# Patient Record
Sex: MALE | Race: WHITE | NOT HISPANIC OR LATINO | Employment: FULL TIME | URBAN - METROPOLITAN AREA
[De-identification: names, ages, dates, MRNs, and addresses within clinical notes are randomized per-mention and may not be internally consistent; named-entity substitution may affect disease eponyms.]

---

## 2019-10-25 ENCOUNTER — CONVERSION ENCOUNTER (OUTPATIENT)
Dept: FAMILY MEDICINE CLINIC | Facility: CLINIC | Age: 34
End: 2019-10-25

## 2019-10-25 ENCOUNTER — OFFICE VISIT CONVERTED (OUTPATIENT)
Dept: FAMILY MEDICINE CLINIC | Facility: CLINIC | Age: 34
End: 2019-10-25
Attending: FAMILY MEDICINE

## 2019-10-25 ENCOUNTER — HOSPITAL ENCOUNTER (OUTPATIENT)
Dept: FAMILY MEDICINE CLINIC | Facility: CLINIC | Age: 34
Discharge: HOME | End: 2019-10-25
Attending: FAMILY MEDICINE

## 2019-10-25 LAB
C TRACH RRNA CVX QL NAA+PROBE: NOT DETECTED
N GONORRHOEA DNA SPEC QL NAA+PROBE: NOT DETECTED

## 2019-10-27 LAB — HCV AB SER DONR QL: 0.1 S/CO RATIO (ref 0–0.9)

## 2019-10-28 LAB
CONV HEPATITIS B SURFACE AG W CONFIRMATION RE: NEGATIVE
CONV HIV COMBO AG/AB (HIV-1/O/2) WITH REFLEX: NEGATIVE
HBV CORE AB SER DONR QL IA: NEGATIVE
HBV CORE IGM SERPL QL IA: NEGATIVE
HBV E AB SERPL QL IA: NEGATIVE
HBV E AG SERPL QL IA: NEGATIVE
HBV SURFACE AB SER QL: NON REACTIVE

## 2019-12-27 ENCOUNTER — OFFICE VISIT CONVERTED (OUTPATIENT)
Dept: FAMILY MEDICINE CLINIC | Facility: CLINIC | Age: 34
End: 2019-12-27
Attending: FAMILY MEDICINE

## 2019-12-27 ENCOUNTER — HOSPITAL ENCOUNTER (OUTPATIENT)
Dept: FAMILY MEDICINE CLINIC | Facility: CLINIC | Age: 34
Discharge: HOME | End: 2019-12-27
Attending: FAMILY MEDICINE

## 2019-12-27 ENCOUNTER — CONVERSION ENCOUNTER (OUTPATIENT)
Dept: FAMILY MEDICINE CLINIC | Facility: CLINIC | Age: 34
End: 2019-12-27

## 2019-12-27 LAB
ALBUMIN SERPL-MCNC: 4.5 G/DL (ref 3.5–5)
ALBUMIN/GLOB SERPL: 1.4 {RATIO} (ref 1.4–2.6)
ALP SERPL-CCNC: 120 U/L (ref 53–128)
ALT SERPL-CCNC: 100 U/L (ref 10–40)
ANION GAP SERPL CALC-SCNC: 23 MMOL/L (ref 8–19)
AST SERPL-CCNC: 173 U/L (ref 15–50)
BILIRUB SERPL-MCNC: 1.13 MG/DL (ref 0.2–1.3)
BUN SERPL-MCNC: 10 MG/DL (ref 5–25)
BUN/CREAT SERPL: 10 {RATIO} (ref 6–20)
CALCIUM SERPL-MCNC: 8.7 MG/DL (ref 8.7–10.4)
CHLORIDE SERPL-SCNC: 97 MMOL/L (ref 99–111)
CHOLEST SERPL-MCNC: 240 MG/DL (ref 107–200)
CHOLEST/HDLC SERPL: 2.6 {RATIO} (ref 3–6)
CONV CO2: 23 MMOL/L (ref 22–32)
CONV TOTAL PROTEIN: 7.8 G/DL (ref 6.3–8.2)
CREAT UR-MCNC: 0.96 MG/DL (ref 0.7–1.2)
GFR SERPLBLD BASED ON 1.73 SQ M-ARVRAT: >60 ML/MIN/{1.73_M2}
GLOBULIN UR ELPH-MCNC: 3.3 G/DL (ref 2–3.5)
GLUCOSE SERPL-MCNC: 91 MG/DL (ref 70–99)
HDLC SERPL-MCNC: 91 MG/DL (ref 40–60)
LDLC SERPL CALC-MCNC: 129 MG/DL (ref 70–100)
OSMOLALITY SERPL CALC.SUM OF ELEC: 289 MOSM/KG (ref 273–304)
POTASSIUM SERPL-SCNC: 3.4 MMOL/L (ref 3.5–5.3)
SODIUM SERPL-SCNC: 140 MMOL/L (ref 135–147)
TRIGL SERPL-MCNC: 98 MG/DL (ref 40–150)
VLDLC SERPL-MCNC: 20 MG/DL (ref 5–37)

## 2020-01-30 ENCOUNTER — HOSPITAL ENCOUNTER (OUTPATIENT)
Dept: FAMILY MEDICINE CLINIC | Facility: CLINIC | Age: 35
Discharge: HOME | End: 2020-01-30
Attending: FAMILY MEDICINE

## 2020-01-30 LAB
ALBUMIN SERPL-MCNC: 4.3 G/DL (ref 3.5–5)
ALBUMIN/GLOB SERPL: 1.3 {RATIO} (ref 1.4–2.6)
ALP SERPL-CCNC: 131 U/L (ref 53–128)
ALT SERPL-CCNC: 112 U/L (ref 10–40)
ANION GAP SERPL CALC-SCNC: 18 MMOL/L (ref 8–19)
AST SERPL-CCNC: 245 U/L (ref 15–50)
BILIRUB SERPL-MCNC: 1.63 MG/DL (ref 0.2–1.3)
BUN SERPL-MCNC: 6 MG/DL (ref 5–25)
BUN/CREAT SERPL: 7 {RATIO} (ref 6–20)
CALCIUM SERPL-MCNC: 9 MG/DL (ref 8.7–10.4)
CHLORIDE SERPL-SCNC: 97 MMOL/L (ref 99–111)
CONV CO2: 30 MMOL/L (ref 22–32)
CONV TOTAL PROTEIN: 7.5 G/DL (ref 6.3–8.2)
CREAT UR-MCNC: 0.84 MG/DL (ref 0.7–1.2)
GFR SERPLBLD BASED ON 1.73 SQ M-ARVRAT: >60 ML/MIN/{1.73_M2}
GLOBULIN UR ELPH-MCNC: 3.2 G/DL (ref 2–3.5)
GLUCOSE SERPL-MCNC: 88 MG/DL (ref 70–99)
OSMOLALITY SERPL CALC.SUM OF ELEC: 291 MOSM/KG (ref 273–304)
POTASSIUM SERPL-SCNC: 3.2 MMOL/L (ref 3.5–5.3)
SODIUM SERPL-SCNC: 142 MMOL/L (ref 135–147)

## 2020-01-31 ENCOUNTER — OFFICE VISIT CONVERTED (OUTPATIENT)
Dept: FAMILY MEDICINE CLINIC | Facility: CLINIC | Age: 35
End: 2020-01-31
Attending: FAMILY MEDICINE

## 2020-02-03 ENCOUNTER — HOSPITAL ENCOUNTER (OUTPATIENT)
Dept: ULTRASOUND IMAGING | Facility: HOSPITAL | Age: 35
Discharge: HOME | End: 2020-02-03
Attending: FAMILY MEDICINE

## 2020-02-24 ENCOUNTER — OFFICE VISIT CONVERTED (OUTPATIENT)
Dept: SURGERY | Facility: CLINIC | Age: 35
End: 2020-02-24
Attending: SURGERY

## 2020-03-12 ENCOUNTER — HOSPITAL ENCOUNTER (OUTPATIENT)
Dept: PERIOP | Facility: HOSPITAL | Age: 35
Setting detail: HOSPITAL OUTPATIENT SURGERY
Discharge: HOME | End: 2020-03-12
Attending: SURGERY

## 2020-03-27 ENCOUNTER — CONVERSION ENCOUNTER (OUTPATIENT)
Dept: SURGERY | Facility: CLINIC | Age: 35
End: 2020-03-27

## 2020-03-27 ENCOUNTER — OFFICE VISIT CONVERTED (OUTPATIENT)
Dept: SURGERY | Facility: CLINIC | Age: 35
End: 2020-03-27
Attending: SURGERY

## 2020-04-16 ENCOUNTER — TELEMEDICINE CONVERTED (OUTPATIENT)
Dept: FAMILY MEDICINE CLINIC | Facility: CLINIC | Age: 35
End: 2020-04-16
Attending: FAMILY MEDICINE

## 2020-04-22 ENCOUNTER — HOSPITAL ENCOUNTER (OUTPATIENT)
Dept: LAB | Facility: HOSPITAL | Age: 35
Discharge: HOME | End: 2020-04-22
Attending: FAMILY MEDICINE

## 2020-04-22 LAB
ALBUMIN SERPL-MCNC: 4.2 G/DL (ref 3.5–5)
ALBUMIN/GLOB SERPL: 1.1 {RATIO} (ref 1.4–2.6)
ALP SERPL-CCNC: 115 U/L (ref 53–128)
ALT SERPL-CCNC: 63 U/L (ref 10–40)
ANION GAP SERPL CALC-SCNC: 21 MMOL/L (ref 8–19)
AST SERPL-CCNC: 120 U/L (ref 15–50)
BILIRUB SERPL-MCNC: 2.04 MG/DL (ref 0.2–1.3)
BUN SERPL-MCNC: 5 MG/DL (ref 5–25)
BUN/CREAT SERPL: 6 {RATIO} (ref 6–20)
CALCIUM SERPL-MCNC: 9.7 MG/DL (ref 8.7–10.4)
CHLORIDE SERPL-SCNC: 68 MMOL/L (ref 99–111)
CONV CO2: 43 MMOL/L (ref 22–32)
CONV TOTAL PROTEIN: 8 G/DL (ref 6.3–8.2)
CREAT UR-MCNC: 0.85 MG/DL (ref 0.7–1.2)
GFR SERPLBLD BASED ON 1.73 SQ M-ARVRAT: >60 ML/MIN/{1.73_M2}
GLOBULIN UR ELPH-MCNC: 3.8 G/DL (ref 2–3.5)
GLUCOSE SERPL-MCNC: 116 MG/DL (ref 70–99)
OSMOLALITY SERPL CALC.SUM OF ELEC: 268 MOSM/KG (ref 273–304)
POTASSIUM SERPL-SCNC: 2 MMOL/L (ref 3.5–5.3)
SODIUM SERPL-SCNC: 130 MMOL/L (ref 135–147)

## 2020-04-23 LAB — MAGNESIUM SERPL-MCNC: 1.54 MG/DL (ref 1.6–2.3)

## 2020-04-24 ENCOUNTER — HOSPITAL ENCOUNTER (OUTPATIENT)
Dept: LAB | Facility: HOSPITAL | Age: 35
Discharge: HOME | End: 2020-04-24
Attending: NURSE PRACTITIONER

## 2020-04-24 LAB
ALBUMIN SERPL-MCNC: 4.1 G/DL (ref 3.5–5)
ALBUMIN/GLOB SERPL: 1.1 {RATIO} (ref 1.4–2.6)
ALP SERPL-CCNC: 103 U/L (ref 53–128)
ALT SERPL-CCNC: 44 U/L (ref 10–40)
ANION GAP SERPL CALC-SCNC: 20 MMOL/L (ref 8–19)
AST SERPL-CCNC: 84 U/L (ref 15–50)
BILIRUB SERPL-MCNC: 1.97 MG/DL (ref 0.2–1.3)
BUN SERPL-MCNC: 5 MG/DL (ref 5–25)
BUN/CREAT SERPL: 5 {RATIO} (ref 6–20)
CALCIUM SERPL-MCNC: 9.3 MG/DL (ref 8.7–10.4)
CHLORIDE SERPL-SCNC: 72 MMOL/L (ref 99–111)
CONV CO2: 39 MMOL/L (ref 22–32)
CONV TOTAL PROTEIN: 7.8 G/DL (ref 6.3–8.2)
CREAT UR-MCNC: 1.11 MG/DL (ref 0.7–1.2)
GFR SERPLBLD BASED ON 1.73 SQ M-ARVRAT: >60 ML/MIN/{1.73_M2}
GLOBULIN UR ELPH-MCNC: 3.7 G/DL (ref 2–3.5)
GLUCOSE SERPL-MCNC: 160 MG/DL (ref 70–99)
MAGNESIUM SERPL-MCNC: 1.55 MG/DL (ref 1.6–2.3)
OSMOLALITY SERPL CALC.SUM OF ELEC: 269 MOSM/KG (ref 273–304)
POTASSIUM SERPL-SCNC: 2.3 MMOL/L (ref 3.5–5.3)
SODIUM SERPL-SCNC: 129 MMOL/L (ref 135–147)

## 2020-04-27 ENCOUNTER — OFFICE VISIT CONVERTED (OUTPATIENT)
Dept: FAMILY MEDICINE CLINIC | Facility: CLINIC | Age: 35
End: 2020-04-27
Attending: FAMILY MEDICINE

## 2020-04-27 ENCOUNTER — HOSPITAL ENCOUNTER (OUTPATIENT)
Dept: FAMILY MEDICINE CLINIC | Facility: CLINIC | Age: 35
Discharge: HOME | End: 2020-04-27
Attending: FAMILY MEDICINE

## 2020-04-27 LAB
ALBUMIN SERPL-MCNC: 3.7 G/DL (ref 3.5–5)
ALBUMIN/GLOB SERPL: 1 {RATIO} (ref 1.4–2.6)
ALP SERPL-CCNC: 81 U/L (ref 53–128)
ALT SERPL-CCNC: 29 U/L (ref 10–40)
ANION GAP SERPL CALC-SCNC: 20 MMOL/L (ref 8–19)
AST SERPL-CCNC: 57 U/L (ref 15–50)
BILIRUB SERPL-MCNC: 2.03 MG/DL (ref 0.2–1.3)
BUN SERPL-MCNC: 8 MG/DL (ref 5–25)
BUN/CREAT SERPL: 9 {RATIO} (ref 6–20)
CALCIUM SERPL-MCNC: 9.6 MG/DL (ref 8.7–10.4)
CHLORIDE SERPL-SCNC: 80 MMOL/L (ref 99–111)
CONV CO2: 35 MMOL/L (ref 22–32)
CONV TOTAL PROTEIN: 7.5 G/DL (ref 6.3–8.2)
CREAT UR-MCNC: 0.89 MG/DL (ref 0.7–1.2)
GFR SERPLBLD BASED ON 1.73 SQ M-ARVRAT: >60 ML/MIN/{1.73_M2}
GLOBULIN UR ELPH-MCNC: 3.8 G/DL (ref 2–3.5)
GLUCOSE SERPL-MCNC: 172 MG/DL (ref 70–99)
MAGNESIUM SERPL-MCNC: 1.72 MG/DL (ref 1.6–2.3)
OSMOLALITY SERPL CALC.SUM OF ELEC: 276 MOSM/KG (ref 273–304)
POTASSIUM SERPL-SCNC: 2.6 MMOL/L (ref 3.5–5.3)
SODIUM SERPL-SCNC: 132 MMOL/L (ref 135–147)

## 2020-05-04 ENCOUNTER — HOSPITAL ENCOUNTER (OUTPATIENT)
Dept: LAB | Facility: HOSPITAL | Age: 35
Discharge: HOME | End: 2020-05-04
Attending: FAMILY MEDICINE

## 2020-05-04 LAB
ALBUMIN SERPL-MCNC: 4 G/DL (ref 3.5–5)
ALBUMIN/GLOB SERPL: 1 {RATIO} (ref 1.4–2.6)
ALP SERPL-CCNC: 99 U/L (ref 53–128)
ALT SERPL-CCNC: 64 U/L (ref 10–40)
ANION GAP SERPL CALC-SCNC: 23 MMOL/L (ref 8–19)
AST SERPL-CCNC: 74 U/L (ref 15–50)
BILIRUB SERPL-MCNC: 0.94 MG/DL (ref 0.2–1.3)
BUN SERPL-MCNC: 7 MG/DL (ref 5–25)
BUN/CREAT SERPL: 8 {RATIO} (ref 6–20)
CALCIUM SERPL-MCNC: 9.9 MG/DL (ref 8.7–10.4)
CHLORIDE SERPL-SCNC: 89 MMOL/L (ref 99–111)
CONV CO2: 30 MMOL/L (ref 22–32)
CONV TOTAL PROTEIN: 8.2 G/DL (ref 6.3–8.2)
CREAT UR-MCNC: 0.88 MG/DL (ref 0.7–1.2)
EST. AVERAGE GLUCOSE BLD GHB EST-MCNC: 88 MG/DL
GFR SERPLBLD BASED ON 1.73 SQ M-ARVRAT: >60 ML/MIN/{1.73_M2}
GLOBULIN UR ELPH-MCNC: 4.2 G/DL (ref 2–3.5)
GLUCOSE SERPL-MCNC: 128 MG/DL (ref 70–99)
HBA1C MFR BLD: 4.7 % (ref 3.5–5.7)
OSMOLALITY SERPL CALC.SUM OF ELEC: 286 MOSM/KG (ref 273–304)
POTASSIUM SERPL-SCNC: 3.9 MMOL/L (ref 3.5–5.3)
SODIUM SERPL-SCNC: 138 MMOL/L (ref 135–147)

## 2020-05-15 ENCOUNTER — OFFICE VISIT CONVERTED (OUTPATIENT)
Dept: FAMILY MEDICINE CLINIC | Facility: CLINIC | Age: 35
End: 2020-05-15
Attending: FAMILY MEDICINE

## 2020-05-15 ENCOUNTER — HOSPITAL ENCOUNTER (OUTPATIENT)
Dept: FAMILY MEDICINE CLINIC | Facility: CLINIC | Age: 35
Discharge: HOME | End: 2020-05-15
Attending: FAMILY MEDICINE

## 2020-05-15 LAB
ALBUMIN SERPL-MCNC: 4 G/DL (ref 3.5–5)
ALBUMIN/GLOB SERPL: 1 {RATIO} (ref 1.4–2.6)
ALP SERPL-CCNC: 90 U/L (ref 53–128)
ALT SERPL-CCNC: 28 U/L (ref 10–40)
ANION GAP SERPL CALC-SCNC: 20 MMOL/L (ref 8–19)
AST SERPL-CCNC: 37 U/L (ref 15–50)
BILIRUB SERPL-MCNC: 0.84 MG/DL (ref 0.2–1.3)
BUN SERPL-MCNC: 6 MG/DL (ref 5–25)
BUN/CREAT SERPL: 6 {RATIO} (ref 6–20)
CALCIUM SERPL-MCNC: 9.8 MG/DL (ref 8.7–10.4)
CHLORIDE SERPL-SCNC: 91 MMOL/L (ref 99–111)
CONV CO2: 31 MMOL/L (ref 22–32)
CONV TOTAL PROTEIN: 7.9 G/DL (ref 6.3–8.2)
CREAT UR-MCNC: 0.99 MG/DL (ref 0.7–1.2)
GFR SERPLBLD BASED ON 1.73 SQ M-ARVRAT: >60 ML/MIN/{1.73_M2}
GLOBULIN UR ELPH-MCNC: 3.9 G/DL (ref 2–3.5)
GLUCOSE SERPL-MCNC: 138 MG/DL (ref 70–99)
OSMOLALITY SERPL CALC.SUM OF ELEC: 288 MOSM/KG (ref 273–304)
POTASSIUM SERPL-SCNC: 3.3 MMOL/L (ref 3.5–5.3)
SODIUM SERPL-SCNC: 139 MMOL/L (ref 135–147)

## 2020-06-12 ENCOUNTER — OFFICE VISIT CONVERTED (OUTPATIENT)
Dept: FAMILY MEDICINE CLINIC | Facility: CLINIC | Age: 35
End: 2020-06-12
Attending: FAMILY MEDICINE

## 2020-06-12 ENCOUNTER — HOSPITAL ENCOUNTER (OUTPATIENT)
Dept: FAMILY MEDICINE CLINIC | Facility: CLINIC | Age: 35
Discharge: HOME | End: 2020-06-12
Attending: FAMILY MEDICINE

## 2020-06-12 LAB
ALBUMIN SERPL-MCNC: 3.8 G/DL (ref 3.5–5)
ALBUMIN/GLOB SERPL: 1 {RATIO} (ref 1.4–2.6)
ALP SERPL-CCNC: 92 U/L (ref 53–128)
ALT SERPL-CCNC: 11 U/L (ref 10–40)
ANION GAP SERPL CALC-SCNC: 16 MMOL/L (ref 8–19)
AST SERPL-CCNC: 34 U/L (ref 15–50)
BILIRUB SERPL-MCNC: 0.78 MG/DL (ref 0.2–1.3)
BUN SERPL-MCNC: 3 MG/DL (ref 5–25)
BUN/CREAT SERPL: 3 {RATIO} (ref 6–20)
CALCIUM SERPL-MCNC: 9.7 MG/DL (ref 8.7–10.4)
CHLORIDE SERPL-SCNC: 102 MMOL/L (ref 99–111)
CONV CO2: 27 MMOL/L (ref 22–32)
CONV TOTAL PROTEIN: 7.5 G/DL (ref 6.3–8.2)
CREAT UR-MCNC: 0.91 MG/DL (ref 0.7–1.2)
GFR SERPLBLD BASED ON 1.73 SQ M-ARVRAT: >60 ML/MIN/{1.73_M2}
GLOBULIN UR ELPH-MCNC: 3.7 G/DL (ref 2–3.5)
GLUCOSE SERPL-MCNC: 78 MG/DL (ref 70–99)
OSMOLALITY SERPL CALC.SUM OF ELEC: 287 MOSM/KG (ref 273–304)
POTASSIUM SERPL-SCNC: 4 MMOL/L (ref 3.5–5.3)
SODIUM SERPL-SCNC: 141 MMOL/L (ref 135–147)

## 2020-12-21 ENCOUNTER — TELEMEDICINE CONVERTED (OUTPATIENT)
Dept: FAMILY MEDICINE CLINIC | Facility: CLINIC | Age: 35
End: 2020-12-21
Attending: FAMILY MEDICINE

## 2020-12-22 ENCOUNTER — HOSPITAL ENCOUNTER (OUTPATIENT)
Dept: FAMILY MEDICINE CLINIC | Facility: CLINIC | Age: 35
Discharge: HOME | End: 2020-12-22
Attending: FAMILY MEDICINE

## 2020-12-22 LAB
ALBUMIN SERPL-MCNC: 4.5 G/DL (ref 3.5–5)
ALBUMIN/GLOB SERPL: 1.3 {RATIO} (ref 1.4–2.6)
ALP SERPL-CCNC: 181 U/L (ref 53–128)
ALT SERPL-CCNC: 86 U/L (ref 10–40)
ANION GAP SERPL CALC-SCNC: 18 MMOL/L (ref 8–19)
AST SERPL-CCNC: 201 U/L (ref 15–50)
BILIRUB SERPL-MCNC: 1.48 MG/DL (ref 0.2–1.3)
BUN SERPL-MCNC: 10 MG/DL (ref 5–25)
BUN/CREAT SERPL: 11 {RATIO} (ref 6–20)
CALCIUM SERPL-MCNC: 9.5 MG/DL (ref 8.7–10.4)
CHLORIDE SERPL-SCNC: 96 MMOL/L (ref 99–111)
CONV CO2: 27 MMOL/L (ref 22–32)
CONV TOTAL PROTEIN: 7.9 G/DL (ref 6.3–8.2)
CREAT UR-MCNC: 0.92 MG/DL (ref 0.7–1.2)
GFR SERPLBLD BASED ON 1.73 SQ M-ARVRAT: >60 ML/MIN/{1.73_M2}
GLOBULIN UR ELPH-MCNC: 3.4 G/DL (ref 2–3.5)
GLUCOSE SERPL-MCNC: 101 MG/DL (ref 70–99)
OSMOLALITY SERPL CALC.SUM OF ELEC: 283 MOSM/KG (ref 273–304)
POTASSIUM SERPL-SCNC: 3.6 MMOL/L (ref 3.5–5.3)
SODIUM SERPL-SCNC: 137 MMOL/L (ref 135–147)

## 2021-05-12 NOTE — PROGRESS NOTES
Progress Note      Patient Name: Wai Gay   Patient ID: [id number]   Sex: Male   YOB: 1985    Primary Care Provider: Callum Peterson DO   Referring Provider: Callum Peterson DO    Visit Date: April 16, 2020    Provider: Callum Peterson DO   Location: [facility]   Location Address: [address] [place] [address]  [id number]   Location Phone: [phone]          Chief Complaint  · F/u for HTN, tobacco abuse, elevated liver enzymes      History Of Present Illness  Video Conferencing Visit  Wai Gay is a 34 year old /White male who is presenting for evaluation via video conferencing. Verbal consent obtained before beginning visit.   The following staff were present during this visit: Elba Tapia LPN   Wai Gay is a 34 year old /White male who presents for evaluation and treatment of: HTN and elevated LFT. He states that he has been checking his BP and it has been borderline. He is taking his medication daily.      He has his GB out a month ago for gallstones. He also had liver biopsy done at that time. I do not have those results. His pathology was consistent with early cirrhosis.  He states that he has a 20 oz beer per day.       Past Medical History  Disease Name Date Onset Notes   Alcohol abuse 03/14/2017 --    Essential hypertension 12/27/2019 Will start medication   Hypokalemia 03/14/2017 Will up date   Hyponatremia 03/14/2017 --    Steatohepatitis, alcoholic 03/14/2017 --    Tobacco abuse 03/14/2017 --          Past Surgical History  Procedure Name Date Notes   Cholecystectomy --  --          Allergy List  Allergen Name Date Reaction Notes   NO KNOWN DRUG ALLERGIES --  --  --    No known history of drug allergy --  --  --          Family Medical History  Disease Name Relative/Age Notes   Stroke Grandfather (maternal)/  Grandmother (maternal)/   --    Heart Disease Grandfather (maternal)/  Grandmother (maternal)/   --   "  Hypertension Grandmother (maternal)/   --    Lung cancer Grandfather (paternal)/  Grandmother (maternal)/   --    Diabetes Grandmother (maternal)/  Mother/   --          Social History  Finding Status Start/Stop Quantity Notes   Alcohol Current some day --/-- 4 serv/week --    Tobacco Current every day 18/-- 1/2-1ppd --          Review of Systems  · Constitutional  o Denies  o : fatigue  · HENT  o Denies  o : headaches  · Cardiovascular  o Denies  o : chest pain, irregular heart beats, rapid heart rate, dyspnea on exertion  · Gastrointestinal  o Denies  o : nausea, vomiting, diarrhea, constipation, abdominal pain      Vitals  Date Time BP Position Site L\R Cuff Size HR RR TEMP (F) WT  HT  BMI kg/m2 BSA m2 O2 Sat HC       01/31/2020 03:43 /96 Sitting    113 - R   186lbs 4oz 5'  8\" 28.32 2.01 98 %    02/24/2020 03:15 PM       14  181lbs 2oz 5'  8\" 27.54 1.99     03/27/2020 10:00 AM       16  180lbs 0oz 5'  8\" 27.37 1.98           Physical Examination  · Constitutional  o Appearance  o : well-nourished, well developed, no obvious deformities present  · Head and Face  o Head  o :   § Inspection  § : atraumatic, normocephalic  o Face  o :   § Inspection  § : no facial lesions  · Respiratory  o Respiratory Effort  o : breathing unlabored          Assessment  · Essential hypertension     401.9/I10  HIs BP is borderline. He needs to quit drinking, smoking and weight loss.   · Hypokalemia     276.8/E87.6  Will up date  · Hyponatremia     276.1/E87.1  · Steatohepatitis, alcoholic     571.1/K70.10  he states that he drink little, but with cirrhosis it is probably more then he lets on  · Tobacco abuse     305.1/Z72.0      Plan  · Orders  o [facility] ([id number]) - 276.8/E87.6, 276.1/E87.1, 571.1/K70.10, 401.9/I10 - 04/16/2020  o ACO-39: Current medications updated and reviewed () - - 04/16/2020  · Medications  o Medications have been Reconciled  o Transition of Care or Provider Policy  · Instructions  o Patient is taking " medications as prescribed and doing well.   o Take all medications as prescribed/directed.  o Patient instructed/educated on their diet and exercise program.  o Patient was educated/instructed on their diagnosis, treatment and medications prior to discharge from the clinic today.  o Patient instructed to seek medical attention urgently for new or worsening symptoms.  o Call the office with any concerns or questions.  o Bring all medicines with their bottles to each office visit.  · Disposition  o Call or Return if symptoms worsen or persist.  o Return Visit Request in/on 2 months +/- 2 days ([id number]).            Electronically Signed by: Callum Peterson, DO -Author on April 16, 2020 03:56:46 PM

## 2021-05-12 NOTE — PROGRESS NOTES
"   Progress Note      Patient Name: Wai Gay   Patient ID: [id number]   Sex: Male   YOB: 1985    Primary Care Provider: Callum Peterson DO   Referring Provider: Callum Peterson DO    Visit Date: March 27, 2020    Provider: David Spencer MD   Location: [facility]   Location Address: [address] [place] [address]  [id number]   Location Phone: [phone]          Chief Complaint  · Follow Up Office Visit      History Of Present Illness     Wai came in today for evaluation. He is a gentleman that recently had a laparoscopic cholecystectomy. At the time of his surgery, he appeared to have cirrhosis and we went ahead and did a liver biopsy. It turns out that he did have early changes consistent with cirrhosis. He does drink alcohol daily.       Past Medical History  ***No Significant Medical History; Alcohol abuse; Essential hypertension; Hypokalemia; Hyponatremia; Steatohepatitis, alcoholic; Tobacco abuse         Past Surgical History  *I have had no surgeries; Cholecystectomy         Medication List  triamcinolone acetonide 0.1 % topical cream         Allergy List  NO KNOWN DRUG ALLERGIES; No known history of drug allergy         Family Medical History  Stroke; Heart Disease; Hypertension; Lung cancer; Diabetes         Social History  Alcohol (Current some day); Tobacco (Current every day)         Review of Systems  · Cardiovascular  o Denies  o : chest pain, irregular heart beats, rapid heart rate, chest pain on exertion, shortness of breath, lower extremity swelling  · Respiratory  o Denies  o : shortness of breath, wheezing, cough, wheezing, chronic cough, coughing up blood  · Gastrointestinal  o Denies  o : nausea, vomiting, diarrhea, chronic abdominal pain, reflux symptoms      Vitals  Date Time BP Position Site L\R Cuff Size HR RR TEMP (F) WT  HT  BMI kg/m2 BSA m2 O2 Sat HC       03/27/2020 10:00 AM       16  180lbs 0oz 5'  8\" 27.37 1.98           Physical Examination   "   Today on physical exam, he appears well today. His incisions look good.           Assessment  · Postoperative Exam Following Surgery     V67.00       Status post recent laparoscopic cholecystectomy. He did have gallstones but he also had evidence of cirrhosis on visual inspection and his biopsy came back consistent with that.       Plan  · Medications  o Medications have been Reconciled  o Transition of Care or Provider Policy     We will let him go back to work next week and then we are also going to refer him to one of the gastroenterologists for chronic monitoring of his cirrhosis.             Electronically Signed by: Ashley Lomax-, -Author on March 27, 2020 12:41:37 PM  Electronically Co-signed by: David Spencer MD -Reviewer on March 30, 2020 02:00:55 PM

## 2021-05-12 NOTE — PROGRESS NOTES
Progress Note      Patient Name: Wai Gay   Patient ID: [id number]   Sex: Male   YOB: 1985    Primary Care Provider: Callum Peterson DO   Referring Provider: Callum Peterson DO    Visit Date: April 27, 2020    Provider: Callum Peterson DO   Location: [facility]   Location Address: [address] [place] [address]  [id number]   Location Phone: [phone]          Chief Complaint  · Follow up - Potassium Labs  · pt has dry red spots all around nose mouth and cheeks on face      History Of Present Illness  Wai Gay is a 34 year old /White male who presents for evaluation and treatment of: abnormal electrolytes. He states that he has not drank in 6 days. He states that he feels good.       Past Medical History  Disease Name Date Onset Notes   Alcohol abuse 03/14/2017 --    Essential hypertension 12/27/2019 Will start medication   Hypokalemia 03/14/2017 Will up date   Hyponatremia 03/14/2017 --    Steatohepatitis, alcoholic 03/14/2017 --    Tobacco abuse 03/14/2017 --          Past Surgical History  Procedure Name Date Notes   Cholecystectomy --  --          Medication List  Name Date Started Instructions   potassium chloride 20 mEq oral tablet extended release 04/22/2020 take 4 tablets (80 meq) PO x 2 days then decrease to 1 tablet (20meq) PO qd.         Allergy List  Allergen Name Date Reaction Notes   NO KNOWN DRUG ALLERGIES --  --  --    No known history of drug allergy --  --  --          Family Medical History  Disease Name Relative/Age Notes   Stroke Grandfather (maternal)/  Grandmother (maternal)/   --    Heart Disease Grandfather (maternal)/  Grandmother (maternal)/   --    Hypertension Grandmother (maternal)/   --    Lung cancer Grandfather (paternal)/  Grandmother (maternal)/   --    Diabetes Grandmother (maternal)/  Mother/   --          Social History  Finding Status Start/Stop Quantity Notes   Alcohol Current some day --/-- 4 serv/week --   "  Tobacco Current every day 18/-- 1/2-1ppd --          Review of Systems  · Constitutional  o Denies  o : fatigue, loss of appetite  · Cardiovascular  o Denies  o : chest pain, irregular heart beats, rapid heart rate  · Gastrointestinal  o Denies  o : nausea, vomiting, diarrhea, constipation, jaundice, blood in stools, melena      Vitals  Date Time BP Position Site L\R Cuff Size HR RR TEMP (F) WT  HT  BMI kg/m2 BSA m2 O2 Sat HC       12/27/2019 02:17 /111 Sitting    108 - R  98.3 191lbs 4oz 5'  8\" 29.08 2.04 97 %    01/31/2020 03:43 /96 Sitting    113 - R   186lbs 4oz 5'  8\" 28.32 2.01 98 %    04/27/2020 03:53 /64 Sitting    145 - R  100.2 167lbs 1oz 5'  8\" 25.4 1.91 96 %          Physical Examination  · Constitutional  o Appearance  o : well-nourished, well developed, no obvious deformities present  · Respiratory  o Respiratory Effort  o : breathing unlabored, no accessory muscle use  o Auscultation of Lungs  o : normal breath sounds  · Cardiovascular  o Heart  o :   § Auscultation of Heart  § : regular rate, normal rhythm, no murmurs present  · Gastrointestinal  o Abdominal Examination  o : abdomen nontender to palpation, normal bowel sounds, tone normal without rigidity or guarding, no masses present  o Liver and spleen  o : no hepatomegaly present          Assessment  · Alcohol abuse     305.00/F10.10  · Hypokalemia     276.8/E87.6  Will up date  · Hyponatremia     276.1/E87.1  will update   · Steatohepatitis, alcoholic     571.1/K70.10  encouraged him to continue to abstain from ETOH  · Tobacco abuse     305.1/Z72.0  he is smoking less      Plan  · Orders  o Brief face-to-face behavioral counseling for alcohol misuse, 15 minutes () - 305.00/F10.10 - 04/27/2020  o [facility] ([id number]) - 276.8/E87.6, 276.1/E87.1, 571.1/K70.10 - 04/27/2020  o ACO-39: Current medications updated and reviewed () - - 04/27/2020  o Magnesium, serum (36761) - 305.00/F10.10 - " 04/27/2020  · Medications  o Medications have been Reconciled  o Transition of Care or Provider Policy  · Instructions  o Counseled patient on harms of alcohol misuse and encouraged cessation of alcohol intake (15 minutes).  o Patient is taking medications as prescribed and doing well.   o Take all medications as prescribed/directed.  o Patient instructed/educated on their diet and exercise program.  o Patient was educated/instructed on their diagnosis, treatment and medications prior to discharge from the clinic today.  o Patient instructed to seek medical attention urgently for new or worsening symptoms.  o Call the office with any concerns or questions.  o Bring all medicines with their bottles to each office visit.  · Disposition  o Call or Return if symptoms worsen or persist.  o Return Visit Request in/on 2 weeks +/- 2 days ([id number]).            Electronically Signed by: Callum Peterson DO -Author on April 27, 2020 04:09:46 PM

## 2021-05-13 NOTE — PROGRESS NOTES
Progress Note      Patient Name: Wai Gay   Patient ID: [id number]   Sex: Male   YOB: 1985    Primary Care Provider: Callum Peterson DO   Referring Provider: Callum Peterson DO    Visit Date: May 15, 2020    Provider: Callum Peterson DO   Location: [facility]   Location Address: [address] [place] [address]  [id number]   Location Phone: [phone]          Chief Complaint  · 2 week follow up - Alcohol abuse, Alcoholic, Tobacco Abuse, Hypokalemia, hypoatremia, Stratohhepatitis      History Of Present Illness  Wai Gay is a 34 year old /White male who presents for evaluation and treatment of: alcohol abuse, elevated LFT and electrolyte abnormalities. He states that he is no longer drinking and denies any illicit substance use.       Past Medical History  Disease Name Date Onset Notes   Alcohol abuse 03/14/2017 --    Essential hypertension 12/27/2019 Will start medication   Hypokalemia 03/14/2017 Will up date   Hyponatremia 03/14/2017 --    Steatohepatitis, alcoholic 03/14/2017 --    Tobacco abuse 03/14/2017 --          Past Surgical History  Procedure Name Date Notes   Cholecystectomy --  --          Medication List  Name Date Started Instructions   potassium chloride 20 mEq oral tablet extended release 04/22/2020 take 4 tablets (80 meq) PO x 2 days then decrease to 1 tablet (20meq) PO qd.         Allergy List  Allergen Name Date Reaction Notes   NO KNOWN DRUG ALLERGIES --  --  --    No known history of drug allergy --  --  --          Family Medical History  Disease Name Relative/Age Notes   Stroke Grandfather (maternal)/  Grandmother (maternal)/   --    Heart Disease Grandfather (maternal)/  Grandmother (maternal)/   --    Hypertension Grandmother (maternal)/   --    Lung cancer Grandfather (paternal)/  Grandmother (maternal)/   --    Diabetes Grandmother (maternal)/  Mother/   --          Social History  Finding Status Start/Stop Quantity Notes  "  Alcohol Current some day --/-- 4 serv/week --    Tobacco Current every day 18/-- 1/2-1ppd --          Review of Systems  · Constitutional  o Denies  o : fatigue  · Gastrointestinal  o Denies  o : nausea, vomiting, diarrhea, constipation, abdominal pain  · Psychiatric  o Denies  o : anxiety, depression      Vitals  Date Time BP Position Site L\R Cuff Size HR RR TEMP (F) WT  HT  BMI kg/m2 BSA m2 O2 Sat HC       01/31/2020 03:43 /96 Sitting    113 - R   186lbs 4oz 5'  8\" 28.32 2.01 98 %    04/27/2020 03:53 /64 Sitting    145 - R  100.2 167lbs 1oz 5'  8\" 25.4 1.91 96 %    05/15/2020 02:50 PM 98/83 Sitting    134 - R  98.4 160lbs 9oz 5'  8\" 24.41 1.87 98 %          Physical Examination  · Constitutional  o Appearance  o : well-nourished, well developed, no obvious deformities present  · Respiratory  o Respiratory Effort  o : breathing unlabored, no accessory muscle use  o Auscultation of Lungs  o : normal breath sounds  · Cardiovascular  o Heart  o :   § Auscultation of Heart  § : regular rate, normal rhythm, no murmurs present  · Gastrointestinal  o Abdominal Examination  o : abdomen nontender to palpation, normal bowel sounds, tone normal without rigidity or guarding  o Liver and spleen  o : no hepatomegaly present          Assessment  · Hypokalemia     276.8/E87.6  Will up date  · Hyponatremia     276.1/E87.1  will update   · Steatohepatitis, alcoholic     571.1/K70.10  will update his LFT  · Tobacco abuse     305.1/Z72.0  encouraged to quit.       Plan  · Orders  o CMP [facility] ([id number]) - 276.8/E87.6, 276.1/E87.1, 571.1/K70.10, 305.1/Z72.0 - 05/15/2020  o ACO-39: Current medications updated and reviewed () - - 05/15/2020  · Medications  o Medications have been Reconciled  o Transition of Care or Provider Policy  · Instructions  o Patient is taking medications as prescribed and doing well.   o Take all medications as prescribed/directed.  o Patient instructed/educated on their diet and exercise " program.  o Patient was educated/instructed on their diagnosis, treatment and medications prior to discharge from the clinic today.  o Patient instructed to seek medical attention urgently for new or worsening symptoms.  o Call the office with any concerns or questions.  o Bring all medicines with their bottles to each office visit.  · Disposition  o Call or Return if symptoms worsen or persist.  o Return Visit Request in/on 4 weeks +/- 2 days ([id number]).            Electronically Signed by: Callum Peterson DO -Author on May 15, 2020 03:24:40 PM

## 2021-05-13 NOTE — PROGRESS NOTES
Progress Note      Patient Name: Wai Gay   Patient ID: [id number]   Sex: Male   YOB: 1985    Primary Care Provider: Callum Peterson DO   Referring Provider: Callum Peterson DO    Visit Date: June 12, 2020    Provider: Callum Peterson DO   Location: [facility]   Location Address: [address] [place] [address]  [id number]   Location Phone: [phone]          Chief Complaint  · 4 week follow up - Hypokalemia, Hypoantremia      History Of Present Illness  Wai Gay is a 34 year old /White male who presents for evaluation and treatment of: HYPOnatremia and kalemia. He is taking his K+ daily as prescribed. NO ETOH.       Past Medical History  Disease Name Date Onset Notes   Alcohol abuse 03/14/2017 --    Essential hypertension 12/27/2019 Will start medication   Hypokalemia 03/14/2017 Will up date   Hyponatremia 03/14/2017 --    Steatohepatitis, alcoholic 03/14/2017 --    Tobacco abuse 03/14/2017 --          Past Surgical History  Procedure Name Date Notes   Cholecystectomy --  --          Medication List  Name Date Started Instructions   potassium chloride 20 mEq oral tablet extended release 05/20/2020 take 1 tablet (20 meq) by oral route once daily with food for 30 days         Allergy List  Allergen Name Date Reaction Notes   NO KNOWN DRUG ALLERGIES --  --  --    No known history of drug allergy --  --  --          Family Medical History  Disease Name Relative/Age Notes   Stroke Grandfather (maternal)/  Grandmother (maternal)/   --    Heart Disease Grandfather (maternal)/  Grandmother (maternal)/   --    Hypertension Grandmother (maternal)/   --    Lung cancer Grandfather (paternal)/  Grandmother (maternal)/   --    Diabetes Grandmother (maternal)/  Mother/   --          Social History  Finding Status Start/Stop Quantity Notes   Alcohol Current some day --/-- 4 serv/week --    Tobacco Current every day 18/-- 1/2-1ppd --          Review of  "Systems  · Constitutional  o Denies  o : fatigue  · Cardiovascular  o Denies  o : chest pain, irregular heart beats, rapid heart rate  · Gastrointestinal  o Denies  o : nausea, vomiting, diarrhea, constipation      Vitals  Date Time BP Position Site L\R Cuff Size HR RR TEMP (F) WT  HT  BMI kg/m2 BSA m2 O2 Sat HC       04/27/2020 03:53 /64 Sitting    145 - R  100.2 167lbs 1oz 5'  8\" 25.4 1.91 96 %    05/15/2020 02:50 PM 98/83 Sitting    134 - R  98.4 160lbs 9oz 5'  8\" 24.41 1.87 98 %    06/12/2020 03:56 /76 Sitting    103 - R  98.2 159lbs 2oz 5'  8\" 24.19 1.86 99 %          Physical Examination  · Constitutional  o Appearance  o : well-nourished, well developed, no obvious deformities present  · Respiratory  o Respiratory Effort  o : breathing unlabored, no accessory muscle use  o Auscultation of Lungs  o : normal breath sounds  · Cardiovascular  o Heart  o :   § Auscultation of Heart  § : regular rate, normal rhythm, no murmurs present  · Gastrointestinal  o Abdominal Examination  o : abdomen nontender to palpation, normal bowel sounds, tone normal without rigidity or guarding, no masses present  o Liver and spleen  o : no hepatomegaly present          Assessment  · Hypokalemia     276.8/E87.6  Will up date  · Hyponatremia     276.1/E87.1  will update. Encouraged with the hot weather to rehydrate with electrolyte solutions  · Steatohepatitis, alcoholic     571.1/K70.10  will repeat. He has not had any alcohol in several months      Plan  · Orders  o CMP [facility] ([id number]) - 276.8/E87.6, 276.1/E87.1, 571.1/K70.10 - 06/12/2020  o ACO-39: Current medications updated and reviewed () - - 06/12/2020  · Medications  o Medications have been Reconciled  o Transition of Care or Provider Policy  · Instructions  o Patient is taking medications as prescribed and doing well.   o Take all medications as prescribed/directed.  o Patient instructed/educated on their diet and exercise program.  o Patient was " educated/instructed on their diagnosis, treatment and medications prior to discharge from the clinic today.  o Patient instructed to seek medical attention urgently for new or worsening symptoms.  o Call the office with any concerns or questions.  o Bring all medicines with their bottles to each office visit.  · Disposition  o Call or Return if symptoms worsen or persist.  o Return Visit Request in/on 3 months +/- 2 days ([id number]).            Electronically Signed by: Callum Peterson DO -Author on June 12, 2020 04:36:13 PM

## 2021-05-14 NOTE — PROGRESS NOTES
Progress Note      Patient Name: Wai Gay   Patient ID: [id number]   Sex: Male   YOB: 1985    Primary Care Provider: Callum Peterson DO   Referring Provider: Callum Peterson DO    Visit Date: December 21, 2020    Provider: Callum Peterson DO   Location: [facility]   Location Address: [address] [place] [address]  [id number]   Location Phone: [phone]          Chief Complaint  · Follow up - Hyoialenia, Hyponatremia, Steatohepatits alcoholic  · medication refills      History Of Present Illness  Video Conferencing Visit  Wai Gay is a 35 year old /White male who is presenting for evaluation via video conferencing via ZOOM. Verbal consent obtained before beginning visit.   The following staff were present during this visit: Hamida Tate CMA   Wai Gay is a 35 year old /White male who presents for evaluation and treatment of: h/o alcohol abuse, fatty liver, and HYPOnatremia and kalemia. He states that he is doing well. Denies any alcohol use.      He is still smoking, but iinfrequently       Past Medical History  Disease Name Date Onset Notes   Alcohol abuse 03/14/2017 --    Essential hypertension 12/27/2019 Will start medication   Hypokalemia 03/14/2017 Will up date   Hyponatremia 03/14/2017 --    Steatohepatitis, alcoholic 03/14/2017 --    Tobacco abuse 03/14/2017 --          Past Surgical History  Procedure Name Date Notes   Cholecystectomy --  --          Medication List  Name Date Started Instructions   potassium chloride 20 mEq oral tablet extended release 05/20/2020 take 1 tablet (20 meq) by oral route once daily with food for 30 days   POTASSIUM CHLORIDE 20MEQ ER TABLETS 11/29/2020 TAKE 1 TABLET(20 MEQ) BY MOUTH EVERY DAY WITH FOOD         Allergy List  Allergen Name Date Reaction Notes   NO KNOWN DRUG ALLERGIES --  --  --    No known history of drug allergy --  --  --          Family Medical  "History  Disease Name Relative/Age Notes   Stroke Grandfather (maternal)/  Grandmother (maternal)/   --    Heart Disease Grandfather (maternal)/  Grandmother (maternal)/   --    Hypertension Grandmother (maternal)/   --    Lung cancer Grandfather (paternal)/  Grandmother (maternal)/   --    Diabetes Grandmother (maternal)/  Mother/   --          Social History  Finding Status Start/Stop Quantity Notes   Alcohol Current some day --/-- 4 serv/week --    Tobacco Current every day 18/-- 1/2-1ppd --          Review of Systems  · Constitutional  o Denies  o : fatigue  · HENT  o Denies  o : headaches  · Cardiovascular  o Denies  o : chest pain, irregular heart beats, rapid heart rate  · Respiratory  o Denies  o : shortness of breath, wheezing, cough, dyspnea on exertion  · Gastrointestinal  o Denies  o : nausea, vomiting, diarrhea  · Musculoskeletal  o Denies  o : muscle cramps      Vitals  Date Time BP Position Site L\R Cuff Size HR RR TEMP (F) WT  HT  BMI kg/m2 BSA m2 O2 Sat FR L/min FiO2 HC       04/27/2020 03:53 /64 Sitting    145 - R  100.2 167lbs 1oz 5'  8\" 25.4 1.91 96 %  21%    05/15/2020 02:50 PM 98/83 Sitting    134 - R  98.4 160lbs 9oz 5'  8\" 24.41 1.87 98 %  21%    06/12/2020 03:56 /76 Sitting    103 - R  98.2 159lbs 2oz 5'  8\" 24.19 1.86 99 %  21%          Physical Examination  · Constitutional  o Appearance  o : well-nourished, well developed, no obvious deformities present  · Head and Face  o Head  o :   § Inspection  § : atraumatic, normocephalic  o Face  o :   § Inspection  § : no facial lesions  · Respiratory  o Respiratory Effort  o : breathing unlabored, no accessory muscle use  · Neurologic  o Mental Status Examination  o :   § Orientation  § : grossly oriented to person, place and time  § Speech/Language  § : speech development normal for age, level of language comprehension normal for age, communication ability within normal limits, rate or speech normal  · Psychiatric  o Judgement and " Insight  o : judgement and insight intact, judgement for everyday activities and social situations within normal limits  o Thought Processes  o : rate of thoughts normal, thought content logical  o Mood and Affect  o : mood normal, affect appropriate          Assessment  · Hypokalemia     276.8/E87.6  Will up date  · Hyponatremia     276.1/E87.1  will update   · Steatohepatitis, alcoholic     571.1/K70.10  will update his LFT  · Tobacco abuse     305.1/Z72.0  encouraged him to not smoke any      Plan  · Orders  o CMP [facility] ([id number]) - 276.8/E87.6, 276.1/E87.1, 571.1/K70.10 - 12/21/2020  o ACO-39: Current medications updated and reviewed (, 1159F) - - 12/21/2020  o ACO-14: Influenza immunization was not administered for reasons documented [facility] () - - 12/21/2020   pt refused  · Medications  o potassium chloride 20 mEq oral tablet extended release   SIG: take 1 tablet (20 meq) by oral route once daily with food for 30 days   DISP: (30) Tablet with 11 refills  Adjusted on 12/21/2020     o POTASSIUM CHLORIDE 20MEQ ER TABLETS   SIG: TAKE 1 TABLET(20 MEQ) BY MOUTH EVERY DAY WITH FOOD   DISP: (30) Tablet with -1 refills  Discontinued on 12/21/2020     o Medications have been Reconciled  o Transition of Care or Provider Policy  · Instructions  o Patient is taking medications as prescribed and doing well.   o Take all medications as prescribed/directed.  o Patient instructed/educated on their diet and exercise program.  o Patient was educated/instructed on their diagnosis, treatment and medications prior to discharge from the clinic today.  o Patient instructed to seek medical attention urgently for new or worsening symptoms.  o Call the office with any concerns or questions.  o Bring all medicines with their bottles to each office visit.  · Disposition  o Call or Return if symptoms worsen or persist.  o Return Visit Request in/on 1 year +/- 2 days ([id number]).            Electronically Signed by: Callum Peterson DO  -Author on December 21, 2020 03:39:43 PM

## 2021-05-15 VITALS
WEIGHT: 191.25 LBS | HEIGHT: 68 IN | HEART RATE: 108 BPM | DIASTOLIC BLOOD PRESSURE: 111 MMHG | TEMPERATURE: 98.3 F | OXYGEN SATURATION: 97 % | BODY MASS INDEX: 28.99 KG/M2 | SYSTOLIC BLOOD PRESSURE: 153 MMHG

## 2021-05-15 VITALS
BODY MASS INDEX: 24.33 KG/M2 | OXYGEN SATURATION: 98 % | HEIGHT: 68 IN | SYSTOLIC BLOOD PRESSURE: 98 MMHG | TEMPERATURE: 98.4 F | WEIGHT: 160.56 LBS | DIASTOLIC BLOOD PRESSURE: 83 MMHG | HEART RATE: 134 BPM

## 2021-05-15 VITALS
OXYGEN SATURATION: 96 % | SYSTOLIC BLOOD PRESSURE: 107 MMHG | HEART RATE: 145 BPM | HEIGHT: 68 IN | WEIGHT: 167.06 LBS | BODY MASS INDEX: 25.32 KG/M2 | DIASTOLIC BLOOD PRESSURE: 64 MMHG | TEMPERATURE: 100.2 F

## 2021-05-15 VITALS
OXYGEN SATURATION: 99 % | HEART RATE: 103 BPM | BODY MASS INDEX: 24.12 KG/M2 | SYSTOLIC BLOOD PRESSURE: 113 MMHG | WEIGHT: 159.12 LBS | HEIGHT: 68 IN | DIASTOLIC BLOOD PRESSURE: 76 MMHG | TEMPERATURE: 98.2 F

## 2021-05-15 VITALS
SYSTOLIC BLOOD PRESSURE: 164 MMHG | HEIGHT: 68 IN | BODY MASS INDEX: 28.55 KG/M2 | DIASTOLIC BLOOD PRESSURE: 107 MMHG | HEART RATE: 93 BPM | WEIGHT: 188.37 LBS | OXYGEN SATURATION: 100 %

## 2021-05-15 VITALS — BODY MASS INDEX: 27.45 KG/M2 | HEIGHT: 68 IN | WEIGHT: 181.12 LBS | RESPIRATION RATE: 14 BRPM

## 2021-05-15 VITALS — RESPIRATION RATE: 16 BRPM | BODY MASS INDEX: 27.28 KG/M2 | WEIGHT: 180 LBS | HEIGHT: 68 IN

## 2021-05-15 VITALS
SYSTOLIC BLOOD PRESSURE: 135 MMHG | OXYGEN SATURATION: 98 % | BODY MASS INDEX: 28.23 KG/M2 | DIASTOLIC BLOOD PRESSURE: 96 MMHG | HEART RATE: 113 BPM | WEIGHT: 186.25 LBS | HEIGHT: 68 IN

## 2021-09-07 ENCOUNTER — APPOINTMENT (OUTPATIENT)
Dept: CT IMAGING | Facility: HOSPITAL | Age: 36
End: 2021-09-07

## 2021-09-07 ENCOUNTER — APPOINTMENT (OUTPATIENT)
Dept: GENERAL RADIOLOGY | Facility: HOSPITAL | Age: 36
End: 2021-09-07

## 2021-09-07 ENCOUNTER — HOSPITAL ENCOUNTER (INPATIENT)
Facility: HOSPITAL | Age: 36
LOS: 2 days | Discharge: HOME | End: 2021-09-09
Attending: EMERGENCY MEDICINE | Admitting: FAMILY MEDICINE

## 2021-09-07 DIAGNOSIS — K72.00 ACUTE LIVER FAILURE WITHOUT HEPATIC COMA: Primary | ICD-10-CM

## 2021-09-07 DIAGNOSIS — D72.829 LEUKOCYTOSIS, UNSPECIFIED TYPE: ICD-10-CM

## 2021-09-07 DIAGNOSIS — E87.6 HYPOKALEMIA: ICD-10-CM

## 2021-09-07 DIAGNOSIS — K70.31 ALCOHOLIC CIRRHOSIS OF LIVER WITH ASCITES (HCC): ICD-10-CM

## 2021-09-07 DIAGNOSIS — R00.0 TACHYCARDIA: ICD-10-CM

## 2021-09-07 DIAGNOSIS — J90 PLEURAL EFFUSION: ICD-10-CM

## 2021-09-07 DIAGNOSIS — E87.1 HYPONATREMIA: ICD-10-CM

## 2021-09-07 DIAGNOSIS — K70.31 ASCITES DUE TO ALCOHOLIC CIRRHOSIS (HCC): ICD-10-CM

## 2021-09-07 LAB
ALBUMIN SERPL-MCNC: 3 G/DL (ref 3.5–5.2)
ALBUMIN/GLOB SERPL: 0.9 G/DL
ALP SERPL-CCNC: 222 U/L (ref 39–117)
ALT SERPL W P-5'-P-CCNC: 34 U/L (ref 1–41)
AMMONIA BLD-SCNC: 41 UMOL/L (ref 16–60)
ANION GAP SERPL CALCULATED.3IONS-SCNC: 11.4 MMOL/L (ref 5–15)
ANION GAP SERPL CALCULATED.3IONS-SCNC: 12.6 MMOL/L (ref 5–15)
ANISOCYTOSIS BLD QL: NORMAL
AST SERPL-CCNC: 141 U/L (ref 1–40)
BACTERIA UR QL AUTO: ABNORMAL /HPF
BASOPHILS # BLD AUTO: 0.05 10*3/MM3 (ref 0–0.2)
BASOPHILS # BLD AUTO: 0.07 10*3/MM3 (ref 0–0.2)
BASOPHILS NFR BLD AUTO: 0.3 % (ref 0–1.5)
BASOPHILS NFR BLD AUTO: 0.6 % (ref 0–1.5)
BILIRUB SERPL-MCNC: 23.9 MG/DL (ref 0–1.2)
BILIRUB UR QL STRIP: ABNORMAL
BUN SERPL-MCNC: 8 MG/DL (ref 6–20)
BUN SERPL-MCNC: 8 MG/DL (ref 6–20)
BUN/CREAT SERPL: 11.3 (ref 7–25)
BUN/CREAT SERPL: 9.9 (ref 7–25)
CALCIUM SPEC-SCNC: 8 MG/DL (ref 8.6–10.5)
CALCIUM SPEC-SCNC: 8.6 MG/DL (ref 8.6–10.5)
CHLORIDE SERPL-SCNC: 85 MMOL/L (ref 98–107)
CHLORIDE SERPL-SCNC: 87 MMOL/L (ref 98–107)
CLARITY UR: CLEAR
CO2 SERPL-SCNC: 28.4 MMOL/L (ref 22–29)
CO2 SERPL-SCNC: 28.6 MMOL/L (ref 22–29)
COLOR UR: YELLOW
CREAT SERPL-MCNC: 0.71 MG/DL (ref 0.76–1.27)
CREAT SERPL-MCNC: 0.81 MG/DL (ref 0.76–1.27)
DEPRECATED RDW RBC AUTO: 58.2 FL (ref 37–54)
DEPRECATED RDW RBC AUTO: 59.9 FL (ref 37–54)
EOSINOPHIL # BLD AUTO: 0.09 10*3/MM3 (ref 0–0.4)
EOSINOPHIL # BLD AUTO: 0.1 10*3/MM3 (ref 0–0.4)
EOSINOPHIL NFR BLD AUTO: 0.6 % (ref 0.3–6.2)
EOSINOPHIL NFR BLD AUTO: 0.8 % (ref 0.3–6.2)
ERYTHROCYTE [DISTWIDTH] IN BLOOD BY AUTOMATED COUNT: 16.3 % (ref 12.3–15.4)
ERYTHROCYTE [DISTWIDTH] IN BLOOD BY AUTOMATED COUNT: 16.5 % (ref 12.3–15.4)
ETHANOL BLD-MCNC: <10 MG/DL (ref 0–10)
ETHANOL UR QL: <0.01 %
GFR SERPL CREATININE-BSD FRML MDRD: 110 ML/MIN/1.73
GFR SERPL CREATININE-BSD FRML MDRD: 128 ML/MIN/1.73
GLOBULIN UR ELPH-MCNC: 3.5 GM/DL
GLUCOSE SERPL-MCNC: 128 MG/DL (ref 65–99)
GLUCOSE SERPL-MCNC: 88 MG/DL (ref 65–99)
GLUCOSE UR STRIP-MCNC: ABNORMAL MG/DL
HCT VFR BLD AUTO: 31.1 % (ref 37.5–51)
HCT VFR BLD AUTO: 35 % (ref 37.5–51)
HGB BLD-MCNC: 11.4 G/DL (ref 13–17.7)
HGB BLD-MCNC: 13 G/DL (ref 13–17.7)
HGB UR QL STRIP.AUTO: ABNORMAL
HOLD SPECIMEN: NORMAL
HOLD SPECIMEN: NORMAL
HYALINE CASTS UR QL AUTO: ABNORMAL /LPF
IMM GRANULOCYTES # BLD AUTO: 0.17 10*3/MM3 (ref 0–0.05)
IMM GRANULOCYTES # BLD AUTO: 0.31 10*3/MM3 (ref 0–0.05)
IMM GRANULOCYTES NFR BLD AUTO: 1.3 % (ref 0–0.5)
IMM GRANULOCYTES NFR BLD AUTO: 2 % (ref 0–0.5)
INR PPP: 2.02 (ref 2–3)
INR PPP: 2.1 (ref 2–3)
KETONES UR QL STRIP: ABNORMAL
LEUKOCYTE ESTERASE UR QL STRIP.AUTO: ABNORMAL
LIPASE SERPL-CCNC: 142 U/L (ref 13–60)
LYMPHOCYTES # BLD AUTO: 1.1 10*3/MM3 (ref 0.7–3.1)
LYMPHOCYTES # BLD AUTO: 1.17 10*3/MM3 (ref 0.7–3.1)
LYMPHOCYTES NFR BLD AUTO: 7 % (ref 19.6–45.3)
LYMPHOCYTES NFR BLD AUTO: 9.3 % (ref 19.6–45.3)
MAGNESIUM SERPL-MCNC: 1.6 MG/DL (ref 1.6–2.6)
MAGNESIUM SERPL-MCNC: 1.6 MG/DL (ref 1.6–2.6)
MCH RBC QN AUTO: 36.1 PG (ref 26.6–33)
MCH RBC QN AUTO: 36.4 PG (ref 26.6–33)
MCHC RBC AUTO-ENTMCNC: 36.7 G/DL (ref 31.5–35.7)
MCHC RBC AUTO-ENTMCNC: 37.1 G/DL (ref 31.5–35.7)
MCV RBC AUTO: 98 FL (ref 79–97)
MCV RBC AUTO: 98.4 FL (ref 79–97)
MONOCYTES # BLD AUTO: 1.57 10*3/MM3 (ref 0.1–0.9)
MONOCYTES # BLD AUTO: 1.68 10*3/MM3 (ref 0.1–0.9)
MONOCYTES NFR BLD AUTO: 10.7 % (ref 5–12)
MONOCYTES NFR BLD AUTO: 12.4 % (ref 5–12)
NEUTROPHILS NFR BLD AUTO: 12.47 10*3/MM3 (ref 1.7–7)
NEUTROPHILS NFR BLD AUTO: 75.6 % (ref 42.7–76)
NEUTROPHILS NFR BLD AUTO: 79.4 % (ref 42.7–76)
NEUTROPHILS NFR BLD AUTO: 9.55 10*3/MM3 (ref 1.7–7)
NITRITE UR QL STRIP: POSITIVE
NRBC BLD AUTO-RTO: 0 /100 WBC (ref 0–0.2)
NRBC BLD AUTO-RTO: 0 /100 WBC (ref 0–0.2)
OVALOCYTES BLD QL SMEAR: NORMAL
PH UR STRIP.AUTO: 6.5 [PH] (ref 5–8)
PHOSPHATE SERPL-MCNC: 2.2 MG/DL (ref 2.5–4.5)
PLATELET # BLD AUTO: 134 10*3/MM3 (ref 140–450)
PLATELET # BLD AUTO: 138 10*3/MM3 (ref 140–450)
PMV BLD AUTO: 9.3 FL (ref 6–12)
PMV BLD AUTO: 9.5 FL (ref 6–12)
POTASSIUM SERPL-SCNC: 2.5 MMOL/L (ref 3.5–5.2)
POTASSIUM SERPL-SCNC: 2.7 MMOL/L (ref 3.5–5.2)
PROT SERPL-MCNC: 6.5 G/DL (ref 6–8.5)
PROT UR QL STRIP: ABNORMAL
PROTHROMBIN TIME: 20.5 SECONDS (ref 9.4–12)
PROTHROMBIN TIME: 20.9 SECONDS (ref 9.4–12)
RBC # BLD AUTO: 3.16 10*6/MM3 (ref 4.14–5.8)
RBC # BLD AUTO: 3.57 10*6/MM3 (ref 4.14–5.8)
RBC # UR: ABNORMAL /HPF
REF LAB TEST METHOD: ABNORMAL
SMALL PLATELETS BLD QL SMEAR: ADEQUATE
SODIUM SERPL-SCNC: 126 MMOL/L (ref 136–145)
SODIUM SERPL-SCNC: 127 MMOL/L (ref 136–145)
SP GR UR STRIP: 1.01 (ref 1–1.03)
SQUAMOUS #/AREA URNS HPF: ABNORMAL /HPF
TARGETS BLD QL SMEAR: NORMAL
UROBILINOGEN UR QL STRIP: ABNORMAL
WBC # BLD AUTO: 12.63 10*3/MM3 (ref 3.4–10.8)
WBC # BLD AUTO: 15.7 10*3/MM3 (ref 3.4–10.8)
WBC MORPH BLD: NORMAL
WBC UR QL AUTO: ABNORMAL /HPF
WHOLE BLOOD HOLD SPECIMEN: NORMAL
WHOLE BLOOD HOLD SPECIMEN: NORMAL

## 2021-09-07 PROCEDURE — 25010000003 PHYTONADIONE 10 MG/ML SOLUTION 1 ML AMPULE: Performed by: EMERGENCY MEDICINE

## 2021-09-07 PROCEDURE — 81001 URINALYSIS AUTO W/SCOPE: CPT | Performed by: EMERGENCY MEDICINE

## 2021-09-07 PROCEDURE — 99223 1ST HOSP IP/OBS HIGH 75: CPT | Performed by: GENERAL PRACTICE

## 2021-09-07 PROCEDURE — 82550 ASSAY OF CK (CPK): CPT | Performed by: GENERAL PRACTICE

## 2021-09-07 PROCEDURE — 84478 ASSAY OF TRIGLYCERIDES: CPT | Performed by: GENERAL PRACTICE

## 2021-09-07 PROCEDURE — 82728 ASSAY OF FERRITIN: CPT | Performed by: GENERAL PRACTICE

## 2021-09-07 PROCEDURE — 86140 C-REACTIVE PROTEIN: CPT | Performed by: GENERAL PRACTICE

## 2021-09-07 PROCEDURE — 74177 CT ABD & PELVIS W/CONTRAST: CPT

## 2021-09-07 PROCEDURE — 83690 ASSAY OF LIPASE: CPT | Performed by: EMERGENCY MEDICINE

## 2021-09-07 PROCEDURE — 84145 PROCALCITONIN (PCT): CPT | Performed by: GENERAL PRACTICE

## 2021-09-07 PROCEDURE — 85025 COMPLETE CBC W/AUTO DIFF WBC: CPT | Performed by: GENERAL PRACTICE

## 2021-09-07 PROCEDURE — 80048 BASIC METABOLIC PNL TOTAL CA: CPT | Performed by: GENERAL PRACTICE

## 2021-09-07 PROCEDURE — 36415 COLL VENOUS BLD VENIPUNCTURE: CPT | Performed by: EMERGENCY MEDICINE

## 2021-09-07 PROCEDURE — 25010000003 POTASSIUM CHLORIDE 10 MEQ/100ML SOLUTION: Performed by: EMERGENCY MEDICINE

## 2021-09-07 PROCEDURE — 85025 COMPLETE CBC W/AUTO DIFF WBC: CPT | Performed by: EMERGENCY MEDICINE

## 2021-09-07 PROCEDURE — 84484 ASSAY OF TROPONIN QUANT: CPT | Performed by: GENERAL PRACTICE

## 2021-09-07 PROCEDURE — 80053 COMPREHEN METABOLIC PANEL: CPT | Performed by: EMERGENCY MEDICINE

## 2021-09-07 PROCEDURE — 83735 ASSAY OF MAGNESIUM: CPT | Performed by: EMERGENCY MEDICINE

## 2021-09-07 PROCEDURE — 82077 ASSAY SPEC XCP UR&BREATH IA: CPT | Performed by: EMERGENCY MEDICINE

## 2021-09-07 PROCEDURE — 0 IOPAMIDOL PER 1 ML: Performed by: EMERGENCY MEDICINE

## 2021-09-07 PROCEDURE — 82140 ASSAY OF AMMONIA: CPT | Performed by: EMERGENCY MEDICINE

## 2021-09-07 PROCEDURE — 83880 ASSAY OF NATRIURETIC PEPTIDE: CPT | Performed by: GENERAL PRACTICE

## 2021-09-07 PROCEDURE — 85610 PROTHROMBIN TIME: CPT | Performed by: GENERAL PRACTICE

## 2021-09-07 PROCEDURE — 85610 PROTHROMBIN TIME: CPT | Performed by: EMERGENCY MEDICINE

## 2021-09-07 PROCEDURE — 84100 ASSAY OF PHOSPHORUS: CPT | Performed by: GENERAL PRACTICE

## 2021-09-07 PROCEDURE — 71045 X-RAY EXAM CHEST 1 VIEW: CPT

## 2021-09-07 PROCEDURE — 99285 EMERGENCY DEPT VISIT HI MDM: CPT

## 2021-09-07 PROCEDURE — 83036 HEMOGLOBIN GLYCOSYLATED A1C: CPT | Performed by: GENERAL PRACTICE

## 2021-09-07 PROCEDURE — 83735 ASSAY OF MAGNESIUM: CPT | Performed by: GENERAL PRACTICE

## 2021-09-07 PROCEDURE — 85007 BL SMEAR W/DIFF WBC COUNT: CPT | Performed by: EMERGENCY MEDICINE

## 2021-09-07 RX ORDER — BISACODYL 5 MG/1
5 TABLET, DELAYED RELEASE ORAL DAILY PRN
Status: DISCONTINUED | OUTPATIENT
Start: 2021-09-07 | End: 2021-09-09 | Stop reason: HOSPADM

## 2021-09-07 RX ORDER — POTASSIUM CHLORIDE 750 MG/1
40 CAPSULE, EXTENDED RELEASE ORAL ONCE
Status: COMPLETED | OUTPATIENT
Start: 2021-09-07 | End: 2021-09-07

## 2021-09-07 RX ORDER — POLYETHYLENE GLYCOL 3350 17 G/17G
17 POWDER, FOR SOLUTION ORAL DAILY PRN
Status: DISCONTINUED | OUTPATIENT
Start: 2021-09-07 | End: 2021-09-09 | Stop reason: HOSPADM

## 2021-09-07 RX ORDER — ONDANSETRON 4 MG/1
4 TABLET, FILM COATED ORAL EVERY 6 HOURS PRN
Status: DISCONTINUED | OUTPATIENT
Start: 2021-09-07 | End: 2021-09-09 | Stop reason: HOSPADM

## 2021-09-07 RX ORDER — BISACODYL 10 MG
10 SUPPOSITORY, RECTAL RECTAL DAILY PRN
Status: DISCONTINUED | OUTPATIENT
Start: 2021-09-07 | End: 2021-09-09 | Stop reason: HOSPADM

## 2021-09-07 RX ORDER — SODIUM CHLORIDE 0.9 % (FLUSH) 0.9 %
10 SYRINGE (ML) INJECTION AS NEEDED
Status: DISCONTINUED | OUTPATIENT
Start: 2021-09-07 | End: 2021-09-09 | Stop reason: HOSPADM

## 2021-09-07 RX ORDER — SODIUM CHLORIDE 0.9 % (FLUSH) 0.9 %
10 SYRINGE (ML) INJECTION EVERY 12 HOURS SCHEDULED
Status: DISCONTINUED | OUTPATIENT
Start: 2021-09-07 | End: 2021-09-09 | Stop reason: HOSPADM

## 2021-09-07 RX ORDER — POTASSIUM CHLORIDE 7.45 MG/ML
10 INJECTION INTRAVENOUS
Status: COMPLETED | OUTPATIENT
Start: 2021-09-07 | End: 2021-09-07

## 2021-09-07 RX ORDER — AMOXICILLIN 250 MG
2 CAPSULE ORAL 2 TIMES DAILY
Status: DISCONTINUED | OUTPATIENT
Start: 2021-09-07 | End: 2021-09-09 | Stop reason: HOSPADM

## 2021-09-07 RX ADMIN — IOPAMIDOL 100 ML: 755 INJECTION, SOLUTION INTRAVENOUS at 19:58

## 2021-09-07 RX ADMIN — POTASSIUM CHLORIDE 10 MEQ: 7.46 INJECTION, SOLUTION INTRAVENOUS at 21:29

## 2021-09-07 RX ADMIN — PHYTONADIONE 10 MG: 10 INJECTION, EMULSION INTRAMUSCULAR; INTRAVENOUS; SUBCUTANEOUS at 23:35

## 2021-09-07 RX ADMIN — POTASSIUM CHLORIDE 10 MEQ: 7.46 INJECTION, SOLUTION INTRAVENOUS at 19:41

## 2021-09-07 RX ADMIN — POTASSIUM CHLORIDE 40 MEQ: 10 CAPSULE, COATED, EXTENDED RELEASE ORAL at 19:39

## 2021-09-07 NOTE — ED PROVIDER NOTES
Time: 6:21 PM EDT  Arrived by: private car  Chief Complaint:   Chief Complaint   Patient presents with   • Jaundice     History provided by: patient  History is limited by: N/A     History of Present Illness:  Patient is a 35 y.o. year old male that presents to the emergency department with jaundice. Pt mother is at bedside. Pt states he went to work on Friday when someone noticed his eyes were yellow which is worsening. Pt mother notes pt skin and eyes are yellow. Pt has noticed bloating in his stomach. Pt has had dark urine. Pt stools have been dark green.      Pt had his gallbladder removed 2 years ago.   Pt had 2 beers last night and that's his usual daily amount for the last 6 months to a year. Pt drank a fifth a day for about 5 years.      Jaundice  Location:  Skin and eyes  Severity:  Mild  Onset quality:  Gradual  Duration:  5 days  Timing:  Constant  Progression:  Worsening  Chronicity:  New  Relieved by:  Nothing  Worsened by:  Nothing  Ineffective treatments:  None tried  Associated symptoms: no abdominal pain, no chest pain, no cough, no diarrhea, no fever, no nausea, no rash, no shortness of breath and no vomiting        Similar Symptoms Previously: no  Recently seen: no      Patient Care Team  Primary Care Provider: Callum Peterson DO    Past Medical History:     No Known Allergies  Past Medical History:   Diagnosis Date   • Alcohol abuse 03/14/2017   • Hypokalemia 03/14/2017    Will update   • Hyponatremia 03/14/2017   • Steatohepatitis, alcoholic 03/14/2017   • Tobacco abuse 03/14/2017     Past Surgical History:   Procedure Laterality Date   • CHOLECYSTECTOMY       Family History   Problem Relation Age of Onset   • Alcohol abuse Mother    • Arthritis Mother    • COPD Mother    • Heart disease Maternal Grandmother    • Hypertension Maternal Grandmother    • Lung cancer Maternal Grandmother    • Stroke Maternal Grandmother    • Heart disease Maternal Grandfather    • Lung cancer Maternal  "Grandfather        Home Medications:  Prior to Admission medications    Not on File        Social History:   Social History     Tobacco Use   • Smoking status: Current Every Day Smoker     Packs/day: 1.00     Years: 13.00     Pack years: 13.00   • Smokeless tobacco: Never Used   • Tobacco comment: start age 20   Substance Use Topics   • Alcohol use: Yes     Alcohol/week: 3.0 standard drinks     Types: 2 Cans of beer, 1 Shots of liquor per week     Comment: 4 serv/week   • Drug use: Not Currently         Review of Systems:  Review of Systems   Constitutional: Negative for chills and fever.   HENT: Negative for nosebleeds.    Eyes: Negative for redness.   Respiratory: Negative for cough and shortness of breath.    Cardiovascular: Negative for chest pain.   Gastrointestinal: Positive for jaundice. Negative for abdominal pain, diarrhea, nausea and vomiting.   Genitourinary: Negative for dysuria, frequency and hematuria.   Musculoskeletal: Negative for back pain and neck pain.   Skin: Positive for color change. Negative for rash.   Neurological: Negative for seizures.        Physical Exam:  /69 (BP Location: Right arm, Patient Position: Lying)   Pulse 100   Temp 98.6 °F (37 °C) (Oral)   Resp 18   Ht 170.2 cm (67\")   Wt 78.1 kg (172 lb 2.9 oz)   SpO2 93%   BMI 26.97 kg/m²     Physical Exam  Vitals and nursing note reviewed.   Constitutional:       General: He is not in acute distress.     Appearance: Normal appearance. He is not toxic-appearing.   HENT:      Head: Normocephalic and atraumatic.      Nose: Nose normal.      Mouth/Throat:      Mouth: Mucous membranes are moist.   Eyes:      General: Scleral icterus (severe) present.      Pupils: Pupils are equal, round, and reactive to light.      Right eye: Pupil is round and reactive.      Left eye: Pupil is round and reactive.   Cardiovascular:      Rate and Rhythm: Regular rhythm. Tachycardia present.      Pulses: Normal pulses.      Heart sounds: Normal " heart sounds. No murmur heard.     Pulmonary:      Effort: Pulmonary effort is normal. No respiratory distress.      Breath sounds: Normal breath sounds and air entry. No decreased air movement. No decreased breath sounds, wheezing, rhonchi or rales.      Comments: Lungs are clear bilaterally.   Chest:      Chest wall: No tenderness.   Abdominal:      Palpations: Abdomen is soft. There is hepatomegaly.      Tenderness: There is no abdominal tenderness. There is no guarding or rebound.      Comments: No rigidity. Mild ascites present.    Musculoskeletal:         General: No tenderness. Normal range of motion.      Cervical back: Normal range of motion and neck supple. No tenderness.      Right lower leg: No edema.      Left lower leg: No edema.   Skin:     General: Skin is warm and dry.      Capillary Refill: Capillary refill takes more than 3 seconds.      Coloration: Skin is jaundiced and pale.      Findings: Bruising present. No rash.      Comments: Bruising to left flank. Mild jaundice of the face.    Neurological:      General: No focal deficit present.      Mental Status: He is alert and oriented to person, place, and time.      Sensory: Sensation is intact. No sensory deficit.      Motor: Motor function is intact. No weakness.   Psychiatric:         Behavior: Behavior normal.                Medications in the Emergency Department:  Medications   sodium chloride 0.9 % flush 10 mL (has no administration in time range)   sodium chloride 0.9 % flush 10 mL (10 mL Intravenous Given 9/8/21 2016)   sodium chloride 0.9 % flush 10 mL (has no administration in time range)   sennosides-docusate (PERICOLACE) 8.6-50 MG per tablet 2 tablet (2 tablets Oral Given 9/8/21 2016)     And   polyethylene glycol (MIRALAX) packet 17 g (has no administration in time range)     And   bisacodyl (DULCOLAX) EC tablet 5 mg (has no administration in time range)     And   bisacodyl (DULCOLAX) suppository 10 mg (has no administration in time  range)   ondansetron (ZOFRAN) tablet 4 mg (has no administration in time range)   LORazepam (ATIVAN) tablet 1 mg (has no administration in time range)     Or   LORazepam (ATIVAN) injection 1 mg (has no administration in time range)     Or   LORazepam (ATIVAN) tablet 2 mg (has no administration in time range)     Or   LORazepam (ATIVAN) injection 2 mg (has no administration in time range)     Or   LORazepam (ATIVAN) injection 2 mg (has no administration in time range)     Or   LORazepam (ATIVAN) injection 2 mg (has no administration in time range)   prednisoLONE (PRELONE) oral solution 40 mg (40 mg Oral Given 9/8/21 0936)   cefTRIAXone (ROCEPHIN) 1 g/100 mL 0.9% NS (MBP) (0 g Intravenous Stopped 9/8/21 1105)   potassium chloride 10 mEq in 100 mL IVPB (10 mEq Intravenous Not Given 9/8/21 1959)   potassium chloride 10 mEq in 100 mL IVPB (10 mEq Intravenous Not Given 9/8/21 1959)   albumin human 25 % IV SOLN 25 g (25 g Intravenous Not Given 9/8/21 1828)   albumin human 25 % IV SOLN 25 g (25 g Intravenous Not Given 9/8/21 1824)   potassium chloride (MICRO-K) CR capsule 40 mEq (40 mEq Oral Given 9/7/21 1939)   potassium chloride 10 mEq in 100 mL IVPB (0 mEq Intravenous Stopped 9/7/21 2316)   iopamidol (ISOVUE-370) 76 % injection 100 mL (100 mL Intravenous Given 9/7/21 1958)   phytonadione (AQUA-MEPHYTON, VITAMIN K) 10 mg in sodium chloride 0.9 % 50 mL IVPB (0 mg Intravenous Stopped 9/8/21 0044)   potassium chloride (MICRO-K) CR capsule 40 mEq (40 mEq Oral Given 9/8/21 0935)   magnesium sulfate in D5W 1g/100mL (PREMIX) (0 g Intravenous Stopped 9/8/21 1221)   lidocaine (XYLOCAINE) 2% injection  - ADS Override Pull (10 mL Injection Given 9/8/21 1603)   potassium chloride (MICRO-K) CR capsule 40 mEq (40 mEq Oral Given 9/8/21 2215)   gadobenate dimeglumine (MULTIHANCE) injection 15 mL (15 mL Intravenous Given 9/8/21 1068)        Labs  Lab Results (last 24 hours)     Procedure Component Value Units Date/Time    Lipid Panel  [[id number]]  (Abnormal) Collected: 09/08/21 0746    Specimen: Blood Updated: 09/08/21 0815     Total Cholesterol 104 mg/dL      Triglycerides 145 mg/dL      HDL Cholesterol 11 mg/dL      LDL Cholesterol  67 mg/dL      VLDL Cholesterol 26 mg/dL      LDL/HDL Ratio 5.82    Narrative:      Cholesterol Reference Ranges  (U.[place]. Department of Health and Human Services ATP III Classifications)    Desirable          <200 mg/dL  Borderline High    200-239 mg/dL  High Risk          >240 mg/dL      Triglyceride Reference Ranges  (U.S. Department of Health and Human Services ATP III Classifications)    Normal           <150 mg/dL  Borderline High  150-199 mg/dL  High             200-499 mg/dL  Very High        >500 mg/dL    HDL Reference Ranges  (U.S. Department of Health and Human Services ATP III Classifcations)    Low     <40 mg/dl (major risk factor for CHD)  High    >60 mg/dl ('negative' risk factor for CHD)        LDL Reference Ranges  (U.S. Department of Health and Human Services ATP III Classifcations)    Optimal          <100 mg/dL  Near Optimal     100-129 mg/dL  Borderline High  130-159 mg/dL  High             160-189 mg/dL  Very High        >189 mg/dL    Magnesium [[id number]]  (Normal) Collected: 09/08/21 0746    Specimen: Blood Updated: 09/08/21 0814     Magnesium 1.6 mg/dL     Lactate Dehydrogenase [[id number]]  (Abnormal) Collected: 09/08/21 0746    Specimen: Blood Updated: 09/08/21 0814      U/L     Sedimentation Rate [[id number]]  (Abnormal) Collected: 09/08/21 0746    Specimen: Blood Updated: 09/08/21 0754     Sed Rate 38 mm/hr     D-dimer, Quantitative [[id number]]  (Abnormal) Collected: 09/08/21 0746    Specimen: Blood Updated: 09/08/21 0830     D-Dimer, Quantitative 6.59 mg/L (FEU)     Narrative:      Effective 6/30/09 new normal reference range: <= 0.59 mg/L FEU  Increases in D-dimer concentration observed with  thromboembolic events can be variable due to localization,  size and age of the thrombus.  Therefore, a thromboembolic  event cannot be diagnosed with certainty on the basis of the  reference range.  D-dimers may also be elevated for a variety of disorders   including: advanced age, pregnancy, coronary disease, cancer,  liver disease, infection, inflammation, hematoma, DIC, trauma,  post surgery, diabetes, thrombolytic therapy, stress, and  general hospitalization. D-dimer levels may be decreased in  patients on anticoagulant therapy.    Fibrinogen [[id number]]  (Normal) Collected: 09/08/21 0746    Specimen: Blood Updated: 09/08/21 0818     Fibrinogen 270 mg/dL     aPTT [[id number]]  (Normal) Collected: 09/08/21 0746    Specimen: Blood Updated: 09/08/21 0818     PTT 32.5 seconds     Lactic Acid, Plasma [[id number]]  (Normal) Collected: 09/08/21 0746    Specimen: Blood Updated: 09/08/21 0809     Lactate 1.2 mmol/L     Hepatitis Panel, Acute [[id number]]  (Normal) Collected: 09/08/21 0746    Specimen: Blood Updated: 09/08/21 1625     Hepatitis B Surface Ag Non-Reactive     Hep A IgM Non-Reactive     Hep B C IgM Non-Reactive     Hepatitis C Ab Non-Reactive    Narrative:      Results may be falsely decreased if patient taking Biotin.     Comprehensive Metabolic Panel [[id number]]  (Abnormal) Collected: 09/08/21 0746    Specimen: Blood Updated: 09/08/21 0829     Glucose 91 mg/dL      BUN 8 mg/dL      Creatinine 0.64 mg/dL      Sodium 128 mmol/L      Potassium 2.5 mmol/L      Chloride 90 mmol/L      CO2 29.0 mmol/L      Calcium 7.6 mg/dL      Total Protein 5.4 g/dL      Albumin 2.70 g/dL      ALT (SGPT) 27 U/L      AST (SGOT) 113 U/L      Alkaline Phosphatase 212 U/L      Total Bilirubin 19.3 mg/dL      eGFR Non African Amer 144 mL/min/1.73      Globulin 2.7 gm/dL      A/G Ratio 1.0 g/dL      BUN/Creatinine Ratio 12.5     Anion Gap 9.0 mmol/L     Narrative:      GFR Normal >60  Chronic Kidney Disease <60  Kidney Failure <15      Protime-INR [[id number]]  (Abnormal) Collected: 09/08/21 0746    Specimen: Blood Updated:  09/08/21 0842     Protime 17.1 Seconds      INR 1.70    Narrative:      Suggested Therapeutic Ranges For Oral Anticoagulant Therapy:  Level of Therapy                      INR Target Range  Standard Dose                            2.0-3.0  High Dose                                2.5-3.5  Patients not receiving anticoagulant  Therapy Normal Range                     0.6-1.2    COVID PRE-OP / PRE-PROCEDURE SCREENING ORDER (NO ISOLATION) - Swab, Nasopharynx [[id number]]  (Normal) Collected: 09/08/21 0916    Specimen: Swab from Nasopharynx Updated: 09/08/21 2113    Narrative:      The following orders were created for panel order COVID PRE-OP / PRE-PROCEDURE SCREENING ORDER (NO ISOLATION) - Swab, Nasopharynx.  Procedure                               Abnormality         Status                     ---------                               -----------         ------                     COVID-19,CEPHEID/JULIUS/BD...[[id number]]  Normal              Final result                 Please view results for these tests on the individual orders.    COVID-19,CEPHEID/JULIUS/BDMAX,COR/AMBAR/PAD/[facility] IN-HOUSE(OR EMERGENT/ADD-ON),NP SWAB IN TRANSPORT MEDIA 3-4 HR TAT, RT-PCR - Swab, Nasopharynx [[id number]]  (Normal) Collected: 09/08/21 0916    Specimen: Swab from Nasopharynx Updated: 09/08/21 2113     COVID19 Not Detected    Narrative:      Fact sheet for providers: https://www.fda.gov/media/592789/download     Fact sheet for patients: https://www.fda.gov/media/952892/download  Fact sheet for providers: https://www.fda.gov/media/072437/download     Fact sheet for patients: https://www.fda.gov/media/834636/download  Fact sheet for providers: https://www.fda.gov/media/778109/download     Fact sheet for patients: https://www.fda.gov/media/503641/download    Body Fluid Cell Count With Differential - Body Fluid, Peritoneum [[id number]] Collected: 09/08/21 1605    Specimen: Body Fluid from Peritoneum Updated: 09/08/21 3253    Narrative:      The  following orders were created for panel order Body Fluid Cell Count With Differential - Body Fluid, Peritoneum.  Procedure                               Abnormality         Status                     ---------                               -----------         ------                     Body fluid cell count - ...[[id number]]                      Final result               Body fluid differential ...[[id number]]                      Final result                 Please view results for these tests on the individual orders.    Albumin, Fluid - Body Fluid, Peritoneum [[id number]] Collected: 09/08/21 1605    Specimen: Body Fluid from Peritoneum Updated: 09/08/21 2250     Albumin, Fluid 0.50 g/dL     Narrative:      No Reference Ranges Established.    A Serous fluid albumin gradient (serum albumin-fluid) <1.1 g/dL suggests the fluid is an exudate.  Cirrhosis usually results in an ascites fluid albumin gradient >1.1 g/dL.    This test was developed, its performance characteristics determined and judged suitable for clinical purposes by [facility].  It has not been cleared or approved by the FDA.  The laboratory is regulated under CLIA as qualified to perfom high-complexity testing.      Protein, Body Fluid - Body Fluid, Peritoneum [[id number]] Collected: 09/08/21 1605    Specimen: Body Fluid from Peritoneum Updated: 09/08/21 2250     Protein, Total, Fluid <1.0 g/dL     Narrative:      No Reference Ranges Established.    A serous fluid total fluid (TP) greater than 50 percent of the serum TP suggests the fluid is an exudate.      1. Pleural TP/Serum TP >0.5  2. Pleural LD/Serum LD >0.6  3. Pleural LD >2/3 of the upper limit of normal for serum LDH    This test was developed, it performance characteristics determined and judged suitable for clinical purposes by [facility].  It has not been cleared or approved by the FDA.  The laboratory is regulated under CLIA as qualified to  perform high-complexity testing.     Body Fluid Culture - Body Fluid, Peritoneum [[id number]] Collected: 09/08/21 1605    Specimen: Body Fluid from Peritoneum Updated: 09/08/21 1756     Gram Stain No organisms seen      Few (2+) WBCs seen    Body fluid cell count - Body Fluid, Peritoneum [[id number]] Collected: 09/08/21 1605    Specimen: Body Fluid from Peritoneum Updated: 09/08/21 1743     Color, Fluid Yellow     Appearance, Fluid Clear     Nucleated Cells, Fluid 69 /mm3      RBC, Fluid <2,000 /mm3     Narrative:      No reference range established. Physician to interpret results with clinical findings.    Body fluid differential - Body Fluid, Peritoneum [[id number]] Collected: 09/08/21 1605    Specimen: Body Fluid from Peritoneum Updated: 09/08/21 1743     Neutrophils, Fluid 3 %      Lymphocytes, Fluid 23 %      Monocytes, Fluid 33 %      Mesothelial Cells, Fluid 16 %      Macrophage, Fluid 25 %     Basic Metabolic Panel [[id number]]  (Abnormal) Collected: 09/08/21 2018    Specimen: Blood from Arm, Left Updated: 09/08/21 2059     Glucose 139 mg/dL      BUN 8 mg/dL      Creatinine 0.67 mg/dL      Sodium 129 mmol/L      Potassium 3.2 mmol/L      Chloride 94 mmol/L      CO2 24.7 mmol/L      Calcium 7.8 mg/dL      eGFR Non African Amer 137 mL/min/1.73      BUN/Creatinine Ratio 11.9     Anion Gap 10.3 mmol/L     Narrative:      GFR Normal >60  Chronic Kidney Disease <60  Kidney Failure <15      Magnesium [[id number]]  (Normal) Collected: 09/08/21 2018    Specimen: Blood from Arm, Left Updated: 09/08/21 2059     Magnesium 2.1 mg/dL            Imaging:  IR Paracentesis With Imaging    Result Date: 9/8/2021  PROCEDURE: IR PARACENTESIS WITH IMAGING  COMPARISON: None  INDICATIONS: ascites. 5 FR 7 CM YUEH. 800 ML OCHOA CLEAR FLUID COLLECTED AND SENT TO LAB.  CONSENT:   Prior to the procedure, the risks, benefits and alternatives were thoroughly explained.  This included the risk of bleeding, infection, injury to associated  structures, and risk of bowel injury.  The patient expressed understanding and wished to continue.  Informed written consent was obtained.  FINDINGS: Preliminary ultrasound demonstrated appropriate fluid in the right mid abdomen.  The overlying skin was prepped and draped in normal sterile fashion.  Lidocaine was injected along the anticipated tract of the needle.   A 5 Stateless 7 cm Yueh catheter was advanced into the peritoneal space.  800 cubic centimeters clear flores fluid was aspirated.   The catheter was removed without complication.  The fluid was sent to the lab for analysis per referring clinician.   CONCLUSION: Successful ultrasound-guided diagnostic and therapeutic paracentesis without evidence of complication.     AHSAN GERMAIN MD       Electronically Signed and Approved By: AHSAN GERMAIN MD on 9/08/2021 at 16:55               Procedures:  Procedures    Progress                            Medical Decision Making:  MDM  Number of Diagnoses or Management Options     Amount and/or Complexity of Data Reviewed  Clinical lab tests: reviewed (Reviewed pt CMP from 8 months ago. Bilirubin was 1.48. sodium was 137. Potassium was 3.6.)  Tests in the radiology section of CPT®: reviewed  Decide to obtain previous medical records or to obtain history from someone other than the patient: yes  Discuss the patient with other providers: yes (I discussed the patient with Dr. Berg.  We have reviewed the patient's symptoms, vital signs and laboratory values as well as the patient's CT.  He is requesting hospitalist admit the patient for further work-up and evaluation of the liver failure and possible undergo paracentesis in the hospital.  Dr. Khan request that 10 mg of IV vitamin K be given)                 Final diagnoses:   Acute liver failure without hepatic coma   Alcoholic cirrhosis of liver with ascites (CMS/HCC)   Hyponatremia   Hypokalemia   Ascites due to alcoholic cirrhosis (CMS/HCC)   Pleural effusion    Tachycardia   Leukocytosis, unspecified type        Disposition:  ED Disposition     ED Disposition Condition Comment    Decision to Admit  Level of Care: Med/Surg [1]   Diagnosis: Acute hepatitis [[id number]]   Admitting Physician: FADIA VALDOVINOS [[id number]]   Attending Physician: FADIA VALDOVINOS [[id number]]   Isolate for COVID?: Yes [1]   Certification: I Certify That Inpatient Hospital Services Are Medically Necessary For Greater Than 2 Midnights            This medical record created using voice recognition software and may contain unintended errors.    Documentation assistance provided by Diamond Yao acting as scribe for Chicho Rinaldi DO. Information recorded by the scribe was done at my direction and has been verified and validated by me.          Diamond Yao  09/07/21 2947       Diamond Yao  09/07/21 4344       Chicho Rinaldi DO  09/09/21 0130

## 2021-09-08 ENCOUNTER — APPOINTMENT (OUTPATIENT)
Dept: MRI IMAGING | Facility: HOSPITAL | Age: 36
End: 2021-09-08

## 2021-09-08 ENCOUNTER — APPOINTMENT (OUTPATIENT)
Dept: ULTRASOUND IMAGING | Facility: HOSPITAL | Age: 36
End: 2021-09-08

## 2021-09-08 ENCOUNTER — APPOINTMENT (OUTPATIENT)
Dept: INTERVENTIONAL RADIOLOGY/VASCULAR | Facility: HOSPITAL | Age: 36
End: 2021-09-08

## 2021-09-08 PROBLEM — B17.9 ACUTE HEPATITIS: Status: ACTIVE | Noted: 2021-09-08

## 2021-09-08 LAB
ALBUMIN FLD-MCNC: 0.5 G/DL
ALBUMIN SERPL-MCNC: 2.7 G/DL (ref 3.5–5.2)
ALBUMIN/GLOB SERPL: 1 G/DL
ALP SERPL-CCNC: 212 U/L (ref 39–117)
ALT SERPL W P-5'-P-CCNC: 27 U/L (ref 1–41)
ANION GAP SERPL CALCULATED.3IONS-SCNC: 10.3 MMOL/L (ref 5–15)
ANION GAP SERPL CALCULATED.3IONS-SCNC: 9 MMOL/L (ref 5–15)
APPEARANCE FLD: CLEAR
APTT PPP: 32.5 SECONDS (ref 22.2–34.2)
AST SERPL-CCNC: 113 U/L (ref 1–40)
BILIRUB SERPL-MCNC: 19.3 MG/DL (ref 0–1.2)
BUN SERPL-MCNC: 8 MG/DL (ref 6–20)
BUN SERPL-MCNC: 8 MG/DL (ref 6–20)
BUN/CREAT SERPL: 11.9 (ref 7–25)
BUN/CREAT SERPL: 12.5 (ref 7–25)
CALCIUM SPEC-SCNC: 7.6 MG/DL (ref 8.6–10.5)
CALCIUM SPEC-SCNC: 7.8 MG/DL (ref 8.6–10.5)
CHLORIDE SERPL-SCNC: 90 MMOL/L (ref 98–107)
CHLORIDE SERPL-SCNC: 94 MMOL/L (ref 98–107)
CHOLEST SERPL-MCNC: 104 MG/DL (ref 0–200)
CK SERPL-CCNC: 354 U/L (ref 20–200)
CO2 SERPL-SCNC: 24.7 MMOL/L (ref 22–29)
CO2 SERPL-SCNC: 29 MMOL/L (ref 22–29)
COLOR FLD: YELLOW
CREAT SERPL-MCNC: 0.64 MG/DL (ref 0.76–1.27)
CREAT SERPL-MCNC: 0.67 MG/DL (ref 0.76–1.27)
CRP SERPL-MCNC: 5.04 MG/DL (ref 0–0.5)
D DIMER PPP FEU-MCNC: 6.59 MG/L (FEU) (ref 0–0.59)
D-LACTATE SERPL-SCNC: 1.2 MMOL/L (ref 0.5–2)
D-LACTATE SERPL-SCNC: 1.8 MMOL/L (ref 0.5–2)
ERYTHROCYTE [SEDIMENTATION RATE] IN BLOOD: 38 MM/HR (ref 0–15)
FERRITIN SERPL-MCNC: 826.7 NG/ML (ref 30–400)
FIBRINOGEN PPP-MCNC: 270 MG/DL (ref 200–450)
GFR SERPL CREATININE-BSD FRML MDRD: 137 ML/MIN/1.73
GFR SERPL CREATININE-BSD FRML MDRD: 144 ML/MIN/1.73
GLOBULIN UR ELPH-MCNC: 2.7 GM/DL
GLUCOSE SERPL-MCNC: 139 MG/DL (ref 65–99)
GLUCOSE SERPL-MCNC: 91 MG/DL (ref 65–99)
HAV IGM SERPL QL IA: NORMAL
HBA1C MFR BLD: <4.3 % (ref 4.8–5.6)
HBV CORE IGM SERPL QL IA: NORMAL
HBV SURFACE AG SERPL QL IA: NORMAL
HCV AB SER DONR QL: NORMAL
HDLC SERPL-MCNC: 11 MG/DL (ref 40–60)
INR PPP: 1.7 (ref 2–3)
LDH SERPL-CCNC: 341 U/L (ref 135–225)
LDLC SERPL CALC-MCNC: 67 MG/DL (ref 0–100)
LDLC/HDLC SERPL: 5.82 {RATIO}
LYMPHOCYTES NFR FLD MANUAL: 23 %
MACROPHAGE FLUID: 25 %
MAGNESIUM SERPL-MCNC: 1.6 MG/DL (ref 1.6–2.6)
MAGNESIUM SERPL-MCNC: 2.1 MG/DL (ref 1.6–2.6)
MESOTHL CELL NFR FLD MANUAL: 16 %
MONOCYTES NFR FLD: 33 %
NEUTROPHILS NFR FLD MANUAL: 3 %
NT-PROBNP SERPL-MCNC: 243.7 PG/ML (ref 0–450)
NUC CELL # FLD: 69 /MM3
POTASSIUM SERPL-SCNC: 2.5 MMOL/L (ref 3.5–5.2)
POTASSIUM SERPL-SCNC: 3.2 MMOL/L (ref 3.5–5.2)
PROCALCITONIN SERPL-MCNC: 0.4 NG/ML (ref 0–0.25)
PROT FLD-MCNC: <1 G/DL
PROT SERPL-MCNC: 5.4 G/DL (ref 6–8.5)
PROTHROMBIN TIME: 17.1 SECONDS (ref 9.4–12)
RBC # FLD AUTO: <2000 /MM3
SARS-COV-2 RNA RESP QL NAA+PROBE: NOT DETECTED
SODIUM SERPL-SCNC: 128 MMOL/L (ref 136–145)
SODIUM SERPL-SCNC: 129 MMOL/L (ref 136–145)
TRIGL SERPL-MCNC: 136 MG/DL (ref 0–150)
TRIGL SERPL-MCNC: 145 MG/DL (ref 0–150)
TROPONIN T SERPL-MCNC: <0.01 NG/ML (ref 0–0.03)
VLDLC SERPL-MCNC: 26 MG/DL (ref 5–40)

## 2021-09-08 PROCEDURE — A9577 INJ MULTIHANCE: HCPCS | Performed by: FAMILY MEDICINE

## 2021-09-08 PROCEDURE — 25010000003 POTASSIUM CHLORIDE 10 MEQ/100ML SOLUTION: Performed by: PHYSICIAN ASSISTANT

## 2021-09-08 PROCEDURE — 87070 CULTURE OTHR SPECIMN AEROBIC: CPT | Performed by: INTERNAL MEDICINE

## 2021-09-08 PROCEDURE — 63710000001 PREDNISOLONE 15 MG/5ML SOLUTION: Performed by: PHYSICIAN ASSISTANT

## 2021-09-08 PROCEDURE — 25010000002 CEFTRIAXONE PER 250 MG: Performed by: PHYSICIAN ASSISTANT

## 2021-09-08 PROCEDURE — 80061 LIPID PANEL: CPT | Performed by: GENERAL PRACTICE

## 2021-09-08 PROCEDURE — 85384 FIBRINOGEN ACTIVITY: CPT | Performed by: GENERAL PRACTICE

## 2021-09-08 PROCEDURE — 85610 PROTHROMBIN TIME: CPT | Performed by: FAMILY MEDICINE

## 2021-09-08 PROCEDURE — 99233 SBSQ HOSP IP/OBS HIGH 50: CPT | Performed by: FAMILY MEDICINE

## 2021-09-08 PROCEDURE — 82042 OTHER SOURCE ALBUMIN QUAN EA: CPT | Performed by: INTERNAL MEDICINE

## 2021-09-08 PROCEDURE — 80074 ACUTE HEPATITIS PANEL: CPT | Performed by: GENERAL PRACTICE

## 2021-09-08 PROCEDURE — C1729 CATH, DRAINAGE: HCPCS

## 2021-09-08 PROCEDURE — 85652 RBC SED RATE AUTOMATED: CPT | Performed by: GENERAL PRACTICE

## 2021-09-08 PROCEDURE — 25010000002 MAGNESIUM SULFATE IN D5W 1G/100ML (PREMIX) 1-5 GM/100ML-% SOLUTION: Performed by: PHYSICIAN ASSISTANT

## 2021-09-08 PROCEDURE — 83615 LACTATE (LD) (LDH) ENZYME: CPT | Performed by: GENERAL PRACTICE

## 2021-09-08 PROCEDURE — 83605 ASSAY OF LACTIC ACID: CPT | Performed by: GENERAL PRACTICE

## 2021-09-08 PROCEDURE — 74183 MRI ABD W/O CNTR FLWD CNTR: CPT

## 2021-09-08 PROCEDURE — 83735 ASSAY OF MAGNESIUM: CPT | Performed by: PHYSICIAN ASSISTANT

## 2021-09-08 PROCEDURE — 89051 BODY FLUID CELL COUNT: CPT | Performed by: INTERNAL MEDICINE

## 2021-09-08 PROCEDURE — 76942 ECHO GUIDE FOR BIOPSY: CPT

## 2021-09-08 PROCEDURE — 80048 BASIC METABOLIC PNL TOTAL CA: CPT | Performed by: PHYSICIAN ASSISTANT

## 2021-09-08 PROCEDURE — 83735 ASSAY OF MAGNESIUM: CPT | Performed by: GENERAL PRACTICE

## 2021-09-08 PROCEDURE — 83605 ASSAY OF LACTIC ACID: CPT | Performed by: EMERGENCY MEDICINE

## 2021-09-08 PROCEDURE — 0W9G3ZZ DRAINAGE OF PERITONEAL CAVITY, PERCUTANEOUS APPROACH: ICD-10-PCS | Performed by: RADIOLOGY

## 2021-09-08 PROCEDURE — 88108 CYTOPATH CONCENTRATE TECH: CPT | Performed by: INTERNAL MEDICINE

## 2021-09-08 PROCEDURE — U0003 INFECTIOUS AGENT DETECTION BY NUCLEIC ACID (DNA OR RNA); SEVERE ACUTE RESPIRATORY SYNDROME CORONAVIRUS 2 (SARS-COV-2) (CORONAVIRUS DISEASE [COVID-19]), AMPLIFIED PROBE TECHNIQUE, MAKING USE OF HIGH THROUGHPUT TECHNOLOGIES AS DESCRIBED BY CMS-2020-01-R: HCPCS | Performed by: FAMILY MEDICINE

## 2021-09-08 PROCEDURE — 87040 BLOOD CULTURE FOR BACTERIA: CPT | Performed by: EMERGENCY MEDICINE

## 2021-09-08 PROCEDURE — 80053 COMPREHEN METABOLIC PANEL: CPT | Performed by: FAMILY MEDICINE

## 2021-09-08 PROCEDURE — 85730 THROMBOPLASTIN TIME PARTIAL: CPT | Performed by: GENERAL PRACTICE

## 2021-09-08 PROCEDURE — 0 GADOBENATE DIMEGLUMINE 529 MG/ML SOLUTION: Performed by: FAMILY MEDICINE

## 2021-09-08 PROCEDURE — 84157 ASSAY OF PROTEIN OTHER: CPT | Performed by: INTERNAL MEDICINE

## 2021-09-08 PROCEDURE — 87205 SMEAR GRAM STAIN: CPT | Performed by: INTERNAL MEDICINE

## 2021-09-08 PROCEDURE — 99222 1ST HOSP IP/OBS MODERATE 55: CPT | Performed by: INTERNAL MEDICINE

## 2021-09-08 PROCEDURE — 85379 FIBRIN DEGRADATION QUANT: CPT | Performed by: GENERAL PRACTICE

## 2021-09-08 RX ORDER — LORAZEPAM 0.5 MG/1
1 TABLET ORAL
Status: DISCONTINUED | OUTPATIENT
Start: 2021-09-08 | End: 2021-09-09 | Stop reason: HOSPADM

## 2021-09-08 RX ORDER — LORAZEPAM 2 MG/ML
2 INJECTION INTRAMUSCULAR
Status: DISCONTINUED | OUTPATIENT
Start: 2021-09-08 | End: 2021-09-09 | Stop reason: HOSPADM

## 2021-09-08 RX ORDER — PREDNISOLONE 15 MG/5ML
40 SOLUTION ORAL DAILY
Status: DISCONTINUED | OUTPATIENT
Start: 2021-09-08 | End: 2021-09-09 | Stop reason: HOSPADM

## 2021-09-08 RX ORDER — POTASSIUM CHLORIDE 750 MG/1
40 CAPSULE, EXTENDED RELEASE ORAL ONCE
Status: COMPLETED | OUTPATIENT
Start: 2021-09-08 | End: 2021-09-08

## 2021-09-08 RX ORDER — POTASSIUM CHLORIDE 7.45 MG/ML
10 INJECTION INTRAVENOUS
Status: DISPENSED | OUTPATIENT
Start: 2021-09-08 | End: 2021-09-08

## 2021-09-08 RX ORDER — LORAZEPAM 2 MG/ML
1 INJECTION INTRAMUSCULAR
Status: DISCONTINUED | OUTPATIENT
Start: 2021-09-08 | End: 2021-09-09 | Stop reason: HOSPADM

## 2021-09-08 RX ORDER — MAGNESIUM SULFATE 1 G/100ML
1 INJECTION INTRAVENOUS
Status: COMPLETED | OUTPATIENT
Start: 2021-09-08 | End: 2021-09-08

## 2021-09-08 RX ORDER — LORAZEPAM 0.5 MG/1
2 TABLET ORAL
Status: DISCONTINUED | OUTPATIENT
Start: 2021-09-08 | End: 2021-09-09 | Stop reason: HOSPADM

## 2021-09-08 RX ORDER — POTASSIUM CHLORIDE 7.45 MG/ML
10 INJECTION INTRAVENOUS
Status: DISCONTINUED | OUTPATIENT
Start: 2021-09-08 | End: 2021-09-08

## 2021-09-08 RX ORDER — ALBUMIN (HUMAN) 12.5 G/50ML
25 SOLUTION INTRAVENOUS ONCE
Status: DISCONTINUED | OUTPATIENT
Start: 2021-09-08 | End: 2021-09-09 | Stop reason: HOSPADM

## 2021-09-08 RX ORDER — LIDOCAINE HYDROCHLORIDE 20 MG/ML
INJECTION, SOLUTION INFILTRATION; PERINEURAL
Status: COMPLETED
Start: 2021-09-08 | End: 2021-09-08

## 2021-09-08 RX ORDER — ALBUMIN (HUMAN) 12.5 G/50ML
50 SOLUTION INTRAVENOUS ONCE
Status: DISCONTINUED | OUTPATIENT
Start: 2021-09-08 | End: 2021-09-08

## 2021-09-08 RX ADMIN — POTASSIUM CHLORIDE 10 MEQ: 7.46 INJECTION, SOLUTION INTRAVENOUS at 11:15

## 2021-09-08 RX ADMIN — PREDNISOLONE 40 MG: 15 SOLUTION ORAL at 09:36

## 2021-09-08 RX ADMIN — POTASSIUM CHLORIDE 40 MEQ: 10 CAPSULE, COATED, EXTENDED RELEASE ORAL at 09:35

## 2021-09-08 RX ADMIN — MAGNESIUM SULFATE 1 G: 1 INJECTION INTRAVENOUS at 11:21

## 2021-09-08 RX ADMIN — SODIUM CHLORIDE, PRESERVATIVE FREE 10 ML: 5 INJECTION INTRAVENOUS at 10:45

## 2021-09-08 RX ADMIN — SODIUM CHLORIDE, PRESERVATIVE FREE 10 ML: 5 INJECTION INTRAVENOUS at 20:16

## 2021-09-08 RX ADMIN — POTASSIUM CHLORIDE 40 MEQ: 10 CAPSULE, COATED, EXTENDED RELEASE ORAL at 22:15

## 2021-09-08 RX ADMIN — GADOBENATE DIMEGLUMINE 15 ML: 529 INJECTION, SOLUTION INTRAVENOUS at 21:54

## 2021-09-08 RX ADMIN — MAGNESIUM SULFATE 1 G: 1 INJECTION INTRAVENOUS at 09:36

## 2021-09-08 RX ADMIN — POTASSIUM CHLORIDE 10 MEQ: 7.46 INJECTION, SOLUTION INTRAVENOUS at 09:36

## 2021-09-08 RX ADMIN — POTASSIUM CHLORIDE 10 MEQ: 7.46 INJECTION, SOLUTION INTRAVENOUS at 12:17

## 2021-09-08 RX ADMIN — LIDOCAINE HYDROCHLORIDE 10 ML: 20 INJECTION, SOLUTION INFILTRATION; PERINEURAL at 16:03

## 2021-09-08 RX ADMIN — DOCUSATE SODIUM 50MG AND SENNOSIDES 8.6MG 2 TABLET: 8.6; 5 TABLET, FILM COATED ORAL at 20:16

## 2021-09-08 RX ADMIN — SODIUM CHLORIDE 1 G: 9 INJECTION, SOLUTION INTRAVENOUS at 09:37

## 2021-09-08 NOTE — PROGRESS NOTES
[facility]   Hospitalist Progress Note       Patient Name: Wai Gay  : 1985  MRN: [id number]  Primary Care Physician: Callum Peterson DO  Date of admission: 2021  Today's Date: 2021  Room / Bed:     Subjective   Chief Complaint: Jaundice    Summary:  Wai Gay is a 35 y.o. male  with a history of heavy alcohol abuse.  He continues to drink daily, but reportedly not nearly as much as he used to (previously fifth daily).  He was at work and his boss asked him about yellow discoloration of eyes and suggested he seek medical attention.  The patient really has no major symptoms other than maybe some mild abdominal distention.  He specifically denies any fever, nausea, vomiting, diarrhea, or abdominal pain.  His energy levels are reasonable and he feels close to his baseline.    He came to the ED with K 2.5, , lipase 142, magnesium 1.6, INR 2.0, ammonia 41, WBC 15, creatinine 0.7, ALT 34, , total bilirubin 23.9.  His DF score is > 70.    Interval Followup: 2021  • Patient resting on gurney.  Alert and oriented and coherent.  No signs of withdrawal.  • Sinus rhythm on monitor.  Vital stable.  • Mother at bedside.  • Patient jaundiced with mild abdominal distention, minimally tender.  He says feels pretty normal/okay without any specific complaints other than mild distention.  • Was given food in the ED.  He denies any issues with oral intake.  Ultrasound has been postponed.  • Gastroenterology consulted via the ED and vitamin K ordered      REVIEW OF SYSTEMS: All other systems reviewed and are negative.   • GEN: No fevers. No chills. No weight gain. No weight loss.     • HEENT:   No dysphagia/odynophagia. No visual disturbance.    • GI:    No N/V.  No abd pain. No diarrhea. No constipation.  No bloody/black tarry stools. No hematemesis.   • CV: No chest discomfort.  No palps. No lightheadedness. No syncope. No orthopnea/PND. No edema.   • RESP:    No  cough. No wheeze.  No increased sputum. No hemoptysis. No MATOS.  • :   No dysuria or suprapubic discomfort. No frequency.No urgency. No hesitancy. No incontinence. No hematuria. No flank pain.    • MS:   No joint stiffness or arthralgias. No myalgias. No muscle weakness.    • SKIN:   No painful or pruritic rashes.  No skin discoloration.  • NEURO:  No focal numbness or weakness.  No headaches.  No ataxia. No slurred speech. No receptive/expressive aphasia.      • PSYCH:   No anxiety. No depression.  • ENDO:  No tremor, hair loss, heat or cold intolerance.  Objective   Temp:  [99.9 °F (37.7 °C)] 99.9 °F (37.7 °C)  Heart Rate:  [] 97  Resp:  [18-23] 23  BP: ()/(61-87) 90/61  PHYSICAL EXAM   • CON: WN. WD. NAD.   • EYES:  Sclera icteric. EOMI. Normal conjunctiva.   • ENT:  Oropharyngeal mucosa without ulcers or thrush.    • NECK:  No thyromegaly. No stridor. Trachea midline.  • RESP:  CTA. No wheezes. No crackles.  No work of breathing or tachypnea.   • CV:  Rhythm regular. Rate WNL. No murmur noted.  No edema.  • GI:  Soft and minimally tender.  Mildly distended.  Bowel sounds present.   • EXT: Peripheral pulses intact.  No joint deformities or cyanosis.  • LYMPH:  No lymphedema noted.  No cervical lymphadenopathy.  • PSYCH:  Alert. Oriented. Normal affect and mood.  • NEURO:  CNII-XII grossly intact. No dysarthria or aphasia. No unilateral weakness or paresthesia.  • SKIN: No chronic venous stasis changes or varicosities.  No cellulitis.  Clubbing fingernails noted    Results from last 7 days   Lab Units 09/07/21 2238 09/07/21  1705   WBC 10*3/mm3 12.63* 15.70*   HEMOGLOBIN g/dL 11.4* 13.0   HEMATOCRIT % 31.1* 35.0*   PLATELETS 10*3/mm3 134* 138*     Results from last 7 days   Lab Units 09/08/21  0746 09/07/21 2238 09/07/21  1705   SODIUM mmol/L 128* 127* 126*   POTASSIUM mmol/L 2.5* 2.7* 2.5*   CO2 mmol/L 29.0 28.6 28.4   CHLORIDE mmol/L 90* 87* 85*   ANION GAP mmol/L 9.0 11.4 12.6   BUN mg/dL 8 8 8    CREATININE mg/dL 0.64* 0.71* 0.81   GLUCOSE mg/dL 91 88 128*     Results from last 7 days   Lab Units 09/08/21  0746 09/07/21  2238 09/07/21  1705   INR  1.70* 2.10 2.02       RESULTS REVIEWED:  I have personally reviewed the results from the time of this admission to 9/8/2021 12:33 EDT and agree with these findings:  [x]  Laboratory  []  Microbiology  [x]  Radiology  [x]  EKG/Telemetry   []  Cardiology/Vascular   []  Pathology  [x]  Old records  []  Other:  Assessment / Plan   Assessment:  • Liver failure  • Possible alcoholic hepatitis  • Cirrhosis  • Alcohol abuse  • Tobacco use  • History of cholelithiasis/cholecystectomy  • Bacteruria, query UTI  • Pleural effusions  • Ascites  • Hypokalemia, hyponatremia, hypomagnesemia, elevated INR, hyperbilirubinemia, leukocytosis     Plan:  • Admit to hospitalist service, monitored bed  • Gastroenterology consulted: Dr. Khan  • Vitamin K given in ED  • DF score = 70  • Empiric Rocephin added  • Prednisolone 40 mg daily added  • Abdominal ultrasound .... Pending  • Paracentesis .... Will discuss with GI  • Viral hep panel A/B/C ..... Pending  • PPI    Discussed plan with RN.  DVT prophylaxis:  Mechanical DVT prophylaxis orders are present.  CODE STATUS:      Level Of Support Discussed With: Patient  Code Status: CPR  Medical Interventions (Level of Support Prior to Arrest): Full       Electronically signed by FRITZ Calix, 09/08/21, 12:33 PM EDT.    Attending documentation:  I reviewed the above documentation and independently reviewed and rounded and evaluated the patient and discussed the care plan with JESUS Alva PA-C, I agree with his findings and plan as documented, what I have added to the care plan and modified is as follows in my documentation and my medical decision making; I consulted GI, I discussed with Dr. Khan.  The patient is a 35-year-old male who presented to the ER with chief complaint of jaundice.  He has been jaundiced for several days, he is  a heavy alcohol consumer.  In the emergency room he was found to be in liver failure, signs of sepsis to suggest related to alcoholism and acute alcoholic hepatitis.  His INR was elevated, his LFTs were elevated, his total bilirubin was 23.9, his DF score was greater than 70.  He was given vitamin K, I reviewed his case and did not deem him to have SBP based on lack of clinical supportive evidence and findings, he underwent paracentesis which did not allude to infection.  He has been started on prednisolone, he will be given vitamin K further on a daily basis, liver ultrasound is pending, other etiologies for liver failure are being worked up.  Seen and examined on rounds this morning, his mother was at the bedside, he was in no acute distress, he had no worsening abdominal pain, he had no fatigue, he had no shakes or lethargy.  He had several electrolyte abnormalities including severely low potassium, low sodium, low magnesium.  I reviewed paracentesis results where less than a liter was drained, Gram stain had few WBCs, no organisms seen.  Vitals reviewed, physical exam, well-appearing male, clearly jaundiced, with scleral icterus, in no acute distress, EOMI, full range of motion of his neck, alert and oriented x3, regular rate and rhythm, clear to auscultation bilaterally, soft mildly distended abdomen, with very minimal tenderness, very little abdominal fullness, cranial nerves grossly intact, clubbing fingers, no peripheral edema.  Hospitalized with acute alcoholic hepatitis, liver failure, coagulopathy due to liver disease, alcoholic abuse, severe electrolyte derangement, replacement potassium and magnesium ordered IV, monitor on telemetry while receiving potassium supplementation, vitamin K repeat 10 mg in the morning, empiric Rocephin, start prednisolone 40 mg daily, follow-up liver imaging, discussed with Dr. Khan, following urine culture, I do not think he has UTI, a.m. labs, full code, VTE prophylaxis  with SCDs, watch for alcohol withdrawal, if he shows signs of symptoms, contact MD for CIWA orders, okay to have regular diet, clinical course will dictate further management, discussed with nurse at the bedside.  -MALINA CARDOSO  Electronically signed by Jerrod Baker MD, 09/08/21, 7:32 PM EDT.

## 2021-09-08 NOTE — CONSULTS
[facility]  Initial Inpatient Consult Note    Referring Provider: Hospitalist    Reason for Consultation: Jaundice    Subjective     History of present illness:    35 y.o. male with history as below who came to emergency department after he noted having jaundice dark urine but he describes 3 days.  He does have a history of alcohol previously drinking 1/5 of alcohol per day but notes he decrease his alcohol consumption several months ago to 2 beers per day on average.  He denies any abdominal pain, nausea, vomiting fevers or chills and actually feels well.  His appetite has been good he denies any recent weight loss.  He has had cholecystectomy.  Emergency department he was found to have significantly high total bilirubin, prolonged PTT/INR, but no evidence of confusion and his ammonia level was normal.    Past Medical History:  Past Medical History:   Diagnosis Date   • Alcohol abuse 03/14/2017   • Hypokalemia 03/14/2017    Will update   • Hyponatremia 03/14/2017   • Steatohepatitis, alcoholic 03/14/2017   • Tobacco abuse 03/14/2017     Past Surgical History:  Past Surgical History:   Procedure Laterality Date   • CHOLECYSTECTOMY        Social History:   Social History     Tobacco Use   • Smoking status: Current Every Day Smoker     Packs/day: 1.00     Years: 13.00     Pack years: 13.00   • Smokeless tobacco: Never Used   • Tobacco comment: start age 20   Substance Use Topics   • Alcohol use: Yes     Alcohol/week: 3.0 standard drinks     Types: 2 Cans of beer, 1 Shots of liquor per week     Comment: 4 serv/week      Family History:  Family History   Problem Relation Age of Onset   • Alcohol abuse Mother    • Arthritis Mother    • COPD Mother    • Heart disease Maternal Grandmother    • Hypertension Maternal Grandmother    • Lung cancer Maternal Grandmother    • Stroke Maternal Grandmother    • Heart disease Maternal Grandfather    • Lung cancer Maternal Grandfather        Home Meds:  No  medications prior to admission.     Current Meds:   lidocaine, , ,   albumin human, 25 g, Intravenous, Once  albumin human, 25 g, Intravenous, Once  cefTRIAXone, 1 g, Intravenous, Q24H  potassium chloride, 10 mEq, Intravenous, Q1H  prednisoLONE, 40 mg, Oral, Daily  senna-docusate sodium, 2 tablet, Oral, BID  sodium chloride, 10 mL, Intravenous, Q12H      Allergies:  No Known Allergies  Review of Systems  Pertinent items are noted in HPI, all other systems reviewed and negative         Vital Signs  Temp:  [98.6 °F (37 °C)-99.9 °F (37.7 °C)] 98.6 °F (37 °C)  Heart Rate:  [] 100  Resp:  [18-23] 18  BP: ()/(61-87) 113/69  Physical Exam:  General Appearance:    Alert, cooperative, in no acute distress   Head:    Normocephalic, without obvious abnormality, atraumatic   Eyes:          conjunctivae and sclerae normal, scleral icterus quite obvious   Throat:   no thrush, oral mucosa moist   Neck:   Supple, no adenopathy   Lungs:     Clear to auscultation bilaterally    Heart:    Regular rhythm and normal rate    Chest Wall:    No abnormalities observed   Abdomen:     Soft, nondistended, nontender; normal bowel sounds   Extremities:   no edema, no redness   Skin:   No bruising or rash   Psychiatric:  normal mood and insight     Results Review:  [x]  Laboratory   [x]  Radiology  []  Pathology      I reviewed the patient's new clinical results.    Results from last 7 days   Lab Units 09/07/21 2238 09/07/21  1705   WBC 10*3/mm3 12.63* 15.70*   HEMOGLOBIN g/dL 11.4* 13.0   HEMATOCRIT % 31.1* 35.0*   PLATELETS 10*3/mm3 134* 138*     Results from last 7 days   Lab Units 09/08/21  0746 09/07/21 2238 09/07/21  1705   SODIUM mmol/L 128* 127* 126*   POTASSIUM mmol/L 2.5* 2.7* 2.5*   CHLORIDE mmol/L 90* 87* 85*   CO2 mmol/L 29.0 28.6 28.4   BUN mg/dL 8 8 8   CREATININE mg/dL 0.64* 0.71* 0.81   CALCIUM mg/dL 7.6* 8.0* 8.6   BILIRUBIN mg/dL 19.3*  --  23.9*   ALK PHOS U/L 212*  --  222*   ALT (SGPT) U/L 27  --  34   AST  (SGOT) U/L 113*  --  141*   GLUCOSE mg/dL 91 88 128*     Results from last 7 days   Lab Units 09/08/21  0746 09/07/21  2238 09/07/21  1705   INR  1.70* 2.10 2.02     Lab Results   Lab Value Date/Time    LIPASE 142 (H) 09/07/2021 1705       Radiology:  US Abdomen Limited         XR Chest 1 View   Final Result      CT Abdomen Pelvis With Contrast   Final Result      IR Paracentesis With Imaging    (Results Pending)        Assessment/Plan     Patient Active Problem List   Diagnosis   • Acute liver failure without hepatic coma   • Acute hepatitis        Plan:  Patient appears to have acute alcoholic related hepatitis with an elevated discriminant function score which will qualify him for steroids.  He was counseled today that he needs to completely abstain from all alcohol.  We will send him to have ultrasound guided paracentesis to ensure there is no evidence of SBP.  His CT scan was suggestive of cirrhosis with signs of portal hypertension.  His INR is improved with vitamin K this morning..  I also think it reasonable to proceed with MRI/MRCP to rule out other etiologies but most likely this is only alcoholic hepatitis.      I discussed the patients findings and my recommendations with patient.    Gino hKan MD

## 2021-09-08 NOTE — PLAN OF CARE
Goal Outcome Evaluation:  Plan of Care Reviewed With: patient      PATIENT HAD PARACENTESIS DONE TODAY.  REMOVED 800mL OF FLUID, NOTIFIED DR. LENNON OF THE RESULTS.  VERBAL ORDER GIVEN TO HOLD ALBUMIN AT THIS TIME. READ BACK AND VERIFIED.

## 2021-09-08 NOTE — H&P
[facility]  [facility] 2021   Patient Name: Wai Gay  : 1985  MRN: [id number]  Primary Care Physician:  Callum Peterson DO  Date of admission: 2021    Subjective   Subjective     Chief Complaint: Icterus    HPI: Wai Gay is a 35 y.o. male who presented to the hospital complaining of noticing eye discoloration.  Patient is completely asymptomatic otherwise.  He has presented to work in his boss asked him to come to be evaluated by the physician.  Patient's mother notes that the skin and the eyes have been more yellow than normal HEENT patient has noticed some bloating in his stomach he reports that he had a cyst cholecystectomy and at that time he was told that he was in the beginning onset ofcirrhotic liver disease.  The patient states that he drinks 2 beers per day and occasionally drinks some shots.  He states he had about 8 years of very heavy drinking.  He denies doing any illicit drugs, patient is tearful and frightful that he will die from liver disease.  Patient's mother is at the bedside and states she is concerned that he could go through withdrawals and states that often \"his mind goes flying\"  Initial work-up in the emergency department revealed glucose of 88 sodium of 127 potassium of 2.7  Patient had a white count of 12.63 his UA was significant for glucose, ketones, large bilirubin and 1+ bacteria.  Chest x-ray did show a bilateral pulmonary infiltrates.         Personal History   ROS     REVIEW OF SYSTEMS  General-no fatigue, no fever or chills, denies weakness or fainting  HEENT -Denies dysphagia or hearing changes  Respiratory-Denies cough dyspnea sputum and changes , denies hemoptysis or shortness of breath on exertion  Cardiovascular- Denies chest pain ,palpitations, orthopnea  GI -Denies abdominal pain, nausea ,vomiting ,diarrhea ,blood  per rectum   -Denies dysuria ,frequency  Musculoskeletal-Denies joint " effusion or joint tenderness  Hem onc-Denies bleeding or bruising         PMH  Past Medical History:   Diagnosis Date   • Alcohol abuse 03/14/2017   • Hypertension 12/27/2019    Essential  Will start medication   • Hypokalemia 03/14/2017    Will update   • Hyponatremia 03/14/2017   • Steatohepatitis, alcoholic 03/14/2017   • Tobacco abuse 03/14/2017         PSH  Past Surgical History:   Procedure Laterality Date   • CHOLECYSTECTOMY         FH  Family History   Problem Relation Age of Onset   • Diabetes Mother    • Stroke Maternal Grandmother    • Heart disease Maternal Grandmother    • Hypertension Maternal Grandmother    • Diabetes Maternal Grandmother    • Lung cancer Maternal Grandmother    • Stroke Maternal Grandfather    • Heart disease Maternal Grandfather    • Lung cancer Maternal Grandfather        SOCIAL HISTORY   reports that he has been smoking. He has a 13.00 pack-year smoking history. He has never used smokeless tobacco. He reports current alcohol use. He reports previous drug use.     ALLERGIES   No Known Allergies    HOME MEDICINES  None     Objective     Vitals:   Temp:  [99.9 °F (37.7 °C)] 99.9 °F (37.7 °C)  Heart Rate:  [104-139] 114  Resp:  [18-21] 21  BP: (113-132)/(73-87) 119/73    Physical Exam  Vital signs were reviewed.  General appearance - Patient appears well-developed and well-nourished.   Head - Normocephalic, atraumatic.  Pupils - Equal, round, reactive to light.  Extraocular muscles are intact.  Conjunctive is clear  Tympanic membranes - Gray, intact without erythema or retractions.  Oral mucosa - Pink and moist without lesions or erythema.  Uvula is midline.  Neck - Supple.  Trachea was midline.  There is no palpable lymphadenopathy or thyromegaly.  There are no meningeal signs  Lungs -diminished in the bases patient is mildly tachypneic.  Heart -tachycardic and irregularly irregular without any murmurs, clicks, or gallops.  Abdomen - Soft.  Obese.  Bowel sounds are present.  There is  no rebound, guarding, or rigidity.  There are no palpable masses.  There are no pulsatile masses.  Patient does have some ascites.  Back - Spine is straight and midline.  There is no CVA tenderness.  Extremities - Intact x4 with full range of motion.  There is no palpable edema.  Neurologic - Cranial nerves II through XII are grossly intact.  Patient has right-sided residual hemiparesis.  Cerebellar function was normal.  Integument - There are no rashes.  There are no petechia or purpura lesions noted.  There are no vesicular lesions noted.           Assessment / Plan     Assessment/Plan:   Patient was admitted to a monitored bed     Acute hepatitis likely alcoholic hepatitis with elevated bilirubin  -We will consult Dr. Berg, he was consulted from the emergency department, appreciate input.  -We will obtain a right upper quadrant ultrasound    Hypokalemia  -Replace potassium  -keep Magnesium above 2    -Bilateral infiltrates, questionable  pneumonia.   Patient was placed on respiratory isolation  CBC CMP CRP proBNP, sed rate, lactic acid, coags, procalcitonin, flu PCR obtained.  Respiratory viral panel obtained  Ferritin and d-dimer  Sputum culture strep mycoplasma Legionella  Urinalysis, urine culture, blood cultures.  We will start patient on Decadron 6 mg IV daily  Patient will be started on remdesivir.  We will do DuoNebs every 4 as needed shortness of breath.    Daily alcohol  -We will place patient on CIWA protocol    SCDs bilateral for DVT prophylaxis.        DVT prophylaxis:  Mechanical DVT prophylaxis orders are present.    CODE STATUS:       Result Review:    I have personally reviewed the results from the time of this admission to 6/11/2021 06:16 EDT and agree with these findings:  [x]  Laboratory  [x]  Microbiology  [x]  Radiology  [x]  EKG/Telemetry   []  Cardiology/Vascular   []  Pathology  []  Old records  []  Other:    Admission Status:  I believe this patient meets  inpatientstatus.    Electronically signed by Radha Loo MD, 09/07/21, 11:49 PM EDT.

## 2021-09-08 NOTE — NURSING NOTE
Patient was new admit today. While getting report from ED, I was told that 3 bags of potassium chloride was already given and patient only had 1 bag potassium chloride left to run.     Notified pharmacy that 1 bag of potassium chloride was still needed. Pharmacist asked the nurse to call ED back and confirm that 3 bags potassium had been given. Computer does not reflect that 3 bags were given.     Returned phone call to pharmacy and discussed the findings, 1 bag of potassium chloride was sent up from pharmacy.

## 2021-09-08 NOTE — ED NOTES
Received call from Belén in Ultrasound.  She advises that she came to get patient for procedure and he was eating.  Admitting physician ordered procedure and patient was supposed to be NPO for 8 hours prior to procedure.  Procedure will be rescheduled for tomorrow.       Lula Wahl RN  09/08/21 0895

## 2021-09-09 ENCOUNTER — READMISSION MANAGEMENT (OUTPATIENT)
Dept: CALL CENTER | Facility: HOSPITAL | Age: 36
End: 2021-09-09

## 2021-09-09 ENCOUNTER — APPOINTMENT (OUTPATIENT)
Dept: ULTRASOUND IMAGING | Facility: HOSPITAL | Age: 36
End: 2021-09-09

## 2021-09-09 VITALS
WEIGHT: 172.18 LBS | BODY MASS INDEX: 27.02 KG/M2 | TEMPERATURE: 98.1 F | SYSTOLIC BLOOD PRESSURE: 110 MMHG | OXYGEN SATURATION: 96 % | DIASTOLIC BLOOD PRESSURE: 69 MMHG | HEIGHT: 67 IN | HEART RATE: 95 BPM | RESPIRATION RATE: 18 BRPM

## 2021-09-09 LAB
ALBUMIN SERPL-MCNC: 2.4 G/DL (ref 3.5–5.2)
ALBUMIN/GLOB SERPL: 0.8 G/DL
ALP SERPL-CCNC: 224 U/L (ref 39–117)
ALT SERPL W P-5'-P-CCNC: 27 U/L (ref 1–41)
ANION GAP SERPL CALCULATED.3IONS-SCNC: 11.2 MMOL/L (ref 5–15)
ANISOCYTOSIS BLD QL: NORMAL
AST SERPL-CCNC: 93 U/L (ref 1–40)
BASOPHILS # BLD AUTO: 0.04 10*3/MM3 (ref 0–0.2)
BASOPHILS NFR BLD AUTO: 0.3 % (ref 0–1.5)
BILIRUB CONJ SERPL-MCNC: >10 MG/DL (ref 0–0.3)
BILIRUB SERPL-MCNC: 19.6 MG/DL (ref 0–1.2)
BUN SERPL-MCNC: 9 MG/DL (ref 6–20)
BUN/CREAT SERPL: 13.8 (ref 7–25)
CALCIUM SPEC-SCNC: 7.8 MG/DL (ref 8.6–10.5)
CHLORIDE SERPL-SCNC: 94 MMOL/L (ref 98–107)
CK SERPL-CCNC: 193 U/L (ref 20–200)
CO2 SERPL-SCNC: 25.8 MMOL/L (ref 22–29)
CREAT SERPL-MCNC: 0.65 MG/DL (ref 0.76–1.27)
CRP SERPL-MCNC: 4.18 MG/DL (ref 0–0.5)
DEPRECATED RDW RBC AUTO: 58.8 FL (ref 37–54)
EOSINOPHIL # BLD AUTO: 0.01 10*3/MM3 (ref 0–0.4)
EOSINOPHIL NFR BLD AUTO: 0.1 % (ref 0.3–6.2)
ERYTHROCYTE [DISTWIDTH] IN BLOOD BY AUTOMATED COUNT: 16.5 % (ref 12.3–15.4)
ERYTHROCYTE [SEDIMENTATION RATE] IN BLOOD: 41 MM/HR (ref 0–15)
FERRITIN SERPL-MCNC: 700 NG/ML (ref 30–400)
GFR SERPL CREATININE-BSD FRML MDRD: 141 ML/MIN/1.73
GLOBULIN UR ELPH-MCNC: 3.2 GM/DL
GLUCOSE SERPL-MCNC: 113 MG/DL (ref 65–99)
HCT VFR BLD AUTO: 31.7 % (ref 37.5–51)
HGB BLD-MCNC: 11.8 G/DL (ref 13–17.7)
IMM GRANULOCYTES # BLD AUTO: 0.17 10*3/MM3 (ref 0–0.05)
IMM GRANULOCYTES NFR BLD AUTO: 1.5 % (ref 0–0.5)
INR 570 REPEAT: 1.54 (ref 2–3)
LYMPHOCYTES # BLD AUTO: 0.75 10*3/MM3 (ref 0.7–3.1)
LYMPHOCYTES NFR BLD AUTO: 6.4 % (ref 19.6–45.3)
MACROCYTES BLD QL SMEAR: NORMAL
MAGNESIUM SERPL-MCNC: 2.1 MG/DL (ref 1.6–2.6)
MCH RBC QN AUTO: 36.8 PG (ref 26.6–33)
MCHC RBC AUTO-ENTMCNC: 37.2 G/DL (ref 31.5–35.7)
MCV RBC AUTO: 98.8 FL (ref 79–97)
MONOCYTES # BLD AUTO: 0.87 10*3/MM3 (ref 0.1–0.9)
MONOCYTES NFR BLD AUTO: 7.5 % (ref 5–12)
NEUTROPHILS NFR BLD AUTO: 84.2 % (ref 42.7–76)
NEUTROPHILS NFR BLD AUTO: 9.81 10*3/MM3 (ref 1.7–7)
NRBC BLD AUTO-RTO: 0 /100 WBC (ref 0–0.2)
PHOSPHATE SERPL-MCNC: 1.7 MG/DL (ref 2.5–4.5)
PLAT MORPH BLD: NORMAL
PLATELET # BLD AUTO: 145 10*3/MM3 (ref 140–450)
PMV BLD AUTO: 9.6 FL (ref 6–12)
POTASSIUM SERPL-SCNC: 3 MMOL/L (ref 3.5–5.2)
PROT SERPL-MCNC: 5.6 G/DL (ref 6–8.5)
PT 570 REPEAT: 15.9 SECONDS (ref 9.4–12)
RBC # BLD AUTO: 3.21 10*6/MM3 (ref 4.14–5.8)
SODIUM SERPL-SCNC: 131 MMOL/L (ref 136–145)
WBC # BLD AUTO: 11.65 10*3/MM3 (ref 3.4–10.8)
WBC MORPH BLD: NORMAL

## 2021-09-09 PROCEDURE — 25010000003 POTASSIUM CHLORIDE 10 MEQ/100ML SOLUTION: Performed by: FAMILY MEDICINE

## 2021-09-09 PROCEDURE — 82248 BILIRUBIN DIRECT: CPT | Performed by: FAMILY MEDICINE

## 2021-09-09 PROCEDURE — 85652 RBC SED RATE AUTOMATED: CPT | Performed by: GENERAL PRACTICE

## 2021-09-09 PROCEDURE — 63710000001 PREDNISOLONE 15 MG/5ML SOLUTION: Performed by: PHYSICIAN ASSISTANT

## 2021-09-09 PROCEDURE — 85610 PROTHROMBIN TIME: CPT | Performed by: PHYSICIAN ASSISTANT

## 2021-09-09 PROCEDURE — 25010000002 CEFTRIAXONE PER 250 MG: Performed by: PHYSICIAN ASSISTANT

## 2021-09-09 PROCEDURE — 83735 ASSAY OF MAGNESIUM: CPT | Performed by: GENERAL PRACTICE

## 2021-09-09 PROCEDURE — 99232 SBSQ HOSP IP/OBS MODERATE 35: CPT | Performed by: INTERNAL MEDICINE

## 2021-09-09 PROCEDURE — 86140 C-REACTIVE PROTEIN: CPT | Performed by: GENERAL PRACTICE

## 2021-09-09 PROCEDURE — 99239 HOSP IP/OBS DSCHRG MGMT >30: CPT | Performed by: FAMILY MEDICINE

## 2021-09-09 PROCEDURE — 85007 BL SMEAR W/DIFF WBC COUNT: CPT | Performed by: PHYSICIAN ASSISTANT

## 2021-09-09 PROCEDURE — 80053 COMPREHEN METABOLIC PANEL: CPT | Performed by: PHYSICIAN ASSISTANT

## 2021-09-09 PROCEDURE — 76705 ECHO EXAM OF ABDOMEN: CPT

## 2021-09-09 PROCEDURE — 85025 COMPLETE CBC W/AUTO DIFF WBC: CPT | Performed by: PHYSICIAN ASSISTANT

## 2021-09-09 PROCEDURE — 84100 ASSAY OF PHOSPHORUS: CPT | Performed by: FAMILY MEDICINE

## 2021-09-09 PROCEDURE — 82728 ASSAY OF FERRITIN: CPT | Performed by: GENERAL PRACTICE

## 2021-09-09 PROCEDURE — 36415 COLL VENOUS BLD VENIPUNCTURE: CPT | Performed by: PHYSICIAN ASSISTANT

## 2021-09-09 PROCEDURE — 82550 ASSAY OF CK (CPK): CPT | Performed by: GENERAL PRACTICE

## 2021-09-09 RX ORDER — POTASSIUM CHLORIDE 750 MG/1
20 TABLET, FILM COATED, EXTENDED RELEASE ORAL DAILY
Qty: 60 TABLET | Refills: 0 | Status: SHIPPED | OUTPATIENT
Start: 2021-09-09 | End: 2021-11-05

## 2021-09-09 RX ORDER — MULTIPLE VITAMINS W/ MINERALS TAB 9MG-400MCG
1 TAB ORAL DAILY
Qty: 30 EACH | Refills: 0 | Status: SHIPPED | OUTPATIENT
Start: 2021-09-09 | End: 2022-02-07

## 2021-09-09 RX ORDER — ESOMEPRAZOLE MAGNESIUM 40 MG/1
40 CAPSULE, DELAYED RELEASE ORAL
Qty: 30 CAPSULE | Refills: 1 | Status: SHIPPED | OUTPATIENT
Start: 2021-09-09 | End: 2021-11-05

## 2021-09-09 RX ORDER — POTASSIUM CHLORIDE 7.45 MG/ML
10 INJECTION INTRAVENOUS ONCE
Status: COMPLETED | OUTPATIENT
Start: 2021-09-09 | End: 2021-09-09

## 2021-09-09 RX ORDER — PREDNISOLONE SODIUM PHOSPHATE 10 MG/1
TABLET, ORALLY DISINTEGRATING ORAL
Qty: 138 TABLET | Refills: 0 | Status: SHIPPED | OUTPATIENT
Start: 2021-09-09 | End: 2021-11-05

## 2021-09-09 RX ORDER — POTASSIUM CHLORIDE 750 MG/1
40 CAPSULE, EXTENDED RELEASE ORAL 2 TIMES DAILY
Status: DISCONTINUED | OUTPATIENT
Start: 2021-09-09 | End: 2021-09-09 | Stop reason: HOSPADM

## 2021-09-09 RX ADMIN — POTASSIUM CHLORIDE 40 MEQ: 10 CAPSULE, COATED, EXTENDED RELEASE ORAL at 09:45

## 2021-09-09 RX ADMIN — SODIUM CHLORIDE 1 G: 9 INJECTION, SOLUTION INTRAVENOUS at 11:29

## 2021-09-09 RX ADMIN — SODIUM CHLORIDE, PRESERVATIVE FREE 10 ML: 5 INJECTION INTRAVENOUS at 09:48

## 2021-09-09 RX ADMIN — POTASSIUM CHLORIDE 10 MEQ: 7.46 INJECTION, SOLUTION INTRAVENOUS at 09:45

## 2021-09-09 RX ADMIN — PREDNISOLONE 30 MG: 15 SOLUTION ORAL at 09:43

## 2021-09-09 NOTE — OUTREACH NOTE
Prep Survey      Responses   [facility] facility patient discharged from?  [facility]   Is LACE score < 7 ?  No   Emergency Room discharge w/ pulse ox?  No   Eligibility  [facility]   Date of Admission  09/07/21   Date of Discharge  09/09/21   Discharge Disposition  Home or Self Care   Discharge diagnosis  Acute liver failure without hepatic coma  Acute hepatitis   Does the patient have one of the following disease processes/diagnoses(primary or secondary)?  Other   Does the patient have Home health ordered?  No   Is there a DME ordered?  No   Prep survey completed?  Yes          Peace Melendez RN

## 2021-09-09 NOTE — DISCHARGE INSTRUCTIONS
September 9, 2021     Patient: Wai Gay   YOB: 1985   Date of Visit: 9/7/2021       To Whom It May Concern:    It is my medical opinion that Wai Gay may return to work/school after his appointment with PCP Callum Peterson on 9/16/2021       @EMI@    Sincerely,        No name on file.    CC: [unfilled]

## 2021-09-09 NOTE — SIGNIFICANT NOTE
"   09/08/21 2016   Provider Notification   Reason for Communication Review case   Provider Name marga   Notification Route Phone call   Response See orders    notified marga of not sure if patient has received full doses of potassium runs in ED. They have not been scanned in. Can we do a redraw on labs to see if the patient needs all 5 runs due? Tea Matias RN     \"yes I will put orders in now\"  "

## 2021-09-09 NOTE — SIGNIFICANT NOTE
09/09/21 1115   Coping/Psychosocial   Observed Emotional State calm;cooperative   Verbalized Emotional State hopefulness   Trust Relationship/Rapport empathic listening provided   Family/Support Persons mother   Involvement in Care interacting with patient   Spiritual Care   Spiritual Care Visit Type initial   Spiritual Care Source  initiative   Receptivity to Spiritual Care visit welcomed   Spiritual Care Interventions supportive conversation provided   Response to Spiritual Care receptive of support   Use of Spiritual Resources non-Pentecostalism use of spiritual care   Spiritual Care Follow-Up follow-up, none required as presently assessed

## 2021-09-09 NOTE — PROGRESS NOTES
[facility]  Inpatient Progress Note    Reason for Follow Up: Alcoholic hepatitis, jaundice    Subjective     Interval History:   Paracentesis yesterday showed no evidence of SBP  MRI showed no bile duct abnormality, mild ascites, pancreatic inflammatory changes  Ascites fluid calculate high SAAG, low total protein consistent with portal hypertension  Acute hepatitis panel negative      Current Facility-Administered Medications:   •  albumin human 25 % IV SOLN 25 g, 25 g, Intravenous, Once, Nicolle Sanchez  •  albumin human 25 % IV SOLN 25 g, 25 g, Intravenous, Once, Nicolle Sanchez  •  sennosides-docusate (PERICOLACE) 8.6-50 MG per tablet 2 tablet, 2 tablet, Oral, BID, 2 tablet at 09/08/21 2016 **AND** polyethylene glycol (MIRALAX) packet 17 g, 17 g, Oral, Daily PRN **AND** bisacodyl (DULCOLAX) EC tablet 5 mg, 5 mg, Oral, Daily PRN **AND** bisacodyl (DULCOLAX) suppository 10 mg, 10 mg, Rectal, Daily PRN, Radha Loo MD  •  cefTRIAXone (ROCEPHIN) 1 g/100 mL 0.9% NS (MBP), 1 g, Intravenous, Q24H, Tyler Alva PA, Stopped at 09/08/21 1105  •  LORazepam (ATIVAN) tablet 1 mg, 1 mg, Oral, Q2H PRN **OR** LORazepam (ATIVAN) injection 1 mg, 1 mg, Intravenous, Q2H PRN **OR** LORazepam (ATIVAN) tablet 2 mg, 2 mg, Oral, Q1H PRN **OR** LORazepam (ATIVAN) injection 2 mg, 2 mg, Intravenous, Q1H PRN **OR** LORazepam (ATIVAN) injection 2 mg, 2 mg, Intravenous, Q15 Min PRN **OR** LORazepam (ATIVAN) injection 2 mg, 2 mg, Intramuscular, Q15 Min PRN, Radha Loo MD  •  ondansetron (ZOFRAN) tablet 4 mg, 4 mg, Oral, Q6H PRN, Radha Loo MD  •  potassium chloride (MICRO-K) CR capsule 40 mEq, 40 mEq, Oral, BID, Tyler Alva PA, 40 mEq at 09/09/21 0945  •  prednisoLONE (PRELONE) oral solution 40 mg, 40 mg, Oral, Daily, Tyler Alva PA, 30 mg at 09/09/21 0943  •  sodium chloride 0.9 % flush 10 mL, 10 mL, Intravenous, PRN, Chicho Rinaldi, DO  •  sodium chloride 0.9 % flush 10 mL, 10 mL, Intravenous,  Q12H, Radha Loo MD, 10 mL at 09/09/21 0948  •  sodium chloride 0.9 % flush 10 mL, 10 mL, Intravenous, PRN, Radha Loo MD  Review of Systems:    All systems were reviewed and negative except for that previously mentioned in the HPI    Objective     Vital Signs  Temp:  [97.9 °F (36.6 °C)-99 °F (37.2 °C)] 97.9 °F (36.6 °C)  Heart Rate:  [] 98  Resp:  [18] 18  BP: (108-117)/(64-77) 111/72  Body mass index is 26.97 kg/m².    Intake/Output Summary (Last 24 hours) at 9/9/2021 1110  Last data filed at 9/9/2021 1027  Gross per 24 hour   Intake 920 ml   Output --   Net 920 ml     I/O this shift:  In: 240 [P.O.:240]  Out: -      Physical Exam:   General: patient awake, alert and cooperative   Eyes: Normal lids and lashes, no scleral icterus   Neck: supple, normal ROM   Skin: warm and dry, not jaundiced   Cardiovascular: regular rhythm and rate, no murmurs auscultated   Pulm: clear to auscultation bilaterally, regular and unlabored   Abdomen: soft, nontender, nondistended; normal bowel sounds   Rectal: deferred   Extremities: no rash or edema   Psychiatric: Normal mood and behavior; memory intact     Results Review:     I reviewed the patient's new clinical results.    Results from last 7 days   Lab Units 09/09/21 0443 09/07/21 2238 09/07/21  1705   WBC 10*3/mm3 11.65* 12.63* 15.70*   HEMOGLOBIN g/dL 11.8* 11.4* 13.0   HEMATOCRIT % 31.7* 31.1* 35.0*   PLATELETS 10*3/mm3 145 134* 138*     Results from last 7 days   Lab Units 09/09/21 0443 09/08/21 2018 09/08/21  0746 09/07/21 2238 09/07/21  1705   SODIUM mmol/L 131* 129* 128*   < > 126*   POTASSIUM mmol/L 3.0* 3.2* 2.5*   < > 2.5*   CHLORIDE mmol/L 94* 94* 90*   < > 85*   CO2 mmol/L 25.8 24.7 29.0   < > 28.4   BUN mg/dL 9 8 8   < > 8   CREATININE mg/dL 0.65* 0.67* 0.64*   < > 0.81   CALCIUM mg/dL 7.8* 7.8* 7.6*   < > 8.6   BILIRUBIN mg/dL 19.6*  --  19.3*  --  23.9*   ALK PHOS U/L 224*  --  212*  --  222*   ALT (SGPT) U/L 27  --  27  --  34   AST (SGOT) U/L  93*  --  113*  --  141*   GLUCOSE mg/dL 113* 139* 91   < > 128*    < > = values in this interval not displayed.     Results from last 7 days   Lab Units 09/09/21  0745 09/08/21  0746 09/07/21  2238   INR   --  1.70* 2.10    REPEAT  1.54*  --   --      Lab Results   Lab Value Date/Time    LIPASE 142 (H) 09/07/2021 1705       Radiology:  [unfilled]        Assessment:     Patient Active Problem List   Diagnosis   • Acute liver failure without hepatic coma   • Acute hepatitis        Plan:   Patient has acute alcoholic hepatitis but is relatively asymptomatic apart from his jaundice.  Paracentesis yesterday was consistent with portal hypertension and no SBP.  I therefore think he can be discharged home on a 1 month prednisone taper 40 mg, 30 mg, 20 mg, and 10 mg dosing each for 1 week.  Long discussion about importance of alcohol cessation and enrollment in AA or rehabilitation facility was discussed with the patient and his mother.  Patient is able to work in my opinion as long as his physical condition allows for.  He will follow-up in my office in 2 to 3 weeks with repeat labs at that time.  He and his family were also instructed that if he develops any signs of confusion or abdominal pain then he needs to return to the emergency department    I discussed the patients findings and my recommendations with patient.    Gino Khan M.D.  Gastroenterology

## 2021-09-09 NOTE — PLAN OF CARE
Goal Outcome Evaluation:  Plan of Care Reviewed With: patient        Progress: no change  Outcome Summary: pt has rested well during the night. covid is negative. ciwa precautions. has not qualified for needing ativan. pt is very jaundiced, md is aware. no other changes this shift.Tea Matias RN

## 2021-09-09 NOTE — DISCHARGE SUMMARY
[facility]  HOSPITALIST  DISCHARGE SUMMARY       Patient Name: Wai Gay  : 1985  MRN: [id number]  Primary Care Physician: Callum Peterson DO    Date of Admission: 2021  Date of Discharge: 2021    Discharge Diagnoses     Active Hospital Problems    Diagnosis POA   • Acute hepatitis [B17.9] Yes   • Acute liver failure without hepatic coma [K72.00] Yes      Resolved Hospital Problems   No resolved problems to display.     · Liver failure  · Likely alcoholic hepatitis  · S/P paracentesis 21  · Cirrhosis  · Alcohol abuse  · Tobacco use  · History of cholelithiasis/cholecystectomy  · Bacteruria, query UTI  · Pleural effusions  · Ascites  · Hypokalemia, hyponatremia, hypomagnesemia, elevated INR, hyperbilirubinemia, leukocytosis (improved)    Hospital Course   Hospital Course:  Wai Gay is a pleasant 35 y.o. male with a history of heavy alcohol abuse.  He continues to drink daily, but reportedly not nearly as much as he used to (previously fifth daily).  He was at work and his boss asked him about yellow discoloration of eyes and suggested he seek medical attention.  The patient really has no major symptoms other than maybe some mild abdominal distention.  He specifically denies any fever, nausea, vomiting, diarrhea, or abdominal pain.  His energy levels are reasonable and he feels close to his baseline.   He came to the ED with K 2.5, , lipase 142, magnesium 1.6, INR 2.0, ammonia 41, WBC 15, creatinine 0.7, ALT 34, , total bilirubin 23.9.  His DF score is > 70.    He was admitted to the hospitalist service; seen by gastroenterology in consultation.  He was given Rocephin empirically.  He was placed on CIWA protocol.  He had paracentesis 21 with 800 cc clear flores fluid removed.  Fluid analysis did not suggest SBP.  He was started on prednisolone.  He was given Vitamin K to help lower his INR.  Viral hepatitis panel was negative for hep ABC.   Potassium supplementation given.  Sodium improved to 131.  ALT improved to 27.  AST 93.  Bilirubin modestly decreased to 19.6.  Work-up included CT abdomen pelvis, abdominal ultrasound, and MRCP.  Results below.  Evaluation and clinical picture consistent with alcoholic hepatitis.  Patient would benefit from prolonged steroid, in light of elevated Maddrey DF score.  Patient will be on prednisolone 40 mg daily x 28 days, then tapering off over the next 2 weeks.  PPI started while on steroids.  He will follow up with gastroenterology in 4 weeks.  He has been strongly encouraged to avoid alcohol.  He has been advised to seek medical attention if symptoms recur or new symptoms develop in the interim.  He is to follow-up with his PCP in 1 to 2 weeks as well.    Discharge Follow Up / Recommendations (labs, diagnostics, meds, etc):   • As above  Future Appointments   Date Time Provider Department Center   9/16/2021  9:00 AM Callum Peterson,  [facility]   11/5/2021  3:15 PM Marian Ferrell NP [facility]   12/21/2021  3:45 PM Callum Peterson,  [facility]   •   Consultants     Consults     Date and Time Order Name Status Description    9/8/2021 12:32 PM Inpatient Gastroenterology Consult Completed     9/8/2021 12:26 AM Inpatient Gastroenterology Consult Completed     9/7/2021 10:15 PM Inpatient Gastroenterology Consult Completed     9/7/2021  9:52 PM Inpatient Hospitalist Consult Completed     9/7/2021  8:56 PM Inpatient Gastroenterology Consult Completed       •   On Day of Discharge   VS: Temp:  [97.9 °F (36.6 °C)-99 °F (37.2 °C)] 98.1 °F (36.7 °C)  Heart Rate:  [] 95  Resp:  [18] 18  BP: (109-117)/(64-77) 110/69  EXAM:  (refer to progress note from 9/9/2021)     Discharge Medications      New Medications      Instructions Start Date   esomeprazole 40 MG capsule  Commonly known as: nexIUM   40 mg, Oral, Every Morning Before Breakfast      multivitamin with minerals tablet tablet   1  tablet, Oral, Daily      potassium chloride 10 MEQ CR tablet   20 mEq, Oral, Daily      prednisoLONE ODT 10 MG disintegrating tablet  Commonly known as: ORAPRED ODT   Daily dose:  40mg x 28 days, then, 30mg x 4 days, then 20mg x 4 days, then 10mg x 4 days, then 5mg x 4 days.           Procedures   Paracentesis  Imaging   CT Abdomen Pelvis With Contrast    Result Date: 9/7/2021  PROCEDURE: CT ABDOMEN PELVIS W CONTRAST  COMPARISON: [facility], ,  ABDOMEN LIMITED, 2/03/2020, 14:38.  INDICATIONS: abdominal pain, Jaundice  TECHNIQUE: After obtaining the patient's consent, CT images were created with non-ionic intravenous contrast material.   PROTOCOL:   Standard imaging protocol performed    RADIATION:   DLP: 503.1 mGy*cm   Automated exposure control was utilized to minimize radiation dose. CONTRAST: 100 cc Isovue 370 I.V. LABS:   eGFR: >60 ml/min/1.73m2  FINDINGS:  There are small to moderate layering bilateral pleural effusions with associated dependent bibasilar atelectasis.  There is a moderate amount of ascites.  The liver is enlarged and diffusely nodular indicative of cirrhosis.  There are subtle nodular hypodensities throughout the liver which are too small to characterize .  Cholecystectomy clips are present.  There is no biliary dilatation.  The hepatic and portal veins are patent.  The spleen is enlarged and measures 18 cm in maximum craniocaudal dimension at the midclavicular line.  Pancreatic parenchyma appears unremarkable.  In there are splenic varices as well as suggestion of gastroesophageal varices.  The bladder is unremarkable.  Solid pelvic organs are normal for his age.  Perirectal varices are noted.  There is a small hiatal hernia.  No focal areas of bowel wall thickening or dilation.  The appendix is normal.  The abdominal aorta is normal in course and caliber.  There is mild retroperitoneal and gastrohepatic and upper abdominal adenopathy.  No acute bony abnormality or aggressive  appearing focal osseous lesions.  CONCLUSION: 1. There is cirrhosis with splenomegaly and multiple portosystemic varices.  .  There is mild upper abdominal and retroperitoneal adenopathy and moderate amount of ascites.  There are also small to moderate layering bilateral pleural effusions.  2. Evidence of cholecystectomy.  There is no biliary dilatation.  Subtle tiny hypodensities throughout the liver are present , which may be related to his cirrhosis but they are too small to characterize.  MRI of the abdomen with and without contrast and MRCP would be better able to characterize the liver and biliary tree if this has not been recently performed elsewhere.  There are no prior CTs at this institution for comparison.    DELONTE HERNANDEZ DO       Electronically Signed and Approved By: DELONTE HERNANDEZ DO on 9/07/2021 at 20:38               IR Paracentesis With Imaging    Result Date: 9/8/2021  PROCEDURE: IR PARACENTESIS WITH IMAGING  COMPARISON: None  INDICATIONS: ascites. 5 FR 7 CM YUEH. 800 ML FLORES CLEAR FLUID COLLECTED AND SENT TO LAB.  CONSENT:   Prior to the procedure, the risks, benefits and alternatives were thoroughly explained.  This included the risk of bleeding, infection, injury to associated structures, and risk of bowel injury.  The patient expressed understanding and wished to continue.  Informed written consent was obtained.  FINDINGS: Preliminary ultrasound demonstrated appropriate fluid in the right mid abdomen.  The overlying skin was prepped and draped in normal sterile fashion.  Lidocaine was injected along the anticipated tract of the needle.   A 5 Sami 7 cm Yueh catheter was advanced into the peritoneal space.  800 cubic centimeters clear flores fluid was aspirated.   The catheter was removed without complication.  The fluid was sent to the lab for analysis per referring clinician.   CONCLUSION: Successful ultrasound-guided diagnostic and therapeutic paracentesis without evidence of  complication.     AHSAN GERMAIN MD       Electronically Signed and Approved By: AHSAN GERMAIN MD on 9/08/2021 at 16:55             XR Chest 1 View    Result Date: 9/7/2021  PROCEDURE: XR CHEST 1 VW  COMPARISON: [facility], CR, CHEST AP/PA 1 VIEW, 3/27/2017, 14:25.  INDICATIONS: JAUNDICE  FINDINGS: A single AP upright portable view of the chest is provided for review.  New bibasilar infiltrates/atelectasis are/is seen.  The findings may represent infectious multifocal pneumonia.  Atelectasis alone is possible.  No cardiomediastinal enlargement.  The projection is somewhat apical-lordotic, which may limit assessment.  No pneumothorax.  No pneumomediastinum.  No pleural effusion.  There is slight pulmonary hypoinflation.  External artifacts obscure detail.  CONCLUSION: New bibasilar infiltrates and/or atelectasis are/is seen.  The findings may represent infectious multifocal pneumonia.      POP DORADO JR, MD       Electronically Signed and Approved By: POP DORADO JR, MD on 9/07/2021 at 23:28               MRI abdomen w wo contrast mrcp    Result Date: 9/9/2021  PROCEDURE: MRI ABDOMEN W WO CONTRAST MRCP  COMPARISONS: [facility], CT, CT ABD-PELVIS W CONTRAST, 9/07/2021, 20:00.   [facility], US, US ABDOMEN LIMITED, 2/03/2020, 14:38.  INDICATIONS: Jaundice.  CONTRAST: 15 mL  MultiHance I.V.  TECHNIQUE: A comprehensive examination was performed utilizing a variety of imaging planes and imaging parameters to optimize visualization of suspected pathology.  Images were obtained both before and after intravenous gadolinium injection. Magnetic resonance cholangiopancreatography (MRCP) was also performed.  1,086 magnetic resonance (MR) images were obtained.  There is motion artifact on the study.  The patient was unable to suspend respirations for the exam.  FINDINGS: The study is limited by motion artifact.  Diffuse hepatic steatosis is seen with hepatomegaly.  There is  suspected cirrhosis with portal hypertension and portosystemic venous shunting.  Splenomegaly is present.  The maximum craniocaudal dimension of the spleen is about 17.6 cm.  The maximum craniocaudal dimension of the liver is approximately 22.3 cm.  Prominent goyo hepatis and portacaval lymph nodes are suspected.  The caudate lobe of liver is thought to be mildly prominent.  There are small to moderate bilateral pleural effusions.  A small to moderate amount of ascites is seen.  Age-indeterminate pancreatitis is suspected without definite pseudocyst formation or pancreatic necrosis.  Please correlate with pertinent lab values.  There may be secondary duodenitis.  No pancreatic ductal dilatation is seen.  No biliary ductal dilatation is identified.  The patient has undergone cholecystectomy.  No suspicious pancreatic, renal, or hepatic mass is appreciated.  There is a small hiatal hernia.  There may be paraesophageal varices.  Recanalization of the paraumbilical veins is suspected.  Atelectasis involves the lung bases.  No adrenal mass is seen.  No definite portal vein thrombosis.  The splenic vein is patent.  The visualized portions of the superior mesenteric vein are patent.  There is bilateral gynecomastia.  Portal hypertensive enteropathy/colopathy cannot be excluded.  CONCLUSION:  1. Motion-limited study.  2. The patient has undergone cholecystectomy.  3. There is suspected cirrhosis with diffuse hepatic steatosis and hepatomegaly.  Portal hypertension and portosystemic venous shunting are also suspected.  4. There is splenomegaly.  5. There is a small-to-moderate amount of ascites.  6. Small-to-moderate bilateral pleural effusions are seen.  7. No biliary or pancreatic ductal dilatation is seen.  8. Age-indeterminate pancreatitis is suggested.  Please correlate with pertinent lab values.  No definite MRI evidence of pancreatic necrosis or pseudocyst formation.  9. No definite suspicious mass is appreciated  within the pancreas, liver, kidneys, or spleen.  However, the motion artifact may limit detection of such findings.  10. No definite adrenal mass is suggested.  11. Atelectasis and/or infiltrates in the lung bases is/are noted.  12. No hydronephrosis.  13. There may be secondary duodenitis. 14. Prominent upper abdominal lymph nodes are suspected.  These findings are nonspecific.  They may be reactive in nature.  15. Consider close interval clinical and imaging follow-up.  Nuclear Medicine (NM) whole-body FDG-PET-CT scan may be helpful in further imaging assessment if clinically warranted and if not contraindicated (especially if clinical concern exists for an occult malignancy).    POP DORADO JR, MD       Electronically Signed and Approved By: POP DORADO JR, MD on 9/09/2021 at 3:45             US Abdomen Limited    Result Date: 9/9/2021  PROCEDURE: US ABDOMEN LIMITED  COMPARISON: [facility], US, US ABDOMEN LIMITED, 2/03/2020, 14:38.  INDICATIONS: sob  TECHNIQUE: Ultrasound examination of the right upper quadrant of the abdomen was performed.   FINDINGS:  LIVER: Liver enlarged measuring 20.5 cm.  The liver has diffusely increased echotexture.  There is a small amount of perihepatic fluid.  There may be mild surface nodularity in the liver.  No visible liver mass.  Portal vein is patent.  BILIARY: Previous cholecystectomy.  Common duct measures 7 mm in diameter, unchanged. PANCREAS: Normal.  No visible mass, abnormal atrophy, or ductal dilatation.  RIGHT KIDNEY: Normal.  No mass or hydronephrosis.  OTHER: Spleen enlarged measuring 14.8 cm.  There are bilateral pleural effusions.  CONCLUSION: Possible cirrhotic liver.  No visible liver mass.  Increased liver echotexture could represent steatosis or sequela of chronic liver disease.  Small amount of perihepatic ascites.  Bilateral pleural effusions.  Splenomegaly.  Mild prominence of the common duct may be related to previous cholecystectomy.   Correlate with laboratory values.     MIKAL NI MD       ELECTRONICALLY SIGNED AND APPROVED BY: MIKAL NI MD ON 9/09/2021 AT 9:26             Discharge Details   Hospital Diet:     Diet Order   Procedures   • Diet Regular     CODE STATUS:    Code Status and Medical Interventions:   Ordered at: 09/08/21 0727     Level Of Support Discussed With:    Patient     Code Status:    CPR     Medical Interventions (Level of Support Prior to Arrest):    Full     Additional Instructions for the Follow-ups that You Need to Schedule     Discharge Follow-up with PCP   As directed       Currently Documented PCP:    Callum Peterson DO    PCP Phone Number:    [phone]     Follow Up Details: 1 week         Discharge Follow-up with Specified Provider: [facility] - gastroenterology clinic 1 month; 1 Month   As directed      To: [facility] - gastroenterology clinic 1 month    Follow Up: 1 Month             Discharge Disposition: Home or Self Care  Pertinent  Labs   LAB RESULTS:      Lab 09/09/21  0745 09/09/21  0443 09/08/21  0746 09/08/21  0100 09/07/21  2238 09/07/21  1705   WBC  --  11.65*  --   --  12.63* 15.70*   HEMOGLOBIN  --  11.8*  --   --  11.4* 13.0   HEMATOCRIT  --  31.7*  --   --  31.1* 35.0*   PLATELETS  --  145  --   --  134* 138*   NEUTROS ABS  --  9.81*  --   --  9.55* 12.47*   IMMATURE GRANS (ABS)  --  0.17*  --   --  0.17* 0.31*   LYMPHS ABS  --  0.75  --   --  1.17 1.10   MONOS ABS  --  0.87  --   --  1.57* 1.68*   EOS ABS  --  0.01  --   --  0.10 0.09   MCV  --  98.8*  --   --  98.4* 98.0*   SED RATE  --  41* 38*  --   --   --    CRP  --  4.18*  --   --  5.04*  --    PROCALCITONIN  --   --   --   --  0.40*  --    LACTATE  --   --  1.2 1.8  --   --    LDH  --   --  341*  --   --   --    PROTIME  --   --  17.1*  --  20.9* 20.5*    REPEAT 15.9*  --   --   --   --   --    APTT  --   --  32.5  --   --   --          Lab 09/09/21  0443 09/08/21 2018 09/08/21  0746 09/07/21  2235 09/07/21  1705   SODIUM  131* 129* 128* 127* 126*   POTASSIUM 3.0* 3.2* 2.5* 2.7* 2.5*   CHLORIDE 94* 94* 90* 87* 85*   CO2 25.8 24.7 29.0 28.6 28.4   ANION GAP 11.2 10.3 9.0 11.4 12.6   BUN 9 8 8 8 8   CREATININE 0.65* 0.67* 0.64* 0.71* 0.81   GLUCOSE 113* 139* 91 88 128*   CALCIUM 7.8* 7.8* 7.6* 8.0* 8.6   MAGNESIUM 2.1 2.1 1.6 1.6 1.6   PHOSPHORUS 1.7*  --   --  2.2*  --    HEMOGLOBIN A1C  --   --   --  <4.30*  --          Lab 09/09/21  0443 09/08/21  0746 09/07/21  1705   TOTAL PROTEIN 5.6* 5.4* 6.5   ALBUMIN 2.40* 2.70* 3.00*   GLOBULIN 3.2 2.7 3.5   ALT (SGPT) 27 27 34   AST (SGOT) 93* 113* 141*   BILIRUBIN 19.6* 19.3* 23.9*   BILIRUBIN DIRECT >10.0*  --   --    ALK PHOS 224* 212* 222*   LIPASE  --   --  142*         Lab 09/09/21  0745 09/08/21  0746 09/07/21 2238 09/07/21  1705   PROBNP  --   --  243.7  --    TROPONIN T  --   --  <0.010  --    PROTIME  --  17.1* 20.9* 20.5*    REPEAT 15.9*  --   --   --    INR  --  1.70* 2.10 2.02    REPEAT 1.54*  --   --   --          Lab 09/08/21  0746 09/07/21 2238   CHOLESTEROL 104  --    LDL CHOL 67  --    HDL CHOL 11*  --    TRIGLYCERIDES 145 136         Lab 09/09/21  0443   FERRITIN 700.00*         Brief Urine Lab Results  (Last result in the past 365 days)      Color   Clarity   Blood   Leuk Est   Nitrite   Protein   CREAT   Urine HCG        09/07/21 1709 Yellow Clear Trace Trace Positive 30 mg/dL (1+)             Microbiology Results (last 10 days)     Procedure Component Value - Date/Time    Body Fluid Culture - Body Fluid, Peritoneum [[id number]] Collected: 09/08/21 1605    Lab Status: Preliminary result Specimen: Body Fluid from Peritoneum Updated: 09/09/21 1044     Body Fluid Culture No growth     Gram Stain No organisms seen      Few (2+) WBCs seen    COVID PRE-OP / PRE-PROCEDURE SCREENING ORDER (NO ISOLATION) - Swab, Nasopharynx [[id number]]  (Normal) Collected: 09/08/21 0916    Lab Status: Final result Specimen: Swab from Nasopharynx Updated: 09/08/21 2113    Narrative:       The following orders were created for panel order COVID PRE-OP / PRE-PROCEDURE SCREENING ORDER (NO ISOLATION) - Swab, Nasopharynx.  Procedure                               Abnormality         Status                     ---------                               -----------         ------                     COVID-19,CEPHEID/JULIUS/BD...[[id number]]  Normal              Final result                 Please view results for these tests on the individual orders.    COVID-19,CEPHEID/JULIUS/BDMAX,COR/AMBAR/PAD/CALEB IN-HOUSE(OR EMERGENT/ADD-ON),NP SWAB IN TRANSPORT MEDIA 3-4 HR TAT, RT-PCR - Swab, Nasopharynx [[id number]]  (Normal) Collected: 09/08/21 0916    Lab Status: Final result Specimen: Swab from Nasopharynx Updated: 09/08/21 2113     COVID19 Not Detected    Narrative:      Fact sheet for providers: https://www.fda.gov/media/519879/download     Fact sheet for patients: https://www.fda.gov/media/159118/download  Fact sheet for providers: https://www.fda.gov/media/444407/download     Fact sheet for patients: https://www.fda.gov/media/784539/download  Fact sheet for providers: https://www.fda.gov/media/876719/download     Fact sheet for patients: https://www.fda.gov/media/525373/download    Blood Culture - Blood, Arm, Left [[id number]] Collected: 09/08/21 0104    Lab Status: Preliminary result Specimen: Blood from Arm, Left Updated: 09/09/21 0130     Blood Culture No growth at 24 hours    Blood Culture - Blood, Arm, Left [[id number]] Collected: 09/08/21 0100    Lab Status: Preliminary result Specimen: Blood from Arm, Left Updated: 09/09/21 0130     Blood Culture No growth at 24 hours        Labs Pending at Discharge:   Pending Labs     Order Current Status    Non-gynecologic Cytology In process    Blood Culture - Blood, Arm, Left Preliminary result    Blood Culture - Blood, Arm, Left Preliminary result    Body Fluid Culture - Body Fluid, Peritoneum Preliminary result        Time spent on Discharge including face to face service:  > 30 minutes  Electronically signed by FRITZ Calix, 09/09/21, 2:27 PM EDT.         Attending documentation:  I reviewed the above documentation and independently reviewed and rounded and evaluated the patient and discussed the care plan with JESUS Alva PA-C, I agree with his findings and plan as documented, what I have added to the care plan and modified is as follows in my documentation and my medical decision making; I consulted GI, I discussed with Dr. Khan.  The patient is a 35-year-old male who presented to the ER with chief complaint of jaundice.  He has been jaundiced for several days, he is a heavy alcohol consumer.  In the emergency room he was found to be in liver failure, signs of sepsis to suggest related to alcoholism and acute alcoholic hepatitis.  His INR was elevated, his LFTs were elevated, his total bilirubin was 23.9, his DF score was greater than 70.  He was given vitamin K, I reviewed his case and did not deem him to have SBP based on lack of clinical supportive evidence and findings, he underwent paracentesis which did not elude to infection.  He had been started on prednisolone, was given vitamin K, liver ultrasound is revealed cirrhotic liver, no masses, perihepatic ascites, splenomegaly.  MRI of his abdomen revealed liver cirrhosis, diffuse hepatic steatosis, portal hypertension and portosystemic venous shunting, splenomegaly, pleural effusions, no biliary ductal dilation.  Duodenitis.  The patient did not have any epigastric abdominal pain nor did he have any hypoxemia.  He had low potassium which was replaced.  Apart from jaundice he was relatively asymptomatic per GI.  Long discussion about alcohol cessation enrollment in [employer] Anonymous and rehabilitation discussed with the patient.  Cleared by GI for discharge to follow-up with GI in 2 to 3 weeks with repeat labs.  Advised him to return to the ER if he has any new onset abdominal pain.  He is to complete a long course of  prednisolone at home.  If he does not stop drinking alcohol his prognosis is guarded, he is aware of this prognosis.  Discharged in hemodynamically stable condition on 9/9/2021. I again reiterated the fact that his DF score 70, if his liver does not improve or rebound and he does not make efforts to stop drinking alcohol, his prognosis is guarded.  Total discharge time 32 minutes.  Discussed with nurse at the bedside, discussed with Dr. Khan.  -MALINA CARDOSO    Electronically signed by Jerrod Baker MD, 09/09/21, 8:46 PM EDT.

## 2021-09-09 NOTE — PROGRESS NOTES
[facility]   Hospitalist Progress Note       Patient Name: Wai Gay  : 1985  MRN: [id number]  Primary Care Physician: Callum Peterson DO  Date of admission: 2021  Today's Date: 2021  Room / Bed:   418/1  Subjective   Chief Complaint: Jaundice    Summary:  Wai Gay is a 35 y.o. male  with a history of heavy alcohol abuse.  He continues to drink daily, but reportedly not nearly as much as he used to (previously fifth daily).  He was at work and his boss asked him about yellow discoloration of eyes and suggested he seek medical attention.  The patient really has no major symptoms other than maybe some mild abdominal distention.  He specifically denies any fever, nausea, vomiting, diarrhea, or abdominal pain.  His energy levels are reasonable and he feels close to his baseline.    He came to the ED with K 2.5, , lipase 142, magnesium 1.6, INR 2.0, ammonia 41, WBC 15, creatinine 0.7, ALT 34, , total bilirubin 23.9.  His DF score is > 70.    Interval Followup: 2021  • Alert and oriented and coherent.  No signs of withdrawal.  • Sinus rhythm on monitor.    • Vitals stable.  • Mother at bedside.  • Feels better today.  Had paracentesis yesterday.  • GI recommending steroid taper and f/u in 1 month. Abstain from EtOH.    REVIEW OF SYSTEMS: All other systems reviewed and are negative.   • GEN: No fevers. No chills. No weight gain. No weight loss.     • HEENT:   No dysphagia/odynophagia. No visual disturbance.    • GI:    No N/V.  No abd pain. No diarrhea. No constipation.  No bloody/black tarry stools. No hematemesis.   • CV: No chest discomfort.  No palps. No lightheadedness. No syncope. No orthopnea/PND. No edema.   • RESP:    No cough. No wheeze.  No increased sputum. No hemoptysis. No MATOS.  • :   No dysuria or suprapubic discomfort. No frequency.No urgency. No hesitancy. No incontinence. No hematuria. No flank pain.    • MS:   No joint stiffness or  arthralgias. No myalgias. No muscle weakness.    • SKIN:   No painful or pruritic rashes.  No skin discoloration.  • NEURO:  No focal numbness or weakness.  No headaches.  No ataxia. No slurred speech. No receptive/expressive aphasia.      • PSYCH:   No anxiety. No depression.  • ENDO:  No tremor, hair loss, heat or cold intolerance.  Objective   Temp:  [97.9 °F (36.6 °C)-99 °F (37.2 °C)] 97.9 °F (36.6 °C)  Heart Rate:  [] 98  Resp:  [18] 18  BP: ()/(61-77) 111/72  PHYSICAL EXAM   • CON: WN. WD. NAD.   • EYES:  Sclera icteric. EOMI. Normal conjunctiva.   • ENT:  Oropharyngeal mucosa without ulcers or thrush.    • NECK:  No thyromegaly. No stridor. Trachea midline.  • RESP:  CTA. No wheezes. No crackles.  No work of breathing or tachypnea.   • CV:  Rhythm regular. Rate WNL. No murmur noted.  No edema.  • GI:  Soft and minimally tender.  Mildly distended.  Bowel sounds present.   • EXT: Peripheral pulses intact.  No joint deformities or cyanosis.  • LYMPH:  No lymphedema noted.  No cervical lymphadenopathy.  • PSYCH:  Alert. Oriented. Normal affect and mood.  • NEURO:  CNII-XII grossly intact. No dysarthria or aphasia. No unilateral weakness or paresthesia.  • SKIN: No chronic venous stasis changes or varicosities.  No cellulitis.  Clubbing fingernails noted    Results from last 7 days   Lab Units 09/09/21 0443 09/07/21 2238 09/07/21  1705   WBC 10*3/mm3 11.65* 12.63* 15.70*   HEMOGLOBIN g/dL 11.8* 11.4* 13.0   HEMATOCRIT % 31.7* 31.1* 35.0*   PLATELETS 10*3/mm3 145 134* 138*     Results from last 7 days   Lab Units 09/09/21 0443 09/08/21 2018 09/08/21  0746 09/07/21 2238 09/07/21  1705   SODIUM mmol/L 131* 129* 128* 127* 126*   POTASSIUM mmol/L 3.0* 3.2* 2.5* 2.7* 2.5*   CO2 mmol/L 25.8 24.7 29.0 28.6 28.4   CHLORIDE mmol/L 94* 94* 90* 87* 85*   ANION GAP mmol/L 11.2 10.3 9.0 11.4 12.6   BUN mg/dL 9 8 8 8 8   CREATININE mg/dL 0.65* 0.67* 0.64* 0.71* 0.81   GLUCOSE mg/dL 113* 139* 91 88 128*      Results from last 7 days   Lab Units 09/09/21  0745 09/08/21  0746 09/07/21  2238 09/07/21  1705   INR   --  1.70* 2.10 2.02    REPEAT  1.54*  --   --   --        RESULTS REVIEWED:  I have personally reviewed the results from the time of this admission to 9/9/2021 10:40 EDT and agree with these findings:  [x]  Laboratory  []  Microbiology  [x]  Radiology  [x]  EKG/Telemetry   []  Cardiology/Vascular   []  Pathology  [x]  Old records  []  Other:  Assessment / Plan   Assessment:  • Liver failure  • Likely alcoholic hepatitis  • S/P paracentesis 9/8/21  • Cirrhosis  • Alcohol abuse  • Tobacco use  • History of cholelithiasis/cholecystectomy  • Bacteruria, query UTI  • Pleural effusions  • Ascites  • Hypokalemia, hyponatremia, hypomagnesemia, elevated INR, hyperbilirubinemia, leukocytosis     Plan:  • Follow up with GI in 4 weeks.  Avoid EtOH.  Steroid Taper.  PPI while on steroids.  • Multivitamin / Potassium supplement.  • Gastroenterology consulted: Dr. Khan  • Vitamin K given in ED  • DF score = 70  • Empiric Rocephin added  • Prednisolone 40 mg daily added  o (Continue prednisolone 40mg daily for 28 days, and then finish therapy with a taper: 30mg x 4 days, then 20mg x 4 days, then 10mg x 4 days, then 5mg x 4 days, then stop.  • Paracentesis .... 800cc.  Analysis noted.  Not suggestive of SBP.  • Viral hep panel A/B/C ..... negative  • PPI  • Abdominal ultrasound .... Possible cirrhotic liver.  No visible liver mass.  Increased liver echotexture could represent steatosis or sequela of chronic liver disease.  Small amount of perihepatic ascites.  Bilateral pleural effusions.  Splenomegaly.    Discussed plan with RN.  DVT prophylaxis:  Mechanical DVT prophylaxis orders are present.  CODE STATUS:      Level Of Support Discussed With: Patient  Code Status: CPR  Medical Interventions (Level of Support Prior to Arrest): Full         Attending documentation:  I reviewed the above documentation and  independently reviewed and rounded and evaluated the patient and discussed the care plan with JESUS Alva PA-C, I agree with his findings and plan as documented, what I have added to the care plan and modified is as follows in my documentation and my medical decision making; I consulted GI, I discussed with Dr. Khan.  The patient is a 35-year-old male who presented to the ER with chief complaint of jaundice.  He has been jaundiced for several days, he is a heavy alcohol consumer.  In the emergency room he was found to be in liver failure, signs of sepsis to suggest related to alcoholism and acute alcoholic hepatitis.  His INR was elevated, his LFTs were elevated, his total bilirubin was 23.9, his DF score was greater than 70.  He was given vitamin K, I reviewed his case and did not deem him to have SBP based on lack of clinical supportive evidence and findings, he underwent paracentesis which did not elude to infection.  He had been started on prednisolone, was given vitamin K, liver ultrasound is revealed cirrhotic liver, no masses, perihepatic ascites, splenomegaly.  MRI of his abdomen revealed liver cirrhosis, diffuse hepatic steatosis, portal hypertension and portosystemic venous shunting, splenomegaly, pleural effusions, no biliary ductal dilation.  Duodenitis.  The patient did not have any epigastric abdominal pain nor did he have any hypoxemia.  He had low potassium which was replaced.  Apart from jaundice he was relatively asymptomatic per GI.  Long discussion about alcohol cessation enrollment in [employer] and rehabilitation discussed with the patient.  Cleared by GI for discharge to follow-up with GI in 2 to 3 weeks with repeat labs.  Advised him to return to the ER if he has any new onset abdominal pain.  He is to complete a long course of prednisolone at home.  If he does not stop drinking alcohol his prognosis is guarded, he is aware of this prognosis.  Discharged in hemodynamically stable  condition on 9/9/2021.  Seen on the day of discharge, vitals reviewed, physical exam, well-appearing male, clearly jaundiced, with scleral icterus, in no acute distress, EOMI, full range of motion of his neck, alert and oriented x3, regular rate and rhythm, clear to auscultation bilaterally, soft mildly distended abdomen, with very minimal tenderness, very little abdominal fullness, cranial nerves grossly intact, clubbing fingers, no peripheral edema.  I again reiterated the fact that his DF score 70, if his liver does not improve or rebound and he does not make efforts to stop drinking alcohol, his prognosis is guarded.  Total discharge time 32 minutes.  Discussed with nurse at the bedside, discussed with Dr. Khan.  -MALINA CARDOSO  Electronically signed by Jerrod Baker MD, 09/09/21, 8:46 PM EDT.

## 2021-09-10 ENCOUNTER — TRANSITIONAL CARE MANAGEMENT TELEPHONE ENCOUNTER (OUTPATIENT)
Dept: CALL CENTER | Facility: HOSPITAL | Age: 36
End: 2021-09-10

## 2021-09-10 LAB
CYTO UR: NORMAL
LAB AP CASE REPORT: NORMAL
LAB AP CLINICAL INFORMATION: NORMAL
PATH REPORT.FINAL DX SPEC: NORMAL
PATH REPORT.GROSS SPEC: NORMAL
STAT OF ADQ CVX/VAG CYTO-IMP: NORMAL

## 2021-09-10 NOTE — OUTREACH NOTE
[facility] TCM Note      Responses   [facility] patient discharged from?  [facility]   Does the patient have one of the following disease processes/diagnoses(primary or secondary)?  Other   TCM attempt successful?  Yes   Call start time  1344   Call end time  1352   Discharge diagnosis  Acute liver failure without hepatic coma  Acute hepatitis   Meds reviewed with patient/caregiver?  Yes   Is the patient having any side effects they believe may be caused by any medication additions or changes?  No   Does the patient have all medications ordered at discharge?  Yes   Is the patient taking all medications as directed (includes completed medication regime)?  Yes   Medication comments  Pt has to go back to pharmacy to  Nexium--   Comments regarding appointments  Reminded him of GI appt 11/5/21   Does the patient have a primary care provider?   Yes   Does the patient have an appointment with their PCP within 7 days of discharge?  Yes   Comments regarding PCP  Hospital D/C follow-up scheduled for 9/16/21@0900am     Has the patient kept scheduled appointments due by today?  N/A   Has home health visited the patient within 72 hours of discharge?  N/A   Psychosocial issues?  No   Did the patient receive a copy of their discharge instructions?  Yes   Nursing interventions  Reviewed instructions with patient   What is the patient's perception of their health status since discharge?  Improving [Reports some yellowing to eyes remain but improving, no n/v or abdominal pain, some diarrhea. He reports improvement.  disc'd etoh cessation and importance and resources if needed--v/u,]   Is the patient/caregiver able to teach back signs and symptoms related to disease process for when to call PCP?  Yes   Is the patient/caregiver able to teach back signs and symptoms related to disease process for when to call 911?  Yes   If the patient is a current smoker, are they able to teach back resources for cessation?  Smoking cessation  medications   TCM call completed?  Yes          Dinora Talley, MIGUEL A    9/10/2021, 13:52 EDT

## 2021-09-11 LAB
BACTERIA FLD CULT: NORMAL
GRAM STN SPEC: NORMAL
GRAM STN SPEC: NORMAL

## 2021-09-13 LAB
BACTERIA SPEC AEROBE CULT: NORMAL
BACTERIA SPEC AEROBE CULT: NORMAL

## 2021-09-16 ENCOUNTER — READMISSION MANAGEMENT (OUTPATIENT)
Dept: CALL CENTER | Facility: HOSPITAL | Age: 36
End: 2021-09-16

## 2021-09-16 ENCOUNTER — OFFICE VISIT (OUTPATIENT)
Dept: FAMILY MEDICINE CLINIC | Facility: CLINIC | Age: 36
End: 2021-09-16

## 2021-09-16 VITALS
TEMPERATURE: 97.9 F | OXYGEN SATURATION: 99 % | WEIGHT: 171.8 LBS | HEIGHT: 67 IN | DIASTOLIC BLOOD PRESSURE: 64 MMHG | HEART RATE: 108 BPM | SYSTOLIC BLOOD PRESSURE: 100 MMHG | BODY MASS INDEX: 26.97 KG/M2

## 2021-09-16 DIAGNOSIS — F10.10 ALCOHOL ABUSE: ICD-10-CM

## 2021-09-16 DIAGNOSIS — E87.1 HYPONATREMIA: ICD-10-CM

## 2021-09-16 DIAGNOSIS — E87.6 HYPOKALEMIA: ICD-10-CM

## 2021-09-16 DIAGNOSIS — K72.00 ACUTE LIVER FAILURE WITHOUT HEPATIC COMA: Primary | ICD-10-CM

## 2021-09-16 DIAGNOSIS — K70.10 STEATOHEPATITIS, ALCOHOLIC: ICD-10-CM

## 2021-09-16 LAB
ALBUMIN SERPL-MCNC: 2.9 G/DL (ref 3.5–5.2)
ALBUMIN/GLOB SERPL: 0.8 G/DL
ALP SERPL-CCNC: 256 U/L (ref 39–117)
ALT SERPL W P-5'-P-CCNC: 52 U/L (ref 1–41)
ANION GAP SERPL CALCULATED.3IONS-SCNC: 9.5 MMOL/L (ref 5–15)
AST SERPL-CCNC: 99 U/L (ref 1–40)
BILIRUB SERPL-MCNC: 14 MG/DL (ref 0–1.2)
BUN SERPL-MCNC: 16 MG/DL (ref 6–20)
BUN/CREAT SERPL: 18.4 (ref 7–25)
CALCIUM SPEC-SCNC: 8.9 MG/DL (ref 8.6–10.5)
CHLORIDE SERPL-SCNC: 107 MMOL/L (ref 98–107)
CO2 SERPL-SCNC: 24.5 MMOL/L (ref 22–29)
CREAT SERPL-MCNC: 0.87 MG/DL (ref 0.76–1.27)
GFR SERPL CREATININE-BSD FRML MDRD: 101 ML/MIN/1.73
GLOBULIN UR ELPH-MCNC: 3.6 GM/DL
GLUCOSE SERPL-MCNC: 92 MG/DL (ref 65–99)
INR PPP: 1.37 (ref 2–3)
POTASSIUM SERPL-SCNC: 4 MMOL/L (ref 3.5–5.2)
PROT SERPL-MCNC: 6.5 G/DL (ref 6–8.5)
PROTHROMBIN TIME: 14.4 SECONDS (ref 9.4–12)
SODIUM SERPL-SCNC: 141 MMOL/L (ref 136–145)

## 2021-09-16 PROCEDURE — 85610 PROTHROMBIN TIME: CPT | Performed by: FAMILY MEDICINE

## 2021-09-16 PROCEDURE — 80053 COMPREHEN METABOLIC PANEL: CPT | Performed by: FAMILY MEDICINE

## 2021-09-16 PROCEDURE — 99495 TRANSJ CARE MGMT MOD F2F 14D: CPT | Performed by: FAMILY MEDICINE

## 2021-09-16 PROCEDURE — 36415 COLL VENOUS BLD VENIPUNCTURE: CPT | Performed by: FAMILY MEDICINE

## 2021-09-16 RX ORDER — PREDNISOLONE 15 MG/5ML
SOLUTION ORAL
COMMUNITY
Start: 2021-09-09 | End: 2021-11-05

## 2021-09-16 NOTE — ASSESSMENT & PLAN NOTE
He has a follow-up with GI.  Repeat his liver function test in INR.  He may need a FibroScan to assess the degree of cirrhosis.

## 2021-09-16 NOTE — ASSESSMENT & PLAN NOTE
He does have a follow-up appoint with GI.  Hopefully most of his liver is fatty versus fibrotic.  And this will heal over period of time.  Encouraged him this only happens with long-term sobriety.

## 2021-09-16 NOTE — OUTREACH NOTE
Medical Week 2 Survey      Responses   [facility] [place] patient discharged from?  [facility]   Does the patient have one of the following disease processes/diagnoses(primary or secondary)?  Other   Week 2 attempt successful?  Yes   Call start time  1307   Discharge diagnosis  Acute liver failure without hepatic coma  Acute hepatitis   Call end time  1310   Comments regarding appointments  GI appt 11/5/21   Does the patient have a primary care provider?   Yes   Comments regarding PCP  Hospital D/C follow-up scheduled for 9/16/21@0900am  - completed.    Has the patient kept scheduled appointments due by today?  Yes   Psychosocial issues?  No   Did the patient receive a copy of their discharge instructions?  Yes   Nursing interventions  Reviewed instructions with patient   What is the patient's perception of their health status since discharge?  Improving   Is the patient/caregiver able to teach back signs and symptoms related to disease process for when to call PCP?  Yes   Is the patient/caregiver able to teach back signs and symptoms related to disease process for when to call 911?  Yes   Is the patient/caregiver able to teach back the hierarchy of who to call/visit for symptoms/problems? PCP, Specialist, Home health nurse, Urgent Care, ED, 911  Yes   If the patient is a current smoker, are they able to teach back resources for cessation?  Smoking cessation medications [Cut back on smoking. ]   Week 2 Call Completed?  Yes   Wrap up additional comments  Pt reports that he did see his Dr this AM. Pt also states that it took him a little while to get his multi vitamin,  however he does have all meds at this time and is taking as ordered. Pt states that he has not been drinking any alcohol. Praise given.           Dyan Michelle RN

## 2021-09-16 NOTE — ASSESSMENT & PLAN NOTE
Since his discharge from the hospital he states he has not consumed any alcohol whatsoever.  Instructed him the long-term sobriety typically tends to be better maintained through support groups such as AA.

## 2021-09-16 NOTE — PROGRESS NOTES
Chief Complaint   Patient presents with   • Hospital Follow Up Visit     liver failure        Subjective     Wai Gay  has a past medical history of Alcohol abuse (03/14/2017), Hypokalemia (03/14/2017), Hyponatremia (03/14/2017), Steatohepatitis, alcoholic (03/14/2017), and Tobacco abuse (03/14/2017).    Hospital [id number] was admitted at [facility] 9/7 through 9/9/2021.  This initially occurred when he was at work and his boss noticed that the whites of his eyes were yellow.  As a result they encouraged him to go to the emergency room for treatment.  His evaluation there revealed low sodium potassium abnormal liver enzymes and bilirubin.  He was subsequently admitted to have all of these abnormalities corrected.  He does have a long history of alcohol abuse prior but he states as of late it is typically only 1 or 2 beers per day.  Since discharge he did get a phone call from the discharge planning nurse see if any cares or needs at that time.  He states that since he has been home he has felt pretty good.  He has not drank any alcohol and denies his eyes being yellow.      PHQ-2 Depression Screening  Little interest or pleasure in doing things? 0   Feeling down, depressed, or hopeless? 0   PHQ-2 Total Score 0   PHQ-9 Depression Screening  Little interest or pleasure in doing things? 0   Feeling down, depressed, or hopeless? 0   Trouble falling or staying asleep, or sleeping too much?     Feeling tired or having little energy?     Poor appetite or overeating?     Feeling bad about yourself - or that you are a failure or have let yourself or your family down?     Trouble concentrating on things, such as reading the newspaper or watching television?     Moving or speaking so slowly that other people could have noticed? Or the opposite - being so fidgety or restless that you have been moving around a lot more than usual?     Thoughts that you would be better off dead, or of hurting yourself in  some way?     PHQ-9 Total Score 0   If you checked off any problems, how difficult have these problems made it for you to do your work, take care of things at home, or get along with other people?       No Known Allergies    Prior to Admission medications    Medication Sig Start Date End Date Taking? Authorizing Provider   esomeprazole (nexIUM) 40 MG capsule Take 1 capsule by mouth Every Morning Before Breakfast. 9/9/21  Yes Tyler Alva PA   multivitamin with minerals (multivitamin with minerals) tablet tablet Take 1 tablet by mouth Daily. 9/9/21  Yes Tyler Alva PA   potassium chloride 10 MEQ CR tablet Take 2 tablets by mouth Daily. 9/9/21  Yes Tyler Alva PA   prednisoLONE (PRELONE) 15 MG/5ML solution oral solution  9/9/21  Yes Provider, MD Rose   prednisoLONE ODT (ORAPRED ODT) 10 MG disintegrating tablet Daily dose:  40mg x 28 days, then, 30mg x 4 days, then 20mg x 4 days, then 10mg x 4 days, then 5mg x 4 days. 9/9/21   Tyler Alva PA        Patient Active Problem List   Diagnosis   • Acute liver failure without hepatic coma   • Acute hepatitis   • Hypokalemia   • Hyponatremia   • Steatohepatitis, alcoholic   • Alcohol abuse        Past Surgical History:   Procedure Laterality Date   • CHOLECYSTECTOMY         Social History     Socioeconomic History   • Marital status: Single     Spouse name: Not on file   • Number of children: Not on file   • Years of education: Not on file   • Highest education level: Not on file   Tobacco Use   • Smoking status: Current Every Day Smoker     Packs/day: 1.00     Years: 13.00     Pack years: 13.00   • Smokeless tobacco: Never Used   • Tobacco comment: start age 20   Vaping Use   • Vaping Use: Never used   Substance and Sexual Activity   • Alcohol use: Yes     Alcohol/week: 3.0 standard drinks     Types: 2 Cans of beer, 1 Shots of liquor per week     Comment: 4 serv/week   • Drug use: Not Currently   • Sexual activity: Yes     Birth control/protection:  "Condom       Family History   Problem Relation Age of Onset   • Alcohol abuse Mother    • Arthritis Mother    • COPD Mother    • Heart disease Maternal Grandmother    • Hypertension Maternal Grandmother    • Lung cancer Maternal Grandmother    • Stroke Maternal Grandmother    • Heart disease Maternal Grandfather    • Lung cancer Maternal Grandfather        Family history, surgical history, past medical history, Allergies and med's reviewed with patient today and updated in [facility] EMR.     ROS:  Review of Systems   Constitutional: Negative for appetite change and fatigue.   HENT: Negative for congestion, postnasal drip and rhinorrhea.    Eyes: Negative for blurred vision and visual disturbance.   Respiratory: Negative for cough, chest tightness, shortness of breath and wheezing.    Cardiovascular: Negative for chest pain and palpitations.   Gastrointestinal: Positive for diarrhea. Negative for abdominal pain, blood in stool, constipation and indigestion.   Skin: Negative for color change.   Neurological: Negative for headache.   Psychiatric/Behavioral: Negative for depressed mood. The patient is not nervous/anxious.        OBJECTIVE:  Vitals:    09/16/21 0918   BP: 100/64   BP Location: Left arm   Patient Position: Sitting   Pulse: 108   Temp: 97.9 °F (36.6 °C)   SpO2: 99%   Weight: 77.9 kg (171 lb 12.8 oz)   Height: 170.2 cm (67\")     No exam data present   Body mass index is 26.91 kg/m².  No LMP for male patient.    Physical Exam  Vitals and nursing note reviewed.   Constitutional:       General: He is not in acute distress.     Appearance: Normal appearance. He is normal weight.   HENT:      Head: Normocephalic.      Right Ear: Tympanic membrane, ear canal and external ear normal.      Left Ear: Tympanic membrane, ear canal and external ear normal.      Nose: Nose normal.      Mouth/Throat:      Mouth: Mucous membranes are moist.      Dentition: Has dentures.      Pharynx: Oropharynx is clear.   Eyes:      General: " Scleral icterus present.      Pupils: Pupils are equal, round, and reactive to light.   Cardiovascular:      Rate and Rhythm: Normal rate and regular rhythm.      Pulses: Normal pulses.      Heart sounds: Normal heart sounds. No murmur heard.     Pulmonary:      Effort: Pulmonary effort is normal.      Breath sounds: Normal breath sounds. No wheezing, rhonchi or rales.   Musculoskeletal:      Cervical back: No rigidity or tenderness.   Lymphadenopathy:      Cervical: No cervical adenopathy.   Skin:     General: Skin is warm and dry.      Coloration: Skin is not jaundiced.      Findings: No rash.   Neurological:      General: No focal deficit present.      Mental Status: He is alert and oriented to person, place, and time.      Gait: Gait normal.   Psychiatric:         Mood and Affect: Mood normal.         Thought Content: Thought content normal.         Judgment: Judgment normal.         Procedures    No results displayed because visit has over 200 results.          ASSESSMENT/ PLAN:    Diagnoses and all orders for this visit:    1. Acute liver failure without hepatic coma (Primary)  Assessment & Plan:  He does have a follow-up appoint with GI.  Hopefully most of his liver is fatty versus fibrotic.  And this will heal over period of time.  Encouraged him this only happens with long-term sobriety.    Orders:  -     Comprehensive Metabolic Panel  -     Protime-INR    2. Steatohepatitis, alcoholic  Assessment & Plan:  He has a follow-up with GI.  Repeat his liver function test in INR.  He may need a FibroScan to assess the degree of cirrhosis.    Orders:  -     Comprehensive Metabolic Panel  -     Protime-INR    3. Hypokalemia  Assessment & Plan:  Update his electrolytes to make sure that his sodium potassium improved.    Orders:  -     Comprehensive Metabolic Panel  -     Protime-INR    4. Hyponatremia  -     Comprehensive Metabolic Panel  -     Protime-INR    5. Alcohol abuse  Assessment & Plan:  Since his discharge  from the hospital he states he has not consumed any alcohol whatsoever.  Instructed him the long-term sobriety typically tends to be better maintained through support groups such as AA.        Orders Placed Today:     No orders of the defined types were placed in this encounter.       Management Plan:     An After Visit Summary was printed and given to the patient at discharge.    Follow-up: Return in about 4 weeks (around 10/14/2021) for Recheck.    Callum Peterson,  9/16/2021 09:49 EDT  This note was electronically signed.

## 2021-09-23 ENCOUNTER — TELEPHONE (OUTPATIENT)
Dept: FAMILY MEDICINE CLINIC | Facility: CLINIC | Age: 36
End: 2021-09-23

## 2021-10-18 ENCOUNTER — OFFICE VISIT (OUTPATIENT)
Dept: FAMILY MEDICINE CLINIC | Facility: CLINIC | Age: 36
End: 2021-10-18

## 2021-10-18 VITALS
HEART RATE: 90 BPM | TEMPERATURE: 98.5 F | SYSTOLIC BLOOD PRESSURE: 112 MMHG | BODY MASS INDEX: 26.68 KG/M2 | HEIGHT: 67 IN | WEIGHT: 170 LBS | OXYGEN SATURATION: 98 % | DIASTOLIC BLOOD PRESSURE: 76 MMHG

## 2021-10-18 DIAGNOSIS — F10.10 ALCOHOL ABUSE: ICD-10-CM

## 2021-10-18 DIAGNOSIS — K70.10 STEATOHEPATITIS, ALCOHOLIC: Primary | ICD-10-CM

## 2021-10-18 DIAGNOSIS — E87.1 HYPONATREMIA: ICD-10-CM

## 2021-10-18 DIAGNOSIS — Z72.0 TOBACCO ABUSE: ICD-10-CM

## 2021-10-18 DIAGNOSIS — Z23 NEED FOR INFLUENZA VACCINATION: ICD-10-CM

## 2021-10-18 DIAGNOSIS — R10.84 GENERALIZED ABDOMINAL PAIN: ICD-10-CM

## 2021-10-18 DIAGNOSIS — E87.6 HYPOKALEMIA: ICD-10-CM

## 2021-10-18 LAB
ALBUMIN SERPL-MCNC: 3.5 G/DL (ref 3.5–5.2)
ALBUMIN/GLOB SERPL: 1.3 G/DL
ALP SERPL-CCNC: 157 U/L (ref 39–117)
ALT SERPL W P-5'-P-CCNC: 38 U/L (ref 1–41)
ANION GAP SERPL CALCULATED.3IONS-SCNC: 10.9 MMOL/L (ref 5–15)
AST SERPL-CCNC: 48 U/L (ref 1–40)
BASOPHILS # BLD AUTO: 0.01 10*3/MM3 (ref 0–0.2)
BASOPHILS NFR BLD AUTO: 0.1 % (ref 0–1.5)
BILIRUB SERPL-MCNC: 6.3 MG/DL (ref 0–1.2)
BUN SERPL-MCNC: 8 MG/DL (ref 6–20)
BUN/CREAT SERPL: 9.8 (ref 7–25)
CALCIUM SPEC-SCNC: 8.3 MG/DL (ref 8.6–10.5)
CHLORIDE SERPL-SCNC: 102 MMOL/L (ref 98–107)
CO2 SERPL-SCNC: 24.1 MMOL/L (ref 22–29)
CREAT SERPL-MCNC: 0.82 MG/DL (ref 0.76–1.27)
DEPRECATED RDW RBC AUTO: 39.5 FL (ref 37–54)
EOSINOPHIL # BLD AUTO: 0.08 10*3/MM3 (ref 0–0.4)
EOSINOPHIL NFR BLD AUTO: 1.2 % (ref 0.3–6.2)
ERYTHROCYTE [DISTWIDTH] IN BLOOD BY AUTOMATED COUNT: 11.3 % (ref 12.3–15.4)
GFR SERPL CREATININE-BSD FRML MDRD: 108 ML/MIN/1.73
GLOBULIN UR ELPH-MCNC: 2.6 GM/DL
GLUCOSE SERPL-MCNC: 100 MG/DL (ref 65–99)
HCT VFR BLD AUTO: 39.4 % (ref 37.5–51)
HGB BLD-MCNC: 14.5 G/DL (ref 13–17.7)
IMM GRANULOCYTES # BLD AUTO: 0.03 10*3/MM3 (ref 0–0.05)
IMM GRANULOCYTES NFR BLD AUTO: 0.4 % (ref 0–0.5)
INR PPP: 1.32 (ref 2–3)
LYMPHOCYTES # BLD AUTO: 0.79 10*3/MM3 (ref 0.7–3.1)
LYMPHOCYTES NFR BLD AUTO: 11.6 % (ref 19.6–45.3)
MCH RBC QN AUTO: 35.6 PG (ref 26.6–33)
MCHC RBC AUTO-ENTMCNC: 36.8 G/DL (ref 31.5–35.7)
MCV RBC AUTO: 96.8 FL (ref 79–97)
MONOCYTES # BLD AUTO: 0.54 10*3/MM3 (ref 0.1–0.9)
MONOCYTES NFR BLD AUTO: 7.9 % (ref 5–12)
NEUTROPHILS NFR BLD AUTO: 5.36 10*3/MM3 (ref 1.7–7)
NEUTROPHILS NFR BLD AUTO: 78.8 % (ref 42.7–76)
NRBC BLD AUTO-RTO: 0 /100 WBC (ref 0–0.2)
PLATELET # BLD AUTO: 71 10*3/MM3 (ref 140–450)
PMV BLD AUTO: 11.5 FL (ref 6–12)
POTASSIUM SERPL-SCNC: 3.5 MMOL/L (ref 3.5–5.2)
PROT SERPL-MCNC: 6.1 G/DL (ref 6–8.5)
PROTHROMBIN TIME: 13.9 SECONDS (ref 9.4–12)
RBC # BLD AUTO: 4.07 10*6/MM3 (ref 4.14–5.8)
SODIUM SERPL-SCNC: 137 MMOL/L (ref 136–145)
WBC # BLD AUTO: 6.81 10*3/MM3 (ref 3.4–10.8)

## 2021-10-18 PROCEDURE — 36415 COLL VENOUS BLD VENIPUNCTURE: CPT | Performed by: FAMILY MEDICINE

## 2021-10-18 PROCEDURE — 99214 OFFICE O/P EST MOD 30 MIN: CPT | Performed by: FAMILY MEDICINE

## 2021-10-18 PROCEDURE — 80053 COMPREHEN METABOLIC PANEL: CPT | Performed by: FAMILY MEDICINE

## 2021-10-18 PROCEDURE — 90471 IMMUNIZATION ADMIN: CPT | Performed by: FAMILY MEDICINE

## 2021-10-18 PROCEDURE — 85025 COMPLETE CBC W/AUTO DIFF WBC: CPT | Performed by: FAMILY MEDICINE

## 2021-10-18 PROCEDURE — 85610 PROTHROMBIN TIME: CPT | Performed by: FAMILY MEDICINE

## 2021-10-18 PROCEDURE — 90686 IIV4 VACC NO PRSV 0.5 ML IM: CPT | Performed by: FAMILY MEDICINE

## 2021-10-18 NOTE — ASSESSMENT & PLAN NOTE
He needs to continue to avoid alcohol and all Tylenol-containing products.  Overall he is doing well.  We will update his labs.

## 2021-10-18 NOTE — PROGRESS NOTES
Chief Complaint   Patient presents with   • Follow-up     1 mon f/u   • Bloated     pt states his stomach is feeling swollen         Subjective     Wai Gay  has a past medical history of Alcohol abuse (03/14/2017), Hypokalemia (03/14/2017), Hyponatremia (03/14/2017), Steatohepatitis, alcoholic (03/14/2017), and Tobacco abuse (03/14/2017).    Alcoholic liver disease-he states he is doing well.  He has not had any alcohol alcohol-containing products and no Tylenol since her last visit.  He has had some right upper quadrant epigastric discomfort for the last 2 to 3 days.  It feels better with laying on his side.  It is not better nor worse with eating.      PHQ-2 Depression Screening  Little interest or pleasure in doing things?     Feeling down, depressed, or hopeless?     PHQ-2 Total Score     PHQ-9 Depression Screening  Little interest or pleasure in doing things?     Feeling down, depressed, or hopeless?     Trouble falling or staying asleep, or sleeping too much?     Feeling tired or having little energy?     Poor appetite or overeating?     Feeling bad about yourself - or that you are a failure or have let yourself or your family down?     Trouble concentrating on things, such as reading the newspaper or watching television?     Moving or speaking so slowly that other people could have noticed? Or the opposite - being so fidgety or restless that you have been moving around a lot more than usual?     Thoughts that you would be better off dead, or of hurting yourself in some way?     PHQ-9 Total Score     If you checked off any problems, how difficult have these problems made it for you to do your work, take care of things at home, or get along with other people?       No Known Allergies    Prior to Admission medications    Medication Sig Start Date End Date Taking? Authorizing Provider   esomeprazole (nexIUM) 40 MG capsule Take 1 capsule by mouth Every Morning Before Breakfast. 9/9/21  Yes Artie  FRITZ Scott   multivitamin with minerals (multivitamin with minerals) tablet tablet Take 1 tablet by mouth Daily. 9/9/21  Yes Tyler Alva PA   potassium chloride 10 MEQ CR tablet Take 2 tablets by mouth Daily. 9/9/21   Tyler Alva PA   prednisoLONE (PRELONE) 15 MG/5ML solution oral solution  9/9/21   Provider, MD Rose   prednisoLONE ODT (ORAPRED ODT) 10 MG disintegrating tablet Daily dose:  40mg x 28 days, then, 30mg x 4 days, then 20mg x 4 days, then 10mg x 4 days, then 5mg x 4 days. 9/9/21   Tyler Alva PA        Patient Active Problem List   Diagnosis   • Acute liver failure without hepatic coma   • Acute hepatitis   • Hypokalemia   • Hyponatremia   • Steatohepatitis, alcoholic   • Alcohol abuse   • Tobacco abuse   • Generalized abdominal pain   • Need for influenza vaccination        Past Surgical History:   Procedure Laterality Date   • CHOLECYSTECTOMY         Social History     Socioeconomic History   • Marital status: Single   Tobacco Use   • Smoking status: Current Every Day Smoker     Packs/day: 0.50     Years: 13.00     Pack years: 6.50     Types: Cigarettes     Start date: 2008   • Smokeless tobacco: Never Used   Vaping Use   • Vaping Use: Never used   Substance and Sexual Activity   • Alcohol use: Yes     Alcohol/week: 3.0 standard drinks     Types: 2 Cans of beer, 1 Shots of liquor per week     Comment: 4 serv/week   • Drug use: Not Currently   • Sexual activity: Yes     Birth control/protection: Condom       Family History   Problem Relation Age of Onset   • Alcohol abuse Mother    • Arthritis Mother    • COPD Mother    • Heart disease Maternal Grandmother    • Hypertension Maternal Grandmother    • Lung cancer Maternal Grandmother    • Stroke Maternal Grandmother    • Heart disease Maternal Grandfather    • Lung cancer Maternal Grandfather        Family history, surgical history, past medical history, Allergies and med's reviewed with patient today and updated in [employer] EMR.  "    ROS:  Review of Systems   Constitutional: Negative for fatigue.   Gastrointestinal: Positive for abdominal pain. Negative for abdominal distention, blood in stool, constipation, diarrhea, nausea, GERD and indigestion.   Skin: Negative for color change.       OBJECTIVE:  Vitals:    10/18/21 0917   BP: 112/76   BP Location: Left arm   Patient Position: Sitting   Cuff Size: Adult   Pulse: 90   Temp: 98.5 °F (36.9 °C)   SpO2: 98%   Weight: 77.1 kg (170 lb)   Height: 170.2 cm (67\")     No exam data present   Body mass index is 26.63 kg/m².  No LMP for male patient.    Physical Exam  Vitals and nursing note reviewed.   Constitutional:       General: He is not in acute distress.     Appearance: He is normal weight.   HENT:      Head: Normocephalic and atraumatic.   Cardiovascular:      Rate and Rhythm: Normal rate and regular rhythm.      Heart sounds: Normal heart sounds. No murmur heard.      Pulmonary:      Effort: Pulmonary effort is normal.      Breath sounds: Normal breath sounds. No wheezing, rhonchi or rales.   Abdominal:      General: Abdomen is flat. Bowel sounds are normal. There is no distension.      Palpations: Abdomen is soft. There is no hepatomegaly or mass.      Tenderness: There is generalized abdominal tenderness.   Neurological:      Mental Status: He is alert.         Procedures    No visits with results within 30 Day(s) from this visit.   Latest known visit with results is:   Office Visit on 09/16/2021   Component Date Value Ref Range Status   • Glucose 09/16/2021 92  65 - 99 mg/dL Final   • BUN 09/16/2021 16  6 - 20 mg/dL Final   • Creatinine 09/16/2021 0.87  0.76 - 1.27 mg/dL Final   • Sodium 09/16/2021 141  136 - 145 mmol/L Final   • Potassium 09/16/2021 4.0  3.5 - 5.2 mmol/L Final   • Chloride 09/16/2021 107  98 - 107 mmol/L Final   • CO2 09/16/2021 24.5  22.0 - 29.0 mmol/L Final   • Calcium 09/16/2021 8.9  8.6 - 10.5 mg/dL Final   • Total Protein 09/16/2021 6.5  6.0 - 8.5 g/dL Final   • " Albumin 09/16/2021 2.90* 3.50 - 5.20 g/dL Final   • ALT (SGPT) 09/16/2021 52* 1 - 41 U/L Final   • AST (SGOT) 09/16/2021 99* 1 - 40 U/L Final   • Alkaline Phosphatase 09/16/2021 256* 39 - 117 U/L Final   • Total Bilirubin 09/16/2021 14.0* 0.0 - 1.2 mg/dL Final   • eGFR Non African Amer 09/16/2021 101  >60 mL/min/1.73 Final   • Globulin 09/16/2021 3.6  gm/dL Final   • A/G Ratio 09/16/2021 0.8  g/dL Final   • BUN/Creatinine Ratio 09/16/2021 18.4  7.0 - 25.0 Final   • Anion Gap 09/16/2021 9.5  5.0 - 15.0 mmol/L Final   • Protime 09/16/2021 14.4* 9.4 - 12.0 Seconds Final   • INR 09/16/2021 1.37* 2.00 - 3.00 Final       ASSESSMENT/ PLAN:    Diagnoses and all orders for this visit:    1. Steatohepatitis, alcoholic (Primary)  Assessment & Plan:  He needs to continue to avoid alcohol and all Tylenol-containing products.  Overall he is doing well.  We will update his labs.    Orders:  -     Comprehensive Metabolic Panel  -     CT Abdomen Pelvis Without Contrast; Future    2. Hypokalemia  -     Comprehensive Metabolic Panel    3. Hyponatremia  -     Comprehensive Metabolic Panel    4. Alcohol abuse  Assessment & Plan:  He has not had any alcohol now in about 2 months.  We will encouraged him to abstain 100%.    Orders:  -     CBC & Differential  -     Comprehensive Metabolic Panel  -     CT Abdomen Pelvis Without Contrast; Future    5. Tobacco abuse  Assessment & Plan:  He is still smoking but down to a half a pack per day.      6. Generalized abdominal pain  Assessment & Plan:  With his generalized abdominal pain we will update his labs and get a CT of his abdomen.    Orders:  -     CBC & Differential  -     Comprehensive Metabolic Panel  -     CT Abdomen Pelvis Without Contrast; Future    7. Need for influenza vaccination    Other orders  -     FluLaval/Fluarix/Fluzone >6 Months ([id number])      Orders Placed Today:     No orders of the defined types were placed in this encounter.       Management Plan:     An After Visit  Summary was printed and given to the patient at discharge.    Follow-up: No follow-ups on file.    Callum Peterson DO 10/18/2021 09:43 EDT  This note was electronically signed.

## 2021-10-21 ENCOUNTER — TELEPHONE (OUTPATIENT)
Dept: FAMILY MEDICINE CLINIC | Facility: CLINIC | Age: 36
End: 2021-10-21

## 2021-10-21 DIAGNOSIS — R10.84 GENERALIZED ABDOMINAL PAIN: Primary | ICD-10-CM

## 2021-10-27 ENCOUNTER — APPOINTMENT (OUTPATIENT)
Dept: CT IMAGING | Facility: HOSPITAL | Age: 36
End: 2021-10-27

## 2021-11-03 NOTE — PROGRESS NOTES
Chief Complaint        In patient follow up from [facility]     History of Present Illness      Wai Gay is a 35 y.o. male who presents to [facility] as a new patient with a history of heavy alcohol abuse and alcoholic hepatitis.  Patient is following up in the office after admission to the hospital 9/7 through 9/9.  When he was admitted to the hospital his potassium was 2.5 sodium 126, lipase 142, INR 2, ammonia 41, ALT 34, , total bilirubin 23.9.  Patient was admitted to the hospital and treated with antibiotics he had a paracentesis which removed 800 cc.  He was started on steroids. Patient discharged With a bilirubin of 19.6. Today he presents to the office sober since 9/7/2021. Reviewed his recent labs 10/18/2021 his bilirubin is 6.3 AST 48, INR 1.37, sodium 137, platelet 71 patient reports he is feeling really good since stopping alcohol intake. He reports a longstanding history of alcoholism drinking 2 tall beers and 4-5 shots of whiskey for about 8 years. He has been sober for 2 months. He continues to have abdominal tenderness over umbilical hernia that is reducible. Patient denies abdominal swelling, leg swelling, dark-colored urine, easily bleeding. Patient denies fever, nausea, vomiting, weight loss, night sweats, melena, hematochezia, hematemesis.    Patient was admitted to [facility] on 09/07/2021 and discharged on 09/09/2021    Patient has never had a colonoscopy or EGD.     Abdominal ultrasound scheduled for 11/14/2021    Results       Result Review :   The following data was reviewed by: Marian Ferrell NP on 11/05/2021     CMP    CMP 9/9/21 9/16/21 10/18/21   Glucose 113 (A) 92 100 (A)   BUN 9 16 8   Creatinine 0.65 (A) 0.87 0.82   eGFR Non African Am 141 101 108   Sodium 131 (A) 141 137   Potassium 3.0 (A) 4.0 3.5   Chloride 94 (A) 107 102   Calcium 7.8 (A) 8.9 8.3 (A)   Albumin 2.40 (A) 2.90 (A) 3.50   Total Bilirubin 19.6  "(A) 14.0 (A) 6.3 (A)   Alkaline Phosphatase 224 (A) 256 (A) 157 (A)   AST (SGOT) 93 (A) 99 (A) 48 (A)   ALT (SGPT) 27 52 (A) 38   (A) Abnormal value            CBC    CBC 9/7/21 9/7/21 9/9/21 10/18/21    1705 2238     WBC 15.70 (A) 12.63 (A) 11.65 (A) 6.81   RBC 3.57 (A) 3.16 (A) 3.21 (A) 4.07 (A)   Hemoglobin 13.0 11.4 (A) 11.8 (A) 14.5   Hematocrit 35.0 (A) 31.1 (A) 31.7 (A) 39.4   MCV 98.0 (A) 98.4 (A) 98.8 (A) 96.8   MCH 36.4 (A) 36.1 (A) 36.8 (A) 35.6 (A)   MCHC 37.1 (A) 36.7 (A) 37.2 (A) 36.8 (A)   RDW 16.5 (A) 16.3 (A) 16.5 (A) 11.3 (A)   Platelets 138 (A) 134 (A) 145 71 (A)   (A) Abnormal value                   Past Medical History       Past Medical History:   Diagnosis Date   • Alcohol abuse 03/14/2017   • Hypokalemia 03/14/2017    Will update   • Hyponatremia 03/14/2017   • Liver disease    • Steatohepatitis, alcoholic 03/14/2017   • Tobacco abuse 03/14/2017       Past Surgical History:   Procedure Laterality Date   • CHOLECYSTECTOMY           Current Outpatient Medications:   •  multivitamin with minerals (multivitamin with minerals) tablet tablet, Take 1 tablet by mouth Daily., Disp: 30 each, Rfl: 0     No Known Allergies    Family History   Problem Relation Age of Onset   • Alcohol abuse Mother    • Arthritis Mother    • COPD Mother    • Heart disease Maternal Grandmother    • Hypertension Maternal Grandmother    • Lung cancer Maternal Grandmother    • Stroke Maternal Grandmother    • Heart disease Maternal Grandfather    • Lung cancer Maternal Grandfather    • Colon cancer Neg Hx         Social History     Social History Narrative   • Not on file       Objective       Objective     Vital Signs:   /77 (BP Location: Left arm, Patient Position: Sitting, Cuff Size: Small Adult)   Pulse 108   Ht 170.2 cm (67\")   Wt 73.1 kg (161 lb 1.6 oz)   SpO2 100%   BMI 25.23 kg/m²     Body mass index is 25.23 kg/m².    Physical Exam  Constitutional:       General: He is not in acute distress.     " Appearance: Normal appearance. He is well-developed and normal weight.   Eyes:      Conjunctiva/sclera: Conjunctivae normal.      Pupils: Pupils are equal, round, and reactive to light.      Visual Fields: Right eye visual fields normal and left eye visual fields normal.   Cardiovascular:      Rate and Rhythm: Normal rate and regular rhythm.      Heart sounds: Normal heart sounds.   Pulmonary:      Effort: Pulmonary effort is normal. No retractions.      Breath sounds: Normal breath sounds and air entry.      Comments: Inspection of chest: normal appearance  Abdominal:      General: Bowel sounds are normal.      Palpations: Abdomen is soft.      Tenderness: There is no abdominal tenderness.      Hernia: A hernia is present.      Comments: No appreciable hepatosplenomegaly   Musculoskeletal:      Cervical back: Neck supple.      Right lower leg: No edema.      Left lower leg: No edema.   Lymphadenopathy:      Cervical: No cervical adenopathy.   Skin:     Findings: No lesion.      Comments: Turgor normal   Neurological:      Mental Status: He is alert and oriented to person, place, and time.   Psychiatric:         Mood and Affect: Mood and affect normal.              Assessment & Plan          Assessment and Plan    Diagnoses and all orders for this visit:    1. Periumbilical abdominal pain (Primary)  -     CBC (No Diff); Future  -     Comprehensive Metabolic Panel; Future  -     Protime-INR  -     CT Abdomen Pelvis With Contrast; Future    2. Steatohepatitis, alcoholic  -     CBC (No Diff); Future  -     Comprehensive Metabolic Panel; Future  -     Protime-INR  -     CT Abdomen Pelvis With Contrast; Future    3. Acute liver failure without hepatic coma  -     CBC (No Diff); Future  -     Comprehensive Metabolic Panel; Future  -     Protime-INR  -     CT Abdomen Pelvis With Contrast; Future    4. Acute hepatitis  -     CBC (No Diff); Future  -     Comprehensive Metabolic Panel; Future  -     Protime-INR  -     CT  Abdomen Pelvis With Contrast; Future    5. History of alcohol abuse  Comments:  Sobriety-9/7/2021  Orders:  -     CBC (No Diff); Future  -     Comprehensive Metabolic Panel; Future  -     Protime-INR  -     CT Abdomen Pelvis With Contrast; Future    6. Alcoholic cirrhosis of liver with ascites (HCC)  -     CBC (No Diff); Future  -     Comprehensive Metabolic Panel; Future  -     Protime-INR  -     CT Abdomen Pelvis With Contrast; Future      35-year-old male presenting the office today for an inpatient follow-up  with a history of heavy alcohol abuse and alcoholic hepatitis. We have reviewed the patient's labs and imaging. Patient's bilirubin has improved to 6.9. He continues with his jaundice. Patient is having pain around an umbilical hernia I have ordered a CT scan of the abdomen for further evaluation. Patient continues with sobriety since 9/7/2021. I have recommended AA for support related to his sobriety. We will follow up in the office in 1 month. Patient will have labs completed to include a CBC, CMP, PT/INR prior to the appointment. Patient agreeable to this plan will call with any questions or concerns.          Follow Up       Follow Up   Return in about 1 month (around 12/5/2021).  Patient was given instructions and counseling regarding his condition or for health maintenance advice. Please see specific information pulled into the AVS if appropriate.

## 2021-11-05 ENCOUNTER — OFFICE VISIT (OUTPATIENT)
Dept: GASTROENTEROLOGY | Facility: CLINIC | Age: 36
End: 2021-11-05

## 2021-11-05 VITALS
DIASTOLIC BLOOD PRESSURE: 77 MMHG | HEART RATE: 108 BPM | OXYGEN SATURATION: 100 % | BODY MASS INDEX: 25.28 KG/M2 | WEIGHT: 161.1 LBS | SYSTOLIC BLOOD PRESSURE: 110 MMHG | HEIGHT: 67 IN

## 2021-11-05 DIAGNOSIS — F10.11 HISTORY OF ALCOHOL ABUSE: ICD-10-CM

## 2021-11-05 DIAGNOSIS — R10.33 PERIUMBILICAL ABDOMINAL PAIN: Primary | ICD-10-CM

## 2021-11-05 DIAGNOSIS — K70.31 ALCOHOLIC CIRRHOSIS OF LIVER WITH ASCITES (HCC): ICD-10-CM

## 2021-11-05 DIAGNOSIS — K70.10 STEATOHEPATITIS, ALCOHOLIC: ICD-10-CM

## 2021-11-05 DIAGNOSIS — K72.00 ACUTE LIVER FAILURE WITHOUT HEPATIC COMA: ICD-10-CM

## 2021-11-05 DIAGNOSIS — B17.9 ACUTE HEPATITIS: ICD-10-CM

## 2021-11-05 PROCEDURE — 99214 OFFICE O/P EST MOD 30 MIN: CPT | Performed by: NURSE PRACTITIONER

## 2021-11-18 ENCOUNTER — HOSPITAL ENCOUNTER (OUTPATIENT)
Dept: CT IMAGING | Facility: HOSPITAL | Age: 36
Discharge: HOME | End: 2021-11-18
Admitting: NURSE PRACTITIONER

## 2021-11-18 ENCOUNTER — LAB (OUTPATIENT)
Dept: FAMILY MEDICINE CLINIC | Facility: CLINIC | Age: 36
End: 2021-11-18

## 2021-11-18 DIAGNOSIS — K70.10 STEATOHEPATITIS, ALCOHOLIC: ICD-10-CM

## 2021-11-18 DIAGNOSIS — R10.33 PERIUMBILICAL ABDOMINAL PAIN: ICD-10-CM

## 2021-11-18 DIAGNOSIS — B17.9 ACUTE HEPATITIS: ICD-10-CM

## 2021-11-18 DIAGNOSIS — K70.31 ALCOHOLIC CIRRHOSIS OF LIVER WITH ASCITES (HCC): ICD-10-CM

## 2021-11-18 DIAGNOSIS — K72.00 ACUTE LIVER FAILURE WITHOUT HEPATIC COMA: ICD-10-CM

## 2021-11-18 DIAGNOSIS — F10.11 HISTORY OF ALCOHOL ABUSE: ICD-10-CM

## 2021-11-18 PROBLEM — I10 ESSENTIAL HYPERTENSION: Status: ACTIVE | Noted: 2019-12-27

## 2021-11-18 LAB
ALBUMIN SERPL-MCNC: 3.1 G/DL (ref 3.5–5.2)
ALBUMIN/GLOB SERPL: 0.9 G/DL
ALP SERPL-CCNC: 187 U/L (ref 39–117)
ALT SERPL W P-5'-P-CCNC: 17 U/L (ref 1–41)
ANION GAP SERPL CALCULATED.3IONS-SCNC: 12.6 MMOL/L (ref 5–15)
AST SERPL-CCNC: 42 U/L (ref 1–40)
BILIRUB SERPL-MCNC: 4 MG/DL (ref 0–1.2)
BUN SERPL-MCNC: 5 MG/DL (ref 6–20)
BUN/CREAT SERPL: 6.6 (ref 7–25)
CALCIUM SPEC-SCNC: 8.3 MG/DL (ref 8.6–10.5)
CHLORIDE SERPL-SCNC: 100 MMOL/L (ref 98–107)
CO2 SERPL-SCNC: 24.4 MMOL/L (ref 22–29)
CREAT SERPL-MCNC: 0.76 MG/DL (ref 0.76–1.27)
DEPRECATED RDW RBC AUTO: 48.9 FL (ref 37–54)
ERYTHROCYTE [DISTWIDTH] IN BLOOD BY AUTOMATED COUNT: 14 % (ref 12.3–15.4)
GFR SERPL CREATININE-BSD FRML MDRD: 117 ML/MIN/1.73
GLOBULIN UR ELPH-MCNC: 3.4 GM/DL
GLUCOSE SERPL-MCNC: 92 MG/DL (ref 65–99)
HCT VFR BLD AUTO: 37.7 % (ref 37.5–51)
HGB BLD-MCNC: 13.2 G/DL (ref 13–17.7)
INR PPP: 1.32 (ref 2–3)
MCH RBC QN AUTO: 33.8 PG (ref 26.6–33)
MCHC RBC AUTO-ENTMCNC: 35 G/DL (ref 31.5–35.7)
MCV RBC AUTO: 96.4 FL (ref 79–97)
PLATELET # BLD AUTO: 152 10*3/MM3 (ref 140–450)
PMV BLD AUTO: 9.9 FL (ref 6–12)
POTASSIUM SERPL-SCNC: 3.3 MMOL/L (ref 3.5–5.2)
PROT SERPL-MCNC: 6.5 G/DL (ref 6–8.5)
PROTHROMBIN TIME: 13.3 SECONDS (ref 9.4–12)
RBC # BLD AUTO: 3.91 10*6/MM3 (ref 4.14–5.8)
SODIUM SERPL-SCNC: 137 MMOL/L (ref 136–145)
WBC NRBC COR # BLD: 9.59 10*3/MM3 (ref 3.4–10.8)

## 2021-11-18 PROCEDURE — 85610 PROTHROMBIN TIME: CPT | Performed by: NURSE PRACTITIONER

## 2021-11-18 PROCEDURE — 74177 CT ABD & PELVIS W/CONTRAST: CPT

## 2021-11-18 PROCEDURE — 36415 COLL VENOUS BLD VENIPUNCTURE: CPT | Performed by: FAMILY MEDICINE

## 2021-11-18 PROCEDURE — 0 IOPAMIDOL PER 1 ML: Performed by: NURSE PRACTITIONER

## 2021-11-18 PROCEDURE — 85027 COMPLETE CBC AUTOMATED: CPT | Performed by: NURSE PRACTITIONER

## 2021-11-18 PROCEDURE — 80053 COMPREHEN METABOLIC PANEL: CPT | Performed by: NURSE PRACTITIONER

## 2021-11-18 RX ADMIN — IOPAMIDOL 100 ML: 755 INJECTION, SOLUTION INTRAVENOUS at 15:58

## 2021-11-19 ENCOUNTER — HOSPITAL ENCOUNTER (EMERGENCY)
Facility: HOSPITAL | Age: 36
Discharge: HOME | End: 2021-11-19
Attending: EMERGENCY MEDICINE | Admitting: EMERGENCY MEDICINE

## 2021-11-19 ENCOUNTER — TELEPHONE (OUTPATIENT)
Dept: GASTROENTEROLOGY | Facility: CLINIC | Age: 36
End: 2021-11-19

## 2021-11-19 ENCOUNTER — APPOINTMENT (OUTPATIENT)
Dept: INTERVENTIONAL RADIOLOGY/VASCULAR | Facility: HOSPITAL | Age: 36
End: 2021-11-19

## 2021-11-19 VITALS
WEIGHT: 163.36 LBS | OXYGEN SATURATION: 96 % | BODY MASS INDEX: 24.76 KG/M2 | RESPIRATION RATE: 18 BRPM | TEMPERATURE: 98.4 F | SYSTOLIC BLOOD PRESSURE: 97 MMHG | HEART RATE: 100 BPM | HEIGHT: 68 IN | DIASTOLIC BLOOD PRESSURE: 71 MMHG

## 2021-11-19 DIAGNOSIS — B17.9 ACUTE HEPATITIS: ICD-10-CM

## 2021-11-19 DIAGNOSIS — K70.10 STEATOHEPATITIS, ALCOHOLIC: ICD-10-CM

## 2021-11-19 DIAGNOSIS — J90 PLEURAL EFFUSION: Primary | ICD-10-CM

## 2021-11-19 DIAGNOSIS — F10.11 HISTORY OF ALCOHOL ABUSE: ICD-10-CM

## 2021-11-19 DIAGNOSIS — J90 PLEURAL EFFUSION: ICD-10-CM

## 2021-11-19 DIAGNOSIS — R18.8 CIRRHOSIS OF LIVER WITH ASCITES, UNSPECIFIED HEPATIC CIRRHOSIS TYPE (HCC): Primary | ICD-10-CM

## 2021-11-19 DIAGNOSIS — K72.00 ACUTE LIVER FAILURE WITHOUT HEPATIC COMA: ICD-10-CM

## 2021-11-19 DIAGNOSIS — K70.31 ALCOHOLIC CIRRHOSIS OF LIVER WITH ASCITES (HCC): ICD-10-CM

## 2021-11-19 DIAGNOSIS — K74.60 CIRRHOSIS OF LIVER WITH ASCITES, UNSPECIFIED HEPATIC CIRRHOSIS TYPE (HCC): Primary | ICD-10-CM

## 2021-11-19 LAB
ALBUMIN FLD-MCNC: 0.6 G/DL
ALBUMIN FLD-MCNC: 0.7 G/DL
ALBUMIN SERPL-MCNC: 3.2 G/DL (ref 3.5–5.2)
ALBUMIN/GLOB SERPL: 1 G/DL
ALP SERPL-CCNC: 179 U/L (ref 39–117)
ALT SERPL W P-5'-P-CCNC: 16 U/L (ref 1–41)
AMMONIA BLD-SCNC: 13 UMOL/L (ref 16–60)
ANION GAP SERPL CALCULATED.3IONS-SCNC: 10.7 MMOL/L (ref 5–15)
APPEARANCE FLD: CLEAR
APPEARANCE FLD: CLEAR
AST SERPL-CCNC: 38 U/L (ref 1–40)
BACTERIA UR QL AUTO: ABNORMAL /HPF
BASOPHILS # BLD AUTO: 0.05 10*3/MM3 (ref 0–0.2)
BASOPHILS NFR BLD AUTO: 0.7 % (ref 0–1.5)
BILIRUB SERPL-MCNC: 4.6 MG/DL (ref 0–1.2)
BILIRUB UR QL STRIP: ABNORMAL
BUN SERPL-MCNC: 6 MG/DL (ref 6–20)
BUN/CREAT SERPL: 8.1 (ref 7–25)
CALCIUM SPEC-SCNC: 8.3 MG/DL (ref 8.6–10.5)
CHLORIDE SERPL-SCNC: 99 MMOL/L (ref 98–107)
CLARITY UR: CLEAR
CO2 SERPL-SCNC: 25.3 MMOL/L (ref 22–29)
COLOR FLD: YELLOW
COLOR FLD: YELLOW
COLOR UR: ABNORMAL
CREAT SERPL-MCNC: 0.74 MG/DL (ref 0.76–1.27)
DEPRECATED RDW RBC AUTO: 50.3 FL (ref 37–54)
EOSINOPHIL # BLD AUTO: 0.26 10*3/MM3 (ref 0–0.4)
EOSINOPHIL NFR BLD AUTO: 3.5 % (ref 0.3–6.2)
ERYTHROCYTE [DISTWIDTH] IN BLOOD BY AUTOMATED COUNT: 13.9 % (ref 12.3–15.4)
GFR SERPL CREATININE-BSD FRML MDRD: 121 ML/MIN/1.73
GLOBULIN UR ELPH-MCNC: 3.2 GM/DL
GLUCOSE SERPL-MCNC: 115 MG/DL (ref 65–99)
GLUCOSE UR STRIP-MCNC: ABNORMAL MG/DL
HCT VFR BLD AUTO: 37.4 % (ref 37.5–51)
HGB BLD-MCNC: 13 G/DL (ref 13–17.7)
HGB UR QL STRIP.AUTO: ABNORMAL
HOLD SPECIMEN: NORMAL
HOLD SPECIMEN: NORMAL
HYALINE CASTS UR QL AUTO: ABNORMAL /LPF
IMM GRANULOCYTES # BLD AUTO: 0.03 10*3/MM3 (ref 0–0.05)
IMM GRANULOCYTES NFR BLD AUTO: 0.4 % (ref 0–0.5)
KETONES UR QL STRIP: ABNORMAL
LEUKOCYTE ESTERASE UR QL STRIP.AUTO: ABNORMAL
LIPASE SERPL-CCNC: 166 U/L (ref 13–60)
LYMPHOCYTES # BLD AUTO: 1.38 10*3/MM3 (ref 0.7–3.1)
LYMPHOCYTES NFR BLD AUTO: 18.4 % (ref 19.6–45.3)
LYMPHOCYTES NFR FLD MANUAL: 40 %
LYMPHOCYTES NFR FLD MANUAL: 80 %
MCH RBC QN AUTO: 33.9 PG (ref 26.6–33)
MCHC RBC AUTO-ENTMCNC: 34.8 G/DL (ref 31.5–35.7)
MCV RBC AUTO: 97.7 FL (ref 79–97)
MONOCYTES # BLD AUTO: 0.87 10*3/MM3 (ref 0.1–0.9)
MONOCYTES NFR BLD AUTO: 11.6 % (ref 5–12)
MONOCYTES NFR FLD: 20 %
MONOCYTES NFR FLD: 50 %
NEUTROPHILS NFR BLD AUTO: 4.89 10*3/MM3 (ref 1.7–7)
NEUTROPHILS NFR BLD AUTO: 65.4 % (ref 42.7–76)
NEUTROPHILS NFR FLD MANUAL: 10 %
NITRITE UR QL STRIP: ABNORMAL
NRBC BLD AUTO-RTO: 0 /100 WBC (ref 0–0.2)
NUC CELL # FLD: 121 /MM3
NUC CELL # FLD: 137 /MM3
PH UR STRIP.AUTO: ABNORMAL [PH]
PLATELET # BLD AUTO: 144 10*3/MM3 (ref 140–450)
PMV BLD AUTO: 9.2 FL (ref 6–12)
POTASSIUM SERPL-SCNC: 3.1 MMOL/L (ref 3.5–5.2)
PROT FLD-MCNC: <1 G/DL
PROT FLD-MCNC: <1 G/DL
PROT SERPL-MCNC: 6.4 G/DL (ref 6–8.5)
PROT UR QL STRIP: ABNORMAL
RBC # BLD AUTO: 3.83 10*6/MM3 (ref 4.14–5.8)
RBC # FLD AUTO: <2000 /MM3
RBC # FLD AUTO: <2000 /MM3
RBC # UR STRIP: ABNORMAL /HPF
REF LAB TEST METHOD: ABNORMAL
SODIUM SERPL-SCNC: 135 MMOL/L (ref 136–145)
SP GR UR STRIP: 1.02 (ref 1–1.03)
SQUAMOUS #/AREA URNS HPF: ABNORMAL /HPF
UROBILINOGEN UR QL STRIP: ABNORMAL
WBC # UR STRIP: ABNORMAL /HPF
WBC NRBC COR # BLD: 7.48 10*3/MM3 (ref 3.4–10.8)
WHOLE BLOOD HOLD SPECIMEN: NORMAL
WHOLE BLOOD HOLD SPECIMEN: NORMAL

## 2021-11-19 PROCEDURE — 88108 CYTOPATH CONCENTRATE TECH: CPT | Performed by: NURSE PRACTITIONER

## 2021-11-19 PROCEDURE — 84157 ASSAY OF PROTEIN OTHER: CPT | Performed by: NURSE PRACTITIONER

## 2021-11-19 PROCEDURE — 99283 EMERGENCY DEPT VISIT LOW MDM: CPT

## 2021-11-19 PROCEDURE — 85025 COMPLETE CBC W/AUTO DIFF WBC: CPT | Performed by: EMERGENCY MEDICINE

## 2021-11-19 PROCEDURE — C1729 CATH, DRAINAGE: HCPCS

## 2021-11-19 PROCEDURE — 81001 URINALYSIS AUTO W/SCOPE: CPT | Performed by: EMERGENCY MEDICINE

## 2021-11-19 PROCEDURE — 89051 BODY FLUID CELL COUNT: CPT | Performed by: NURSE PRACTITIONER

## 2021-11-19 PROCEDURE — 80053 COMPREHEN METABOLIC PANEL: CPT | Performed by: EMERGENCY MEDICINE

## 2021-11-19 PROCEDURE — 96374 THER/PROPH/DIAG INJ IV PUSH: CPT

## 2021-11-19 PROCEDURE — 82042 OTHER SOURCE ALBUMIN QUAN EA: CPT | Performed by: NURSE PRACTITIONER

## 2021-11-19 PROCEDURE — 36415 COLL VENOUS BLD VENIPUNCTURE: CPT

## 2021-11-19 PROCEDURE — 82140 ASSAY OF AMMONIA: CPT | Performed by: EMERGENCY MEDICINE

## 2021-11-19 PROCEDURE — 83690 ASSAY OF LIPASE: CPT | Performed by: EMERGENCY MEDICINE

## 2021-11-19 PROCEDURE — 76942 ECHO GUIDE FOR BIOPSY: CPT

## 2021-11-19 PROCEDURE — 25010000002 FUROSEMIDE PER 20 MG: Performed by: EMERGENCY MEDICINE

## 2021-11-19 RX ORDER — SPIRONOLACTONE 50 MG/1
50 TABLET, FILM COATED ORAL DAILY
Qty: 20 TABLET | Refills: 0 | Status: SHIPPED | OUTPATIENT
Start: 2021-11-19 | End: 2021-12-06 | Stop reason: SDUPTHER

## 2021-11-19 RX ORDER — LIDOCAINE HYDROCHLORIDE 20 MG/ML
INJECTION, SOLUTION INFILTRATION; PERINEURAL
Status: COMPLETED
Start: 2021-11-19 | End: 2021-11-19

## 2021-11-19 RX ORDER — ALBUMIN (HUMAN) 12.5 G/50ML
50 SOLUTION INTRAVENOUS ONCE
Status: CANCELLED | OUTPATIENT
Start: 2021-11-19 | End: 2021-11-19

## 2021-11-19 RX ORDER — SODIUM CHLORIDE 0.9 % (FLUSH) 0.9 %
10 SYRINGE (ML) INJECTION AS NEEDED
Status: DISCONTINUED | OUTPATIENT
Start: 2021-11-19 | End: 2021-11-19 | Stop reason: HOSPADM

## 2021-11-19 RX ORDER — FUROSEMIDE 10 MG/ML
40 INJECTION INTRAMUSCULAR; INTRAVENOUS ONCE
Status: COMPLETED | OUTPATIENT
Start: 2021-11-19 | End: 2021-11-19

## 2021-11-19 RX ADMIN — FUROSEMIDE 40 MG: 10 INJECTION INTRAMUSCULAR; INTRAVENOUS at 14:04

## 2021-11-19 RX ADMIN — LIDOCAINE HYDROCHLORIDE 10 ML: 20 INJECTION, SOLUTION INFILTRATION; PERINEURAL at 16:39

## 2021-11-19 NOTE — ED PROVIDER NOTES
"Time: 1:45 PM EST  Arrived by: ambulance; accompanied by family   Chief Complaint: ASCITES    History provided by: pt  History is limited by: N/A     History of Present Illness:  Patient is a 36 y.o. year old male that presents to the emergency department with ASCITES. This started a couple days ago and is still present and constant. It is moderate in severity. Nothing improves or worsens symptoms.     Pt notes that he received a call today from his PCP after having an abdominal CT yesterday. He states that he was told to come into the ED due to \"fluid\". He reports no other complaints at this time.     Pt has hx of cirrhosis. He has not had any ETOH since October 2021.       History provided by:  Patient      Similar Symptoms Previously: yes   Recently seen: Pt was last seen in this ED on 9/7/21 for jaundice. Pt was admitted.       Patient Care Team  Primary Care Provider: Callum Peterson DO    Past Medical History:     No Known Allergies  Past Medical History:   Diagnosis Date   • Alcohol abuse 03/14/2017   • Essential hypertension 12/27/2019   • Hypokalemia 03/14/2017    Will update   • Hyponatremia 03/14/2017   • Liver disease    • Steatohepatitis, alcoholic 03/14/2017   • Tobacco abuse 03/14/2017     Past Surgical History:   Procedure Laterality Date   • CHOLECYSTECTOMY       Family History   Problem Relation Age of Onset   • Alcohol abuse Mother    • Arthritis Mother    • COPD Mother    • Heart disease Maternal Grandmother    • Hypertension Maternal Grandmother    • Lung cancer Maternal Grandmother    • Stroke Maternal Grandmother    • Heart disease Maternal Grandfather    • Lung cancer Maternal Grandfather    • Colon cancer Neg Hx        Home Medications:  Prior to Admission medications    Medication Sig Start Date End Date Taking? Authorizing Provider   multivitamin with minerals (multivitamin with minerals) tablet tablet Take 1 tablet by mouth Daily. 9/9/21   Tyler Alva PA        Social " "History:   Social History     Tobacco Use   • Smoking status: Current Every Day Smoker     Packs/day: 0.50     Years: 13.00     Pack years: 6.50     Types: Cigarettes     Start date: 2008   • Smokeless tobacco: Never Used   Vaping Use   • Vaping Use: Never used   Substance Use Topics   • Alcohol use: Not Currently     Alcohol/week: 3.0 standard drinks     Types: 2 Cans of beer, 1 Shots of liquor per week     Comment: FORMER   • Drug use: Not Currently     Recent travel: no     Review of Systems:  Review of Systems   Constitutional: Negative for chills, diaphoresis and fever.   HENT: Negative for ear discharge and nosebleeds.    Eyes: Negative for photophobia.   Respiratory: Negative for shortness of breath.    Cardiovascular: Negative for chest pain.   Gastrointestinal: Negative for diarrhea, nausea and vomiting.        Ascites.    Genitourinary: Negative for dysuria.   Musculoskeletal: Negative for back pain and neck pain.   Skin: Negative for rash.   Neurological: Negative for headaches.        Physical Exam:  BP 94/72   Pulse 96   Temp 98.4 °F (36.9 °C) (Oral)   Resp 18   Ht 172.7 cm (68\")   Wt 74.1 kg (163 lb 5.8 oz)   SpO2 97%   BMI 24.84 kg/m²     Physical Exam  Vitals and nursing note reviewed.   Constitutional:       General: He is not in acute distress.     Appearance: Normal appearance. He is not toxic-appearing.   HENT:      Head: Normocephalic and atraumatic.      Jaw: There is normal jaw occlusion.      Mouth/Throat:      Mouth: Mucous membranes are dry.   Eyes:      General: Lids are normal. Scleral icterus (mild) present.      Extraocular Movements: Extraocular movements intact.      Conjunctiva/sclera: Conjunctivae normal.      Pupils: Pupils are equal, round, and reactive to light.   Cardiovascular:      Rate and Rhythm: Normal rate and regular rhythm.      Pulses: Normal pulses.      Heart sounds: Normal heart sounds.   Pulmonary:      Effort: Pulmonary effort is normal. No respiratory " distress.      Breath sounds: Normal breath sounds. No wheezing or rhonchi.   Abdominal:      General: Abdomen is flat. There is distension (mild).      Palpations: Abdomen is soft.      Tenderness: There is no abdominal tenderness. There is no guarding or rebound.   Musculoskeletal:         General: Normal range of motion.      Cervical back: Normal range of motion and neck supple.      Right lower leg: No edema.      Left lower leg: No edema.   Skin:     General: Skin is warm and dry.   Neurological:      Mental Status: He is alert and oriented to person, place, and time. Mental status is at baseline.   Psychiatric:         Mood and Affect: Mood normal.                Medications in the Emergency Department:  Medications   sodium chloride 0.9 % flush 10 mL (has no administration in time range)   furosemide (LASIX) injection 40 mg (40 mg Intravenous Given 11/19/21 1404)   lidocaine (XYLOCAINE) 2% injection  - ADS Override Pull (10 mL Injection Given 11/19/21 1639)        Labs  Lab Results (last 24 hours)     Procedure Component Value Units Date/Time    CBC & Differential [[id number]]  (Abnormal) Collected: 11/19/21 1307    Specimen: Blood Updated: 11/19/21 1315    Narrative:      The following orders were created for panel order CBC & Differential.  Procedure                               Abnormality         Status                     ---------                               -----------         ------                     CBC Auto Differential[[id number]]        Abnormal            Final result                 Please view results for these tests on the individual orders.    Comprehensive Metabolic Panel [[id number]]  (Abnormal) Collected: 11/19/21 1307    Specimen: Blood Updated: 11/19/21 1345     Glucose 115 mg/dL      BUN 6 mg/dL      Creatinine 0.74 mg/dL      Sodium 135 mmol/L      Potassium 3.1 mmol/L      Chloride 99 mmol/L      CO2 25.3 mmol/L      Calcium 8.3 mg/dL      Total Protein 6.4 g/dL      Albumin 3.20  g/dL      ALT (SGPT) 16 U/L      AST (SGOT) 38 U/L      Alkaline Phosphatase 179 U/L      Total Bilirubin 4.6 mg/dL      eGFR Non African Amer 121 mL/min/1.73      Globulin 3.2 gm/dL      A/G Ratio 1.0 g/dL      BUN/Creatinine Ratio 8.1     Anion Gap 10.7 mmol/L     Narrative:      GFR Normal >60  Chronic Kidney Disease <60  Kidney Failure <15      Lipase [[id number]]  (Abnormal) Collected: 11/19/21 1307    Specimen: Blood Updated: 11/19/21 1335     Lipase 166 U/L     CBC Auto Differential [[id number]]  (Abnormal) Collected: 11/19/21 1307    Specimen: Blood Updated: 11/19/21 1315     WBC 7.48 10*3/mm3      RBC 3.83 10*6/mm3      Hemoglobin 13.0 g/dL      Hematocrit 37.4 %      MCV 97.7 fL      MCH 33.9 pg      MCHC 34.8 g/dL      RDW 13.9 %      RDW-SD 50.3 fl      MPV 9.2 fL      Platelets 144 10*3/mm3      Neutrophil % 65.4 %      Lymphocyte % 18.4 %      Monocyte % 11.6 %      Eosinophil % 3.5 %      Basophil % 0.7 %      Immature Grans % 0.4 %      Neutrophils, Absolute 4.89 10*3/mm3      Lymphocytes, Absolute 1.38 10*3/mm3      Monocytes, Absolute 0.87 10*3/mm3      Eosinophils, Absolute 0.26 10*3/mm3      Basophils, Absolute 0.05 10*3/mm3      Immature Grans, Absolute 0.03 10*3/mm3      nRBC 0.0 /100 WBC     Urinalysis With Microscopic If Indicated (No Culture) - Urine, Clean Catch [[id number]]  (Abnormal) Collected: 11/19/21 1406    Specimen: Urine, Clean Catch Updated: 11/19/21 1424     Color, UA [place]     Appearance, UA Clear     pH, UA --     Comment: Result not available due to interfering substances.        Specific Gravity, UA 1.020     Glucose, UA --     Comment: Result not available due to interfering substances.        Ketones, UA --     Comment: Result not available due to interfering substances.        Bilirubin, UA --     Comment: Result not available due to interfering substances.        Blood, UA --     Comment: Result not available due to interfering substances.        Protein, UA --     Comment:  Result not available due to interfering substances.        Leuk Esterase, UA --     Comment: Result not available due to interfering substances.        Nitrite, UA --     Comment: Result not available due to interfering substances.        Urobilinogen, UA --     Comment: Result not available due to interfering substances.       Ammonia [[id number]]  (Abnormal) Collected: 11/19/21 1406    Specimen: Blood Updated: 11/19/21 1520     Ammonia 13 umol/L     Urinalysis, Microscopic Only - Urine, Clean Catch [[id number]]  (Abnormal) Collected: 11/19/21 1406    Specimen: Urine, Clean Catch Updated: 11/19/21 1437     RBC, UA 0-2 /HPF      WBC, UA 0-2 /HPF      Bacteria, UA Trace /HPF      Squamous Epithelial Cells, UA None Seen /HPF      Hyaline Casts, UA None Seen /LPF      Methodology Automated Microscopy    Albumin, Fluid - Body Fluid, Peritoneum [[id number]] Collected: 11/19/21 1645    Specimen: Body Fluid from Peritoneum Updated: 11/19/21 1721    Body Fluid Cell Count With Differential - Body Fluid, Peritoneum [[id number]] Collected: 11/19/21 1645    Specimen: Body Fluid from Peritoneum Updated: 11/19/21 1734    Narrative:      The following orders were created for panel order Body Fluid Cell Count With Differential - Body Fluid, Peritoneum.  Procedure                               Abnormality         Status                     ---------                               -----------         ------                     Body fluid cell count - ...[[id number]]                      In process                 Body fluid differential ...[[id number]]                      In process                   Please view results for these tests on the individual orders.    Protein, Body Fluid - Body Fluid, Peritoneum [[id number]] Collected: 11/19/21 1645    Specimen: Body Fluid from Peritoneum Updated: 11/19/21 1721    Body fluid cell count - Body Fluid, Peritoneum [[id number]] Collected: 11/19/21 1645    Specimen: Body Fluid from Peritoneum Updated:  11/19/21 1721    Body fluid differential - Body Fluid, Peritoneum [[id number]] Collected: 11/19/21 1645    Specimen: Body Fluid from Peritoneum Updated: 11/19/21 1734    Albumin, Fluid - Pleural Fluid, Pleural Cavity [[id number]] Collected: 11/19/21 1645    Specimen: Pleural Fluid from Pleural Cavity Updated: 11/19/21 1739    Body Fluid Cell Count With Differential - Pleural Fluid, Pleural Cavity [[id number]] Collected: 11/19/21 1645    Specimen: Pleural Fluid from Pleural Cavity Updated: 11/19/21 1739    Narrative:      The following orders were created for panel order Body Fluid Cell Count With Differential - Pleural Fluid, Pleural Cavity.  Procedure                               Abnormality         Status                     ---------                               -----------         ------                     Body fluid cell count - ...[[id number]]                      In process                   Please view results for these tests on the individual orders.    Protein, Body Fluid - Pleural Fluid, Pleural Cavity [[id number]] Collected: 11/19/21 1645    Specimen: Pleural Fluid from Pleural Cavity Updated: 11/19/21 1739    Body fluid cell count - Pleural Fluid, Pleural Cavity [[id number]] Collected: 11/19/21 1645    Specimen: Pleural Fluid from Pleural Cavity Updated: 11/19/21 1739           Imaging:  No Radiology Exams Resulted Within Past 24 Hours    Procedures:  Procedures    Progress                            Medical Decision Making:  MDM  Number of Diagnoses or Management Options  Cirrhosis of liver with ascites, unspecified hepatic cirrhosis type (HCC)  Diagnosis management comments: The patient´s CBC was reviewed and shows no abnormalities of critical concern. Of note, there is no anemia requiring a blood transfusion and the platelet count is acceptable.  The patient´s CMP was reviewed and shows no abnormalities of critical concern. Of note, the patient´s sodium and potassium are acceptable. The patient´s liver  enzymes are unremarkable. The patient´s renal function (creatinine) is preserved. The patient has a normal anion gap.  Lipase is 166.  Urinalysis shows trace bacteriuria.  The patient had a paracentesis in the emergency department and 3.5 L was removed.  Case was discussed with Dr. Victoria who agrees the patient to be discharged and suggest starting the patient on Aldactone as a outpatient.  Patient advised to follow-up with Dr. Victoria next 2 to 3 days.  The patient is resting comfortably and feels better, is alert and in no distress. Repeat examination is unremarkable and benign; in particular, there's no discomfort at McBurney's point and there is no pulsatile mass. The history, exam, diagnostic testing, and current condition does not suggest acute appendicitis, bowel obstruction, acute cholecystitis, bowel perforation, major gastrointestinal bleeding, severe diverticulitis, abdominal aortic aneurysm, mesenteric ischemia, volvulus, sepsis, or other significant pathology that warrants further testing, continued ED treatment, admission, for surgical evaluation at this point. The vital signs have been stable. The patient does not have uncontrollable pain, intractable vomiting, or other significant symptoms. The patient's condition is stable and appropriate for discharge from the emergency department.       Amount and/or Complexity of Data Reviewed  Clinical lab tests: reviewed  Tests in the radiology section of CPT®: reviewed  Discussion of test results with the performing providers: yes  Decide to obtain previous medical records or to obtain history from someone other than the patient: yes  Review and summarize past medical records: yes (Pt had a CT scan yesterday that shows abdominal and pelvic ascites and moderate left pleural effusion. )  Discuss the patient with other providers: yes    Risk of Complications, Morbidity, and/or Mortality  Presenting problems: moderate  Management options: moderate    Patient  Progress  Patient progress: stable       Final diagnoses:   Cirrhosis of liver with ascites, unspecified hepatic cirrhosis type (HCC)        Disposition:  ED Disposition     ED Disposition Condition Comment    Discharge Stable           This medical record created using voice recognition software and may contain unintended errors.    Documentation assistance provided by Tea Sanchez acting as scribe for Dru Woody MD. Information recorded by the scribe was done at my direction and has been verified and validated by me.        Tea Sanchez  11/19/21 [id number]       Dru Woody MD  11/19/21 [id number]

## 2021-11-19 NOTE — TELEPHONE ENCOUNTER
I spoke with patient, and notified him that his CT showed fluid on his abdomen and his lungs per Marian. Patient states he is having leg swelling, abdominal swelling, and shortness of breath at times. I advised patient the earliest we could get him into a paracentesis is Tuesday at 10:00 AM. Patient was advised to go to the ER. Patient agreed to go to the ER.

## 2021-11-23 ENCOUNTER — HOSPITAL ENCOUNTER (OUTPATIENT)
Dept: INTERVENTIONAL RADIOLOGY/VASCULAR | Facility: HOSPITAL | Age: 36
End: 2021-11-23

## 2021-12-01 NOTE — PROGRESS NOTES
Chief Complaint   Cirrhosis     History of Present Illness       Wai Gay is a 36 y.o. male who presents to [facility] for 1 month follow-up with a history of alcoholic hepatitis.  Patient reports overall he is doing really well he denies any abdominal pain he continues sobriety since 9/7/2021.  He did have a paracentesis performed 11/19/2021 where they removed 3-1/2 L off of the abdomen.  Patient reports he was experience shortness of breath prior to his paracentesis but that is since resolved.  He denies abdominal swelling, leg swelling, confusion, dark-colored urine or easily bruising or bleeding.  Patient continues on spironolactone 50 mg daily and is tolerating the medication well.  Patient reports no abdominal pain. Patient denies fever, nausea, vomiting, weight loss, night sweats, melena, hematochezia, hematemesis.    Labs performed on 11/19/2021 see below.  PT 13.3, INR 1.32, Ammonia 13    CT abdomen and pelvis with contrast performed on 11/18/2021 increase large amount of abdominal and pelvic ascites, cirrhosis, splenomegaly, portal hypertension, mural prominence throughout the small bowel likely portal hypertensive enteropathy moderate left pleural effusion.    Patient has never had a colonoscopy or EGD.    Office Visit 11/5/21 new patient with a history of heavy alcohol abuse and alcoholic hepatitis.  Patient is following up in the office after admission to the hospital 9/7 through 9/9.  When he was admitted to the hospital his potassium was 2.5 sodium 126, lipase 142, INR 2, ammonia 41, ALT 34, , total bilirubin 23.9.  Patient was admitted to the hospital and treated with antibiotics he had a paracentesis which removed 800 cc.  He was started on steroids. Patient discharged With a bilirubin of 19.6. Today he presents to the office sober since 9/7/2021. Reviewed his recent labs 10/18/2021 his bilirubin is 6.3 AST 48, INR 1.37, sodium 137, platelet 71  patient reports he is feeling really good since stopping alcohol intake. He reports a longstanding history of alcoholism drinking 2 tall beers and 4-5 shots of whiskey for about 8 years. He has been sober for 2 months. He continues to have abdominal tenderness over umbilical hernia that is reducible. Patient denies abdominal swelling, leg swelling, dark-colored urine, easily bleeding. Patient denies fever, nausea, vomiting, weight loss, night sweats, melena, hematochezia, hematemesis.    Results       Result Review :       CMP    CMP 10/18/21 11/18/21 11/19/21   Glucose 100 (A) 92 115 (A)   BUN 8 5 (A) 6   Creatinine 0.82 0.76 0.74 (A)   eGFR Non African Am 108 117 121   Sodium 137 137 135 (A)   Potassium 3.5 3.3 (A) 3.1 (A)   Chloride 102 100 99   Calcium 8.3 (A) 8.3 (A) 8.3 (A)   Albumin 3.50 3.10 (A) 3.20 (A)   Total Bilirubin 6.3 (A) 4.0 (A) 4.6 (A)   Alkaline Phosphatase 157 (A) 187 (A) 179 (A)   AST (SGOT) 48 (A) 42 (A) 38   ALT (SGPT) 38 17 16   (A) Abnormal value            CBC    CBC 10/18/21 11/18/21 11/19/21   WBC 6.81 9.59 7.48   RBC 4.07 (A) 3.91 (A) 3.83 (A)   Hemoglobin 14.5 13.2 13.0   Hematocrit 39.4 37.7 37.4 (A)   MCV 96.8 96.4 97.7 (A)   MCH 35.6 (A) 33.8 (A) 33.9 (A)   MCHC 36.8 (A) 35.0 34.8   RDW 11.3 (A) 14.0 13.9   Platelets 71 (A) 152 144   (A) Abnormal value                      Past Medical History       Past Medical History:   Diagnosis Date   • Alcohol abuse 03/14/2017   • Essential hypertension 12/27/2019   • Hypokalemia 03/14/2017    Will update   • Hyponatremia 03/14/2017   • Liver disease    • Steatohepatitis, alcoholic 03/14/2017   • Tobacco abuse 03/14/2017       Past Surgical History:   Procedure Laterality Date   • CHOLECYSTECTOMY           Current Outpatient Medications:   •  multivitamin with minerals (multivitamin with minerals) tablet tablet, Take 1 tablet by mouth Daily., Disp: 30 each, Rfl: 0  •  spironolactone (ALDACTONE) 50 MG tablet, Take 1 tablet by mouth Daily., Disp:  30 tablet, Rfl: 5     No Known Allergies    Family History   Problem Relation Age of Onset   • Alcohol abuse Mother    • Arthritis Mother    • COPD Mother    • Heart disease Maternal Grandmother    • Hypertension Maternal Grandmother    • Lung cancer Maternal Grandmother    • Stroke Maternal Grandmother    • Heart disease Maternal Grandfather    • Lung cancer Maternal Grandfather    • Colon cancer Neg Hx         Social History     Social History Narrative   • Not on file       Objective       Vital Signs:   /71   Pulse 117   Wt 62.6 kg (138 lb)   SpO2 100%   BMI 20.98 kg/m²       Physical Exam  Constitutional:       General: He is not in acute distress.     Appearance: Normal appearance.   HENT:      Head: Normocephalic.   Eyes:      Conjunctiva/sclera: Conjunctivae normal.      Pupils: Pupils are equal, round, and reactive to light.      Visual Fields: Right eye visual fields normal and left eye visual fields normal.   Neck:      Trachea: Trachea normal.   Cardiovascular:      Rate and Rhythm: Normal rate and regular rhythm.      Heart sounds: Normal heart sounds.   Pulmonary:      Effort: Pulmonary effort is normal.      Breath sounds: Normal breath sounds and air entry.      Comments: Inspection of chest: normal appearance  Abdominal:      General: Abdomen is flat. Bowel sounds are normal.      Palpations: Abdomen is soft. There is no mass.      Tenderness: There is no guarding.   Musculoskeletal:      Right lower leg: No edema.      Left lower leg: No edema.   Skin:     Findings: No lesion.      Comments: Turgor is normal   Neurological:      Mental Status: He is alert and oriented to person, place, and time.   Psychiatric:         Mood and Affect: Mood and affect normal.           Assessment & Plan          Assessment and Plan    Diagnoses and all orders for this visit:    1. Steatohepatitis, alcoholic (Primary)  -     CBC & Differential; Future  -     Comprehensive Metabolic Panel; Future  -      Protime-INR; Future    2. Acute liver failure without hepatic coma  -     CBC & Differential; Future  -     Comprehensive Metabolic Panel; Future  -     Protime-INR; Future    3. History of alcohol abuse  -     CBC & Differential; Future  -     Comprehensive Metabolic Panel; Future  -     Protime-INR; Future    4. Alcoholic cirrhosis of liver with ascites (HCC)  -     CBC & Differential; Future  -     Comprehensive Metabolic Panel; Future  -     Protime-INR; Future    Other orders  -     Discontinue: spironolactone (ALDACTONE) 50 MG tablet; Take 1 tablet by mouth Daily.  Dispense: 20 tablet; Refill: 0  -     spironolactone (ALDACTONE) 50 MG tablet; Take 1 tablet by mouth Daily.  Dispense: 30 tablet; Refill: 5        36-year-old male presenting the office today for a 1 month follow-up with a history of alcoholic hepatitis.  Patient has improved tremendously since last office visit.  He had a paracentesis 11/19/2021 and reports he has been feeling well since that time.  I have ordered a CBC, CMP, PT/INR for the patient to complete at his convenience. I have recommended that the patient undergo further evaluation with an EGD to evaluate for esophageal varices.  I have discussed this procedure in detail with the patient.  I have discussed the risks, benefits and alternatives.  I have discussed the risk of anesthesia, bleeding and perforation.  Patient understands these risks, benefits and alternatives and wishes to proceed.  I will schedule him at his earliest convenience.  Patient will continue spironolactone I have placed a refill at this time.  Patient will continue with sobriety.  He has been sober since 9/7/2021.  We will follow up in office after EGD.          Follow Up       Follow Up   Return for Follow up after endoscopy in office.  Patient was given instructions and counseling regarding his condition or for health maintenance advice. Please see specific information pulled into the AVS if appropriate.

## 2021-12-06 ENCOUNTER — OFFICE VISIT (OUTPATIENT)
Dept: GASTROENTEROLOGY | Facility: CLINIC | Age: 36
End: 2021-12-06

## 2021-12-06 ENCOUNTER — PREP FOR SURGERY (OUTPATIENT)
Dept: OTHER | Facility: HOSPITAL | Age: 36
End: 2021-12-06

## 2021-12-06 VITALS
WEIGHT: 138 LBS | SYSTOLIC BLOOD PRESSURE: 105 MMHG | BODY MASS INDEX: 20.98 KG/M2 | OXYGEN SATURATION: 100 % | DIASTOLIC BLOOD PRESSURE: 71 MMHG | HEART RATE: 117 BPM

## 2021-12-06 DIAGNOSIS — K70.31 ALCOHOLIC CIRRHOSIS OF LIVER WITH ASCITES (HCC): ICD-10-CM

## 2021-12-06 DIAGNOSIS — K72.00 ACUTE LIVER FAILURE WITHOUT HEPATIC COMA: ICD-10-CM

## 2021-12-06 DIAGNOSIS — K74.60 CIRRHOSIS OF LIVER WITH ASCITES, UNSPECIFIED HEPATIC CIRRHOSIS TYPE (HCC): ICD-10-CM

## 2021-12-06 DIAGNOSIS — K70.31 ALCOHOLIC CIRRHOSIS OF LIVER WITH ASCITES (HCC): Primary | ICD-10-CM

## 2021-12-06 DIAGNOSIS — R18.8 CIRRHOSIS OF LIVER WITH ASCITES, UNSPECIFIED HEPATIC CIRRHOSIS TYPE (HCC): ICD-10-CM

## 2021-12-06 DIAGNOSIS — F10.11 HISTORY OF ALCOHOL ABUSE: ICD-10-CM

## 2021-12-06 DIAGNOSIS — K70.10 STEATOHEPATITIS, ALCOHOLIC: Primary | ICD-10-CM

## 2021-12-06 PROCEDURE — 99214 OFFICE O/P EST MOD 30 MIN: CPT | Performed by: NURSE PRACTITIONER

## 2021-12-06 RX ORDER — SPIRONOLACTONE 50 MG/1
50 TABLET, FILM COATED ORAL DAILY
Qty: 30 TABLET | Refills: 5 | Status: SHIPPED | OUTPATIENT
Start: 2021-12-06 | End: 2022-05-16

## 2021-12-06 RX ORDER — SPIRONOLACTONE 50 MG/1
50 TABLET, FILM COATED ORAL DAILY
Qty: 20 TABLET | Refills: 0 | Status: SHIPPED | OUTPATIENT
Start: 2021-12-06 | End: 2021-12-06 | Stop reason: SDUPTHER

## 2021-12-21 ENCOUNTER — OFFICE VISIT (OUTPATIENT)
Dept: FAMILY MEDICINE CLINIC | Facility: CLINIC | Age: 36
End: 2021-12-21

## 2021-12-21 VITALS
HEART RATE: 104 BPM | WEIGHT: 137 LBS | OXYGEN SATURATION: 100 % | HEIGHT: 68 IN | TEMPERATURE: 98 F | SYSTOLIC BLOOD PRESSURE: 101 MMHG | DIASTOLIC BLOOD PRESSURE: 67 MMHG | BODY MASS INDEX: 20.76 KG/M2

## 2021-12-21 DIAGNOSIS — K70.10 STEATOHEPATITIS, ALCOHOLIC: ICD-10-CM

## 2021-12-21 DIAGNOSIS — K70.31 ALCOHOLIC CIRRHOSIS OF LIVER WITH ASCITES (HCC): ICD-10-CM

## 2021-12-21 DIAGNOSIS — F10.11 HISTORY OF ALCOHOL ABUSE: ICD-10-CM

## 2021-12-21 DIAGNOSIS — K72.00 ACUTE LIVER FAILURE WITHOUT HEPATIC COMA: ICD-10-CM

## 2021-12-21 DIAGNOSIS — I10 ESSENTIAL HYPERTENSION: Primary | ICD-10-CM

## 2021-12-21 LAB
ACANTHOCYTES BLD QL SMEAR: ABNORMAL
ALBUMIN SERPL-MCNC: 3.3 G/DL (ref 3.5–5.2)
ALBUMIN/GLOB SERPL: 1 G/DL
ALP SERPL-CCNC: 169 U/L (ref 39–117)
ALT SERPL W P-5'-P-CCNC: 18 U/L (ref 1–41)
ANION GAP SERPL CALCULATED.3IONS-SCNC: 6.3 MMOL/L (ref 5–15)
AST SERPL-CCNC: 36 U/L (ref 1–40)
BILIRUB SERPL-MCNC: 3 MG/DL (ref 0–1.2)
BUN SERPL-MCNC: 10 MG/DL (ref 6–20)
BUN/CREAT SERPL: 14.3 (ref 7–25)
BURR CELLS BLD QL SMEAR: ABNORMAL
CALCIUM SPEC-SCNC: 8.9 MG/DL (ref 8.6–10.5)
CHLORIDE SERPL-SCNC: 102 MMOL/L (ref 98–107)
CO2 SERPL-SCNC: 27.7 MMOL/L (ref 22–29)
CREAT SERPL-MCNC: 0.7 MG/DL (ref 0.76–1.27)
DEPRECATED RDW RBC AUTO: 42.6 FL (ref 37–54)
ELLIPTOCYTES BLD QL SMEAR: ABNORMAL
EOSINOPHIL # BLD MANUAL: 1.34 10*3/MM3 (ref 0–0.4)
EOSINOPHIL NFR BLD MANUAL: 18.1 % (ref 0.3–6.2)
ERYTHROCYTE [DISTWIDTH] IN BLOOD BY AUTOMATED COUNT: 13 % (ref 12.3–15.4)
GFR SERPL CREATININE-BSD FRML MDRD: 129 ML/MIN/1.73
GLOBULIN UR ELPH-MCNC: 3.4 GM/DL
GLUCOSE SERPL-MCNC: 95 MG/DL (ref 65–99)
HCT VFR BLD AUTO: 33.9 % (ref 37.5–51)
HGB BLD-MCNC: 12 G/DL (ref 13–17.7)
INR PPP: 1.37 (ref 2–3)
LYMPHOCYTES # BLD MANUAL: 1.49 10*3/MM3 (ref 0.7–3.1)
LYMPHOCYTES NFR BLD MANUAL: 5.3 % (ref 5–12)
MCH RBC QN AUTO: 32 PG (ref 26.6–33)
MCHC RBC AUTO-ENTMCNC: 35.4 G/DL (ref 31.5–35.7)
MCV RBC AUTO: 90.4 FL (ref 79–97)
MONOCYTES # BLD: 0.39 10*3/MM3 (ref 0.1–0.9)
NEUTROPHILS # BLD AUTO: 4.17 10*3/MM3 (ref 1.7–7)
NEUTROPHILS NFR BLD MANUAL: 56.4 % (ref 42.7–76)
PLAT MORPH BLD: NORMAL
PLATELET # BLD AUTO: 96 10*3/MM3 (ref 140–450)
PMV BLD AUTO: 11.5 FL (ref 6–12)
POIKILOCYTOSIS BLD QL SMEAR: ABNORMAL
POLYCHROMASIA BLD QL SMEAR: ABNORMAL
POTASSIUM SERPL-SCNC: 3.8 MMOL/L (ref 3.5–5.2)
PROT SERPL-MCNC: 6.7 G/DL (ref 6–8.5)
PROTHROMBIN TIME: 13.7 SECONDS (ref 9.4–12)
RBC # BLD AUTO: 3.75 10*6/MM3 (ref 4.14–5.8)
SODIUM SERPL-SCNC: 136 MMOL/L (ref 136–145)
VARIANT LYMPHS NFR BLD MANUAL: 20.2 % (ref 19.6–45.3)
WBC MORPH BLD: NORMAL
WBC NRBC COR # BLD: 7.39 10*3/MM3 (ref 3.4–10.8)

## 2021-12-21 PROCEDURE — 85610 PROTHROMBIN TIME: CPT | Performed by: NURSE PRACTITIONER

## 2021-12-21 PROCEDURE — 85007 BL SMEAR W/DIFF WBC COUNT: CPT | Performed by: NURSE PRACTITIONER

## 2021-12-21 PROCEDURE — 36415 COLL VENOUS BLD VENIPUNCTURE: CPT | Performed by: FAMILY MEDICINE

## 2021-12-21 PROCEDURE — 80053 COMPREHEN METABOLIC PANEL: CPT | Performed by: NURSE PRACTITIONER

## 2021-12-21 PROCEDURE — 85025 COMPLETE CBC W/AUTO DIFF WBC: CPT | Performed by: NURSE PRACTITIONER

## 2021-12-21 PROCEDURE — 99213 OFFICE O/P EST LOW 20 MIN: CPT | Performed by: FAMILY MEDICINE

## 2021-12-21 NOTE — PROGRESS NOTES
Chief Complaint   Patient presents with   • Follow-up     1 year    • Hypertension        Subjective     Wai Gay  has a past medical history of Alcohol abuse (03/14/2017), Essential hypertension (12/27/2019), Hypokalemia (03/14/2017), Hyponatremia (03/14/2017), Liver disease, Steatohepatitis, alcoholic (03/14/2017), and Tobacco abuse (03/14/2017).    Hypertension-he does not check his blood pressure outside the office.  His blood pressure is very good here today at 110/67.    Alcoholic cirrhosis-he saw GI about a month ago and had another paracentesis.  He states he has not drank any alcohol now in a month.  He is due for repeat labs through the gastroenterology.      PHQ-2 Depression Screening  Little interest or pleasure in doing things?     Feeling down, depressed, or hopeless?     PHQ-2 Total Score     PHQ-9 Depression Screening  Little interest or pleasure in doing things?     Feeling down, depressed, or hopeless?     Trouble falling or staying asleep, or sleeping too much?     Feeling tired or having little energy?     Poor appetite or overeating?     Feeling bad about yourself - or that you are a failure or have let yourself or your family down?     Trouble concentrating on things, such as reading the newspaper or watching television?     Moving or speaking so slowly that other people could have noticed? Or the opposite - being so fidgety or restless that you have been moving around a lot more than usual?     Thoughts that you would be better off dead, or of hurting yourself in some way?     PHQ-9 Total Score     If you checked off any problems, how difficult have these problems made it for you to do your work, take care of things at home, or get along with other people?       No Known Allergies    Prior to Admission medications    Medication Sig Start Date End Date Taking? Authorizing Provider   multivitamin with minerals (multivitamin with minerals) tablet tablet Take 1 tablet by mouth Daily.  9/9/21  Yes Tyler Alva PA   spironolactone (ALDACTONE) 50 MG tablet Take 1 tablet by mouth Daily. 12/6/21  Yes Marian Ferrell NP        Patient Active Problem List   Diagnosis   • Acute liver failure without hepatic coma   • Acute hepatitis   • Hypokalemia   • Hyponatremia   • Steatohepatitis, alcoholic   • Alcohol abuse   • Tobacco abuse   • Generalized abdominal pain   • Need for influenza vaccination   • Essential hypertension   • Alcoholic cirrhosis of liver with ascites (HCC)   • Cirrhosis of liver with ascites (HCC)        Past Surgical History:   Procedure Laterality Date   • CHOLECYSTECTOMY         Social History     Socioeconomic History   • Marital status: Single   Tobacco Use   • Smoking status: Current Every Day Smoker     Packs/day: 0.50     Years: 13.00     Pack years: 6.50     Types: Cigarettes     Start date: 2008   • Smokeless tobacco: Never Used   Vaping Use   • Vaping Use: Never used   Substance and Sexual Activity   • Alcohol use: Not Currently     Alcohol/week: 3.0 standard drinks     Types: 2 Cans of beer, 1 Shots of liquor per week     Comment: FORMER   • Drug use: Not Currently   • Sexual activity: Yes     Birth control/protection: Condom       Family History   Problem Relation Age of Onset   • Alcohol abuse Mother    • Arthritis Mother    • COPD Mother    • Heart disease Maternal Grandmother    • Hypertension Maternal Grandmother    • Lung cancer Maternal Grandmother    • Stroke Maternal Grandmother    • Heart disease Maternal Grandfather    • Lung cancer Maternal Grandfather    • Colon cancer Neg Hx        Family history, surgical history, past medical history, Allergies and med's reviewed with patient today and updated in [facility] EMR.     ROS:  Review of Systems   Constitutional: Negative for fatigue.   Respiratory: Negative for cough, chest tightness, shortness of breath and wheezing.    Cardiovascular: Negative for chest pain and palpitations.   Gastrointestinal: Negative for abdominal  "distention, abdominal pain, blood in stool, constipation, diarrhea, nausea, vomiting and GERD.   Neurological: Negative for headache.       OBJECTIVE:  Vitals:    12/21/21 1517   BP: 101/67   BP Location: Left arm   Patient Position: Sitting   Pulse: 104   Temp: 98 °F (36.7 °C)   SpO2: 100%   Weight: 62.1 kg (137 lb)   Height: 172.7 cm (68\")     No exam data present   Body mass index is 20.83 kg/m².  No LMP for male patient.    Physical Exam  Vitals and nursing note reviewed.   Constitutional:       General: He is not in acute distress.     Appearance: Normal appearance. He is normal weight.   HENT:      Head: Normocephalic.   Cardiovascular:      Rate and Rhythm: Normal rate and regular rhythm.      Heart sounds: Normal heart sounds. No murmur heard.      Pulmonary:      Effort: Pulmonary effort is normal.      Breath sounds: Normal breath sounds. No wheezing.   Abdominal:      General: Abdomen is flat. Bowel sounds are normal. There is no distension.      Palpations: Abdomen is soft.      Tenderness: There is no abdominal tenderness.   Neurological:      Mental Status: He is alert.         Procedures    No visits with results within 30 Day(s) from this visit.   Latest known visit with results is:   Admission on 11/19/2021, Discharged on 11/19/2021   Component Date Value Ref Range Status   • Glucose 11/19/2021 115* 65 - 99 mg/dL Final   • BUN 11/19/2021 6  6 - 20 mg/dL Final   • Creatinine 11/19/2021 0.74* 0.76 - 1.27 mg/dL Final   • Sodium 11/19/2021 135* 136 - 145 mmol/L Final   • Potassium 11/19/2021 3.1* 3.5 - 5.2 mmol/L Final   • Chloride 11/19/2021 99  98 - 107 mmol/L Final   • CO2 11/19/2021 25.3  22.0 - 29.0 mmol/L Final   • Calcium 11/19/2021 8.3* 8.6 - 10.5 mg/dL Final   • Total Protein 11/19/2021 6.4  6.0 - 8.5 g/dL Final   • Albumin 11/19/2021 3.20* 3.50 - 5.20 g/dL Final   • ALT (SGPT) 11/19/2021 16  1 - 41 U/L Final   • AST (SGOT) 11/19/2021 38  1 - 40 U/L Final   • Alkaline Phosphatase 11/19/2021 " 179* 39 - 117 U/L Final   • Total Bilirubin 11/19/2021 4.6* 0.0 - 1.2 mg/dL Final   • eGFR Non African Amer 11/19/2021 121  >60 mL/min/1.73 Final   • Globulin 11/19/2021 3.2  gm/dL Final   • A/G Ratio 11/19/2021 1.0  g/dL Final   • BUN/Creatinine Ratio 11/19/2021 8.1  7.0 - 25.0 Final   • Anion Gap 11/19/2021 10.7  5.0 - 15.0 mmol/L Final   • Lipase 11/19/2021 166* 13 - 60 U/L Final   • Color, UA 11/19/2021 Orange* Yellow, Straw Final   • Appearance, UA 11/19/2021 Clear  Clear Final   • pH, UA 11/19/2021    Final    Result not available due to interfering substances.   • Specific Gravity, UA 11/19/2021 1.020  1.005 - 1.030 Final   • Glucose, UA 11/19/2021    Final    Result not available due to interfering substances.   • Ketones, UA 11/19/2021    Final    Result not available due to interfering substances.   • Bilirubin, UA 11/19/2021    Final    Result not available due to interfering substances.   • Blood, UA 11/19/2021    Final    Result not available due to interfering substances.   • Protein, UA 11/19/2021    Final    Result not available due to interfering substances.   • Leuk Esterase, UA 11/19/2021    Final    Result not available due to interfering substances.   • Nitrite, UA 11/19/2021    Final    Result not available due to interfering substances.   • Urobilinogen, UA 11/19/2021    Final    Result not available due to interfering substances.   • Extra Tube 11/19/2021 Hold for add-ons.   Final    Auto resulted.   • Extra Tube 11/19/2021 hold for add-on   Final    Auto resulted   • Extra Tube 11/19/2021 Hold for add-ons.   Final    Auto resulted.   • Extra Tube 11/19/2021 hold for add-on   Final    Auto resulted   • WBC 11/19/2021 7.48  3.40 - 10.80 10*3/mm3 Final   • RBC 11/19/2021 3.83* 4.14 - 5.80 10*6/mm3 Final   • Hemoglobin 11/19/2021 13.0  13.0 - 17.7 g/dL Final   • Hematocrit 11/19/2021 37.4* 37.5 - 51.0 % Final   • MCV 11/19/2021 97.7* 79.0 - 97.0 fL Final   • MCH 11/19/2021 33.9* 26.6 - 33.0 pg  Final   • MCHC 11/19/2021 34.8  31.5 - 35.7 g/dL Final   • RDW 11/19/2021 13.9  12.3 - 15.4 % Final   • RDW-SD 11/19/2021 50.3  37.0 - 54.0 fl Final   • MPV 11/19/2021 9.2  6.0 - 12.0 fL Final   • Platelets 11/19/2021 144  140 - 450 10*3/mm3 Final   • Neutrophil % 11/19/2021 65.4  42.7 - 76.0 % Final   • Lymphocyte % 11/19/2021 18.4* 19.6 - 45.3 % Final   • Monocyte % 11/19/2021 11.6  5.0 - 12.0 % Final   • Eosinophil % 11/19/2021 3.5  0.3 - 6.2 % Final   • Basophil % 11/19/2021 0.7  0.0 - 1.5 % Final   • Immature Grans % 11/19/2021 0.4  0.0 - 0.5 % Final   • Neutrophils, Absolute 11/19/2021 4.89  1.70 - 7.00 10*3/mm3 Final   • Lymphocytes, Absolute 11/19/2021 1.38  0.70 - 3.10 10*3/mm3 Final   • Monocytes, Absolute 11/19/2021 0.87  0.10 - 0.90 10*3/mm3 Final   • Eosinophils, Absolute 11/19/2021 0.26  0.00 - 0.40 10*3/mm3 Final   • Basophils, Absolute 11/19/2021 0.05  0.00 - 0.20 10*3/mm3 Final   • Immature Grans, Absolute 11/19/2021 0.03  0.00 - 0.05 10*3/mm3 Final   • nRBC 11/19/2021 0.0  0.0 - 0.2 /100 WBC Final   • Ammonia 11/19/2021 13* 16 - 60 umol/L Final   • RBC, UA 11/19/2021 0-2* None Seen /HPF Final   • WBC, UA 11/19/2021 0-2* None Seen /HPF Final   • Bacteria, UA 11/19/2021 Trace* None Seen /HPF Final   • Squamous Epithelial Cells, UA 11/19/2021 None Seen  None Seen, 0-2 /HPF Final   • Hyaline Casts, UA 11/19/2021 None Seen  None Seen /LPF Final   • Methodology 11/19/2021 Automated Microscopy   Final   • Albumin, Fluid 11/19/2021 0.60  g/dL Final   • Case Report 11/19/2021    Final                    Value:Medical Cytology Report                           Case: [id number]                                  Authorizing Provider:  Marian Ferrell NP           Collected:           11/19/2021 04:45 PM          Ordering Location:     [facility]      Received:            11/22/2021 08:57 AM                                 EMERGENCY ROOM                                                                Pathologist:           Praful Matthew MD                                                            Specimen:    Abdomen, Peritoneal fluid                                                                 • Specimen Adequacy 11/19/2021 Satisfactory for evaluation   Final   • Final Diagnosis 11/19/2021    Final                    Value:This result contains rich text formatting which cannot be displayed here.   • Clinical Information 11/19/2021    Final                    Value:This result contains rich text formatting which cannot be displayed here.   • Gross Description 11/19/2021    Final                    Value:This result contains rich text formatting which cannot be displayed here.   • Microscopic Description 11/19/2021    Final    This result contains rich text formatting which cannot be displayed here.   • Protein, Total, Fluid 11/19/2021 <1.0  g/dL Final   • Color, Fluid 11/19/2021 Yellow   Final   • Appearance, Fluid 11/19/2021 Clear  Clear Final   • Nucleated Cells, Fluid 11/19/2021 137  /mm3 Final   • RBC, Fluid 11/19/2021 <2,000    /mm3 Final   • Neutrophils, Fluid 11/19/2021 10  % Final   • Lymphocytes, Fluid 11/19/2021 40  % Final   • Monocytes, Fluid 11/19/2021 50  % Final   • Albumin, Fluid 11/19/2021 0.70  g/dL Final   • Protein, Total, Fluid 11/19/2021 <1.0  g/dL Final   • Color, Fluid 11/19/2021 Yellow   Final   • Appearance, Fluid 11/19/2021 Clear  Clear Final   • Nucleated Cells, Fluid 11/19/2021 121  /mm3 Final   • RBC, Fluid 11/19/2021 <2,000    /mm3 Final   • Lymphocytes, Fluid 11/19/2021 80  % Final   • Monocytes, Fluid 11/19/2021 20  % Final       ASSESSMENT/ PLAN:    Diagnoses and all orders for this visit:    1. Essential hypertension (Primary)  Assessment & Plan:  His blood pressure is good here today.  He is just on the spironolactone which is primarily for his alcoholic liver disease and ascites.      2. Alcoholic cirrhosis of liver with ascites (HCC)  Assessment & Plan:  He is doing  better right now he has not drank in about a month we will get his labs for GI while he is here today.        Orders Placed Today:     No orders of the defined types were placed in this encounter.       Management Plan:     An After Visit Summary was printed and given to the patient at discharge.    Follow-up: Return in about 6 months (around 6/21/2022) for Recheck.    Callum Peterson,  12/21/2021 15:37 EST  This note was electronically signed.

## 2021-12-21 NOTE — ASSESSMENT & PLAN NOTE
His blood pressure is good here today.  He is just on the spironolactone which is primarily for his alcoholic liver disease and ascites.

## 2021-12-21 NOTE — ASSESSMENT & PLAN NOTE
He is doing better right now he has not drank in about a month we will get his labs for GI while he is here today.

## 2022-01-11 ENCOUNTER — TELEPHONE (OUTPATIENT)
Dept: GASTROENTEROLOGY | Facility: CLINIC | Age: 37
End: 2022-01-11

## 2022-01-11 NOTE — TELEPHONE ENCOUNTER
Patient left a VM requesting a refill but did not mention which medication. I attempted to call patient back, and no VM has been set up. Unable to reach patient. Awaiting a call back from patient.

## 2022-02-07 NOTE — PRE-PROCEDURE INSTRUCTIONS
PT INSTRUCTED WHERE TO COME TO AND NOTHING TO EAT OR DRINK AFTER MIDNIGHT ON TUESDAY. HE STATED THAT HIS FATHER WAS BRINGING HIM NO MEDS NOTED TO TAKE THE MORNING OF PROCEDURE ARRIVAL TIME IS 0730 ON WEDNESADY 2/9/22

## 2022-02-09 ENCOUNTER — ANESTHESIA (OUTPATIENT)
Dept: GASTROENTEROLOGY | Facility: HOSPITAL | Age: 37
End: 2022-02-09

## 2022-02-09 ENCOUNTER — TELEPHONE (OUTPATIENT)
Dept: GASTROENTEROLOGY | Facility: CLINIC | Age: 37
End: 2022-02-09

## 2022-02-09 ENCOUNTER — ANESTHESIA EVENT (OUTPATIENT)
Dept: GASTROENTEROLOGY | Facility: HOSPITAL | Age: 37
End: 2022-02-09

## 2022-02-09 ENCOUNTER — HOSPITAL ENCOUNTER (OUTPATIENT)
Facility: HOSPITAL | Age: 37
Setting detail: HOSPITAL OUTPATIENT SURGERY
Discharge: HOME | End: 2022-02-09
Attending: INTERNAL MEDICINE | Admitting: INTERNAL MEDICINE

## 2022-02-09 VITALS
BODY MASS INDEX: 21.45 KG/M2 | WEIGHT: 141.54 LBS | HEART RATE: 70 BPM | OXYGEN SATURATION: 97 % | TEMPERATURE: 97.8 F | RESPIRATION RATE: 16 BRPM | HEIGHT: 68 IN | SYSTOLIC BLOOD PRESSURE: 95 MMHG | DIASTOLIC BLOOD PRESSURE: 64 MMHG

## 2022-02-09 DIAGNOSIS — K70.31 ALCOHOLIC CIRRHOSIS OF LIVER WITH ASCITES: ICD-10-CM

## 2022-02-09 DIAGNOSIS — K74.60 CIRRHOSIS OF LIVER WITH ASCITES, UNSPECIFIED HEPATIC CIRRHOSIS TYPE: ICD-10-CM

## 2022-02-09 DIAGNOSIS — R18.8 CIRRHOSIS OF LIVER WITH ASCITES, UNSPECIFIED HEPATIC CIRRHOSIS TYPE: ICD-10-CM

## 2022-02-09 PROCEDURE — 25010000002 PROPOFOL 10 MG/ML EMULSION: Performed by: NURSE ANESTHETIST, CERTIFIED REGISTERED

## 2022-02-09 PROCEDURE — 88305 TISSUE EXAM BY PATHOLOGIST: CPT | Performed by: INTERNAL MEDICINE

## 2022-02-09 PROCEDURE — 43239 EGD BIOPSY SINGLE/MULTIPLE: CPT | Performed by: INTERNAL MEDICINE

## 2022-02-09 RX ORDER — SODIUM CHLORIDE, SODIUM LACTATE, POTASSIUM CHLORIDE, CALCIUM CHLORIDE 600; 310; 30; 20 MG/100ML; MG/100ML; MG/100ML; MG/100ML
30 INJECTION, SOLUTION INTRAVENOUS CONTINUOUS
Status: DISCONTINUED | OUTPATIENT
Start: 2022-02-09 | End: 2022-02-09 | Stop reason: HOSPADM

## 2022-02-09 RX ORDER — LIDOCAINE HYDROCHLORIDE 20 MG/ML
INJECTION, SOLUTION INFILTRATION; PERINEURAL AS NEEDED
Status: DISCONTINUED | OUTPATIENT
Start: 2022-02-09 | End: 2022-02-09 | Stop reason: SURG

## 2022-02-09 RX ORDER — PROPOFOL 10 MG/ML
VIAL (ML) INTRAVENOUS AS NEEDED
Status: DISCONTINUED | OUTPATIENT
Start: 2022-02-09 | End: 2022-02-09 | Stop reason: SURG

## 2022-02-09 RX ORDER — PANTOPRAZOLE SODIUM 40 MG/1
40 TABLET, DELAYED RELEASE ORAL DAILY
Qty: 30 TABLET | Refills: 5 | Status: SHIPPED | OUTPATIENT
Start: 2022-02-09 | End: 2022-08-08

## 2022-02-09 RX ADMIN — PROPOFOL 50 MG: 10 INJECTION, EMULSION INTRAVENOUS at 08:58

## 2022-02-09 RX ADMIN — LIDOCAINE HYDROCHLORIDE 100 MG: 20 INJECTION, SOLUTION INFILTRATION; PERINEURAL at 08:58

## 2022-02-09 RX ADMIN — PROPOFOL 200 MCG/KG/MIN: 10 INJECTION, EMULSION INTRAVENOUS at 08:58

## 2022-02-09 RX ADMIN — SODIUM CHLORIDE, POTASSIUM CHLORIDE, SODIUM LACTATE AND CALCIUM CHLORIDE 30 ML/HR: 600; 310; 30; 20 INJECTION, SOLUTION INTRAVENOUS at 08:12

## 2022-02-09 NOTE — H&P
"Pre Procedure History & Physical    Chief Complaint:   cirrhosis    Subjective     HPI:   cirrhosis    Past Medical History:   Past Medical History:   Diagnosis Date   • Alcohol abuse 03/14/2017   • Essential hypertension 12/27/2019   • Hypokalemia 03/14/2017    Will update   • Hyponatremia 03/14/2017   • Liver disease    • Steatohepatitis, alcoholic 03/14/2017   • Tobacco abuse 03/14/2017       Past Surgical History:  Past Surgical History:   Procedure Laterality Date   • CHOLECYSTECTOMY         Family History:  Family History   Problem Relation Age of Onset   • Alcohol abuse Mother    • Arthritis Mother    • COPD Mother    • Heart disease Maternal Grandmother    • Hypertension Maternal Grandmother    • Lung cancer Maternal Grandmother    • Stroke Maternal Grandmother    • Heart disease Maternal Grandfather    • Lung cancer Maternal Grandfather    • Cancer Paternal Grandmother    • Cancer Paternal Grandfather    • Colon cancer Neg Hx    • Malig Hyperthermia Neg Hx        Social History:   reports that he has been smoking cigarettes. He started smoking about 14 years ago. He has a 6.50 pack-year smoking history. He has never used smokeless tobacco. He reports previous alcohol use of about 3.0 standard drinks of alcohol per week. He reports previous drug use.    Medications:   Medications Prior to Admission   Medication Sig Dispense Refill Last Dose   • MELATONIN GUMMIES PO Take 10 mg by mouth As Needed.      • spironolactone (ALDACTONE) 50 MG tablet Take 1 tablet by mouth Daily. 30 tablet 5        Allergies:  Patient has no known allergies.        Objective     Height 172.7 cm (67.99\"), weight 64.2 kg (141 lb 8.6 oz).    Physical Exam   Constitutional: Pt is oriented to person, place, and time and well-developed, well-nourished, and in no distress.   Mouth/Throat: Oropharynx is clear and moist.   Neck: Normal range of motion.   Cardiovascular: Normal rate, regular rhythm and normal heart sounds.    Pulmonary/Chest: " Effort normal and breath sounds normal.   Abdominal: Soft. Nontender  Skin: Skin is warm and dry.   Psychiatric: Mood, memory, affect and judgment normal.     Assessment/Plan     Diagnosis:  cirrhosis    Anticipated Surgical Procedure:  egd    The risks, benefits, and alternatives of this procedure have been discussed with the patient or the responsible party- the patient understands and agrees to proceed.

## 2022-02-09 NOTE — ANESTHESIA PREPROCEDURE EVALUATION
Anesthesia Evaluation     Patient summary reviewed and Nursing notes reviewed   no history of anesthetic complications:  NPO Solid Status: > 8 hours  NPO Liquid Status: > 2 hours           Airway   Mallampati: II  TM distance: >3 FB  No difficulty expected  Dental    (+) upper dentures        Pulmonary - normal exam   (+) a smoker,   Cardiovascular - normal exam  Exercise tolerance: good (4-7 METS)    (+) hypertension,       Neuro/Psych  (+) psychiatric history,    GI/Hepatic/Renal/Endo    (+)   liver disease (acute liver failure) cirrhosis,     ROS Comment: Hyponatremia   ascites    Musculoskeletal (-) negative ROS    Abdominal  - normal exam   Substance History   (+) alcohol use (quit- last drink in october 2021),      OB/GYN negative ob/gyn ROS         Other - negative ROS       ROS/Med Hx Other:                        Anesthesia Plan    ASA 3     general   (Total IV Anesthesia      )  intravenous induction     Anesthetic plan, all risks, benefits, and alternatives have been provided, discussed and informed consent has been obtained with: patient.    Plan discussed with CRNA.        CODE STATUS:

## 2022-02-09 NOTE — DISCHARGE INSTRUCTIONS
Last's Esophagus    Last's esophagus occurs when the tissue that lines the esophagus changes or becomes damaged. The esophagus is the tube that carries food from the throat to the stomach. With Last's esophagus, the cells that line the esophagus are replaced by cells that are similar to the lining of the intestines (intestinal metaplasia).  Last's esophagus itself may not cause any symptoms. However, many people who have Last's esophagus also have gastroesophageal reflux disease (GERD), which may cause symptoms such as heartburn. Over time, a few people with this condition may develop cancer of the esophagus. Treatment may include medicines, procedures to destroy the abnormal cells, or surgery.  What are the causes?  The exact cause of this condition is not known. In some cases, the condition develops from damage to the lining of the esophagus caused by gastroesophageal reflux disease (GERD). GERD occurs when stomach acids flow up from the stomach into the esophagus. Frequent symptoms of GERD may cause intestinal metaplasia or cause cell changes (dysplasia).  What increases the risk?  You are more likely to develop this condition if you:  · Have GERD.  · Are male.  · Are of  descent.  · Are obese.  · Are older than 50.  · Have a hiatal hernia. This is a condition in which part of your stomach bulges into your chest.  · Smoke.  What are the signs or symptoms?  People with Last's esophagus often have no symptoms. However, many people with this condition also have GERD. Symptoms of GERD may include:  · Heartburn.  · Difficulty swallowing.  · Dry cough.  How is this diagnosed?  This condition may be diagnosed based on:  · Results of an upper gastrointestinal endoscopy. For this exam, a thin, flexible tube with a light and a camera on the end (endoscope) is passed down your esophagus. Your health care provider can view the inside of your esophagus during this procedure.  · Results of a biopsy.  For this procedure, several tissue samples are removed (biopsy) from your esophagus to look at under a microscope. They are then checked for intestinal metaplasia or dysplasia.  How is this treated?  Treatment for this condition may include:  · Medicines (proton pump inhibitors, or PPIs) to decrease or stop GERD.  · Periodic endoscopic exams to make sure that cancer is not developing.  · A procedure or surgery for dysplasia. This may include:  ? Removal or destruction of abnormal cells.  ? Removal of part of the esophagus.  Follow these instructions at home:  Eating and drinking  · Eat more fruits and vegetables.  · Avoid fatty foods.  · Eat small, frequent meals instead of large meals.  · Avoid foods that cause heartburn. These foods include:  ? Coffee and alcoholic drinks.  ? Tomatoes and foods made with tomatoes.  ? Greasy or spicy foods.  ? Chocolate and peppermint.  · Do not drink alcohol.  General instructions  · Take over-the-counter and prescription medicines only as told by your health care provider.  · Do not use any products that contain nicotine or tobacco, such as cigarettes, e-cigarettes, and chewing tobacco. If you need help quitting, ask your health care provider.  · If you are being treated for GERD, make sure you take medicines and follow all instructions as told by your health care provider.  · Keep all follow-up visits as told by your health care provider. This is important.  Contact a health care provider if:  · You have heartburn or GERD symptoms.  · You have difficulty swallowing.  Get help right away if:  · You have chest pain.  · You are unable to swallow.  · You vomit blood or material that looks like coffee grounds.  · Your stool (feces) is bright red or dark.  These symptoms may represent a serious problem that is an emergency. Do not wait to see if the symptoms will go away. Get medical help right away. Call your local emergency services (911 in the [place] Do not drive yourself to the  [facility].  Summary  · Last's esophagus occurs when the tissue that lines the esophagus changes or becomes damaged.  · Last's esophagus may be diagnosed with an upper gastrointestinal endoscopy and a biopsy.  · Treatment may include medicines, procedures to remove abnormal cells, or surgery.  · Follow your health care provider's instructions about what to eat and drink, what medicines to take, and when to call for help.  This information is not intended to replace advice given to you by your health care provider. Make sure you discuss any questions you have with your health care provider.  Document Revised: 03/06/2021 Document Reviewed: 03/06/2021  [employer] Patient Education © 2021 [employer]  Upper Endoscopy, Adult, Care After  This sheet gives you information about how to care for yourself after your procedure. Your health care provider may also give you more specific instructions. If you have problems or questions, contact your health care provider.  What can I expect after the procedure?  After the procedure, it is common to have:  · A sore throat.  · Mild stomach pain or discomfort.  · Bloating.  · Nausea.  Follow these instructions at home:    · Follow instructions from your health care provider about what to eat or drink after your procedure.  · Return to your normal activities as told by your health care provider. Ask your health care provider what activities are safe for you.  · Take over-the-counter and prescription medicines only as told by your health care provider.  · If you were given a sedative during the procedure, it can affect you for several hours. Do not drive or operate machinery until your health care provider says that it is safe.  · Keep all follow-up visits as told by your health care provider. This is important.  Contact a health care provider if you have:  · A sore throat that lasts longer than one day.  · Trouble swallowing.  Get help right away if:  · You vomit blood or your  vomit looks like coffee grounds.  · You have:  ? A fever.  ? Bloody, black, or tarry stools.  ? A severe sore throat or you cannot swallow.  ? Difficulty breathing.  ? Severe pain in your chest or abdomen.  Summary  · After the procedure, it is common to have a sore throat, mild stomach discomfort, bloating, and nausea.  · If you were given a sedative during the procedure, it can affect you for several hours. Do not drive or operate machinery until your health care provider says that it is safe.  · Follow instructions from your health care provider about what to eat or drink after your procedure.  · Return to your normal activities as told by your health care provider.  This information is not intended to replace advice given to you by your health care provider. Make sure you discuss any questions you have with your health care provider.  Document Revised: 12/15/2020 Document Reviewed: 05/20/2019  [employer] Patient Education © 2021 [employer]

## 2022-02-09 NOTE — ANESTHESIA POSTPROCEDURE EVALUATION
Patient: Wai Gay    Procedure Summary     Date: 02/09/22 Room / Location: [facility] ENDOSCOPY 2 / [facility] ENDOSCOPY    Anesthesia Start: 0856 Anesthesia Stop: 0925    Procedure: ESOPHAGOGASTRODUODENOSCOPY WITH BX (N/A ) Diagnosis:       Alcoholic cirrhosis of liver with ascites (HCC)      Cirrhosis of liver with ascites, unspecified hepatic cirrhosis type (HCC)      (Alcoholic cirrhosis of liver with ascites (HCC) [K70.31])      (Cirrhosis of liver with ascites, unspecified hepatic cirrhosis type (HCC) [K74.60, R18.8])    Surgeons: Gino Khan MD Provider: Catalina Cespedes DO    Anesthesia Type: general ASA Status: 3          Anesthesia Type: general    Vitals  Vitals Value Taken Time   BP 95/64 02/09/22 0945   Temp 36.6 °C (97.8 °F) 02/09/22 0945   Pulse 77 02/09/22 0947   Resp 16 02/09/22 0945   SpO2 89 % 02/09/22 0947   Vitals shown include unvalidated device data.        Post Anesthesia Care and Evaluation    Patient location during evaluation: bedside  Patient participation: complete - patient participated  Level of consciousness: awake  Pain management: adequate  Airway patency: patent  Anesthetic complications: No anesthetic complications  PONV Status: none  Cardiovascular status: acceptable and stable  Respiratory status: acceptable  Hydration status: acceptable    Comments: An Anesthesiologist personally participated in the most demanding procedures (including induction and emergence if applicable) in the anesthesia plan, monitored the course of anesthesia administration at frequent intervals and remained physically present and available for immediate diagnosis and treatment of emergencies.

## 2022-02-09 NOTE — TELEPHONE ENCOUNTER
"Endo called to schedule follow up appointment for EGD, I told her that patient was already scheduled 2/21/22 per Marian.  She said Bill wanted a follow up in 4 months but she would confirm and call back.  She called back shortly after and said \"Per Bill, cancel the 2/21 appointment and schedule appointment in 4 months\".  I have not cancelled this appointment yet, I wanted to confirm with you that you did not need to see this patient sooner for another reason.  Please advise whether to cancel this appointment on 2/21 or if patient needs to keep this as well so that I can inform the patient.  "

## 2022-02-10 LAB
CYTO UR: NORMAL
LAB AP CASE REPORT: NORMAL
LAB AP CLINICAL INFORMATION: NORMAL
PATH REPORT.FINAL DX SPEC: NORMAL
PATH REPORT.GROSS SPEC: NORMAL

## 2022-05-16 RX ORDER — SPIRONOLACTONE 50 MG/1
TABLET, FILM COATED ORAL
Qty: 90 TABLET | Refills: 1 | Status: SHIPPED | OUTPATIENT
Start: 2022-05-16 | End: 2022-11-08

## 2022-06-08 NOTE — PROGRESS NOTES
Chief Complaint   EGD follow up     History of Present Illness       Wai Gay is a 36 y.o. male who presents to [facility] GASTROENTEROLOGY for follow-up after recent EGD.  We reviewed EGD and pathology at this time.  Patient has a history of alcoholic hepatitis, alcoholic cirrhosis requiring paracentesis.  Patient has been sober since 9/6/2021.  Patient had a longstanding history of alcoholism drinking 2 tall beers and 4-5 shots of whiskey for about 8 years prior to 9/6/2021.  Patient's last Paracentesis occurred 11/19/2021 with 3.5 L of fluid removed.  Patient has been on spironolactone 50 mg daily since that time and had no issues with fluid.  Patient denies abdominal swelling, leg swelling, confusion, jaundice, easily bleeding, fever, nausea, vomiting, weight loss, night sweats, melena, hematochezia, hematemesis.    EGD: Review of the patient's most recent EGD performed by Dr. Khan on 02/09/2022 esophageal mucosal changes suspicious for long segment Last's esophagus in the lower third of the esophagus.  Maximum longitudinal extent 7 cm in length.  Medium amount of food found in the gastric body.  Normal duodenum.  Repeat EGD 1 year.    Labs performed on 12/21/2022 see below.    CT scan abdomen pelvis with contrast 11/18/2021 revealed large amount of ascites, cirrhosis, mild splenomegaly, portal hypertension, mural prominence of the small bowel likely portal hypertensive enteropathy.     Results       Result Review :       CMP    CMP 11/18/21 11/19/21 12/21/21   Glucose 92 115 (A) 95   BUN 5 (A) 6 10   Creatinine 0.76 0.74 (A) 0.70 (A)   eGFR Non African Am 117 121 129   Sodium 137 135 (A) 136   Potassium 3.3 (A) 3.1 (A) 3.8   Chloride 100 99 102   Calcium 8.3 (A) 8.3 (A) 8.9   Albumin 3.10 (A) 3.20 (A) 3.30 (A)   Total Bilirubin 4.0 (A) 4.6 (A) 3.0 (A)   Alkaline Phosphatase 187 (A) 179 (A) 169 (A)   AST (SGOT) 42 (A) 38 36   ALT (SGPT) 17 16 18   (A) Abnormal value             CBC    CBC 11/18/21 11/19/21 12/21/21   WBC 9.59 7.48 7.39   RBC 3.91 (A) 3.83 (A) 3.75 (A)   Hemoglobin 13.2 13.0 12.0 (A)   Hematocrit 37.7 37.4 (A) 33.9 (A)   MCV 96.4 97.7 (A) 90.4   MCH 33.8 (A) 33.9 (A) 32.0   MCHC 35.0 34.8 35.4   RDW 14.0 13.9 13.0   Platelets 152 144 96 (A)   (A) Abnormal value              Iron Profile No results found for: IRON, TIBC, LABIRON, TRANSFERRIN  Liver Workup   Ferritin   Date Value Ref Range Status   09/09/2021 700.00 (H) 30.00 - 400.00 ng/mL Final     PT/INR   Protime   Date Value Ref Range Status   12/21/2021 13.7 (H) 9.4 - 12.0 Seconds Final   09/09/2021 15.9 (H) 9.4 - 12.0 Seconds Final     INR   Date Value Ref Range Status   12/21/2021 1.37 (L) 2.00 - 3.00 Final   09/09/2021 1.54 (L) 2.00 - 3.00 Final               Past Medical History       Past Medical History:   Diagnosis Date   • Alcohol abuse 03/14/2017   • Essential hypertension 12/27/2019   • Hypokalemia 03/14/2017    Will update   • Hyponatremia 03/14/2017   • Liver disease    • Steatohepatitis, alcoholic 03/14/2017   • Tobacco abuse 03/14/2017       Past Surgical History:   Procedure Laterality Date   • CHOLECYSTECTOMY     • ENDOSCOPY N/A 2/9/2022    Procedure: ESOPHAGOGASTRODUODENOSCOPY WITH BX;  Surgeon: Gino Khan MD;  Location: [facility] ENDOSCOPY;  Service: Gastroenterology;  Laterality: N/A;  RETAINED LIQUID FOOD IN STOMACH, HUFFMAN'S ESOPHAGUS         Current Outpatient Medications:   •  MELATONIN GUMMIES PO, Take 10 mg by mouth As Needed., Disp: , Rfl:   •  pantoprazole (PROTONIX) 40 MG EC tablet, Take 1 tablet by mouth Daily., Disp: 30 tablet, Rfl: 5  •  spironolactone (ALDACTONE) 50 MG tablet, TAKE 1 TABLET BY MOUTH EVERY DAY, Disp: 90 tablet, Rfl: 1     No Known Allergies    Family History   Problem Relation Age of Onset   • Alcohol abuse Mother    • Arthritis Mother    • COPD Mother    • Heart disease Maternal Grandmother    • Hypertension Maternal Grandmother    • Lung cancer Maternal  "Grandmother    • Stroke Maternal Grandmother    • Heart disease Maternal Grandfather    • Lung cancer Maternal Grandfather    • Cancer Paternal Grandmother    • Cancer Paternal Grandfather    • Colon cancer Neg Hx    • Malig Hyperthermia Neg Hx         Social History     Social History Narrative   • Not on file       Objective     Vital Signs:   /58   Pulse 96   Ht 170.2 cm (67\")   Wt 71.2 kg (157 lb)   SpO2 100%   BMI 24.59 kg/m²       Physical Exam  Constitutional:       General: He is not in acute distress.     Appearance: Normal appearance. He is well-developed and normal weight.   Eyes:      Conjunctiva/sclera: Conjunctivae normal.      Pupils: Pupils are equal, round, and reactive to light.      Visual Fields: Right eye visual fields normal and left eye visual fields normal.   Cardiovascular:      Rate and Rhythm: Normal rate and regular rhythm.      Heart sounds: Normal heart sounds.   Pulmonary:      Effort: Pulmonary effort is normal. No retractions.      Breath sounds: Normal breath sounds and air entry.      Comments: Inspection of chest: normal appearance  Abdominal:      General: Bowel sounds are normal.      Palpations: Abdomen is soft.      Tenderness: There is no abdominal tenderness.      Comments: No appreciable hepatosplenomegaly   Musculoskeletal:      Cervical back: Neck supple.      Right lower leg: No edema.      Left lower leg: No edema.   Lymphadenopathy:      Cervical: No cervical adenopathy.   Skin:     Findings: No lesion.      Comments: Turgor normal   Neurological:      Mental Status: He is alert and oriented to person, place, and time.   Psychiatric:         Mood and Affect: Mood and affect normal.           Assessment & Plan          Assessment and Plan    Diagnoses and all orders for this visit:    1. Alcoholic cirrhosis of liver with ascites (HCC) (Primary)  -     US Abdomen Limited; Future  -     CBC (No Diff); Future  -     Comprehensive Metabolic Panel; Future  -     " Protime-INR; Future  -     AFP Tumor Marker; Future    2. Cirrhosis of liver with ascites, unspecified hepatic cirrhosis type (HCC)  -     US Abdomen Limited; Future  -     CBC (No Diff); Future  -     Comprehensive Metabolic Panel; Future  -     Protime-INR; Future  -     AFP Tumor Marker; Future    3. History of alcohol abuse  -     US Abdomen Limited; Future  -     CBC (No Diff); Future  -     Comprehensive Metabolic Panel; Future  -     Protime-INR; Future  -     AFP Tumor Marker; Future      36-year-old male presenting the office today for follow-up after recent EGD.  We reviewed EGD and pathology at this time.  Patient has a history of alcoholic hepatitis, alcoholic cirrhosis requiring paracentesis.  Patient has been sober since 9/6/2021, encouraged continued sobriety.  I have ordered labs to include a CBC, CMP, PT/INR alpha-fetoprotein for further evaluation of the patient cirrhosis.  I have ordered 6-month ultrasound to be performed at this time.  We have discussed the general recommendations for cirrhosis listed below.  Patient will be due for EGD in February 2023.  Patient will follow-up in the office in 6 months.  He is agreeable to this plan will call any questions or concerns.    General Recommendations:  - Abstain from alcohol and illicit substances which can accelerate progression of liver disease.  - Tobacco cessation  - Hepatitis A and B hepatitis vaccinations   - Pneumococcal, meningococcal, and zoster vaccinations  - Yearly influenza.   - Avoid narcotics, benzodiazepines, sedatives, or sleeping medications (other than diphenhydramine) as these medications have potential to aggravate hepatic encephalopathy.   - Avoid ASA (including products containing ASA such as Excedrin) and NSAIDs (Including but not limited to Ibuprofen, Advil, Aleve, Motrin, Naproxen, Nuprin, and Goody powders). These products have the potential to decreased platelet adhesivenness, irritate the stomach, and reduce renal  function in patients with liver disease.   - Limit Tylenol < 2,000 mg daily.      Portal hypertension screening:  -Reviewed recent EGD with no varices noted.  Repeat EGD in 1 year related to Last's esophagus.    Hepatocellular carcinoma (HCC):  -The patient is aware of the increased risk of HCC. Schedule RUQ US with doppler and check AFP every 6 months for HCC surveillance.    Nutrition:  - Recommended diet: 35-40 kcal/kg/min with 1.2 - 1.5 g/kg/day of lean protein from poultry, fish and legumes, with at least 3 servings protein daily. Eat plenty of vegetables and fruits.  - Diet along with regular exercise as tolerated was recommended to decrease the muscle wasting which is associated with chronic liver disease.   - Encouraged to be conscientious of dietary sodium intake. <2 grams per day.   - Check vitamin A and vitamin D and supplement as necessary.    Follow up:  - Office visit every 6 months with labs and an US.  - Return sooner should the need arise. Pt understands to call any time with questions or concerns.   - Counseled to call if jaundice, ascites, encephalopathy or bleeding develop.                Follow Up       Follow Up   Return in about 6 months (around 12/9/2022).  Patient was given instructions and counseling regarding his condition or for health maintenance advice. Please see specific information pulled into the AVS if appropriate.

## 2022-06-09 ENCOUNTER — OFFICE VISIT (OUTPATIENT)
Dept: GASTROENTEROLOGY | Facility: CLINIC | Age: 37
End: 2022-06-09

## 2022-06-09 VITALS
HEIGHT: 67 IN | WEIGHT: 157 LBS | SYSTOLIC BLOOD PRESSURE: 107 MMHG | BODY MASS INDEX: 24.64 KG/M2 | HEART RATE: 96 BPM | DIASTOLIC BLOOD PRESSURE: 58 MMHG | OXYGEN SATURATION: 100 %

## 2022-06-09 DIAGNOSIS — K70.31 ALCOHOLIC CIRRHOSIS OF LIVER WITH ASCITES: Primary | ICD-10-CM

## 2022-06-09 DIAGNOSIS — F10.11 HISTORY OF ALCOHOL ABUSE: ICD-10-CM

## 2022-06-09 DIAGNOSIS — K74.60 CIRRHOSIS OF LIVER WITH ASCITES, UNSPECIFIED HEPATIC CIRRHOSIS TYPE: ICD-10-CM

## 2022-06-09 DIAGNOSIS — R18.8 CIRRHOSIS OF LIVER WITH ASCITES, UNSPECIFIED HEPATIC CIRRHOSIS TYPE: ICD-10-CM

## 2022-06-09 PROCEDURE — 99214 OFFICE O/P EST MOD 30 MIN: CPT | Performed by: NURSE PRACTITIONER

## 2022-06-28 ENCOUNTER — OFFICE VISIT (OUTPATIENT)
Dept: FAMILY MEDICINE CLINIC | Facility: CLINIC | Age: 37
End: 2022-06-28

## 2022-06-28 VITALS
TEMPERATURE: 98.2 F | BODY MASS INDEX: 24.96 KG/M2 | WEIGHT: 159 LBS | OXYGEN SATURATION: 100 % | SYSTOLIC BLOOD PRESSURE: 95 MMHG | HEIGHT: 67 IN | HEART RATE: 96 BPM | DIASTOLIC BLOOD PRESSURE: 55 MMHG

## 2022-06-28 DIAGNOSIS — Z23 NEED FOR VACCINATION AGAINST STREPTOCOCCUS PNEUMONIAE: ICD-10-CM

## 2022-06-28 DIAGNOSIS — I10 ESSENTIAL HYPERTENSION: Primary | ICD-10-CM

## 2022-06-28 DIAGNOSIS — Z23 NEED FOR MENINGOCOCCAL VACCINATION: ICD-10-CM

## 2022-06-28 DIAGNOSIS — F10.11 HISTORY OF ALCOHOL ABUSE: ICD-10-CM

## 2022-06-28 DIAGNOSIS — K74.60 CIRRHOSIS OF LIVER WITH ASCITES, UNSPECIFIED HEPATIC CIRRHOSIS TYPE: ICD-10-CM

## 2022-06-28 DIAGNOSIS — F10.10 ALCOHOL ABUSE: ICD-10-CM

## 2022-06-28 DIAGNOSIS — R18.8 CIRRHOSIS OF LIVER WITH ASCITES, UNSPECIFIED HEPATIC CIRRHOSIS TYPE: ICD-10-CM

## 2022-06-28 DIAGNOSIS — K70.31 ALCOHOLIC CIRRHOSIS OF LIVER WITH ASCITES: ICD-10-CM

## 2022-06-28 DIAGNOSIS — Z23 NEED FOR SHINGLES VACCINE: ICD-10-CM

## 2022-06-28 DIAGNOSIS — Z23 NEED FOR HEPATITIS A AND B VACCINATION: ICD-10-CM

## 2022-06-28 LAB
INR PPP: 1.51 (ref 0.86–1.15)
PROTHROMBIN TIME: 18.5 SECONDS (ref 11.8–14.9)

## 2022-06-28 PROCEDURE — 90746 HEPB VACCINE 3 DOSE ADULT IM: CPT | Performed by: FAMILY MEDICINE

## 2022-06-28 PROCEDURE — 99214 OFFICE O/P EST MOD 30 MIN: CPT | Performed by: FAMILY MEDICINE

## 2022-06-28 PROCEDURE — 85610 PROTHROMBIN TIME: CPT | Performed by: NURSE PRACTITIONER

## 2022-06-28 PROCEDURE — 80053 COMPREHEN METABOLIC PANEL: CPT | Performed by: FAMILY MEDICINE

## 2022-06-28 PROCEDURE — 90677 PCV20 VACCINE IM: CPT | Performed by: FAMILY MEDICINE

## 2022-06-28 PROCEDURE — 90471 IMMUNIZATION ADMIN: CPT | Performed by: FAMILY MEDICINE

## 2022-06-28 PROCEDURE — 36415 COLL VENOUS BLD VENIPUNCTURE: CPT | Performed by: FAMILY MEDICINE

## 2022-06-28 PROCEDURE — 90734 MENACWYD/MENACWYCRM VACC IM: CPT | Performed by: FAMILY MEDICINE

## 2022-06-28 PROCEDURE — 85027 COMPLETE CBC AUTOMATED: CPT | Performed by: NURSE PRACTITIONER

## 2022-06-28 PROCEDURE — 90472 IMMUNIZATION ADMIN EACH ADD: CPT | Performed by: FAMILY MEDICINE

## 2022-06-28 PROCEDURE — 82105 ALPHA-FETOPROTEIN SERUM: CPT | Performed by: NURSE PRACTITIONER

## 2022-06-28 PROCEDURE — 80061 LIPID PANEL: CPT | Performed by: FAMILY MEDICINE

## 2022-06-28 RX ORDER — ZOSTER VACCINE RECOMBINANT, ADJUVANTED 50 MCG/0.5
0.5 KIT INTRAMUSCULAR ONCE
Qty: 1 EACH | Refills: 1 | Status: SHIPPED | OUTPATIENT
Start: 2022-06-28 | End: 2022-06-28

## 2022-06-28 NOTE — PROGRESS NOTES
Chief Complaint   Patient presents with   • Hypertension     6 month follow up        Subjective     Wai Gay  has a past medical history of Alcohol abuse (03/14/2017), Essential hypertension (12/27/2019), Hypokalemia (03/14/2017), Hyponatremia (03/14/2017), Steatohepatitis, alcoholic (03/14/2017), and Tobacco abuse (03/14/2017).    Hypertension- he states his blood pressure has been good.  It is somewhat on the low side today at 95/55.    Alcoholic liver disease-he has not drank since September of last year.  He continues to see GI on a regular basis related to his alcoholic liver disease cirrhosis and previous ascites.  He continues to do well.  He saw them beginning of the month and has routine labs ordered and is scheduled for an upcoming ultrasound of his liver as well.      PHQ-2 Depression Screening  Little interest or pleasure in doing things?     Feeling down, depressed, or hopeless?     PHQ-2 Total Score     PHQ-9 Depression Screening  Little interest or pleasure in doing things?     Feeling down, depressed, or hopeless?     Trouble falling or staying asleep, or sleeping too much?     Feeling tired or having little energy?     Poor appetite or overeating?     Feeling bad about yourself - or that you are a failure or have let yourself or your family down?     Trouble concentrating on things, such as reading the newspaper or watching television?     Moving or speaking so slowly that other people could have noticed? Or the opposite - being so fidgety or restless that you have been moving around a lot more than usual?     Thoughts that you would be better off dead, or of hurting yourself in some way?     PHQ-9 Total Score     If you checked off any problems, how difficult have these problems made it for you to do your work, take care of things at home, or get along with other people?       No Known Allergies    Prior to Admission medications    Medication Sig Start Date End Date Taking? Authorizing  Provider   MELATONIN GUMMIES PO Take 10 mg by mouth As Needed.   Yes Provider, MD Rose   pantoprazole (PROTONIX) 40 MG EC tablet Take 1 tablet by mouth Daily. 2/9/22  Yes Gino Khan MD   spironolactone (ALDACTONE) 50 MG tablet TAKE 1 TABLET BY MOUTH EVERY DAY 5/16/22  Yes Marian Ferrell APRN        Patient Active Problem List   Diagnosis   • Acute liver failure without hepatic coma   • Acute hepatitis   • Hypokalemia   • Hyponatremia   • Steatohepatitis, alcoholic   • Alcohol abuse   • Tobacco abuse   • Generalized abdominal pain   • Need for influenza vaccination   • Essential hypertension   • Alcoholic cirrhosis of liver with ascites (HCC)   • Cirrhosis of liver with ascites (HCC)   • Need for hepatitis A and B vaccination   • Need for vaccination against Streptococcus pneumoniae   • Need for meningococcal vaccination   • Need for shingles vaccine        Past Surgical History:   Procedure Laterality Date   • CHOLECYSTECTOMY     • ENDOSCOPY N/A 2/9/2022    Procedure: ESOPHAGOGASTRODUODENOSCOPY WITH BX;  Surgeon: Gino Khan MD;  Location: [facility] ENDOSCOPY;  Service: Gastroenterology;  Laterality: N/A;  RETAINED LIQUID FOOD IN STOMACH, HUFFMAN'S ESOPHAGUS       Social History     Socioeconomic History   • Marital status: Single   Tobacco Use   • Smoking status: Current Every Day Smoker     Packs/day: 0.50     Years: 13.00     Pack years: 6.50     Types: Cigarettes     Start date: 2008   • Smokeless tobacco: Never Used   Vaping Use   • Vaping Use: Never used   Substance and Sexual Activity   • Alcohol use: Not Currently     Alcohol/week: 3.0 standard drinks     Types: 2 Cans of beer, 1 Shots of liquor per week     Comment: FORMER   • Drug use: Not Currently   • Sexual activity: Defer       Family History   Problem Relation Age of Onset   • Alcohol abuse Mother    • Arthritis Mother    • COPD Mother    • Heart disease Maternal Grandmother    • Hypertension Maternal Grandmother    • Lung  "cancer Maternal Grandmother    • Stroke Maternal Grandmother    • Heart disease Maternal Grandfather    • Lung cancer Maternal Grandfather    • Cancer Paternal Grandmother    • Cancer Paternal Grandfather    • Colon cancer Neg Hx    • Malig Hyperthermia Neg Hx        Family history, surgical history, past medical history, Allergies and meds reviewed with patient today and updated in [facility] EMR.     ROS:  Review of Systems   Constitutional: Negative for fatigue.   HENT: Negative for congestion, postnasal drip and rhinorrhea.    Eyes: Negative for blurred vision and visual disturbance.   Respiratory: Negative for cough, chest tightness, shortness of breath and wheezing.    Cardiovascular: Negative for chest pain and palpitations.   Gastrointestinal: Positive for abdominal distention. Negative for abdominal pain, blood in stool, constipation and diarrhea.   Allergic/Immunologic: Negative for environmental allergies.   Neurological: Negative for headache.   Psychiatric/Behavioral: Negative for depressed mood. The patient is not nervous/anxious.        OBJECTIVE:  Vitals:    06/28/22 1437   BP: 95/55   Pulse: 96   Temp: 98.2 °F (36.8 °C)   TempSrc: Temporal   SpO2: 100%   Weight: 72.1 kg (159 lb)   Height: 170.2 cm (67\")     No exam data present   Body mass index is 24.9 kg/m².  No LMP for male patient.    Physical Exam  Vitals and nursing note reviewed.   Constitutional:       General: He is not in acute distress.     Appearance: Normal appearance. He is normal weight.   HENT:      Head: Normocephalic.      Right Ear: Tympanic membrane, ear canal and external ear normal.      Left Ear: Tympanic membrane, ear canal and external ear normal.      Nose: Nose normal.      Mouth/Throat:      Mouth: Mucous membranes are moist.      Dentition: Has dentures.      Pharynx: Oropharynx is clear.   Eyes:      General: No scleral icterus.     Conjunctiva/sclera: Conjunctivae normal.      Pupils: Pupils are equal, round, and reactive to " light.   Cardiovascular:      Rate and Rhythm: Normal rate and regular rhythm.      Pulses: Normal pulses.      Heart sounds: Normal heart sounds. No murmur heard.  Pulmonary:      Effort: Pulmonary effort is normal.      Breath sounds: Normal breath sounds. No wheezing, rhonchi or rales.   Musculoskeletal:      Cervical back: Neck supple. No rigidity or tenderness.   Lymphadenopathy:      Cervical: No cervical adenopathy.   Skin:     General: Skin is warm and dry.      Coloration: Skin is not jaundiced.      Findings: No rash.   Neurological:      General: No focal deficit present.      Mental Status: He is alert and oriented to person, place, and time.   Psychiatric:         Mood and Affect: Mood normal.         Thought Content: Thought content normal.         Judgment: Judgment normal.         Procedures    No visits with results within 30 Day(s) from this visit.   Latest known visit with results is:   Admission on 02/09/2022, Discharged on 02/09/2022   Component Date Value Ref Range Status   • Case Report 02/09/2022    Final                    Value:Surgical Pathology Report                         Case: [id number]                                  Authorizing Provider:  Gino Khan MD  Collected:           02/09/2022 09:15 AM          Ordering Location:     [facility] Received:            02/09/2022 09:45 AM                                 SUITES                                                                       Pathologist:           Megan Bunch MD                                                     Specimens:   1) - Gastric, Antrum, ANTRUM BX                                                                     2) - Esophagus, HUFFMAN'S ESOPHAGUS                                                       • Clinical Information 02/09/2022    Final    This result contains rich text formatting which cannot be displayed here.   • Final Diagnosis 02/09/2022    Final                     Value:This result contains rich text formatting which cannot be displayed here.   • Gross Description 02/09/2022    Final                    Value:This result contains rich text formatting which cannot be displayed here.   • Microscopic Description 02/09/2022    Final    This result contains rich text formatting which cannot be displayed here.       ASSESSMENT/ PLAN:    Diagnoses and all orders for this visit:    1. Essential hypertension (Primary)  Assessment & Plan:  His blood pressure is good here today.  It is somewhat low because of his diuretics.  We will update his electrolytes and lipids.    Orders:  -     Lipid Panel    2. Alcoholic cirrhosis of liver with ascites (HCC)  Assessment & Plan:  He is doing well at this time.  Due to his cirrhosis he needs some routine immunizations.    Orders:  -     Mening ACY&W-135 Diphth Conj (MENACTRA) injection 0.5 mL    3. Alcohol abuse  Assessment & Plan:  He is doing well.  He has abstained alcohol now for almost 10 months.  And doing well without it.    Orders:  -     Mening ACY&W-135 Diphth Conj (MENACTRA) injection 0.5 mL    4. Need for hepatitis A and B vaccination    5. Need for vaccination against Streptococcus pneumoniae    6. Need for meningococcal vaccination  -     Mening ACY&W-135 Diphth Conj (MENACTRA) injection 0.5 mL    7. Need for shingles vaccine    Other orders  -     Pneumococcal Conjugate Vaccine 20-Valent All  -     Hepatitis B Vaccine Adult IM (ENERGIX/RECOMBIVAX)  -     Zoster Vac Recomb Adjuvanted (Shingrix) 50 MCG/0.5ML reconstituted suspension; Inject 0.5 mL into the appropriate muscle as directed by prescriber 1 (One) Time for 1 dose.  Dispense: 1 each; Refill: 1      Orders Placed Today:     New Medications Ordered This Visit   Medications   • Mening ACY&W-135 Diphth Conj (MENACTRA) injection 0.5 mL   • Zoster Vac Recomb Adjuvanted (Shingrix) 50 MCG/0.5ML reconstituted suspension     Sig: Inject 0.5 mL into the appropriate muscle as directed  by prescriber 1 (One) Time for 1 dose.     Dispense:  1 each     Refill:  1        Management Plan:     An After Visit Summary was printed and given to the patient at discharge.    Follow-up: Return in about 6 months (around 12/28/2022).    Callum Peterson DO 6/28/2022 15:37 EDT  This note was electronically signed.

## 2022-06-28 NOTE — ASSESSMENT & PLAN NOTE
His blood pressure is good here today.  It is somewhat low because of his diuretics.  We will update his electrolytes and lipids.

## 2022-06-29 LAB
ALBUMIN SERPL-MCNC: 3.7 G/DL (ref 3.5–5.2)
ALBUMIN/GLOB SERPL: 1.1 G/DL
ALP SERPL-CCNC: 180 U/L (ref 39–117)
ALPHA-FETOPROTEIN: 4 NG/ML (ref 0–8.3)
ALT SERPL W P-5'-P-CCNC: 17 U/L (ref 1–41)
ANION GAP SERPL CALCULATED.3IONS-SCNC: 12.4 MMOL/L (ref 5–15)
AST SERPL-CCNC: 42 U/L (ref 1–40)
BILIRUB SERPL-MCNC: 2.7 MG/DL (ref 0–1.2)
BUN SERPL-MCNC: 8 MG/DL (ref 6–20)
BUN/CREAT SERPL: 8.8 (ref 7–25)
CALCIUM SPEC-SCNC: 9.6 MG/DL (ref 8.6–10.5)
CHLORIDE SERPL-SCNC: 104 MMOL/L (ref 98–107)
CHOLEST SERPL-MCNC: 142 MG/DL (ref 0–200)
CO2 SERPL-SCNC: 23.6 MMOL/L (ref 22–29)
CREAT SERPL-MCNC: 0.91 MG/DL (ref 0.76–1.27)
DEPRECATED RDW RBC AUTO: 44.4 FL (ref 37–54)
EGFRCR SERPLBLD CKD-EPI 2021: 113.4 ML/MIN/1.73
ERYTHROCYTE [DISTWIDTH] IN BLOOD BY AUTOMATED COUNT: 13.6 % (ref 12.3–15.4)
GLOBULIN UR ELPH-MCNC: 3.3 GM/DL
GLUCOSE SERPL-MCNC: 70 MG/DL (ref 65–99)
HCT VFR BLD AUTO: 35.1 % (ref 37.5–51)
HDLC SERPL-MCNC: 65 MG/DL (ref 40–60)
HGB BLD-MCNC: 12.9 G/DL (ref 13–17.7)
LDLC SERPL CALC-MCNC: 65 MG/DL (ref 0–100)
LDLC/HDLC SERPL: 1.02 {RATIO}
MCH RBC QN AUTO: 33.2 PG (ref 26.6–33)
MCHC RBC AUTO-ENTMCNC: 36.8 G/DL (ref 31.5–35.7)
MCV RBC AUTO: 90.2 FL (ref 79–97)
PLATELET # BLD AUTO: 111 10*3/MM3 (ref 140–450)
PMV BLD AUTO: 9.7 FL (ref 6–12)
POTASSIUM SERPL-SCNC: 4.2 MMOL/L (ref 3.5–5.2)
PROT SERPL-MCNC: 7 G/DL (ref 6–8.5)
RBC # BLD AUTO: 3.89 10*6/MM3 (ref 4.14–5.8)
SODIUM SERPL-SCNC: 140 MMOL/L (ref 136–145)
TRIGL SERPL-MCNC: 53 MG/DL (ref 0–150)
VLDLC SERPL-MCNC: 12 MG/DL (ref 5–40)
WBC NRBC COR # BLD: 4.4 10*3/MM3 (ref 3.4–10.8)

## 2022-07-06 ENCOUNTER — TELEPHONE (OUTPATIENT)
Dept: GASTROENTEROLOGY | Facility: CLINIC | Age: 37
End: 2022-07-06

## 2022-07-06 DIAGNOSIS — K70.31 ALCOHOLIC CIRRHOSIS OF LIVER WITH ASCITES: Primary | ICD-10-CM

## 2022-07-06 NOTE — TELEPHONE ENCOUNTER
----- Message from ANA LAURA Gramajo sent at 7/1/2022  8:06 AM EDT -----  Glucose normal when labs are drawn.  Kidney function normal.  AST mildly elevated.  Alkaline phosphatase elevated.  Bilirubin 2.7 Continues to improve.  Alpha-fetoprotein normal.  Hemoglobin improving at 12.9.  Repeat CMP in 3 months with PT/INR.  Maintain follow-up.

## 2022-07-06 NOTE — TELEPHONE ENCOUNTER
Called left message for Pt to call the office back concerning his labs and repeat labs in 3 months

## 2022-07-07 ENCOUNTER — APPOINTMENT (OUTPATIENT)
Dept: ULTRASOUND IMAGING | Facility: HOSPITAL | Age: 37
End: 2022-07-07

## 2022-07-21 ENCOUNTER — HOSPITAL ENCOUNTER (OUTPATIENT)
Dept: ULTRASOUND IMAGING | Facility: HOSPITAL | Age: 37
Discharge: HOME | End: 2022-07-21
Admitting: NURSE PRACTITIONER

## 2022-07-21 DIAGNOSIS — K74.60 CIRRHOSIS OF LIVER WITH ASCITES, UNSPECIFIED HEPATIC CIRRHOSIS TYPE: ICD-10-CM

## 2022-07-21 DIAGNOSIS — K70.31 ALCOHOLIC CIRRHOSIS OF LIVER WITH ASCITES: ICD-10-CM

## 2022-07-21 DIAGNOSIS — R18.8 CIRRHOSIS OF LIVER WITH ASCITES, UNSPECIFIED HEPATIC CIRRHOSIS TYPE: ICD-10-CM

## 2022-07-21 DIAGNOSIS — F10.11 HISTORY OF ALCOHOL ABUSE: ICD-10-CM

## 2022-07-21 PROCEDURE — 76705 ECHO EXAM OF ABDOMEN: CPT

## 2022-08-08 RX ORDER — PANTOPRAZOLE SODIUM 40 MG/1
TABLET, DELAYED RELEASE ORAL
Qty: 90 TABLET | Refills: 1 | Status: SHIPPED | OUTPATIENT
Start: 2022-08-08 | End: 2023-02-14

## 2022-08-29 ENCOUNTER — CLINICAL SUPPORT (OUTPATIENT)
Dept: FAMILY MEDICINE CLINIC | Facility: CLINIC | Age: 37
End: 2022-08-29

## 2022-08-29 DIAGNOSIS — Z23 NEED FOR HEPATITIS B VACCINATION: Primary | ICD-10-CM

## 2022-08-29 PROCEDURE — 90746 HEPB VACCINE 3 DOSE ADULT IM: CPT | Performed by: FAMILY MEDICINE

## 2022-08-29 PROCEDURE — 90471 IMMUNIZATION ADMIN: CPT | Performed by: FAMILY MEDICINE

## 2022-11-08 RX ORDER — SPIRONOLACTONE 50 MG/1
TABLET, FILM COATED ORAL
Qty: 90 TABLET | Refills: 1 | Status: SHIPPED | OUTPATIENT
Start: 2022-11-08

## 2022-11-11 ENCOUNTER — TELEPHONE (OUTPATIENT)
Dept: GASTROENTEROLOGY | Facility: CLINIC | Age: 37
End: 2022-11-11

## 2022-11-11 NOTE — TELEPHONE ENCOUNTER
Called pt to remind of over due labs that are due and reminded to have them done before office visit on 12.14.2022

## 2022-11-28 ENCOUNTER — LAB (OUTPATIENT)
Dept: LAB | Facility: HOSPITAL | Age: 37
End: 2022-11-28

## 2022-11-28 DIAGNOSIS — K70.31 ALCOHOLIC CIRRHOSIS OF LIVER WITH ASCITES: ICD-10-CM

## 2022-11-28 LAB
ALBUMIN SERPL-MCNC: 3.2 G/DL (ref 3.5–5.2)
ALBUMIN/GLOB SERPL: 0.9 G/DL
ALP SERPL-CCNC: 174 U/L (ref 39–117)
ALT SERPL W P-5'-P-CCNC: 16 U/L (ref 1–41)
ANION GAP SERPL CALCULATED.3IONS-SCNC: 7.2 MMOL/L (ref 5–15)
AST SERPL-CCNC: 39 U/L (ref 1–40)
BILIRUB SERPL-MCNC: 2.2 MG/DL (ref 0–1.2)
BUN SERPL-MCNC: 6 MG/DL (ref 6–20)
BUN/CREAT SERPL: 5.9 (ref 7–25)
CALCIUM SPEC-SCNC: 8.4 MG/DL (ref 8.6–10.5)
CHLORIDE SERPL-SCNC: 106 MMOL/L (ref 98–107)
CO2 SERPL-SCNC: 23.8 MMOL/L (ref 22–29)
CREAT SERPL-MCNC: 1.01 MG/DL (ref 0.76–1.27)
EGFRCR SERPLBLD CKD-EPI 2021: 99.5 ML/MIN/1.73
GLOBULIN UR ELPH-MCNC: 3.4 GM/DL
GLUCOSE SERPL-MCNC: 103 MG/DL (ref 65–99)
INR PPP: 1.35 (ref 0.86–1.15)
POTASSIUM SERPL-SCNC: 4.1 MMOL/L (ref 3.5–5.2)
PROT SERPL-MCNC: 6.6 G/DL (ref 6–8.5)
PROTHROMBIN TIME: 16.8 SECONDS (ref 11.8–14.9)
SODIUM SERPL-SCNC: 137 MMOL/L (ref 136–145)

## 2022-11-28 PROCEDURE — 36415 COLL VENOUS BLD VENIPUNCTURE: CPT

## 2022-11-28 PROCEDURE — 85610 PROTHROMBIN TIME: CPT

## 2022-11-28 PROCEDURE — 80053 COMPREHEN METABOLIC PANEL: CPT

## 2022-12-12 NOTE — PROGRESS NOTES
Chief Complaint  6 month follow up     Wai Gay is a 37 y.o. male who presents to [facility]- [facility] for 6 month follow up     History of present Illness  Patient presents to the office for 6 month follow up regarding alcohol related cirrhosis. Patient has a history of alcoholic hepatitis, patient has been sober since 9/6/21. History of heavy alcohol use 2 tall beers and 4-5 shots of whiskey for about 8 years prior to 9/6/2021.  Most recent meld 13 based on 11/28/22 labs.  Patient reports adequate management of fluid retention with spironolactone 50 mg daily.  Denies abdominal swelling, extremity swelling, excessive bruising/bleeding, difficulty sleeping, or problems with memory.  Patient continues pantoprazole 40 mg daily due to history of Huffman's esophagus.  Denies breakthrough heartburn, nausea, vomiting, epigastric pain, and dysphagia.  Denies lower GI symptoms such as change in bowel habits, melena, and hematochezia.    US abdomen limited 07/21/2022 -diffuse coarse echotexture throughout liver, status postcholecystectomy    EGD 02/09/2022 by Dr. Khan -mucosal changes suggestive of Huffman's esophagus, medium amount of food in gastric body, and normal duodenum.  Esophageal biopsies show sparse intestinal metaplasia, normal stomach biopsies.  Repeat EGD in 1 year     Past Medical History:   Diagnosis Date   • Alcohol abuse 03/14/2017   • Essential hypertension 12/27/2019   • Hypokalemia 03/14/2017    Will update   • Hyponatremia 03/14/2017   • Steatohepatitis, alcoholic 03/14/2017   • Tobacco abuse 03/14/2017       Past Surgical History:   Procedure Laterality Date   • CHOLECYSTECTOMY     • ENDOSCOPY N/A 02/09/2022    Procedure: ESOPHAGOGASTRODUODENOSCOPY WITH BX;  Surgeon: Gino Khan MD;  Location: [facility] ENDOSCOPY;  Service: Gastroenterology;  Laterality: N/A;  RETAINED LIQUID FOOD IN STOMACH, HUFFMAN'S ESOPHAGUS   • UPPER GASTROINTESTINAL  "ENDOSCOPY           Current Outpatient Medications:   •  MELATONIN GUMMIES PO, Take 10 mg by mouth As Needed., Disp: , Rfl:   •  pantoprazole (PROTONIX) 40 MG EC tablet, TAKE 1 TABLET BY MOUTH EVERY DAY, Disp: 90 tablet, Rfl: 1  •  spironolactone (ALDACTONE) 50 MG tablet, TAKE 1 TABLET BY MOUTH EVERY DAY, Disp: 90 tablet, Rfl: 1     No Known Allergies    Family History   Problem Relation Age of Onset   • Alcohol abuse Mother    • Arthritis Mother    • COPD Mother    • Heart disease Maternal Grandmother    • Hypertension Maternal Grandmother    • Lung cancer Maternal Grandmother    • Stroke Maternal Grandmother    • Heart disease Maternal Grandfather    • Lung cancer Maternal Grandfather    • Cancer Paternal Grandmother    • Cancer Paternal Grandfather    • Colon cancer Neg Hx    • Malig Hyperthermia Neg Hx         Social History     Social History Narrative   • Not on file       Objective       Vital Signs:   /66 (BP Location: Left arm, Patient Position: Sitting, Cuff Size: Adult)   Pulse 84   Ht 170.2 cm (67.01\")   Wt 79.4 kg (175 lb 1.6 oz)   SpO2 100%   BMI 27.42 kg/m²       Physical Exam  Constitutional:       Appearance: Normal appearance.   HENT:      Head: Normocephalic.   Cardiovascular:      Rate and Rhythm: Normal rate and regular rhythm.      Heart sounds: Normal heart sounds.   Pulmonary:      Effort: Pulmonary effort is normal.      Breath sounds: Normal breath sounds.   Abdominal:      General: Bowel sounds are normal.      Palpations: Abdomen is soft.   Skin:     General: Skin is warm and dry.   Neurological:      Mental Status: He is alert and oriented to person, place, and time. Mental status is at baseline.   Psychiatric:         Mood and Affect: Mood normal.         Behavior: Behavior normal.         Thought Content: Thought content normal.         Judgment: Judgment normal.         Result Review :       CBC w/diff    CBC w/Diff 12/21/21 6/28/22   WBC 7.39 4.40   RBC 3.75 (A) 3.89 (A) "   Hemoglobin 12.0 (A) 12.9 (A)   Hematocrit 33.9 (A) 35.1 (A)   MCV 90.4 90.2   MCH 32.0 33.2 (A)   MCHC 35.4 36.8 (A)   RDW 13.0 13.6   Platelets 96 (A) 111 (A)   (A) Abnormal value            CMP    CMP 12/21/21 6/28/22 11/28/22   Glucose 95 70 103 (A)   BUN 10 8 6   Creatinine 0.70 (A) 0.91 1.01   eGFR Non African Am 129     Sodium 136 140 137   Potassium 3.8 4.2 4.1   Chloride 102 104 106   Calcium 8.9 9.6 8.4 (A)   Albumin 3.30 (A) 3.70 3.20 (A)   Total Bilirubin 3.0 (A) 2.7 (A) 2.2 (A)   Alkaline Phosphatase 169 (A) 180 (A) 174 (A)   AST (SGOT) 36 42 (A) 39   ALT (SGPT) 18 17 16   (A) Abnormal value              Liver Workup   Ferritin   Date Value Ref Range Status   09/09/2021 700.00 (H) 30.00 - 400.00 ng/mL Final     Protime   Date Value Ref Range Status   11/28/2022 16.8 (H) 11.8 - 14.9 Seconds Final   09/09/2021 15.9 (H) 9.4 - 12.0 Seconds Final     INR   Date Value Ref Range Status   11/28/2022 1.35 (H) 0.86 - 1.15 Final   09/09/2021 1.54 (L) 2.00 - 3.00 Final     ALPHA-FETOPROTEIN   Date Value Ref Range Status   06/28/2022 4 0 - 8.3 ng/mL Final           Assessment and Plan    Diagnoses and all orders for this visit:    1. Alcoholic cirrhosis of liver without ascites (HCC) (Primary)  -     CBC & Differential; Future  -     Comprehensive Metabolic Panel; Future  -     Protime-INR; Future  -     AFP Tumor Marker; Future  -     US Liver; Future    2. History of alcohol abuse    3. Last's esophagus without dysplasia    37 year old patient presents to the office for 6 month follow up regarding alcohol related cirrhosis and Last's esophagus.  Patient has been sober since 9/6/2021.  Most recent MELD score 13 based on 11/28/2022 labs.  Patient will complete labs listed above and ultrasound for surveillance.  Patient will continue spironolactone 50 mg daily as this is adequately managing fluid retention.  Patient will continue Protonix 40 mg due to history of Last's esophagus.  Patient is due for repeat  EGD due to Last's esophagus, we will schedule at his convenience.  Patient is agreeable to plan will call the office any questions or concerns.    EGD Surgical Risk and Benefits: Possible risk/complications, benefits, and alternatives to surgical or invasive procedure have been explained to patient and/or legal guardian. Risks include bleeding, infection, and perforation. Patient has been evaluated and can tolerate anesthesia and/or sedation. Risk, benefits, and alternatives to anesthesia and sedation have been explained to patient and/or legal guardian.       General Recommendations:  - Abstain from alcohol and illicit substances which can accelerate progression of liver disease.  - Tobacco cessation  - Hepatitis A and B hepatitis vaccinations   - Pneumococcal, meningococcal, and zoster vaccinations  - Yearly influenza.   - Avoid narcotics, benzodiazepines, sedatives, or sleeping medications (other than diphenhydramine) as these medications have potential to aggravate hepatic encephalopathy.   - Avoid ASA (including products containing ASA such as Excedrin) and NSAIDs (Including but not limited to Ibuprofen, Advil, Aleve, Motrin, Naproxen, Nuprin, and Goody powders). These products have the potential to decreased platelet adhesivenness, irritate the stomach, and reduce renal function in patients with liver disease.   - Limit Tylenol < 1,000 mg daily.  - Check DXA to assess for osteoporosis.     Portal hypertension screening:  -We will plan to schedule EGD to screen for esophageal varices.   - Medical treatment (beta blockers) and banding procedures discussed.     Hepatocellular carcinoma (HCC):  -The patient is aware of the increased risk of HCC. Schedule RUQ US with doppler and check AFP every 6 months for HCC surveillance.     Nutrition:  - Recommended diet: 35-40 kcal/kg/min with 1.2 - 1.5 g/kg/day of lean protein from poultry, fish and legumes, with at least 3 servings protein daily. Eat plenty of vegetables  and fruits.  - Diet along with regular exercise as tolerated was recommended to decrease the muscle wasting which is associated with chronic liver disease.   - Encouraged to be conscientious of dietary sodium intake. <2 grams per day.   - Check vitamin A and vitamin D and supplement as necessary.     Follow up:  - Office visit every 6 months with labs and an US.  - Return sooner should the need arise. Pt understands to call any time with questions or concerns.   - Counseled to call if jaundice, ascites, encephalopathy or bleeding develop.      Follow Up   Return in about 6 months (around 6/14/2023) for cirrhosis.  Patient was given instructions and counseling regarding his condition or for health maintenance advice. Please see specific information pulled into the AVS if appropriate.

## 2022-12-14 ENCOUNTER — OFFICE VISIT (OUTPATIENT)
Dept: GASTROENTEROLOGY | Facility: CLINIC | Age: 37
End: 2022-12-14

## 2022-12-14 ENCOUNTER — PREP FOR SURGERY (OUTPATIENT)
Dept: OTHER | Facility: HOSPITAL | Age: 37
End: 2022-12-14

## 2022-12-14 VITALS
OXYGEN SATURATION: 100 % | HEART RATE: 84 BPM | DIASTOLIC BLOOD PRESSURE: 66 MMHG | HEIGHT: 67 IN | WEIGHT: 175.1 LBS | SYSTOLIC BLOOD PRESSURE: 114 MMHG | BODY MASS INDEX: 27.48 KG/M2

## 2022-12-14 DIAGNOSIS — K70.30 ALCOHOLIC CIRRHOSIS OF LIVER WITHOUT ASCITES: Primary | ICD-10-CM

## 2022-12-14 DIAGNOSIS — K22.70 BARRETT'S ESOPHAGUS WITHOUT DYSPLASIA: ICD-10-CM

## 2022-12-14 DIAGNOSIS — F10.11 HISTORY OF ALCOHOL ABUSE: ICD-10-CM

## 2022-12-14 PROCEDURE — 99214 OFFICE O/P EST MOD 30 MIN: CPT

## 2022-12-16 ENCOUNTER — PATIENT ROUNDING (BHMG ONLY) (OUTPATIENT)
Dept: GASTROENTEROLOGY | Facility: CLINIC | Age: 37
End: 2022-12-16

## 2022-12-16 NOTE — PROGRESS NOTES
"12/16/2022      Hello, may I speak with Wai Gay     My name is Akbar. I am calling from [facility] [place].    Before we get started may I verify your date of birth? 1985    I am calling to officially welcome you to our practice and ask about your recent visit. Is this a good time to talk? Yes.     Tell me about your visit with us. What things went well? \"The staff are always great. My visit with Shyla went smoothly.\"         We're always looking for ways to make our patients' experiences even better. Do you have recommendations on ways we may improve? No.     Overall were you satisfied with your first visit to our practice? Yes.     I appreciate you taking the time to speak with me today. Is there anything else I can do for you? No.    I'm glad to hear that you had a very good visit. We would really appreciate you completing a survey if you receive one either in the mail, email or text.       Thank you, and have a great day.    "

## 2023-01-05 ENCOUNTER — APPOINTMENT (OUTPATIENT)
Dept: ULTRASOUND IMAGING | Facility: HOSPITAL | Age: 38
End: 2023-01-05

## 2023-01-10 ENCOUNTER — APPOINTMENT (OUTPATIENT)
Dept: ULTRASOUND IMAGING | Facility: HOSPITAL | Age: 38
End: 2023-01-10
Payer: COMMERCIAL

## 2023-01-13 ENCOUNTER — OFFICE VISIT (OUTPATIENT)
Dept: FAMILY MEDICINE CLINIC | Facility: CLINIC | Age: 38
End: 2023-01-13
Payer: COMMERCIAL

## 2023-01-13 VITALS
BODY MASS INDEX: 27.34 KG/M2 | DIASTOLIC BLOOD PRESSURE: 74 MMHG | HEART RATE: 94 BPM | OXYGEN SATURATION: 100 % | HEIGHT: 67 IN | WEIGHT: 174.2 LBS | SYSTOLIC BLOOD PRESSURE: 122 MMHG | TEMPERATURE: 97.6 F

## 2023-01-13 DIAGNOSIS — K70.31 ALCOHOLIC CIRRHOSIS OF LIVER WITH ASCITES: ICD-10-CM

## 2023-01-13 DIAGNOSIS — I10 ESSENTIAL HYPERTENSION: Primary | ICD-10-CM

## 2023-01-13 PROCEDURE — 99213 OFFICE O/P EST LOW 20 MIN: CPT | Performed by: FAMILY MEDICINE

## 2023-01-13 NOTE — ASSESSMENT & PLAN NOTE
He is doing well.  He has a follow-up appointment with GI, Dr. Gino Berg, in the next couple months.  He will have the updated labs and an ultrasound of his liver.  Encouraged him to abstain from alcohol and any Tylenol-containing products.

## 2023-01-13 NOTE — PROGRESS NOTES
Chief Complaint   Patient presents with   • Follow-up     6 month    • Hypertension        Subjective     Wai Gay  has a past medical history of Alcohol abuse (03/14/2017), Essential hypertension (12/27/2019), Hypokalemia (03/14/2017), Hyponatremia (03/14/2017), Steatohepatitis, alcoholic (03/14/2017), and Tobacco abuse (03/14/2017).    Hypertension- he does not check his blood pressure outside the office.  His blood pressure is good here today at 122/74.    Alcoholic cirrhosis- he states he has not drank any alcohol now in over a year.  He also attempts to avoid any Tylenol and Tylenol-containing products.      PHQ-2 Depression Screening  Little interest or pleasure in doing things?     Feeling down, depressed, or hopeless?     PHQ-2 Total Score     PHQ-9 Depression Screening  Little interest or pleasure in doing things?     Feeling down, depressed, or hopeless?     Trouble falling or staying asleep, or sleeping too much?     Feeling tired or having little energy?     Poor appetite or overeating?     Feeling bad about yourself - or that you are a failure or have let yourself or your family down?     Trouble concentrating on things, such as reading the newspaper or watching television?     Moving or speaking so slowly that other people could have noticed? Or the opposite - being so fidgety or restless that you have been moving around a lot more than usual?     Thoughts that you would be better off dead, or of hurting yourself in some way?     PHQ-9 Total Score     If you checked off any problems, how difficult have these problems made it for you to do your work, take care of things at home, or get along with other people?       No Known Allergies    Prior to Admission medications    Medication Sig Start Date End Date Taking? Authorizing Provider   pantoprazole (PROTONIX) 40 MG EC tablet TAKE 1 TABLET BY MOUTH EVERY DAY 8/8/22  Yes Gino Khan MD   spironolactone (ALDACTONE) 50 MG tablet TAKE 1  TABLET BY MOUTH EVERY DAY 11/8/22  Yes Shyla Yarbrough, ANA LAURA   MELATONIN GUMMIES PO Take 10 mg by mouth As Needed.  1/13/23  Provider, MD Rose        Patient Active Problem List   Diagnosis   • Acute liver failure without hepatic coma   • Acute hepatitis   • Hypokalemia   • Hyponatremia   • Steatohepatitis, alcoholic   • Alcohol abuse   • Tobacco abuse   • Generalized abdominal pain   • Need for influenza vaccination   • Essential hypertension   • Alcoholic cirrhosis of liver with ascites (HCC)   • Cirrhosis of liver with ascites (HCC)   • Need for hepatitis A and B vaccination   • Need for vaccination against Streptococcus pneumoniae   • Need for meningococcal vaccination   • Need for shingles vaccine   • Huffman's esophagus without dysplasia        Past Surgical History:   Procedure Laterality Date   • CHOLECYSTECTOMY     • ENDOSCOPY N/A 02/09/2022    Procedure: ESOPHAGOGASTRODUODENOSCOPY WITH BX;  Surgeon: Gino Khan MD;  Location: [facility] ENDOSCOPY;  Service: Gastroenterology;  Laterality: N/A;  RETAINED LIQUID FOOD IN STOMACH, HUFFMAN'S ESOPHAGUS   • UPPER GASTROINTESTINAL ENDOSCOPY         Social History     Socioeconomic History   • Marital status: Single   Tobacco Use   • Smoking status: Every Day     Packs/day: 0.50     Years: 13.00     Pack years: 6.50     Types: Cigarettes     Start date: 2008   • Smokeless tobacco: Never   Vaping Use   • Vaping Use: Never used   Substance and Sexual Activity   • Alcohol use: Not Currently     Alcohol/week: 3.0 standard drinks     Types: 2 Cans of beer, 1 Shots of liquor per week     Comment: FORMER   • Drug use: Not Currently   • Sexual activity: Defer       Family History   Problem Relation Age of Onset   • Alcohol abuse Mother    • Arthritis Mother    • COPD Mother    • Heart disease Maternal Grandmother    • Hypertension Maternal Grandmother    • Lung cancer Maternal Grandmother    • Stroke Maternal Grandmother    • Heart disease Maternal  "Grandfather    • Lung cancer Maternal Grandfather    • Cancer Paternal Grandmother    • Cancer Paternal Grandfather    • Colon cancer Neg Hx    • Malig Hyperthermia Neg Hx        Family history, surgical history, past medical history, Allergies and meds reviewed with patient today and updated in [facility] EMR.     ROS:  Review of Systems   Constitutional: Negative for fatigue.   Respiratory: Negative for cough, chest tightness, shortness of breath and wheezing.    Cardiovascular: Negative for chest pain, palpitations and leg swelling.   Gastrointestinal: Negative for abdominal distention, abdominal pain, blood in stool, constipation, diarrhea, nausea and vomiting.   Neurological: Negative for headache.       OBJECTIVE:  Vitals:    01/13/23 1533   BP: 122/74   BP Location: Right arm   Patient Position: Sitting   Pulse: 94   Temp: 97.6 °F (36.4 °C)   SpO2: 100%   Weight: 79 kg (174 lb 3.2 oz)   Height: 170.2 cm (67.01\")     No results found.   Body mass index is 27.28 kg/m².  No LMP for male patient.    Physical Exam  Vitals and nursing note reviewed.   Constitutional:       General: He is not in acute distress.     Appearance: Normal appearance. He is normal weight.   HENT:      Head: Normocephalic.   Cardiovascular:      Rate and Rhythm: Normal rate and regular rhythm.      Heart sounds: Normal heart sounds. No murmur heard.  Pulmonary:      Effort: Pulmonary effort is normal.      Breath sounds: Normal breath sounds. No wheezing.   Abdominal:      General: Abdomen is flat. Bowel sounds are normal.      Palpations: Abdomen is soft. There is no mass.      Tenderness: There is no abdominal tenderness.   Neurological:      Mental Status: He is alert.         Procedures    No visits with results within 30 Day(s) from this visit.   Latest known visit with results is:   Lab on 11/28/2022   Component Date Value Ref Range Status   • Glucose 11/28/2022 103 (H)  65 - 99 mg/dL Final   • BUN 11/28/2022 6  6 - 20 mg/dL Final   • " Creatinine 11/28/2022 1.01  0.76 - 1.27 mg/dL Final   • Sodium 11/28/2022 137  136 - 145 mmol/L Final   • Potassium 11/28/2022 4.1  3.5 - 5.2 mmol/L Final   • Chloride 11/28/2022 106  98 - 107 mmol/L Final   • CO2 11/28/2022 23.8  22.0 - 29.0 mmol/L Final   • Calcium 11/28/2022 8.4 (L)  8.6 - 10.5 mg/dL Final   • Total Protein 11/28/2022 6.6  6.0 - 8.5 g/dL Final   • Albumin 11/28/2022 3.20 (L)  3.50 - 5.20 g/dL Final   • ALT (SGPT) 11/28/2022 16  1 - 41 U/L Final   • AST (SGOT) 11/28/2022 39  1 - 40 U/L Final   • Alkaline Phosphatase 11/28/2022 174 (H)  39 - 117 U/L Final   • Total Bilirubin 11/28/2022 2.2 (H)  0.0 - 1.2 mg/dL Final   • Globulin 11/28/2022 3.4  gm/dL Final   • A/G Ratio 11/28/2022 0.9  g/dL Final   • BUN/Creatinine Ratio 11/28/2022 5.9 (L)  7.0 - 25.0 Final   • Anion Gap 11/28/2022 7.2  5.0 - 15.0 mmol/L Final   • eGFR 11/28/2022 99.5  >60.0 mL/min/1.73 Final    National Kidney Foundation and American Society of Nephrology (ASN) Task Force recommended calculation based on the Chronic Kidney Disease Epidemiology Collaboration (CKD-EPI) equation refit without adjustment for race.   • Protime 11/28/2022 16.8 (H)  11.8 - 14.9 Seconds Final   • INR 11/28/2022 1.35 (H)  0.86 - 1.15 Final       ASSESSMENT/ PLAN:    Diagnoses and all orders for this visit:    1. Essential hypertension (Primary)  Assessment & Plan:  His blood pressure remains fine here today.  His routine labs with GI to check his electrolytes and liver function with his next visit thus we will hold off on repeating any at this time.      2. Alcoholic cirrhosis of liver with ascites (HCC)  Assessment & Plan:  He is doing well.  He has a follow-up appointment with GI, Dr. Gino Berg, in the next couple months.  He will have the updated labs and an ultrasound of his liver.  Encouraged him to abstain from alcohol and any Tylenol-containing products.        Orders Placed Today:     No orders of the defined types were placed in this  encounter.       Management Plan:     An After Visit Summary was printed and given to the patient at discharge.    Follow-up: Return in about 6 months (around 7/13/2023) for Recheck.    Callum Peterson DO 1/13/2023 16:07 EST  This note was electronically signed.

## 2023-01-13 NOTE — ASSESSMENT & PLAN NOTE
His blood pressure remains fine here today.  His routine labs with GI to check his electrolytes and liver function with his next visit thus we will hold off on repeating any at this time.

## 2023-01-23 ENCOUNTER — HOSPITAL ENCOUNTER (OUTPATIENT)
Dept: ULTRASOUND IMAGING | Facility: HOSPITAL | Age: 38
Discharge: HOME | End: 2023-01-23
Payer: COMMERCIAL

## 2023-01-23 DIAGNOSIS — K70.30 ALCOHOLIC CIRRHOSIS OF LIVER WITHOUT ASCITES: ICD-10-CM

## 2023-01-23 PROCEDURE — 76705 ECHO EXAM OF ABDOMEN: CPT

## 2023-01-27 ENCOUNTER — TELEPHONE (OUTPATIENT)
Dept: GASTROENTEROLOGY | Facility: CLINIC | Age: 38
End: 2023-01-27
Payer: COMMERCIAL

## 2023-01-27 NOTE — TELEPHONE ENCOUNTER
I left  reminding Mr Gay of his scheduled scope on 02.10.23, estimated arrival time of 12:00pm. Reminded of NPO after midnight. Stated a hospital nurse will call to go over Rx's and confirm time. Stated I would also sent [employer] message too. [email or web address]

## 2023-01-31 ENCOUNTER — TELEPHONE (OUTPATIENT)
Dept: GASTROENTEROLOGY | Facility: CLINIC | Age: 38
End: 2023-01-31
Payer: COMMERCIAL

## 2023-01-31 NOTE — TELEPHONE ENCOUNTER
Pt called back, I let the patient know he has lab work that was ordered by ANA LAURA Brandon. Pt stated he is going to try either this week or next week to get those labs done.

## 2023-02-09 NOTE — PRE-PROCEDURE INSTRUCTIONS
Contacted patient regarding a possible earlier arrival time.  Patient agreed and verbalized understanding of an 0600-arrival time on 2/10/23.    Reminded to be NPO after midnight tonight.    Carrie Duran RN

## 2023-02-10 ENCOUNTER — ANESTHESIA (OUTPATIENT)
Dept: GASTROENTEROLOGY | Facility: HOSPITAL | Age: 38
End: 2023-02-10
Payer: COMMERCIAL

## 2023-02-10 ENCOUNTER — HOSPITAL ENCOUNTER (OUTPATIENT)
Facility: HOSPITAL | Age: 38
Setting detail: HOSPITAL OUTPATIENT SURGERY
Discharge: HOME | End: 2023-02-10
Attending: INTERNAL MEDICINE | Admitting: INTERNAL MEDICINE
Payer: COMMERCIAL

## 2023-02-10 ENCOUNTER — ANESTHESIA EVENT (OUTPATIENT)
Dept: GASTROENTEROLOGY | Facility: HOSPITAL | Age: 38
End: 2023-02-10
Payer: COMMERCIAL

## 2023-02-10 VITALS
BODY MASS INDEX: 28.25 KG/M2 | HEART RATE: 82 BPM | TEMPERATURE: 97.5 F | HEIGHT: 67 IN | SYSTOLIC BLOOD PRESSURE: 98 MMHG | OXYGEN SATURATION: 100 % | RESPIRATION RATE: 17 BRPM | DIASTOLIC BLOOD PRESSURE: 69 MMHG | WEIGHT: 180 LBS

## 2023-02-10 DIAGNOSIS — K70.30 ALCOHOLIC CIRRHOSIS OF LIVER WITHOUT ASCITES: ICD-10-CM

## 2023-02-10 DIAGNOSIS — K22.70 BARRETT'S ESOPHAGUS WITHOUT DYSPLASIA: ICD-10-CM

## 2023-02-10 PROCEDURE — 88305 TISSUE EXAM BY PATHOLOGIST: CPT | Performed by: INTERNAL MEDICINE

## 2023-02-10 PROCEDURE — 25010000002 PROPOFOL 10 MG/ML EMULSION: Performed by: NURSE ANESTHETIST, CERTIFIED REGISTERED

## 2023-02-10 PROCEDURE — 43239 EGD BIOPSY SINGLE/MULTIPLE: CPT | Performed by: INTERNAL MEDICINE

## 2023-02-10 RX ORDER — SODIUM CHLORIDE, SODIUM LACTATE, POTASSIUM CHLORIDE, CALCIUM CHLORIDE 600; 310; 30; 20 MG/100ML; MG/100ML; MG/100ML; MG/100ML
30 INJECTION, SOLUTION INTRAVENOUS CONTINUOUS
Status: DISCONTINUED | OUTPATIENT
Start: 2023-02-10 | End: 2023-02-10 | Stop reason: HOSPADM

## 2023-02-10 RX ORDER — PROPOFOL 10 MG/ML
VIAL (ML) INTRAVENOUS AS NEEDED
Status: DISCONTINUED | OUTPATIENT
Start: 2023-02-10 | End: 2023-02-10 | Stop reason: SURG

## 2023-02-10 RX ORDER — SODIUM CHLORIDE, SODIUM LACTATE, POTASSIUM CHLORIDE, CALCIUM CHLORIDE 600; 310; 30; 20 MG/100ML; MG/100ML; MG/100ML; MG/100ML
1000 INJECTION, SOLUTION INTRAVENOUS CONTINUOUS
Status: DISCONTINUED | OUTPATIENT
Start: 2023-02-10 | End: 2023-02-10 | Stop reason: HOSPADM

## 2023-02-10 RX ORDER — LIDOCAINE HYDROCHLORIDE 20 MG/ML
INJECTION, SOLUTION EPIDURAL; INFILTRATION; INTRACAUDAL; PERINEURAL AS NEEDED
Status: DISCONTINUED | OUTPATIENT
Start: 2023-02-10 | End: 2023-02-10 | Stop reason: SURG

## 2023-02-10 RX ADMIN — PROPOFOL 100 MG: 10 INJECTION, EMULSION INTRAVENOUS at 07:53

## 2023-02-10 RX ADMIN — PROPOFOL 100 MG: 10 INJECTION, EMULSION INTRAVENOUS at 07:56

## 2023-02-10 RX ADMIN — PROPOFOL 100 MG: 10 INJECTION, EMULSION INTRAVENOUS at 07:43

## 2023-02-10 RX ADMIN — PROPOFOL 100 MG: 10 INJECTION, EMULSION INTRAVENOUS at 07:48

## 2023-02-10 RX ADMIN — SODIUM CHLORIDE, POTASSIUM CHLORIDE, SODIUM LACTATE AND CALCIUM CHLORIDE 1000 ML: 600; 310; 30; 20 INJECTION, SOLUTION INTRAVENOUS at 06:41

## 2023-02-10 RX ADMIN — LIDOCAINE HYDROCHLORIDE 100 MG: 20 INJECTION, SOLUTION EPIDURAL; INFILTRATION; INTRACAUDAL; PERINEURAL at 07:43

## 2023-02-10 NOTE — ANESTHESIA PREPROCEDURE EVALUATION
Anesthesia Evaluation     Patient summary reviewed and Nursing notes reviewed   no history of anesthetic complications:  NPO Solid Status: > 8 hours  NPO Liquid Status: > 2 hours           Airway   Mallampati: II  Dental    (+) poor dentition    Comment: Just some lower teeth left, severe caries    Pulmonary    (+) a smoker Current,   Cardiovascular   Exercise tolerance: good (4-7 METS)    (+) hypertension,       Neuro/Psych- negative ROS  GI/Hepatic/Renal/Endo    (+)  GERD,  hepatitis, liver disease cirrhosis,     Musculoskeletal (-) negative ROS    Abdominal    Substance History   (+) alcohol use,      OB/GYN negative ob/gyn ROS         Other - negative ROS       ROS/Med Hx Other: PAT Nursing Notes unavailable.                   Anesthesia Plan    ASA 3     general       Anesthetic plan, risks, benefits, and alternatives have been provided, discussed and informed consent has been obtained with: patient.        CODE STATUS:

## 2023-02-10 NOTE — H&P
Pre Procedure History & Physical    Chief Complaint:   Cirrhosis  Barretts    Subjective     HPI:   As above    Past Medical History:   Past Medical History:   Diagnosis Date   • Alcohol abuse 03/14/2017   • Essential hypertension 12/27/2019   • GERD (gastroesophageal reflux disease)    • Hypokalemia 03/14/2017    Will update   • Hyponatremia 03/14/2017   • Steatohepatitis, alcoholic 03/14/2017   • Tobacco abuse 03/14/2017       Past Surgical History:  Past Surgical History:   Procedure Laterality Date   • CHOLECYSTECTOMY     • ENDOSCOPY N/A 02/09/2022    Procedure: ESOPHAGOGASTRODUODENOSCOPY WITH BX;  Surgeon: Gino Khan MD;  Location: [facility] ENDOSCOPY;  Service: Gastroenterology;  Laterality: N/A;  RETAINED LIQUID FOOD IN STOMACH, HUFFMAN'S ESOPHAGUS   • UPPER GASTROINTESTINAL ENDOSCOPY         Family History:  Family History   Problem Relation Age of Onset   • Alcohol abuse Mother    • Arthritis Mother    • COPD Mother    • Heart disease Maternal Grandmother    • Hypertension Maternal Grandmother    • Lung cancer Maternal Grandmother    • Stroke Maternal Grandmother    • Heart disease Maternal Grandfather    • Lung cancer Maternal Grandfather    • Cancer Paternal Grandmother    • Cancer Paternal Grandfather    • Colon cancer Neg Hx    • Malig Hyperthermia Neg Hx        Social History:   reports that he has been smoking cigarettes. He started smoking about 15 years ago. He has a 6.50 pack-year smoking history. He has never used smokeless tobacco. He reports that he does not currently use alcohol after a past usage of about 3.0 standard drinks per week. He reports that he does not currently use drugs.    Medications:   Medications Prior to Admission   Medication Sig Dispense Refill Last Dose   • pantoprazole (PROTONIX) 40 MG EC tablet TAKE 1 TABLET BY MOUTH EVERY DAY 90 tablet 1 2/9/2023   • spironolactone (ALDACTONE) 50 MG tablet TAKE 1 TABLET BY MOUTH EVERY DAY 90 tablet 1 2/9/2023  "      Allergies:  Patient has no known allergies.        Objective     Blood pressure 105/67, pulse 80, temperature 98.2 °F (36.8 °C), temperature source Temporal, resp. rate 16, height 170.2 cm (67\"), weight 81.6 kg (180 lb), SpO2 100 %.    Physical Exam   Constitutional: Pt is oriented to person, place, and time and well-developed, well-nourished, and in no distress.   Mouth/Throat: Oropharynx is clear and moist.   Neck: Normal range of motion.   Cardiovascular: Normal rate, regular rhythm and normal heart sounds.    Pulmonary/Chest: Effort normal and breath sounds normal.   Abdominal: Soft. Nontender  Skin: Skin is warm and dry.   Psychiatric: Mood, memory, affect and judgment normal.     Assessment & Plan     Diagnosis:  barretts  gerd  cirrhosis    Anticipated Surgical Procedure:  egd    The risks, benefits, and alternatives of this procedure have been discussed with the patient or the responsible party- the patient understands and agrees to proceed.            "

## 2023-02-10 NOTE — ANESTHESIA POSTPROCEDURE EVALUATION
Patient: Wai Gay    Procedure Summary     Date: 02/10/23 Room / Location: [facility] ENDOSCOPY 2 / [facility] ENDOSCOPY    Anesthesia Start: 0741 Anesthesia Stop: 0800    Procedure: ESOPHAGOGASTRODUODENOSCOPY Diagnosis:       Alcoholic cirrhosis of liver without ascites (HCC)      Last's esophagus without dysplasia      (Alcoholic cirrhosis of liver without ascites (HCC) [K70.30])      (Last's esophagus without dysplasia [K22.70])    Surgeons: Gino Khan MD Provider: Josh Bae MD    Anesthesia Type: general ASA Status: 3          Anesthesia Type: general    Vitals  Vitals Value Taken Time   BP 98/69 02/10/23 0815   Temp 36.4 °C (97.5 °F) 02/10/23 0815   Pulse 82 02/10/23 0815   Resp 17 02/10/23 0815   SpO2 100 % 02/10/23 0815           Post Anesthesia Care and Evaluation    Patient location during evaluation: bedside  Patient participation: complete - patient participated  Level of consciousness: awake  Pain management: adequate    Airway patency: patent  Anesthetic complications: No anesthetic complications  PONV Status: none  Cardiovascular status: acceptable and stable  Respiratory status: acceptable  Hydration status: acceptable    Comments: An Anesthesiologist personally participated in the most demanding procedures (including induction and emergence if applicable) in the anesthesia plan, monitored the course of anesthesia administration at frequent intervals and remained physically present and available for immediate diagnosis and treatment of emergencies.

## 2023-02-13 ENCOUNTER — CLINICAL SUPPORT (OUTPATIENT)
Dept: FAMILY MEDICINE CLINIC | Facility: CLINIC | Age: 38
End: 2023-02-13
Payer: COMMERCIAL

## 2023-02-13 DIAGNOSIS — K70.30 ALCOHOLIC CIRRHOSIS OF LIVER WITHOUT ASCITES: ICD-10-CM

## 2023-02-13 LAB
CYTO UR: NORMAL
INR PPP: 1.37 (ref 0.86–1.15)
LAB AP CASE REPORT: NORMAL
LAB AP CLINICAL INFORMATION: NORMAL
PATH REPORT.FINAL DX SPEC: NORMAL
PATH REPORT.GROSS SPEC: NORMAL
PROTHROMBIN TIME: 17 SECONDS (ref 11.8–14.9)

## 2023-02-13 PROCEDURE — 82105 ALPHA-FETOPROTEIN SERUM: CPT

## 2023-02-13 PROCEDURE — 85610 PROTHROMBIN TIME: CPT

## 2023-02-13 PROCEDURE — 36415 COLL VENOUS BLD VENIPUNCTURE: CPT | Performed by: FAMILY MEDICINE

## 2023-02-13 PROCEDURE — 80053 COMPREHEN METABOLIC PANEL: CPT

## 2023-02-13 PROCEDURE — 85025 COMPLETE CBC W/AUTO DIFF WBC: CPT

## 2023-02-14 ENCOUNTER — TELEPHONE (OUTPATIENT)
Dept: GASTROENTEROLOGY | Facility: CLINIC | Age: 38
End: 2023-02-14
Payer: COMMERCIAL

## 2023-02-14 DIAGNOSIS — R74.8 ELEVATED LIVER ENZYMES: Primary | ICD-10-CM

## 2023-02-14 LAB
ALBUMIN SERPL-MCNC: 3.5 G/DL (ref 3.5–5.2)
ALBUMIN/GLOB SERPL: 1.2 G/DL
ALP SERPL-CCNC: 206 U/L (ref 39–117)
ALPHA-FETOPROTEIN: 3.06 NG/ML (ref 0–8.3)
ALT SERPL W P-5'-P-CCNC: 21 U/L (ref 1–41)
ANION GAP SERPL CALCULATED.3IONS-SCNC: 7.4 MMOL/L (ref 5–15)
AST SERPL-CCNC: 45 U/L (ref 1–40)
BASOPHILS # BLD AUTO: 0.03 10*3/MM3 (ref 0–0.2)
BASOPHILS NFR BLD AUTO: 0.6 % (ref 0–1.5)
BILIRUB SERPL-MCNC: 2 MG/DL (ref 0–1.2)
BUN SERPL-MCNC: 9 MG/DL (ref 6–20)
BUN/CREAT SERPL: 8.6 (ref 7–25)
CALCIUM SPEC-SCNC: 8.5 MG/DL (ref 8.6–10.5)
CHLORIDE SERPL-SCNC: 103 MMOL/L (ref 98–107)
CO2 SERPL-SCNC: 27.6 MMOL/L (ref 22–29)
CREAT SERPL-MCNC: 1.05 MG/DL (ref 0.76–1.27)
DEPRECATED RDW RBC AUTO: 48.2 FL (ref 37–54)
EGFRCR SERPLBLD CKD-EPI 2021: 94.9 ML/MIN/1.73
EOSINOPHIL # BLD AUTO: 0.48 10*3/MM3 (ref 0–0.4)
EOSINOPHIL NFR BLD AUTO: 9.2 % (ref 0.3–6.2)
ERYTHROCYTE [DISTWIDTH] IN BLOOD BY AUTOMATED COUNT: 14.1 % (ref 12.3–15.4)
GLOBULIN UR ELPH-MCNC: 3 GM/DL
GLUCOSE SERPL-MCNC: 91 MG/DL (ref 65–99)
HCT VFR BLD AUTO: 36.3 % (ref 37.5–51)
HGB BLD-MCNC: 12.2 G/DL (ref 13–17.7)
LYMPHOCYTES # BLD AUTO: 1.56 10*3/MM3 (ref 0.7–3.1)
LYMPHOCYTES NFR BLD AUTO: 29.9 % (ref 19.6–45.3)
MCH RBC QN AUTO: 31.4 PG (ref 26.6–33)
MCHC RBC AUTO-ENTMCNC: 33.6 G/DL (ref 31.5–35.7)
MCV RBC AUTO: 93.6 FL (ref 79–97)
MONOCYTES # BLD AUTO: 0.68 10*3/MM3 (ref 0.1–0.9)
MONOCYTES NFR BLD AUTO: 13.1 % (ref 5–12)
NEUTROPHILS NFR BLD AUTO: 2.44 10*3/MM3 (ref 1.7–7)
NEUTROPHILS NFR BLD AUTO: 46.8 % (ref 42.7–76)
PLATELET # BLD AUTO: 103 10*3/MM3 (ref 140–450)
PMV BLD AUTO: 10.1 FL (ref 6–12)
POTASSIUM SERPL-SCNC: 3.9 MMOL/L (ref 3.5–5.2)
PROT SERPL-MCNC: 6.5 G/DL (ref 6–8.5)
RBC # BLD AUTO: 3.88 10*6/MM3 (ref 4.14–5.8)
SODIUM SERPL-SCNC: 138 MMOL/L (ref 136–145)
WBC NRBC COR # BLD: 5.21 10*3/MM3 (ref 3.4–10.8)

## 2023-02-14 RX ORDER — PANTOPRAZOLE SODIUM 40 MG/1
TABLET, DELAYED RELEASE ORAL
Qty: 90 TABLET | Refills: 1 | Status: SHIPPED | OUTPATIENT
Start: 2023-02-14

## 2023-02-14 NOTE — TELEPHONE ENCOUNTER
I left detailed vm of results ( ok per KAYLAH) stated I would also send mychart message too. jigna

## 2023-02-14 NOTE — TELEPHONE ENCOUNTER
----- Message from ANA LAURA Fernandez sent at 2/14/2023 11:37 AM EST -----  I have reviewed the patient's most recent blood work.  CBC shows persistent anemia with a hemoglobin of 12.3 and decreased platelets.  AFP tumor marker is normal. CMP shows elevated liver enzymes. Please advise patient to continue sobriety. Due to persistent anemia and elevated liver enzymes I have placed orders for repeat labs and rule out other causes for elevated liver enzymes such as autoimmune or genetic. Please have patient complete this at his convenience.         I have reviewed the patients upper endoscopy and pathology.  Biopsies consistent with mild reactive gastropathy and reflux esophagitis.  Continue Protonix 40mg daily.  Biopsies are negative for H. Pylori, dysplasia, metaplasia, and malignancy.      1.  Cataract OU: Observe for now without intervention. The patient was advised to contact us if any change or worsening of vision. Not interested in cat surgery at this time2. Anisometropia OU:Rx glases3. 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 4. Return for an appointment in 1 year for 30. with Dr. Dyana Campos.

## 2023-02-27 ENCOUNTER — LAB (OUTPATIENT)
Dept: LAB | Facility: HOSPITAL | Age: 38
End: 2023-02-27
Payer: COMMERCIAL

## 2023-02-27 DIAGNOSIS — R74.8 ELEVATED LIVER ENZYMES: ICD-10-CM

## 2023-02-27 LAB
ALBUMIN SERPL-MCNC: 3.4 G/DL (ref 3.5–5.2)
ALP SERPL-CCNC: 219 U/L (ref 39–117)
ALPHA-FETOPROTEIN: 2.95 NG/ML (ref 0–8.3)
ALPHA1 GLOB MFR UR ELPH: 171 MG/DL (ref 90–200)
ALT SERPL W P-5'-P-CCNC: 19 U/L (ref 1–41)
AST SERPL-CCNC: 44 U/L (ref 1–40)
BILIRUB CONJ SERPL-MCNC: 0.8 MG/DL (ref 0–0.3)
BILIRUB INDIRECT SERPL-MCNC: 1.4 MG/DL
BILIRUB SERPL-MCNC: 2.2 MG/DL (ref 0–1.2)
CERULOPLASMIN SERPL-MCNC: 24 MG/DL (ref 16–31)
DEPRECATED RDW RBC AUTO: 51.7 FL (ref 37–54)
ERYTHROCYTE [DISTWIDTH] IN BLOOD BY AUTOMATED COUNT: 15 % (ref 12.3–15.4)
FERRITIN SERPL-MCNC: 67.24 NG/ML (ref 30–400)
HAV IGM SERPL QL IA: NORMAL
HBV CORE IGM SERPL QL IA: NORMAL
HBV SURFACE AG SERPL QL IA: NORMAL
HCT VFR BLD AUTO: 36.5 % (ref 37.5–51)
HCV AB SER DONR QL: NORMAL
HGB BLD-MCNC: 12.5 G/DL (ref 13–17.7)
INR PPP: 1.42 (ref 0.86–1.15)
IRON 24H UR-MRATE: 88 MCG/DL (ref 59–158)
IRON SATN MFR SERPL: 21 % (ref 20–50)
MCH RBC QN AUTO: 31.6 PG (ref 26.6–33)
MCHC RBC AUTO-ENTMCNC: 34.2 G/DL (ref 31.5–35.7)
MCV RBC AUTO: 92.2 FL (ref 79–97)
PLATELET # BLD AUTO: 113 10*3/MM3 (ref 140–450)
PMV BLD AUTO: 9.8 FL (ref 6–12)
PROT SERPL-MCNC: 6.7 G/DL (ref 6–8.5)
PROTHROMBIN TIME: 17.5 SECONDS (ref 11.8–14.9)
RBC # BLD AUTO: 3.96 10*6/MM3 (ref 4.14–5.8)
TIBC SERPL-MCNC: 410 MCG/DL (ref 298–536)
TRANSFERRIN SERPL-MCNC: 275 MG/DL (ref 200–360)
WBC NRBC COR # BLD: 3.47 10*3/MM3 (ref 3.4–10.8)

## 2023-02-27 PROCEDURE — 86015 ACTIN ANTIBODY EACH: CPT

## 2023-02-27 PROCEDURE — 86225 DNA ANTIBODY NATIVE: CPT

## 2023-02-27 PROCEDURE — 82103 ALPHA-1-ANTITRYPSIN TOTAL: CPT

## 2023-02-27 PROCEDURE — 86334 IMMUNOFIX E-PHORESIS SERUM: CPT

## 2023-02-27 PROCEDURE — 86381 MITOCHONDRIAL ANTIBODY EACH: CPT

## 2023-02-27 PROCEDURE — 84466 ASSAY OF TRANSFERRIN: CPT

## 2023-02-27 PROCEDURE — 36415 COLL VENOUS BLD VENIPUNCTURE: CPT

## 2023-02-27 PROCEDURE — 82784 ASSAY IGA/IGD/IGG/IGM EACH: CPT

## 2023-02-27 PROCEDURE — 82105 ALPHA-FETOPROTEIN SERUM: CPT

## 2023-02-27 PROCEDURE — 86038 ANTINUCLEAR ANTIBODIES: CPT

## 2023-02-27 PROCEDURE — 82390 ASSAY OF CERULOPLASMIN: CPT

## 2023-02-27 PROCEDURE — 80076 HEPATIC FUNCTION PANEL: CPT

## 2023-02-27 PROCEDURE — 82728 ASSAY OF FERRITIN: CPT

## 2023-02-27 PROCEDURE — 85027 COMPLETE CBC AUTOMATED: CPT

## 2023-02-27 PROCEDURE — 83540 ASSAY OF IRON: CPT

## 2023-02-27 PROCEDURE — 80074 ACUTE HEPATITIS PANEL: CPT

## 2023-02-27 PROCEDURE — 82525 ASSAY OF COPPER: CPT

## 2023-02-27 PROCEDURE — 85610 PROTHROMBIN TIME: CPT

## 2023-02-28 LAB
DSDNA IGG SERPL IA-ACNC: NEGATIVE [IU]/ML
IGA SERPL-MCNC: 923 MG/DL (ref 90–386)
IGG SERPL-MCNC: 1174 MG/DL (ref 603–1613)
IGM SERPL-MCNC: 100 MG/DL (ref 20–172)
MITOCHONDRIA M2 IGG SER-ACNC: 64.5 UNITS (ref 0–20)
NUCLEAR IGG SER IA-RTO: NEGATIVE
PROT PATTERN SERPL IFE-IMP: ABNORMAL
SMA IGG SER-ACNC: 21 UNITS (ref 0–19)

## 2023-03-01 ENCOUNTER — PREP FOR SURGERY (OUTPATIENT)
Dept: OTHER | Facility: HOSPITAL | Age: 38
End: 2023-03-01
Payer: COMMERCIAL

## 2023-03-01 ENCOUNTER — TELEPHONE (OUTPATIENT)
Dept: GASTROENTEROLOGY | Facility: CLINIC | Age: 38
End: 2023-03-01
Payer: COMMERCIAL

## 2023-03-01 DIAGNOSIS — R74.8 ELEVATED LIVER ENZYMES: Primary | ICD-10-CM

## 2023-03-01 LAB — COPPER SERPL-MCNC: 115 UG/DL (ref 69–132)

## 2023-03-01 NOTE — TELEPHONE ENCOUNTER
----- Message from ANA LAURA Fernandez sent at 2/28/2023  2:56 PM EST -----  I have reviewed the patient's most recent liver work-up which shows a weak positive anti-smooth muscle antibody and positive antimitochondrial antibody, these are often associated with autoimmune causes for elevated liver enzymes.  Repeat liver enzymes continue to be elevated.  PT/INR elevated.  CBC shows stable anemia with a hemoglobin of 12.5.  Acute hep panel is negative.  AFP tumor marker is negative.  Alpha-1 antitrypsin normal.  Ceruloplasmin normal.  Iron profile and ferritin normal.    Patient will need liver biopsy for further work-up due to elevated ASMA and FIONA.  Please discussed risk associated with procedure to include liver or bile duct injury, bleeding, and infection.  Please schedule at patient's convenience.

## 2023-03-01 NOTE — TELEPHONE ENCOUNTER
Spoke to pt on results and having liver biopsy done. Pt agreed to this plan. Will get this ordered and scheduled for pt and call with appointment

## 2023-03-02 ENCOUNTER — TELEPHONE (OUTPATIENT)
Dept: GASTROENTEROLOGY | Facility: CLINIC | Age: 38
End: 2023-03-02
Payer: COMMERCIAL

## 2023-03-02 NOTE — TELEPHONE ENCOUNTER
Pt is Scheduled for liver biopsy, called pt informed of time and date of 03.13.2023 @ 09:30 arrive @ 9:00am. NPO after midnight needs  and someone to stay with him for at least 24hrs after. No blood thinners or Nsaids prior. Pt voiced understanding

## 2023-03-06 ENCOUNTER — LAB (OUTPATIENT)
Dept: LAB | Facility: HOSPITAL | Age: 38
End: 2023-03-06
Payer: COMMERCIAL

## 2023-03-06 DIAGNOSIS — R74.8 ELEVATED LIVER ENZYMES: ICD-10-CM

## 2023-03-06 LAB
ALBUMIN SERPL-MCNC: 3.4 G/DL (ref 3.5–5.2)
ALBUMIN/GLOB SERPL: 1 G/DL
ALP SERPL-CCNC: 212 U/L (ref 39–117)
ALT SERPL W P-5'-P-CCNC: 21 U/L (ref 1–41)
ANION GAP SERPL CALCULATED.3IONS-SCNC: 7 MMOL/L (ref 5–15)
APTT PPP: 40.5 SECONDS (ref 24.2–34.2)
AST SERPL-CCNC: 47 U/L (ref 1–40)
BASOPHILS # BLD AUTO: 0.03 10*3/MM3 (ref 0–0.2)
BASOPHILS NFR BLD AUTO: 0.6 % (ref 0–1.5)
BILIRUB SERPL-MCNC: 2.2 MG/DL (ref 0–1.2)
BUN SERPL-MCNC: 9 MG/DL (ref 6–20)
BUN/CREAT SERPL: 7.7 (ref 7–25)
CALCIUM SPEC-SCNC: 8.7 MG/DL (ref 8.6–10.5)
CHLORIDE SERPL-SCNC: 107 MMOL/L (ref 98–107)
CO2 SERPL-SCNC: 24 MMOL/L (ref 22–29)
CREAT SERPL-MCNC: 1.17 MG/DL (ref 0.76–1.27)
DEPRECATED RDW RBC AUTO: 43 FL (ref 37–54)
EGFRCR SERPLBLD CKD-EPI 2021: 83.4 ML/MIN/1.73
EOSINOPHIL # BLD AUTO: 0.37 10*3/MM3 (ref 0–0.4)
EOSINOPHIL NFR BLD AUTO: 7.4 % (ref 0.3–6.2)
ERYTHROCYTE [DISTWIDTH] IN BLOOD BY AUTOMATED COUNT: 13.3 % (ref 12.3–15.4)
GLOBULIN UR ELPH-MCNC: 3.4 GM/DL
GLUCOSE SERPL-MCNC: 83 MG/DL (ref 65–99)
HCT VFR BLD AUTO: 34 % (ref 37.5–51)
HGB BLD-MCNC: 12.3 G/DL (ref 13–17.7)
LYMPHOCYTES # BLD AUTO: 1.8 10*3/MM3 (ref 0.7–3.1)
LYMPHOCYTES NFR BLD AUTO: 36 % (ref 19.6–45.3)
MCH RBC QN AUTO: 32.2 PG (ref 26.6–33)
MCHC RBC AUTO-ENTMCNC: 36.2 G/DL (ref 31.5–35.7)
MCV RBC AUTO: 89 FL (ref 79–97)
MONOCYTES # BLD AUTO: 0.62 10*3/MM3 (ref 0.1–0.9)
MONOCYTES NFR BLD AUTO: 12.4 % (ref 5–12)
NEUTROPHILS NFR BLD AUTO: 2.17 10*3/MM3 (ref 1.7–7)
NEUTROPHILS NFR BLD AUTO: 43.4 % (ref 42.7–76)
PLATELET # BLD AUTO: 102 10*3/MM3 (ref 140–450)
PMV BLD AUTO: 10.3 FL (ref 6–12)
POTASSIUM SERPL-SCNC: 3.9 MMOL/L (ref 3.5–5.2)
PROT SERPL-MCNC: 6.8 G/DL (ref 6–8.5)
RBC # BLD AUTO: 3.82 10*6/MM3 (ref 4.14–5.8)
SODIUM SERPL-SCNC: 138 MMOL/L (ref 136–145)
WBC NRBC COR # BLD: 5 10*3/MM3 (ref 3.4–10.8)

## 2023-03-06 PROCEDURE — 85730 THROMBOPLASTIN TIME PARTIAL: CPT

## 2023-03-06 PROCEDURE — 36415 COLL VENOUS BLD VENIPUNCTURE: CPT

## 2023-03-06 PROCEDURE — 85025 COMPLETE CBC W/AUTO DIFF WBC: CPT

## 2023-03-06 PROCEDURE — 80053 COMPREHEN METABOLIC PANEL: CPT

## 2023-03-21 ENCOUNTER — LAB (OUTPATIENT)
Dept: LAB | Facility: HOSPITAL | Age: 38
End: 2023-03-21
Payer: COMMERCIAL

## 2023-03-21 DIAGNOSIS — R74.8 ELEVATED LIVER ENZYMES: Primary | ICD-10-CM

## 2023-03-21 LAB
INR PPP: 1.43 (ref 0.86–1.15)
PROTHROMBIN TIME: 17.4 SECONDS (ref 11.8–14.9)

## 2023-03-21 PROCEDURE — 36415 COLL VENOUS BLD VENIPUNCTURE: CPT

## 2023-03-21 PROCEDURE — 85610 PROTHROMBIN TIME: CPT

## 2023-03-28 ENCOUNTER — HOSPITAL ENCOUNTER (OUTPATIENT)
Dept: CT IMAGING | Facility: HOSPITAL | Age: 38
Discharge: HOME | End: 2023-03-28
Payer: COMMERCIAL

## 2023-03-28 VITALS
DIASTOLIC BLOOD PRESSURE: 50 MMHG | HEART RATE: 67 BPM | OXYGEN SATURATION: 100 % | SYSTOLIC BLOOD PRESSURE: 87 MMHG | RESPIRATION RATE: 16 BRPM

## 2023-03-28 DIAGNOSIS — R74.8 ELEVATED LIVER ENZYMES: ICD-10-CM

## 2023-03-28 PROCEDURE — 77012 CT SCAN FOR NEEDLE BIOPSY: CPT

## 2023-03-28 PROCEDURE — 25010000002 ONDANSETRON PER 1 MG: Performed by: RADIOLOGY

## 2023-03-28 PROCEDURE — 25010000002 FENTANYL CITRATE (PF) 50 MCG/ML SOLUTION: Performed by: RADIOLOGY

## 2023-03-28 PROCEDURE — 88307 TISSUE EXAM BY PATHOLOGIST: CPT

## 2023-03-28 PROCEDURE — 88313 SPECIAL STAINS GROUP 2: CPT

## 2023-03-28 RX ORDER — ONDANSETRON 2 MG/ML
INJECTION INTRAMUSCULAR; INTRAVENOUS AS NEEDED
Status: COMPLETED | OUTPATIENT
Start: 2023-03-28 | End: 2023-03-28

## 2023-03-28 RX ORDER — FENTANYL CITRATE 50 UG/ML
INJECTION, SOLUTION INTRAMUSCULAR; INTRAVENOUS AS NEEDED
Status: COMPLETED | OUTPATIENT
Start: 2023-03-28 | End: 2023-03-28

## 2023-03-28 RX ORDER — LIDOCAINE HYDROCHLORIDE 20 MG/ML
INJECTION, SOLUTION INFILTRATION; PERINEURAL
Status: DISPENSED
Start: 2023-03-28 | End: 2023-03-28

## 2023-03-28 RX ADMIN — FENTANYL CITRATE 25 MCG: 50 INJECTION, SOLUTION INTRAMUSCULAR; INTRAVENOUS at 08:53

## 2023-03-28 RX ADMIN — ONDANSETRON 4 MG: 2 INJECTION INTRAMUSCULAR; INTRAVENOUS at 08:51

## 2023-03-28 NOTE — NURSING NOTE
Pt discharged ambulatory to private vehicle s/p liver biopsy. Pt had no complaints of pain, biopsy site dressing clean, dry and intact. Discharge instructions given and explained, pt expressed understanding.

## 2023-04-03 LAB
CYTO UR: NORMAL
LAB AP CASE REPORT: NORMAL
LAB AP CLINICAL INFORMATION: NORMAL
LAB AP SPECIAL STAINS: NORMAL
PATH REPORT.FINAL DX SPEC: NORMAL
PATH REPORT.GROSS SPEC: NORMAL

## 2023-04-04 ENCOUNTER — TELEPHONE (OUTPATIENT)
Dept: GASTROENTEROLOGY | Facility: CLINIC | Age: 38
End: 2023-04-04
Payer: COMMERCIAL

## 2023-04-04 DIAGNOSIS — R17 ELEVATED BILIRUBIN: ICD-10-CM

## 2023-04-04 DIAGNOSIS — K70.30 ALCOHOLIC CIRRHOSIS OF LIVER WITHOUT ASCITES: Primary | ICD-10-CM

## 2023-04-04 DIAGNOSIS — R74.8 ELEVATED ALKALINE PHOSPHATASE LEVEL: ICD-10-CM

## 2023-04-04 DIAGNOSIS — K74.3 PRIMARY BILIARY CHOLANGITIS: ICD-10-CM

## 2023-04-04 RX ORDER — URSODIOL 300 MG/1
600 CAPSULE ORAL 2 TIMES DAILY WITH MEALS
Qty: 360 CAPSULE | Refills: 2 | Status: SHIPPED | OUTPATIENT
Start: 2023-04-04 | End: 2023-07-03

## 2023-04-04 NOTE — TELEPHONE ENCOUNTER
Mr Johnson called back, notified him of results. Encouraged him to keep f/u appt. He asked me to send him the results thru mychart . Voiced understanding.  jigna

## 2023-04-04 NOTE — TELEPHONE ENCOUNTER
----- Message from ANA LAURA Fernandez sent at 4/4/2023  1:47 PM EDT -----  I have reviewed the patient's liver biopsy with Dr. Khan. Biopsy shows cirrhosis due to previous alcohol use but findings suggest an underlying autoimmune process called Primary Biliary Cholangitis is contributing to cirrhosis as well. I have ordered further work up with MRI MRCP to better assess biliary ducts due to atypical presentation of PBC. I have prescribed Ursodiol for treatment, this medication can cause GI upset so please start with 1 pill BID for 1 week and then increase to 2 pills BID after that. I have placed orders for repeat blood work to reassess liver enzymes in 2 months, please complete prior to follow up appointment on 6/12/23

## 2023-05-15 ENCOUNTER — TELEPHONE (OUTPATIENT)
Dept: GASTROENTEROLOGY | Facility: CLINIC | Age: 38
End: 2023-05-15
Payer: COMMERCIAL

## 2023-05-15 RX ORDER — SPIRONOLACTONE 50 MG/1
TABLET, FILM COATED ORAL
Qty: 90 TABLET | Refills: 1 | Status: SHIPPED | OUTPATIENT
Start: 2023-05-15

## 2023-05-15 NOTE — TELEPHONE ENCOUNTER
Pt called office to inform us that he could not afford the patient responsible portion of his insurance bill. He will discuss this with Shyla on next visit.

## 2023-06-09 NOTE — PROGRESS NOTES
Chief Complaint   4m follow up    History of Present Illness       Wai Gay is a 37 y.o. male who presents to [facility] for follow-up after recent EGD.  We reviewed EGD and pathology at this time.  Patient had liver biopsy performed 3/28/2023 which did display primary biliary cholangitis.  Patient was placed on ursodiol following liver biopsy results.  Patient is tolerating ursodiol well.  He has yet to complete his repeat labs to evaluate his liver enzymes.  MRI with MRCP was ordered but due to insurance coverage he could not afford his portion of the bill and chose not to move forward with MRI with MRCP.  Patient continue sobriety since 9/6/2021.  Patient reports minimal lower extremity swelling and does continue spironolactone 50 mg daily.  Patient's reflux is well controlled currently on Protonix.  Patient denies itching, fatigue, ascites, fever, nausea, vomiting, weight loss, night sweats, melena, hematochezia, hematemesis.    EGD: Review of the patient's most recent EGD performed by Dr. Khan on 02.10.2023 Mucosal changes consistent with short segment of Last's esophagus, mild portal hypertensive gastropathy, localized inflammation in the stomach.  Reflux esophagitis.  Repeat EGD in 3 years.    CT needle biopsy liver on 03.28.2023 PBC    US liver on 01.23.2023  Cirrhotic appearance of the liver with no findings of portal hypertensio   2. Status post cholecystectomy with no biliary obstruction  3. Normal ultrasound appearance of the right kidney, visualized pancreas, and spleen     Most recent labs on 04.04.2023 see below     PCP-Dr. Peterson  Last paracentesis-November.  Screening for HCC-1/23/2023  Diuretics-spironolactone 50 mg  Most recent lab work-planned for today, previous 4/4/2023  Diet-not following specific diet.  Needs to be on low-sodium.  Diabetes-no  Esophageal varices screening-yes, 2/10/2023 no esophageal varices noticed  Other  specialist-none.  Results       Result Review :   The following data was reviewed by: ANA LAURA Gramajo on 06/12/2023:    CMP          2/13/2023    15:19 2/27/2023    15:39 3/6/2023    15:32   CMP   Glucose 91   83    BUN 9   9    Creatinine 1.05   1.17    EGFR 94.9   83.4    Sodium 138   138    Potassium 3.9   3.9    Chloride 103   107    Calcium 8.5   8.7    Total Protein 6.5  6.7  6.8    Albumin 3.5  3.4  3.4    Globulin 3.0   3.4    Total Bilirubin 2.0  2.2  2.2    Alkaline Phosphatase 206  219  212    AST (SGOT) 45  44  47    ALT (SGPT) 21  19  21    Albumin/Globulin Ratio 1.2   1.0    BUN/Creatinine Ratio 8.6   7.7    Anion Gap 7.4   7.0      CBC          2/13/2023    15:19 2/27/2023    15:39 3/6/2023    15:32   CBC   WBC 5.21  3.47  5.00    RBC 3.88  3.96  3.82    Hemoglobin 12.2  12.5  12.3    Hematocrit 36.3  36.5  34.0    MCV 93.6  92.2  89.0    MCH 31.4  31.6  32.2    MCHC 33.6  34.2  36.2    RDW 14.1  15.0  13.3    Platelets 103  113  102        Liver Workup   ALPHA -1 ANTITRYPSIN   Date Value Ref Range Status   02/27/2023 171 90 - 200 mg/dL Final     dsDNA   Date Value Ref Range Status   02/27/2023 Negative Negative Final     Expanded LISA Screen   Date Value Ref Range Status   02/27/2023 Negative Negative Final     Smooth Muscle Ab   Date Value Ref Range Status   02/27/2023 21 (H) 0 - 19 Units Final     Comment:                      Negative                     0 - 19                   Weak positive               20 - 30                   Moderate to strong positive     >30   Actin Antibodies are found in 52-85% of patients with   autoimmune hepatitis or chronic active hepatitis and   in 22% of patients with primary biliary cirrhosis.     Ceruloplasmin   Date Value Ref Range Status   02/27/2023 24 16 - 31 mg/dL Final     Ferritin   Date Value Ref Range Status   02/27/2023 67.24 30.00 - 400.00 ng/mL Final     Immunofixation Result, Serum   Date Value Ref Range Status   02/27/2023 Comment  Final      Comment:     No monoclonality detected.     IgG   Date Value Ref Range Status   02/27/2023 1174 603 - 1613 mg/dL Final     IgA   Date Value Ref Range Status   02/27/2023 923 (H) 90 - 386 mg/dL Final     Comment:     Results confirmed on  dilution.     IgM   Date Value Ref Range Status   02/27/2023 100 20 - 172 mg/dL Final     Iron   Date Value Ref Range Status   02/27/2023 88 59 - 158 mcg/dL Final     TIBC   Date Value Ref Range Status   02/27/2023 410 298 - 536 mcg/dL Final     Iron Saturation (TSAT)   Date Value Ref Range Status   02/27/2023 21 20 - 50 % Final     Transferrin   Date Value Ref Range Status   02/27/2023 275 200 - 360 mg/dL Final     Mitochondrial Ab   Date Value Ref Range Status   02/27/2023 64.5 (H) 0.0 - 20.0 Units Final     Comment:                                     Negative    0.0 - 20.0                                  Equivocal  20.1 - 24.9                                  Positive         >24.9  Mitochondrial (M2) Antibodies are found in 90-96% of  patients with primary biliary cirrhosis.     Protime   Date Value Ref Range Status   03/21/2023 17.4 (H) 11.8 - 14.9 Seconds Final   09/09/2021 15.9 (H) 9.4 - 12.0 Seconds Final     INR   Date Value Ref Range Status   03/21/2023 1.43 (H) 0.86 - 1.15 Final   09/09/2021 1.54 (L) 2.00 - 3.00 Final     ALPHA-FETOPROTEIN   Date Value Ref Range Status   02/27/2023 2.95 0 - 8.3 ng/mL Final               Past Medical History       Past Medical History:   Diagnosis Date    Alcohol abuse 03/14/2017    Essential hypertension 12/27/2019    GERD (gastroesophageal reflux disease)     Hypokalemia 03/14/2017    Will update    Hyponatremia 03/14/2017    Steatohepatitis, alcoholic 03/14/2017    Tobacco abuse 03/14/2017       Past Surgical History:   Procedure Laterality Date    CHOLECYSTECTOMY      ENDOSCOPY N/A 02/09/2022    Procedure: ESOPHAGOGASTRODUODENOSCOPY WITH BX;  Surgeon: Gino Khan MD;  Location: [facility] ENDOSCOPY;  Service: Gastroenterology;  " Laterality: N/A;  RETAINED LIQUID FOOD IN STOMACH, HUFFMAN'S ESOPHAGUS    ENDOSCOPY N/A 2/10/2023    Procedure: ESOPHAGOGASTRODUODENOSCOPY;  Surgeon: Gino Khan MD;  Location: [facility] ENDOSCOPY;  Service: Gastroenterology;  Laterality: N/A;  GASTRITIS, BARRETTS ESOPHAGUS, PHG    UPPER GASTROINTESTINAL ENDOSCOPY           Current Outpatient Medications:     pantoprazole (PROTONIX) 40 MG EC tablet, TAKE 1 TABLET BY MOUTH EVERY DAY, Disp: 90 tablet, Rfl: 1    spironolactone (ALDACTONE) 50 MG tablet, TAKE 1 TABLET BY MOUTH EVERY DAY, Disp: 90 tablet, Rfl: 1    ursodiol (ACTIGALL) 300 MG capsule, Take 2 capsules by mouth 2 (Two) Times a Day With Meals for 90 days., Disp: 360 capsule, Rfl: 2     No Known Allergies    Family History   Problem Relation Age of Onset    Alcohol abuse Mother     Arthritis Mother     COPD Mother     Heart disease Maternal Grandmother     Hypertension Maternal Grandmother     Lung cancer Maternal Grandmother     Stroke Maternal Grandmother     Heart disease Maternal Grandfather     Lung cancer Maternal Grandfather     Cancer Paternal Grandmother     Cancer Paternal Grandfather     Colon cancer Neg Hx     Malig Hyperthermia Neg Hx         Social History     Social History Narrative    Not on file       Objective   Vital Signs:   BP 99/62 (BP Location: Right arm, Patient Position: Sitting, Cuff Size: Adult)   Pulse 84   Ht 170.2 cm (67\")   Wt 81.6 kg (180 lb)   SpO2 100%   BMI 28.19 kg/m²       Physical Exam  Constitutional:       General: He is not in acute distress.     Appearance: Normal appearance. He is well-developed and normal weight.   Eyes:      Conjunctiva/sclera: Conjunctivae normal.      Pupils: Pupils are equal, round, and reactive to light.      Visual Fields: Right eye visual fields normal and left eye visual fields normal.   Cardiovascular:      Rate and Rhythm: Normal rate and regular rhythm.      Heart sounds: Normal heart sounds.   Pulmonary:      Effort: " Pulmonary effort is normal. No retractions.      Breath sounds: Normal breath sounds and air entry.      Comments: Inspection of chest: normal appearance  Abdominal:      General: Bowel sounds are normal.      Palpations: Abdomen is soft.      Tenderness: There is no abdominal tenderness.      Comments: No appreciable hepatosplenomegaly   Musculoskeletal:      Cervical back: Neck supple.      Right lower leg: No edema.      Left lower leg: No edema.   Lymphadenopathy:      Cervical: No cervical adenopathy.   Skin:     Findings: No lesion.      Comments: Turgor normal   Neurological:      Mental Status: He is alert and oriented to person, place, and time.   Psychiatric:         Mood and Affect: Mood and affect normal.         Assessment & Plan          Assessment and Plan    Diagnoses and all orders for this visit:    1. Alcoholic cirrhosis of liver without ascites (Primary)  -     US Abdomen Limited; Future    2. Primary biliary cholangitis  -     US Abdomen Limited; Future    3. Elevated alkaline phosphatase level  -     US Abdomen Limited; Future    4. Elevated bilirubin  -     US Abdomen Limited; Future    5. Elevated liver enzymes  -     US Abdomen Limited; Future    6. Last's esophagus without dysplasia  -     US Abdomen Limited; Future    7. History of alcohol abuse  -     US Abdomen Limited; Future    8. Gastroesophageal reflux disease with esophagitis without hemorrhage  -     US Abdomen Limited; Future      37-year-old male presenting the office today for follow-up after recent EGD.  We reviewed EGD and pathology at this time.  Patient had liver biopsy performed 3/28/2023 which did display primary biliary cholangitis.  Patient was placed on ursodiol following liver biopsy results.  Patient is tolerating ursodiol well.  Patient has repeat lab work ordered that he plans to repeat today.  Patient continue sobriety since 9/6/2021.  Patient continues spironolactone 50 mg daily and does have minimal lower  extremity edema that persist.  Educated the patient on low-sodium diet.  Patient will follow-up in the office in 3 months.  Patient agreeable to this plan and will call with any questions or concerns.          Follow Up       Follow Up   Return in about 3 months (around 9/12/2023).  Patient was given instructions and counseling regarding his condition or for health maintenance advice. Please see specific information pulled into the AVS if appropriate.

## 2023-06-12 ENCOUNTER — OFFICE VISIT (OUTPATIENT)
Dept: GASTROENTEROLOGY | Facility: CLINIC | Age: 38
End: 2023-06-12
Payer: COMMERCIAL

## 2023-06-12 ENCOUNTER — LAB (OUTPATIENT)
Dept: LAB | Facility: HOSPITAL | Age: 38
End: 2023-06-12
Payer: COMMERCIAL

## 2023-06-12 VITALS
SYSTOLIC BLOOD PRESSURE: 99 MMHG | DIASTOLIC BLOOD PRESSURE: 62 MMHG | WEIGHT: 180 LBS | BODY MASS INDEX: 28.25 KG/M2 | HEIGHT: 67 IN | HEART RATE: 84 BPM | OXYGEN SATURATION: 100 %

## 2023-06-12 DIAGNOSIS — K21.00 GASTROESOPHAGEAL REFLUX DISEASE WITH ESOPHAGITIS WITHOUT HEMORRHAGE: ICD-10-CM

## 2023-06-12 DIAGNOSIS — R74.8 ELEVATED ALKALINE PHOSPHATASE LEVEL: ICD-10-CM

## 2023-06-12 DIAGNOSIS — K70.30 ALCOHOLIC CIRRHOSIS OF LIVER WITHOUT ASCITES: Primary | ICD-10-CM

## 2023-06-12 DIAGNOSIS — R17 ELEVATED BILIRUBIN: ICD-10-CM

## 2023-06-12 DIAGNOSIS — K22.70 BARRETT'S ESOPHAGUS WITHOUT DYSPLASIA: ICD-10-CM

## 2023-06-12 DIAGNOSIS — K74.3 PRIMARY BILIARY CHOLANGITIS: ICD-10-CM

## 2023-06-12 DIAGNOSIS — R74.8 ELEVATED LIVER ENZYMES: ICD-10-CM

## 2023-06-12 DIAGNOSIS — F10.11 HISTORY OF ALCOHOL ABUSE: ICD-10-CM

## 2023-06-12 LAB
ALBUMIN SERPL-MCNC: 3 G/DL (ref 3.5–5.2)
ALBUMIN/GLOB SERPL: 0.9 G/DL
ALP SERPL-CCNC: 193 U/L (ref 39–117)
ALT SERPL W P-5'-P-CCNC: 17 U/L (ref 1–41)
ANION GAP SERPL CALCULATED.3IONS-SCNC: 7.1 MMOL/L (ref 5–15)
AST SERPL-CCNC: 39 U/L (ref 1–40)
BASOPHILS # BLD AUTO: 0.03 10*3/MM3 (ref 0–0.2)
BASOPHILS NFR BLD AUTO: 0.8 % (ref 0–1.5)
BILIRUB SERPL-MCNC: 2.4 MG/DL (ref 0–1.2)
BUN SERPL-MCNC: 10 MG/DL (ref 6–20)
BUN/CREAT SERPL: 10.6 (ref 7–25)
CALCIUM SPEC-SCNC: 8.6 MG/DL (ref 8.6–10.5)
CANCER AG19-9 SERPL-ACNC: 46.3 U/ML
CHLORIDE SERPL-SCNC: 106 MMOL/L (ref 98–107)
CO2 SERPL-SCNC: 25.9 MMOL/L (ref 22–29)
CREAT SERPL-MCNC: 0.94 MG/DL (ref 0.76–1.27)
DEPRECATED RDW RBC AUTO: 57.4 FL (ref 37–54)
EGFRCR SERPLBLD CKD-EPI 2021: 108.4 ML/MIN/1.73
EOSINOPHIL # BLD AUTO: 0.27 10*3/MM3 (ref 0–0.4)
EOSINOPHIL NFR BLD AUTO: 6.8 % (ref 0.3–6.2)
ERYTHROCYTE [DISTWIDTH] IN BLOOD BY AUTOMATED COUNT: 16.2 % (ref 12.3–15.4)
GLOBULIN UR ELPH-MCNC: 3.3 GM/DL
GLUCOSE SERPL-MCNC: 92 MG/DL (ref 65–99)
HCT VFR BLD AUTO: 37.3 % (ref 37.5–51)
HGB BLD-MCNC: 12.6 G/DL (ref 13–17.7)
IMM GRANULOCYTES # BLD AUTO: 0.01 10*3/MM3 (ref 0–0.05)
IMM GRANULOCYTES NFR BLD AUTO: 0.3 % (ref 0–0.5)
INR PPP: 1.48 (ref 0.86–1.15)
LYMPHOCYTES # BLD AUTO: 0.91 10*3/MM3 (ref 0.7–3.1)
LYMPHOCYTES NFR BLD AUTO: 22.9 % (ref 19.6–45.3)
MCH RBC QN AUTO: 32.8 PG (ref 26.6–33)
MCHC RBC AUTO-ENTMCNC: 33.8 G/DL (ref 31.5–35.7)
MCV RBC AUTO: 97.1 FL (ref 79–97)
MONOCYTES # BLD AUTO: 0.44 10*3/MM3 (ref 0.1–0.9)
MONOCYTES NFR BLD AUTO: 11.1 % (ref 5–12)
NEUTROPHILS NFR BLD AUTO: 2.31 10*3/MM3 (ref 1.7–7)
NEUTROPHILS NFR BLD AUTO: 58.1 % (ref 42.7–76)
NRBC BLD AUTO-RTO: 0 /100 WBC (ref 0–0.2)
PLATELET # BLD AUTO: 93 10*3/MM3 (ref 140–450)
PMV BLD AUTO: 10.4 FL (ref 6–12)
POTASSIUM SERPL-SCNC: 4 MMOL/L (ref 3.5–5.2)
PROT SERPL-MCNC: 6.3 G/DL (ref 6–8.5)
PROTHROMBIN TIME: 17.9 SECONDS (ref 11.8–14.9)
RBC # BLD AUTO: 3.84 10*6/MM3 (ref 4.14–5.8)
SODIUM SERPL-SCNC: 139 MMOL/L (ref 136–145)
WBC NRBC COR # BLD: 3.97 10*3/MM3 (ref 3.4–10.8)

## 2023-06-12 PROCEDURE — 36415 COLL VENOUS BLD VENIPUNCTURE: CPT

## 2023-06-12 PROCEDURE — 85610 PROTHROMBIN TIME: CPT

## 2023-06-12 PROCEDURE — 80053 COMPREHEN METABOLIC PANEL: CPT

## 2023-06-12 PROCEDURE — 85025 COMPLETE CBC W/AUTO DIFF WBC: CPT

## 2023-06-12 PROCEDURE — 99214 OFFICE O/P EST MOD 30 MIN: CPT | Performed by: NURSE PRACTITIONER

## 2023-06-12 PROCEDURE — 82105 ALPHA-FETOPROTEIN SERUM: CPT

## 2023-06-12 PROCEDURE — 86301 IMMUNOASSAY TUMOR CA 19-9: CPT

## 2023-06-13 LAB — ALPHA-FETOPROTEIN: 2.44 NG/ML (ref 0–8.3)

## 2023-06-15 ENCOUNTER — TELEPHONE (OUTPATIENT)
Dept: GASTROENTEROLOGY | Facility: CLINIC | Age: 38
End: 2023-06-15
Payer: COMMERCIAL

## 2023-06-15 DIAGNOSIS — R97.8 ELEVATED CA 19-9 LEVEL: ICD-10-CM

## 2023-06-15 DIAGNOSIS — K74.3 PRIMARY BILIARY CHOLANGITIS: Primary | ICD-10-CM

## 2023-06-15 NOTE — TELEPHONE ENCOUNTER
----- Message from ANA LAURA Fernandez sent at 6/15/2023  7:53 AM EDT -----  I reviewed the patient's most recent labs.  CMP shows alkaline phosphatase has improved, total bilirubin remains elevated.  CBC shows improving anemia with a hemoglobin of 12.6.  Platelets remain decreased.  PT/INR elevated.  AFP tumor marker is normal.  CA 19-9 is elevated.  I have replaced referral for EUS for further evaluation of the bile ducts.  Please let me know when this procedure is scheduled.

## 2023-06-15 NOTE — TELEPHONE ENCOUNTER
Spoke with pt. Notified of results. Will fax records to Dr Rodriges office for EUS.  Pt aware they will contact him to schedule EUS. Encouraged to keep f/u appt. Voiced understanding. jigna

## 2023-06-19 ENCOUNTER — TELEPHONE (OUTPATIENT)
Dept: GASTROENTEROLOGY | Facility: CLINIC | Age: 38
End: 2023-06-19
Payer: COMMERCIAL

## 2023-06-19 NOTE — TELEPHONE ENCOUNTER
Called pt to out getting scheduled foe MRI wants to cancel at this time due to he has a US scheduled wants to wait and see what results are from that

## 2023-07-14 PROBLEM — F10.11 HISTORY OF ALCOHOL ABUSE: Status: ACTIVE | Noted: 2017-03-14

## 2023-07-25 RX ORDER — PANTOPRAZOLE SODIUM 40 MG/1
TABLET, DELAYED RELEASE ORAL
Qty: 90 TABLET | Refills: 1 | Status: SHIPPED | OUTPATIENT
Start: 2023-07-25

## 2023-07-31 ENCOUNTER — TELEPHONE (OUTPATIENT)
Dept: GASTROENTEROLOGY | Facility: CLINIC | Age: 38
End: 2023-07-31
Payer: COMMERCIAL

## 2023-09-18 NOTE — PROGRESS NOTES
Chief Complaint  3 month follow up     Wai Gay is a 37 y.o. male who presents to [facility] GASTROENTEROLOGY- [facility] for 3 month follow up     History of present Illness  Patient presents to the office for 3 month follow up with a history of alcohol abuse, primary biliary cholangitis, cirrhosis, ascites, splenomegaly, elevated CA 19.9, Barretts esophagus, and portal hypertensive gastropathy.  Patient previously referred for EUS due to elevated CA 19-9 but has not been scheduled due to patient's work schedule. MELD 15 based on 6/12/2023.  Patient currently scheduled for repeat liver ultrasound 10/13/2023.  Patient continues ursodiol 1200mg daily. Continues to control edema with Aldactone 50mg daily. Denies swelling in extremities or abdomen when taking this daily.  Denies excessive bruising/bleeding, problems with memory, and difficulty sleeping.  Patient has continued sobriety for almost 3 years now.  Heartburn/indigestion controlled with pantoprazole 40 mg daily.  Denies breakthrough heartburn, nausea, vomiting, epigastric pain, and dysphagia.  Denies lower GI symptoms such as change in bowel habits, constipation, diarrhea, melena, and hematochezia.    US Abdomen Limited 06/30/2023 -advanced cirrhosis of the liver, appears progressive when compared to previous ultrasound, CBD 9mm, status post cholecystectomy, new small volume perihepatic ascites, progressive moderate splenomegaly.    CT Needle Biopsy Liver 03/28/2023 -cirrhosis with the possibility of primary biliary cholangitis.    EGD 02/10/2023 by Dr. Khan -mucosa suggestive of short segment and Last's, mild portal hypertensive gastropathy, mild gastritis, and normal duodenum.      Past Medical History:   Diagnosis Date    Alcohol abuse 03/14/2017    Essential hypertension 12/27/2019    GERD (gastroesophageal reflux disease)     Hypokalemia 03/14/2017    Will update    Hyponatremia 03/14/2017    Steatohepatitis, alcoholic  "03/14/2017    Tobacco abuse 03/14/2017       Past Surgical History:   Procedure Laterality Date    CHOLECYSTECTOMY      ENDOSCOPY N/A 02/09/2022    Procedure: ESOPHAGOGASTRODUODENOSCOPY WITH BX;  Surgeon: Gino Khan MD;  Location: [facility] ENDOSCOPY;  Service: Gastroenterology;  Laterality: N/A;  RETAINED LIQUID FOOD IN STOMACH, HUFFMAN'S ESOPHAGUS    ENDOSCOPY N/A 2/10/2023    Procedure: ESOPHAGOGASTRODUODENOSCOPY;  Surgeon: Gino Khan MD;  Location: [facility] ENDOSCOPY;  Service: Gastroenterology;  Laterality: N/A;  GASTRITIS, BARRETTS ESOPHAGUS, PHG    UPPER GASTROINTESTINAL ENDOSCOPY           Current Outpatient Medications:     pantoprazole (PROTONIX) 40 MG EC tablet, TAKE 1 TABLET BY MOUTH EVERY DAY, Disp: 90 tablet, Rfl: 1    spironolactone (ALDACTONE) 50 MG tablet, TAKE 1 TABLET BY MOUTH EVERY DAY, Disp: 90 tablet, Rfl: 1    ursodiol (ACTIGALL) 300 MG capsule, Take 2 capsules by mouth 2 (Two) Times a Day., Disp: , Rfl:      No Known Allergies    Family History   Problem Relation Age of Onset    Alcohol abuse Mother     Arthritis Mother     COPD Mother     Heart disease Maternal Grandmother     Hypertension Maternal Grandmother     Lung cancer Maternal Grandmother     Stroke Maternal Grandmother     Heart disease Maternal Grandfather     Lung cancer Maternal Grandfather     Cancer Paternal Grandmother     Cancer Paternal Grandfather     Colon cancer Neg Hx     Malig Hyperthermia Neg Hx         Social History     Social History Narrative    Not on file       Objective       Vital Signs:   /66 (BP Location: Left arm, Patient Position: Sitting, Cuff Size: Adult)   Pulse 96   Ht 175.3 cm (69\")   Wt 82.9 kg (182 lb 12.8 oz)   SpO2 100%   BMI 26.99 kg/m²       Physical Exam  Constitutional:       Appearance: Normal appearance.   HENT:      Head: Normocephalic.   Cardiovascular:      Rate and Rhythm: Normal rate and regular rhythm.      Heart sounds: Normal heart sounds.   Pulmonary:    "   Effort: Pulmonary effort is normal.      Breath sounds: Normal breath sounds.   Abdominal:      General: Bowel sounds are normal.      Palpations: Abdomen is soft.   Skin:     General: Skin is warm and dry.   Neurological:      Mental Status: He is alert and oriented to person, place, and time. Mental status is at baseline.   Psychiatric:         Mood and Affect: Mood normal.         Behavior: Behavior normal.         Thought Content: Thought content normal.         Judgment: Judgment normal.       Result Review :       CBC w/diff          2/27/2023    15:39 3/6/2023    15:32 6/12/2023    11:22   CBC w/Diff   WBC 3.47  5.00  3.97    RBC 3.96  3.82  3.84    Hemoglobin 12.5  12.3  12.6    Hematocrit 36.5  34.0  37.3    MCV 92.2  89.0  97.1    MCH 31.6  32.2  32.8    MCHC 34.2  36.2  33.8    RDW 15.0  13.3  16.2    Platelets 113  102  93    Neutrophil Rel %  43.4  58.1    Immature Granulocyte Rel %   0.3    Lymphocyte Rel %  36.0  22.9    Monocyte Rel %  12.4  11.1    Eosinophil Rel %  7.4  6.8    Basophil Rel %  0.6  0.8      CMP          2/27/2023    15:39 3/6/2023    15:32 6/12/2023    11:22   CMP   Glucose  83  92    BUN  9  10    Creatinine  1.17  0.94    EGFR  83.4  108.4    Sodium  138  139    Potassium  3.9  4.0    Chloride  107  106    Calcium  8.7  8.6    Total Protein 6.7  6.8  6.3    Albumin 3.4  3.4  3.0    Globulin  3.4  3.3    Total Bilirubin 2.2  2.2  2.4    Alkaline Phosphatase 219  212  193    AST (SGOT) 44  47  39    ALT (SGPT) 19  21  17    Albumin/Globulin Ratio  1.0  0.9    BUN/Creatinine Ratio  7.7  10.6    Anion Gap  7.0  7.1        Liver Workup   ALPHA -1 ANTITRYPSIN   Date Value Ref Range Status   02/27/2023 171 90 - 200 mg/dL Final     dsDNA   Date Value Ref Range Status   02/27/2023 Negative Negative Final     Expanded LISA Screen   Date Value Ref Range Status   02/27/2023 Negative Negative Final     Smooth Muscle Ab   Date Value Ref Range Status   02/27/2023 21 (H) 0 - 19 Units Final      Comment:                      Negative                     0 - 19                   Weak positive               20 - 30                   Moderate to strong positive     >30   Actin Antibodies are found in 52-85% of patients with   autoimmune hepatitis or chronic active hepatitis and   in 22% of patients with primary biliary cirrhosis.     Ceruloplasmin   Date Value Ref Range Status   02/27/2023 24 16 - 31 mg/dL Final     Ferritin   Date Value Ref Range Status   02/27/2023 67.24 30.00 - 400.00 ng/mL Final     Immunofixation Result, Serum   Date Value Ref Range Status   02/27/2023 Comment  Final     Comment:     No monoclonality detected.     IgG   Date Value Ref Range Status   02/27/2023 1174 603 - 1613 mg/dL Final     IgA   Date Value Ref Range Status   02/27/2023 923 (H) 90 - 386 mg/dL Final     Comment:     Results confirmed on  dilution.     IgM   Date Value Ref Range Status   02/27/2023 100 20 - 172 mg/dL Final     Iron   Date Value Ref Range Status   02/27/2023 88 59 - 158 mcg/dL Final     TIBC   Date Value Ref Range Status   02/27/2023 410 298 - 536 mcg/dL Final     Iron Saturation (TSAT)   Date Value Ref Range Status   02/27/2023 21 20 - 50 % Final     Transferrin   Date Value Ref Range Status   02/27/2023 275 200 - 360 mg/dL Final     Mitochondrial Ab   Date Value Ref Range Status   02/27/2023 64.5 (H) 0.0 - 20.0 Units Final     Comment:                                     Negative    0.0 - 20.0                                  Equivocal  20.1 - 24.9                                  Positive         >24.9  Mitochondrial (M2) Antibodies are found in 90-96% of  patients with primary biliary cirrhosis.     Protime   Date Value Ref Range Status   06/12/2023 17.9 (H) 11.8 - 14.9 Seconds Final   09/09/2021 15.9 (H) 9.4 - 12.0 Seconds Final     INR   Date Value Ref Range Status   06/12/2023 1.48 (H) 0.86 - 1.15 Final   09/09/2021 1.54 (L) 2.00 - 3.00 Final     ALPHA-FETOPROTEIN   Date Value Ref Range Status    06/12/2023 2.44 0 - 8.3 ng/mL Final           Assessment and Plan    Diagnoses and all orders for this visit:    1. Alcoholic cirrhosis of liver without ascites (Primary)  -     CBC & Differential; Future  -     Comprehensive Metabolic Panel; Future  -     Protime-INR; Future  -     AFP Tumor Marker; Future  -     Cancer Antigen 19-9; Future  -     MRI abdomen w wo contrast mrcp; Future    2. Primary biliary cholangitis  -     CBC & Differential; Future  -     Comprehensive Metabolic Panel; Future  -     Protime-INR; Future  -     AFP Tumor Marker; Future  -     Cancer Antigen 19-9; Future  -     MRI abdomen w wo contrast mrcp; Future    3. Elevated CA 19-9 level  -     Cancer Antigen 19-9; Future  -     MRI abdomen w wo contrast mrcp; Future    4. Elevated liver enzymes  -     MRI abdomen w wo contrast mrcp; Future    5. Last's esophagus without dysplasia    37-year-old patient presents to the office for 3 month follow up with a history of alcohol abuse, primary biliary cholangitis, cirrhosis, ascites, splenomegaly, elevated CA 19.9, Barretts esophagus, and portal hypertensive gastropathy.  Patient has continued sobriety for almost 3 years now.  Patient previously referred for EUS due to elevated CA 19-9 but has not been scheduled due to patient's work schedule. MELD 15 based on 6/12/2023.  Patient currently scheduled for repeat liver ultrasound 10/13/2023, we will try to submit approval for MRI MRCP through insurance due to elevated CA 19 9 if approved and affordable for patient we will cancel scheduled ultrasound.  Patient will complete labs listed above as well as repeat CA 19-9.  Patient will continue ursodiol 1200mg daily. Continue aldactone 50 mg daily.  We will continue Protonix 40 mg daily.  Patient will follow-up in 6 months.  Patient is agreeable to plan call the office any questions or concerns.    Follow Up   Return in about 6 months (around 3/20/2024).  Patient was given instructions and  counseling regarding his condition or for health maintenance advice. Please see specific information pulled into the AVS if appropriate.

## 2023-09-20 ENCOUNTER — OFFICE VISIT (OUTPATIENT)
Dept: GASTROENTEROLOGY | Facility: CLINIC | Age: 38
End: 2023-09-20
Payer: COMMERCIAL

## 2023-09-20 ENCOUNTER — LAB (OUTPATIENT)
Dept: LAB | Facility: HOSPITAL | Age: 38
End: 2023-09-20
Payer: COMMERCIAL

## 2023-09-20 VITALS
HEIGHT: 69 IN | HEART RATE: 96 BPM | OXYGEN SATURATION: 100 % | WEIGHT: 182.8 LBS | BODY MASS INDEX: 27.08 KG/M2 | DIASTOLIC BLOOD PRESSURE: 66 MMHG | SYSTOLIC BLOOD PRESSURE: 105 MMHG

## 2023-09-20 DIAGNOSIS — K22.70 BARRETT'S ESOPHAGUS WITHOUT DYSPLASIA: ICD-10-CM

## 2023-09-20 DIAGNOSIS — K70.30 ALCOHOLIC CIRRHOSIS OF LIVER WITHOUT ASCITES: ICD-10-CM

## 2023-09-20 DIAGNOSIS — K74.3 PRIMARY BILIARY CHOLANGITIS: ICD-10-CM

## 2023-09-20 DIAGNOSIS — R97.8 ELEVATED CA 19-9 LEVEL: ICD-10-CM

## 2023-09-20 DIAGNOSIS — R74.8 ELEVATED LIVER ENZYMES: ICD-10-CM

## 2023-09-20 DIAGNOSIS — K70.30 ALCOHOLIC CIRRHOSIS OF LIVER WITHOUT ASCITES: Primary | ICD-10-CM

## 2023-09-20 LAB
ALBUMIN SERPL-MCNC: 3 G/DL (ref 3.5–5.2)
ALBUMIN/GLOB SERPL: 0.9 G/DL
ALP SERPL-CCNC: 228 U/L (ref 39–117)
ALPHA-FETOPROTEIN: <2 NG/ML (ref 0–8.3)
ALT SERPL W P-5'-P-CCNC: 15 U/L (ref 1–41)
ANION GAP SERPL CALCULATED.3IONS-SCNC: 11 MMOL/L (ref 5–15)
AST SERPL-CCNC: 36 U/L (ref 1–40)
BASOPHILS # BLD AUTO: 0.03 10*3/MM3 (ref 0–0.2)
BASOPHILS NFR BLD AUTO: 0.7 % (ref 0–1.5)
BILIRUB SERPL-MCNC: 2.3 MG/DL (ref 0–1.2)
BUN SERPL-MCNC: 8 MG/DL (ref 6–20)
BUN/CREAT SERPL: 7.5 (ref 7–25)
CALCIUM SPEC-SCNC: 8.5 MG/DL (ref 8.6–10.5)
CANCER AG19-9 SERPL-ACNC: 45.2 U/ML
CHLORIDE SERPL-SCNC: 104 MMOL/L (ref 98–107)
CO2 SERPL-SCNC: 25 MMOL/L (ref 22–29)
CREAT SERPL-MCNC: 1.06 MG/DL (ref 0.76–1.27)
DEPRECATED RDW RBC AUTO: 47.4 FL (ref 37–54)
EGFRCR SERPLBLD CKD-EPI 2021: 93.9 ML/MIN/1.73
EOSINOPHIL # BLD AUTO: 0.46 10*3/MM3 (ref 0–0.4)
EOSINOPHIL NFR BLD AUTO: 11 % (ref 0.3–6.2)
ERYTHROCYTE [DISTWIDTH] IN BLOOD BY AUTOMATED COUNT: 14 % (ref 12.3–15.4)
GLOBULIN UR ELPH-MCNC: 3.4 GM/DL
GLUCOSE SERPL-MCNC: 86 MG/DL (ref 65–99)
HCT VFR BLD AUTO: 33.4 % (ref 37.5–51)
HGB BLD-MCNC: 11.5 G/DL (ref 13–17.7)
INR PPP: 1.52 (ref 0.86–1.15)
LYMPHOCYTES # BLD AUTO: 1.09 10*3/MM3 (ref 0.7–3.1)
LYMPHOCYTES NFR BLD AUTO: 26.1 % (ref 19.6–45.3)
MCH RBC QN AUTO: 32 PG (ref 26.6–33)
MCHC RBC AUTO-ENTMCNC: 34.4 G/DL (ref 31.5–35.7)
MCV RBC AUTO: 93 FL (ref 79–97)
MONOCYTES # BLD AUTO: 0.4 10*3/MM3 (ref 0.1–0.9)
MONOCYTES NFR BLD AUTO: 9.6 % (ref 5–12)
NEUTROPHILS NFR BLD AUTO: 2.18 10*3/MM3 (ref 1.7–7)
NEUTROPHILS NFR BLD AUTO: 52.4 % (ref 42.7–76)
PLATELET # BLD AUTO: 103 10*3/MM3 (ref 140–450)
PMV BLD AUTO: 10.4 FL (ref 6–12)
POTASSIUM SERPL-SCNC: 4.1 MMOL/L (ref 3.5–5.2)
PROT SERPL-MCNC: 6.4 G/DL (ref 6–8.5)
PROTHROMBIN TIME: 18.3 SECONDS (ref 11.8–14.9)
RBC # BLD AUTO: 3.59 10*6/MM3 (ref 4.14–5.8)
SODIUM SERPL-SCNC: 140 MMOL/L (ref 136–145)
WBC NRBC COR # BLD: 4.17 10*3/MM3 (ref 3.4–10.8)

## 2023-09-20 PROCEDURE — 80053 COMPREHEN METABOLIC PANEL: CPT

## 2023-09-20 PROCEDURE — 85610 PROTHROMBIN TIME: CPT

## 2023-09-20 PROCEDURE — 36415 COLL VENOUS BLD VENIPUNCTURE: CPT

## 2023-09-20 PROCEDURE — 86301 IMMUNOASSAY TUMOR CA 19-9: CPT

## 2023-09-20 PROCEDURE — 82105 ALPHA-FETOPROTEIN SERUM: CPT

## 2023-09-20 PROCEDURE — 85025 COMPLETE CBC W/AUTO DIFF WBC: CPT

## 2023-09-22 ENCOUNTER — TELEPHONE (OUTPATIENT)
Dept: GASTROENTEROLOGY | Facility: CLINIC | Age: 38
End: 2023-09-22
Payer: COMMERCIAL

## 2023-09-22 NOTE — TELEPHONE ENCOUNTER
----- Message from ANA LAURA Fernandez sent at 9/21/2023  8:22 AM EDT -----  I have reviewed the patient's most recent lab work. CBC shows decrease hemoglobin compared to previous labs to 11.5. Platelets continue to be decreased which is a result of cirrhosis. Plan for repeat CBC in 1 month to monitor anemia. AFP tumor marker is normal. PT/INR continue to be elevated. MELD is 15 which is stable compared to previous labs. Alkaline phosphatase and bilirubin continue to be elevated, recommend continuing Melissa as prescribe and abstaining from alcohol.     CA 19. 9 which is a tumor marker continues to be elevated but has improved compared to previous labs. Plan to proceed with MRI MRCP ordered at last office visit, can we please see if this will be approved by insurance and scheduled within the next 2 weeks. Pending these results will consider previously recommended EUS (patient has limited time to complete procedures due to work).     Orders placed to repeat labs in 1 month.

## 2023-09-22 NOTE — TELEPHONE ENCOUNTER
Spoke with pt. Notified of results.  Offered to transfer him to radiology scheduling, pt declined as he was at the grocery.   Will also sent pt Chelailehart message of results. Voiced understanding.  jigna

## 2023-10-10 ENCOUNTER — TELEPHONE (OUTPATIENT)
Dept: GASTROENTEROLOGY | Facility: CLINIC | Age: 38
End: 2023-10-10
Payer: COMMERCIAL

## 2023-10-10 NOTE — TELEPHONE ENCOUNTER
Patient called in to the office today. Per office note, patient is planning to have the MRI completed instead of the Ultrasound. I am cancelling the ultrasound at this time pending MRI authorization. If the MRI is not approved or if it is too expensive then we will proceed with the Ultrasound. Patient voiced understanding.

## 2023-10-19 RX ORDER — SPIRONOLACTONE 50 MG/1
TABLET, FILM COATED ORAL
Qty: 90 TABLET | Refills: 1 | Status: SHIPPED | OUTPATIENT
Start: 2023-10-19

## 2023-10-20 ENCOUNTER — LAB (OUTPATIENT)
Dept: LAB | Facility: HOSPITAL | Age: 38
End: 2023-10-20
Payer: COMMERCIAL

## 2023-10-20 DIAGNOSIS — K74.3 PRIMARY BILIARY CHOLANGITIS: ICD-10-CM

## 2023-10-20 DIAGNOSIS — K70.30 ALCOHOLIC CIRRHOSIS OF LIVER WITHOUT ASCITES: ICD-10-CM

## 2023-10-20 LAB
ALBUMIN SERPL-MCNC: 2.9 G/DL (ref 3.5–5.2)
ALP SERPL-CCNC: 225 U/L (ref 39–117)
ALT SERPL W P-5'-P-CCNC: 14 U/L (ref 1–41)
AST SERPL-CCNC: 34 U/L (ref 1–40)
BASOPHILS # BLD AUTO: 0.04 10*3/MM3 (ref 0–0.2)
BASOPHILS NFR BLD AUTO: 1 % (ref 0–1.5)
BILIRUB CONJ SERPL-MCNC: 1 MG/DL (ref 0–0.3)
BILIRUB INDIRECT SERPL-MCNC: 0.9 MG/DL
BILIRUB SERPL-MCNC: 1.9 MG/DL (ref 0–1.2)
DEPRECATED RDW RBC AUTO: 56.5 FL (ref 37–54)
EOSINOPHIL # BLD AUTO: 0.39 10*3/MM3 (ref 0–0.4)
EOSINOPHIL NFR BLD AUTO: 9.8 % (ref 0.3–6.2)
ERYTHROCYTE [DISTWIDTH] IN BLOOD BY AUTOMATED COUNT: 15.8 % (ref 12.3–15.4)
FERRITIN SERPL-MCNC: 64.6 NG/ML (ref 30–400)
HCT VFR BLD AUTO: 37.6 % (ref 37.5–51)
HGB BLD-MCNC: 12.3 G/DL (ref 13–17.7)
IMM GRANULOCYTES # BLD AUTO: 0.01 10*3/MM3 (ref 0–0.05)
IMM GRANULOCYTES NFR BLD AUTO: 0.3 % (ref 0–0.5)
INR PPP: 1.35 (ref 0.86–1.15)
IRON 24H UR-MRATE: 62 MCG/DL (ref 59–158)
IRON SATN MFR SERPL: 17 % (ref 20–50)
LYMPHOCYTES # BLD AUTO: 1.09 10*3/MM3 (ref 0.7–3.1)
LYMPHOCYTES NFR BLD AUTO: 27.5 % (ref 19.6–45.3)
MCH RBC QN AUTO: 32.1 PG (ref 26.6–33)
MCHC RBC AUTO-ENTMCNC: 32.7 G/DL (ref 31.5–35.7)
MCV RBC AUTO: 98.2 FL (ref 79–97)
MONOCYTES # BLD AUTO: 0.49 10*3/MM3 (ref 0.1–0.9)
MONOCYTES NFR BLD AUTO: 12.3 % (ref 5–12)
NEUTROPHILS NFR BLD AUTO: 1.95 10*3/MM3 (ref 1.7–7)
NEUTROPHILS NFR BLD AUTO: 49.1 % (ref 42.7–76)
NRBC BLD AUTO-RTO: 0 /100 WBC (ref 0–0.2)
PLATELET # BLD AUTO: 111 10*3/MM3 (ref 140–450)
PMV BLD AUTO: 10.5 FL (ref 6–12)
PROT SERPL-MCNC: 6.5 G/DL (ref 6–8.5)
PROTHROMBIN TIME: 16.7 SECONDS (ref 11.8–14.9)
RBC # BLD AUTO: 3.83 10*6/MM3 (ref 4.14–5.8)
TIBC SERPL-MCNC: 371 MCG/DL (ref 298–536)
TRANSFERRIN SERPL-MCNC: 249 MG/DL (ref 200–360)
WBC NRBC COR # BLD: 3.97 10*3/MM3 (ref 3.4–10.8)

## 2023-10-20 PROCEDURE — 36415 COLL VENOUS BLD VENIPUNCTURE: CPT

## 2023-10-20 PROCEDURE — 85025 COMPLETE CBC W/AUTO DIFF WBC: CPT

## 2023-10-20 PROCEDURE — 82728 ASSAY OF FERRITIN: CPT

## 2023-10-20 PROCEDURE — 80076 HEPATIC FUNCTION PANEL: CPT

## 2023-10-20 PROCEDURE — 83540 ASSAY OF IRON: CPT

## 2023-10-20 PROCEDURE — 85610 PROTHROMBIN TIME: CPT

## 2023-10-20 PROCEDURE — 84466 ASSAY OF TRANSFERRIN: CPT

## 2023-10-23 ENCOUNTER — TELEPHONE (OUTPATIENT)
Dept: GASTROENTEROLOGY | Facility: CLINIC | Age: 38
End: 2023-10-23
Payer: COMMERCIAL

## 2023-10-23 NOTE — TELEPHONE ENCOUNTER
Spoke with pt. Notified of results.  Pt stated he does NOT take any iron supplement. Advised pt to  a OTC iron supplement.  MRI is scheduled 10.30.23. encouraged to maintain appt.  Voiced understanding. [email or web address]

## 2023-10-23 NOTE — TELEPHONE ENCOUNTER
----- Message from ANA LAURA Fernandez sent at 10/20/2023  2:32 PM EDT -----  I have reviewed the patient's most recent labs. CBC shows improving anemia with hemoglobin 12.3, platelets remain decreased. Iron is normal and iron saturation is decreased. Please inquire if patient is taking OTC iron supplement, if not recommend starting. Alkaline phosphatase and bilirubin have improved since starting jorge, please continue medication. MRI MRCP previously ordered but does not appear to be scheduled, can we please inquire about the status of this.

## 2023-11-08 ENCOUNTER — HOSPITAL ENCOUNTER (OUTPATIENT)
Dept: MRI IMAGING | Facility: HOSPITAL | Age: 38
Discharge: HOME | End: 2023-11-08
Payer: COMMERCIAL

## 2023-11-08 DIAGNOSIS — K70.30 ALCOHOLIC CIRRHOSIS OF LIVER WITHOUT ASCITES: ICD-10-CM

## 2023-11-08 DIAGNOSIS — R97.8 ELEVATED CA 19-9 LEVEL: ICD-10-CM

## 2023-11-08 DIAGNOSIS — K74.3 PRIMARY BILIARY CHOLANGITIS: ICD-10-CM

## 2023-11-08 DIAGNOSIS — R74.8 ELEVATED LIVER ENZYMES: ICD-10-CM

## 2023-11-08 PROCEDURE — 0 GADOBENATE DIMEGLUMINE 529 MG/ML SOLUTION

## 2023-11-08 PROCEDURE — 74183 MRI ABD W/O CNTR FLWD CNTR: CPT

## 2023-11-08 PROCEDURE — A9577 INJ MULTIHANCE: HCPCS

## 2023-11-08 RX ADMIN — GADOBENATE DIMEGLUMINE 15 ML: 529 INJECTION, SOLUTION INTRAVENOUS at 15:55

## 2023-11-09 ENCOUNTER — TELEPHONE (OUTPATIENT)
Dept: GASTROENTEROLOGY | Facility: CLINIC | Age: 38
End: 2023-11-09
Payer: COMMERCIAL

## 2023-11-09 DIAGNOSIS — K70.30 ALCOHOLIC CIRRHOSIS OF LIVER WITHOUT ASCITES: Primary | ICD-10-CM

## 2023-11-09 NOTE — TELEPHONE ENCOUNTER
----- Message from ANA LAURA Fernandez sent at 11/9/2023 11:37 AM EST -----  I have reviewed the patient's most recent MRI MRCP which shows progression in cirrhosis compared to previous imaging 2021, biliary tree is normal, evidence of portal hypertension which is a result of cirrhosis, there are small cystic lesions in the pancreas.     I have placed orders for repeat blood work, please have patient complete at his convenience.

## 2023-11-13 LAB
CREAT BLDA-MCNC: 0.7 MG/DL
EGFRCR SERPLBLD CKD-EPI 2021: 123.2 ML/MIN/1.73

## 2023-11-21 ENCOUNTER — LAB (OUTPATIENT)
Dept: LAB | Facility: HOSPITAL | Age: 38
End: 2023-11-21
Payer: COMMERCIAL

## 2023-11-21 DIAGNOSIS — K70.30 ALCOHOLIC CIRRHOSIS OF LIVER WITHOUT ASCITES: ICD-10-CM

## 2023-11-21 LAB
ALBUMIN SERPL-MCNC: 3.2 G/DL (ref 3.5–5.2)
ALBUMIN/GLOB SERPL: 0.9 G/DL
ALP SERPL-CCNC: 246 U/L (ref 39–117)
ALT SERPL W P-5'-P-CCNC: 15 U/L (ref 1–41)
ANION GAP SERPL CALCULATED.3IONS-SCNC: 9.8 MMOL/L (ref 5–15)
AST SERPL-CCNC: 36 U/L (ref 1–40)
BILIRUB SERPL-MCNC: 2.1 MG/DL (ref 0–1.2)
BUN SERPL-MCNC: 9 MG/DL (ref 6–20)
BUN/CREAT SERPL: 8.3 (ref 7–25)
CALCIUM SPEC-SCNC: 8.4 MG/DL (ref 8.6–10.5)
CANCER AG19-9 SERPL-ACNC: 58.4 U/ML
CHLORIDE SERPL-SCNC: 102 MMOL/L (ref 98–107)
CO2 SERPL-SCNC: 25.2 MMOL/L (ref 22–29)
CREAT SERPL-MCNC: 1.08 MG/DL (ref 0.76–1.27)
EGFRCR SERPLBLD CKD-EPI 2021: 91.2 ML/MIN/1.73
GLOBULIN UR ELPH-MCNC: 3.5 GM/DL
GLUCOSE SERPL-MCNC: 80 MG/DL (ref 65–99)
INR PPP: 1.39 (ref 0.86–1.15)
POTASSIUM SERPL-SCNC: 4 MMOL/L (ref 3.5–5.2)
PROT SERPL-MCNC: 6.7 G/DL (ref 6–8.5)
PROTHROMBIN TIME: 17.3 SECONDS (ref 11.8–14.9)
SODIUM SERPL-SCNC: 137 MMOL/L (ref 136–145)

## 2023-11-21 PROCEDURE — 86301 IMMUNOASSAY TUMOR CA 19-9: CPT

## 2023-11-21 PROCEDURE — 85610 PROTHROMBIN TIME: CPT

## 2023-11-21 PROCEDURE — 80053 COMPREHEN METABOLIC PANEL: CPT

## 2023-11-21 PROCEDURE — 36415 COLL VENOUS BLD VENIPUNCTURE: CPT

## 2023-11-29 ENCOUNTER — TELEPHONE (OUTPATIENT)
Dept: GASTROENTEROLOGY | Facility: CLINIC | Age: 38
End: 2023-11-29
Payer: COMMERCIAL

## 2023-11-29 NOTE — TELEPHONE ENCOUNTER
Hub staff attempted to follow warm transfer process and was unsuccessful     Caller: Wai Gay    Relationship to patient: Self    Best call back number: [phone]    Patient is needing: PT IS RETURNING MISS CALL FROM THE OFFICE. PLEASE GIVE PT A CALL BACK. IF NOT ABLE TO REACH PT. IT IS OKAY TO LEAVE AN VOICEMAIL

## 2023-11-29 NOTE — TELEPHONE ENCOUNTER
----- Message from ANA LAURA Fernandez sent at 11/29/2023 12:06 PM EST -----  I have reviewed the patient's most recent labs. CA 19.9 is elevated and alkaline phosphatase and bilirubin also continue to be elevated. MELD is 16 based on these labs. It is very important to proceed with EUS for further evaluation. If we need to provide note to employer for him to be excused from work so he can complete this procedure then we can do that.

## 2023-11-29 NOTE — TELEPHONE ENCOUNTER
Hub staff attempted to follow warm transfer process and was unsuccessful     Caller: Wai Gay    Relationship: Self    Best call back number: [phone]     PATIENT RETURNED JARVIS'S CALL; HE WILL CALL BACK AFTER HE'S OFF WORK. NO CALL BACK FROM THE OFFICE REQUESTED.

## 2023-11-30 NOTE — TELEPHONE ENCOUNTER
Spoke to pt on results voiced understanding but stated her would call the office if he decides to have the EUS done

## 2024-01-16 RX ORDER — SPIRONOLACTONE 50 MG/1
TABLET, FILM COATED ORAL
Qty: 90 TABLET | Refills: 1 | Status: SHIPPED | OUTPATIENT
Start: 2024-01-16

## 2024-01-17 RX ORDER — PANTOPRAZOLE SODIUM 40 MG/1
TABLET, DELAYED RELEASE ORAL
Qty: 90 TABLET | Refills: 1 | Status: SHIPPED | OUTPATIENT
Start: 2024-01-17

## 2024-02-06 RX ORDER — URSODIOL 300 MG/1
CAPSULE ORAL
Qty: 360 CAPSULE | Refills: 2 | Status: SHIPPED | OUTPATIENT
Start: 2024-02-06

## 2024-03-20 ENCOUNTER — OFFICE VISIT (OUTPATIENT)
Dept: GASTROENTEROLOGY | Facility: CLINIC | Age: 39
End: 2024-03-20
Payer: COMMERCIAL

## 2024-03-20 VITALS
HEIGHT: 69 IN | OXYGEN SATURATION: 100 % | HEART RATE: 105 BPM | WEIGHT: 184.2 LBS | DIASTOLIC BLOOD PRESSURE: 73 MMHG | BODY MASS INDEX: 27.28 KG/M2 | SYSTOLIC BLOOD PRESSURE: 111 MMHG

## 2024-03-20 DIAGNOSIS — K74.3 PRIMARY BILIARY CHOLANGITIS: ICD-10-CM

## 2024-03-20 DIAGNOSIS — K21.00 GASTROESOPHAGEAL REFLUX DISEASE WITH ESOPHAGITIS WITHOUT HEMORRHAGE: ICD-10-CM

## 2024-03-20 DIAGNOSIS — F10.11 HISTORY OF ALCOHOL ABUSE: ICD-10-CM

## 2024-03-20 DIAGNOSIS — K42.9 UMBILICAL HERNIA WITHOUT OBSTRUCTION AND WITHOUT GANGRENE: ICD-10-CM

## 2024-03-20 DIAGNOSIS — K70.30 ALCOHOLIC CIRRHOSIS OF LIVER WITHOUT ASCITES: Primary | ICD-10-CM

## 2024-03-20 DIAGNOSIS — K22.70 BARRETT'S ESOPHAGUS WITHOUT DYSPLASIA: ICD-10-CM

## 2024-03-20 DIAGNOSIS — R74.8 ELEVATED LIVER ENZYMES: ICD-10-CM

## 2024-03-20 DIAGNOSIS — R17 JAUNDICE: ICD-10-CM

## 2024-03-20 NOTE — PROGRESS NOTES
Chief Complaint  Cirrhosis, Abdominal Pain (- Belly button/ Hernia ), and Nausea    Wai Gay is a 38 y.o. male who presents to [facility]- [facility] for follow up.     History of present Illness  Patient presents to the office for follow up with a history of alcohol abuse, primary biliary cholangitis, cirrhosis, ascites, splenomegaly, elevated CA 19.9, Barretts esophagus, and portal hypertensive gastropathy.  Patient previously referred for EUS due to elevated CA 19-9 but has not been scheduled due to patient's work schedule, patient aware of risk associated with deferring. Previously taking ursodiol 1200mg daily, he has not had medication for 3 weeks due to insurance. Continues to control edema with Aldactone 50mg daily. Denies swelling in extremities or abdomen when taking this daily.  Denies excessive bruising/bleeding, problems with memory, and difficulty sleeping.  Patient has continued sobriety for almost 3 years now.  Heartburn/indigestion controlled with pantoprazole 40 mg daily.  Denies breakthrough heartburn, nausea, vomiting, epigastric pain, and dysphagia.  In the last month he has developed a protruding umbilical hernia that is increasing in size. Denies lower GI symptoms such as change in bowel habits, constipation, diarrhea, melena, and hematochezia.     MRI MRCP 11/8/23  1. Cirrhotic liver morphology, progressed from 2021 imaging.  No MRI findings of hepatocellular   carcinoma within limitation of motion degradation.  2. Normal caliber of the biliary tree.  No overt stricture or peribiliary mass.  3. Sequelae of portal hypertension including splenomegaly, intra-abdominal varices and small volume   ascites.  Portal vasculature appears patent.  4. Small cystic lesions in the pancreas without worrisome features, probably postinflammatory cyst   versus side branch IPMNs and not significantly changed from prior exam.     US Abdomen Limited 06/30/2023 -advanced  cirrhosis of the liver, appears progressive when compared to previous ultrasound, CBD 9mm, status post cholecystectomy, new small volume perihepatic ascites, progressive moderate splenomegaly.     CT Needle Biopsy Liver 03/28/2023 -cirrhosis with the possibility of primary biliary cholangitis.     EGD 02/10/2023 by Dr. Khan -mucosa suggestive of short segment and Huffman's, mild portal hypertensive gastropathy, mild gastritis, and normal duodenum.    Past Medical History:   Diagnosis Date    Alcohol abuse 03/14/2017    Essential hypertension 12/27/2019    GERD (gastroesophageal reflux disease)     Hypokalemia 03/14/2017    Will update    Hyponatremia 03/14/2017    Steatohepatitis, alcoholic 03/14/2017    Tobacco abuse 03/14/2017       Past Surgical History:   Procedure Laterality Date    CHOLECYSTECTOMY      ENDOSCOPY N/A 02/09/2022    Procedure: ESOPHAGOGASTRODUODENOSCOPY WITH BX;  Surgeon: Gino Khan MD;  Location: [facility] END[facility];  Service: Gastroenterology;  Laterality: N/A;  RETAINED LIQUID FOOD IN STOMACH, HUFFMAN'S ESOPHAGUS    ENDOSCOPY N/A 2/10/2023    Procedure: ESOPHAGOGASTRODUODENOSCOPY;  Surgeon: Gino Khan MD;  Location: [facility] ENDOSCOPY;  Service: Gastroenterology;  Laterality: N/A;  GASTRITIS, BARRETTS ESOPHAGUS, PHG    UPPER GASTROINTESTINAL ENDOSCOPY           Current Outpatient Medications:     pantoprazole (PROTONIX) 40 MG EC tablet, TAKE 1 TABLET BY MOUTH EVERY DAY, Disp: 90 tablet, Rfl: 1    spironolactone (ALDACTONE) 50 MG tablet, TAKE 1 TABLET BY MOUTH EVERY DAY, Disp: 90 tablet, Rfl: 1    ursodiol (ACTIGALL) 300 MG capsule, TAKE 2 CAPSULES BY MOUTH 2 (TWO) TIMES A DAY WITH MEALS FOR 90 DAYS. (Patient not taking: Reported on 3/20/2024), Disp: 360 capsule, Rfl: 2     No Known Allergies    Family History   Problem Relation Age of Onset    Alcohol abuse Mother     Arthritis Mother     COPD Mother     Heart disease Maternal Grandmother     Hypertension Maternal  "Grandmother     Lung cancer Maternal Grandmother     Stroke Maternal Grandmother     Heart disease Maternal Grandfather     Lung cancer Maternal Grandfather     Cancer Paternal Grandmother     Cancer Paternal Grandfather     Colon cancer Neg Hx     Malig Hyperthermia Neg Hx         Social History     Social History Narrative    Not on file       Objective       Vital Signs:   /73 (BP Location: Right arm, Patient Position: Sitting, Cuff Size: Adult)   Pulse 105   Ht 175.3 cm (69.02\")   Wt 83.6 kg (184 lb 3.2 oz)   SpO2 100%   BMI 27.19 kg/m²       Physical Exam  Constitutional:       Appearance: Normal appearance. He is normal weight.   HENT:      Head: Normocephalic and atraumatic.      Nose: Nose normal.   Pulmonary:      Effort: Pulmonary effort is normal.   Skin:     General: Skin is warm and dry.      Coloration: Skin is jaundiced.   Neurological:      Mental Status: He is alert and oriented to person, place, and time. Mental status is at baseline.   Psychiatric:         Mood and Affect: Mood normal.         Behavior: Behavior normal.         Thought Content: Thought content normal.         Judgment: Judgment normal.         Result Review :       CBC w/diff          6/12/2023    11:22 9/20/2023    15:14 10/20/2023    11:17   CBC w/Diff   WBC 3.97  4.17  3.97    RBC 3.84  3.59  3.83    Hemoglobin 12.6  11.5  12.3    Hematocrit 37.3  33.4  37.6    MCV 97.1  93.0  98.2    MCH 32.8  32.0  32.1    MCHC 33.8  34.4  32.7    RDW 16.2  14.0  15.8    Platelets 93  103  111    Neutrophil Rel % 58.1  52.4  49.1    Immature Granulocyte Rel % 0.3   0.3    Lymphocyte Rel % 22.9  26.1  27.5    Monocyte Rel % 11.1  9.6  12.3    Eosinophil Rel % 6.8  11.0  9.8    Basophil Rel % 0.8  0.7  1.0      CMP          10/20/2023    11:17 11/8/2023    17:33 11/21/2023    14:48   CMP   Glucose   80    BUN   9    Creatinine  0.70  1.08    EGFR  123.2  91.2    Sodium   137    Potassium   4.0    Chloride   102    Calcium   8.4  "   Total Protein 6.5   6.7    Albumin 2.9   3.2    Globulin   3.5    Total Bilirubin 1.9   2.1    Alkaline Phosphatase 225   246    AST (SGOT) 34   36    ALT (SGPT) 14   15    Albumin/Globulin Ratio   0.9    BUN/Creatinine Ratio   8.3    Anion Gap   9.8        Liver Workup   ALPHA -1 ANTITRYPSIN   Date Value Ref Range Status   02/27/2023 171 90 - 200 mg/dL Final     dsDNA   Date Value Ref Range Status   02/27/2023 Negative Negative Final     Expanded LISA Screen   Date Value Ref Range Status   02/27/2023 Negative Negative Final     Smooth Muscle Ab   Date Value Ref Range Status   02/27/2023 21 (H) 0 - 19 Units Final     Comment:                      Negative                     0 - 19                   Weak positive               20 - 30                   Moderate to strong positive     >30   Actin Antibodies are found in 52-85% of patients with   autoimmune hepatitis or chronic active hepatitis and   in 22% of patients with primary biliary cirrhosis.     Ceruloplasmin   Date Value Ref Range Status   02/27/2023 24 16 - 31 mg/dL Final     Ferritin   Date Value Ref Range Status   10/20/2023 64.60 30.00 - 400.00 ng/mL Final     Immunofixation Result, Serum   Date Value Ref Range Status   02/27/2023 Comment  Final     Comment:     No monoclonality detected.     IgG   Date Value Ref Range Status   02/27/2023 1174 603 - 1613 mg/dL Final     IgA   Date Value Ref Range Status   02/27/2023 923 (H) 90 - 386 mg/dL Final     Comment:     Results confirmed on  dilution.     IgM   Date Value Ref Range Status   02/27/2023 100 20 - 172 mg/dL Final     Iron   Date Value Ref Range Status   10/20/2023 62 59 - 158 mcg/dL Final     TIBC   Date Value Ref Range Status   10/20/2023 371 298 - 536 mcg/dL Final     Iron Saturation (TSAT)   Date Value Ref Range Status   10/20/2023 17 (L) 20 - 50 % Final     Transferrin   Date Value Ref Range Status   10/20/2023 249 200 - 360 mg/dL Final     Mitochondrial Ab   Date Value Ref Range Status    02/27/2023 64.5 (H) 0.0 - 20.0 Units Final     Comment:                                     Negative    0.0 - 20.0                                  Equivocal  20.1 - 24.9                                  Positive         >24.9  Mitochondrial (M2) Antibodies are found in 90-96% of  patients with primary biliary cirrhosis.     Protime   Date Value Ref Range Status   11/21/2023 17.3 (H) 11.8 - 14.9 Seconds Final   09/09/2021 15.9 (H) 9.4 - 12.0 Seconds Final     INR   Date Value Ref Range Status   11/21/2023 1.39 (H) 0.86 - 1.15 Final   09/09/2021 1.54 (L) 2.00 - 3.00 Final     ALPHA-FETOPROTEIN   Date Value Ref Range Status   09/20/2023 <2 0 - 8.3 ng/mL Final           Assessment and Plan    Diagnoses and all orders for this visit:    1. Alcoholic cirrhosis of liver without ascites (Primary)  -     CBC & Differential; Future  -     Comprehensive Metabolic Panel; Future  -     Protime-INR; Future  -     AFP Tumor Marker; Future  -     CT Abdomen Pelvis With Contrast; Future    2. Primary biliary cholangitis  -     CBC & Differential; Future  -     Comprehensive Metabolic Panel; Future  -     Protime-INR; Future  -     AFP Tumor Marker; Future  -     CT Abdomen Pelvis With Contrast; Future    3. Elevated liver enzymes  -     CBC & Differential; Future  -     Comprehensive Metabolic Panel; Future  -     Protime-INR; Future  -     AFP Tumor Marker; Future  -     CT Abdomen Pelvis With Contrast; Future    4. Last's esophagus without dysplasia    5. History of alcohol abuse    6. Gastroesophageal reflux disease with esophagitis without hemorrhage    7. Umbilical hernia without obstruction and without gangrene  -     CT Abdomen Pelvis With Contrast; Future  -     Ambulatory Referral to General Surgery    8. Jaundice    38 year old patient presents to the office for follow up with a history of alcohol abuse, primary biliary cholangitis, cirrhosis, ascites, splenomegaly, elevated CA 19.9, Barretts esophagus, and portal  hypertensive gastropathy.  Patient previously referred for EUS due to elevated CA 19-9 but has not been scheduled due to patient's work schedule, patient aware of risk associated with deferring. Previously taking ursodiol 1200mg daily, he has not had medication for 3 weeks due to insurance - plan to contact insurance to further discuss. Patient understands importance of compliance with this medication to prevent further liver damage.  Patient has continued sobriety for almost 3 years now.  Heartburn/indigestion controlled with pantoprazole 40 mg daily. In the last month he has developed a protruding umbilical hernia that is increasing in size and tender to touch. Plan to proceed with CT scan for further evaluation and referral to general surgery. Plan to complete MELD labs listed above. Patient will follow up in 6 months. Patient is agreeable to plan and will call the office with any questions or concerns.     Follow Up   Return in about 6 months (around 9/20/2024).  Patient was given instructions and counseling regarding his condition or for health maintenance advice. Please see specific information pulled into the AVS if appropriate.

## 2024-03-21 ENCOUNTER — TELEPHONE (OUTPATIENT)
Dept: GASTROENTEROLOGY | Facility: CLINIC | Age: 39
End: 2024-03-21
Payer: COMMERCIAL

## 2024-03-21 NOTE — TELEPHONE ENCOUNTER
Spoke with patient today regarding his CT scan that is scheduled 3/28/24. Also informed patient that his ursodiol medication is being applied to his deductible which is why it is so expensive. Sending patient a "[employer]" message with this information.

## 2024-03-26 ENCOUNTER — OFFICE VISIT (OUTPATIENT)
Dept: FAMILY MEDICINE CLINIC | Facility: CLINIC | Age: 39
End: 2024-03-26
Payer: COMMERCIAL

## 2024-03-26 VITALS
HEIGHT: 69 IN | BODY MASS INDEX: 26.96 KG/M2 | SYSTOLIC BLOOD PRESSURE: 116 MMHG | WEIGHT: 182 LBS | DIASTOLIC BLOOD PRESSURE: 79 MMHG | HEART RATE: 98 BPM | TEMPERATURE: 97 F | OXYGEN SATURATION: 98 %

## 2024-03-26 DIAGNOSIS — K74.3 PRIMARY BILIARY CHOLANGITIS: ICD-10-CM

## 2024-03-26 DIAGNOSIS — R74.8 ELEVATED LIVER ENZYMES: ICD-10-CM

## 2024-03-26 DIAGNOSIS — I10 ESSENTIAL HYPERTENSION: Primary | ICD-10-CM

## 2024-03-26 DIAGNOSIS — K70.30 ALCOHOLIC CIRRHOSIS OF LIVER WITHOUT ASCITES: ICD-10-CM

## 2024-03-26 LAB
ALBUMIN SERPL-MCNC: 2.7 G/DL (ref 3.5–5.2)
ALBUMIN/GLOB SERPL: 0.8 G/DL
ALP SERPL-CCNC: 171 U/L (ref 39–117)
ALPHA-FETOPROTEIN: <2 NG/ML (ref 0–8.3)
ALT SERPL W P-5'-P-CCNC: 21 U/L (ref 1–41)
ANION GAP SERPL CALCULATED.3IONS-SCNC: 9.6 MMOL/L (ref 5–15)
AST SERPL-CCNC: 37 U/L (ref 1–40)
BASOPHILS # BLD AUTO: 0.03 10*3/MM3 (ref 0–0.2)
BASOPHILS NFR BLD AUTO: 0.7 % (ref 0–1.5)
BILIRUB SERPL-MCNC: 1.4 MG/DL (ref 0–1.2)
BUN SERPL-MCNC: 13 MG/DL (ref 6–20)
BUN/CREAT SERPL: 12.1 (ref 7–25)
CALCIUM SPEC-SCNC: 7.9 MG/DL (ref 8.6–10.5)
CHLORIDE SERPL-SCNC: 109 MMOL/L (ref 98–107)
CHOLEST SERPL-MCNC: 111 MG/DL (ref 0–200)
CO2 SERPL-SCNC: 21.4 MMOL/L (ref 22–29)
CREAT SERPL-MCNC: 1.07 MG/DL (ref 0.76–1.27)
DEPRECATED RDW RBC AUTO: 49.6 FL (ref 37–54)
EGFRCR SERPLBLD CKD-EPI 2021: 92.2 ML/MIN/1.73
EOSINOPHIL # BLD AUTO: 0.54 10*3/MM3 (ref 0–0.4)
EOSINOPHIL NFR BLD AUTO: 12.6 % (ref 0.3–6.2)
ERYTHROCYTE [DISTWIDTH] IN BLOOD BY AUTOMATED COUNT: 15.1 % (ref 12.3–15.4)
GLOBULIN UR ELPH-MCNC: 3.3 GM/DL
GLUCOSE SERPL-MCNC: 90 MG/DL (ref 65–99)
HCT VFR BLD AUTO: 33.9 % (ref 37.5–51)
HDLC SERPL-MCNC: 35 MG/DL (ref 40–60)
HGB BLD-MCNC: 11.6 G/DL (ref 13–17.7)
IMM GRANULOCYTES # BLD AUTO: 0.01 10*3/MM3 (ref 0–0.05)
IMM GRANULOCYTES NFR BLD AUTO: 0.2 % (ref 0–0.5)
INR PPP: 1.47 (ref 0.86–1.15)
LDLC SERPL CALC-MCNC: 64 MG/DL (ref 0–100)
LDLC/HDLC SERPL: 1.86 {RATIO}
LYMPHOCYTES # BLD AUTO: 1.22 10*3/MM3 (ref 0.7–3.1)
LYMPHOCYTES NFR BLD AUTO: 28.6 % (ref 19.6–45.3)
MCH RBC QN AUTO: 31.2 PG (ref 26.6–33)
MCHC RBC AUTO-ENTMCNC: 34.2 G/DL (ref 31.5–35.7)
MCV RBC AUTO: 91.1 FL (ref 79–97)
MONOCYTES # BLD AUTO: 0.32 10*3/MM3 (ref 0.1–0.9)
MONOCYTES NFR BLD AUTO: 7.5 % (ref 5–12)
NEUTROPHILS NFR BLD AUTO: 2.15 10*3/MM3 (ref 1.7–7)
NEUTROPHILS NFR BLD AUTO: 50.4 % (ref 42.7–76)
NRBC BLD AUTO-RTO: 0 /100 WBC (ref 0–0.2)
PLATELET # BLD AUTO: 102 10*3/MM3 (ref 140–450)
PMV BLD AUTO: 10.3 FL (ref 6–12)
POTASSIUM SERPL-SCNC: 4 MMOL/L (ref 3.5–5.2)
PROT SERPL-MCNC: 6 G/DL (ref 6–8.5)
PROTHROMBIN TIME: 18.1 SECONDS (ref 11.8–14.9)
RBC # BLD AUTO: 3.72 10*6/MM3 (ref 4.14–5.8)
SODIUM SERPL-SCNC: 140 MMOL/L (ref 136–145)
TRIGL SERPL-MCNC: 54 MG/DL (ref 0–150)
VLDLC SERPL-MCNC: 12 MG/DL (ref 5–40)
WBC NRBC COR # BLD AUTO: 4.27 10*3/MM3 (ref 3.4–10.8)

## 2024-03-26 PROCEDURE — 85025 COMPLETE CBC W/AUTO DIFF WBC: CPT

## 2024-03-26 PROCEDURE — 80053 COMPREHEN METABOLIC PANEL: CPT | Performed by: FAMILY MEDICINE

## 2024-03-26 PROCEDURE — 80061 LIPID PANEL: CPT | Performed by: FAMILY MEDICINE

## 2024-03-26 PROCEDURE — 85610 PROTHROMBIN TIME: CPT

## 2024-03-26 PROCEDURE — 99213 OFFICE O/P EST LOW 20 MIN: CPT | Performed by: FAMILY MEDICINE

## 2024-03-26 PROCEDURE — 82105 ALPHA-FETOPROTEIN SERUM: CPT

## 2024-03-26 NOTE — PROGRESS NOTES
Chief Complaint   Patient presents with    Follow-up     6 month     Hypertension        Subjective     Wai Gay  has a past medical history of Alcohol abuse (03/14/2017), Essential hypertension (12/27/2019), GERD (gastroesophageal reflux disease), Hypokalemia (03/14/2017), Hyponatremia (03/14/2017), Steatohepatitis, alcoholic (03/14/2017), and Tobacco abuse (03/14/2017).    Hypertension-he does not check his blood pressure outside the office.  He does take his medication on a regular basis.  He denies any chest pain tightness or heaviness.        PHQ-2 Depression Screening  Little interest or pleasure in doing things?     Feeling down, depressed, or hopeless?     PHQ-2 Total Score     PHQ-9 Depression Screening  Little interest or pleasure in doing things?     Feeling down, depressed, or hopeless?     Trouble falling or staying asleep, or sleeping too much?     Feeling tired or having little energy?     Poor appetite or overeating?     Feeling bad about yourself - or that you are a failure or have let yourself or your family down?     Trouble concentrating on things, such as reading the newspaper or watching television?     Moving or speaking so slowly that other people could have noticed? Or the opposite - being so fidgety or restless that you have been moving around a lot more than usual?     Thoughts that you would be better off dead, or of hurting yourself in some way?     PHQ-9 Total Score     If you checked off any problems, how difficult have these problems made it for you to do your work, take care of things at home, or get along with other people?       No Known Allergies    Prior to Admission medications    Medication Sig Start Date End Date Taking? Authorizing Provider   pantoprazole (PROTONIX) 40 MG EC tablet TAKE 1 TABLET BY MOUTH EVERY DAY 1/17/24  Yes Shyla Yarbrough APRN   spironolactone (ALDACTONE) 50 MG tablet TAKE 1 TABLET BY MOUTH EVERY DAY 1/16/24  Yes Shyla Yarbrough APRN    ursodiol (ACTIGALL) 300 MG capsule TAKE 2 CAPSULES BY MOUTH 2 (TWO) TIMES A DAY WITH MEALS FOR 90 DAYS.  Patient not taking: Reported on 3/26/2024 2/6/24 3/26/24  Shyla Yarbrough, APRN        Patient Active Problem List   Diagnosis    Acute liver failure without hepatic coma    Acute hepatitis    Hypokalemia    Hyponatremia    Steatohepatitis, alcoholic    History of alcohol abuse    Tobacco abuse    Generalized abdominal pain    Need for influenza vaccination    Essential hypertension    Alcoholic cirrhosis of liver with ascites    Cirrhosis of liver with ascites    Need for hepatitis A and B vaccination    Need for vaccination against Streptococcus pneumoniae    Need for meningococcal vaccination    Need for shingles vaccine    Huffman's esophagus without dysplasia        Past Surgical History:   Procedure Laterality Date    CHOLECYSTECTOMY      ENDOSCOPY N/A 02/09/2022    Procedure: ESOPHAGOGASTRODUODENOSCOPY WITH BX;  Surgeon: Gino Khan MD;  Location: [facility] ENDOSCOPY;  Service: Gastroenterology;  Laterality: N/A;  RETAINED LIQUID FOOD IN STOMACH, HUFFMAN'S ESOPHAGUS    ENDOSCOPY N/A 2/10/2023    Procedure: ESOPHAGOGASTRODUODENOSCOPY;  Surgeon: Gino Khan MD;  Location: [facility] ENDOSCOPY;  Service: Gastroenterology;  Laterality: N/A;  GASTRITIS, BARRETTS ESOPHAGUS, PHG    UPPER GASTROINTESTINAL ENDOSCOPY         Social History     Socioeconomic History    Marital status: Single   Tobacco Use    Smoking status: Every Day     Current packs/day: 0.50     Average packs/day: 0.5 packs/day for 16.2 years (8.1 ttl pk-yrs)     Types: Cigarettes     Start date: 2008     Passive exposure: Current    Smokeless tobacco: Never   Vaping Use    Vaping status: Never Used   Substance and Sexual Activity    Alcohol use: Not Currently     Alcohol/week: 3.0 standard drinks of alcohol     Types: 2 Cans of beer, 1 Shots of liquor per week     Comment: FORMER    Drug use: Not Currently    Sexual activity:  "Defer       Family History   Problem Relation Age of Onset    Alcohol abuse Mother     Arthritis Mother     COPD Mother     Heart disease Maternal Grandmother     Hypertension Maternal Grandmother     Lung cancer Maternal Grandmother     Stroke Maternal Grandmother     Heart disease Maternal Grandfather     Lung cancer Maternal Grandfather     Cancer Paternal Grandmother     Cancer Paternal Grandfather     Colon cancer Neg Hx     Malig Hyperthermia Neg Hx        Family history, surgical history, past medical history, Allergies and meds reviewed with patient today and updated in [facility] EMR.     ROS:  Review of Systems   Constitutional:  Negative for fatigue.   Eyes:  Negative for visual disturbance.   Respiratory:  Negative for cough, chest tightness, shortness of breath and wheezing.    Cardiovascular:  Negative for chest pain and palpitations.   Neurological:  Negative for headache.       OBJECTIVE:  Vitals:    03/26/24 0813   BP: 116/79   BP Location: Right arm   Patient Position: Sitting   Pulse: 98   Temp: 97 °F (36.1 °C)   SpO2: 98%   Weight: 82.6 kg (182 lb)   Height: 175.3 cm (69.02\")     No results found.   Body mass index is 26.86 kg/m².  No LMP for male patient.    The ASCVD Risk score (Dyer DK, et al., 2019) failed to calculate for the following reasons:    The 2019 ASCVD risk score is only valid for ages 40 to 79     Physical Exam  Vitals and nursing note reviewed.   Constitutional:       General: He is not in acute distress.     Appearance: Normal appearance. He is normal weight.   HENT:      Head: Normocephalic.      Right Ear: Tympanic membrane, ear canal and external ear normal.      Left Ear: Tympanic membrane, ear canal and external ear normal.      Nose: Nose normal.      Mouth/Throat:      Mouth: Mucous membranes are moist.      Dentition: Has dentures.      Pharynx: Oropharynx is clear.   Eyes:      General: No scleral icterus.     Conjunctiva/sclera: Conjunctivae normal.      Pupils: Pupils are " equal, round, and reactive to light.   Cardiovascular:      Rate and Rhythm: Normal rate and regular rhythm.      Pulses: Normal pulses.      Heart sounds: Normal heart sounds. No murmur heard.  Pulmonary:      Effort: Pulmonary effort is normal.      Breath sounds: Normal breath sounds. No wheezing, rhonchi or rales.   Musculoskeletal:      Cervical back: Neck supple. No rigidity or tenderness.   Lymphadenopathy:      Cervical: No cervical adenopathy.   Skin:     General: Skin is warm and dry.      Coloration: Skin is not jaundiced.      Findings: No rash.   Neurological:      General: No focal deficit present.      Mental Status: He is alert and oriented to person, place, and time.   Psychiatric:         Mood and Affect: Mood normal.         Thought Content: Thought content normal.         Judgment: Judgment normal.         Procedures    No visits with results within 30 Day(s) from this visit.   Latest known visit with results is:   Lab on 11/21/2023   Component Date Value Ref Range Status    Glucose 11/21/2023 80  65 - 99 mg/dL Final    BUN 11/21/2023 9  6 - 20 mg/dL Final    Creatinine 11/21/2023 1.08  0.76 - 1.27 mg/dL Final    Sodium 11/21/2023 137  136 - 145 mmol/L Final    Potassium 11/21/2023 4.0  3.5 - 5.2 mmol/L Final    Chloride 11/21/2023 102  98 - 107 mmol/L Final    CO2 11/21/2023 25.2  22.0 - 29.0 mmol/L Final    Calcium 11/21/2023 8.4 (L)  8.6 - 10.5 mg/dL Final    Total Protein 11/21/2023 6.7  6.0 - 8.5 g/dL Final    Albumin 11/21/2023 3.2 (L)  3.5 - 5.2 g/dL Final    ALT (SGPT) 11/21/2023 15  1 - 41 U/L Final    AST (SGOT) 11/21/2023 36  1 - 40 U/L Final    Alkaline Phosphatase 11/21/2023 246 (H)  39 - 117 U/L Final    Total Bilirubin 11/21/2023 2.1 (H)  0.0 - 1.2 mg/dL Final    Globulin 11/21/2023 3.5  gm/dL Final    A/G Ratio 11/21/2023 0.9  g/dL Final    BUN/Creatinine Ratio 11/21/2023 8.3  7.0 - 25.0 Final    Anion Gap 11/21/2023 9.8  5.0 - 15.0 mmol/L Final    eGFR 11/21/2023 91.2  >60.0  mL/min/1.73 Final    Protime 11/21/2023 17.3 (H)  11.8 - 14.9 Seconds Final    INR 11/21/2023 1.39 (H)  0.86 - 1.15 Final    CA 19-9 11/21/2023 58.4 (H)  <=35.0 U/mL Final       ASSESSMENT/ PLAN:    Diagnoses and all orders for this visit:    1. Essential hypertension (Primary)  Assessment & Plan:  His blood pressure is good.  He will continue his spironolactone as he takes for his chronic liver disease as well.    Orders:  -     Lipid Panel               Orders Placed Today:     No orders of the defined types were placed in this encounter.       Management Plan:     An After Visit Summary was printed and given to the patient at discharge.    Follow-up: Return in about 1 year (around 3/26/2025) for Recheck.    Callum Peterson DO 3/26/2024 08:28 EDT  This note was electronically signed.

## 2024-03-26 NOTE — ASSESSMENT & PLAN NOTE
His blood pressure is good.  He will continue his spironolactone as he takes for his chronic liver disease as well.

## 2024-03-28 ENCOUNTER — TELEPHONE (OUTPATIENT)
Dept: GASTROENTEROLOGY | Facility: CLINIC | Age: 39
End: 2024-03-28
Payer: COMMERCIAL

## 2024-03-28 ENCOUNTER — HOSPITAL ENCOUNTER (OUTPATIENT)
Dept: CT IMAGING | Facility: HOSPITAL | Age: 39
Discharge: HOME | End: 2024-03-28
Payer: COMMERCIAL

## 2024-03-28 DIAGNOSIS — K42.9 UMBILICAL HERNIA WITHOUT OBSTRUCTION AND WITHOUT GANGRENE: ICD-10-CM

## 2024-03-28 DIAGNOSIS — K74.3 PRIMARY BILIARY CHOLANGITIS: ICD-10-CM

## 2024-03-28 DIAGNOSIS — K70.30 ALCOHOLIC CIRRHOSIS OF LIVER WITHOUT ASCITES: ICD-10-CM

## 2024-03-28 DIAGNOSIS — R74.8 ELEVATED LIVER ENZYMES: ICD-10-CM

## 2024-03-28 PROCEDURE — 25510000001 IOPAMIDOL PER 1 ML

## 2024-03-28 PROCEDURE — 74177 CT ABD & PELVIS W/CONTRAST: CPT

## 2024-03-28 RX ADMIN — IOPAMIDOL 100 ML: 755 INJECTION, SOLUTION INTRAVENOUS at 08:59

## 2024-03-28 NOTE — TELEPHONE ENCOUNTER
Pt called back.  Notified of results.  Will also sent pt a [employer] message too. Advised pt to  stool collection kit. Voiced understanding. [email or web address]

## 2024-03-28 NOTE — TELEPHONE ENCOUNTER
----- Message from ANA LAURA Fernandez sent at 3/27/2024  3:39 PM EDT -----  I have reviewed the patient's most recent labs.  CBC shows worsening anemia with a hemoglobin of 11.6.  Platelets remain decreased which is a result of cirrhosis.  CMP shows improvement in alkaline phosphatase and bilirubin compared to labs 4 months ago.  Albumin is decreased, please advise patient to increase protein in his diet helpful to supplement with drink daily protein shakes like boost or Ensure.  AFP tumor marker negative.    Due to worsening anemia recommend occult blood and repeat CBC in 1 month to continue trending, orders placed.

## 2024-03-29 ENCOUNTER — OFFICE VISIT (OUTPATIENT)
Dept: SURGERY | Facility: CLINIC | Age: 39
End: 2024-03-29
Payer: COMMERCIAL

## 2024-03-29 VITALS — RESPIRATION RATE: 16 BRPM | HEIGHT: 69 IN | WEIGHT: 182 LBS | BODY MASS INDEX: 26.96 KG/M2

## 2024-03-29 DIAGNOSIS — K42.9 UMBILICAL HERNIA WITHOUT OBSTRUCTION AND WITHOUT GANGRENE: ICD-10-CM

## 2024-03-29 DIAGNOSIS — K70.31 ALCOHOLIC CIRRHOSIS OF LIVER WITH ASCITES: Primary | ICD-10-CM

## 2024-03-29 NOTE — PROGRESS NOTES
Inpatient History and Physical Surgical Orders    Preadmission Location:   Preadmission Time:  Facility:  Surgery Date:  Surgery Time:  Preadmission Test date:     Chief Complaint  Outpatient History and Physical / Surgical Orders    Primary Care Provider: Callum Peterson DO    Referring Provider: Shyla Yarbrough APRN    Subjective      Patient Name: Wai Gay : 1985    HPI  The patient is a 38-year-old gentleman who has a history of alcohol abuse and who has cirrhosis with ascites.  He has had increasing discomfort from an umbilical hernia defect.  He had a CT scan yesterday which did show the hernia.  There was no bowel incarcerated in the hernia and he had a large amount of ascites seen on that scan.    Past History:  Medical History: has a past medical history of Alcohol abuse (2017), Essential hypertension (2019), GERD (gastroesophageal reflux disease), Hypokalemia (2017), Hyponatremia (2017), Steatohepatitis, alcoholic (2017), Tobacco abuse (2017), and Umbilical hernia.   Surgical History: has a past surgical history that includes Cholecystectomy; Esophagogastroduodenoscopy (N/A, 2022); Upper gastrointestinal endoscopy; and Esophagogastroduodenoscopy (N/A, 2/10/2023).   Family History: family history includes Alcohol abuse in his mother; Arthritis in his mother; COPD in his mother; Cancer in his paternal grandfather and paternal grandmother; Heart disease in his maternal grandfather and maternal grandmother; Hypertension in his maternal grandmother; Lung cancer in his maternal grandfather and maternal grandmother; Stroke in his maternal grandmother.   Social History: reports that he has been smoking cigarettes. He started smoking about 16 years ago. He has a 8.1 pack-year smoking history. He has been exposed to tobacco smoke. He has never used smokeless tobacco. He reports that he does not currently use alcohol after a past usage of  "about 3.0 standard drinks of alcohol per week. He reports that he does not currently use drugs.  Allergies: Patient has no known allergies.       Current Outpatient Medications:     pantoprazole (PROTONIX) 40 MG EC tablet, TAKE 1 TABLET BY MOUTH EVERY DAY, Disp: 90 tablet, Rfl: 1    spironolactone (ALDACTONE) 50 MG tablet, TAKE 1 TABLET BY MOUTH EVERY DAY, Disp: 90 tablet, Rfl: 1       Objective   Vital Signs:   Resp 16   Ht 175.3 cm (69.02\")   Wt 82.6 kg (182 lb)   BMI 26.86 kg/m²       Physical Exam  Vitals and nursing note reviewed.   Constitutional:       Appearance: Normal appearance. The patient is well-developed.   Cardiovascular:      Rate and Rhythm: Normal rate and regular rhythm.   Pulmonary:      Effort: Pulmonary effort is normal.      Breath sounds: Normal air entry.   Abdominal:      General: Bowel sounds are normal.      Palpations: Abdomen is soft.  Large umbilical hernia.  He has palpable ascites in his abdomen today.     Skin:     General: Skin is warm and dry.   Neurological:      Mental Status: The patient is alert and oriented to person, place, and time.      Motor: Motor function is intact.   Psychiatric:         Mood and Affect: Mood normal.       Result Review :               Assessment and Plan   Diagnoses and all orders for this visit:    1. Alcoholic cirrhosis of liver with ascites (Primary)    2. Umbilical hernia without obstruction and without gangrene    I told Mr. Gay that we would probably want to repair his hernia electively but that we should probably wait until his ascites is under better control medically before proceeding with that type of operation.  I believe he has some follow-up with the gastroenterologist here in the next week or so and we will see what they recommend.  I will see him back in 3 weeks and we will reassess him at that time.    I  David Spencer MD  03/29/2024    "

## 2024-04-01 ENCOUNTER — TELEPHONE (OUTPATIENT)
Dept: GASTROENTEROLOGY | Facility: CLINIC | Age: 39
End: 2024-04-01
Payer: COMMERCIAL

## 2024-04-01 NOTE — TELEPHONE ENCOUNTER
HUB did warm transfer..  Pt stated the Rx you wrote for Eliquis 5mg QD will cost him $154.00.   he stated the pharmacy has a coupon for Eliquis 2.5 mg 2QD, will cost him $10.00..   please advise   Jose

## 2024-04-02 NOTE — TELEPHONE ENCOUNTER
Spoke with pt, advised new Rx sent to pharmacy.  I also did warm transfer to radiology scheduling to set up his paracentesis this week.  Voiced understanding. jigna

## 2024-04-08 ENCOUNTER — HOSPITAL ENCOUNTER (OUTPATIENT)
Dept: INTERVENTIONAL RADIOLOGY/VASCULAR | Facility: HOSPITAL | Age: 39
Discharge: HOME | End: 2024-04-08
Payer: COMMERCIAL

## 2024-04-08 ENCOUNTER — LAB (OUTPATIENT)
Dept: LAB | Facility: HOSPITAL | Age: 39
End: 2024-04-08
Payer: COMMERCIAL

## 2024-04-08 VITALS
RESPIRATION RATE: 16 BRPM | DIASTOLIC BLOOD PRESSURE: 68 MMHG | SYSTOLIC BLOOD PRESSURE: 106 MMHG | OXYGEN SATURATION: 100 % | HEART RATE: 79 BPM

## 2024-04-08 DIAGNOSIS — K70.31 ALCOHOLIC CIRRHOSIS OF LIVER WITH ASCITES: ICD-10-CM

## 2024-04-08 LAB
ALBUMIN FLD-MCNC: 0.3 G/DL
APTT PPP: 37.3 SECONDS (ref 24.2–34.2)
INR PPP: 1.67 (ref 0.86–1.15)
PLATELET # BLD AUTO: 94 10*3/MM3 (ref 140–450)
PROTHROMBIN TIME: 20 SECONDS (ref 11.8–14.9)

## 2024-04-08 PROCEDURE — 87205 SMEAR GRAM STAIN: CPT

## 2024-04-08 PROCEDURE — 87070 CULTURE OTHR SPECIMN AEROBIC: CPT

## 2024-04-08 PROCEDURE — 87015 SPECIMEN INFECT AGNT CONCNTJ: CPT

## 2024-04-08 PROCEDURE — 82042 OTHER SOURCE ALBUMIN QUAN EA: CPT

## 2024-04-08 PROCEDURE — 88108 CYTOPATH CONCENTRATE TECH: CPT

## 2024-04-08 PROCEDURE — 89051 BODY FLUID CELL COUNT: CPT

## 2024-04-08 PROCEDURE — 85049 AUTOMATED PLATELET COUNT: CPT

## 2024-04-08 PROCEDURE — 85730 THROMBOPLASTIN TIME PARTIAL: CPT

## 2024-04-08 PROCEDURE — 85610 PROTHROMBIN TIME: CPT

## 2024-04-08 PROCEDURE — 76942 ECHO GUIDE FOR BIOPSY: CPT

## 2024-04-08 RX ORDER — LIDOCAINE HYDROCHLORIDE 20 MG/ML
10 INJECTION, SOLUTION INFILTRATION; PERINEURAL ONCE
Status: COMPLETED | OUTPATIENT
Start: 2024-04-08 | End: 2024-04-08

## 2024-04-08 RX ADMIN — LIDOCAINE HYDROCHLORIDE 9 ML: 20 INJECTION, SOLUTION INFILTRATION; PERINEURAL at 09:45

## 2024-04-08 RX ADMIN — SODIUM BICARBONATE 1 ML: 84 INJECTION, SOLUTION INTRAVENOUS at 09:45

## 2024-04-09 LAB
APPEARANCE FLD: CLEAR
COLOR FLD: NORMAL
LYMPHOCYTES NFR FLD MANUAL: 32 %
MACROPHAGE FLUID: 46 %
MESOTHL CELL NFR FLD MANUAL: 10 %
MONOCYTES NFR FLD: 10 %
NEUTROPHILS NFR FLD MANUAL: 2 %
NUC CELL # FLD: 48 /MM3
RBC # FLD AUTO: <2000 /MM3

## 2024-04-12 LAB
BACTERIA FLD CULT: NORMAL
GRAM STN SPEC: NORMAL
GRAM STN SPEC: NORMAL

## 2024-04-18 ENCOUNTER — PREP FOR SURGERY (OUTPATIENT)
Dept: OTHER | Facility: HOSPITAL | Age: 39
End: 2024-04-18
Payer: COMMERCIAL

## 2024-04-18 ENCOUNTER — TELEPHONE (OUTPATIENT)
Dept: GASTROENTEROLOGY | Facility: CLINIC | Age: 39
End: 2024-04-18
Payer: COMMERCIAL

## 2024-04-18 DIAGNOSIS — K70.31 ALCOHOLIC CIRRHOSIS OF LIVER WITH ASCITES: Primary | ICD-10-CM

## 2024-04-18 RX ORDER — ALBUMIN (HUMAN) 12.5 G/50ML
25 SOLUTION INTRAVENOUS AS NEEDED
OUTPATIENT
Start: 2024-04-18

## 2024-04-18 NOTE — TELEPHONE ENCOUNTER
Patient called requesting to be scheduled for a paracentesis   Patient scheduled for available 04/26/2024 @ 9:15. Patient aware.   I informed patient if he was having  SOB or abdominal pain to report to ED and they can perform paracentesis sooner.   Patient voiced understanding   Will call with any questions or concerns

## 2024-04-19 ENCOUNTER — HOSPITAL ENCOUNTER (EMERGENCY)
Facility: HOSPITAL | Age: 39
Discharge: HOME | End: 2024-04-19
Payer: COMMERCIAL

## 2024-04-19 VITALS
HEART RATE: 85 BPM | OXYGEN SATURATION: 100 % | SYSTOLIC BLOOD PRESSURE: 121 MMHG | WEIGHT: 180.78 LBS | RESPIRATION RATE: 18 BRPM | HEIGHT: 69 IN | DIASTOLIC BLOOD PRESSURE: 78 MMHG | BODY MASS INDEX: 26.78 KG/M2 | TEMPERATURE: 97.9 F

## 2024-04-19 PROCEDURE — 99211 OFF/OP EST MAY X REQ PHY/QHP: CPT

## 2024-04-19 RX ORDER — SODIUM CHLORIDE 0.9 % (FLUSH) 0.9 %
10 SYRINGE (ML) INJECTION AS NEEDED
Status: DISCONTINUED | OUTPATIENT
Start: 2024-04-19 | End: 2024-04-19 | Stop reason: HOSPADM

## 2024-04-26 ENCOUNTER — LAB (OUTPATIENT)
Dept: LAB | Facility: HOSPITAL | Age: 39
End: 2024-04-26
Payer: COMMERCIAL

## 2024-04-26 ENCOUNTER — HOSPITAL ENCOUNTER (OUTPATIENT)
Dept: INTERVENTIONAL RADIOLOGY/VASCULAR | Facility: HOSPITAL | Age: 39
Discharge: HOME | End: 2024-04-26
Payer: COMMERCIAL

## 2024-04-26 VITALS — OXYGEN SATURATION: 98 % | HEART RATE: 73 BPM | DIASTOLIC BLOOD PRESSURE: 65 MMHG | SYSTOLIC BLOOD PRESSURE: 103 MMHG

## 2024-04-26 DIAGNOSIS — D64.9 ANEMIA, UNSPECIFIED TYPE: ICD-10-CM

## 2024-04-26 LAB
ALBUMIN FLD-MCNC: 0.4 G/DL
ANISOCYTOSIS BLD QL: ABNORMAL
APPEARANCE FLD: CLEAR
APTT PPP: 36.8 SECONDS (ref 24.2–34.2)
BASOPHILS # BLD MANUAL: 0.04 10*3/MM3 (ref 0–0.2)
BASOPHILS NFR BLD MANUAL: 1 % (ref 0–1.5)
COLOR FLD: NORMAL
DEPRECATED RDW RBC AUTO: 58.8 FL (ref 37–54)
EOSINOPHIL # BLD MANUAL: 0.95 10*3/MM3 (ref 0–0.4)
EOSINOPHIL NFR BLD MANUAL: 24 % (ref 0.3–6.2)
ERYTHROCYTE [DISTWIDTH] IN BLOOD BY AUTOMATED COUNT: 16.8 % (ref 12.3–15.4)
HCT VFR BLD AUTO: 32.7 % (ref 37.5–51)
HGB BLD-MCNC: 10.7 G/DL (ref 13–17.7)
HYPOCHROMIA BLD QL: ABNORMAL
INR PPP: 1.58 (ref 0.86–1.15)
LARGE PLATELETS: ABNORMAL
LYMPHOCYTES # BLD MANUAL: 0.71 10*3/MM3 (ref 0.7–3.1)
LYMPHOCYTES NFR BLD MANUAL: 6 % (ref 5–12)
LYMPHOCYTES NFR FLD MANUAL: 49 %
MACROCYTES BLD QL SMEAR: ABNORMAL
MACROPHAGE FLUID: 22 %
MCH RBC QN AUTO: 30.9 PG (ref 26.6–33)
MCHC RBC AUTO-ENTMCNC: 32.7 G/DL (ref 31.5–35.7)
MCV RBC AUTO: 94.5 FL (ref 79–97)
MESOTHL CELL NFR FLD MANUAL: 3 %
MONOCYTES # BLD: 0.24 10*3/MM3 (ref 0.1–0.9)
MONOCYTES NFR FLD: 5 %
NEUTROPHILS # BLD AUTO: 2.02 10*3/MM3 (ref 1.7–7)
NEUTROPHILS NFR BLD MANUAL: 51 % (ref 42.7–76)
NEUTROPHILS NFR FLD MANUAL: 21 %
NUC CELL # FLD: 95 /MM3
OVALOCYTES BLD QL SMEAR: ABNORMAL
PLATELET # BLD AUTO: 91 10*3/MM3 (ref 140–450)
PMV BLD AUTO: 9.5 FL (ref 6–12)
POIKILOCYTOSIS BLD QL SMEAR: ABNORMAL
PROTHROMBIN TIME: 19.1 SECONDS (ref 11.8–14.9)
RBC # BLD AUTO: 3.46 10*6/MM3 (ref 4.14–5.8)
RBC # FLD AUTO: <2000 /MM3
SMALL PLATELETS BLD QL SMEAR: ABNORMAL
VARIANT LYMPHS NFR BLD MANUAL: 18 % (ref 19.6–45.3)
WBC MORPH BLD: NORMAL
WBC NRBC COR # BLD AUTO: 3.96 10*3/MM3 (ref 3.4–10.8)

## 2024-04-26 PROCEDURE — 82042 OTHER SOURCE ALBUMIN QUAN EA: CPT

## 2024-04-26 PROCEDURE — 89051 BODY FLUID CELL COUNT: CPT

## 2024-04-26 PROCEDURE — 85610 PROTHROMBIN TIME: CPT

## 2024-04-26 PROCEDURE — 85007 BL SMEAR W/DIFF WBC COUNT: CPT

## 2024-04-26 PROCEDURE — 76942 ECHO GUIDE FOR BIOPSY: CPT

## 2024-04-26 PROCEDURE — 85730 THROMBOPLASTIN TIME PARTIAL: CPT

## 2024-04-26 PROCEDURE — 87070 CULTURE OTHR SPECIMN AEROBIC: CPT

## 2024-04-26 PROCEDURE — 36415 COLL VENOUS BLD VENIPUNCTURE: CPT

## 2024-04-26 PROCEDURE — C1729 CATH, DRAINAGE: HCPCS

## 2024-04-26 PROCEDURE — 85025 COMPLETE CBC W/AUTO DIFF WBC: CPT

## 2024-04-26 PROCEDURE — 87015 SPECIMEN INFECT AGNT CONCNTJ: CPT

## 2024-04-26 PROCEDURE — 87205 SMEAR GRAM STAIN: CPT

## 2024-04-26 RX ORDER — LIDOCAINE HYDROCHLORIDE 20 MG/ML
20 INJECTION, SOLUTION INFILTRATION; PERINEURAL ONCE
Status: COMPLETED | OUTPATIENT
Start: 2024-04-26 | End: 2024-04-26

## 2024-04-26 RX ADMIN — SODIUM BICARBONATE 1 MEQ: 84 INJECTION, SOLUTION INTRAVENOUS at 10:05

## 2024-04-26 RX ADMIN — LIDOCAINE HYDROCHLORIDE 20 ML: 20 INJECTION, SOLUTION INFILTRATION; PERINEURAL at 10:05

## 2024-04-29 LAB
BACTERIA FLD CULT: NORMAL
GRAM STN SPEC: NORMAL

## 2024-04-30 ENCOUNTER — TELEPHONE (OUTPATIENT)
Dept: GASTROENTEROLOGY | Facility: CLINIC | Age: 39
End: 2024-04-30
Payer: COMMERCIAL

## 2024-04-30 NOTE — TELEPHONE ENCOUNTER
----- Message from Jose PRABHAKAR sent at 4/30/2024  4:24 PM EDT -----  CBC does show worsening anemia with a hemoglobin of 10.7.  Please have patient complete previously ordered occult blood to further evaluate.  Patient will likely need EGD/colonoscopy for further evaluation of anemia.

## 2024-04-30 NOTE — TELEPHONE ENCOUNTER
Spoke with pt. Notified of results.  Advised pt to stop by office to  stool collection kit, as he no longer has his. He voiced understanding. jigna

## 2024-05-06 ENCOUNTER — TELEPHONE (OUTPATIENT)
Dept: GASTROENTEROLOGY | Facility: CLINIC | Age: 39
End: 2024-05-06
Payer: COMMERCIAL

## 2024-05-07 ENCOUNTER — APPOINTMENT (OUTPATIENT)
Dept: INTERVENTIONAL RADIOLOGY/VASCULAR | Facility: HOSPITAL | Age: 39
End: 2024-05-07
Payer: COMMERCIAL

## 2024-05-07 ENCOUNTER — HOSPITAL ENCOUNTER (EMERGENCY)
Facility: HOSPITAL | Age: 39
Discharge: HOME | End: 2024-05-07
Attending: EMERGENCY MEDICINE
Payer: COMMERCIAL

## 2024-05-07 ENCOUNTER — PREP FOR SURGERY (OUTPATIENT)
Dept: OTHER | Facility: HOSPITAL | Age: 39
End: 2024-05-07
Payer: COMMERCIAL

## 2024-05-07 VITALS
OXYGEN SATURATION: 100 % | BODY MASS INDEX: 28.57 KG/M2 | WEIGHT: 188.49 LBS | RESPIRATION RATE: 18 BRPM | SYSTOLIC BLOOD PRESSURE: 100 MMHG | DIASTOLIC BLOOD PRESSURE: 64 MMHG | TEMPERATURE: 99 F | HEART RATE: 87 BPM | HEIGHT: 68 IN

## 2024-05-07 DIAGNOSIS — K70.11 ASCITES DUE TO ALCOHOLIC HEPATITIS: Primary | ICD-10-CM

## 2024-05-07 DIAGNOSIS — K70.31 ALCOHOLIC CIRRHOSIS OF LIVER WITH ASCITES: Primary | ICD-10-CM

## 2024-05-07 LAB
ALBUMIN FLD-MCNC: 0.2 G/DL
ALBUMIN SERPL-MCNC: 2.6 G/DL (ref 3.5–5.2)
ALBUMIN/GLOB SERPL: 0.8 G/DL
ALP SERPL-CCNC: 188 U/L (ref 39–117)
ALT SERPL W P-5'-P-CCNC: 19 U/L (ref 1–41)
ANION GAP SERPL CALCULATED.3IONS-SCNC: 9.1 MMOL/L (ref 5–15)
APPEARANCE FLD: CLEAR
APTT PPP: 36.9 SECONDS (ref 78–95.9)
AST SERPL-CCNC: 38 U/L (ref 1–40)
BASOPHILS # BLD AUTO: 0.02 10*3/MM3 (ref 0–0.2)
BASOPHILS NFR BLD AUTO: 0.4 % (ref 0–1.5)
BILIRUB SERPL-MCNC: 2.1 MG/DL (ref 0–1.2)
BUN SERPL-MCNC: 10 MG/DL (ref 6–20)
BUN/CREAT SERPL: 8.5 (ref 7–25)
CALCIUM SPEC-SCNC: 7.6 MG/DL (ref 8.6–10.5)
CHLORIDE SERPL-SCNC: 102 MMOL/L (ref 98–107)
CO2 SERPL-SCNC: 22.9 MMOL/L (ref 22–29)
COLOR FLD: NORMAL
CREAT SERPL-MCNC: 1.17 MG/DL (ref 0.76–1.27)
DEPRECATED RDW RBC AUTO: 55.2 FL (ref 37–54)
EGFRCR SERPLBLD CKD-EPI 2021: 82.9 ML/MIN/1.73
EOSINOPHIL # BLD AUTO: 0.74 10*3/MM3 (ref 0–0.4)
EOSINOPHIL NFR BLD AUTO: 13.6 % (ref 0.3–6.2)
ERYTHROCYTE [DISTWIDTH] IN BLOOD BY AUTOMATED COUNT: 16.2 % (ref 12.3–15.4)
GLOBULIN UR ELPH-MCNC: 3.2 GM/DL
GLUCOSE SERPL-MCNC: 89 MG/DL (ref 65–99)
HCT VFR BLD AUTO: 31.1 % (ref 37.5–51)
HGB BLD-MCNC: 10.3 G/DL (ref 13–17.7)
HOLD SPECIMEN: NORMAL
IMM GRANULOCYTES # BLD AUTO: 0.02 10*3/MM3 (ref 0–0.05)
IMM GRANULOCYTES NFR BLD AUTO: 0.4 % (ref 0–0.5)
INR PPP: 1.71 (ref 0.86–1.15)
LYMPHOCYTES # BLD AUTO: 0.92 10*3/MM3 (ref 0.7–3.1)
LYMPHOCYTES NFR BLD AUTO: 16.8 % (ref 19.6–45.3)
LYMPHOCYTES NFR FLD MANUAL: 6 %
MCH RBC QN AUTO: 30.7 PG (ref 26.6–33)
MCHC RBC AUTO-ENTMCNC: 33.1 G/DL (ref 31.5–35.7)
MCV RBC AUTO: 92.8 FL (ref 79–97)
MESOTHL CELL NFR FLD MANUAL: 5 %
MONOCYTES # BLD AUTO: 0.52 10*3/MM3 (ref 0.1–0.9)
MONOCYTES NFR BLD AUTO: 9.5 % (ref 5–12)
MONOCYTES NFR FLD: 18 %
NEUTROPHILS NFR BLD AUTO: 3.24 10*3/MM3 (ref 1.7–7)
NEUTROPHILS NFR BLD AUTO: 59.3 % (ref 42.7–76)
NEUTROPHILS NFR FLD MANUAL: 71 %
NRBC BLD AUTO-RTO: 0 /100 WBC (ref 0–0.2)
NUC CELL # FLD: 486 /MM3
PLATELET # BLD AUTO: 84 10*3/MM3 (ref 140–450)
PMV BLD AUTO: 9.3 FL (ref 6–12)
POTASSIUM SERPL-SCNC: 3.4 MMOL/L (ref 3.5–5.2)
PROT SERPL-MCNC: 5.8 G/DL (ref 6–8.5)
PROTHROMBIN TIME: 20.4 SECONDS (ref 11.8–14.9)
RBC # BLD AUTO: 3.35 10*6/MM3 (ref 4.14–5.8)
RBC # FLD AUTO: <2000 /MM3
SODIUM SERPL-SCNC: 134 MMOL/L (ref 136–145)
WBC NRBC COR # BLD AUTO: 5.46 10*3/MM3 (ref 3.4–10.8)

## 2024-05-07 PROCEDURE — 87205 SMEAR GRAM STAIN: CPT | Performed by: EMERGENCY MEDICINE

## 2024-05-07 PROCEDURE — 85025 COMPLETE CBC W/AUTO DIFF WBC: CPT | Performed by: NURSE PRACTITIONER

## 2024-05-07 PROCEDURE — 85730 THROMBOPLASTIN TIME PARTIAL: CPT | Performed by: NURSE PRACTITIONER

## 2024-05-07 PROCEDURE — 87070 CULTURE OTHR SPECIMN AEROBIC: CPT | Performed by: EMERGENCY MEDICINE

## 2024-05-07 PROCEDURE — 85610 PROTHROMBIN TIME: CPT | Performed by: NURSE PRACTITIONER

## 2024-05-07 PROCEDURE — 89051 BODY FLUID CELL COUNT: CPT | Performed by: EMERGENCY MEDICINE

## 2024-05-07 PROCEDURE — 76942 ECHO GUIDE FOR BIOPSY: CPT

## 2024-05-07 PROCEDURE — 88108 CYTOPATH CONCENTRATE TECH: CPT

## 2024-05-07 PROCEDURE — 99284 EMERGENCY DEPT VISIT MOD MDM: CPT

## 2024-05-07 PROCEDURE — 80053 COMPREHEN METABOLIC PANEL: CPT | Performed by: NURSE PRACTITIONER

## 2024-05-07 PROCEDURE — 82042 OTHER SOURCE ALBUMIN QUAN EA: CPT

## 2024-05-07 RX ORDER — LIDOCAINE HYDROCHLORIDE 20 MG/ML
10 INJECTION, SOLUTION INFILTRATION; PERINEURAL ONCE
Status: COMPLETED | OUTPATIENT
Start: 2024-05-07 | End: 2024-05-07

## 2024-05-07 RX ORDER — ALBUMIN (HUMAN) 12.5 G/50ML
25 SOLUTION INTRAVENOUS AS NEEDED
OUTPATIENT
Start: 2024-05-07

## 2024-05-07 RX ORDER — SODIUM CHLORIDE 0.9 % (FLUSH) 0.9 %
10 SYRINGE (ML) INJECTION AS NEEDED
Status: DISCONTINUED | OUTPATIENT
Start: 2024-05-07 | End: 2024-05-07 | Stop reason: HOSPADM

## 2024-05-07 RX ADMIN — LIDOCAINE HYDROCHLORIDE 9 ML: 20 INJECTION, SOLUTION INFILTRATION; PERINEURAL at 15:35

## 2024-05-07 RX ADMIN — SODIUM BICARBONATE 1 ML: 84 INJECTION INTRAVENOUS at 15:35

## 2024-05-07 NOTE — Clinical Note
[facility] EMERGENCY ROOM  [address] [place] [address]  Phone: [phone]  Fax: [phone]    Wai Gay was seen and treated in our emergency department on 5/7/2024.  He may return to work on 05/08/2024.         Thank you for choosing [facility].    Jasmyn Louie, APRN

## 2024-05-07 NOTE — ED PROVIDER NOTES
gera: 12:39 PM EDT  Date of encounter:  5/7/2024  Independent Historian/Clinical History and Information was obtained by:   Patient          Chief Complaint: abdominal swelling        History is limited by: N/A          History of Present Illness:  Patient is a 38 y.o. year old male alcoholic cirrhosis with ascites and PE (on eliquis) who presents to the emergency department for evaluation of abdominal swelling.  Patient states he had a paracentesis on 8 April and 26th.  Patient states his fluid has been swelling since his last paracentesis.  Patient states he has one scheduled for this Friday but could not wait because he cannot bend over to tie shoes.  He denies fever, cough, chest pain or shortness breath, dysuria, nausea, vomiting, constipation or diarrhea, or other complaint.  Patient smokes half pack a day.  Patient denies alcohol use              Patient Care Team  Primary Care Provider: Callum Peterson DO    Past Medical History:     No Known Allergies  Past Medical History:   Diagnosis Date    Alcohol abuse 03/14/2017    Essential hypertension 12/27/2019    GERD (gastroesophageal reflux disease)     Hypokalemia 03/14/2017    Will update    Hyponatremia 03/14/2017    Steatohepatitis, alcoholic 03/14/2017    Tobacco abuse 03/14/2017    Umbilical hernia      Past Surgical History:   Procedure Laterality Date    CHOLECYSTECTOMY      ENDOSCOPY N/A 02/09/2022    Procedure: ESOPHAGOGASTRODUODENOSCOPY WITH BX;  Surgeon: iGno Khan MD;  Location: [facility] ENDOSCOPY;  Service: Gastroenterology;  Laterality: N/A;  RETAINED LIQUID FOOD IN STOMACH, HUFFMAN'S ESOPHAGUS    ENDOSCOPY N/A 2/10/2023    Procedure: ESOPHAGOGASTRODUODENOSCOPY;  Surgeon: Gino Khan MD;  Location: [facility] ENDOSCOPY;  Service: Gastroenterology;  Laterality: N/A;  GASTRITIS, BARRETTS ESOPHAGUS, PHG    UPPER GASTROINTESTINAL ENDOSCOPY       Family History   Problem Relation Age of Onset    Alcohol abuse Mother      "Arthritis Mother     COPD Mother     Heart disease Maternal Grandmother     Hypertension Maternal Grandmother     Lung cancer Maternal Grandmother     Stroke Maternal Grandmother     Heart disease Maternal Grandfather     Lung cancer Maternal Grandfather     Cancer Paternal Grandmother     Cancer Paternal Grandfather     Colon cancer Neg Hx     Malig Hyperthermia Neg Hx        Home Medications:  Prior to Admission medications    Medication Sig Start Date End Date Taking? Authorizing Provider   apixaban (ELIQUIS) 2.5 MG tablet tablet Take 1 tablet by mouth 2 (Two) Times a Day. 4/2/24   Shyla Yarbrough APRN   pantoprazole (PROTONIX) 40 MG EC tablet TAKE 1 TABLET BY MOUTH EVERY DAY 1/17/24   Shyla Yarbrough APRN   spironolactone (ALDACTONE) 50 MG tablet TAKE 1 TABLET BY MOUTH EVERY DAY 1/16/24   Shyla Yarbrough APRN        Social History:   Social History     Tobacco Use    Smoking status: Every Day     Current packs/day: 0.50     Average packs/day: 0.5 packs/day for 16.3 years (8.2 ttl pk-yrs)     Types: Cigarettes     Start date: 2008     Passive exposure: Current    Smokeless tobacco: Never   Vaping Use    Vaping status: Never Used   Substance Use Topics    Alcohol use: Not Currently     Alcohol/week: 3.0 standard drinks of alcohol     Types: 2 Cans of beer, 1 Shots of liquor per week     Comment: FORMER    Drug use: Not Currently         Physical Exam:  /64   Pulse 87   Temp 99 °F (37.2 °C) (Oral)   Resp 18   Ht 172.7 cm (68\")   Wt 85.5 kg (188 lb 7.9 oz)   SpO2 100%   BMI 28.66 kg/m²     Physical Exam  Constitutional:       Appearance: Normal appearance.   HENT:      Head: Normocephalic and atraumatic.   Cardiovascular:      Rate and Rhythm: Normal rate and regular rhythm.      Pulses: Normal pulses.      Heart sounds: Normal heart sounds.   Pulmonary:      Effort: Pulmonary effort is normal.      Breath sounds: Normal breath sounds.   Abdominal:      General: Bowel sounds are normal.      " Palpations: Abdomen is soft.      Tenderness: There is abdominal tenderness (mild diffuse).      Comments: Positive fluid wave   Musculoskeletal:         General: Normal range of motion.   Skin:     General: Skin is warm and dry.   Neurological:      General: No focal deficit present.      Mental Status: He is alert and oriented to person, place, and time.   Psychiatric:         Mood and Affect: Mood normal.         Behavior: Behavior normal.         Thought Content: Thought content normal.         Judgment: Judgment normal.                        Comorbidities that affect care:    Alcoholic cirrhosis    External Notes reviewed:    None      The following orders were placed and all results were independently analyzed by me:  Orders Placed This Encounter   Procedures    Body Fluid Culture - Body Fluid, Peritoneum    Body Fluid Culture - Body Fluid, Peritoneum    US Paracentesis    Comprehensive Metabolic Panel    Protime-INR    aPTT    CBC Auto Differential    Body Fluid Cell Count With Differential - Body Fluid, Peritoneum    Body fluid cell count - Body Fluid, Peritoneum    Body Fluid Cell Count With Differential - Body Fluid, Peritoneum    Albumin, Fluid - Body Fluid, Peritoneum    Obtain Informed Consent    Insert Peripheral IV    CBC & Differential    Extra Tubes    Gold Top - SST       Medications Given in the Emergency Department:  Medications   sodium chloride 0.9 % flush 10 mL (has no administration in time range)   lidocaine (XYLOCAINE) 2% injection 10 mL (9 mL Injection Given 5/7/24 1535)   sodium bicarbonate injection 8.4% 10 mL (1 mL Injection Given 5/7/24 1535)                Labs:    Lab Results (last 24 hours)       Procedure Component Value Units Date/Time    CBC & Differential [[id number]]  (Abnormal) Collected: 05/07/24 1356    Specimen: Blood from Arm, Left Updated: 05/07/24 1413    Narrative:      The following orders were created for panel order CBC & Differential.  Procedure                                Abnormality         Status                     ---------                               -----------         ------                     CBC Auto Differential[[id number]]        Abnormal            Final result                 Please view results for these tests on the individual orders.    Comprehensive Metabolic Panel [[id number]]  (Abnormal) Collected: 05/07/24 1356    Specimen: Blood from Arm, Left Updated: 05/07/24 1430     Glucose 89 mg/dL      BUN 10 mg/dL      Creatinine 1.17 mg/dL      Sodium 134 mmol/L      Potassium 3.4 mmol/L      Chloride 102 mmol/L      CO2 22.9 mmol/L      Calcium 7.6 mg/dL      Total Protein 5.8 g/dL      Albumin 2.6 g/dL      ALT (SGPT) 19 U/L      AST (SGOT) 38 U/L      Alkaline Phosphatase 188 U/L      Total Bilirubin 2.1 mg/dL      Globulin 3.2 gm/dL      A/G Ratio 0.8 g/dL      BUN/Creatinine Ratio 8.5     Anion Gap 9.1 mmol/L      eGFR 82.9 mL/min/1.73     Narrative:      GFR Normal >60  Chronic Kidney Disease <60  Kidney Failure <15      Protime-INR [[id number]]  (Abnormal) Collected: 05/07/24 1356    Specimen: Blood from Arm, Left Updated: 05/07/24 1417     Protime 20.4 Seconds      INR 1.71    Narrative:      Suggested Therapeutic Ranges For Oral Anticoagulant Therapy:  Level of Therapy                      INR Target Range  Standard Dose                            2.0-3.0  High Dose                                2.5-3.5  Patients not receiving anticoagulant  Therapy Normal Range                     0.86-1.15    aPTT [[id number]]  (Abnormal) Collected: 05/07/24 1356    Specimen: Blood from Arm, Left Updated: 05/07/24 1417     PTT 36.9 seconds     CBC Auto Differential [[id number]]  (Abnormal) Collected: 05/07/24 1356    Specimen: Blood from Arm, Left Updated: 05/07/24 1413     WBC 5.46 10*3/mm3      RBC 3.35 10*6/mm3      Hemoglobin 10.3 g/dL      Hematocrit 31.1 %      MCV 92.8 fL      MCH 30.7 pg      MCHC 33.1 g/dL      RDW 16.2 %      RDW-SD 55.2 fl      MPV 9.3 fL       Platelets 84 10*3/mm3      Neutrophil % 59.3 %      Lymphocyte % 16.8 %      Monocyte % 9.5 %      Eosinophil % 13.6 %      Basophil % 0.4 %      Immature Grans % 0.4 %      Neutrophils, Absolute 3.24 10*3/mm3      Lymphocytes, Absolute 0.92 10*3/mm3      Monocytes, Absolute 0.52 10*3/mm3      Eosinophils, Absolute 0.74 10*3/mm3      Basophils, Absolute 0.02 10*3/mm3      Immature Grans, Absolute 0.02 10*3/mm3      nRBC 0.0 /100 WBC     Body Fluid Cell Count With Differential - Body Fluid, Peritoneum [[id number]] Collected: 05/07/24 1539    Specimen: Body Fluid from Peritoneum Updated: 05/07/24 1618    Narrative:      The following orders were created for panel order Body Fluid Cell Count With Differential - Body Fluid, Peritoneum.  Procedure                               Abnormality         Status                     ---------                               -----------         ------                     Body fluid cell count - ...[[id number]]                      In process                   Please view results for these tests on the individual orders.    Body Fluid Culture - Body Fluid, Peritoneum [[id number]] Collected: 05/07/24 1539    Specimen: Body Fluid from Peritoneum Updated: 05/07/24 1618    Body fluid cell count - Body Fluid, Peritoneum [[id number]] Collected: 05/07/24 1539    Specimen: Body Fluid from Peritoneum Updated: 05/07/24 1618    Albumin, Fluid - Body Fluid, Peritoneum [[id number]] Collected: 05/07/24 1539    Specimen: Body Fluid from Peritoneum Updated: 05/07/24 1618             Imaging:    No Radiology Exams Resulted Within Past 24 Hours        ED course:        Notes from radiology:    ER PATIENT.   DIAGNOSTIC.   5FR 7CM YUEH.   3.6 LITERS CLEAR YELLOW ASCTIC FLUID REMOVED.   80 ML SAMPLE SNE TO THE LAB.     I have discussed with the patient the emergency department work-up including all test results, the differential diagnosis, the diagnosis given in the emergency department, and the absolute need  for follow-up with the primary care physician.  I have discussed all prescriptions and treatments.  I have discussed the possible worsening of their condition, and also discussed criteria for return to the emergency department which includes but is not limited to worsening condition or lack of improvement of the current condition.  I have informed them that a normal work-up evaluation in the emergency department and/or discharge from the emergency department, does not signify that no disease process is present, but simply that there is no emergent indication for admission.  All questions were answered at this time.  I informed them that significant pathology may still be present, but they may need further work-up, and that this further investigation should be undertaken by the primary care physician. He voiced his/her understanding.  Patient is agreeable to plan for discharge.    Discharge instructions include:     Follow up with your PCP, Dr. Peterson for reevaluation and recheck of blood pressure.    Return the emergency department for fever, abdominal swelling, vomiting, new change or worsening symptoms.                Differential Diagnosis and Discussion:  Ascites, SBP      Consultants/Shared Management Plan:  1300 spoke with [employer] sandhya regarding patient and the need for paracentesis.  Discussed history findings and history of scheduled paracentesis.  She states will place on list for intervention.  She has no questions or further recommendations          Social Determinants of Health:    Patient is independent, reliable, and has access to care.       MDM     Amount and/or Complexity of Data Reviewed  Clinical lab tests: reviewed    Risk of Complications, Morbidity, and/or Mortality  Presenting problems: moderate  Diagnostic procedures: low  Management options: low    Patient Progress  Patient progress: improved                       Final diagnoses:   Ascites due to alcoholic hepatitis            Jasmyn Louie, APRN  05/07/24 2015

## 2024-05-07 NOTE — ED PROVIDER NOTES
"SHARED VISIT NOTE:    Patient is 38 y.o. year old male that presents to the ED for evaluation of abdominal swelling and ascites..         ED Course: The patient had paracentesis in the ED and he will be discharged home.  His vital signs are improved.    /64   Pulse 87   Temp 99 °F (37.2 °C) (Oral)   Resp 18   Ht 172.7 cm (68\")   Wt 85.5 kg (188 lb 7.9 oz)   SpO2 100%   BMI 28.66 kg/m²   Results for orders placed or performed during the hospital encounter of 05/07/24   Comprehensive Metabolic Panel    Specimen: Arm, Left; Blood   Result Value Ref Range    Glucose 89 65 - 99 mg/dL    BUN 10 6 - 20 mg/dL    Creatinine 1.17 0.76 - 1.27 mg/dL    Sodium 134 (L) 136 - 145 mmol/L    Potassium 3.4 (L) 3.5 - 5.2 mmol/L    Chloride 102 98 - 107 mmol/L    CO2 22.9 22.0 - 29.0 mmol/L    Calcium 7.6 (L) 8.6 - 10.5 mg/dL    Total Protein 5.8 (L) 6.0 - 8.5 g/dL    Albumin 2.6 (L) 3.5 - 5.2 g/dL    ALT (SGPT) 19 1 - 41 U/L    AST (SGOT) 38 1 - 40 U/L    Alkaline Phosphatase 188 (H) 39 - 117 U/L    Total Bilirubin 2.1 (H) 0.0 - 1.2 mg/dL    Globulin 3.2 gm/dL    A/G Ratio 0.8 g/dL    BUN/Creatinine Ratio 8.5 7.0 - 25.0    Anion Gap 9.1 5.0 - 15.0 mmol/L    eGFR 82.9 >60.0 mL/min/1.73   Protime-INR    Specimen: Arm, Left; Blood   Result Value Ref Range    Protime 20.4 (H) 11.8 - 14.9 Seconds    INR 1.71 (H) 0.86 - 1.15   aPTT    Specimen: Arm, Left; Blood   Result Value Ref Range    PTT 36.9 (L) 78.0 - 95.9 seconds   CBC Auto Differential    Specimen: Arm, Left; Blood   Result Value Ref Range    WBC 5.46 3.40 - 10.80 10*3/mm3    RBC 3.35 (L) 4.14 - 5.80 10*6/mm3    Hemoglobin 10.3 (L) 13.0 - 17.7 g/dL    Hematocrit 31.1 (L) 37.5 - 51.0 %    MCV 92.8 79.0 - 97.0 fL    MCH 30.7 26.6 - 33.0 pg    MCHC 33.1 31.5 - 35.7 g/dL    RDW 16.2 (H) 12.3 - 15.4 %    RDW-SD 55.2 (H) 37.0 - 54.0 fl    MPV 9.3 6.0 - 12.0 fL    Platelets 84 (L) 140 - 450 10*3/mm3    Neutrophil % 59.3 42.7 - 76.0 %    Lymphocyte % 16.8 (L) 19.6 - 45.3 %    " Monocyte % 9.5 5.0 - 12.0 %    Eosinophil % 13.6 (H) 0.3 - 6.2 %    Basophil % 0.4 0.0 - 1.5 %    Immature Grans % 0.4 0.0 - 0.5 %    Neutrophils, Absolute 3.24 1.70 - 7.00 10*3/mm3    Lymphocytes, Absolute 0.92 0.70 - 3.10 10*3/mm3    Monocytes, Absolute 0.52 0.10 - 0.90 10*3/mm3    Eosinophils, Absolute 0.74 (H) 0.00 - 0.40 10*3/mm3    Basophils, Absolute 0.02 0.00 - 0.20 10*3/mm3    Immature Grans, Absolute 0.02 0.00 - 0.05 10*3/mm3    nRBC 0.0 0.0 - 0.2 /100 WBC   Body fluid cell count - Body Fluid, Peritoneum    Specimen: Peritoneum; Body Fluid   Result Value Ref Range    Color, Fluid Light Yellow     Appearance, Fluid Clear Clear    Nucleated Cells, Fluid 486 /mm3    RBC, Fluid <2,000   /mm3   Gold Top - SST   Result Value Ref Range    Extra Tube Hold for add-ons.      Medications   sodium chloride 0.9 % flush 10 mL (has no administration in time range)   lidocaine (XYLOCAINE) 2% injection 10 mL (9 mL Injection Given 5/7/24 9075)   sodium bicarbonate injection 8.4% 10 mL (1 mL Injection Given 5/7/24 1535)     US Paracentesis    Result Date: 4/26/2024  Narrative: PROCEDURE: US PARACENTESIS-  DATE: 4/26/2024  INDICATION: 38 years old Male with ascites  CONSENT: The procedural risks, benefits, and alternatives were discussed with the patient.  Procedure risks discussed with the patient included potential bleeding, infection, and injury to surrounding tissues and structures. All patient questions were satisfactorily answered prior to the procedure. Verbal and written informed consent were obtained as well as for any reasonable additional procedures which might be emergently required should a complication directly related to the procedure occur.  TECHNIQUE: A formal time-out was taken correctly identifying the patient's name, patient's date of birth, procedure type, and procedure site.  The patient was placed in the supine position on the procedure table. Limited grayscale and color Doppler sonography of the  "abdomen was performed to identify a suitable site for paracentesis. The largest pocket of ascites was identified within the right lower abdomen. The skin overlying the right lower quadrant was then prepped and draped in the typical sterile fashion. The skin and soft tissues were anesthetized using 2% buffered lidocaine.  A 5F Yueh catheter was advanced into the fluid collection and 2.8 L pale yellow fluid was drained.  The fluid was sent to the lab for analysis. The catheter was removed and a sterile bandage applied to the skin entry site.  The patient tolerated the procedure well without any immediate complications.      Impression: Successful ultrasound-guided diagnostic and therapeutic paracentesis was performed.  Electronically Signed By-AHSAN GERMAIN MD On:4/26/2024 12:24 PM      US Paracentesis    Result Date: 4/8/2024  Narrative: US PARACENTESIS-  Date of Exam: 4/8/2024 9:25 AM  Indication: ascites; K70.31-Alcoholic cirrhosis of liver with ascites.  Comparison: IR paracentesis with imaging, 11/19/2021  Consent: Risks and benefits were discussed with the patient including but not limited to risk of bleeding, infection, bowel injury, injury to adjacent structures and/or allergic reaction or reaction to medications used. Alternatives were discussed with the patient. The patient verbalized understanding and elected to proceed with the procedure. Of note, patient took Eliquis this morning prior to arrival to today's procedure. Discussed that because of this, patient had increased risk of bleeding. Patient verbalized understanding of the risks and elected to proceed with the procedure.  Technique/Findings: Preprocedural vitals obtained by a radiology nurse and reviewed. Preliminary bedside ultrasound demonstrated a moderate amount of ascitic fluid in the right lower quadrant, site was marked with an \"L?. The overlying skin was prepped and draped in sterile fashion. The skin was infiltrated with local anesthesia " over the insertion site with 10 mL of 2% lidocaine to anesthetize the site. A scalpel was used to make a small nick in the overlying skin to aid in catheter advancement. A 5 [place] 7 cm Yueh catheter was then advanced into peritoneal space. 3.6 L of clear, straw-colored ascitic fluid was aspirated. Afterwards, the catheter was then removed and a bandage was applied. A sample of the fluid specimen was sent to lab for further diagnostic evaluation. Postprocedural vitals were obtained by a radiology nurse.      Impression: Impression: Ultrasound-guided paracentesis was performed without complication, patient tolerated procedure with 3.6 L of clear, straw-colored ascitic fluid removed. A sample specimen of 80 mL was sent to the lab for further diagnostic evaluation. The patient was instructed to obtain follow up care from the referring physician.   AMERICO Strickland PA-C   Electronically Signed By-AHSAN GERMAIN MD On:4/8/2024 11:05 AM       MDM:    Procedures          SHARED VISIT ATTESTATION:    This visit was performed by both myself and an APC.  I performed the substantive portion of the medical decision making. The management plan was made or approved by me, and I take responsibility for patient management.           Agusto Fernandez DO  16:32 EDT  05/07/24         Agusto Fernandez DO  05/07/24 1632

## 2024-05-07 NOTE — DISCHARGE INSTRUCTIONS
Follow up with your PCP, Dr. Peterson for reevaluation and recheck of blood pressure.    Return the emergency department for fever, abdominal swelling, vomiting, new change or worsening symptoms.

## 2024-05-10 LAB
BACTERIA FLD CULT: NORMAL
GRAM STN SPEC: NORMAL
GRAM STN SPEC: NORMAL

## 2024-05-14 ENCOUNTER — PREP FOR SURGERY (OUTPATIENT)
Dept: OTHER | Facility: HOSPITAL | Age: 39
End: 2024-05-14
Payer: COMMERCIAL

## 2024-05-14 DIAGNOSIS — K70.31 ALCOHOLIC CIRRHOSIS OF LIVER WITH ASCITES: Primary | ICD-10-CM

## 2024-05-14 RX ORDER — ALBUMIN (HUMAN) 12.5 G/50ML
25 SOLUTION INTRAVENOUS AS NEEDED
OUTPATIENT
Start: 2024-05-14

## 2024-05-15 ENCOUNTER — HOSPITAL ENCOUNTER (OUTPATIENT)
Dept: INTERVENTIONAL RADIOLOGY/VASCULAR | Facility: HOSPITAL | Age: 39
Discharge: HOME | End: 2024-05-15
Payer: COMMERCIAL

## 2024-05-15 VITALS — OXYGEN SATURATION: 99 % | HEART RATE: 83 BPM | SYSTOLIC BLOOD PRESSURE: 100 MMHG | DIASTOLIC BLOOD PRESSURE: 61 MMHG

## 2024-05-15 DIAGNOSIS — K70.31 ALCOHOLIC CIRRHOSIS OF LIVER WITH ASCITES: ICD-10-CM

## 2024-05-15 LAB
ALBUMIN FLD-MCNC: 0.2 G/DL
APPEARANCE FLD: CLEAR
COLOR FLD: NORMAL
LYMPHOCYTES NFR FLD MANUAL: 63 %
MACROPHAGE FLUID: 10 %
MESOTHL CELL NFR FLD MANUAL: 2 %
MONOCYTES NFR FLD: 19 %
NEUTROPHILS NFR FLD MANUAL: 6 %
NUC CELL # FLD: 83 /MM3
RBC # FLD AUTO: <2000 /MM3

## 2024-05-15 PROCEDURE — 89051 BODY FLUID CELL COUNT: CPT

## 2024-05-15 PROCEDURE — 87205 SMEAR GRAM STAIN: CPT

## 2024-05-15 PROCEDURE — 88108 CYTOPATH CONCENTRATE TECH: CPT

## 2024-05-15 PROCEDURE — 82042 OTHER SOURCE ALBUMIN QUAN EA: CPT

## 2024-05-15 PROCEDURE — 76942 ECHO GUIDE FOR BIOPSY: CPT

## 2024-05-15 PROCEDURE — C1729 CATH, DRAINAGE: HCPCS

## 2024-05-15 PROCEDURE — 87070 CULTURE OTHR SPECIMN AEROBIC: CPT

## 2024-05-15 RX ORDER — LIDOCAINE HYDROCHLORIDE 20 MG/ML
20 INJECTION, SOLUTION INFILTRATION; PERINEURAL ONCE
Status: COMPLETED | OUTPATIENT
Start: 2024-05-15 | End: 2024-05-15

## 2024-05-15 RX ADMIN — LIDOCAINE HYDROCHLORIDE 20 ML: 20 INJECTION, SOLUTION INFILTRATION; PERINEURAL at 09:45

## 2024-05-15 RX ADMIN — SODIUM BICARBONATE 1 MEQ: 84 INJECTION, SOLUTION INTRAVENOUS at 09:45

## 2024-05-18 LAB
BACTERIA FLD CULT: NORMAL
GRAM STN SPEC: NORMAL

## 2024-05-22 ENCOUNTER — HOSPITAL ENCOUNTER (OUTPATIENT)
Dept: INTERVENTIONAL RADIOLOGY/VASCULAR | Facility: HOSPITAL | Age: 39
Discharge: HOME | End: 2024-05-22
Payer: COMMERCIAL

## 2024-05-22 VITALS — OXYGEN SATURATION: 100 % | DIASTOLIC BLOOD PRESSURE: 88 MMHG | HEART RATE: 80 BPM | SYSTOLIC BLOOD PRESSURE: 123 MMHG

## 2024-05-22 DIAGNOSIS — K70.31 ALCOHOLIC CIRRHOSIS OF LIVER WITH ASCITES: ICD-10-CM

## 2024-05-22 LAB
APPEARANCE FLD: CLEAR
COLOR FLD: NORMAL
LYMPHOCYTES NFR FLD MANUAL: 6 %
MACROPHAGE FLUID: 13 %
MESOTHL CELL NFR FLD MANUAL: 2 %
MONOCYTES NFR FLD: 21 %
NEUTROPHILS NFR FLD MANUAL: 58 %
NUC CELL # FLD: 201 /MM3
RBC # FLD AUTO: <2000 /MM3

## 2024-05-22 PROCEDURE — 76942 ECHO GUIDE FOR BIOPSY: CPT

## 2024-05-22 PROCEDURE — C1729 CATH, DRAINAGE: HCPCS

## 2024-05-22 PROCEDURE — 89051 BODY FLUID CELL COUNT: CPT

## 2024-05-22 PROCEDURE — 87205 SMEAR GRAM STAIN: CPT

## 2024-05-22 PROCEDURE — 88108 CYTOPATH CONCENTRATE TECH: CPT

## 2024-05-22 PROCEDURE — 87070 CULTURE OTHR SPECIMN AEROBIC: CPT

## 2024-05-22 RX ORDER — LIDOCAINE HYDROCHLORIDE 20 MG/ML
20 INJECTION, SOLUTION INFILTRATION; PERINEURAL ONCE
Status: COMPLETED | OUTPATIENT
Start: 2024-05-22 | End: 2024-05-22

## 2024-05-22 RX ADMIN — SODIUM BICARBONATE 1 ML: 84 INJECTION, SOLUTION INTRAVENOUS at 13:40

## 2024-05-22 RX ADMIN — LIDOCAINE HYDROCHLORIDE 9 ML: 20 INJECTION, SOLUTION INFILTRATION; PERINEURAL at 13:40

## 2024-05-25 ENCOUNTER — APPOINTMENT (OUTPATIENT)
Dept: CT IMAGING | Facility: HOSPITAL | Age: 39
End: 2024-05-25
Payer: COMMERCIAL

## 2024-05-25 ENCOUNTER — APPOINTMENT (OUTPATIENT)
Dept: GENERAL RADIOLOGY | Facility: HOSPITAL | Age: 39
End: 2024-05-25
Payer: COMMERCIAL

## 2024-05-25 ENCOUNTER — HOSPITAL ENCOUNTER (INPATIENT)
Facility: HOSPITAL | Age: 39
LOS: 4 days | Discharge: HOME | End: 2024-05-29
Attending: EMERGENCY MEDICINE | Admitting: STUDENT IN AN ORGANIZED HEALTH CARE EDUCATION/TRAINING PROGRAM
Payer: COMMERCIAL

## 2024-05-25 DIAGNOSIS — K76.9 PLEURAL EFFUSION ASSOCIATED WITH HEPATIC DISORDER: Primary | ICD-10-CM

## 2024-05-25 DIAGNOSIS — G62.89 OTHER POLYNEUROPATHY: ICD-10-CM

## 2024-05-25 DIAGNOSIS — J96.01 ACUTE RESPIRATORY FAILURE WITH HYPOXIA: ICD-10-CM

## 2024-05-25 DIAGNOSIS — R18.8 CIRRHOSIS OF LIVER WITH ASCITES, UNSPECIFIED HEPATIC CIRRHOSIS TYPE: ICD-10-CM

## 2024-05-25 DIAGNOSIS — K70.31 ALCOHOLIC CIRRHOSIS OF LIVER WITH ASCITES: ICD-10-CM

## 2024-05-25 DIAGNOSIS — K74.60 CIRRHOSIS OF LIVER WITH ASCITES, UNSPECIFIED HEPATIC CIRRHOSIS TYPE: ICD-10-CM

## 2024-05-25 DIAGNOSIS — R09.02 HYPOXIA: ICD-10-CM

## 2024-05-25 DIAGNOSIS — J91.8 PLEURAL EFFUSION ASSOCIATED WITH HEPATIC DISORDER: Primary | ICD-10-CM

## 2024-05-25 DIAGNOSIS — R06.00 ACUTE DYSPNEA: ICD-10-CM

## 2024-05-25 PROBLEM — J96.00 RESPIRATORY FAILURE, ACUTE: Status: ACTIVE | Noted: 2024-05-25

## 2024-05-25 LAB
ALBUMIN SERPL-MCNC: 2.5 G/DL (ref 3.5–5.2)
ALBUMIN/GLOB SERPL: 0.7 G/DL
ALP SERPL-CCNC: 163 U/L (ref 39–117)
ALT SERPL W P-5'-P-CCNC: 15 U/L (ref 1–41)
ANION GAP SERPL CALCULATED.3IONS-SCNC: 9.9 MMOL/L (ref 5–15)
AST SERPL-CCNC: 24 U/L (ref 1–40)
BACTERIA FLD CULT: NORMAL
BASOPHILS # BLD AUTO: 0.03 10*3/MM3 (ref 0–0.2)
BASOPHILS NFR BLD AUTO: 0.4 % (ref 0–1.5)
BILIRUB SERPL-MCNC: 1.5 MG/DL (ref 0–1.2)
BUN SERPL-MCNC: 10 MG/DL (ref 6–20)
BUN/CREAT SERPL: 10.8 (ref 7–25)
CALCIUM SPEC-SCNC: 7.7 MG/DL (ref 8.6–10.5)
CHLORIDE SERPL-SCNC: 102 MMOL/L (ref 98–107)
CO2 SERPL-SCNC: 20.1 MMOL/L (ref 22–29)
CREAT SERPL-MCNC: 0.93 MG/DL (ref 0.76–1.27)
DEPRECATED RDW RBC AUTO: 53.7 FL (ref 37–54)
EGFRCR SERPLBLD CKD-EPI 2021: 109.1 ML/MIN/1.73
EOSINOPHIL # BLD AUTO: 0.46 10*3/MM3 (ref 0–0.4)
EOSINOPHIL NFR BLD AUTO: 6.7 % (ref 0.3–6.2)
ERYTHROCYTE [DISTWIDTH] IN BLOOD BY AUTOMATED COUNT: 15.7 % (ref 12.3–15.4)
GLOBULIN UR ELPH-MCNC: 3.6 GM/DL
GLUCOSE SERPL-MCNC: 102 MG/DL (ref 65–99)
GRAM STN SPEC: NORMAL
HCT VFR BLD AUTO: 33.9 % (ref 37.5–51)
HGB BLD-MCNC: 11.1 G/DL (ref 13–17.7)
HOLD SPECIMEN: NORMAL
HOLD SPECIMEN: NORMAL
IMM GRANULOCYTES # BLD AUTO: 0.03 10*3/MM3 (ref 0–0.05)
IMM GRANULOCYTES NFR BLD AUTO: 0.4 % (ref 0–0.5)
INR PPP: 1.61 (ref 0.86–1.15)
LDH SERPL-CCNC: 304 U/L (ref 135–225)
LYMPHOCYTES # BLD AUTO: 0.77 10*3/MM3 (ref 0.7–3.1)
LYMPHOCYTES NFR BLD AUTO: 11.2 % (ref 19.6–45.3)
MCH RBC QN AUTO: 30.9 PG (ref 26.6–33)
MCHC RBC AUTO-ENTMCNC: 32.7 G/DL (ref 31.5–35.7)
MCV RBC AUTO: 94.4 FL (ref 79–97)
MONOCYTES # BLD AUTO: 0.72 10*3/MM3 (ref 0.1–0.9)
MONOCYTES NFR BLD AUTO: 10.5 % (ref 5–12)
NEUTROPHILS NFR BLD AUTO: 4.86 10*3/MM3 (ref 1.7–7)
NEUTROPHILS NFR BLD AUTO: 70.8 % (ref 42.7–76)
NRBC BLD AUTO-RTO: 0 /100 WBC (ref 0–0.2)
NT-PROBNP SERPL-MCNC: 130.4 PG/ML (ref 0–450)
PLATELET # BLD AUTO: 145 10*3/MM3 (ref 140–450)
PMV BLD AUTO: 9.6 FL (ref 6–12)
POTASSIUM SERPL-SCNC: 3.4 MMOL/L (ref 3.5–5.2)
PROT SERPL-MCNC: 6.1 G/DL (ref 6–8.5)
PROTHROMBIN TIME: 19.4 SECONDS (ref 11.8–14.9)
QT INTERVAL: 347 MS
QTC INTERVAL: 469 MS
RBC # BLD AUTO: 3.59 10*6/MM3 (ref 4.14–5.8)
SODIUM SERPL-SCNC: 132 MMOL/L (ref 136–145)
TROPONIN T SERPL HS-MCNC: 9 NG/L
WBC NRBC COR # BLD AUTO: 6.87 10*3/MM3 (ref 3.4–10.8)
WHOLE BLOOD HOLD COAG: NORMAL
WHOLE BLOOD HOLD SPECIMEN: NORMAL

## 2024-05-25 PROCEDURE — 84484 ASSAY OF TROPONIN QUANT: CPT | Performed by: EMERGENCY MEDICINE

## 2024-05-25 PROCEDURE — 25010000002 AZITHROMYCIN PER 500 MG: Performed by: STUDENT IN AN ORGANIZED HEALTH CARE EDUCATION/TRAINING PROGRAM

## 2024-05-25 PROCEDURE — 25010000002 CEFTRIAXONE PER 250 MG: Performed by: STUDENT IN AN ORGANIZED HEALTH CARE EDUCATION/TRAINING PROGRAM

## 2024-05-25 PROCEDURE — 80053 COMPREHEN METABOLIC PANEL: CPT | Performed by: EMERGENCY MEDICINE

## 2024-05-25 PROCEDURE — 93010 ELECTROCARDIOGRAM REPORT: CPT | Performed by: INTERNAL MEDICINE

## 2024-05-25 PROCEDURE — 85610 PROTHROMBIN TIME: CPT | Performed by: EMERGENCY MEDICINE

## 2024-05-25 PROCEDURE — 25510000001 IOPAMIDOL PER 1 ML: Performed by: EMERGENCY MEDICINE

## 2024-05-25 PROCEDURE — 94761 N-INVAS EAR/PLS OXIMETRY MLT: CPT

## 2024-05-25 PROCEDURE — 93005 ELECTROCARDIOGRAM TRACING: CPT | Performed by: EMERGENCY MEDICINE

## 2024-05-25 PROCEDURE — 25010000002 POTASSIUM CHLORIDE 10 MEQ/100ML SOLUTION: Performed by: STUDENT IN AN ORGANIZED HEALTH CARE EDUCATION/TRAINING PROGRAM

## 2024-05-25 PROCEDURE — 85025 COMPLETE CBC W/AUTO DIFF WBC: CPT | Performed by: EMERGENCY MEDICINE

## 2024-05-25 PROCEDURE — 71260 CT THORAX DX C+: CPT

## 2024-05-25 PROCEDURE — 83615 LACTATE (LD) (LDH) ENZYME: CPT | Performed by: STUDENT IN AN ORGANIZED HEALTH CARE EDUCATION/TRAINING PROGRAM

## 2024-05-25 PROCEDURE — 25810000003 SODIUM CHLORIDE 0.9 % SOLUTION: Performed by: STUDENT IN AN ORGANIZED HEALTH CARE EDUCATION/TRAINING PROGRAM

## 2024-05-25 PROCEDURE — 93005 ELECTROCARDIOGRAM TRACING: CPT

## 2024-05-25 PROCEDURE — 83880 ASSAY OF NATRIURETIC PEPTIDE: CPT | Performed by: EMERGENCY MEDICINE

## 2024-05-25 PROCEDURE — 71045 X-RAY EXAM CHEST 1 VIEW: CPT

## 2024-05-25 PROCEDURE — 99285 EMERGENCY DEPT VISIT HI MDM: CPT

## 2024-05-25 PROCEDURE — 25010000002 FUROSEMIDE PER 20 MG: Performed by: STUDENT IN AN ORGANIZED HEALTH CARE EDUCATION/TRAINING PROGRAM

## 2024-05-25 PROCEDURE — 99223 1ST HOSP IP/OBS HIGH 75: CPT | Performed by: STUDENT IN AN ORGANIZED HEALTH CARE EDUCATION/TRAINING PROGRAM

## 2024-05-25 RX ORDER — ACETAMINOPHEN 500 MG
1000 TABLET ORAL EVERY 8 HOURS PRN
Status: DISCONTINUED | OUTPATIENT
Start: 2024-05-25 | End: 2024-05-29 | Stop reason: HOSPADM

## 2024-05-25 RX ORDER — PANTOPRAZOLE SODIUM 40 MG/1
40 TABLET, DELAYED RELEASE ORAL DAILY
Status: DISCONTINUED | OUTPATIENT
Start: 2024-05-26 | End: 2024-05-29 | Stop reason: HOSPADM

## 2024-05-25 RX ORDER — FUROSEMIDE 10 MG/ML
20 INJECTION INTRAMUSCULAR; INTRAVENOUS ONCE
Status: COMPLETED | OUTPATIENT
Start: 2024-05-25 | End: 2024-05-25

## 2024-05-25 RX ORDER — NITROGLYCERIN 0.4 MG/1
0.4 TABLET SUBLINGUAL
Status: DISCONTINUED | OUTPATIENT
Start: 2024-05-25 | End: 2024-05-29 | Stop reason: HOSPADM

## 2024-05-25 RX ORDER — POTASSIUM CHLORIDE 7.45 MG/ML
10 INJECTION INTRAVENOUS
Status: COMPLETED | OUTPATIENT
Start: 2024-05-25 | End: 2024-05-25

## 2024-05-25 RX ORDER — SODIUM CHLORIDE 9 MG/ML
40 INJECTION, SOLUTION INTRAVENOUS AS NEEDED
Status: DISCONTINUED | OUTPATIENT
Start: 2024-05-25 | End: 2024-05-29 | Stop reason: HOSPADM

## 2024-05-25 RX ORDER — SODIUM CHLORIDE 0.9 % (FLUSH) 0.9 %
10 SYRINGE (ML) INJECTION AS NEEDED
Status: DISCONTINUED | OUTPATIENT
Start: 2024-05-25 | End: 2024-05-29 | Stop reason: HOSPADM

## 2024-05-25 RX ORDER — SODIUM CHLORIDE 0.9 % (FLUSH) 0.9 %
10 SYRINGE (ML) INJECTION EVERY 12 HOURS SCHEDULED
Status: DISCONTINUED | OUTPATIENT
Start: 2024-05-25 | End: 2024-05-29 | Stop reason: HOSPADM

## 2024-05-25 RX ORDER — SPIRONOLACTONE 25 MG/1
50 TABLET ORAL DAILY
Status: DISCONTINUED | OUTPATIENT
Start: 2024-05-26 | End: 2024-05-26

## 2024-05-25 RX ORDER — POTASSIUM CHLORIDE 29.8 MG/ML
20 INJECTION INTRAVENOUS ONCE
Status: DISCONTINUED | OUTPATIENT
Start: 2024-05-25 | End: 2024-05-25

## 2024-05-25 RX ADMIN — CEFTRIAXONE SODIUM 1000 MG: 1 INJECTION, POWDER, FOR SOLUTION INTRAMUSCULAR; INTRAVENOUS at 21:05

## 2024-05-25 RX ADMIN — POTASSIUM CHLORIDE 10 MEQ: 7.46 INJECTION, SOLUTION INTRAVENOUS at 22:22

## 2024-05-25 RX ADMIN — AZITHROMYCIN MONOHYDRATE 500 MG: 500 INJECTION, POWDER, LYOPHILIZED, FOR SOLUTION INTRAVENOUS at 22:22

## 2024-05-25 RX ADMIN — FUROSEMIDE 20 MG: 10 INJECTION, SOLUTION INTRAMUSCULAR; INTRAVENOUS at 21:03

## 2024-05-25 RX ADMIN — Medication 10 ML: at 21:05

## 2024-05-25 RX ADMIN — IOPAMIDOL 50 ML: 755 INJECTION, SOLUTION INTRAVENOUS at 16:47

## 2024-05-25 RX ADMIN — POTASSIUM CHLORIDE 10 MEQ: 7.46 INJECTION, SOLUTION INTRAVENOUS at 21:04

## 2024-05-25 NOTE — H&P
[facility]  Date: [facility]   Patient Name: Wai Gay  : 1985  MRN: [id number]  Primary Care Physician:  Callum Peterson DO  Date of admission: 2024    Subjective   Subjective     Chief Complaint: Shortness of breath    HPI:    Wai Gay is a 38 y.o. male with past medical history significant for alcoholic cirrhosis with ascites, history of PE on Eliquis, and HTN who presented for shortness of breath    Patient said he had paracentesis done about 3 days ago.  Over the past few days, he started having shortness of breath.  This prompted him to the ED.  He is not on any O2 at baseline.  He denies fever, no chills, no chest pain, no dysuria.      Personal History     Past Medical History:  Past Medical History:   Diagnosis Date    Alcohol abuse 2017    Essential hypertension 2019    GERD (gastroesophageal reflux disease)     Hypokalemia 2017    Will update    Hyponatremia 2017    Steatohepatitis, alcoholic 2017    Tobacco abuse 2017    Umbilical hernia            Past Surgical History:  Past Surgical History:   Procedure Laterality Date    CHOLECYSTECTOMY      ENDOSCOPY N/A 2022    Procedure: ESOPHAGOGASTRODUODENOSCOPY WITH BX;  Surgeon: Gino Khan MD;  Location: [facility] ENDOSCOPY;  Service: Gastroenterology;  Laterality: N/A;  RETAINED LIQUID FOOD IN STOMACH, HUFFMAN'S ESOPHAGUS    ENDOSCOPY N/A 2/10/2023    Procedure: ESOPHAGOGASTRODUODENOSCOPY;  Surgeon: Gino Khan MD;  Location: [facility] ENDOSCOPY;  Service: Gastroenterology;  Laterality: N/A;  GASTRITIS, BARRETTS ESOPHAGUS, PHG    UPPER GASTROINTESTINAL ENDOSCOPY         Family History:   Family History   Problem Relation Age of Onset    Alcohol abuse Mother     Arthritis Mother     COPD Mother     Heart disease Maternal Grandmother     Hypertension Maternal Grandmother     Lung cancer Maternal Grandmother     Stroke  Maternal Grandmother     Heart disease Maternal Grandfather     Lung cancer Maternal Grandfather     Cancer Paternal Grandmother     Cancer Paternal Grandfather     Colon cancer Neg Hx     Malig Hyperthermia Neg Hx        Social History:   Social History     Socioeconomic History    Marital status: Single   Tobacco Use    Smoking status: Every Day     Current packs/day: 0.50     Average packs/day: 0.5 packs/day for 16.4 years (8.2 ttl pk-yrs)     Types: Cigarettes     Start date: 2008     Passive exposure: Current    Smokeless tobacco: Never   Vaping Use    Vaping status: Never Used   Substance and Sexual Activity    Alcohol use: Not Currently     Alcohol/week: 3.0 standard drinks of alcohol     Types: 2 Cans of beer, 1 Shots of liquor per week     Comment: FORMER    Drug use: Not Currently    Sexual activity: Defer       Home Medications:  apixaban, pantoprazole, and spironolactone    Allergies:  No Known Allergies    Review of Systems   All systems were reviewed and negative except for indicated in HPI    Objective   Objective     Vitals:   Temp:  [98.9 °F (37.2 °C)] 98.9 °F (37.2 °C)  Heart Rate:  [102-115] 102  Resp:  [20-34] 29  BP: (110-121)/(75-77) 112/77  Flow (L/min):  [2] 2    Physical Exam    Constitutional: Awake, alert, no acute distress   Eyes: Pupils equal, sclerae anicteric, no conjunctival injection   HENT: NCAT, mucous membranes moist   Neck: Supple, no thyromegaly, no lymphadenopathy, trachea midline   Respiratory: Poor air entry and decreased breath sounds on the right lung, left lung is okay.  No wheezing.   Cardiovascular: RRR, no murmurs, rubs, or gallops, palpable pedal pulses bilaterally   Gastrointestinal: Positive bowel sounds, soft, nontender, nondistended   Musculoskeletal: No bilateral ankle edema, no clubbing or cyanosis to extremities   Psychiatric: Appropriate affect, cooperative   Neurologic: Oriented x 3, strength symmetric in all extremities, Cranial Nerves grossly intact to  confrontation, speech clear   Skin: No rashes     Result Review    Result Review:  I have personally reviewed the results from the time of this admission to 5/25/2024 18:27 EDT and agree with these findings:  [x]  Laboratory  [x]  Microbiology  [x]  Radiology  [x]  EKG/Telemetry   [x]  Cardiology/Vascular   []  Pathology  []  Old records  []  Other:    Laboratory Studies:  Recent Results (from the past 24 hour(s))   ECG 12 Lead ED Triage Standing Order; SOA    Collection Time: 05/25/24  2:25 PM   Result Value Ref Range    QT Interval 347 ms    QTC Interval 469 ms   Comprehensive Metabolic Panel    Collection Time: 05/25/24  3:05 PM    Specimen: Blood   Result Value Ref Range    Glucose 102 (H) 65 - 99 mg/dL    BUN 10 6 - 20 mg/dL    Creatinine 0.93 0.76 - 1.27 mg/dL    Sodium 132 (L) 136 - 145 mmol/L    Potassium 3.4 (L) 3.5 - 5.2 mmol/L    Chloride 102 98 - 107 mmol/L    CO2 20.1 (L) 22.0 - 29.0 mmol/L    Calcium 7.7 (L) 8.6 - 10.5 mg/dL    Total Protein 6.1 6.0 - 8.5 g/dL    Albumin 2.5 (L) 3.5 - 5.2 g/dL    ALT (SGPT) 15 1 - 41 U/L    AST (SGOT) 24 1 - 40 U/L    Alkaline Phosphatase 163 (H) 39 - 117 U/L    Total Bilirubin 1.5 (H) 0.0 - 1.2 mg/dL    Globulin 3.6 gm/dL    A/G Ratio 0.7 g/dL    BUN/Creatinine Ratio 10.8 7.0 - 25.0    Anion Gap 9.9 5.0 - 15.0 mmol/L    eGFR 109.1 >60.0 mL/min/1.73   BNP    Collection Time: 05/25/24  3:05 PM    Specimen: Blood   Result Value Ref Range    proBNP 130.4 0.0 - 450.0 pg/mL   Single High Sensitivity Troponin T    Collection Time: 05/25/24  3:05 PM    Specimen: Blood   Result Value Ref Range    HS Troponin T 9 <22 ng/L   Green Top (Gel)    Collection Time: 05/25/24  3:05 PM   Result Value Ref Range    Extra Tube Hold for add-ons.    Lavender Top    Collection Time: 05/25/24  3:05 PM   Result Value Ref Range    Extra Tube hold for add-on    Gold Top - SST    Collection Time: 05/25/24  3:05 PM   Result Value Ref Range    Extra Tube Hold for add-ons.    Light Blue Top     Collection Time: 05/25/24  3:05 PM   Result Value Ref Range    Extra Tube Hold for add-ons.    CBC Auto Differential    Collection Time: 05/25/24  3:05 PM    Specimen: Blood   Result Value Ref Range    WBC 6.87 3.40 - 10.80 10*3/mm3    RBC 3.59 (L) 4.14 - 5.80 10*6/mm3    Hemoglobin 11.1 (L) 13.0 - 17.7 g/dL    Hematocrit 33.9 (L) 37.5 - 51.0 %    MCV 94.4 79.0 - 97.0 fL    MCH 30.9 26.6 - 33.0 pg    MCHC 32.7 31.5 - 35.7 g/dL    RDW 15.7 (H) 12.3 - 15.4 %    RDW-SD 53.7 37.0 - 54.0 fl    MPV 9.6 6.0 - 12.0 fL    Platelets 145 140 - 450 10*3/mm3    Neutrophil % 70.8 42.7 - 76.0 %    Lymphocyte % 11.2 (L) 19.6 - 45.3 %    Monocyte % 10.5 5.0 - 12.0 %    Eosinophil % 6.7 (H) 0.3 - 6.2 %    Basophil % 0.4 0.0 - 1.5 %    Immature Grans % 0.4 0.0 - 0.5 %    Neutrophils, Absolute 4.86 1.70 - 7.00 10*3/mm3    Lymphocytes, Absolute 0.77 0.70 - 3.10 10*3/mm3    Monocytes, Absolute 0.72 0.10 - 0.90 10*3/mm3    Eosinophils, Absolute 0.46 (H) 0.00 - 0.40 10*3/mm3    Basophils, Absolute 0.03 0.00 - 0.20 10*3/mm3    Immature Grans, Absolute 0.03 0.00 - 0.05 10*3/mm3    nRBC 0.0 0.0 - 0.2 /100 WBC   Protime-INR    Collection Time: 05/25/24  3:05 PM    Specimen: Blood   Result Value Ref Range    Protime 19.4 (H) 11.8 - 14.9 Seconds    INR 1.61 (H) 0.86 - 1.15       Imaging:  CT Chest With Contrast Diagnostic    Result Date: 5/25/2024  Narrative: CT CHEST W CONTRAST DIAGNOSTIC-  Date of Exam: 5/25/2024 4:36 PM  Indication: Shortness of breath and history of pulmonary embolus.  Comparison: Chest radiograph from earlier today  Technique: CT scan of the chest was performed after the uneventful administration of 50 mL of IV Isovue 370. Automated exposure control and iterative reconstruction methods were used.   Findings: There is occlusion of the bronchus intermedius. There is a large right pleural effusion with right basilar atelectasis. There are several groundglass densities within the left upper lobe. There is a trace left pleural  effusion.  The heart size appears normal. The great vessels are normal caliber. There is no evidence of pulmonary embolus. No abnormally enlarged lymph nodes are identified.  Partial evaluation of the upper abdomen demonstrates changes of cholecystectomy, hepatic cirrhosis, and ascites.  No aggressive osseous lesions are identified.      Impression: Impression: 1.  No evidence of pulmonary embolus. 2.  Large right and trace left pleural effusions. 3.  Right basilar atelectasis. 4.  Hepatic cirrhosis with ascites within visualized upper abdomen.        Electronically Signed ByAlcon Porter MD On:5/25/2024 5:27 PM      XR Chest 1 View    Result Date: 5/25/2024  Narrative: XR CHEST 1 VW-  Date of Exam: 5/25/2024 3:18 PM  Indication: SOA Triage Protocol.  Comparison Exams: September 7, 2021  Technique: Single AP chest radiograph  FINDINGS: There is a large right pleural effusion with right basilar opacity. The left lung is clear. The heart and mediastinal contours appear stable. The pulmonary vasculature appears normal. The osseous structures appear intact.      Impression: 1.  Large right pleural effusion. 2.  Right basilar opacity, which could reflect atelectasis or pneumonia.   Electronically Signed ByAlcon Porter MD On:5/25/2024 3:31 PM      US Paracentesis    Result Date: 5/22/2024  Narrative: PROCEDURE: US PARACENTESIS-  DATE: 5/22/2024  INDICATION: 38 years old Male with ascites  CONSENT: The procedural risks, benefits, and alternatives were discussed with the patient.  Procedure risks discussed with the patient included potential bleeding, infection, and injury to surrounding tissues and structures. All patient questions were satisfactorily answered prior to the procedure. Verbal and written informed consent were obtained as well as for any reasonable additional procedures which might be emergently required should a complication directly related to the procedure occur.  TECHNIQUE: A formal time-out was  "taken correctly identifying the patient's name, patient's date of birth, procedure type, and procedure site.  The patient was placed in the supine position on the procedure table. Limited grayscale and color Doppler sonography of the abdomen was performed to identify a suitable site for paracentesis. The largest pocket of ascites was identified within the right abdomen. The skin overlying the right lower quadrant was then prepped and draped in the typical sterile fashion. The skin and soft tissues were anesthetized using 2% lidocaine  A 5F Channel Meh catheter was advanced into the fluid collection and 2.4 L mL of clear yellow fluid was drained.  The fluid was sent to the lab for analysis.  The catheter was removed and a sterile bandage applied to the skin entry site.  The patient tolerated the procedure well without any immediate complications.      Impression: Successful ultrasound guided diagnostic and therapeutic paracentesis was performed.  Electronically Signed By-AHSAN GERMAIN MD On:5/22/2024 3:43 PM      US Paracentesis    Result Date: 5/15/2024  Narrative: US PARACENTESIS-  Date of Exam: 5/15/2024 9:35 AM  Indication: ascites; K70.31-Alcoholic cirrhosis of liver with ascites.  Comparison: US paracentesis, 5/7/2024  Consent: Risks and benefits were discussed with the patient including but not limited to risk of bleeding, infection, bowel injury, injury to adjacent structures and/or allergic reaction or reaction to medications used. Alternatives were discussed with the patient. The patient verbalized understanding and elected to proceed with the procedure.  Technique/Findings: Preprocedural vitals obtained by a radiology nurse and reviewed. Preliminary bedside ultrasound demonstrated a small amount of ascitic fluid in the right upper quadrant, site was marked with an \"L?. The overlying skin was prepped and draped in sterile fashion. The skin was infiltrated with local anesthesia over the insertion site with 10 " "mL of 2% lidocaine to anesthetize the site. A scalpel was used to make a small nick in the overlying skin to aid in catheter advancement. A 5 [place]istanian 7 cm Yueh catheter was then advanced into peritoneal space. 2.4 L of clear, pale yellow ascitic fluid was aspirated. Afterwards, the catheter was then removed and a bandage was applied. A sample of the fluid specimen was sent to lab for further diagnostic evaluation. Post-procedural vitals were obtained by a radiology nurse.      Impression: Impression: Ultrasound-guided paracentesis was performed without complication, patient tolerated procedure with 2.4 L of clear, pale yellow ascitic fluid removed. A sample specimen of 125 mL sent to the lab for further diagnostic evaluation. The patient was instructed to obtain follow up care from the referring physician.   AMERICO Strickland PA-C   Electronically Signed By-AHSAN GERMAIN MD On:5/15/2024 12:41 PM      US Paracentesis    Result Date: 5/7/2024  Narrative: US PARACENTESIS-  Date of Exam: 5/7/2024 3:16 PM  Indication: Ascites and abdominal pain.  Comparison: US paracentesis, 4/26/2024  Consent: Risks and benefits were discussed with the patient including but not limited to risk of bleeding, infection, bowel injury, injury to adjacent structures and/or allergic reaction or reaction to medications used. Alternatives were discussed with the patient. The patient verbalized understanding and elected to proceed with the procedure.  Technique/Findings: The patient was transferred from the emergency department to the radiology department prior to procedure. Preliminary bedside ultrasound demonstrated a small to moderate amount of ascitic fluid in the right upper quadrant, site was marked with an \"L?. The overlying skin was prepped and draped in sterile fashion. The skin was infiltrated with local anesthesia over the insertion site with 10 mL of 2% lidocaine to anesthetize the site. A scalpel was used to make a small nick in " the overlying skin to aid in catheter advancement. A 5 [place]raini 7 cm Yueh catheter was then advanced into peritoneal space. 3.6 L of clear, yellow ascitic fluid was aspirated. Afterwards, the catheter was then removed and a bandage was applied. A sample of the fluid specimen was sent to lab for further diagnostic evaluation. The patient was transferred back to the emergency department status post procedure. Report was given to patient's ordering provider and RN regarding procedure details via secure epic chat via tech.      Impression: Impression: Ultrasound-guided paracentesis was performed without complication, patient tolerated procedure with 3.6 L of clear, yellow ascitic fluid removed. A sample specimen of 80 mL was sent to the lab for further diagnostic evaluation. The patient was instructed to obtain follow up care from the referring physician.   AMERICO Strickland PA-C  Electronically Signed By-AHSAN GERMAIN MD On:5/7/2024 4:46 PM      US Paracentesis    Result Date: 4/26/2024  Narrative: PROCEDURE: US PARACENTESIS-  DATE: 4/26/2024  INDICATION: 38 years old Male with ascites  CONSENT: The procedural risks, benefits, and alternatives were discussed with the patient.  Procedure risks discussed with the patient included potential bleeding, infection, and injury to surrounding tissues and structures. All patient questions were satisfactorily answered prior to the procedure. Verbal and written informed consent were obtained as well as for any reasonable additional procedures which might be emergently required should a complication directly related to the procedure occur.  TECHNIQUE: A formal time-out was taken correctly identifying the patient's name, patient's date of birth, procedure type, and procedure site.  The patient was placed in the supine position on the procedure table. Limited grayscale and color Doppler sonography of the abdomen was performed to identify a suitable site for paracentesis. The  largest pocket of ascites was identified within the right lower abdomen. The skin overlying the right lower quadrant was then prepped and draped in the typical sterile fashion. The skin and soft tissues were anesthetized using 2% buffered lidocaine.  A 5F Safe[employer] Solutionseh catheter was advanced into the fluid collection and 2.8 L pale yellow fluid was drained.  The fluid was sent to the lab for analysis. The catheter was removed and a sterile bandage applied to the skin entry site.  The patient tolerated the procedure well without any immediate complications.      Impression: Successful ultrasound-guided diagnostic and therapeutic paracentesis was performed.  Electronically Signed By-AHSAN GERMAIN MD On:4/26/2024 12:24 PM       EKG: (my review): Sinus tachycardia, heart rate 110, QTc 469, no ST elevation or depression    Assessment & Plan   Assessment / Plan     -I have discussed the case with the ED physician.  I have seen patient at bedside.  I have independently reviewed his labs, EKG, imaging, and record    Acute hypoxic respiratory failure  Right-sided pleural effusion  Hypokalemia  Hyponatremia, mild  Other chronic problems: alcoholic cirrhosis with ascites, history of PE on Eliquis, and HTN    -I will start patient on ceftriaxone and azithromycin in case of parapneumonic effusion  -Giving 1X Lasix see if it helps with symptoms  -Repleting potassium.  Checking magnesium  -The ED has discussed the case with pulmonology.  Thoracentesis per pulm.  Appreciate further recs.  -Holding home Eliquis for anticipated procedure.  CT PE this admission does not show any PE.  -Further workups and management (echocardiogram...) after thoracentesis lab results    Resume home medications when appropriate.  DVT prophylaxis with SCD.    CODE STATUS:    Level Of Support Discussed With: Patient  Code Status (Patient has no pulse and is not breathing): CPR (Attempt to Resuscitate)  Medical Interventions (Patient has pulse or is breathing):  Full Support      Admission Status:  I believe this patient meets admission status.    Electronically signed by Bib Swan MD, 05/25/24, 6:12 PM EDT.

## 2024-05-25 NOTE — ED TRIAGE NOTES
Pt complains of chest pain and SOB that started thursday, states he has a blood clot dx about 1-2 months ago. states he is currently being treated with eliquis. Pt also c/o diarrhea x approx 1 week

## 2024-05-25 NOTE — PAYOR COMM NOTE
"Wai Gay (38 y.o. Male)     PATIENT INFORMATION  Name:  Wai Gay  MRN#:     [id number]  :  1985       ADMISSION INFORMATION  CLASS: Inpatient   DOS:  24    CURRENT ATTENDING PROVIDER INFORMATION  Name/NPI: Bib Swan MD (NPI: [id number])   Phone:             Phone: [phone]      REQUESTING PROVIDER and RENDERING FACILITY  Name:  [facility]   NPI:  [id number]  TID:  [id number]  Address:      [address] [place] [address]  Phone  [phone]    UTILIZATION REVIEW CONTACT INFORMATION  Phone:      [phone]  Fax:           [phone]    ADMISSION DIAGNOSIS  Respiratory failure, acute J96.00  R side pleural effusion (Large)  J90.   Parapneumonic effusion  J18.9, J91.8.   Hypokalemia  E87.6  Hyponatremia E87.1  Alcoholic cirrhosis w/ascites  K70.31    ++++++++++++++++++++++++++++++++++++++++++++++++++++++++++++++++++++++++++++++++        Date of Birth   1985    Social Security Number       Address   [address] [address]    Home Phone   [phone]    MRN   [id number]       Taoism   Non-Yazdanism    Marital Status   Single                            Admission Date   24    Admission Type   Emergency    Admitting Provider       Attending Provider   Agusto Fernandez DO    Department, Room/Bed   [facility] EMERGENCY ROOM,        Discharge Date       Discharge Disposition       Discharge Destination                                 Attending Provider: Agusto Fernandez DO    Allergies: No Known Allergies    Isolation: None   Infection: None   Code Status: CPR    Ht: 172.7 cm (68\")   Wt: 83 kg (182 lb 15.7 oz)    Admission Cmt: None   Principal Problem: Respiratory failure, acute [J96.00]                   Active Insurance as of 2024       Primary Coverage       Payor Plan Insurance Group Employer/Plan Group    [facility] [id number]       Payor Plan Address Payor Plan Phone " Number Payor Plan Fax Number Effective Dates    [phone]   3/1/2021 - None Entered    [facility] [id number]         Subscriber Name Subscriber Birth Date Member ID       AMARJIT FERRERA 1985 [id number]                    Pleural Effusion [facility] Inpatient Care       Indications Met   Last updated by Gudelia Smith RN on 5/25/2024 1901     Review Status Created By   Primary Completed Gudelia Smith RN      Criteria Review   Pleural Effusion [facility]G Inpatient Care     Overall Determination: Indications Met     Criteria:  [×] Admission is indicated for  1 or more  of the following :      [×] Hemodynamic instability          5/25/2024  7:01 PM              -- 5/25/2024  7:01 PM by Gudelia Smith RN --                  -115, despite rest, oxygenation.      [×] Pneumonia-related (parapneumonic) effusion requiring drainage, as indicated by  1 or more  of the following  [B]:          [×] Large pleural effusion (symptomatic or greater than 1/2 of hemithorax)              5/25/2024  7:01 PM                  -- 5/25/2024  7:01 PM by Gudelia Smith RN --                      Large R pleural effusion. + SOA.     Notes:  -- 5/25/2024  7:01 PM by Gudelia Smith RN --      37yo to ED c/o significant SOA.      Had paracentesis done about 3 days ago with 2.4L drained. Is not on home O2. SOA progressively worse since the procedure. O2 sats 88% on RA on arrival. -115. Resp 29-34.       On exam: R lung w/poor air entry & decreased breath sounds. L lung WNL. AAO x4                  PMHx: ETOH abuse, HTN, GERD, alcoholic steatohepatitis/cirrhosis. DVT.             ED results:       CXR: 1. Large right pleural effusion.      2. Right basilar opacity, which could reflect atelectasis or pneumonia.            CT chest: 1. No evidence of pulmonary embolus. 2. Large R pleural effusion. Trace of L pleural effusion.      3. Right basilar atelectasis. 4. Hepatic cirrhosis with ascites within visualized upper abdomen.            H/H  11.1/33.9;       PT/INR 19.4/1.61;       Na+ 132, K+ 3.4. Calcium 7.7.       . Total bilirubin 1.5.                   EKG: Sinus tach. Rate 110.             In ED: O2 @ 2L.             Admit: Remote Telemetry.       O2.       SCDs      1500ml/day fluid restriction.       Consult Pulmonology.       Rocephin IV qd.       Zithromax IV qd.      Eliquis PO bid.       KCL 20meQ IVPB x1.       Lasix 20mg IV x1.       Labs in AM.                   History & Physical        Do, MD Bib at 24 181           [facility] HISTORY AND PHYSICAL  Date: 2024   Patient Name: Wai Gay  : 1985  MRN: [id number]  Primary Care Physician:  Callum Peterson DO  Date of admission: 2024    Subjective  Subjective     Chief Complaint: Shortness of breath    HPI:    Wai Gay is a 38 y.o. male with past medical history significant for alcoholic cirrhosis with ascites, history of PE on Eliquis, and HTN who presented for shortness of breath    Patient said he had paracentesis done about 3 days ago.  Over the past few days, he started having shortness of breath.  This prompted him to the ED.  He is not on any O2 at baseline.  He denies fever, no chills, no chest pain, no dysuria.      Personal History     Past Medical History:  Past Medical History:   Diagnosis Date    Alcohol abuse 2017    Essential hypertension 2019    GERD (gastroesophageal reflux disease)     Hypokalemia 2017    Will update    Hyponatremia 2017    Steatohepatitis, alcoholic 2017    Tobacco abuse 2017    Umbilical hernia            Past Surgical History:  Past Surgical History:   Procedure Laterality Date    CHOLECYSTECTOMY      ENDOSCOPY N/A 2022    Procedure: ESOPHAGOGASTRODUODENOSCOPY WITH BX;  Surgeon: Gino Khan MD;  Location: [facility] ENDOSCOPY;  Service: Gastroenterology;  Laterality: N/A;  RETAINED LIQUID FOOD IN STOMACH, HUFFMAN'S ESOPHAGUS     ENDOSCOPY N/A 2/10/2023    Procedure: ESOPHAGOGASTRODUODENOSCOPY;  Surgeon: Gino Khan MD;  Location: [facility] ENDOSCOPY;  Service: Gastroenterology;  Laterality: N/A;  GASTRITIS, BARRETTS ESOPHAGUS, PHG    UPPER GASTROINTESTINAL ENDOSCOPY         Social History:   Social History     Socioeconomic History    Marital status: Single   Tobacco Use    Smoking status: Every Day     Current packs/day: 0.50     Average packs/day: 0.5 packs/day for 16.4 years (8.2 ttl pk-yrs)     Types: Cigarettes     Start date: 2008     Passive exposure: Current    Smokeless tobacco: Never   Vaping Use    Vaping status: Never Used   Substance and Sexual Activity    Alcohol use: Not Currently     Alcohol/week: 3.0 standard drinks of alcohol     Types: 2 Cans of beer, 1 Shots of liquor per week     Comment: FORMER    Drug use: Not Currently    Sexual activity: Defer       Home Medications:  apixaban, pantoprazole, and spironolactone    Allergies:  No Known Allergies    Review of Systems   All systems were reviewed and negative except for indicated in HPI    Objective  Objective     Vitals:   Temp:  [98.9 °F (37.2 °C)] 98.9 °F (37.2 °C)  Heart Rate:  [102-115] 102  Resp:  [20-34] 29  BP: (110-121)/(75-77) 112/77  Flow (L/min):  [2] 2    Physical Exam    Constitutional: Awake, alert, no acute distress   Eyes: Pupils equal, sclerae anicteric, no conjunctival injection   HENT: NCAT, mucous membranes moist   Neck: Supple, no thyromegaly, no lymphadenopathy, trachea midline   Respiratory: Poor air entry and decreased breath sounds on the right lung, left lung is okay.  No wheezing.   Cardiovascular: RRR, no murmurs, rubs, or gallops, palpable pedal pulses bilaterally   Gastrointestinal: Positive bowel sounds, soft, nontender, nondistended   Musculoskeletal: No bilateral ankle edema, no clubbing or cyanosis to extremities   Psychiatric: Appropriate affect, cooperative   Neurologic: Oriented x 3, strength symmetric in all extremities,  Cranial Nerves grossly intact to confrontation, speech clear   Skin: No rashes     Result Review   Result Review:  I have personally reviewed the results from the time of this admission to 5/25/2024 18:27 EDT and agree with these findings:  [x]  Laboratory  [x]  Microbiology  [x]  Radiology  [x]  EKG/Telemetry   [x]  Cardiology/Vascular   []  Pathology  []  Old records  []  Other:    Laboratory Studies:  Recent Results (from the past 24 hour(s))   ECG 12 Lead ED Triage Standing Order; SOA    Collection Time: 05/25/24  2:25 PM   Result Value Ref Range    QT Interval 347 ms    QTC Interval 469 ms   Comprehensive Metabolic Panel    Collection Time: 05/25/24  3:05 PM    Specimen: Blood   Result Value Ref Range    Glucose 102 (H) 65 - 99 mg/dL    BUN 10 6 - 20 mg/dL    Creatinine 0.93 0.76 - 1.27 mg/dL    Sodium 132 (L) 136 - 145 mmol/L    Potassium 3.4 (L) 3.5 - 5.2 mmol/L    Chloride 102 98 - 107 mmol/L    CO2 20.1 (L) 22.0 - 29.0 mmol/L    Calcium 7.7 (L) 8.6 - 10.5 mg/dL    Total Protein 6.1 6.0 - 8.5 g/dL    Albumin 2.5 (L) 3.5 - 5.2 g/dL    ALT (SGPT) 15 1 - 41 U/L    AST (SGOT) 24 1 - 40 U/L    Alkaline Phosphatase 163 (H) 39 - 117 U/L    Total Bilirubin 1.5 (H) 0.0 - 1.2 mg/dL    Globulin 3.6 gm/dL    A/G Ratio 0.7 g/dL    BUN/Creatinine Ratio 10.8 7.0 - 25.0    Anion Gap 9.9 5.0 - 15.0 mmol/L    eGFR 109.1 >60.0 mL/min/1.73   BNP    Collection Time: 05/25/24  3:05 PM    Specimen: Blood   Result Value Ref Range    proBNP 130.4 0.0 - 450.0 pg/mL   Single High Sensitivity Troponin T    Collection Time: 05/25/24  3:05 PM    Specimen: Blood   Result Value Ref Range    HS Troponin T 9 <22 ng/L   Green Top (Gel)    Collection Time: 05/25/24  3:05 PM   Result Value Ref Range    Extra Tube Hold for add-ons.    Lavender Top    Collection Time: 05/25/24  3:05 PM   Result Value Ref Range    Extra Tube hold for add-on    Gold Top - SST    Collection Time: 05/25/24  3:05 PM   Result Value Ref Range    Extra Tube Hold for  add-ons.    Light Blue Top    Collection Time: 05/25/24  3:05 PM   Result Value Ref Range    Extra Tube Hold for add-ons.    CBC Auto Differential    Collection Time: 05/25/24  3:05 PM    Specimen: Blood   Result Value Ref Range    WBC 6.87 3.40 - 10.80 10*3/mm3    RBC 3.59 (L) 4.14 - 5.80 10*6/mm3    Hemoglobin 11.1 (L) 13.0 - 17.7 g/dL    Hematocrit 33.9 (L) 37.5 - 51.0 %    MCV 94.4 79.0 - 97.0 fL    MCH 30.9 26.6 - 33.0 pg    MCHC 32.7 31.5 - 35.7 g/dL    RDW 15.7 (H) 12.3 - 15.4 %    RDW-SD 53.7 37.0 - 54.0 fl    MPV 9.6 6.0 - 12.0 fL    Platelets 145 140 - 450 10*3/mm3    Neutrophil % 70.8 42.7 - 76.0 %    Lymphocyte % 11.2 (L) 19.6 - 45.3 %    Monocyte % 10.5 5.0 - 12.0 %    Eosinophil % 6.7 (H) 0.3 - 6.2 %    Basophil % 0.4 0.0 - 1.5 %    Immature Grans % 0.4 0.0 - 0.5 %    Neutrophils, Absolute 4.86 1.70 - 7.00 10*3/mm3    Lymphocytes, Absolute 0.77 0.70 - 3.10 10*3/mm3    Monocytes, Absolute 0.72 0.10 - 0.90 10*3/mm3    Eosinophils, Absolute 0.46 (H) 0.00 - 0.40 10*3/mm3    Basophils, Absolute 0.03 0.00 - 0.20 10*3/mm3    Immature Grans, Absolute 0.03 0.00 - 0.05 10*3/mm3    nRBC 0.0 0.0 - 0.2 /100 WBC   Protime-INR    Collection Time: 05/25/24  3:05 PM    Specimen: Blood   Result Value Ref Range    Protime 19.4 (H) 11.8 - 14.9 Seconds    INR 1.61 (H) 0.86 - 1.15       Imaging:  CT Chest With Contrast Diagnostic    Result Date: 5/25/2024  Narrative: CT CHEST W CONTRAST DIAGNOSTIC-  Date of Exam: 5/25/2024 4:36 PM  Indication: Shortness of breath and history of pulmonary embolus.  Comparison: Chest radiograph from earlier today  Technique: CT scan of the chest was performed after the uneventful administration of 50 mL of IV Isovue 370. Automated exposure control and iterative reconstruction methods were used.   Findings: There is occlusion of the bronchus intermedius. There is a large right pleural effusion with right basilar atelectasis. There are several groundglass densities within the left upper lobe.  There is a trace left pleural effusion.  The heart size appears normal. The great vessels are normal caliber. There is no evidence of pulmonary embolus. No abnormally enlarged lymph nodes are identified.  Partial evaluation of the upper abdomen demonstrates changes of cholecystectomy, hepatic cirrhosis, and ascites.  No aggressive osseous lesions are identified.      Impression: Impression: 1.  No evidence of pulmonary embolus. 2.  Large right and trace left pleural effusions. 3.  Right basilar atelectasis. 4.  Hepatic cirrhosis with ascites within visualized upper abdomen.        Electronically Signed ByAlcon Porter MD On:5/25/2024 5:27 PM      XR Chest 1 View    Result Date: 5/25/2024  Narrative: XR CHEST 1 VW-  Date of Exam: 5/25/2024 3:18 PM  Indication: SOA Triage Protocol.  Comparison Exams: September 7, 2021  Technique: Single AP chest radiograph  FINDINGS: There is a large right pleural effusion with right basilar opacity. The left lung is clear. The heart and mediastinal contours appear stable. The pulmonary vasculature appears normal. The osseous structures appear intact.      Impression: 1.  Large right pleural effusion. 2.  Right basilar opacity, which could reflect atelectasis or pneumonia.   Electronically Signed ByAlcon Porter MD On:5/25/2024 3:31 PM      US Paracentesis    Result Date: 5/22/2024  Narrative: PROCEDURE: US PARACENTESIS-  DATE: 5/22/2024  INDICATION: 38 years old Male with ascites  CONSENT: The procedural risks, benefits, and alternatives were discussed with the patient.  Procedure risks discussed with the patient included potential bleeding, infection, and injury to surrounding tissues and structures. All patient questions were satisfactorily answered prior to the procedure. Verbal and written informed consent were obtained as well as for any reasonable additional procedures which might be emergently required should a complication directly related to the procedure occur.   "TECHNIQUE: A formal time-out was taken correctly identifying the patient's name, patient's date of birth, procedure type, and procedure site.  The patient was placed in the supine position on the procedure table. Limited grayscale and color Doppler sonography of the abdomen was performed to identify a suitable site for paracentesis. The largest pocket of ascites was identified within the right abdomen. The skin overlying the right lower quadrant was then prepped and draped in the typical sterile fashion. The skin and soft tissues were anesthetized using 2% lidocaine  A 5F GeeYeeeh catheter was advanced into the fluid collection and 2.4 L mL of clear yellow fluid was drained.  The fluid was sent to the lab for analysis.  The catheter was removed and a sterile bandage applied to the skin entry site.  The patient tolerated the procedure well without any immediate complications.      Impression: Successful ultrasound guided diagnostic and therapeutic paracentesis was performed.  Electronically Signed By-AHSAN GERMAIN MD On:5/22/2024 3:43 PM      US Paracentesis    Result Date: 5/15/2024  Narrative: US PARACENTESIS-  Date of Exam: 5/15/2024 9:35 AM  Indication: ascites; K70.31-Alcoholic cirrhosis of liver with ascites.  Comparison: US paracentesis, 5/7/2024  Consent: Risks and benefits were discussed with the patient including but not limited to risk of bleeding, infection, bowel injury, injury to adjacent structures and/or allergic reaction or reaction to medications used. Alternatives were discussed with the patient. The patient verbalized understanding and elected to proceed with the procedure.  Technique/Findings: Preprocedural vitals obtained by a radiology nurse and reviewed. Preliminary bedside ultrasound demonstrated a small amount of ascitic fluid in the right upper quadrant, site was marked with an \"L?. The overlying skin was prepped and draped in sterile fashion. The skin was infiltrated with local anesthesia " "over the insertion site with 10 mL of 2% lidocaine to anesthetize the site. A scalpel was used to make a small nick in the overlying skin to aid in catheter advancement. A 5 Prydeinig 7 cm Yueh catheter was then advanced into peritoneal space. 2.4 L of clear, pale yellow ascitic fluid was aspirated. Afterwards, the catheter was then removed and a bandage was applied. A sample of the fluid specimen was sent to lab for further diagnostic evaluation. Post-procedural vitals were obtained by a radiology nurse.      Impression: Impression: Ultrasound-guided paracentesis was performed without complication, patient tolerated procedure with 2.4 L of clear, pale yellow ascitic fluid removed. A sample specimen of 125 mL sent to the lab for further diagnostic evaluation. The patient was instructed to obtain follow up care from the referring physician.   AMERICO Strickland PA-C   Electronically Signed By-AHSAN GERMAIN MD On:5/15/2024 12:41 PM      US Paracentesis    Result Date: 5/7/2024  Narrative: US PARACENTESIS-  Date of Exam: 5/7/2024 3:16 PM  Indication: Ascites and abdominal pain.  Comparison: US paracentesis, 4/26/2024  Consent: Risks and benefits were discussed with the patient including but not limited to risk of bleeding, infection, bowel injury, injury to adjacent structures and/or allergic reaction or reaction to medications used. Alternatives were discussed with the patient. The patient verbalized understanding and elected to proceed with the procedure.  Technique/Findings: The patient was transferred from the emergency department to the radiology department prior to procedure. Preliminary bedside ultrasound demonstrated a small to moderate amount of ascitic fluid in the right upper quadrant, site was marked with an \"L?. The overlying skin was prepped and draped in sterile fashion. The skin was infiltrated with local anesthesia over the insertion site with 10 mL of 2% lidocaine to anesthetize the site. A scalpel " was used to make a small nick in the overlying skin to aid in catheter advancement. A 5 Azeri 7 cm Yueh catheter was then advanced into peritoneal space. 3.6 L of clear, yellow ascitic fluid was aspirated. Afterwards, the catheter was then removed and a bandage was applied. A sample of the fluid specimen was sent to lab for further diagnostic evaluation. The patient was transferred back to the emergency department status post procedure. Report was given to patient's ordering provider and RN regarding procedure details via secure epic chat via tech.      Impression: Impression: Ultrasound-guided paracentesis was performed without complication, patient tolerated procedure with 3.6 L of clear, yellow ascitic fluid removed. A sample specimen of 80 mL was sent to the lab for further diagnostic evaluation. The patient was instructed to obtain follow up care from the referring physician.   AMERICO Strickland PA-C  Electronically Signed By-AHSAN GERMAIN MD On:5/7/2024 4:46 PM      US Paracentesis    Result Date: 4/26/2024  Narrative: PROCEDURE: US PARACENTESIS-  DATE: 4/26/2024  INDICATION: 38 years old Male with ascites  CONSENT: The procedural risks, benefits, and alternatives were discussed with the patient.  Procedure risks discussed with the patient included potential bleeding, infection, and injury to surrounding tissues and structures. All patient questions were satisfactorily answered prior to the procedure. Verbal and written informed consent were obtained as well as for any reasonable additional procedures which might be emergently required should a complication directly related to the procedure occur.  TECHNIQUE: A formal time-out was taken correctly identifying the patient's name, patient's date of birth, procedure type, and procedure site.  The patient was placed in the supine position on the procedure table. Limited grayscale and color Doppler sonography of the abdomen was performed to identify a suitable  site for paracentesis. The largest pocket of ascites was identified within the right lower abdomen. The skin overlying the right lower quadrant was then prepped and draped in the typical sterile fashion. The skin and soft tissues were anesthetized using 2% buffered lidocaine.  A 5F Gladitoodeh catheter was advanced into the fluid collection and 2.8 L pale yellow fluid was drained.  The fluid was sent to the lab for analysis. The catheter was removed and a sterile bandage applied to the skin entry site.  The patient tolerated the procedure well without any immediate complications.      Impression: Successful ultrasound-guided diagnostic and therapeutic paracentesis was performed.  Electronically Signed By-AHSAN GERMAIN MD On:4/26/2024 12:24 PM       EKG: (my review): Sinus tachycardia, heart rate 110, QTc 469, no ST elevation or depression    Assessment & Plan  Assessment / Plan     -I have discussed the case with the ED physician.  I have seen patient at bedside.  I have independently reviewed his labs, EKG, imaging, and record    Acute hypoxic respiratory failure  Right-sided pleural effusion  Hypokalemia  Hyponatremia, mild  Other chronic problems: alcoholic cirrhosis with ascites, history of PE on Eliquis, and HTN    -I will start patient on ceftriaxone and azithromycin in case of parapneumonic effusion  -Giving 1X Lasix see if it helps with symptoms  -Repleting potassium.  Checking magnesium  -The ED has discussed the case with pulmonology.  Thoracentesis per pulm.  Appreciate further recs.  -Holding home Eliquis for anticipated procedure.  CT PE this admission does not show any PE.  -Further workups and management (echocardiogram...) after thoracentesis lab results    Resume home medications when appropriate.  DVT prophylaxis with SCD.    CODE STATUS:    Level Of Support Discussed With: Patient  Code Status (Patient has no pulse and is not breathing): CPR (Attempt to Resuscitate)  Medical Interventions (Patient  has pulse or is breathing): Full Support      Admission Status:  I believe this patient meets admission status.    Electronically signed by Bib Swan MD, 05/25/24, 6:12 PM EDT.               Electronically signed by Bib Swan MD at 05/25/24 1827       Vital Signs (last day)       Date/Time Temp Temp src Pulse Resp BP Patient Position SpO2    05/25/24 1830 98.8 (37.1) -- 99 27 115/73 -- 97    05/25/24 17:33:18 98.9 (37.2) Oral 102 29 112/77 -- 97    05/25/24 1631 -- -- 107 -- 110/77 -- 95    05/25/24 1611 -- -- 105 -- -- -- 94    05/25/24 1551 -- -- 103 -- -- -- 95    05/25/24 15:46:30 98.9 (37.2) Oral 105 34 110/77 Lying 96    05/25/24 1511 -- -- 103 -- -- -- 96    05/25/24 1451 -- -- 107 -- -- -- 89    05/25/24 1422 98.9 (37.2) -- 115 20 121/75 -- 96          Current Facility-Administered Medications   Medication Dose Route Frequency Provider Last Rate Last Admin    sodium chloride 0.9 % flush 10 mL  10 mL Intravenous Agusto Blanton DO         Current Outpatient Medications   Medication Sig Dispense Refill    apixaban (ELIQUIS) 2.5 MG tablet tablet Take 1 tablet by mouth 2 (Two) Times a Day. 60 tablet 2    pantoprazole (PROTONIX) 40 MG EC tablet TAKE 1 TABLET BY MOUTH EVERY DAY (Patient taking differently: Take 1 tablet by mouth Daily.) 90 tablet 1    spironolactone (ALDACTONE) 50 MG tablet TAKE 1 TABLET BY MOUTH EVERY DAY (Patient taking differently: Take 1 tablet by mouth Daily.) 90 tablet 1     Lab Results (last 24 hours)       Procedure Component Value Units Date/Time    Protime-INR [[id number]]  (Abnormal) Collected: 05/25/24 1505    Specimen: Blood Updated: 05/25/24 1612     Protime 19.4 Seconds      INR 1.61    Narrative:      Suggested Therapeutic Ranges For Oral Anticoagulant Therapy:  Level of Therapy                      INR Target Range  Standard Dose                            2.0-3.0  High Dose                                2.5-3.5  Patients not receiving anticoagulant  Therapy Normal Range                      0.86-1.15    Comprehensive Metabolic Panel [[id number]]  (Abnormal) Collected: 05/25/24 1505    Specimen: Blood Updated: 05/25/24 1539     Glucose 102 mg/dL      BUN 10 mg/dL      Creatinine 0.93 mg/dL      Sodium 132 mmol/L      Potassium 3.4 mmol/L      Chloride 102 mmol/L      CO2 20.1 mmol/L      Calcium 7.7 mg/dL      Total Protein 6.1 g/dL      Albumin 2.5 g/dL      ALT (SGPT) 15 U/L      AST (SGOT) 24 U/L      Alkaline Phosphatase 163 U/L      Total Bilirubin 1.5 mg/dL      Globulin 3.6 gm/dL      A/G Ratio 0.7 g/dL      BUN/Creatinine Ratio 10.8     Anion Gap 9.9 mmol/L      eGFR 109.1 mL/min/1.73     Narrative:      GFR Normal >60  Chronic Kidney Disease <60  Kidney Failure <15      Single High Sensitivity Troponin T [[id number]]  (Normal) Collected: 05/25/24 1505    Specimen: Blood Updated: 05/25/24 1539     HS Troponin T 9 ng/L     Narrative:      High Sensitive Troponin T Reference Range:  <14.0 ng/L- Negative Female for AMI  <22.0 ng/L- Negative Male for AMI  >=14 - Abnormal Female indicating possible myocardial injury.  >=22 - Abnormal Male indicating possible myocardial injury.   Clinicians would have to utilize clinical acumen, EKG, Troponin, and serial changes to determine if it is an Acute Myocardial Infarction or myocardial injury due to an underlying chronic condition.         BNP [[id number]]  (Normal) Collected: 05/25/24 1505    Specimen: Blood Updated: 05/25/24 1536     proBNP 130.4 pg/mL     Narrative:      This assay is used as an aid in the diagnosis of individuals suspected of having heart failure. It can be used as an aid in the diagnosis of acute decompensated heart failure (ADHF) in patients presenting with signs and symptoms of ADHF to the emergency department (ED). In addition, NT-proBNP of <300 pg/mL indicates ADHF is not likely.    Age Range Result Interpretation  NT-proBNP Concentration (pg/mL:      <50             Positive            >450                   Farooq                  300-450                    Negative             <300    50-75           Positive            >900                  Gray                300-900                  Negative            <300      >75             Positive            >1800                  Gray                300-1800                  Negative            <300    [place] Draw [[id number]] Collected: 05/25/24 1505    Specimen: Blood Updated: 05/25/24 1515    Narrative:      The following orders were created for panel order [place] Draw.  Procedure                               Abnormality         Status                     ---------                               -----------         ------                     Green Top (Gel)[[id number]]                                  Final result               Lavender Top[[id number]]                                     Final result               Gold Top - SST[[id number]]                                   Final result               Light Blue Top[[id number]]                                   Final result                 Please view results for these tests on the individual orders.    Green Top (Gel) [[id number]] Collected: 05/25/24 1505    Specimen: Blood Updated: 05/25/24 1515     Extra Tube Hold for add-ons.     Comment: Auto resulted.       Lavender Top [[id number]] Collected: 05/25/24 1505    Specimen: Blood Updated: 05/25/24 1515     Extra Tube hold for add-on     Comment: Auto resulted       Gold Top - SST [[id number]] Collected: 05/25/24 1505    Specimen: Blood Updated: 05/25/24 1515     Extra Tube Hold for add-ons.     Comment: Auto resulted.       Light Blue Top [[id number]] Collected: 05/25/24 1505    Specimen: Blood Updated: 05/25/24 1515     Extra Tube Hold for add-ons.     Comment: Auto resulted       CBC & Differential [[id number]]  (Abnormal) Collected: 05/25/24 1505    Specimen: Blood Updated: 05/25/24 1513    Narrative:      The following orders were created for panel order CBC & Differential.  Procedure                                Abnormality         Status                     ---------                               -----------         ------                     CBC Auto Differential[[id number]]        Abnormal            Final result                 Please view results for these tests on the individual orders.    CBC Auto Differential [[id number]]  (Abnormal) Collected: 05/25/24 1505    Specimen: Blood Updated: 05/25/24 1513     WBC 6.87 10*3/mm3      RBC 3.59 10*6/mm3      Hemoglobin 11.1 g/dL      Hematocrit 33.9 %      MCV 94.4 fL      MCH 30.9 pg      MCHC 32.7 g/dL      RDW 15.7 %      RDW-SD 53.7 fl      MPV 9.6 fL      Platelets 145 10*3/mm3      Neutrophil % 70.8 %      Lymphocyte % 11.2 %      Monocyte % 10.5 %      Eosinophil % 6.7 %      Basophil % 0.4 %      Immature Grans % 0.4 %      Neutrophils, Absolute 4.86 10*3/mm3      Lymphocytes, Absolute 0.77 10*3/mm3      Monocytes, Absolute 0.72 10*3/mm3      Eosinophils, Absolute 0.46 10*3/mm3      Basophils, Absolute 0.03 10*3/mm3      Immature Grans, Absolute 0.03 10*3/mm3      nRBC 0.0 /100 WBC           Imaging Results (Last 24 Hours)       Procedure Component Value Units Date/Time    CT Chest With Contrast Diagnostic [[id number]] Collected: 05/25/24 1718     Updated: 05/25/24 1729    Narrative:      CT CHEST W CONTRAST DIAGNOSTIC-     Date of Exam: 5/25/2024 4:36 PM     Indication: Shortness of breath and history of pulmonary embolus.     Comparison: Chest radiograph from earlier today     Technique: CT scan of the chest was performed after the uneventful  administration of 50 mL of IV Isovue 370. Automated exposure control and  iterative reconstruction methods were used.        Findings:  There is occlusion of the bronchus intermedius. There is a large right  pleural effusion with right basilar atelectasis. There are several  groundglass densities within the left upper lobe. There is a trace left  pleural effusion.     The heart size appears normal. The  great vessels are normal caliber.  There is no evidence of pulmonary embolus. No abnormally enlarged lymph  nodes are identified.     Partial evaluation of the upper abdomen demonstrates changes of  cholecystectomy, hepatic cirrhosis, and ascites.     No aggressive osseous lesions are identified.       Impression:      Impression:  1.  No evidence of pulmonary embolus.  2.  Large right and trace left pleural effusions.  3.  Right basilar atelectasis.  4.  Hepatic cirrhosis with ascites within visualized upper abdomen.                       Electronically Signed ByAlcon Porter MD On:5/25/2024 5:27 PM       XR Chest 1 View [[id number]] Collected: 05/25/24 1529     Updated: 05/25/24 1533    Narrative:      XR CHEST 1 VW-     Date of Exam: 5/25/2024 3:18 PM     Indication: SOA Triage Protocol.     Comparison Exams: September 7, 2021     Technique: Single AP chest radiograph     FINDINGS:  There is a large right pleural effusion with right basilar opacity. The  left lung is clear. The heart and mediastinal contours appear stable.  The pulmonary vasculature appears normal. The osseous structures appear  intact.       Impression:      1.  Large right pleural effusion.  2.  Right basilar opacity, which could reflect atelectasis or pneumonia.        Electronically Signed ByAlcon Porter MD On:5/25/2024 3:31 PM             ECG/EMG Results (last 24 hours)       Procedure Component Value Units Date/Time    ECG 12 Lead ED Triage Standing Order; SOA [[id number]] Collected: 05/25/24 1425     Updated: 05/25/24 1426     QT Interval 347 ms      QTC Interval 469 ms     Narrative:      HEART RATE= 110  bpm  RR Interval= 548  ms  WI Interval= 132  ms  P Horizontal Axis= 52  deg  P Front Axis= 14  deg  QRSD Interval= 109  ms  QT Interval= 347  ms  QTcB= 469  ms  QRS Axis= 19  deg  T Wave Axis= -9  deg  - BORDERLINE ECG -  Sinus tachycardia  Low voltage, precordial leads  Borderline T abnormalities, diffuse leads  Electronically  Signed By:   Date and Time of Study: 2024-05-25 14:25:25          Orders (last 24 hrs)        Start     Ordered    05/25/24 1821  Inpatient Admission  Once         05/25/24 1821    05/25/24 1805  Code Status and Medical Interventions:  Continuous         05/25/24 1811    05/25/24 1732  Hospitalist (on-call MD unless specified)  Once        Specialty:  Hospitalist  Provider:  Bib Swan MD    05/25/24 1731    05/25/24 1715  iopamidol (ISOVUE-370) 76 % injection 50 mL  Once in Imaging         05/25/24 1647    05/25/24 1609  Pulmonology (on-call MD unless specified)  Once        Specialty:  Pulmonary Disease  Provider:  Praful Powell MD    05/25/24 1608    05/25/24 1606  Protime-INR  Once         05/25/24 1605    05/25/24 1551  CT Chest With Contrast Diagnostic  1 Time Imaging         05/25/24 1551    05/25/24 1421  NPO Diet NPO Type: Strict NPO  Diet Effective Now         05/25/24 1420    05/25/24 1421  Undress & Gown  Once         05/25/24 1420    05/25/24 1421  Cardiac Monitoring  Continuous        Comments: Follow Standing Orders As Outlined in Process Instructions (Open Order Report to View Full Instructions)    05/25/24 1420    05/25/24 1421  Continuous Pulse Oximetry  Continuous         05/25/24 1420    05/25/24 1421  Vital Signs  Per Hospital Policy         05/25/24 1420    05/25/24 1421  ECG 12 Lead ED Triage Standing Order; SOA  Once         05/25/24 1420    05/25/24 1421  XR Chest 1 View  1 Time Imaging         05/25/24 1420    05/25/24 1421  CBC & Differential  Once         05/25/24 1420    05/25/24 1421  Comprehensive Metabolic Panel  Once         05/25/24 1420    05/25/24 1421  BNP  Once         05/25/24 1420    05/25/24 1421  Single High Sensitivity Troponin T  Once         05/25/24 1420    05/25/24 1421  CBC Auto Differential  PROCEDURE ONCE         05/25/24 1420    05/25/24 1420  sodium chloride 0.9 % flush 10 mL  As Needed         05/25/24 1420    Unscheduled  Oxygen Therapy- Nasal Cannula; Titrate  1-6 LPM Per SpO2; 90 - 95%  Continuous PRN       05/25/24 1420    Signed and Held  [Held by provider]  apixaban (ELIQUIS) tablet 2.5 mg  2 Times Daily        (Hold starts immediately upon release until manually unheld; held by Bib Swan MDHold Reason: Hold For Procedure)    Signed and Held    Signed and Held  pantoprazole (PROTONIX) EC tablet 40 mg  Daily         Signed and Held    Signed and Held  spironolactone (ALDACTONE) tablet 50 mg  Daily         Signed and Held    Signed and Held  Vital Signs  Per Hospital Policy         Signed and Held    Signed and Held  Continuous Cardiac Monitoring  Continuous        Comments: Follow Standing Orders As Outlined in Process Instructions (Open Order Report to View Full Instructions)    Signed and Held    Signed and Held  nitroglycerin (NITROSTAT) SL tablet 0.4 mg  Every 5 Minutes PRN         Signed and Held    Signed and Held  Telemetry - Maintain IV Access  Continuous         Signed and Held    Signed and Held  Telemetry - Place Orders & Notify Provider of Results When Patient Experiences Acute Chest Pain, Dysrhythmia or Respiratory Distress  Until Discontinued         Signed and Held    Signed and Held  Notify Provider (With Default Parameters)  Until Discontinued         Signed and Held    Signed and Held  Activity - Ad Hattie  Until Discontinued         Signed and Held    Signed and Held  Intake & Output  Every Shift       Signed and Held    Signed and Held  Weigh Patient  Once         Signed and Held    Signed and Held  Oral Care  2 Times Daily       Signed and Held    Signed and Held  Oxygen Therapy- Nasal Cannula; Titrate 1-6 LPM Per SpO2; 90 - 95%  Continuous PRN         Signed and Held    Signed and Held  CBC (No Diff)  Morning Draw         Signed and Held    Signed and Held  Comprehensive Metabolic Panel  Morning Draw         Signed and Held    Signed and Held  Magnesium  Morning Draw         Signed and Held    Signed and Held  Phosphorus  Morning Draw         Signed  and Held    Signed and Held  Insert Peripheral IV  Once         Signed and Held    Signed and Held  Saline Lock & Maintain IV Access  Continuous         Signed and Held    Signed and Held  sodium chloride 0.9 % flush 10 mL  Every 12 Hours Scheduled         Signed and Held    Signed and Held  sodium chloride 0.9 % flush 10 mL  As Needed         Signed and Held    Signed and Held  sodium chloride 0.9 % infusion 40 mL  As Needed         Signed and Held    Signed and Held  acetaminophen (TYLENOL) tablet 1,000 mg  Every 8 Hours PRN         Signed and Held    Signed and Held  Place Sequential Compression Device  Once         Signed and Held    Signed and Held  Maintain Sequential Compression Device  Continuous         Signed and Held    Signed and Held  Bowel Regimen Not Indicated  Once         Signed and Held    Signed and Held  Lactate Dehydrogenase  Once         Signed and Held    Signed and Held  furosemide (LASIX) injection 20 mg  Once         Signed and Held    Signed and Held  cefTRIAXone (ROCEPHIN) 1,000 mg in sodium chloride 0.9 % 100 mL IVPB-VTB  Every 24 Hours         Signed and Held    Signed and Held  AZITHROMYCIN 500 MG/250 ML 0.9% NS IVPB (vial-mate)  Every 24 Hours         Signed and Held    Signed and Held  Diet: Fluid Restriction (240 mL/tray), Cardiac; Low Sodium (2g); 1500 mL/day; Fluid Consistency: Thin (IDDSI 0)  Diet Effective Now         Signed and Held    Signed and Held  potassium chloride 20 mEq in 50 mL IVPB  Once         Signed and Held    Signed and Held  Discontinue Cardiac Monitoring  Once         Signed and Held

## 2024-05-25 NOTE — Clinical Note
Level of Care: Med/Surg [1]   Diagnosis: Respiratory failure, acute [301819]   Certification: I Certify That Inpatient Hospital Services Are Medically Necessary For Greater Than 2 Midnights

## 2024-05-25 NOTE — ED PROVIDER NOTES
Time: 3:50 PM EDT  Date of encounter:  5/25/2024  Independent Historian/Clinical History and Information was obtained by:   Patient and Family    History is limited by: N/A    Chief Complaint: Shortness of breath      History of Present Illness:  Patient is a 38 y.o. year old male who presents to the emergency department for evaluation of shortness of breath this patient has a history of cirrhosis and he also smokes cigarettes.  The patient recently had a paracentesis performed and he also has a history of pulmonary emboli.  He is taking Eliquis for the pulmonary emboli.    Today the patient presents to the emergency room complaining worsening shortness of breath over the last couple of days.  The patient's pulse oximeter was 88% on room air upon arrival to the ED.  He has had no fever or chills he does have a mild cough however no sputum production.    HPI    Patient Care Team  Primary Care Provider: Callum Peterson DO    Past Medical History:     No Known Allergies  Past Medical History:   Diagnosis Date    Alcohol abuse 03/14/2017    Essential hypertension 12/27/2019    GERD (gastroesophageal reflux disease)     Hypokalemia 03/14/2017    Will update    Hyponatremia 03/14/2017    Steatohepatitis, alcoholic 03/14/2017    Tobacco abuse 03/14/2017    Umbilical hernia      Past Surgical History:   Procedure Laterality Date    CHOLECYSTECTOMY      ENDOSCOPY N/A 02/09/2022    Procedure: ESOPHAGOGASTRODUODENOSCOPY WITH BX;  Surgeon: Gino Khan MD;  Location: [facility] ENDOSCOPY;  Service: Gastroenterology;  Laterality: N/A;  RETAINED LIQUID FOOD IN STOMACH, HUFFMAN'S ESOPHAGUS    ENDOSCOPY N/A 2/10/2023    Procedure: ESOPHAGOGASTRODUODENOSCOPY;  Surgeon: Gino Khan MD;  Location: [facility] ENDOSCOPY;  Service: Gastroenterology;  Laterality: N/A;  GASTRITIS, BARRETTS ESOPHAGUS, PHG    UPPER GASTROINTESTINAL ENDOSCOPY       Family History   Problem Relation Age of Onset    Alcohol abuse Mother      Arthritis Mother     COPD Mother     Heart disease Maternal Grandmother     Hypertension Maternal Grandmother     Lung cancer Maternal Grandmother     Stroke Maternal Grandmother     Heart disease Maternal Grandfather     Lung cancer Maternal Grandfather     Cancer Paternal Grandmother     Cancer Paternal Grandfather     Colon cancer Neg Hx     Malig Hyperthermia Neg Hx        Home Medications:  Prior to Admission medications    Medication Sig Start Date End Date Taking? Authorizing Provider   apixaban (ELIQUIS) 2.5 MG tablet tablet Take 1 tablet by mouth 2 (Two) Times a Day. 4/2/24   Shyla Yarbrough APRN   pantoprazole (PROTONIX) 40 MG EC tablet TAKE 1 TABLET BY MOUTH EVERY DAY 1/17/24   Shyla Yarbrough APRN   spironolactone (ALDACTONE) 50 MG tablet TAKE 1 TABLET BY MOUTH EVERY DAY 1/16/24   Shyla Yarbrough APRN        Social History:   Social History     Tobacco Use    Smoking status: Every Day     Current packs/day: 0.50     Average packs/day: 0.5 packs/day for 16.4 years (8.2 ttl pk-yrs)     Types: Cigarettes     Start date: 2008     Passive exposure: Current    Smokeless tobacco: Never   Vaping Use    Vaping status: Never Used   Substance Use Topics    Alcohol use: Not Currently     Alcohol/week: 3.0 standard drinks of alcohol     Types: 2 Cans of beer, 1 Shots of liquor per week     Comment: FORMER    Drug use: Not Currently         Review of Systems:  Review of Systems   Constitutional:  Negative for chills and fever.   HENT:  Negative for congestion, ear pain and sore throat.    Eyes:  Negative for pain.   Respiratory:  Positive for cough and shortness of breath. Negative for chest tightness.    Cardiovascular:  Negative for chest pain.   Gastrointestinal:  Negative for abdominal pain, diarrhea, nausea and vomiting.   Genitourinary:  Negative for flank pain and hematuria.   Musculoskeletal:  Negative for joint swelling.   Skin:  Negative for pallor.   Neurological:  Positive for weakness.  "Negative for seizures and headaches.   All other systems reviewed and are negative.       Physical Exam:  /77 (BP Location: Right arm)   Pulse 102   Temp 98.9 °F (37.2 °C) (Oral)   Resp (!) 29   Ht 172.7 cm (68\")   Wt 83 kg (182 lb 15.7 oz)   SpO2 97%   BMI 27.82 kg/m²     Physical Exam  Vitals and nursing note reviewed.   Constitutional:       General: He is in acute distress.      Appearance: Normal appearance. He is ill-appearing. He is not toxic-appearing.   HENT:      Head: Normocephalic and atraumatic.      Mouth/Throat:      Mouth: Mucous membranes are moist.   Eyes:      General: No scleral icterus.  Cardiovascular:      Rate and Rhythm: Regular rhythm. Tachycardia present.      Pulses: Normal pulses.      Heart sounds: Normal heart sounds.   Pulmonary:      Effort: Tachypnea and respiratory distress present.      Breath sounds: Examination of the right-upper field reveals decreased breath sounds. Examination of the right-middle field reveals decreased breath sounds and rhonchi. Examination of the left-middle field reveals rhonchi. Examination of the right-lower field reveals decreased breath sounds. Decreased breath sounds and rhonchi present.   Abdominal:      General: Abdomen is flat.      Palpations: Abdomen is soft.      Tenderness: There is no abdominal tenderness.   Musculoskeletal:         General: Normal range of motion.      Cervical back: Normal range of motion and neck supple.   Skin:     General: Skin is warm and dry.   Neurological:      Mental Status: He is alert and oriented to person, place, and time. Mental status is at baseline.                  Procedures:  Procedures      Medical Decision Making:      Comorbidities that affect care:    Cirrhosis, cigarette smoking, pulmonary embolism    External Notes reviewed:    Previous Admission Note: Recent visit to radiology for paracentesis.      The following orders were placed and all results were independently analyzed by " me:  Orders Placed This Encounter   Procedures    XR Chest 1 View    CT Chest With Contrast Diagnostic    [place] Draw    Comprehensive Metabolic Panel    BNP    Single High Sensitivity Troponin T    CBC Auto Differential    Protime-INR    NPO Diet NPO Type: Strict NPO    Undress & Gown    Continuous Pulse Oximetry    Vital Signs    Pulmonology (on-call MD unless specified)    Hospitalist (on-call MD unless specified)    Oxygen Therapy- Nasal Cannula; Titrate 1-6 LPM Per SpO2; 90 - 95%    ECG 12 Lead ED Triage Standing Order; SOA    Insert Peripheral IV    CBC & Differential    Green Top (Gel)    Lavender Top    Gold Top - SST    Light Blue Top       Medications Given in the Emergency Department:  Medications   sodium chloride 0.9 % flush 10 mL (has no administration in time range)   iopamidol (ISOVUE-370) 76 % injection 50 mL (50 mL Intravenous Given 5/25/24 1647)        ED Course:         EKG: Sinus tachycardia the rate of 110 bpm  Normal P wave and.  Nonspecific ST-T  No acute ischemic  QT QTc interval.      Labs:    Lab Results (last 24 hours)       Procedure Component Value Units Date/Time    CBC & Differential [[id number]]  (Abnormal) Collected: 05/25/24 1505    Specimen: Blood Updated: 05/25/24 1513    Narrative:      The following orders were created for panel order CBC & Differential.  Procedure                               Abnormality         Status                     ---------                               -----------         ------                     CBC Auto Differential[[id number]]        Abnormal            Final result                 Please view results for these tests on the individual orders.    Comprehensive Metabolic Panel [[id number]]  (Abnormal) Collected: 05/25/24 1505    Specimen: Blood Updated: 05/25/24 1537     Glucose 102 mg/dL      BUN 10 mg/dL      Creatinine 0.93 mg/dL      Sodium 132 mmol/L      Potassium 3.4 mmol/L      Chloride 102 mmol/L      CO2 20.1 mmol/L      Calcium 7.7 mg/dL       Total Protein 6.1 g/dL      Albumin 2.5 g/dL      ALT (SGPT) 15 U/L      AST (SGOT) 24 U/L      Alkaline Phosphatase 163 U/L      Total Bilirubin 1.5 mg/dL      Globulin 3.6 gm/dL      A/G Ratio 0.7 g/dL      BUN/Creatinine Ratio 10.8     Anion Gap 9.9 mmol/L      eGFR 109.1 mL/min/1.73     Narrative:      GFR Normal >60  Chronic Kidney Disease <60  Kidney Failure <15      BNP [[id number]]  (Normal) Collected: 05/25/24 1505    Specimen: Blood Updated: 05/25/24 1536     proBNP 130.4 pg/mL     Narrative:      This assay is used as an aid in the diagnosis of individuals suspected of having heart failure. It can be used as an aid in the diagnosis of acute decompensated heart failure (ADHF) in patients presenting with signs and symptoms of ADHF to the emergency department (ED). In addition, NT-proBNP of <300 pg/mL indicates ADHF is not likely.    Age Range Result Interpretation  NT-proBNP Concentration (pg/mL:      <50             Positive            >450                   Gray                 300-450                    Negative             <300    50-75           Positive            >900                  Gray                300-900                  Negative            <300      >75             Positive            >1800                  Gray                300-1800                  Negative            <300    Single High Sensitivity Troponin T [[id number]]  (Normal) Collected: 05/25/24 1505    Specimen: Blood Updated: 05/25/24 1539     HS Troponin T 9 ng/L     Narrative:      High Sensitive Troponin T Reference Range:  <14.0 ng/L- Negative Female for AMI  <22.0 ng/L- Negative Male for AMI  >=14 - Abnormal Female indicating possible myocardial injury.  >=22 - Abnormal Male indicating possible myocardial injury.   Clinicians would have to utilize clinical acumen, EKG, Troponin, and serial changes to determine if it is an Acute Myocardial Infarction or myocardial injury due to an underlying chronic condition.         CBC  Auto Differential [[id number]]  (Abnormal) Collected: 05/25/24 1505    Specimen: Blood Updated: 05/25/24 1513     WBC 6.87 10*3/mm3      RBC 3.59 10*6/mm3      Hemoglobin 11.1 g/dL      Hematocrit 33.9 %      MCV 94.4 fL      MCH 30.9 pg      MCHC 32.7 g/dL      RDW 15.7 %      RDW-SD 53.7 fl      MPV 9.6 fL      Platelets 145 10*3/mm3      Neutrophil % 70.8 %      Lymphocyte % 11.2 %      Monocyte % 10.5 %      Eosinophil % 6.7 %      Basophil % 0.4 %      Immature Grans % 0.4 %      Neutrophils, Absolute 4.86 10*3/mm3      Lymphocytes, Absolute 0.77 10*3/mm3      Monocytes, Absolute 0.72 10*3/mm3      Eosinophils, Absolute 0.46 10*3/mm3      Basophils, Absolute 0.03 10*3/mm3      Immature Grans, Absolute 0.03 10*3/mm3      nRBC 0.0 /100 WBC     Protime-INR [[id number]]  (Abnormal) Collected: 05/25/24 1505    Specimen: Blood Updated: 05/25/24 1612     Protime 19.4 Seconds      INR 1.61    Narrative:      Suggested Therapeutic Ranges For Oral Anticoagulant Therapy:  Level of Therapy                      INR Target Range  Standard Dose                            2.0-3.0  High Dose                                2.5-3.5  Patients not receiving anticoagulant  Therapy Normal Range                     0.86-1.15             Imaging:    CT Chest With Contrast Diagnostic    Result Date: 5/25/2024  CT CHEST W CONTRAST DIAGNOSTIC-  Date of Exam: 5/25/2024 4:36 PM  Indication: Shortness of breath and history of pulmonary embolus.  Comparison: Chest radiograph from earlier today  Technique: CT scan of the chest was performed after the uneventful administration of 50 mL of IV Isovue 370. Automated exposure control and iterative reconstruction methods were used.   Findings: There is occlusion of the bronchus intermedius. There is a large right pleural effusion with right basilar atelectasis. There are several groundglass densities within the left upper lobe. There is a trace left pleural effusion.  The heart size appears normal.  The great vessels are normal caliber. There is no evidence of pulmonary embolus. No abnormally enlarged lymph nodes are identified.  Partial evaluation of the upper abdomen demonstrates changes of cholecystectomy, hepatic cirrhosis, and ascites.  No aggressive osseous lesions are identified.      Impression: 1.  No evidence of pulmonary embolus. 2.  Large right and trace left pleural effusions. 3.  Right basilar atelectasis. 4.  Hepatic cirrhosis with ascites within visualized upper abdomen.        Electronically Signed ByAlcon Porter MD On:5/25/2024 5:27 PM      XR Chest 1 View    Result Date: 5/25/2024  XR CHEST 1 VW-  Date of Exam: 5/25/2024 3:18 PM  Indication: SOA Triage Protocol.  Comparison Exams: September 7, 2021  Technique: Single AP chest radiograph  FINDINGS: There is a large right pleural effusion with right basilar opacity. The left lung is clear. The heart and mediastinal contours appear stable. The pulmonary vasculature appears normal. The osseous structures appear intact.      1.  Large right pleural effusion. 2.  Right basilar opacity, which could reflect atelectasis or pneumonia.   Electronically Signed By-Teofilo Porter MD On:5/25/2024 3:31 PM         Differential Diagnosis and Discussion:    Dyspnea: Differential diagnosis includes but is not limited to metabolic acidosis, neurological disorders, psychogenic, asthma, pneumothorax, upper airway obstruction, COPD, pneumonia, noncardiogenic pulmonary edema, interstitial lung disease, anemia, congestive heart failure, and pulmonary embolism    All labs were reviewed and interpreted by me.  EKG was interpreted by me.    MDM     Amount and/or Complexity of Data Reviewed  Clinical lab tests: reviewed  Tests in the radiology section of CPT®: reviewed  Tests in the medicine section of CPT®: reviewed                 Patient Care Considerations:    CT ABDOMEN AND PELVIS: I considered ordering a CT scan of the abdomen and pelvis however patient has  no abdominal symptoms at this time.      Consultants/Shared Management Plan:    Hospitalist: I have discussed the case with hospitalist who agrees to accept the patient for admission.  Consultant: I have discussed the case with Dr. Powell who states he will consult on the patient.    Social Determinants of Health:    Patient is unable to carry out activities of daily life. Escalation of care is necessary.       Disposition and Care Coordination:    Admit:   Through independent evaluation of the patient's history, physical, and imperical data, the patient meets criteria for inpatient admission to the hospital.        Final diagnoses:   Pleural effusion associated with hepatic disorder   Hypoxia   Cirrhosis of liver with ascites, unspecified hepatic cirrhosis type   Acute dyspnea        ED Disposition       ED Disposition   Decision to Admit    Condition   --    Comment   --               This medical record created using voice recognition software.             Agusto Fernandez,   05/25/24 3697

## 2024-05-26 ENCOUNTER — APPOINTMENT (OUTPATIENT)
Dept: GENERAL RADIOLOGY | Facility: HOSPITAL | Age: 39
End: 2024-05-26
Payer: COMMERCIAL

## 2024-05-26 ENCOUNTER — APPOINTMENT (OUTPATIENT)
Dept: CT IMAGING | Facility: HOSPITAL | Age: 39
End: 2024-05-26
Payer: COMMERCIAL

## 2024-05-26 LAB
ALBUMIN FLD-MCNC: 0.4 G/DL
ALBUMIN SERPL-MCNC: 2.2 G/DL (ref 3.5–5.2)
ALBUMIN/GLOB SERPL: 0.7 G/DL
ALP SERPL-CCNC: 152 U/L (ref 39–117)
ALT SERPL W P-5'-P-CCNC: 13 U/L (ref 1–41)
ANION GAP SERPL CALCULATED.3IONS-SCNC: 8.7 MMOL/L (ref 5–15)
APPEARANCE FLD: CLEAR
AST SERPL-CCNC: 22 U/L (ref 1–40)
BACTERIA SPEC RESP CULT: NORMAL
BILIRUB SERPL-MCNC: 1.1 MG/DL (ref 0–1.2)
BUN SERPL-MCNC: 11 MG/DL (ref 6–20)
BUN/CREAT SERPL: 11.7 (ref 7–25)
CALCIUM SPEC-SCNC: 7.5 MG/DL (ref 8.6–10.5)
CHLORIDE SERPL-SCNC: 104 MMOL/L (ref 98–107)
CHOLESTEROL FLUID: <4 MG/DL
CO2 SERPL-SCNC: 19.3 MMOL/L (ref 22–29)
COLOR FLD: NORMAL
CREAT SERPL-MCNC: 0.94 MG/DL (ref 0.76–1.27)
D-LACTATE SERPL-SCNC: 1.9 MMOL/L (ref 0.5–2)
DEPRECATED RDW RBC AUTO: 53.7 FL (ref 37–54)
EGFRCR SERPLBLD CKD-EPI 2021: 107.7 ML/MIN/1.73
ERYTHROCYTE [DISTWIDTH] IN BLOOD BY AUTOMATED COUNT: 15.7 % (ref 12.3–15.4)
FERRITIN SERPL-MCNC: 38.42 NG/ML (ref 30–400)
GLOBULIN UR ELPH-MCNC: 3.2 GM/DL
GLUCOSE FLD-MCNC: 113 MG/DL
GLUCOSE SERPL-MCNC: 120 MG/DL (ref 65–99)
GRAM STN SPEC: NORMAL
HCT VFR BLD AUTO: 29.1 % (ref 37.5–51)
HGB BLD-MCNC: 9.6 G/DL (ref 13–17.7)
HOLD SPECIMEN: NORMAL
HOLD SPECIMEN: NORMAL
IRON 24H UR-MRATE: 27 MCG/DL (ref 59–158)
IRON SATN MFR SERPL: 8 % (ref 20–50)
L PNEUMO1 AG UR QL IA: NEGATIVE
LDH FLD-CCNC: 52 U/L
LYMPHOCYTES NFR FLD MANUAL: 10 %
MACROPHAGE FLUID: 10 %
MAGNESIUM SERPL-MCNC: 1.5 MG/DL (ref 1.6–2.6)
MCH RBC QN AUTO: 30.8 PG (ref 26.6–33)
MCHC RBC AUTO-ENTMCNC: 33 G/DL (ref 31.5–35.7)
MCV RBC AUTO: 93.3 FL (ref 79–97)
NEUTROPHILS NFR FLD MANUAL: 80 %
NUC CELL # FLD: 317 /MM3
PH FLD: 8 [PH]
PHOSPHATE SERPL-MCNC: 2.7 MG/DL (ref 2.5–4.5)
PLATELET # BLD AUTO: 113 10*3/MM3 (ref 140–450)
PMV BLD AUTO: 10 FL (ref 6–12)
POTASSIUM SERPL-SCNC: 3.3 MMOL/L (ref 3.5–5.2)
PROCALCITONIN SERPL-MCNC: 0.17 NG/ML (ref 0–0.25)
PROT FLD-MCNC: <1 G/DL
PROT SERPL-MCNC: 5.4 G/DL (ref 6–8.5)
RBC # BLD AUTO: 3.12 10*6/MM3 (ref 4.14–5.8)
RBC # FLD AUTO: <2000 /MM3
S PNEUM AG SPEC QL LA: NEGATIVE
SODIUM SERPL-SCNC: 132 MMOL/L (ref 136–145)
TIBC SERPL-MCNC: 322 MCG/DL (ref 298–536)
TRANSFERRIN SERPL-MCNC: 216 MG/DL (ref 200–360)
TRIGL FLD-MCNC: <9 MG/DL
WBC NRBC COR # BLD AUTO: 6.7 10*3/MM3 (ref 3.4–10.8)
WHOLE BLOOD HOLD COAG: NORMAL
WHOLE BLOOD HOLD SPECIMEN: NORMAL

## 2024-05-26 PROCEDURE — 82945 GLUCOSE OTHER FLUID: CPT | Performed by: INTERNAL MEDICINE

## 2024-05-26 PROCEDURE — 94799 UNLISTED PULMONARY SVC/PX: CPT

## 2024-05-26 PROCEDURE — 25010000002 PROCHLORPERAZINE 10 MG/2ML SOLUTION: Performed by: STUDENT IN AN ORGANIZED HEALTH CARE EDUCATION/TRAINING PROGRAM

## 2024-05-26 PROCEDURE — 88108 CYTOPATH CONCENTRATE TECH: CPT | Performed by: INTERNAL MEDICINE

## 2024-05-26 PROCEDURE — 83540 ASSAY OF IRON: CPT | Performed by: INTERNAL MEDICINE

## 2024-05-26 PROCEDURE — 0W993ZZ DRAINAGE OF RIGHT PLEURAL CAVITY, PERCUTANEOUS APPROACH: ICD-10-PCS | Performed by: INTERNAL MEDICINE

## 2024-05-26 PROCEDURE — 87040 BLOOD CULTURE FOR BACTERIA: CPT | Performed by: INTERNAL MEDICINE

## 2024-05-26 PROCEDURE — 89051 BODY FLUID CELL COUNT: CPT | Performed by: INTERNAL MEDICINE

## 2024-05-26 PROCEDURE — 32555 ASPIRATE PLEURA W/ IMAGING: CPT | Performed by: INTERNAL MEDICINE

## 2024-05-26 PROCEDURE — 87205 SMEAR GRAM STAIN: CPT | Performed by: INTERNAL MEDICINE

## 2024-05-26 PROCEDURE — 85027 COMPLETE CBC AUTOMATED: CPT | Performed by: STUDENT IN AN ORGANIZED HEALTH CARE EDUCATION/TRAINING PROGRAM

## 2024-05-26 PROCEDURE — 87070 CULTURE OTHR SPECIMN AEROBIC: CPT | Performed by: INTERNAL MEDICINE

## 2024-05-26 PROCEDURE — 99291 CRITICAL CARE FIRST HOUR: CPT | Performed by: INTERNAL MEDICINE

## 2024-05-26 PROCEDURE — 82728 ASSAY OF FERRITIN: CPT | Performed by: INTERNAL MEDICINE

## 2024-05-26 PROCEDURE — 87449 NOS EACH ORGANISM AG IA: CPT | Performed by: INTERNAL MEDICINE

## 2024-05-26 PROCEDURE — 80053 COMPREHEN METABOLIC PANEL: CPT | Performed by: STUDENT IN AN ORGANIZED HEALTH CARE EDUCATION/TRAINING PROGRAM

## 2024-05-26 PROCEDURE — 84100 ASSAY OF PHOSPHORUS: CPT | Performed by: STUDENT IN AN ORGANIZED HEALTH CARE EDUCATION/TRAINING PROGRAM

## 2024-05-26 PROCEDURE — 94761 N-INVAS EAR/PLS OXIMETRY MLT: CPT

## 2024-05-26 PROCEDURE — 94660 CPAP INITIATION&MGMT: CPT

## 2024-05-26 PROCEDURE — 84145 PROCALCITONIN (PCT): CPT | Performed by: INTERNAL MEDICINE

## 2024-05-26 PROCEDURE — 83986 ASSAY PH BODY FLUID NOS: CPT | Performed by: INTERNAL MEDICINE

## 2024-05-26 PROCEDURE — 83605 ASSAY OF LACTIC ACID: CPT | Performed by: INTERNAL MEDICINE

## 2024-05-26 PROCEDURE — 25010000002 MAGNESIUM SULFATE 2 GM/50ML SOLUTION: Performed by: INTERNAL MEDICINE

## 2024-05-26 PROCEDURE — 83615 LACTATE (LD) (LDH) ENZYME: CPT | Performed by: INTERNAL MEDICINE

## 2024-05-26 PROCEDURE — 84311 SPECTROPHOTOMETRY: CPT | Performed by: INTERNAL MEDICINE

## 2024-05-26 PROCEDURE — 71045 X-RAY EXAM CHEST 1 VIEW: CPT

## 2024-05-26 PROCEDURE — 82042 OTHER SOURCE ALBUMIN QUAN EA: CPT | Performed by: INTERNAL MEDICINE

## 2024-05-26 PROCEDURE — 84157 ASSAY OF PROTEIN OTHER: CPT | Performed by: INTERNAL MEDICINE

## 2024-05-26 PROCEDURE — 87102 FUNGUS ISOLATION CULTURE: CPT | Performed by: INTERNAL MEDICINE

## 2024-05-26 PROCEDURE — 99223 1ST HOSP IP/OBS HIGH 75: CPT | Performed by: INTERNAL MEDICINE

## 2024-05-26 PROCEDURE — 84478 ASSAY OF TRIGLYCERIDES: CPT | Performed by: INTERNAL MEDICINE

## 2024-05-26 PROCEDURE — 25010000002 FUROSEMIDE PER 20 MG: Performed by: INTERNAL MEDICINE

## 2024-05-26 PROCEDURE — 87015 SPECIMEN INFECT AGNT CONCNTJ: CPT | Performed by: INTERNAL MEDICINE

## 2024-05-26 PROCEDURE — 87899 AGENT NOS ASSAY W/OPTIC: CPT | Performed by: INTERNAL MEDICINE

## 2024-05-26 PROCEDURE — 87206 SMEAR FLUORESCENT/ACID STAI: CPT | Performed by: INTERNAL MEDICINE

## 2024-05-26 PROCEDURE — 94640 AIRWAY INHALATION TREATMENT: CPT

## 2024-05-26 PROCEDURE — 83735 ASSAY OF MAGNESIUM: CPT | Performed by: STUDENT IN AN ORGANIZED HEALTH CARE EDUCATION/TRAINING PROGRAM

## 2024-05-26 PROCEDURE — 87116 MYCOBACTERIA CULTURE: CPT | Performed by: INTERNAL MEDICINE

## 2024-05-26 PROCEDURE — 87075 CULTR BACTERIA EXCEPT BLOOD: CPT | Performed by: INTERNAL MEDICINE

## 2024-05-26 PROCEDURE — 84466 ASSAY OF TRANSFERRIN: CPT | Performed by: INTERNAL MEDICINE

## 2024-05-26 RX ORDER — FUROSEMIDE 10 MG/ML
40 INJECTION INTRAMUSCULAR; INTRAVENOUS
Status: DISCONTINUED | OUTPATIENT
Start: 2024-05-26 | End: 2024-05-29 | Stop reason: HOSPADM

## 2024-05-26 RX ORDER — SPIRONOLACTONE 25 MG/1
50 TABLET ORAL
Status: DISCONTINUED | OUTPATIENT
Start: 2024-05-26 | End: 2024-05-29 | Stop reason: HOSPADM

## 2024-05-26 RX ORDER — PROCHLORPERAZINE EDISYLATE 5 MG/ML
5 INJECTION INTRAMUSCULAR; INTRAVENOUS EVERY 6 HOURS PRN
Status: DISCONTINUED | OUTPATIENT
Start: 2024-05-26 | End: 2024-05-29 | Stop reason: HOSPADM

## 2024-05-26 RX ORDER — FUROSEMIDE 10 MG/ML
40 INJECTION INTRAMUSCULAR; INTRAVENOUS ONCE
Status: DISCONTINUED | OUTPATIENT
Start: 2024-05-26 | End: 2024-05-27

## 2024-05-26 RX ORDER — MAGNESIUM SULFATE HEPTAHYDRATE 40 MG/ML
2 INJECTION, SOLUTION INTRAVENOUS ONCE
Status: COMPLETED | OUTPATIENT
Start: 2024-05-26 | End: 2024-05-26

## 2024-05-26 RX ORDER — POTASSIUM CHLORIDE 750 MG/1
40 CAPSULE, EXTENDED RELEASE ORAL 2 TIMES DAILY WITH MEALS
Status: COMPLETED | OUTPATIENT
Start: 2024-05-26 | End: 2024-05-26

## 2024-05-26 RX ORDER — BUDESONIDE 0.5 MG/2ML
0.5 INHALANT ORAL
Status: DISCONTINUED | OUTPATIENT
Start: 2024-05-26 | End: 2024-05-26

## 2024-05-26 RX ORDER — IPRATROPIUM BROMIDE AND ALBUTEROL SULFATE 2.5; .5 MG/3ML; MG/3ML
3 SOLUTION RESPIRATORY (INHALATION) EVERY 4 HOURS PRN
Status: DISCONTINUED | OUTPATIENT
Start: 2024-05-26 | End: 2024-05-29 | Stop reason: HOSPADM

## 2024-05-26 RX ORDER — METOLAZONE 5 MG/1
10 TABLET ORAL ONCE
Status: COMPLETED | OUTPATIENT
Start: 2024-05-26 | End: 2024-05-26

## 2024-05-26 RX ORDER — ROPINIROLE 0.25 MG/1
0.25 TABLET, FILM COATED ORAL 3 TIMES DAILY
Status: DISCONTINUED | OUTPATIENT
Start: 2024-05-26 | End: 2024-05-29 | Stop reason: HOSPADM

## 2024-05-26 RX ORDER — ARFORMOTEROL TARTRATE 15 UG/2ML
15 SOLUTION RESPIRATORY (INHALATION)
Status: DISCONTINUED | OUTPATIENT
Start: 2024-05-26 | End: 2024-05-26

## 2024-05-26 RX ORDER — IPRATROPIUM BROMIDE AND ALBUTEROL SULFATE 2.5; .5 MG/3ML; MG/3ML
3 SOLUTION RESPIRATORY (INHALATION)
Status: DISCONTINUED | OUTPATIENT
Start: 2024-05-26 | End: 2024-05-29 | Stop reason: HOSPADM

## 2024-05-26 RX ORDER — GABAPENTIN 100 MG/1
100 CAPSULE ORAL NIGHTLY
Status: DISCONTINUED | OUTPATIENT
Start: 2024-05-26 | End: 2024-05-29 | Stop reason: HOSPADM

## 2024-05-26 RX ORDER — ALBUTEROL SULFATE 2.5 MG/3ML
2.5 SOLUTION RESPIRATORY (INHALATION) EVERY 4 HOURS PRN
Status: DISCONTINUED | OUTPATIENT
Start: 2024-05-26 | End: 2024-05-29 | Stop reason: HOSPADM

## 2024-05-26 RX ADMIN — PROCHLORPERAZINE EDISYLATE 5 MG: 5 INJECTION INTRAMUSCULAR; INTRAVENOUS at 01:45

## 2024-05-26 RX ADMIN — POTASSIUM CHLORIDE 40 MEQ: 750 CAPSULE, EXTENDED RELEASE ORAL at 08:54

## 2024-05-26 RX ADMIN — SPIRONOLACTONE 50 MG: 25 TABLET ORAL at 18:35

## 2024-05-26 RX ADMIN — POTASSIUM CHLORIDE 40 MEQ: 750 CAPSULE, EXTENDED RELEASE ORAL at 19:12

## 2024-05-26 RX ADMIN — IPRATROPIUM BROMIDE AND ALBUTEROL SULFATE 3 ML: .5; 3 SOLUTION RESPIRATORY (INHALATION) at 12:47

## 2024-05-26 RX ADMIN — Medication 10 ML: at 20:13

## 2024-05-26 RX ADMIN — PANTOPRAZOLE SODIUM 40 MG: 40 TABLET, DELAYED RELEASE ORAL at 08:56

## 2024-05-26 RX ADMIN — IPRATROPIUM BROMIDE AND ALBUTEROL SULFATE 3 ML: .5; 3 SOLUTION RESPIRATORY (INHALATION) at 09:55

## 2024-05-26 RX ADMIN — SPIRONOLACTONE 50 MG: 25 TABLET ORAL at 08:55

## 2024-05-26 RX ADMIN — APIXABAN 2.5 MG: 2.5 TABLET, FILM COATED ORAL at 20:13

## 2024-05-26 RX ADMIN — GABAPENTIN 100 MG: 100 CAPSULE ORAL at 20:13

## 2024-05-26 RX ADMIN — ACETAMINOPHEN 1000 MG: 500 TABLET ORAL at 00:34

## 2024-05-26 RX ADMIN — ROPINIROLE HYDROCHLORIDE 0.25 MG: 0.25 TABLET, FILM COATED ORAL at 20:13

## 2024-05-26 RX ADMIN — METOLAZONE 10 MG: 5 TABLET ORAL at 15:38

## 2024-05-26 RX ADMIN — ROPINIROLE HYDROCHLORIDE 0.25 MG: 0.25 TABLET, FILM COATED ORAL at 11:31

## 2024-05-26 RX ADMIN — FUROSEMIDE 40 MG: 10 INJECTION, SOLUTION INTRAMUSCULAR; INTRAVENOUS at 18:34

## 2024-05-26 RX ADMIN — Medication 10 ML: at 08:57

## 2024-05-26 RX ADMIN — ROPINIROLE HYDROCHLORIDE 0.25 MG: 0.25 TABLET, FILM COATED ORAL at 15:38

## 2024-05-26 RX ADMIN — IPRATROPIUM BROMIDE AND ALBUTEROL SULFATE 3 ML: .5; 3 SOLUTION RESPIRATORY (INHALATION) at 20:04

## 2024-05-26 RX ADMIN — MAGNESIUM SULFATE HEPTAHYDRATE 2 G: 40 INJECTION, SOLUTION INTRAVENOUS at 08:55

## 2024-05-26 NOTE — PROCEDURES
Procedure name: ultrasound-guided right-sided thoracentesis     Indication - pleural effusion     This procedural risks were explained to the patient including pneumothorax requiring chest tube placement, significant bleeding requiring surgery, and infection , understanding of the risks and benefits acknowledged by the patient and family and he wish to proceed with the procedure.     Timeout performed     Procedure details  Ultrasound was use to visualize a  collection of pleural fluid, the diaphragm, and collapsed lung. At this point, the skin overlying the rib was anesthetized with 5 mL 1% lidocaine without epinephrine. A thoracentesis needle was then inserted into the pleural cavity just over top of the rib and pleural fluid was aspirated back. At this time the thoracentesis catheter was advanced to the pleural cavity over the needle.  Approximately 3600mL of clear yellow fluid was removed. Pleural fluid was sent for analysis. Chest x-ray shows reexpansion of lung with some reexpansion pulmonary edema.      Patient tolerated procedure well     No immediate complications    Electronically signed by Praful Powell MD, 05/26/24, 11:24 AM EDT.

## 2024-05-26 NOTE — CONSULTS
Pulmonary / Critical Care Consult Note      Patient Name: Wai Gay  : 1985  MRN: [id number]  Primary Care Physician:  Callum Peterson DO  Referring Physician: Yared Saldivar MD  Date of admission: 2024    Subjective   Subjective     Reason for Consult/ Chief Complaint:   Oral effusion    HPI:  Wai Gay is a 38 y.o. male with alcoholic cirrhosis, who quit drinking 2 years ago, who is now requiring frequent paracentesis comes in shortness of breath.  Patient reports about 3 to 4 days of progressive worsening shortness of breath, orthopnea.  Also reports increasing dry cough.  Last paracentesis 3 days ago.  In the emergency department the patient had increased work of breathing and borderline hypoxemia on room air.  No fevers or chills.  Chest x-ray was done showing a very large pleural effusion.  Patient reports he has never been told he has a pleural effusion or has had a thoracentesis.  I have been consulted to evaluate the patient for thoracentesis.        Personal History     Past Medical History:   Diagnosis Date    Alcohol abuse 2017    Essential hypertension 2019    GERD (gastroesophageal reflux disease)     Hypokalemia 2017    Will update    Hyponatremia 2017    Steatohepatitis, alcoholic 2017    Tobacco abuse 2017    Umbilical hernia        Past Surgical History:   Procedure Laterality Date    CHOLECYSTECTOMY      ENDOSCOPY N/A 2022    Procedure: ESOPHAGOGASTRODUODENOSCOPY WITH BX;  Surgeon: Gino Khan MD;  Location: [facility] ENDOSCOPY;  Service: Gastroenterology;  Laterality: N/A;  RETAINED LIQUID FOOD IN STOMACH, HUFFMAN'S ESOPHAGUS    ENDOSCOPY N/A 2/10/2023    Procedure: ESOPHAGOGASTRODUODENOSCOPY;  Surgeon: Gino Khan MD;  Location: [facility] ENDOSCOPY;  Service: Gastroenterology;  Laterality: N/A;  GASTRITIS, BARRETTS ESOPHAGUS, PHG    UPPER GASTROINTESTINAL ENDOSCOPY         Family History:  family history includes Alcohol abuse in his mother; Arthritis in his mother; COPD in his mother; Cancer in his paternal grandfather and paternal grandmother; Heart disease in his maternal grandfather and maternal grandmother; Hypertension in his maternal grandmother; Lung cancer in his maternal grandfather and maternal grandmother; Stroke in his maternal grandmother. Otherwise pertinent FHx was reviewed and not pertinent to current issue.    Social History:  reports that he has been smoking cigarettes. He started smoking about 16 years ago. He has a 8.2 pack-year smoking history. He has been exposed to tobacco smoke. He has never used smokeless tobacco. He reports that he does not currently use alcohol after a past usage of about 3.0 standard drinks of alcohol per week. He reports that he does not currently use drugs.    Home Medications:  apixaban, pantoprazole, and spironolactone    Allergies:  No Known Allergies    Objective    Objective     Vitals:   Temp:  [97.2 °F (36.2 °C)-99.1 °F (37.3 °C)] 97.3 °F (36.3 °C)  Heart Rate:  [] 101  Resp:  [18-34] 20  BP: ()/(62-83) 109/66  Flow (L/min):  [2-6] 5    Physical Exam:  Vital Signs Reviewed   General:  WDWN, Alert, NAD.    HEENT:  PERRL, EOMI.  OP, nares clear, no sinus tenderness  Neck:  Supple, no JVD, no thyromegaly  Chest:  good aeration, diminished throughout right chest, dull to percussion right chest, pursed lip breathing noted  CV: RRR, no MGR, pulses 2+, equal.  Abd:  Soft, NT, ND, + BS, no HSM  EXT:  no clubbing, no cyanosis, BLE edema  Neuro:  A&Ox3, CN grossly intact, no focal deficits.  Skin: No rashes or lesions noted, tattoos present      Result Review    Result Review:  I have personally reviewed the results from the time of this admission to 5/26/2024 11:18 EDT and agree with these findings:  [x]  Laboratory  [x]  Microbiology  [x]  Radiology  [x]  EKG/Telemetry   []  Cardiology/Vascular   []  Pathology  []  Old records  []   Other:  Most notable findings include:       Lab 05/26/24  0520 05/25/24  1505   WBC 6.70 6.87   HEMOGLOBIN 9.6* 11.1*   HEMATOCRIT 29.1* 33.9*   PLATELETS 113* 145   SODIUM 132* 132*   POTASSIUM 3.3* 3.4*   CHLORIDE 104 102   CO2 19.3* 20.1*   BUN 11 10   CREATININE 0.94 0.93   GLUCOSE 120* 102*   CALCIUM 7.5* 7.7*   PHOSPHORUS 2.7  --    TOTAL PROTEIN 5.4* 6.1   ALBUMIN 2.2* 2.5*   GLOBULIN 3.2 3.6     Chest x-ray and chest CT confirm a very large right pleural effusion with complete right lung atelectasis.  CT of the abdomen and pelvis in March 2024 did not show any pleural effusion at that time on the lung cuts on the right side.  Last paracentesis was 3 days ago.      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  Last drink 2 years ago  Hypokalemia  Hypomagnesemia  Acute hypoxemic respiratory failure  Reexpansion pulmonary edema    Plan:  I see no clinical evidence to suspect pneumonia.  Her recent paracentesis 3 days ago does not meet diagnosis of SBP.  No indication for antibiotics from my perspective  Discontinue provide and Pulmicort.  He has a large pleural effusion that was drained and now has reexpansion pulmonary edema.  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  Will need thoracentesis for diagnostic and therapeutic purposes. I have discussed the risks of the procedure with the patient including pneumothorax, hemothorax, bleeding, hypoxia and death. The patient recognizes these findings, acknowledges these findings and is agreeable to the procedure.  3.6 L of clear pleural fluid was removed from the right pleural space and sent for analysis.  After procedure patient began having increasing shortness of breath and chest pain and oxygen needs with O2 saturations in the 80s on  room air.  Now on 5 to 6 L of oxygen.  Chest x-ray shows reexpansion pulmonary edema.  Recommend continuing supportive care for now.  Trend renal panel and electrolytes.  Replace potassium orally and magnesium IV  Okay to restart Eliquis.  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  If he cannot have that done in a timely fashion, at this point may need to consider TIPS if we can keep his hepatic hydrothorax and refractory ascites under control.    DVT prophylaxis:  Medical and mechanical DVT prophylaxis orders are present.    Code Status and Medical Interventions:   Ordered at: 05/25/24 1811     Level Of Support Discussed With:    Patient     Code Status (Patient has no pulse and is not breathing):    CPR (Attempt to Resuscitate)     Medical Interventions (Patient has pulse or is breathing):    Full Support        Labs, imaging, microbiology, notes and medications personally reviewed  Discussed with primary    Thank you for involving me in the care of this patient.    Electronically signed by Praful Powell MD, 05/26/24, 11:22 AM EDT.

## 2024-05-26 NOTE — NURSING NOTE
Thoracentisis procedure preformed via Dr Praful Powell and Neel Yun APRN. Patient tolerated procedure well. However, after procedure patient complained of increased SOA, and went from requiring oxygen at 2 liters to 8 liters hi flow nasal cannula. APRN at bedside made aware, chest xray was ordered and PRN breathing treatments. This RN also was verbally told via APRN to hold IV lasix as well due to the thoracentesis draining 3.6 liters out and pressure of 90/60's. Dr. Saldivar also aware as he rounded at bedside after procedure took place. X-ray tech came to take chest image, image looked concerning per X-ray tech and Dr. Powell was notified, MD Powell stated he would review.

## 2024-05-26 NOTE — PLAN OF CARE
Goal Outcome Evaluation:         Pt arrived to floor Alert and Oriented x4, follows commands and can make needs known. Pt reporting some minor pain in abdomen with distention and tenderness. Pt on 2L NC. He is ambulating independently in room. No skin issues noted. He is stable and reports no needs or concerns at this time.

## 2024-05-26 NOTE — PROGRESS NOTES
[facility]   Hospitalist Progress Note  Date: 2024  Patient Name: Wai Gay  : 1985  MRN: [id number]  Date of admission: 2024      Subjective   Subjective     Chief Complaint: Shortness of breath    Summary: 38 y.o. male with alcoholic cirrhosis, who quit drinking 2 years ago, who is now requiring frequent paracentesis comes in shortness of breath.  Patient reports about 3 to 4 days of progressive worsening shortness of breath, orthopnea.  Also reports increasing dry cough.  Last paracentesis 3 days ago.  In the emergency department the patient had increased work of breathing and borderline hypoxemia on room air.  No fevers or chills.  Chest x-ray was done showing a very large pleural effusion.  He was admitted for further care, pulmonology was consulted and performed thoracentesis on .  The patient did have reexpansion pulmonary edema requiring IV diuretics, BiPAP, and transfer to the ICU.    Interval Followup: Thoracentesis performed at bedside by pulmonology this morning with 3.6 L of clear pleural fluid removed.  Afterwards, the patient began having increasing shortness of breath and chest discomfort with increasing oxygen saturations.  On my assessment, he was requiring 5 to 6 L nasal cannula for adequate saturations.  Continued to worsen ultimately requiring BiPAP.  Patient also complains of pain and restlessness in his lower extremities.     Objective   Objective     Vitals:   Temp:  [97.2 °F (36.2 °C)-99.1 °F (37.3 °C)] 98.6 °F (37 °C)  Heart Rate:  [] 127  Resp:  [18-34] 20  BP: ()/(62-83) 111/67  Flow (L/min):  [2-12] 12  Physical Exam    Constitutional: Awake, alert, tachypneic at rest   Respiratory: Diffuse rhonchorous breath sounds noted throughout, diminished in bilateral bases, tachypneic   Cardiovascular: Tachycardic but regular, no MRG   Gastrointestinal: Positive bowel sounds, soft, nontender, nondistended   Neurologic: Oriented x 3, strength  symmetric in all extremities, Cranial Nerves grossly intact to confrontation, speech clear    Result Review    I have personally reviewed the results below:  [x]  Laboratory personally viewed CMP, CBC, lactate, procalcitonin  []  Microbiology  []  Radiology  [x]  EKG/Telemetry   []  Cardiology/Vascular   []  Pathology  []  Old records  []  Other:  CBC          5/7/2024    13:56 5/25/2024    15:05 5/26/2024    05:20   CBC   WBC 5.46  6.87  6.70    RBC 3.35  3.59  3.12    Hemoglobin 10.3  11.1  9.6    Hematocrit 31.1  33.9  29.1    MCV 92.8  94.4  93.3    MCH 30.7  30.9  30.8    MCHC 33.1  32.7  33.0    RDW 16.2  15.7  15.7    Platelets 84  145  113      CMP          5/7/2024    13:56 5/25/2024    15:05 5/26/2024    05:20   CMP   Glucose 89  102  120    BUN 10  10  11    Creatinine 1.17  0.93  0.94    EGFR 82.9  109.1  107.7    Sodium 134  132  132    Potassium 3.4  3.4  3.3    Chloride 102  102  104    Calcium 7.6  7.7  7.5    Total Protein 5.8  6.1  5.4    Albumin 2.6  2.5  2.2    Globulin 3.2  3.6  3.2    Total Bilirubin 2.1  1.5  1.1    Alkaline Phosphatase 188  163  152    AST (SGOT) 38  24  22    ALT (SGPT) 19  15  13    Albumin/Globulin Ratio 0.8  0.7  0.7    BUN/Creatinine Ratio 8.5  10.8  11.7    Anion Gap 9.1  9.9  8.7        Assessment & Plan   Assessment / Plan   Acute hypoxemic respiratory failure requiring NIPPV  Large right pleural effusion, suspect secondary to hepatic hydrothorax  Reexpansion pulmonary edema  Refractory ascites  Decompensated alcoholic cirrhosis.  Last drink 2 years ago  Hypokalemia  Hyponatremia  Hypocalcemia  Hypomagnesemia  Reexpansion pulmonary edema    Transfer patient to the ICU  Continue BiPAP for now, if continues to worsen may require intubation and endotracheal intubation  Pulmonology following, appreciate assistance  Patient with/reexpansion pulmonary edema, start Lasix 40 mg IV every 12 hours, dose metolazone 10 mg p.o. x 1  May require Sumner catheter  placement  Respiratory status remains tenuous  Increase Aldactone to 50 mg twice daily  Restart home Eliquis and PPI  Placed magnesium and potassium IV  Obtain CT abdomen pelvis  Ordered IR guided paracentesis, plan for Tuesday when more stable  Discussed with pulmonology-no indication for pneumonia, procalcitonin negative, will hold off on IV antibiotics at this time  Start Requip due to concern for restless leg syndrome  Obtain iron panel and ferritin  Trend renal function and electrolytes with a.m. BMP, magnesium   Trend Hgb and WBC with a.m. CBC     Discussed plan with RN, pulmonology    Pt is critically ill in ICU with acute hypoxic respiratory failure requiring NIPPV, advanced alcoholic cirrhosis, hepatic hydrothorax, pleural effusion. I have spent 32 minutes of critical care time including high complexity decision making to assess, manipulate, and support vital organ system failure in this individual who has impairment in one or more vital organ systems such that there is a high probability of imminent or life-threatening deterioration in the patient's condition. Time spent reviewing previous documentation, reviewing all pertinent telemetry, labs, and imaging studies, examining the patient, modifying the care plan and discussing the patient´s condition and care plan with the consultants, available family and during rounds. This does not include any procedures performed.    DVT prophylaxis:  Medical and mechanical DVT prophylaxis orders are present.      CODE STATUS:   Level Of Support Discussed With: Patient  Code Status (Patient has no pulse and is not breathing): CPR (Attempt to Resuscitate)  Medical Interventions (Patient has pulse or is breathing): Full Support      Electronically signed by Yared Fernandez MD, 05/26/24, 2:46 PM EDT.

## 2024-05-26 NOTE — PROCEDURES
Bedside thoracic ultrasound:     Right side: Liver, right hemidiaphragm, right lower lobe of lung identified.  Large pleural effusion identified.  Images saved     Site marked for thoracentesis.        CPT 38028 with thoracentesis note    Electronically signed by Praful Powell MD, 05/26/24, 11:24 AM EDT.

## 2024-05-26 NOTE — PLAN OF CARE
Goal Outcome Evaluation:                   Patient transferred to the unit this shift. Currently on 15L NC.

## 2024-05-26 NOTE — PLAN OF CARE
Goal Outcome Evaluation:      Pt has had intermittent nausea when standing, which he says has been ongoing for a while. Did have an episode of emesis with 200 ml output or liquid. He also states that he has had increased pain in his legs when laying in bed, which he said has also been an ongoing problem. Provided 1g tylenol, heating pad, raised legs and SCDs, all of which he said either did not help or made it worse.

## 2024-05-27 ENCOUNTER — APPOINTMENT (OUTPATIENT)
Dept: CT IMAGING | Facility: HOSPITAL | Age: 39
End: 2024-05-27
Payer: COMMERCIAL

## 2024-05-27 LAB
ANION GAP SERPL CALCULATED.3IONS-SCNC: 7.2 MMOL/L (ref 5–15)
BASOPHILS # BLD AUTO: 0.03 10*3/MM3 (ref 0–0.2)
BASOPHILS NFR BLD AUTO: 0.4 % (ref 0–1.5)
BUN SERPL-MCNC: 13 MG/DL (ref 6–20)
BUN/CREAT SERPL: 14.3 (ref 7–25)
CALCIUM SPEC-SCNC: 7.4 MG/DL (ref 8.6–10.5)
CHLORIDE SERPL-SCNC: 104 MMOL/L (ref 98–107)
CO2 SERPL-SCNC: 19.8 MMOL/L (ref 22–29)
CREAT SERPL-MCNC: 0.91 MG/DL (ref 0.76–1.27)
DEPRECATED RDW RBC AUTO: 53.9 FL (ref 37–54)
EGFRCR SERPLBLD CKD-EPI 2021: 112 ML/MIN/1.73
EOSINOPHIL # BLD AUTO: 0.62 10*3/MM3 (ref 0–0.4)
EOSINOPHIL NFR BLD AUTO: 7.7 % (ref 0.3–6.2)
ERYTHROCYTE [DISTWIDTH] IN BLOOD BY AUTOMATED COUNT: 15.9 % (ref 12.3–15.4)
GLUCOSE SERPL-MCNC: 104 MG/DL (ref 65–99)
HCT VFR BLD AUTO: 28.2 % (ref 37.5–51)
HGB BLD-MCNC: 9.4 G/DL (ref 13–17.7)
IMM GRANULOCYTES # BLD AUTO: 0.04 10*3/MM3 (ref 0–0.05)
IMM GRANULOCYTES NFR BLD AUTO: 0.5 % (ref 0–0.5)
LYMPHOCYTES # BLD AUTO: 1.04 10*3/MM3 (ref 0.7–3.1)
LYMPHOCYTES NFR BLD AUTO: 13 % (ref 19.6–45.3)
MAGNESIUM SERPL-MCNC: 1.6 MG/DL (ref 1.6–2.6)
MCH RBC QN AUTO: 31.1 PG (ref 26.6–33)
MCHC RBC AUTO-ENTMCNC: 33.3 G/DL (ref 31.5–35.7)
MCV RBC AUTO: 93.4 FL (ref 79–97)
MONOCYTES # BLD AUTO: 0.71 10*3/MM3 (ref 0.1–0.9)
MONOCYTES NFR BLD AUTO: 8.9 % (ref 5–12)
NEUTROPHILS NFR BLD AUTO: 5.57 10*3/MM3 (ref 1.7–7)
NEUTROPHILS NFR BLD AUTO: 69.5 % (ref 42.7–76)
NRBC BLD AUTO-RTO: 0 /100 WBC (ref 0–0.2)
PHOSPHATE SERPL-MCNC: 3.5 MG/DL (ref 2.5–4.5)
PLATELET # BLD AUTO: 118 10*3/MM3 (ref 140–450)
PMV BLD AUTO: 9.9 FL (ref 6–12)
POTASSIUM SERPL-SCNC: 3.4 MMOL/L (ref 3.5–5.2)
RBC # BLD AUTO: 3.02 10*6/MM3 (ref 4.14–5.8)
SODIUM SERPL-SCNC: 131 MMOL/L (ref 136–145)
WBC NRBC COR # BLD AUTO: 8.01 10*3/MM3 (ref 3.4–10.8)

## 2024-05-27 PROCEDURE — 25010000002 MAGNESIUM SULFATE 4 GM/100ML SOLUTION: Performed by: NURSE PRACTITIONER

## 2024-05-27 PROCEDURE — 94799 UNLISTED PULMONARY SVC/PX: CPT

## 2024-05-27 PROCEDURE — 85025 COMPLETE CBC W/AUTO DIFF WBC: CPT | Performed by: INTERNAL MEDICINE

## 2024-05-27 PROCEDURE — 25010000002 FUROSEMIDE PER 20 MG: Performed by: INTERNAL MEDICINE

## 2024-05-27 PROCEDURE — 99291 CRITICAL CARE FIRST HOUR: CPT | Performed by: STUDENT IN AN ORGANIZED HEALTH CARE EDUCATION/TRAINING PROGRAM

## 2024-05-27 PROCEDURE — 83735 ASSAY OF MAGNESIUM: CPT | Performed by: INTERNAL MEDICINE

## 2024-05-27 PROCEDURE — 84100 ASSAY OF PHOSPHORUS: CPT | Performed by: INTERNAL MEDICINE

## 2024-05-27 PROCEDURE — 25010000002 NA FERRIC GLUC CPLX PER 12.5 MG: Performed by: INTERNAL MEDICINE

## 2024-05-27 PROCEDURE — 94761 N-INVAS EAR/PLS OXIMETRY MLT: CPT

## 2024-05-27 PROCEDURE — 94664 DEMO&/EVAL PT USE INHALER: CPT

## 2024-05-27 PROCEDURE — 99233 SBSQ HOSP IP/OBS HIGH 50: CPT | Performed by: INTERNAL MEDICINE

## 2024-05-27 PROCEDURE — 80048 BASIC METABOLIC PNL TOTAL CA: CPT | Performed by: INTERNAL MEDICINE

## 2024-05-27 RX ORDER — MAGNESIUM SULFATE HEPTAHYDRATE 40 MG/ML
4 INJECTION, SOLUTION INTRAVENOUS ONCE
Status: COMPLETED | OUTPATIENT
Start: 2024-05-27 | End: 2024-05-27

## 2024-05-27 RX ORDER — POTASSIUM CHLORIDE 750 MG/1
40 CAPSULE, EXTENDED RELEASE ORAL ONCE
Status: COMPLETED | OUTPATIENT
Start: 2024-05-27 | End: 2024-05-27

## 2024-05-27 RX ADMIN — IPRATROPIUM BROMIDE AND ALBUTEROL SULFATE 3 ML: .5; 3 SOLUTION RESPIRATORY (INHALATION) at 07:21

## 2024-05-27 RX ADMIN — Medication 10 ML: at 21:32

## 2024-05-27 RX ADMIN — IPRATROPIUM BROMIDE AND ALBUTEROL SULFATE 3 ML: .5; 3 SOLUTION RESPIRATORY (INHALATION) at 13:35

## 2024-05-27 RX ADMIN — FUROSEMIDE 40 MG: 10 INJECTION, SOLUTION INTRAMUSCULAR; INTRAVENOUS at 17:19

## 2024-05-27 RX ADMIN — APIXABAN 2.5 MG: 2.5 TABLET, FILM COATED ORAL at 08:50

## 2024-05-27 RX ADMIN — MAGNESIUM SULFATE HEPTAHYDRATE 4 G: 4 INJECTION, SOLUTION INTRAVENOUS at 08:50

## 2024-05-27 RX ADMIN — IPRATROPIUM BROMIDE AND ALBUTEROL SULFATE 3 ML: .5; 3 SOLUTION RESPIRATORY (INHALATION) at 01:03

## 2024-05-27 RX ADMIN — FUROSEMIDE 40 MG: 10 INJECTION, SOLUTION INTRAMUSCULAR; INTRAVENOUS at 08:50

## 2024-05-27 RX ADMIN — PANTOPRAZOLE SODIUM 40 MG: 40 TABLET, DELAYED RELEASE ORAL at 08:50

## 2024-05-27 RX ADMIN — ROPINIROLE HYDROCHLORIDE 0.25 MG: 0.25 TABLET, FILM COATED ORAL at 21:32

## 2024-05-27 RX ADMIN — IPRATROPIUM BROMIDE AND ALBUTEROL SULFATE 3 ML: .5; 3 SOLUTION RESPIRATORY (INHALATION) at 18:47

## 2024-05-27 RX ADMIN — POTASSIUM CHLORIDE 40 MEQ: 750 CAPSULE, EXTENDED RELEASE ORAL at 08:50

## 2024-05-27 RX ADMIN — GABAPENTIN 100 MG: 100 CAPSULE ORAL at 21:32

## 2024-05-27 RX ADMIN — ROPINIROLE HYDROCHLORIDE 0.25 MG: 0.25 TABLET, FILM COATED ORAL at 15:47

## 2024-05-27 RX ADMIN — ROPINIROLE HYDROCHLORIDE 0.25 MG: 0.25 TABLET, FILM COATED ORAL at 08:50

## 2024-05-27 RX ADMIN — SPIRONOLACTONE 50 MG: 25 TABLET ORAL at 17:19

## 2024-05-27 RX ADMIN — SODIUM CHLORIDE 125 MG: 9 INJECTION, SOLUTION INTRAVENOUS at 15:47

## 2024-05-27 RX ADMIN — Medication 10 ML: at 08:50

## 2024-05-27 RX ADMIN — SPIRONOLACTONE 50 MG: 25 TABLET ORAL at 08:50

## 2024-05-27 NOTE — THERAPY EVALUATION
Respiratory Therapist Broncho-Pulmonary Hygiene Progress Note      Patient Name:  Wai Gay  YOB: 1985    Wai Gay meets the qualification for Level 1 of the Bronco-Pulmonary Hygiene Protocol. This was based on my daily patient assessment and includes review of chest x-ray results, cough ability and quality, oxygenation, secretions or risk for secretion development and patient mobility.     Broncho-Pulmonary Hygiene Assessment:    Level of Movement: Actively changing positions without assistance  Alert/ oriented/ cooperative    Breath Sounds: Clear to slightly diminished    Cough: Strong, effective    Chest X-Ray: Possible signs of consolidation and/or atelectasis or clear.     Sputum Productions: None or small amount of thin or watery secretions with effective cough    History and Physical: Chronic condition    SpO2 to Oxygen Need: greater than 92% on room air or  less than 3L nasal canula    Current SpO2 is: 95%  on 2lpm    Based on this information, I have completed the following interventions: Teach/Instruct patient on cough and deep breathe      Electronically signed by Rohan Pak RRT, 05/27/24, 1:36 PM EDT.

## 2024-05-27 NOTE — PROGRESS NOTES
[facility]   Hospitalist Progress Note  Date: 2024  Patient Name: Wai aGy  : 1985  MRN: [id number]  Date of admission: 2024      Subjective   Subjective     Chief Complaint: Shortness of breath    Summary: 38 y.o. male with alcoholic cirrhosis, who quit drinking 2 years ago, who is now requiring frequent paracentesis comes in shortness of breath.  Patient reports about 3 to 4 days of progressive worsening shortness of breath, orthopnea.  Also reports increasing dry cough.  Last paracentesis 3 days ago.  In the emergency department the patient had increased work of breathing and borderline hypoxemia on room air.  No fevers or chills.  Chest x-ray was done showing a very large pleural effusion.  He was admitted for further care, pulmonology was consulted and performed thoracentesis on .  The patient did have reexpansion pulmonary edema requiring IV diuretics, BiPAP, and transfer to the ICU.  I packed and then to leave nasal cannula, transferred to the ICU on .  Plan for paracentesis on .    Interval Followup: No acute events overnight, the patient is feeling significantly better this morning.  Was transitioned off of BiPAP and is now weaned down to 2-3 L nasal cannula.  Feels like his breathing is back to normal.  Having good urine output with diuretics.    Objective   Objective     Vitals:   Temp:  [98.1 °F (36.7 °C)-98.7 °F (37.1 °C)] 98.7 °F (37.1 °C)  Heart Rate:  [] 107  Resp:  [12-16] 14  BP: ()/() 113/75  Flow (L/min):  [2-15] 2  Physical Exam    Constitutional: Chronically ill-appearing, awake, alert, NAD   Respiratory: Significantly improved aeration, diminished in bilateral bases, no increased WOB   Cardiovascular: Tachycardic but regular, no MRG   Gastrointestinal: Positive bowel sounds, soft, nontender, nondistended   Neurologic: Oriented x 3, strength symmetric in all extremities, Cranial Nerves grossly intact to confrontation, speech  clear    Result Review    I have personally reviewed the results below:  [x]  Laboratory personally reviewed CBC, BMP, magnesium, phosphorus  []  Microbiology  []  Radiology  [x]  EKG/Telemetry   []  Cardiology/Vascular   []  Pathology  []  Old records  []  Other:  CBC          5/25/2024    15:05 5/26/2024    05:20 5/27/2024    02:43   CBC   WBC 6.87  6.70  8.01    RBC 3.59  3.12  3.02    Hemoglobin 11.1  9.6  9.4    Hematocrit 33.9  29.1  28.2    MCV 94.4  93.3  93.4    MCH 30.9  30.8  31.1    MCHC 32.7  33.0  33.3    RDW 15.7  15.7  15.9    Platelets 145  113  118      CMP          5/25/2024    15:05 5/26/2024    05:20 5/27/2024    02:43   CMP   Glucose 102  120  104    BUN 10  11  13    Creatinine 0.93  0.94  0.91    EGFR 109.1  107.7  112.0    Sodium 132  132  131    Potassium 3.4  3.3  3.4    Chloride 102  104  104    Calcium 7.7  7.5  7.4    Total Protein 6.1  5.4     Albumin 2.5  2.2     Globulin 3.6  3.2     Total Bilirubin 1.5  1.1     Alkaline Phosphatase 163  152     AST (SGOT) 24  22     ALT (SGPT) 15  13     Albumin/Globulin Ratio 0.7  0.7     BUN/Creatinine Ratio 10.8  11.7  14.3    Anion Gap 9.9  8.7  7.2        Assessment & Plan   Assessment / Plan   Acute hypoxemic respiratory failure requiring NIPPV  Large right pleural effusion, suspect secondary to hepatic hydrothorax  Reexpansion pulmonary edema  History of DVT on Eliquis  Refractory ascites  Decompensated alcoholic cirrhosis.  Last drink 2 years ago  Hypokalemia  Hyponatremia  Hypocalcemia  Hypomagnesemia  Reexpansion pulmonary edema    Continue to monitor in the hospital for management of the above  Discussed with pulmonology-transfer out of the ICU to [facility] bed with remote telemetry  Patient with reexpansion pulmonary edema, continue Lasix 40 mg IV twice daily and Aldactone 50 mg p.o. twice daily  Trend renal function electrolytes closely while on IV diuretics  Replace potassium  Hold Eliquis in anticipation of IR guided paracentesis  tomorrow  Started Requip on 5/26 with improvement of RLS  Transfuse IV iron in setting of iron deficiency anemia and RLS  Continue with scheduled bronchodilators  Trend renal function and electrolytes with a.m. BMP, magnesium   Trend Hgb and WBC with a.m. CBC     Discussed plan with RN, pulmonology    DVT prophylaxis:  Medical and mechanical DVT prophylaxis orders are present.      CODE STATUS:   Level Of Support Discussed With: Patient  Code Status (Patient has no pulse and is not breathing): CPR (Attempt to Resuscitate)  Medical Interventions (Patient has pulse or is breathing): Full Support

## 2024-05-27 NOTE — PLAN OF CARE
Goal Outcome Evaluation:  Plan of Care Reviewed With: patient        Progress: no change  Outcome Evaluation: transferred to floor this shift. alert and oriented x 4. up ad rancho. currently on 2L nasal cannula.

## 2024-05-27 NOTE — PROGRESS NOTES
Pulmonary / Critical Care Progress Note      Patient Name: Wai Gay  : 1985  MRN: [id number]  Attending:  Yared Saldivar MD   Date of admission: 2024    Subjective   Subjective   Patient critically ill with hypoxic respiratory failure, pleural effusion    Over past 24 hours:  Doing better this morning  Tolerated BiPAP overnight  Transition from 15 L high flow nasal cannula, now down to 6 L nasal cannula  1.96 L urine output over last 24 hours      Review of Systems  Constitutional symptoms:  Denied complaints   Cardiovascular:  Denied complaints  Respiratory: + shortness of breath, denied other complaints  Gastrointestinal: Denied complaints  Neurologic: Denied complaints      Objective   Objective     Vitals:   Vital signs for last 24 hours:  Temp:  [98.1 °F (36.7 °C)-98.7 °F (37.1 °C)] 98.7 °F (37.1 °C)  Heart Rate:  [] 109  Resp:  [12-20] 15  BP: ()/() 108/72    Intake/Output last 3 shifts:  I/O last 3 completed shifts:  In: 1140 [P.O.:1140]  Out: 3135 [Urine:3135]  Intake/Output this shift:  I/O this shift:  In: 600 [P.O.:600]  Out: 2150 [Urine:2150]    Vent settings for last 24 hours:       Hemodynamic parameters for last 24 hours:       Physical Exam   Vital Signs Reviewed   General:  Alert, NAD. Lying in bed   HEENT:  PERRL, EOMI.  OP  Chest: Diminished on right side compared to left to auscultation bilaterally, no work of breathing noted on 6 L nasal cannula  CV: RRR, no MGR, pulses 2+, equal.  Abd:  Soft, NT, ND, + BS  EXT:  no clubbing, no cyanosis, no edema  Neuro:  A&Ox3, CN grossly intact, no focal deficits.  Skin: No rashes or lesions noted    Result Review    Result Review:  I have personally reviewed the results from the time of this admission to 2024 13:26 EDT and agree with these findings:  []  Laboratory  []  Microbiology  []  Radiology  []  EKG/Telemetry   []  Cardiology/Vascular   []  Pathology  []  Old records  []  Other:  Most notable  findings include:   -    Assessment & Plan   Assessment / Plan     Active Hospital Problems:  Active Hospital Problems    Diagnosis     **Respiratory failure, acute          Impression:  Acute hypoxic respiratory failure  Large right pleural effusion, suspect secondary to hepatic hydrothorax  Refractory ascites  Decompensated alcoholic cirrhosis.  Last drink 2 years ago  Hypokalemia  Hypomagnesemia  Hyponatremia  Reexpansion pulmonary edema  History of PE on Eliquis     Plan:  Respiratory status improved overnight.  Tolerated BiPAP.    Currently on 6 L nasal cannula, continue to wean to keep SpO2 greater than 90%  Status post Thora 5/26 with 3.6 L output.  No evidence of infection.  No indication for antibiotics at this time.  Patient developed reexpansion pulmonary edema.  Continue diuresis as tolerated with Lasix 40 mg IV twice daily and spironolactone 50 mg p.o. twice daily.  Trend renal panel and electrolytes.  Replace electrolytes to keep K 4, mag 2, Phos 4  Continue Eliquis.  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  If he cannot have that done in a timely fashion, at this point may need to consider TIPS if we can keep his hepatic hydrothorax and refractory ascites under control.     DVT prophylaxis:  Medical and mechanical DVT prophylaxis orders are present.        CODE STATUS:   Level Of Support Discussed With: Patient  Code Status (Patient has no pulse and is not breathing): CPR (Attempt to Resuscitate)  Medical Interventions (Patient has pulse or is breathing): Full Support    Patient is critically ill in the ICU with acute hypoxic respiratory failure on high flow nasal cannula in the setting of reexpansion pulmonary edema after thoracentesis.  32 minutes of critical care time was  spent managing this patient, excluding procedures. This included personally reviewing all pertinent labs, imaging, microbiology and documentation. Also discussing the case with the patient and any available family, the admitting physician and any available ancillary staff. Electronically signed by Mahogany Morrison MD, 05/27/24, 1:26 PM EDT.

## 2024-05-27 NOTE — NURSING NOTE
Patient being transferred to ICU per attending MD. Patients oxygen demand has increased to need continuous BiPAP. Report called to Tierney GRADY. Mother of patient is at the bedside and aware of the transfer.

## 2024-05-27 NOTE — PLAN OF CARE
Goal Outcome Evaluation:   Pt rested well overnight. No complaints voiced. VSS. Weaned to 7 L HFNC.

## 2024-05-28 ENCOUNTER — APPOINTMENT (OUTPATIENT)
Facility: HOSPITAL | Age: 39
End: 2024-05-28
Payer: COMMERCIAL

## 2024-05-28 ENCOUNTER — APPOINTMENT (OUTPATIENT)
Dept: INTERVENTIONAL RADIOLOGY/VASCULAR | Facility: HOSPITAL | Age: 39
End: 2024-05-28
Payer: COMMERCIAL

## 2024-05-28 LAB
ANION GAP SERPL CALCULATED.3IONS-SCNC: 9.1 MMOL/L (ref 5–15)
BASOPHILS # BLD AUTO: 0.04 10*3/MM3 (ref 0–0.2)
BASOPHILS NFR BLD AUTO: 0.5 % (ref 0–1.5)
BH CV LOWER VASCULAR LEFT COMMON FEMORAL AUGMENT: NORMAL
BH CV LOWER VASCULAR LEFT COMMON FEMORAL COMPETENT: NORMAL
BH CV LOWER VASCULAR LEFT COMMON FEMORAL COMPRESS: NORMAL
BH CV LOWER VASCULAR LEFT COMMON FEMORAL PHASIC: NORMAL
BH CV LOWER VASCULAR LEFT COMMON FEMORAL SPONT: NORMAL
BH CV LOWER VASCULAR LEFT DISTAL FEMORAL COMPRESS: NORMAL
BH CV LOWER VASCULAR LEFT GASTRONEMIUS COMPRESS: NORMAL
BH CV LOWER VASCULAR LEFT GREATER SAPH AK COMPRESS: NORMAL
BH CV LOWER VASCULAR LEFT LESSER SAPH COMPRESS: NORMAL
BH CV LOWER VASCULAR LEFT MID FEMORAL AUGMENT: NORMAL
BH CV LOWER VASCULAR LEFT MID FEMORAL COMPETENT: NORMAL
BH CV LOWER VASCULAR LEFT MID FEMORAL COMPRESS: NORMAL
BH CV LOWER VASCULAR LEFT MID FEMORAL PHASIC: NORMAL
BH CV LOWER VASCULAR LEFT MID FEMORAL SPONT: NORMAL
BH CV LOWER VASCULAR LEFT PERONEAL COMPRESS: NORMAL
BH CV LOWER VASCULAR LEFT POPLITEAL AUGMENT: NORMAL
BH CV LOWER VASCULAR LEFT POPLITEAL COMPETENT: NORMAL
BH CV LOWER VASCULAR LEFT POPLITEAL COMPRESS: NORMAL
BH CV LOWER VASCULAR LEFT POPLITEAL PHASIC: NORMAL
BH CV LOWER VASCULAR LEFT POPLITEAL SPONT: NORMAL
BH CV LOWER VASCULAR LEFT POSTERIOR TIBIAL COMPRESS: NORMAL
BH CV LOWER VASCULAR LEFT PROXIMAL FEMORAL COMPRESS: NORMAL
BH CV LOWER VASCULAR LEFT SAPHENOFEMORAL JUNCTION COMPRESS: NORMAL
BH CV LOWER VASCULAR LEFT SOLEAL COMPRESS: NORMAL
BH CV LOWER VASCULAR RIGHT COMMON FEMORAL AUGMENT: NORMAL
BH CV LOWER VASCULAR RIGHT COMMON FEMORAL COMPETENT: NORMAL
BH CV LOWER VASCULAR RIGHT COMMON FEMORAL COMPRESS: NORMAL
BH CV LOWER VASCULAR RIGHT COMMON FEMORAL PHASIC: NORMAL
BH CV LOWER VASCULAR RIGHT COMMON FEMORAL SPONT: NORMAL
BH CV LOWER VASCULAR RIGHT DISTAL FEMORAL COMPRESS: NORMAL
BH CV LOWER VASCULAR RIGHT GASTRONEMIUS COMPRESS: NORMAL
BH CV LOWER VASCULAR RIGHT GREATER SAPH AK COMPRESS: NORMAL
BH CV LOWER VASCULAR RIGHT MID FEMORAL AUGMENT: NORMAL
BH CV LOWER VASCULAR RIGHT MID FEMORAL COMPETENT: NORMAL
BH CV LOWER VASCULAR RIGHT MID FEMORAL COMPRESS: NORMAL
BH CV LOWER VASCULAR RIGHT MID FEMORAL PHASIC: NORMAL
BH CV LOWER VASCULAR RIGHT MID FEMORAL SPONT: NORMAL
BH CV LOWER VASCULAR RIGHT PERONEAL COMPRESS: NORMAL
BH CV LOWER VASCULAR RIGHT POPLITEAL AUGMENT: NORMAL
BH CV LOWER VASCULAR RIGHT POPLITEAL COMPETENT: NORMAL
BH CV LOWER VASCULAR RIGHT POPLITEAL COMPRESS: NORMAL
BH CV LOWER VASCULAR RIGHT POPLITEAL PHASIC: NORMAL
BH CV LOWER VASCULAR RIGHT POPLITEAL SPONT: NORMAL
BH CV LOWER VASCULAR RIGHT POSTERIOR TIBIAL COMPRESS: NORMAL
BH CV LOWER VASCULAR RIGHT PROFUNDA FEMORAL COMPRESS: NORMAL
BH CV LOWER VASCULAR RIGHT SAPHENOFEMORAL JUNCTION COMPRESS: NORMAL
BH CV VAS PRELIMINARY FINDINGS SCRIPTING: 1
BUN SERPL-MCNC: 14 MG/DL (ref 6–20)
BUN/CREAT SERPL: 14 (ref 7–25)
CALCIUM SPEC-SCNC: 7.9 MG/DL (ref 8.6–10.5)
CHLORIDE SERPL-SCNC: 98 MMOL/L (ref 98–107)
CO2 SERPL-SCNC: 22.9 MMOL/L (ref 22–29)
CREAT SERPL-MCNC: 1 MG/DL (ref 0.76–1.27)
DEPRECATED RDW RBC AUTO: 52.3 FL (ref 37–54)
EGFRCR SERPLBLD CKD-EPI 2021: 100 ML/MIN/1.73
EOSINOPHIL # BLD AUTO: 1.13 10*3/MM3 (ref 0–0.4)
EOSINOPHIL NFR BLD AUTO: 14.1 % (ref 0.3–6.2)
ERYTHROCYTE [DISTWIDTH] IN BLOOD BY AUTOMATED COUNT: 15.8 % (ref 12.3–15.4)
GLUCOSE SERPL-MCNC: 85 MG/DL (ref 65–99)
HCT VFR BLD AUTO: 30.2 % (ref 37.5–51)
HGB BLD-MCNC: 10.2 G/DL (ref 13–17.7)
IMM GRANULOCYTES # BLD AUTO: 0.05 10*3/MM3 (ref 0–0.05)
IMM GRANULOCYTES NFR BLD AUTO: 0.6 % (ref 0–0.5)
INR PPP: 1.59 (ref 0.86–1.15)
LYMPHOCYTES # BLD AUTO: 1 10*3/MM3 (ref 0.7–3.1)
LYMPHOCYTES NFR BLD AUTO: 12.5 % (ref 19.6–45.3)
MAGNESIUM SERPL-MCNC: 1.8 MG/DL (ref 1.6–2.6)
MCH RBC QN AUTO: 30.9 PG (ref 26.6–33)
MCHC RBC AUTO-ENTMCNC: 33.8 G/DL (ref 31.5–35.7)
MCV RBC AUTO: 91.5 FL (ref 79–97)
MONOCYTES # BLD AUTO: 0.96 10*3/MM3 (ref 0.1–0.9)
MONOCYTES NFR BLD AUTO: 12 % (ref 5–12)
NEUTROPHILS NFR BLD AUTO: 4.84 10*3/MM3 (ref 1.7–7)
NEUTROPHILS NFR BLD AUTO: 60.3 % (ref 42.7–76)
NRBC BLD AUTO-RTO: 0 /100 WBC (ref 0–0.2)
PHOSPHATE SERPL-MCNC: 3.9 MG/DL (ref 2.5–4.5)
PLATELET # BLD AUTO: 148 10*3/MM3 (ref 140–450)
PMV BLD AUTO: 10.1 FL (ref 6–12)
POTASSIUM SERPL-SCNC: 3.5 MMOL/L (ref 3.5–5.2)
PROTHROMBIN TIME: 19.3 SECONDS (ref 11.8–14.9)
RBC # BLD AUTO: 3.3 10*6/MM3 (ref 4.14–5.8)
SODIUM SERPL-SCNC: 130 MMOL/L (ref 136–145)
WBC NRBC COR # BLD AUTO: 8.02 10*3/MM3 (ref 3.4–10.8)

## 2024-05-28 PROCEDURE — 83735 ASSAY OF MAGNESIUM: CPT | Performed by: INTERNAL MEDICINE

## 2024-05-28 PROCEDURE — 85025 COMPLETE CBC W/AUTO DIFF WBC: CPT | Performed by: INTERNAL MEDICINE

## 2024-05-28 PROCEDURE — 84100 ASSAY OF PHOSPHORUS: CPT | Performed by: INTERNAL MEDICINE

## 2024-05-28 PROCEDURE — 76705 ECHO EXAM OF ABDOMEN: CPT

## 2024-05-28 PROCEDURE — 80048 BASIC METABOLIC PNL TOTAL CA: CPT | Performed by: INTERNAL MEDICINE

## 2024-05-28 PROCEDURE — 93970 EXTREMITY STUDY: CPT

## 2024-05-28 PROCEDURE — 25010000002 FUROSEMIDE PER 20 MG: Performed by: INTERNAL MEDICINE

## 2024-05-28 PROCEDURE — 94799 UNLISTED PULMONARY SVC/PX: CPT

## 2024-05-28 PROCEDURE — 99233 SBSQ HOSP IP/OBS HIGH 50: CPT | Performed by: INTERNAL MEDICINE

## 2024-05-28 PROCEDURE — 85610 PROTHROMBIN TIME: CPT | Performed by: INTERNAL MEDICINE

## 2024-05-28 PROCEDURE — 93970 EXTREMITY STUDY: CPT | Performed by: SURGERY

## 2024-05-28 PROCEDURE — 94664 DEMO&/EVAL PT USE INHALER: CPT

## 2024-05-28 RX ORDER — UREA 10 %
5 LOTION (ML) TOPICAL NIGHTLY PRN
Status: DISCONTINUED | OUTPATIENT
Start: 2024-05-28 | End: 2024-05-29 | Stop reason: HOSPADM

## 2024-05-28 RX ORDER — LIDOCAINE HYDROCHLORIDE 20 MG/ML
20 INJECTION, SOLUTION INFILTRATION; PERINEURAL ONCE
Status: DISCONTINUED | OUTPATIENT
Start: 2024-05-28 | End: 2024-05-28

## 2024-05-28 RX ORDER — POTASSIUM CHLORIDE 750 MG/1
40 CAPSULE, EXTENDED RELEASE ORAL ONCE
Status: COMPLETED | OUTPATIENT
Start: 2024-05-28 | End: 2024-05-28

## 2024-05-28 RX ADMIN — IPRATROPIUM BROMIDE AND ALBUTEROL SULFATE 3 ML: .5; 3 SOLUTION RESPIRATORY (INHALATION) at 21:17

## 2024-05-28 RX ADMIN — ROPINIROLE HYDROCHLORIDE 0.25 MG: 0.25 TABLET, FILM COATED ORAL at 14:45

## 2024-05-28 RX ADMIN — Medication 10 ML: at 20:00

## 2024-05-28 RX ADMIN — FUROSEMIDE 40 MG: 10 INJECTION, SOLUTION INTRAMUSCULAR; INTRAVENOUS at 09:25

## 2024-05-28 RX ADMIN — Medication 10 ML: at 09:26

## 2024-05-28 RX ADMIN — IPRATROPIUM BROMIDE AND ALBUTEROL SULFATE 3 ML: .5; 3 SOLUTION RESPIRATORY (INHALATION) at 12:25

## 2024-05-28 RX ADMIN — GABAPENTIN 100 MG: 100 CAPSULE ORAL at 20:00

## 2024-05-28 RX ADMIN — ACETAMINOPHEN 1000 MG: 500 TABLET ORAL at 14:51

## 2024-05-28 RX ADMIN — ROPINIROLE HYDROCHLORIDE 0.25 MG: 0.25 TABLET, FILM COATED ORAL at 09:26

## 2024-05-28 RX ADMIN — IPRATROPIUM BROMIDE AND ALBUTEROL SULFATE 3 ML: .5; 3 SOLUTION RESPIRATORY (INHALATION) at 00:31

## 2024-05-28 RX ADMIN — FUROSEMIDE 40 MG: 10 INJECTION, SOLUTION INTRAMUSCULAR; INTRAVENOUS at 17:52

## 2024-05-28 RX ADMIN — Medication 5 MG: at 22:19

## 2024-05-28 RX ADMIN — SPIRONOLACTONE 50 MG: 25 TABLET ORAL at 17:52

## 2024-05-28 RX ADMIN — POTASSIUM CHLORIDE 40 MEQ: 750 CAPSULE, EXTENDED RELEASE ORAL at 12:51

## 2024-05-28 RX ADMIN — APIXABAN 2.5 MG: 2.5 TABLET, FILM COATED ORAL at 20:00

## 2024-05-28 RX ADMIN — PANTOPRAZOLE SODIUM 40 MG: 40 TABLET, DELAYED RELEASE ORAL at 09:26

## 2024-05-28 RX ADMIN — ROPINIROLE HYDROCHLORIDE 0.25 MG: 0.25 TABLET, FILM COATED ORAL at 20:00

## 2024-05-28 RX ADMIN — IPRATROPIUM BROMIDE AND ALBUTEROL SULFATE 3 ML: .5; 3 SOLUTION RESPIRATORY (INHALATION) at 07:38

## 2024-05-28 NOTE — PLAN OF CARE
Goal Outcome Evaluation:  Plan of Care Reviewed With: patient           Outcome Evaluation: Patient on 2L nasal cannula tolerating fine with bipap on standby. Will continue to monitor.

## 2024-05-28 NOTE — PROGRESS NOTES
[facility]   Hospitalist Progress Note  Date: 2024  Patient Name: Wai Gay  : 1985  MRN: [id number]  Date of admission: 2024  Consultants:   -Pulmonology: Dr. Stefan Barrientos, Dr. Praful Powell, Dr. Mahogany Morrison    Subjective   Subjective     Chief Complaint: Shortness of breath    Summary:   Wai Gay is a 38 y.o. male with alcoholic cirrhosis quit drinking 2 years ago who is requiring frequent paracentesis that presented with complaints of shortness of breath.  Eval in ED significant for patient having increased work of breathing and borderline hypoxemia.  CXR showed very large pleural effusion.  Hospitalist service contacted for further evaluation management.  Pulmonology consulted.  Thoracentesis done on 2024.  Patient had reexpansion pulmonary edema requiring IV diuretics, BiPAP and transfer to ICU level of care.  Patient tolerated diuresis well, respiratory status improved and patient transferred out of ICU.  Patient evaluated for paracentesis on 2024 and patient did not have enough fluid to be drained.    Interval Followup:   No acute events overnight.  Patient's exercise continues to improve.  Patient denies any active chest pain or shortness of breath.  Nursing with no additional acute issues to report.    Procedures:   -Thoracentesis (2024)    Objective   Objective     Vitals:   Temp:  [97.7 °F (36.5 °C)-98.6 °F (37 °C)] 98.2 °F (36.8 °C)  Heart Rate:  [] 120  Resp:  [18-20] 18  BP: (102-115)/(57-76) 110/76  Flow (L/min):  [1-2] 1  Physical Exam   Gen: No acute distress, Conversant, Pleasant, sitting up in bed  Resp: CTAB, No w/r/r, No respiratory distress appreciated  Card: Regular rhythm, tachycardic, No m/r/g  Abd: Soft, Nontender, Nondistended, + bowel sounds    Result Review    Result Review:  I have personally reviewed the results as below and agree with these findings:  []  Laboratory:   CMP          2024    05:20 2024    02:43  5/28/2024    05:22   CMP   Glucose 120  104  85    BUN 11  13  14    Creatinine 0.94  0.91  1.00    EGFR 107.7  112.0  100.0    Sodium 132  131  130    Potassium 3.3  3.4  3.5    Chloride 104  104  98    Calcium 7.5  7.4  7.9    Total Protein 5.4      Albumin 2.2      Globulin 3.2      Total Bilirubin 1.1      Alkaline Phosphatase 152      AST (SGOT) 22      ALT (SGPT) 13      Albumin/Globulin Ratio 0.7      BUN/Creatinine Ratio 11.7  14.3  14.0    Anion Gap 8.7  7.2  9.1      CBC          5/26/2024    05:20 5/27/2024    02:43 5/28/2024    05:22   CBC   WBC 6.70  8.01  8.02    RBC 3.12  3.02  3.30    Hemoglobin 9.6  9.4  10.2    Hematocrit 29.1  28.2  30.2    MCV 93.3  93.4  91.5    MCH 30.8  31.1  30.9    MCHC 33.0  33.3  33.8    RDW 15.7  15.9  15.8    Platelets 113  118  148    Phosphorus and magnesium within normal limits  []  Microbiology:   []  Radiology:   [x]  EKG/Telemetry:    []  Cardiology/Vascular:    []  Pathology:  []  Old records:  []  Other:    Assessment & Plan   Assessment / Plan     Assessment:  Acute hypoxemic respiratory failure requiring NIPPV  Large right pleural effusion  Reexpansion pulmonary edema  History of DVT on Eliquis  Refractory ascites  Decompensated alcoholic cirrhosis  Hypokalemia  Hyponatremia  Hypocalcemia  Hypomagnesemia    Plan:  -Pulmonology consulted and following, appreciate assistance and recommendations in the care of this patient.  -Continue supplemental O2 to maintain sats greater than 90%, wean as tolerated  -Continue IV Lasix 40 mg twice daily.  Monitor electrolytes and renal function closely given IV diuresis  -Continue spironolactone  -Patient does not have enough fluid amenable to paracentesis.  Restart apixaban.  -Management discussed with pulmonology.  Wean supplemental O2 as tolerated.  Follow-up as an outpatient.  Plan for walk test prior to discharge.  -Will monitor electrolytes and renal function with BMP and magnesium level in the AM  -Will monitor WBC and  Hgb with CBC in the AM  -Clinical course will dictate further management     DVT Prophylaxis: Apixaban  GI Prophylaxis: Pantoprazole  Diet:   Diet Order   Procedures    Diet: Fluid Restriction (240 mL/tray), Cardiac; Low Sodium (2g); 1500 mL/day; Fluid Consistency: Thin (IDDSI 0)     Dispo: Home when medically appropriate discharge     Personally reviewed patients labs and imaging, discussed with patient and nurse at bedside. Discussed management with the following consultants: Pulmonology.     Part of this note may be an electronic transcription/translation of spoken language to printed text using the Dragon dictation system.    DVT prophylaxis:  Medical and mechanical DVT prophylaxis orders are present.        CODE STATUS:   Level Of Support Discussed With: Patient  Code Status (Patient has no pulse and is not breathing): CPR (Attempt to Resuscitate)  Medical Interventions (Patient has pulse or is breathing): Full Support        Electronically signed by Francis Pacheco MD, 5/28/2024, 17:37 EDT.

## 2024-05-28 NOTE — PROGRESS NOTES
Pulmonary / Critical Care Progress Note      Patient Name: Wai Gay  : 1985  MRN: [id number]  Attending:  Francis Pacheco MD   Date of admission: 2024    Subjective   Follow-up for  Follow up for hypoxic respiratory failure, pleural effusion    Over past 24 hours: Remained on oxygen at 2 L/min.  Remains on Lasix 40 mg IV twice daily.  Also on Aldactone.      Doing better this morning  On oxygen at 1 to 2 L/min.  3.1 L urine output over last 24 hours  Still has some shortness of breath  No fever or chills.  No nausea or vomiting.    Review of Systems  Constitutional symptoms:  Denied complaints   Cardiovascular:  Denied complaints  Respiratory: + shortness of breath, denied other complaints  Gastrointestinal: Denied complaints  Neurologic: Denied complaints      Objective   Objective     Vitals:   Vital signs for last 24 hours:  Temp:  [97.7 °F (36.5 °C)-98.7 °F (37.1 °C)] 97.7 °F (36.5 °C)  Heart Rate:  [] 107  Resp:  [14-20] 20  BP: ()/(63-81) 106/63    Intake/Output last 3 shifts:  I/O last 3 completed shifts:  In: 1620 [P.O.:1620]  Out: 6210 [Urine:6210]  Intake/Output this shift:  No intake/output data recorded.    Vent settings for last 24 hours:       Hemodynamic parameters for last 24 hours:       Physical Exam   Vital Signs Reviewed   General:  Alert, NAD. Lying in bed   HEENT:  PERRL, EOMI.  OP  Chest: Diminished on right side compared to left to auscultation bilaterally, no work of breathing noted on 6 L nasal cannula  CV: RRR, no MGR, pulses 2+, equal.  Abd:  Soft, NT, ND, + BS  EXT:  no clubbing, no cyanosis, no edema  Neuro:  A&Ox3, CN grossly intact, no focal deficits.  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  Last drink 2 years ago  Hypokalemia  Hypomagnesemia  Hyponatremia  Reexpansion pulmonary edema  History of PE on Eliquis     Plan:  Respiratory status improved overnight.  Tolerated BiPAP.    Currently on 1-2 L nasal cannula, continue to wean to keep SpO2 greater than 90%.  Check ambulatory oximetry.  Status post Thora 5/26 with 3.6 L output.  No evidence of infection.  No indication for antibiotics at this time.  Patient developed reexpansion pulmonary edema.  Continue diuresis as tolerated with Lasix 40 mg IV twice daily and spironolactone 50 mg p.o. twice daily.  Trend renal panel and electrolytes.  Replace electrolytes to keep K 4, mag 2, Phos 4  Continue Eliquis.  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  If he cannot have that done in a timely fashion, at this point may need to consider TIPS if we can keep his hepatic hydrothorax and refractory ascites under control.  Needs to follow with hepatology service in 1 to 2 weeks.     DVT prophylaxis:  Medical and mechanical DVT prophylaxis orders are present.        CODE STATUS:   Level Of Support Discussed With: Patient  Code Status (Patient has no pulse and is not breathing): CPR (Attempt to Resuscitate)  Medical Interventions (Patient has pulse or is breathing): Full Support    I have reviewed labs, imaging, pertinent clinical data and  provider notes.   I have discussed with bedside nurse and primary service.     Electronically signed by Stefan Barrientos MD, 05/28/24, 7:34 AM EDT.

## 2024-05-28 NOTE — PLAN OF CARE
Goal Outcome Evaluation:  Plan of Care Reviewed With: patient        Progress: improving  Outcome Evaluation: Pt denies pain/discomfort this shift. Checked 02 status on room air with ambulation, sats maintained above 90%, lowest being 94%. 02 removed, educated pt on contacting staff for SOA. Planned d/c home tomorrow. No other issues/needs noted at this time.

## 2024-05-29 ENCOUNTER — READMISSION MANAGEMENT (OUTPATIENT)
Dept: CALL CENTER | Facility: HOSPITAL | Age: 39
End: 2024-05-29
Payer: COMMERCIAL

## 2024-05-29 VITALS
BODY MASS INDEX: 27.73 KG/M2 | HEIGHT: 68 IN | WEIGHT: 182.98 LBS | OXYGEN SATURATION: 97 % | DIASTOLIC BLOOD PRESSURE: 64 MMHG | SYSTOLIC BLOOD PRESSURE: 95 MMHG | HEART RATE: 101 BPM | TEMPERATURE: 98.1 F | RESPIRATION RATE: 18 BRPM

## 2024-05-29 LAB
ANION GAP SERPL CALCULATED.3IONS-SCNC: 9.9 MMOL/L (ref 5–15)
BUN SERPL-MCNC: 16 MG/DL (ref 6–20)
BUN/CREAT SERPL: 16 (ref 7–25)
CALCIUM SPEC-SCNC: 7.7 MG/DL (ref 8.6–10.5)
CHLORIDE SERPL-SCNC: 96 MMOL/L (ref 98–107)
CO2 SERPL-SCNC: 23.1 MMOL/L (ref 22–29)
CREAT SERPL-MCNC: 1 MG/DL (ref 0.76–1.27)
CYTO UR: NORMAL
DEPRECATED RDW RBC AUTO: 51.8 FL (ref 37–54)
EGFRCR SERPLBLD CKD-EPI 2021: 100 ML/MIN/1.73
ERYTHROCYTE [DISTWIDTH] IN BLOOD BY AUTOMATED COUNT: 15.7 % (ref 12.3–15.4)
GLUCOSE SERPL-MCNC: 93 MG/DL (ref 65–99)
HCT VFR BLD AUTO: 28.1 % (ref 37.5–51)
HGB BLD-MCNC: 9.6 G/DL (ref 13–17.7)
LAB AP CASE REPORT: NORMAL
LAB AP CLINICAL INFORMATION: NORMAL
MAGNESIUM SERPL-MCNC: 1.6 MG/DL (ref 1.6–2.6)
MCH RBC QN AUTO: 31.2 PG (ref 26.6–33)
MCHC RBC AUTO-ENTMCNC: 34.2 G/DL (ref 31.5–35.7)
MCV RBC AUTO: 91.2 FL (ref 79–97)
PATH REPORT.FINAL DX SPEC: NORMAL
PATH REPORT.GROSS SPEC: NORMAL
PHOSPHATE SERPL-MCNC: 3.8 MG/DL (ref 2.5–4.5)
PLATELET # BLD AUTO: 134 10*3/MM3 (ref 140–450)
PMV BLD AUTO: 10 FL (ref 6–12)
POTASSIUM SERPL-SCNC: 3.2 MMOL/L (ref 3.5–5.2)
RBC # BLD AUTO: 3.08 10*6/MM3 (ref 4.14–5.8)
SODIUM SERPL-SCNC: 129 MMOL/L (ref 136–145)
WBC NRBC COR # BLD AUTO: 5.89 10*3/MM3 (ref 3.4–10.8)

## 2024-05-29 PROCEDURE — 84100 ASSAY OF PHOSPHORUS: CPT | Performed by: INTERNAL MEDICINE

## 2024-05-29 PROCEDURE — 85027 COMPLETE CBC AUTOMATED: CPT | Performed by: INTERNAL MEDICINE

## 2024-05-29 PROCEDURE — 94799 UNLISTED PULMONARY SVC/PX: CPT

## 2024-05-29 PROCEDURE — 25010000002 FUROSEMIDE PER 20 MG: Performed by: INTERNAL MEDICINE

## 2024-05-29 PROCEDURE — 94664 DEMO&/EVAL PT USE INHALER: CPT

## 2024-05-29 PROCEDURE — 83735 ASSAY OF MAGNESIUM: CPT | Performed by: INTERNAL MEDICINE

## 2024-05-29 PROCEDURE — 25010000002 MAGNESIUM SULFATE 2 GM/50ML SOLUTION: Performed by: INTERNAL MEDICINE

## 2024-05-29 PROCEDURE — 94618 PULMONARY STRESS TESTING: CPT

## 2024-05-29 PROCEDURE — 99232 SBSQ HOSP IP/OBS MODERATE 35: CPT | Performed by: INTERNAL MEDICINE

## 2024-05-29 PROCEDURE — 80048 BASIC METABOLIC PNL TOTAL CA: CPT | Performed by: INTERNAL MEDICINE

## 2024-05-29 PROCEDURE — 99239 HOSP IP/OBS DSCHRG MGMT >30: CPT | Performed by: INTERNAL MEDICINE

## 2024-05-29 RX ORDER — ROPINIROLE 0.25 MG/1
0.25 TABLET, FILM COATED ORAL 3 TIMES DAILY
Qty: 90 TABLET | Refills: 0 | Status: SHIPPED | OUTPATIENT
Start: 2024-05-29 | End: 2024-06-28

## 2024-05-29 RX ORDER — POTASSIUM CHLORIDE 1500 MG/1
20 TABLET, EXTENDED RELEASE ORAL DAILY
Qty: 30 TABLET | Refills: 0 | Status: SHIPPED | OUTPATIENT
Start: 2024-05-29 | End: 2024-06-28

## 2024-05-29 RX ORDER — POTASSIUM CHLORIDE 750 MG/1
40 CAPSULE, EXTENDED RELEASE ORAL ONCE
Status: COMPLETED | OUTPATIENT
Start: 2024-05-29 | End: 2024-05-29

## 2024-05-29 RX ORDER — MAGNESIUM SULFATE HEPTAHYDRATE 40 MG/ML
2 INJECTION, SOLUTION INTRAVENOUS ONCE
Status: COMPLETED | OUTPATIENT
Start: 2024-05-29 | End: 2024-05-29

## 2024-05-29 RX ORDER — FUROSEMIDE 20 MG/1
20 TABLET ORAL 2 TIMES DAILY
Qty: 60 TABLET | Refills: 0 | Status: SHIPPED | OUTPATIENT
Start: 2024-05-29 | End: 2024-06-28

## 2024-05-29 RX ORDER — GABAPENTIN 100 MG/1
100 CAPSULE ORAL NIGHTLY
Qty: 3 CAPSULE | Refills: 0 | Status: SHIPPED | OUTPATIENT
Start: 2024-05-29 | End: 2024-06-10

## 2024-05-29 RX ADMIN — ROPINIROLE HYDROCHLORIDE 0.25 MG: 0.25 TABLET, FILM COATED ORAL at 08:24

## 2024-05-29 RX ADMIN — PANTOPRAZOLE SODIUM 40 MG: 40 TABLET, DELAYED RELEASE ORAL at 08:24

## 2024-05-29 RX ADMIN — POTASSIUM CHLORIDE 40 MEQ: 750 CAPSULE, EXTENDED RELEASE ORAL at 08:23

## 2024-05-29 RX ADMIN — APIXABAN 2.5 MG: 2.5 TABLET, FILM COATED ORAL at 08:24

## 2024-05-29 RX ADMIN — Medication 10 ML: at 08:24

## 2024-05-29 RX ADMIN — FUROSEMIDE 40 MG: 10 INJECTION, SOLUTION INTRAMUSCULAR; INTRAVENOUS at 08:23

## 2024-05-29 RX ADMIN — IPRATROPIUM BROMIDE AND ALBUTEROL SULFATE 3 ML: .5; 3 SOLUTION RESPIRATORY (INHALATION) at 07:45

## 2024-05-29 RX ADMIN — MAGNESIUM SULFATE HEPTAHYDRATE 2 G: 40 INJECTION, SOLUTION INTRAVENOUS at 08:23

## 2024-05-29 RX ADMIN — IPRATROPIUM BROMIDE AND ALBUTEROL SULFATE 3 ML: .5; 3 SOLUTION RESPIRATORY (INHALATION) at 00:42

## 2024-05-29 NOTE — DISCHARGE SUMMARY
[facility]         HOSPITALIST  DISCHARGE SUMMARY    Patient Name: Wai Gay  : 1985  MRN: [id number]    Date of Admission: 2024  Date of Discharge:  24  Primary Care Physician: Callum Peterson DO    Consultants:  -Pulmonology: Dr. Stefan Barrientos, Dr. Praful Powell, Dr. Mahogany Morrison     Hospital Problems:  Acute hypoxemic respiratory failure requiring NIPPV  Large right pleural effusion  Reexpansion pulmonary edema  History of DVT on Eliquis  Refractory ascites  Decompensated alcoholic cirrhosis  Hypokalemia  Hyponatremia  Hypocalcemia  Hypomagnesemia    Hospital Course     Hospital Course:  Wai Gay is a 38 y.o. male with alcoholic cirrhosis quit drinking 2 years ago who is requiring frequent paracentesis that presented with complaints of shortness of breath. Eval in ED significant for patient having increased work of breathing and borderline hypoxemia. CXR showed very large pleural effusion. Hospitalist service contacted for further evaluation management. Pulmonology consulted. Thoracentesis done on 2024. Patient had reexpansion pulmonary edema requiring IV diuretics, BiPAP and transfer to ICU level of care. Patient tolerated diuresis well, respiratory status improved and patient transferred out of ICU. Patient evaluated for paracentesis on 2024 and patient did not have enough fluid to be drained. Patient will discharge on Lasix 20 mg twice daily dosing and is continue spironolactone.  Patient has appointment with [facility] liver team on 2024, importance of maintaining this appointment stressed at length with the patient who voiced understanding.  Due to patient's medical comorbidities he is high risk for readmission.  On day of discharge patient hemodynamically stable and no additional inpatient evaluation or workup necessary this time, patient was discharged home with outpatient follow-up.    DISCHARGE Follow Up Recommendations for  labs and diagnostics:   -Follow-up with PCP in 3 to 5 days  -Follow-up with pulmonology in 2 weeks  -Maintain appointment with [facility] liver team on 06/05/2024  -Follow-up with primary gastroenterologist in 2 weeks    Day of Discharge     Vital Signs:  Temp:  [97.7 °F (36.5 °C)-98.4 °F (36.9 °C)] 98.1 °F (36.7 °C)  Heart Rate:  [] 98  Resp:  [18] 18  BP: ()/(66-76) 101/66  Flow (L/min):  [1] 1  Physical Exam:   Gen: No acute distress, lying in bed, pleasant, conversant  Resp: CTAB, No w/r/r, good aeration, equal chest rise bilaterally  Card: Regular rhythm, tachycardic, No m/r/g  Abd: Soft, Nontender, Nondistended, + bowel sounds     Discharge Details        Discharge Medications        New Medications        Instructions Start Date   furosemide 20 MG tablet  Commonly known as: Lasix   20 mg, Oral, 2 Times Daily      gabapentin 100 MG capsule  Commonly known as: NEURONTIN   100 mg, Oral, Nightly      potassium chloride ER 20 MEQ tablet controlled-release ER tablet  Commonly known as: K-TAB   20 mEq, Oral, Daily      rOPINIRole 0.25 MG tablet  Commonly known as: REQUIP   0.25 mg, Oral, 3 times daily, Take 1 hour before bedtime.             Continue These Medications        Instructions Start Date   apixaban 2.5 MG tablet tablet  Commonly known as: ELIQUIS   2.5 mg, Oral, 2 Times Daily      pantoprazole 40 MG EC tablet  Commonly known as: PROTONIX   TAKE 1 TABLET BY MOUTH EVERY DAY      spironolactone 50 MG tablet  Commonly known as: ALDACTONE   TAKE 1 TABLET BY MOUTH EVERY DAY               No Known Allergies    Discharge Disposition:  Home or Self Care    Diet:  Hospital:  Diet Order   Procedures    Diet: Fluid Restriction (240 mL/tray), Cardiac; Low Sodium (2g); 1500 mL/day; Fluid Consistency: Thin (IDDSI 0)       Discharge Activity:   Activity Instructions       Activity as Tolerated              CODE STATUS:  Code Status and Medical Interventions:   Ordered at: 05/25/24 6111     Level Of Support  Discussed With:    Patient     Code Status (Patient has no pulse and is not breathing):    CPR (Attempt to Resuscitate)     Medical Interventions (Patient has pulse or is breathing):    Full Support       Future Appointments   Date Time Provider Department Center   6/14/2024  9:30 AM [facility] PENNY CT 1  CALEB ETWCT [facility]   7/19/2024  9:00 AM David Spencer MD [facility] GS HARET [facility]   9/25/2024  8:00 AM Shyla Yarbrough APRN [facility] GE ETW [facility]   3/31/2025  9:00 AM Callum Peterson DO [facility] PC HFC [facility]       Additional Instructions for the Follow-ups that You Need to Schedule       Discharge Follow-up with PCP   As directed       Currently Documented PCP:    Callum Peterson DO    PCP Phone Number:    [phone]     Follow Up Details: Follow-up in 3-5 days                Pertinent  and/or Most Recent Results     PROCEDURES:   -Thoracentesis (05/26/2024)     RADIOLOGY:  Duplex Venous Lower Extremity - Bilateral CV-READ [[id number]] Koko as Reviewed   Order Status: Completed Resulted: 05/28/24 1306    Updated: 05/28/24 1309    Right Common Femoral Spont Y    Right Common Femoral Competent Y    Right Common Femoral Phasic Y    Right Common Femoral Compress C    Right Common Femoral Augment Y    Right Saphenofemoral Junction Compress C    Right Profunda Femoral Compress C    Right Mid Femoral Spont Y    Right Mid Femoral Competent Y    Right Mid Femoral Phasic Y    Right Mid Femoral Compress C    Right Mid Femoral Augment Y    Right Distal Femoral Compress C    Right Popliteal Spont Y    Right Popliteal Competent Y    Right Popliteal Phasic Y    Right Popliteal Compress C    Right Popliteal Augment Y    Right Posterior Tibial Compress C    Right Peroneal Compress C    Right Gastronemius Compress C    Right Greater Saph AK Compress C    Left Common Femoral Spont Y    Left Common Femoral Competent Y    Left Common Femoral Phasic Y    Left Common Femoral Compress C    Left Common Femoral Augment Y    Left Saphenofemoral  Junction Compress C    Left Proximal Femoral Compress C    Left Mid Femoral Spont Y    Left Mid Femoral Competent Y    Left Mid Femoral Phasic Y    Left Mid Femoral Compress C    Left Mid Femoral Augment Y    Left Distal Femoral Compress C    Left Popliteal Spont Y    Left Popliteal Competent Y    Left Popliteal Phasic Y    Left Popliteal Compress C    Left Popliteal Augment Y    Left Posterior Tibial Compress C    Left Peroneal Compress C    Left Gastronemius Compress C    Lt soleal compress C    Left Greater Saph AK Compress C    Left Lesser Saph Compress C    BH CV VAS PRELIMINARY FINDINGS SCRIPTING 1.0   Narrative:       Normal bilateral lower extremity venous duplex scan.    US Abdomen Limited [[id number]] Koko as Reviewed   Order Status: Completed Collected: 05/28/24 0849    Updated: 05/28/24 0852   Narrative:        US ABDOMEN LIMITED-        Date of Exam:5/28/2024 7:55 AM     Indication: ascites; K76.9-Liver disease, unspecified; J91.8-Pleural  effusion in other conditions classified elsewhere; R09.02-Hypoxemia;  K74.60-Unspecified cirrhosis of liver; R18.8-Other ascites;  R06.00-Dyspnea, unspecified.        Technique: A limited ultrasound examination of the right upper quadrant  was performed.  Color doppler also performed.     Findings:  Limited ultrasound performed prior to requested paracentesis. There is a  trace amount of fluid in the right upper quadrant adjacent to the liver.  No other significant ascites seen.      Impression:     Impression:  1.  Small amount of right upper quadrant ascites inadequate for safe  paracentesis at this time.     Electronically Signed By-AHSAN GERMAIN MD On:5/28/2024 8:50 AM       XR Chest 1 View [[id number]] Koko as Reviewed   Order Status: Completed Collected: 05/26/24 1049    Updated: 05/26/24 1055   Narrative:        DATE OF EXAM:  5/26/2024 10:21 AM     PROCEDURE:  XR CHEST 1 VW-     INDICATIONS:  s/p right Thoracentesis; K76.9-Liver disease, unspecified;  J91.8-Pleural  effusion in other conditions classified elsewhere; R09.02-Hypoxemia;  K74.60-Unspecified cirrhosis of liver; R18.8-Other ascites;  R06.00-Dyspnea, unspecified     COMPARISON:  5/25/2024 and prior     TECHNIQUE:  Portable Chest     FINDINGS:     Significant decrease in consolidation, pleural effusion on the right  status post thoracentesis. There is persistent patchy opacity compatible  with consolidation of the right base. No obvious pneumothorax noted.  Small residual pleural effusion is questioned. Evidence of old  granulomatous disease noted. Osseous structures appear to be grossly  stable. Heart size is within normal limits.      Impression:     IMPRESSION :  Interval thoracentesis with decrease in pleural and parenchymal changes  on the right with persistent consolidation and blunting of the  costophrenic angle noted. No immediate post procedure pneumothorax  identified [     Electronically Signed By-Mo Barrett On:5/26/2024 10:53 AM       CT Chest With Contrast Diagnostic [[id number]] Koko as Reviewed   Order Status: Completed Collected: 05/25/24 1718    Updated: 05/25/24 1729   Narrative:     CT CHEST W CONTRAST DIAGNOSTIC-     Date of Exam: 5/25/2024 4:36 PM     Indication: Shortness of breath and history of pulmonary embolus.     Comparison: Chest radiograph from earlier today     Technique: CT scan of the chest was performed after the uneventful  administration of 50 mL of IV Isovue 370. Automated exposure control and  iterative reconstruction methods were used.        Findings:  There is occlusion of the bronchus intermedius. There is a large right  pleural effusion with right basilar atelectasis. There are several  groundglass densities within the left upper lobe. There is a trace left  pleural effusion.     The heart size appears normal. The great vessels are normal caliber.  There is no evidence of pulmonary embolus. No abnormally enlarged lymph  nodes are identified.     Partial  evaluation of the upper abdomen demonstrates changes of  cholecystectomy, hepatic cirrhosis, and ascites.     No aggressive osseous lesions are identified.      Impression:     Impression:  1.  No evidence of pulmonary embolus.  2.  Large right and trace left pleural effusions.  3.  Right basilar atelectasis.  4.  Hepatic cirrhosis with ascites within visualized upper abdomen.                       Electronically Signed By-Teofilo Porter MD On:5/25/2024 5:27 PM       XR Chest 1 View [[id number]] Koko as Reviewed   Order Status: Completed Collected: 05/25/24 1529    Updated: 05/25/24 1533   Narrative:     XR CHEST 1 VW-     Date of Exam: 5/25/2024 3:18 PM     Indication: SOA Triage Protocol.     Comparison Exams: September 7, 2021     Technique: Single AP chest radiograph     FINDINGS:  There is a large right pleural effusion with right basilar opacity. The  left lung is clear. The heart and mediastinal contours appear stable.  The pulmonary vasculature appears normal. The osseous structures appear  intact.      Impression:     1.  Large right pleural effusion.  2.  Right basilar opacity, which could reflect atelectasis or pneumonia.        Electronically Signed By-Teofilo Porter MD On:5/25/2024 3:31 PM       LAB RESULTS:      Lab 05/29/24  0555 05/28/24  0522 05/27/24  0243 05/26/24  0908 05/26/24  0520 05/25/24  1505   WBC 5.89 8.02 8.01  --  6.70 6.87   HEMOGLOBIN 9.6* 10.2* 9.4*  --  9.6* 11.1*   HEMATOCRIT 28.1* 30.2* 28.2*  --  29.1* 33.9*   PLATELETS 134* 148 118*  --  113* 145   NEUTROS ABS  --  4.84 5.57  --   --  4.86   IMMATURE GRANS (ABS)  --  0.05 0.04  --   --  0.03   LYMPHS ABS  --  1.00 1.04  --   --  0.77   MONOS ABS  --  0.96* 0.71  --   --  0.72   EOS ABS  --  1.13* 0.62*  --   --  0.46*   MCV 91.2 91.5 93.4  --  93.3 94.4   PROCALCITONIN  --   --   --   --  0.17  --    LACTATE  --   --   --  1.9  --   --    LDH  --   --   --   --   --  304*   PROTIME  --  19.3*  --   --   --  19.4*         Lab  05/29/24  0555 05/28/24  0522 05/27/24  0243 05/26/24  0520 05/25/24  1505   SODIUM 129* 130* 131* 132* 132*   POTASSIUM 3.2* 3.5 3.4* 3.3* 3.4*   CHLORIDE 96* 98 104 104 102   CO2 23.1 22.9 19.8* 19.3* 20.1*   ANION GAP 9.9 9.1 7.2 8.7 9.9   BUN 16 14 13 11 10   CREATININE 1.00 1.00 0.91 0.94 0.93   EGFR 100.0 100.0 112.0 107.7 109.1   GLUCOSE 93 85 104* 120* 102*   CALCIUM 7.7* 7.9* 7.4* 7.5* 7.7*   MAGNESIUM 1.6 1.8 1.6 1.5*  --    PHOSPHORUS 3.8 3.9 3.5 2.7  --          Lab 05/26/24  0520 05/25/24  1505   TOTAL PROTEIN 5.4* 6.1   ALBUMIN 2.2* 2.5*   GLOBULIN 3.2 3.6   ALT (SGPT) 13 15   AST (SGOT) 22 24   BILIRUBIN 1.1 1.5*   ALK PHOS 152* 163*         Lab 05/28/24  0522 05/25/24  1505   PROBNP  --  130.4   HSTROP T  --  9   PROTIME 19.3* 19.4*   INR 1.59* 1.61*             Lab 05/26/24  0908   IRON 27*   IRON SATURATION (TSAT) 8*   TIBC 322   TRANSFERRIN 216   FERRITIN 38.42         Brief Urine Lab Results       None          Microbiology Results (last 10 days)       Procedure Component Value - Date/Time    S. Pneumo Ag Urine or CSF - Urine, Urine, Clean Catch [[id number]]  (Normal) Collected: 05/26/24 1524    Lab Status: Final result Specimen: Urine, Clean Catch Updated: 05/26/24 1614     Strep Pneumo Ag Negative    Legionella Antigen, Urine - Urine, Urine, Clean Catch [[id number]]  (Normal) Collected: 05/26/24 1524    Lab Status: Final result Specimen: Urine, Clean Catch Updated: 05/26/24 1614     LEGIONELLA ANTIGEN, URINE Negative    Respiratory Culture - Sputum, Cough [[id number]] Collected: 05/26/24 1226    Lab Status: Final result Specimen: Sputum from Cough Updated: 05/26/24 1510     Respiratory Culture Rejected     Gram Stain Few (2+) Epithelial cells per low power field      Few (2+) WBCs per low power field      Few (2+) Budding yeast    Narrative:      Specimen rejected due to oropharyngeal contamination. Please reorder and recollect specimen if clinically necessary.    AFB Culture - Body Fluid, Pleural  Cavity [[id number]] Collected: 05/26/24 1041    Lab Status: Preliminary result Specimen: Body Fluid from Pleural Cavity Updated: 05/26/24 1646     AFB Stain No acid fast bacilli seen on direct smear    Body Fluid Culture - Body Fluid, Pleural Cavity [[id number]] Collected: 05/26/24 1041    Lab Status: Preliminary result Specimen: Body Fluid from Pleural Cavity Updated: 05/29/24 0656     Body Fluid Culture No growth at 2 days     Gram Stain Few (2+) WBCs seen      No organisms seen    Anaerobic Culture - Pleural Fluid, Pleural Cavity [[id number]]  (Normal) Collected: 05/26/24 1041    Lab Status: Preliminary result Specimen: Pleural Fluid from Pleural Cavity Updated: 05/29/24 0719     Anaerobic Culture No anaerobes isolated at 3 days    Blood Culture - Blood, Arm, Left [[id number]]  (Normal) Collected: 05/26/24 0908    Lab Status: Preliminary result Specimen: Blood from Arm, Left Updated: 05/28/24 0930     Blood Culture No growth at 2 days    Blood Culture - Blood, Arm, Right [[id number]]  (Normal) Collected: 05/26/24 0908    Lab Status: Preliminary result Specimen: Blood from Arm, Right Updated: 05/28/24 0930     Blood Culture No growth at 2 days    Body Fluid Culture - Body Fluid, Peritoneum [[id number]] Collected: 05/22/24 1428    Lab Status: Final result Specimen: Body Fluid from Peritoneum Updated: 05/25/24 1112     Body Fluid Culture No growth at 3 days     Gram Stain No organisms seen              Results for orders placed during the hospital encounter of 05/25/24    Duplex Venous Lower Extremity - Bilateral CV-READ    Interpretation Summary    Normal bilateral lower extremity venous duplex scan.      Results for orders placed during the hospital encounter of 05/25/24    Duplex Venous Lower Extremity - Bilateral CV-READ    Interpretation Summary    Normal bilateral lower extremity venous duplex scan.          Labs Pending at Discharge:  Pending Labs       Order Current Status    Fungus Culture - Body Fluid, Pleural  Cavity In process    Non-gynecologic Cytology In process    AFB Culture - Body Fluid, Pleural Cavity Preliminary result    Anaerobic Culture - Pleural Fluid, Pleural Cavity Preliminary result    Blood Culture - Blood, Arm, Left Preliminary result    Blood Culture - Blood, Arm, Right Preliminary result    Body Fluid Culture - Body Fluid, Pleural Cavity Preliminary result            Time spent on Discharge including face to face service:  32 minutes    Electronically signed by Francis Pacheco MD, 05/29/24, 8:00 AM EDT.

## 2024-05-29 NOTE — PLAN OF CARE
Goal Outcome Evaluation:  Plan of Care Reviewed With: patient        Progress: no change  Outcome Evaluation: Patient is awake and on room air this morning tolerating well. Bipap is at bedside.

## 2024-05-29 NOTE — PLAN OF CARE
Goal Outcome Evaluation:   Patient stated he wasn't going to wear bipap tonight but would let us know if breathing got worse and he would put mask on.

## 2024-05-29 NOTE — PROGRESS NOTES
Pulmonary / Critical Care Progress Note      Patient Name: Wai Gay  : 1985  MRN: [id number]  Attending:  Francis Pacheco MD   Date of admission: 2024    Subjective   Follow-up for  Follow up for hypoxic respiratory failure, pleural effusion    Over past 24 hours: Remains on Lasix 40 mg IV twice daily.  Also on Aldactone.  Weaned off oxygen.    Doing better this morning  On room air now.  Diuresing well.  Still has some shortness of breath  No fever or chills.  No nausea or vomiting.    Review of Systems  Constitutional symptoms:  Denied complaints   Cardiovascular:  Denied complaints  Respiratory: + shortness of breath, denied other complaints  Gastrointestinal: Denied complaints  Neurologic: Denied complaints      Objective   Objective     Vitals:   Vital signs for last 24 hours:  Temp:  [97.7 °F (36.5 °C)-98.4 °F (36.9 °C)] 98.1 °F (36.7 °C)  Heart Rate:  [102-128] 106  Resp:  [18] 18  BP: ()/(57-76) 101/66    Intake/Output last 3 shifts:  I/O last 3 completed shifts:  In: 960 [P.O.:960]  Out: 1200 [Urine:1200]  Intake/Output this shift:  No intake/output data recorded.    Vent settings for last 24 hours:       Hemodynamic parameters for last 24 hours:       Physical Exam   Vital Signs Reviewed   General:  Alert, NAD. Lying in bed   HEENT:  PERRL, EOMI.  OP  Chest: Diminished on right side compared to left to auscultation bilaterally, no work of breathing noted on 6 L nasal cannula  CV: RRR, no MGR, pulses 2+, equal.  Abd:  Soft, NT, ND, + BS  EXT:  no clubbing, no cyanosis, no edema  Neuro:  A&Ox3, CN grossly intact, no focal deficits.  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  Last drink 2 years ago  Hypokalemia  Hypomagnesemia  Hyponatremia  Reexpansion pulmonary edema  History of PE on Eliquis     Plan:  Weaned off oxygen.  Status post Thora 5/26 with 3.6 L output.  No evidence of infection.  No indication for antibiotics at this time.  Patient developed reexpansion pulmonary edema.  Continue diuresis as tolerated with Lasix 40 mg IV twice daily and spironolactone 50 mg p.o. twice daily.  Trend renal panel and electrolytes.  Replace electrolytes to keep K 4, mag 2, Phos 4  Continue Eliquis.  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  If he cannot have that done in a timely fashion, at this point may need to consider TIPS if we can keep his hepatic hydrothorax and refractory ascites under control.  Needs to follow with hepatology service in 1 to 2 weeks.     DVT prophylaxis:  Medical and mechanical DVT prophylaxis orders are present.        CODE STATUS:   Level Of Support Discussed With: Patient  Code Status (Patient has no pulse and is not breathing): CPR (Attempt to Resuscitate)  Medical Interventions (Patient has pulse or is breathing): Full Support    I have reviewed labs, imaging, pertinent clinical data and provider notes.   I have discussed with bedside nurse and primary service.     Electronically signed by Stefan Barrientos MD, 05/29/24, 7:40 AM EDT.

## 2024-05-29 NOTE — DISCHARGE INSTR - APPOINTMENTS
Dr Khan office will call him with a follow up appointment....Gastroenterology...[address] [facility] [address] Phone [phone]

## 2024-05-29 NOTE — PROCEDURES
Walking Oximetry Progress Note      Patient Name:  Wai Gay  YOB: 1985  Date of Procedure: 05/29/24              ROOM AIR BASELINE   SpO2% 95   Heart Rate 107     EXERCISE ON ROOM AIR SpO2% EXERCISE ON O2 LPM SpO2%   1 MINUTE 95 1 MINUTE     2 MINUTES 95 2 MINUTES     3 MINUTES 94 3 MINUTES     4 MINUTES 95 4 MINUTES     5 MINUTES 96 5 MINUTES     6 MINUTES 96 6 MINUTES                Time to Recovery  n/a   SpO2% Post Exercise  96 on room air.    HR Post Exercise  124     Comments: No increased work of breathing. Steady fast pace, no issues.          Electronically signed by Elinor Bertrand RRT, 05/29/24, 11:11 AM EDT.

## 2024-05-29 NOTE — PLAN OF CARE
Goal Outcome Evaluation:           Progress: no change  Outcome Evaluation: Patient is alert and oriented X4, on room air with no complaints of shortness of air. Medicated with PRN melatonin. No needs or issues noted at this time.

## 2024-05-29 NOTE — OUTREACH NOTE
Prep Survey      Flowsheet Row Responses   [facility] patient discharged from? [facility]   Is LACE score < 7 ? No   Eligibility [facility]   Date of Admission 05/25/24   Date of Discharge 05/29/24   Discharge Disposition Home or Self Care   Discharge diagnosis Respiratory failure, acute   Does the patient have one of the following disease processes/diagnoses(primary or secondary)? Other   Does the patient have Home health ordered? No   Is there a DME ordered? No   Prep survey completed? Yes            Alejandra ROBERTS - Registered Nurse

## 2024-05-29 NOTE — PLAN OF CARE
Goal Outcome Evaluation:  Plan of Care Reviewed With: patient        Progress: no change  Outcome Evaluation: Pt denies pain/discomfort this shift. c/o leg cramps at times, no intervention needed. D/c pending completion of IV magnesium. Pending private transportation via [facility]. No new issues/needs noted at this time.

## 2024-05-30 ENCOUNTER — TRANSITIONAL CARE MANAGEMENT TELEPHONE ENCOUNTER (OUTPATIENT)
Dept: CALL CENTER | Facility: HOSPITAL | Age: 39
End: 2024-05-30
Payer: COMMERCIAL

## 2024-05-30 ENCOUNTER — OFFICE VISIT (OUTPATIENT)
Dept: FAMILY MEDICINE CLINIC | Facility: CLINIC | Age: 39
End: 2024-05-30
Payer: COMMERCIAL

## 2024-05-30 VITALS
OXYGEN SATURATION: 97 % | DIASTOLIC BLOOD PRESSURE: 65 MMHG | SYSTOLIC BLOOD PRESSURE: 105 MMHG | HEART RATE: 98 BPM | WEIGHT: 156.6 LBS | TEMPERATURE: 97.5 F | BODY MASS INDEX: 23.73 KG/M2 | HEIGHT: 68 IN

## 2024-05-30 DIAGNOSIS — E87.6 HYPOKALEMIA: ICD-10-CM

## 2024-05-30 DIAGNOSIS — K70.31 ALCOHOLIC CIRRHOSIS OF LIVER WITH ASCITES: Primary | ICD-10-CM

## 2024-05-30 PROBLEM — J96.00 RESPIRATORY FAILURE, ACUTE: Status: RESOLVED | Noted: 2024-05-25 | Resolved: 2024-05-30

## 2024-05-30 LAB
BACTERIA FLD CULT: NORMAL
GRAM STN SPEC: NORMAL
GRAM STN SPEC: NORMAL

## 2024-05-30 RX ORDER — POTASSIUM CHLORIDE 20 MEQ/1
20 TABLET, EXTENDED RELEASE ORAL 2 TIMES DAILY
Qty: 6 TABLET | Refills: 0 | Status: SHIPPED | OUTPATIENT
Start: 2024-05-30 | End: 2024-06-02

## 2024-05-30 NOTE — PROGRESS NOTES
Transitional Care Follow Up Visit  Subjective     Wai Gay is a 38 y.o. male who presents for a transitional care management visit.    Within 48 business hours after discharge our office contacted him via telephone to coordinate his care and needs.      I reviewed and discussed the details of that call along with the discharge summary, hospital problems, inpatient lab results, inpatient diagnostic studies, and consultation reports with Wai.     Current outpatient and discharge medications have been reconciled for the patient.  Reviewed by: Guerrero Bowers MD          5/29/2024     5:11 PM   Date of TCM Phone Call   [facility]   Date of Admission 5/25/2024   Date of Discharge 5/29/2024   Discharge Disposition Home or Self Care     Risk for Readmission (LACE) Score: 14 (5/29/2024  6:00 AM)      History of Present Illness   Hospital Course:  Wai Gay is a 38 y.o. male with alcoholic cirrhosis quit drinking 2 years ago who is requiring frequent paracentesis that presented to the ED with c/c of shortness of breath. Eval in ED significant for patient having increased work of breathing and borderline hypoxemia. CXR showed very large pleural effusion. Hospitalist service contacted for further evaluation management. Pulmonology consulted. Thoracentesis done on 05/26/2024. Patient had reexpansion pulmonary edema requiring IV diuretics, BiPAP and transfer to ICU level of care. Patient tolerated diuresis well, respiratory status improved and patient transferred out of ICU. Patient evaluated for paracentesis on 05/20/2024 and patient did not have enough fluid to be drained. Patient was discharge on Lasix and spironolactone.  Patient has appointment with [facility] team on 06/05/2024.  Patient today doing good. Complaints of muscle cramps. Blood work reviewed. Pt with hypokalemia. He might need short term supplementation 3 days ads he is on lasix but spironolactone might  "contrarest effect in potassium. Will obtain bmp next week. Follow liver specialist next week.          The following portions of the patient's history were reviewed and updated as appropriate: allergies, current medications, past family history, past medical history, past social history, past surgical history, and problem list.    Review of Systems   Constitutional:  Negative for activity change, chills and diaphoresis.   HENT:  Negative for congestion.    Eyes:  Negative for itching.   Respiratory:  Negative for apnea and chest tightness.    Cardiovascular:  Negative for chest pain.   Gastrointestinal:  Negative for abdominal distention.   Genitourinary:  Negative for difficulty urinating.   Musculoskeletal:  Negative for arthralgias.   Neurological:  Negative for dizziness.   Hematological:  Negative for adenopathy.   Psychiatric/Behavioral:  Negative for agitation.        Objective   /65 (BP Location: Left arm, Patient Position: Sitting, Cuff Size: Adult)   Pulse 98   Temp 97.5 °F (36.4 °C) (Temporal)   Ht 172.7 cm (67.99\")   Wt 71 kg (156 lb 9.6 oz)   SpO2 97%   BMI 23.82 kg/m²   Physical Exam  Vitals reviewed.   HENT:      Head: Normocephalic.      Mouth/Throat:      Mouth: Mucous membranes are moist.   Eyes:      Pupils: Pupils are equal, round, and reactive to light.   Cardiovascular:      Rate and Rhythm: Normal rate.   Abdominal:      General: Abdomen is flat.   Musculoskeletal:         General: Normal range of motion.      Cervical back: Normal range of motion.   Skin:     General: Skin is warm.      Capillary Refill: Capillary refill takes less than 2 seconds.   Neurological:      Mental Status: He is alert.         Assessment & Plan   Diagnoses and all orders for this visit:    1. Alcoholic cirrhosis of liver with ascites (Primary)  Comments:  follow with liver specialist    2. Hypokalemia  -     potassium chloride (KLOR-CON M20) 20 MEQ CR tablet; Take 1 tablet by mouth 2 (Two) Times a Day " for 3 days.  Dispense: 6 tablet; Refill: 0  -     Basic metabolic panel; Future

## 2024-05-30 NOTE — OUTREACH NOTE
Call Center TCM Note      Flowsheet Row Responses   [facility] patient discharged from? [facility]   Does the patient have one of the following disease processes/diagnoses(primary or secondary)? Other   TCM attempt successful? Yes   Discharge diagnosis Respiratory failure, acute   TCM call completed? Yes   Wrap up additional comments TCM appt completed on 5/30/24,  PCP appointment within 2 business days of DC fulfills the TCM requirement, no call necessary.            Dinora Talley RN    5/30/2024, 12:52 EDT

## 2024-05-31 ENCOUNTER — TELEPHONE (OUTPATIENT)
Dept: GASTROENTEROLOGY | Facility: CLINIC | Age: 39
End: 2024-05-31
Payer: COMMERCIAL

## 2024-05-31 ENCOUNTER — TELEPHONE (OUTPATIENT)
Dept: FAMILY MEDICINE CLINIC | Facility: CLINIC | Age: 39
End: 2024-05-31
Payer: COMMERCIAL

## 2024-05-31 LAB
BACTERIA SPEC AEROBE CULT: NORMAL
BACTERIA SPEC AEROBE CULT: NORMAL
BACTERIA SPEC ANAEROBE CULT: NORMAL

## 2024-05-31 NOTE — TELEPHONE ENCOUNTER
Patient called requesting [facility] paperwork due to missing work to attend appointments  Tito DU agreeable to fill out paperwork   Patient aware,he will drop off paper at office for tito  Will call with any questions or concerns

## 2024-05-31 NOTE — TELEPHONE ENCOUNTER
Caller: Wai Gay    Relationship to patient: Self    Best call back number: [phone]     Patient is needing: CALLING TO SEE IF THE OFFICE RECEIVED HIS FMLA FORM THROUGH THE FAX. PLEASE CALL AND ADVISE.

## 2024-06-02 LAB
FUNGUS WND CULT: NORMAL
MYCOBACTERIUM SPEC CULT: NORMAL
NIGHT BLUE STAIN TISS: NORMAL

## 2024-06-03 ENCOUNTER — LAB (OUTPATIENT)
Dept: LAB | Facility: HOSPITAL | Age: 39
End: 2024-06-03
Payer: COMMERCIAL

## 2024-06-03 DIAGNOSIS — E87.6 HYPOKALEMIA: ICD-10-CM

## 2024-06-03 LAB
ANION GAP SERPL CALCULATED.3IONS-SCNC: 9 MMOL/L (ref 5–15)
BUN SERPL-MCNC: 17 MG/DL (ref 6–20)
BUN/CREAT SERPL: 14.2 (ref 7–25)
CALCIUM SPEC-SCNC: 8.4 MG/DL (ref 8.6–10.5)
CHLORIDE SERPL-SCNC: 97 MMOL/L (ref 98–107)
CO2 SERPL-SCNC: 28 MMOL/L (ref 22–29)
CREAT SERPL-MCNC: 1.2 MG/DL (ref 0.76–1.27)
EGFRCR SERPLBLD CKD-EPI 2021: 80.4 ML/MIN/1.73
GLUCOSE SERPL-MCNC: 95 MG/DL (ref 65–99)
POTASSIUM SERPL-SCNC: 3.6 MMOL/L (ref 3.5–5.2)
SODIUM SERPL-SCNC: 134 MMOL/L (ref 136–145)

## 2024-06-03 PROCEDURE — 80048 BASIC METABOLIC PNL TOTAL CA: CPT

## 2024-06-03 PROCEDURE — 36415 COLL VENOUS BLD VENIPUNCTURE: CPT

## 2024-06-04 NOTE — PATIENT INSTRUCTIONS
Managing the Challenge of Quitting Smoking  Quitting smoking is a physical and mental challenge. You may have cravings, withdrawal symptoms, and temptation to smoke. Before quitting, work with your health care provider to make a plan that can help you manage quitting. Making a plan before you quit may keep you from smoking when you have the urge to smoke while trying to quit.  How to manage lifestyle changes  Managing stress  Stress can make you want to smoke, and wanting to smoke may cause stress. It is important to find ways to manage your stress. You could try some of the following:  Practice relaxation techniques.  Breathe slowly and deeply, in through your nose and out through your mouth.  Listen to music.  Soak in a bath or take a shower.  Imagine a peaceful place or vacation.  Get some support.  Talk with family or friends about your stress.  Join a support group.  Talk with a counselor or therapist.  Get some physical activity.  Go for a walk, run, or bike ride.  Play a favorite sport.  Practice yoga.    Medicines  Talk with your health care provider about medicines that might help you deal with cravings and make quitting easier for you.  Relationships  Social situations can be difficult when you are quitting smoking. To manage this, you can:  Avoid parties and other social situations where people might be smoking.  Avoid alcohol.  Leave right away if you have the urge to smoke.  Explain to your family and friends that you are quitting smoking. Ask for support and let them know you might be a bit grumpy.  Plan activities where smoking is not an option.  General instructions  Be aware that many people gain weight after they quit smoking. However, not everyone does. To keep from gaining weight, have a plan in place before you quit, and stick to the plan after you quit. Your plan should include:  Eating healthy snacks. When you have a craving, it may help to:  Eat popcorn, or try carrots, celery, or other cut  vegetables.  Chew sugar-free gum.  Changing how you eat.  Eat small portion sizes at meals.  Eat 4-6 small meals throughout the day instead of 1-2 large meals a day.  Be mindful when you eat. You should avoid watching television or doing other things that might distract you as you eat.  Exercising regularly.  Make time to exercise each day. If you do not have time for a long workout, do short bouts of exercise for 5-10 minutes several times a day.  Do some form of strengthening exercise, such as weight lifting.  Do some exercise that gets your heart beating and causes you to breathe deeply, such as walking fast, running, swimming, or biking. This is very important.  Drinking plenty of water or other low-calorie or no-calorie drinks. Drink enough fluid to keep your urine pale yellow.    How to recognize withdrawal symptoms  Your body and mind may experience discomfort as you try to get used to not having nicotine in your system. These effects are called withdrawal symptoms. They may include:  Feeling hungrier than normal.  Having trouble concentrating.  Feeling irritable or restless.  Having trouble sleeping.  Feeling depressed.  Craving a cigarette.  These symptoms may surprise you, but they are normal to have when quitting smoking.  To manage withdrawal symptoms:  Avoid places, people, and activities that trigger your cravings.  Remember why you want to quit.  Get plenty of sleep.  Avoid coffee and other drinks that contain caffeine. These may worsen some of your symptoms.  How to manage cravings  Come up with a plan for how to deal with your cravings. The plan should include the following:  A definition of the specific situation you want to deal with.  An activity or action you will take to replace smoking.  A clear idea for how this action will help.  The name of someone who could help you with this.  Cravings usually last for 5-10 minutes. Consider taking the following actions to help you with your plan to deal  with cravings:  Keep your mouth busy.  Chew sugar-free gum.  Suck on hard candies or a straw.  Brush your teeth.  Keep your hands and body busy.  Change to a different activity right away.  Squeeze or play with a ball.  Do an activity or a hobby, such as making bead jewelry, practicing needlepoint, or working with wood.  Mix up your normal routine.  Take a short exercise break. Go for a quick walk, or run up and down stairs.  Focus on doing something kind or helpful for someone else.  Call a friend or family member to talk during a craving.  Join a support group.  Contact a quitline.  Where to find support  To get help or find a support group:  Call the National Cancer [place]'s Smoking Quitline: 2-661-QUIT-NOW (372-7383)  Text QUIT to SmokefreeTXT: 516174  Where to find more information  Visit these websites to find more information on quitting smoking:  U.[place]. Department of Health and Human Services: www.smokefree.gov  American Lung Association: www.freedomfromsmoking.org  Centers for Disease Control and Prevention (CDC): www.cdc.gov  American Heart Association: www.heart.org  Contact a health care provider if:  You want to change your plan for quitting.  The medicines you are taking are not helping.  Your eating feels out of control or you cannot sleep.  You feel depressed or become very anxious.  Summary  Quitting smoking is a physical and mental challenge. You will face cravings, withdrawal symptoms, and temptation to smoke again. Preparation can help you as you go through these challenges.  Try different techniques to manage stress, handle social situations, and prevent weight gain.  You can deal with cravings by keeping your mouth busy (such as by chewing gum), keeping your hands and body busy, calling family or friends, or contacting a quitline for people who want to quit smoking.  You can deal with withdrawal symptoms by avoiding places where people smoke, getting plenty of rest, and avoiding drinks that  contain caffeine.  This information is not intended to replace advice given to you by your health care provider. Make sure you discuss any questions you have with your health care provider.  Document Revised: 12/09/2022 Document Reviewed: 12/09/2022  Elsevier Patient Education © 2024 [employer]

## 2024-06-04 NOTE — PROGRESS NOTES
Primary Care Provider  Callum Peterson DO     Referring Provider  No ref. provider found     Chief Complaint  Hospital Follow Up Visit    Subjective          History of Presenting Illness  Patient is a 38-year-old male who was recently hospitalized at [facility] from 5/25/2024 to 5/29/2024 for acute hypoxemic respiratory failure requiring NIPPV and large right pleural effusion who presents for a follow-up visit today.  Per discharge summary report, patient has a history of alcoholic cirrhosis and quit drinking 2 years ago who is requiring frequent paracentesis who presented to hospital with complaints of shortness of breath.  Evaluation in the emergency room was significant for patient having increased work of breathing and borderline hypoxemia.  Patient had chest x-ray completed which showed a very large pleural effusion.  Hospitalist service was contacted for further evaluation and management.  Pulmonology was consulted.  A thoracentesis was completed on 5/26/2024.  Patient had reexpansion pulmonary edema requiring IV diuretics, BiPAP, and transfer to ICU level of care.  Patient tolerated diuresis well and his respiratory status improved and patient was transferred out of ICU.  Patient was evaluated for paracentesis on 5/20/2024 and did not have enough fluid to be drained.  Patient was discharged on Lasix 20 mg twice daily dosing and was advised to continue spironolactone.  Patient has an appointment with [facility] liver team on 6/5/2024 and importance of maintaining this appointment was stressed at length with the patient who voiced understanding per discharge summary report.  Per discharge summary report, due to patient's medical comorbidities he is at high risk for readmission.  On the day of discharge patient was hemodynamically stable and no additional inpatient evaluation workup was necessary at that time and patient was discharged home with outpatient follow-up per  discharge summary report.  Patient states that since discharge from the hospital he is feeling much better.  Patient states that he does get short of breath that is worse with exertion, mild to moderate severity, and improved with rest. Patient states that he is still smoking and is not ready to set a quit date at this time.  Patient states that he is under the care of ANA LAURA Brandon and Dr. Khan for cirrhosis.  Patient states that he was seen by Dr. Storm for evaluation for liver transplant on 6/5/2024.  Patient states that he has not had a drink in 2 years.  Patient denies any history of any cancers with himself, specifically lung cancer.  Patient states that his mother had COPD.  Patient states that his sister had a liver transplant last year.  Patient states that his paternal grandmother had lung cancer in his paternal grandfather had cancer, however is not sure what type.  Patient states that he had a paternal uncle that had brain cancer.  Patient is currently not on any nebulizers or inhalers.  Patient denies any history of any asthma, COPD, or emphysema.  Patient states that he is taking Eliquis every day as prescribed. Patient denies fever, chills, night sweats, swollen glands in the head and neck, unintentional weight loss, hemoptysis, purulent sputum production, dysphagia, chest pain, palpitations, chest tightness, abdominal pain, nausea, vomiting, and diarrhea.  Patient also denies any myalgias, changes in sense of taste and/or smell, sore throat, any other coronavirus or flu-like symptoms.  Patient denies any leg swelling, orthopnea, paroxysmal nocturnal dyspnea.  Patient also denies any hematuria, hematochezia, hematemesis, and epistaxis.  Patient is able to perform activities of daily living.        Review of Systems     Family History   Problem Relation Age of Onset    Alcohol abuse Mother     Arthritis Mother     COPD Mother     Heart disease Maternal Grandmother     Hypertension Maternal  Grandmother     Lung cancer Maternal Grandmother     Stroke Maternal Grandmother     Heart disease Maternal Grandfather     Lung cancer Maternal Grandfather     Cancer Paternal Grandmother     Cancer Paternal Grandfather     Colon cancer Neg Hx     Malig Hyperthermia Neg Hx         Social History     Socioeconomic History    Marital status: Single   Tobacco Use    Smoking status: Every Day     Current packs/day: 0.50     Average packs/day: 0.5 packs/day for 16.4 years (8.2 ttl pk-yrs)     Types: Cigarettes     Start date: 1/1/2008     Passive exposure: Current    Smokeless tobacco: Never   Vaping Use    Vaping status: Never Used   Substance and Sexual Activity    Alcohol use: Not Currently     Alcohol/week: 3.0 standard drinks of alcohol     Types: 2 Cans of beer, 1 Shots of liquor per week     Comment: FORMER    Drug use: Not Currently    Sexual activity: Yes     Partners: Female        Past Medical History:   Diagnosis Date    Alcohol abuse 03/14/2017    Anxiety     Essential hypertension 12/27/2019    GERD (gastroesophageal reflux disease)     Hypokalemia 03/14/2017    Will update    Hyponatremia 03/14/2017    Steatohepatitis, alcoholic 03/14/2017    Tobacco abuse 03/14/2017    Umbilical hernia         Immunization History   Administered Date(s) Administered    COVID-19 (PFIZER) Purple Cap Monovalent 11/07/2021, 11/28/2021    Fluzone (or Fluarix & Flulaval for VFC) >6mos 10/18/2021    Hep B, Adolescent or Pediatric 09/09/2003    Hepatitis B 06/28/2022, 08/29/2022    MMR 01/30/1997    Meningococcal Conjugate 06/28/2022    Pneumococcal Conjugate 20-Valent (PCV20) 06/28/2022    Td (TDVAX) 09/09/2003       No Known Allergies       Current Outpatient Medications:     apixaban (ELIQUIS) 2.5 MG tablet tablet, Take 1 tablet by mouth 2 (Two) Times a Day., Disp: 60 tablet, Rfl: 2    furosemide (Lasix) 20 MG tablet, Take 1 tablet by mouth 2 (Two) Times a Day for 30 days., Disp: 60 tablet, Rfl: 0    gabapentin (NEURONTIN)  "100 MG capsule, Take 1 capsule by mouth Every Night for 3 days., Disp: 3 capsule, Rfl: 0    pantoprazole (PROTONIX) 40 MG EC tablet, TAKE 1 TABLET BY MOUTH EVERY DAY, Disp: 90 tablet, Rfl: 1    potassium chloride ER (K-TAB) 20 MEQ tablet controlled-release ER tablet, Take 1 tablet by mouth Daily for 30 days., Disp: 30 tablet, Rfl: 0    rOPINIRole (REQUIP) 0.25 MG tablet, Take 1 tablet by mouth 3 times a day for 30 days. Take 1 hour before bedtime., Disp: 90 tablet, Rfl: 0    spironolactone (ALDACTONE) 50 MG tablet, TAKE 1 TABLET BY MOUTH EVERY DAY, Disp: 90 tablet, Rfl: 1     Objective     Physical Exam  Vital Signs:   WDWN, Alert, NAD.    HEENT:  PERRL, EOMI.  OP, nares clear, no sinus tenderness  Neck:  Supple, no JVD, no thyromegaly.  Lymph: no axillary, cervical, supraclavicular lymphadenopathy noted bilaterally  Chest:  good aeration, clear to auscultation bilaterally, tympanic to percussion bilaterally, no work of breathing noted  CV: RRR, no MGR, pulses 2+, equal.  Abd:  Soft, NT, ND, + BS, no HSM  EXT:  no clubbing, no cyanosis, no edema, no joint tenderness  Neuro:  A&Ox3, CN grossly intact, no focal deficits.  Skin: No rashes or lesions noted.    BP 95/65 (BP Location: Left arm, Patient Position: Sitting, Cuff Size: Small Adult)   Pulse 79   Resp 16   Ht 172.7 cm (67.99\")   Wt 73 kg (160 lb 14.4 oz)   SpO2 98% Comment: room air  BMI 24.47 kg/m²         Result Review :   I have reviewed discharge summary and imaging study reports from recent hospital stay.  See scanned reports.    Procedures:      US Abdomen Limited    Result Date: 5/28/2024  Impression: 1.  Small amount of right upper quadrant ascites inadequate for safe paracentesis at this time.  Electronically Signed By-AHSAN GERMAIN MD On:5/28/2024 8:50 AM      XR Chest 1 View    Result Date: 5/26/2024  IMPRESSION : Interval thoracentesis with decrease in pleural and parenchymal changes on the right with persistent consolidation and blunting " of the costophrenic angle noted. No immediate post procedure pneumothorax identified [  Electronically Signed By-Mo Barrett On:5/26/2024 10:53 AM      CT Chest With Contrast Diagnostic    Result Date: 5/25/2024  Impression: 1.  No evidence of pulmonary embolus. 2.  Large right and trace left pleural effusions. 3.  Right basilar atelectasis. 4.  Hepatic cirrhosis with ascites within visualized upper abdomen.        Electronically Signed By-Teofilo Porter MD On:5/25/2024 5:27 PM      XR Chest 1 View    Result Date: 5/25/2024  1.  Large right pleural effusion. 2.  Right basilar opacity, which could reflect atelectasis or pneumonia.   Electronically Signed By-Teofilo Porter MD On:5/25/2024 3:31 PM            Assessment and Plan      Assessment:  1.  Acute hypoxic respiratory failure, resolved. Patient is now on room air.  2.  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  3.  Refractory ascites.  4.  Decompensated alcoholic cirrhosis.  Last drink 2 years ago.  5.  History of PE on Eliquis. Recommend 3 months of anticoagulation for PE.  6.  Dyspnea.  7.  Tobacco abuse of cigarettes ongoing.          Plan:  1.  Will order a pulmonary function test and a 6-minute walk test.  2.  Alpha-1 antitrypsin level and genotype drawn in the office today.  3.  Signs and symptoms of fluid accumulation discussed with the patient in the office today.  Patient is advised to call the office or go to the ER with any new or worsening symptoms.  Will order a follow-up chest x-ray.  4.  Continue Eliquis as prescribed.  5.  Continue diuresis as prescribed.  6.  Follow-up with cardiologist, gastroenterologist, hepatologist, and PCP as scheduled.  7.  Vaccination status: patient reports they are up-to-date with pneumonia and Covid vaccines.  Patient is advised receive the new flu vaccine when it becomes available.  Patient is advised to continue to follow CDC recommendations such as social distancing wearing a  mask and washing hands for at least 20 seconds.  8. Smoking status: patient is a current cigarette smoker.  I counseled the patient on smoking cessation.  I counseled the patient on the risks of continued smoking including the risk of lung cancer, head and neck cancer, renal cancer, heart disease, stroke, and early death.  Patient refuses nicotine replacement therapy or pharmacotherapy at this time.  Patient is advised to decrease the number of cigarettes they are smoking up until the point to where they can quit.  9.  Patient to call the office, 911, or go to the ER with new or worsening symptoms.  10.  Follow-up in 4 weeks, sooner if needed.              Follow Up   Return in about 4 weeks (around 7/8/2024) for with an MD.  Patient was given instructions and counseling regarding his condition or for health maintenance advice. Please see specific information pulled into the AVS if appropriate.

## 2024-06-05 RX ORDER — PANTOPRAZOLE SODIUM 40 MG/1
TABLET, DELAYED RELEASE ORAL
Qty: 90 TABLET | Refills: 1 | Status: SHIPPED | OUTPATIENT
Start: 2024-06-05

## 2024-06-05 RX ORDER — SPIRONOLACTONE 50 MG/1
TABLET, FILM COATED ORAL
Qty: 90 TABLET | Refills: 1 | Status: SHIPPED | OUTPATIENT
Start: 2024-06-05

## 2024-06-05 NOTE — TELEPHONE ENCOUNTER
Medication Requested PANTOPRAZOLE 40MG EC, SPIRONOLACTONE 50MG    Last Refill 01/17/2024    Last OV 03/20/2024    Next OV 07/03/2024    Medication pended for approval and correct pharmacy verified yes

## 2024-06-05 NOTE — TELEPHONE ENCOUNTER
Also just wanted to make sure this pt kept appt with [facility] for today? (Per d/c summary)   Noted creatinine is slowly increasing. May not be able to continue diuretics

## 2024-06-06 ENCOUNTER — TRANSCRIBE ORDERS (OUTPATIENT)
Dept: ADMINISTRATIVE | Facility: HOSPITAL | Age: 39
End: 2024-06-06
Payer: COMMERCIAL

## 2024-06-06 DIAGNOSIS — K74.60 HEPATIC CIRRHOSIS, UNSPECIFIED HEPATIC CIRRHOSIS TYPE, UNSPECIFIED WHETHER ASCITES PRESENT: Primary | ICD-10-CM

## 2024-06-09 LAB
FUNGUS WND CULT: NORMAL
MYCOBACTERIUM SPEC CULT: NORMAL
NIGHT BLUE STAIN TISS: NORMAL

## 2024-06-10 ENCOUNTER — OFFICE VISIT (OUTPATIENT)
Dept: PULMONOLOGY | Facility: CLINIC | Age: 39
End: 2024-06-10
Payer: COMMERCIAL

## 2024-06-10 ENCOUNTER — TELEPHONE (OUTPATIENT)
Dept: GASTROENTEROLOGY | Facility: CLINIC | Age: 39
End: 2024-06-10
Payer: COMMERCIAL

## 2024-06-10 VITALS
BODY MASS INDEX: 24.38 KG/M2 | HEIGHT: 68 IN | SYSTOLIC BLOOD PRESSURE: 95 MMHG | RESPIRATION RATE: 16 BRPM | HEART RATE: 79 BPM | OXYGEN SATURATION: 98 % | DIASTOLIC BLOOD PRESSURE: 65 MMHG | WEIGHT: 160.9 LBS

## 2024-06-10 DIAGNOSIS — J96.01 ACUTE HYPOXIC RESPIRATORY FAILURE: ICD-10-CM

## 2024-06-10 DIAGNOSIS — Z86.711 PERSONAL HISTORY OF PE (PULMONARY EMBOLISM): ICD-10-CM

## 2024-06-10 DIAGNOSIS — J90 PLEURAL EFFUSION: ICD-10-CM

## 2024-06-10 DIAGNOSIS — R06.00 DYSPNEA, UNSPECIFIED TYPE: ICD-10-CM

## 2024-06-10 DIAGNOSIS — Z72.0 TOBACCO ABUSE: Primary | ICD-10-CM

## 2024-06-10 PROCEDURE — 99214 OFFICE O/P EST MOD 30 MIN: CPT | Performed by: NURSE PRACTITIONER

## 2024-06-10 NOTE — TELEPHONE ENCOUNTER
Received call from Peace @ [facility]. She states patients CT scan Abd Pelvis requires a peer to peer review.   Phone # to schedule Peer to Peer [phone]  Reference/case # [id number]  Forwarding to Clinical staff/provider for review

## 2024-06-16 LAB
FUNGUS WND CULT: NORMAL
MYCOBACTERIUM SPEC CULT: NORMAL
NIGHT BLUE STAIN TISS: NORMAL

## 2024-06-18 ENCOUNTER — READMISSION MANAGEMENT (OUTPATIENT)
Dept: CALL CENTER | Facility: HOSPITAL | Age: 39
End: 2024-06-18
Payer: COMMERCIAL

## 2024-06-18 NOTE — OUTREACH NOTE
Medical Week 3 Survey      Flowsheet Row Responses   [facility] patient discharged from? [facility]   Does the patient have one of the following disease processes/diagnoses(primary or secondary)? Other   Week 3 attempt successful? Yes   Call start time 1335   Call end time 1339   Discharge diagnosis Respiratory failure, acute   Meds reviewed with patient/caregiver? Yes   Is the patient having any side effects they believe may be caused by any medication additions or changes? No   Does the patient have all medications ordered at discharge? Yes   Is the patient taking all medications as directed (includes completed medication regime)? Yes   Does the patient have a primary care provider?  Yes   Does the patient have an appointment with their PCP within 7 days of discharge? Yes   Has the patient kept scheduled appointments due by today? Yes   Comments Pt has been compliant with f/u appts   Has home health visited the patient within 72 hours of discharge? N/A   Psychosocial issues? No   Did the patient receive a copy of their discharge instructions? Yes   Nursing interventions Reviewed instructions with patient   What is the patient's perception of their health status since discharge? Improving  [Mother reports pt is doing well, reports no SOA and no fluid building up so far. Mother is encouraging to monitor for fluid and aware to notify MD if present]   Is the patient/caregiver able to teach back signs and symptoms related to disease process for when to call PCP? Yes   Is the patient/caregiver able to teach back signs and symptoms related to disease process for when to call 911? Yes   Week 3 Call Completed? Yes   Graduated Yes   Call end time 1339            RAJESH DENSON - Registered Nurse

## 2024-06-23 LAB
FUNGUS WND CULT: NORMAL
MYCOBACTERIUM SPEC CULT: NORMAL
NIGHT BLUE STAIN TISS: NORMAL

## 2024-06-30 LAB
MYCOBACTERIUM SPEC CULT: NORMAL
NIGHT BLUE STAIN TISS: NORMAL

## 2024-07-03 ENCOUNTER — OFFICE VISIT (OUTPATIENT)
Dept: GASTROENTEROLOGY | Facility: CLINIC | Age: 39
End: 2024-07-03
Payer: COMMERCIAL

## 2024-07-03 VITALS
HEART RATE: 95 BPM | DIASTOLIC BLOOD PRESSURE: 59 MMHG | BODY MASS INDEX: 23.89 KG/M2 | OXYGEN SATURATION: 100 % | WEIGHT: 157.1 LBS | SYSTOLIC BLOOD PRESSURE: 89 MMHG

## 2024-07-03 DIAGNOSIS — K74.3 PRIMARY BILIARY CHOLANGITIS: ICD-10-CM

## 2024-07-03 DIAGNOSIS — K21.9 GASTROESOPHAGEAL REFLUX DISEASE, UNSPECIFIED WHETHER ESOPHAGITIS PRESENT: ICD-10-CM

## 2024-07-03 DIAGNOSIS — K22.70 BARRETT'S ESOPHAGUS WITHOUT DYSPLASIA: ICD-10-CM

## 2024-07-03 DIAGNOSIS — R16.1 SPLENOMEGALY: ICD-10-CM

## 2024-07-03 DIAGNOSIS — K70.31 ALCOHOLIC CIRRHOSIS OF LIVER WITH ASCITES: Primary | ICD-10-CM

## 2024-07-03 RX ORDER — URSODIOL 300 MG/1
CAPSULE ORAL
COMMUNITY
Start: 2024-06-08

## 2024-07-03 NOTE — PROGRESS NOTES
Chief Complaint  Follow-up and Nausea (Following up has nausea occasionally doesn't get sick just has a wave of nausea. )    Wai Gay is a 38 y.o. male who presents to [facility] for follow up.     History of present Illness  Patient presents to the office for follow up with a history of alcohol abuse, primary biliary cholangitis, cirrhosis, ascites, splenomegaly, elevated CA 19.9, Barretts esophagus, and portal hypertensive gastropathy.  Patient previously referred for EUS due to elevated CA 19-9 but has not been scheduled due to patient's work schedule, patient aware of risk associated with deferring.  Patient has continued sobriety for almost 3 years now. He recently established care with [facility] hepatology 6/05/24 with plans for liver transplant evaluation and/or TIPS. Last paracentesis 5/22/2024 with 2.4L removed. He is scheduled for CT for HCC surveillance 7/9/24. He continues to manage fluid retention with spironolactone 50mg daily. He continues Melissa for PBC treatment. Denies RUQ pain, extremity/abdominal swelling, excessive bruising/bleeding, problems sleeping, and forgetfulness. Reflux is controlled with Protonix 40mg daily. Denies nausea, vomiting, epigastric pain, and dysphagia. Denies lower GI symptoms.     CT Needle Biopsy Liver 03/28/2023 -cirrhosis with the possibility of primary biliary cholangitis.     EGD 02/10/2023 by Dr. Khan -mucosa suggestive of short segment and Last's, mild portal hypertensive gastropathy, mild gastritis, and normal duodenum.    Past Medical History:   Diagnosis Date    Alcohol abuse 03/14/2017    Anxiety     Essential hypertension 12/27/2019    GERD (gastroesophageal reflux disease)     Hypokalemia 03/14/2017    Will update    Hyponatremia 03/14/2017    Steatohepatitis, alcoholic 03/14/2017    Tobacco abuse 03/14/2017    Umbilical hernia        Past Surgical History:   Procedure Laterality Date    CHOLECYSTECTOMY       ENDOSCOPY N/A 02/09/2022    Procedure: ESOPHAGOGASTRODUODENOSCOPY WITH BX;  Surgeon: Gino Khan MD;  Location: [facility] ENDOSCOPY;  Service: Gastroenterology;  Laterality: N/A;  RETAINED LIQUID FOOD IN STOMACH, HUFFMAN'S ESOPHAGUS    ENDOSCOPY N/A 2/10/2023    Procedure: ESOPHAGOGASTRODUODENOSCOPY;  Surgeon: Gino Khan MD;  Location: [facility] ENDOSCOPY;  Service: Gastroenterology;  Laterality: N/A;  GASTRITIS, BARRETTS ESOPHAGUS, PHG    UPPER GASTROINTESTINAL ENDOSCOPY           Current Outpatient Medications:     apixaban (ELIQUIS) 2.5 MG tablet tablet, Take 1 tablet by mouth 2 (Two) Times a Day., Disp: 60 tablet, Rfl: 2    pantoprazole (PROTONIX) 40 MG EC tablet, TAKE 1 TABLET BY MOUTH EVERY DAY, Disp: 90 tablet, Rfl: 1    spironolactone (ALDACTONE) 50 MG tablet, TAKE 1 TABLET BY MOUTH EVERY DAY, Disp: 90 tablet, Rfl: 1    ursodiol (ACTIGALL) 300 MG capsule, TAKE 2 CAPSULES BY MOUTH 2 (TWO) TIMES A DAY WITH MEALS FOR 90 DAYS., Disp: , Rfl:     furosemide (Lasix) 20 MG tablet, Take 1 tablet by mouth 2 (Two) Times a Day for 30 days., Disp: 60 tablet, Rfl: 0    gabapentin (NEURONTIN) 100 MG capsule, Take 1 capsule by mouth Every Night for 3 days., Disp: 3 capsule, Rfl: 0    rOPINIRole (REQUIP) 0.25 MG tablet, Take 1 tablet by mouth 3 times a day for 30 days. Take 1 hour before bedtime., Disp: 90 tablet, Rfl: 0     No Known Allergies    Family History   Problem Relation Age of Onset    Alcohol abuse Mother     Arthritis Mother     COPD Mother     Heart disease Maternal Grandmother     Hypertension Maternal Grandmother     Lung cancer Maternal Grandmother     Stroke Maternal Grandmother     Heart disease Maternal Grandfather     Lung cancer Maternal Grandfather     Cancer Paternal Grandmother     Cancer Paternal Grandfather     Colon cancer Neg Hx     Malig Hyperthermia Neg Hx         Social History     Social History Narrative    Not on file       Objective       Vital Signs:   BP (!) 89/59 (BP  Location: Right arm, Patient Position: Sitting, Cuff Size: Adult)   Pulse 95   Wt 71.3 kg (157 lb 1.6 oz)   SpO2 100%   BMI 23.89 kg/m²       Physical Exam  Constitutional:       Appearance: Normal appearance. He is normal weight.   HENT:      Head: Normocephalic and atraumatic.      Nose: Nose normal.   Pulmonary:      Effort: Pulmonary effort is normal.   Skin:     General: Skin is warm and dry.   Neurological:      Mental Status: He is alert and oriented to person, place, and time. Mental status is at baseline.   Psychiatric:         Mood and Affect: Mood normal.         Behavior: Behavior normal.         Thought Content: Thought content normal.         Judgment: Judgment normal.         Result Review :       CBC w/diff          5/28/2024    05:22 5/29/2024    05:55 6/5/2024    14:25   CBC w/Diff   WBC 8.02  5.89  4.9       RBC 3.30  3.08  3.4       Hemoglobin 10.2  9.6  11.0       Hematocrit 30.2  28.1  31.7       MCV 91.5  91.2  94.1       MCH 30.9  31.2  32.8       MCHC 33.8  34.2  34.8       RDW 15.8  15.7  16.1       Platelets 148  134  168       Neutrophil Rel % 60.3      Immature Granulocyte Rel % 0.6      Lymphocyte Rel % 12.5      Monocyte Rel % 12.0      Eosinophil Rel % 14.1      Basophil Rel % 0.5         Details          This result is from an external source.             CMP          5/28/2024    05:22 5/29/2024    05:55 6/3/2024    12:27   CMP   Glucose 85  93  95    BUN 14  16  17    Creatinine 1.00  1.00  1.20    EGFR 100.0  100.0  80.4    Sodium 130  129  134    Potassium 3.5  3.2  3.6    Chloride 98  96  97    Calcium 7.9  7.7  8.4    BUN/Creatinine Ratio 14.0  16.0  14.2    Anion Gap 9.1  9.9  9.0        Liver Workup   ALPHA -1 ANTITRYPSIN   Date Value Ref Range Status   02/27/2023 171 90 - 200 mg/dL Final     AFP Tumor Marker   Date Value Ref Range Status   06/05/2024 2.9 <6.1 ng/mL Final     Comment:       This test was performed using the Ihsan Crooks  chemiluminescent method. Values  obtained from  different assay methods cannot be used  interchangeably. AFP levels, regardless of  value, should not be interpreted as absolute  evidence of the presence or absence of disease.     dsDNA   Date Value Ref Range Status   02/27/2023 Negative Negative Final     Expanded LISA Screen   Date Value Ref Range Status   02/27/2023 Negative Negative Final     Smooth Muscle Ab   Date Value Ref Range Status   02/27/2023 21 (H) 0 - 19 Units Final     Comment:                      Negative                     0 - 19                   Weak positive               20 - 30                   Moderate to strong positive     >30   Actin Antibodies are found in 52-85% of patients with   autoimmune hepatitis or chronic active hepatitis and   in 22% of patients with primary biliary cirrhosis.     Ceruloplasmin   Date Value Ref Range Status   02/27/2023 24 16 - 31 mg/dL Final     Ferritin   Date Value Ref Range Status   05/26/2024 38.42 30.00 - 400.00 ng/mL Final     Immunofixation Result, Serum   Date Value Ref Range Status   02/27/2023 Comment  Final     Comment:     No monoclonality detected.     IgG   Date Value Ref Range Status   02/27/2023 1174 603 - 1613 mg/dL Final     IgA   Date Value Ref Range Status   02/27/2023 923 (H) 90 - 386 mg/dL Final     Comment:     Results confirmed on  dilution.     IgM   Date Value Ref Range Status   02/27/2023 100 20 - 172 mg/dL Final     Iron   Date Value Ref Range Status   05/26/2024 27 (L) 59 - 158 mcg/dL Final     TIBC   Date Value Ref Range Status   05/26/2024 322 298 - 536 mcg/dL Final     Iron Saturation (TSAT)   Date Value Ref Range Status   05/26/2024 8 (L) 20 - 50 % Final     Transferrin   Date Value Ref Range Status   05/26/2024 216 200 - 360 mg/dL Final     Mitochondrial Ab   Date Value Ref Range Status   02/27/2023 64.5 (H) 0.0 - 20.0 Units Final     Comment:                                     Negative    0.0 - 20.0                                  Equivocal  20.1 - 24.9                                   Positive         >24.9  Mitochondrial (M2) Antibodies are found in 90-96% of  patients with primary biliary cirrhosis.     Protime   Date Value Ref Range Status   05/28/2024 19.3 (H) 11.8 - 14.9 Seconds Final   09/09/2021 15.9 (H) 9.4 - 12.0 Seconds Final     INR   Date Value Ref Range Status   06/05/2024 1.4 (H) 0.9 - 1.1 Final     Comment:     Moderate-intensity Warfarin Therapy     2.0-3.0  Higher-intensity Warfarin Therapy         3.0-4.0     ALPHA-FETOPROTEIN   Date Value Ref Range Status   03/26/2024 <2 0 - 8.3 ng/mL Final           Assessment and Plan    Diagnoses and all orders for this visit:    1. Alcoholic cirrhosis of liver with ascites (Primary)    2. Primary biliary cholangitis    3. Splenomegaly    4. Last's esophagus without dysplasia    5. Gastroesophageal reflux disease, unspecified whether esophagitis present    38-year-old patient presents to the office for follow up with a history of alcohol abuse, primary biliary cholangitis, cirrhosis, ascites, splenomegaly, elevated CA 19.9, Barretts esophagus, and portal hypertensive gastropathy.  Patient previously referred for EUS due to elevated CA 19-9 but has not been scheduled due to patient's work schedule, patient aware of risk associated with deferring.  Patient has continued sobriety for almost 3 years now. He recently established care with [facility] hepatology 6/05/24 with plans for liver transplant evaluation and/or TIPS, he will maintain follow up as scheduled. Last paracentesis 5/22/2024 with 2.4L removed. He is scheduled for CT for HCC surveillance 7/9/24. He continues to manage fluid retention with spironolactone 50mg daily. He continues Melissa for PBC treatment. Denies RUQ pain, extremity/abdominal swelling, excessive bruising/bleeding, problems sleeping, and forgetfulness. Reflux is controlled with Protonix 40mg daily.  Overall patient is stable from a GI standpoint with current medication regimen.  He will  follow-up in 6 months.  Patient is agreeable to plan call the office any questions or concerns.    Follow Up   Return in about 6 months (around 1/3/2025).  Patient was given instructions and counseling regarding his condition or for health maintenance advice. Please see specific information pulled into the AVS if appropriate.

## 2024-07-05 ENCOUNTER — OFFICE VISIT (OUTPATIENT)
Dept: FAMILY MEDICINE CLINIC | Facility: CLINIC | Age: 39
End: 2024-07-05
Payer: COMMERCIAL

## 2024-07-05 VITALS
HEIGHT: 68 IN | HEART RATE: 82 BPM | DIASTOLIC BLOOD PRESSURE: 63 MMHG | WEIGHT: 169.2 LBS | BODY MASS INDEX: 25.64 KG/M2 | SYSTOLIC BLOOD PRESSURE: 94 MMHG | TEMPERATURE: 95.9 F | OXYGEN SATURATION: 97 %

## 2024-07-05 DIAGNOSIS — K70.31 ALCOHOLIC CIRRHOSIS OF LIVER WITH ASCITES: ICD-10-CM

## 2024-07-05 DIAGNOSIS — Z86.711 PERSONAL HISTORY OF PE (PULMONARY EMBOLISM): ICD-10-CM

## 2024-07-05 DIAGNOSIS — I10 ESSENTIAL HYPERTENSION: Primary | ICD-10-CM

## 2024-07-05 PROCEDURE — 99213 OFFICE O/P EST LOW 20 MIN: CPT | Performed by: FAMILY MEDICINE

## 2024-07-05 NOTE — PROGRESS NOTES
Chief Complaint   Patient presents with    Liver Cirrhosis    Essential hypertension        Subjective     Wai Gay  has a past medical history of Alcohol abuse (03/14/2017), Anxiety, Essential hypertension (12/27/2019), GERD (gastroesophageal reflux disease), Hypokalemia (03/14/2017), Hyponatremia (03/14/2017), Steatohepatitis, alcoholic (03/14/2017), Tobacco abuse (03/14/2017), and Umbilical hernia.    Hypertension-he does not check his blood pressure outside the office.  His blood pressure here today is somewhat low at 94/63.  He is not specifically on any antihypertensives.  He does the furosemide and spironolactone for his portal hypertension.  His last paracentesis was about 6 weeks ago.    Cirrhosis-he has not had any alcohol now for at least 3 years.  He saw gastroenterology 2 days ago and saw his hepatologist about a month ago.  He is working on being added to the liver transplant list.  His labs have remained stable except he did have an elevated CA 19-9.  He is supposed to have an endoscopic ultrasound to evaluate that further.        PHQ-2 Depression Screening  Little interest or pleasure in doing things?     Feeling down, depressed, or hopeless?     PHQ-2 Total Score     PHQ-9 Depression Screening  Little interest or pleasure in doing things?     Feeling down, depressed, or hopeless?     Trouble falling or staying asleep, or sleeping too much?     Feeling tired or having little energy?     Poor appetite or overeating?     Feeling bad about yourself - or that you are a failure or have let yourself or your family down?     Trouble concentrating on things, such as reading the newspaper or watching television?     Moving or speaking so slowly that other people could have noticed? Or the opposite - being so fidgety or restless that you have been moving around a lot more than usual?     Thoughts that you would be better off dead, or of hurting yourself in some way?     PHQ-9 Total Score     If you  checked off any problems, how difficult have these problems made it for you to do your work, take care of things at home, or get along with other people?       No Known Allergies    Prior to Admission medications    Medication Sig Start Date End Date Taking? Authorizing Provider   apixaban (ELIQUIS) 2.5 MG tablet tablet Take 1 tablet by mouth 2 (Two) Times a Day. 4/2/24  Yes Shyla Yarbrough APRN   pantoprazole (PROTONIX) 40 MG EC tablet TAKE 1 TABLET BY MOUTH EVERY DAY 6/5/24  Yes Shyla Yarbrough APRN   spironolactone (ALDACTONE) 50 MG tablet TAKE 1 TABLET BY MOUTH EVERY DAY 6/5/24  Yes Shyla Yarbrough APRN   ursodiol (ACTIGALL) 300 MG capsule TAKE 2 CAPSULES BY MOUTH 2 (TWO) TIMES A DAY WITH MEALS FOR 90 DAYS. 6/8/24  Yes ProviderRose MD   furosemide (Lasix) 20 MG tablet Take 1 tablet by mouth 2 (Two) Times a Day for 30 days. 5/29/24 6/28/24  Francis Pacheco MD   gabapentin (NEURONTIN) 100 MG capsule Take 1 capsule by mouth Every Night for 3 days. 5/29/24 6/10/24  Francis Pacheco MD   rOPINIRole (REQUIP) 0.25 MG tablet Take 1 tablet by mouth 3 times a day for 30 days. Take 1 hour before bedtime. 5/29/24 6/28/24  Francis Pacheco MD        Patient Active Problem List   Diagnosis    Acute liver failure without hepatic coma    Acute hepatitis    Hypokalemia    Hyponatremia    Steatohepatitis, alcoholic    History of alcohol abuse    Tobacco abuse    Generalized abdominal pain    Need for influenza vaccination    Essential hypertension    Alcoholic cirrhosis of liver with ascites    Cirrhosis of liver with ascites    Need for hepatitis A and B vaccination    Need for vaccination against Streptococcus pneumoniae    Need for meningococcal vaccination    Need for shingles vaccine    Last's esophagus without dysplasia    Umbilical hernia without obstruction and without gangrene    Acute hypoxic respiratory failure    Personal history of PE (pulmonary embolism)    Pleural effusion    Dyspnea         Past Surgical History:   Procedure Laterality Date    CHOLECYSTECTOMY      ENDOSCOPY N/A 02/09/2022    Procedure: ESOPHAGOGASTRODUODENOSCOPY WITH BX;  Surgeon: Gino Khan MD;  Location: [facility] ENDOSCOPY;  Service: Gastroenterology;  Laterality: N/A;  RETAINED LIQUID FOOD IN STOMACH, HUFFMAN'S ESOPHAGUS    ENDOSCOPY N/A 2/10/2023    Procedure: ESOPHAGOGASTRODUODENOSCOPY;  Surgeon: Gino Khan MD;  Location: [facility] ENDOSCOPY;  Service: Gastroenterology;  Laterality: N/A;  GASTRITIS, BARRETTS ESOPHAGUS, PHG    UPPER GASTROINTESTINAL ENDOSCOPY         Social History     Socioeconomic History    Marital status: Single   Tobacco Use    Smoking status: Every Day     Current packs/day: 0.50     Average packs/day: 0.5 packs/day for 16.5 years (8.3 ttl pk-yrs)     Types: Cigarettes     Start date: 1/1/2008     Passive exposure: Current    Smokeless tobacco: Never   Vaping Use    Vaping status: Never Used   Substance and Sexual Activity    Alcohol use: Not Currently     Alcohol/week: 3.0 standard drinks of alcohol     Comment: FORMER    Drug use: Not Currently    Sexual activity: Yes     Partners: Female       Family History   Problem Relation Age of Onset    Alcohol abuse Mother     Arthritis Mother     COPD Mother     Heart disease Maternal Grandmother     Hypertension Maternal Grandmother     Lung cancer Maternal Grandmother     Stroke Maternal Grandmother     Heart disease Maternal Grandfather     Lung cancer Maternal Grandfather     Cancer Paternal Grandmother     Cancer Paternal Grandfather     Colon cancer Neg Hx     Malig Hyperthermia Neg Hx        Family history, surgical history, past medical history, Allergies and meds reviewed with patient today and updated in [facility] EMR.     ROS:  Review of Systems   Constitutional:  Negative for fatigue.   HENT:  Negative for congestion, postnasal drip and rhinorrhea.    Respiratory:  Negative for cough, chest tightness, shortness of breath and wheezing.   "  Cardiovascular:  Negative for chest pain and palpitations.   Gastrointestinal:  Negative for abdominal distention and abdominal pain.   Neurological:  Negative for headache.   Psychiatric/Behavioral:  Negative for depressed mood. The patient is not nervous/anxious.        OBJECTIVE:  Vitals:    07/05/24 1014   BP: 94/63   Pulse: 82   Temp: 95.9 °F (35.5 °C)   SpO2: 97%   Weight: 76.7 kg (169 lb 3.2 oz)   Height: 172.7 cm (67.99\")     No results found.   Body mass index is 25.73 kg/m².  No LMP for male patient.    The ASCVD Risk score (Temple DK, et al., 2019) failed to calculate for the following reasons:    The 2019 ASCVD risk score is only valid for ages 40 to 79     Physical Exam  Vitals and nursing note reviewed.   Constitutional:       General: He is not in acute distress.     Appearance: Normal appearance. He is normal weight.   HENT:      Head: Normocephalic.      Right Ear: Tympanic membrane, ear canal and external ear normal.      Left Ear: Tympanic membrane, ear canal and external ear normal.      Nose: Nose normal.      Mouth/Throat:      Mouth: Mucous membranes are moist.      Dentition: Has dentures.      Pharynx: Oropharynx is clear.   Eyes:      General: No scleral icterus.     Conjunctiva/sclera: Conjunctivae normal.      Pupils: Pupils are equal, round, and reactive to light.   Cardiovascular:      Rate and Rhythm: Normal rate and regular rhythm.      Pulses: Normal pulses.      Heart sounds: Normal heart sounds. No murmur heard.  Pulmonary:      Effort: Pulmonary effort is normal.      Breath sounds: Normal breath sounds. No wheezing, rhonchi or rales.   Musculoskeletal:      Cervical back: Neck supple. No rigidity or tenderness.   Lymphadenopathy:      Cervical: No cervical adenopathy.   Skin:     General: Skin is warm and dry.      Coloration: Skin is not jaundiced.      Findings: No rash.   Neurological:      General: No focal deficit present.      Mental Status: He is alert and oriented to " person, place, and time.   Psychiatric:         Mood and Affect: Mood normal.         Thought Content: Thought content normal.         Judgment: Judgment normal.         Procedures    No visits with results within 30 Day(s) from this visit.   Latest known visit with results is:   Lab on 06/03/2024   Component Date Value Ref Range Status    Glucose 06/03/2024 95  65 - 99 mg/dL Final    BUN 06/03/2024 17  6 - 20 mg/dL Final    Creatinine 06/03/2024 1.20  0.76 - 1.27 mg/dL Final    Sodium 06/03/2024 134 (L)  136 - 145 mmol/L Final    Potassium 06/03/2024 3.6  3.5 - 5.2 mmol/L Final    Chloride 06/03/2024 97 (L)  98 - 107 mmol/L Final    CO2 06/03/2024 28.0  22.0 - 29.0 mmol/L Final    Calcium 06/03/2024 8.4 (L)  8.6 - 10.5 mg/dL Final    BUN/Creatinine Ratio 06/03/2024 14.2  7.0 - 25.0 Final    Anion Gap 06/03/2024 9.0  5.0 - 15.0 mmol/L Final    eGFR 06/03/2024 80.4  >60.0 mL/min/1.73 Final       ASSESSMENT/ PLAN:    Diagnoses and all orders for this visit:    1. Essential hypertension (Primary)  Assessment & Plan:  His blood pressure is low due to his diuretics for his portal hypertension.  He is relatively asymptomatic      2. Personal history of PE (pulmonary embolism)  Assessment & Plan:  He took the Eliquis for 3 months.  He states at this time his insurance will no longer pay for it.  Currently he is asymptomatic.      3. Alcoholic cirrhosis of liver with ascites  Assessment & Plan:  Currently he is doing well.  He does have portal hypertension and thus has the furosemide and spironolactone.  His blood pressure is too low to add any beta-blockers.                 Orders Placed Today:     No orders of the defined types were placed in this encounter.       Management Plan:     An After Visit Summary was printed and given to the patient at discharge.    Follow-up: Return in about 6 months (around 1/5/2025) for Recheck.    Callum Peterson,  7/5/2024 10:33 EDT  This note was electronically signed.  Answers  submitted by the patient for this visit:  Office Visit on 7/5/2024 10:15 AM with Callum Howard (Submitted on 6/28/2024)  Please describe your symptoms.: Follow up  Have you had these symptoms before?: No  How long have you been having these symptoms?: Greater than 2 weeks

## 2024-07-05 NOTE — ASSESSMENT & PLAN NOTE
Currently he is doing well.  He does have portal hypertension and thus has the furosemide and spironolactone.  His blood pressure is too low to add any beta-blockers.

## 2024-07-05 NOTE — ASSESSMENT & PLAN NOTE
His blood pressure is low due to his diuretics for his portal hypertension.  He is relatively asymptomatic

## 2024-07-05 NOTE — ASSESSMENT & PLAN NOTE
He took the Eliquis for 3 months.  He states at this time his insurance will no longer pay for it.  Currently he is asymptomatic.

## 2024-07-07 LAB
MYCOBACTERIUM SPEC CULT: NORMAL
NIGHT BLUE STAIN TISS: NORMAL

## 2024-07-09 ENCOUNTER — HOSPITAL ENCOUNTER (OUTPATIENT)
Dept: CT IMAGING | Facility: HOSPITAL | Age: 39
Discharge: HOME | End: 2024-07-09
Admitting: INTERNAL MEDICINE
Payer: COMMERCIAL

## 2024-07-09 DIAGNOSIS — K74.60 HEPATIC CIRRHOSIS, UNSPECIFIED HEPATIC CIRRHOSIS TYPE, UNSPECIFIED WHETHER ASCITES PRESENT: ICD-10-CM

## 2024-07-09 PROCEDURE — 25510000001 IOPAMIDOL PER 1 ML: Performed by: INTERNAL MEDICINE

## 2024-07-09 PROCEDURE — 74178 CT ABD&PLV WO CNTR FLWD CNTR: CPT

## 2024-07-09 RX ADMIN — IOPAMIDOL 90 ML: 755 INJECTION, SOLUTION INTRAVENOUS at 18:18

## 2024-07-22 ENCOUNTER — TELEPHONE (OUTPATIENT)
Dept: GASTROENTEROLOGY | Facility: CLINIC | Age: 39
End: 2024-07-22
Payer: COMMERCIAL

## 2024-07-22 ENCOUNTER — TELEPHONE (OUTPATIENT)
Dept: SURGERY | Facility: CLINIC | Age: 39
End: 2024-07-22
Payer: COMMERCIAL

## 2024-07-22 NOTE — TELEPHONE ENCOUNTER
----- Message from Shyla Yarbrough sent at 7/16/2024 11:39 AM EDT -----  I have reviewed the patient's most recent CT that was ordered by hepatology.  Stable cirrhosis noted with partial thrombus and portal vein, patient is still on Eliquis.  Small to moderate volume ascites present.  There is moderate right pleural effusion in the lower lung airspace.  Please ensure patient has followed up with hepatology for any further recommendations  ----- Message -----  From: Any Wyatt MA  Sent: 7/12/2024  11:22 AM EDT  To: Shyla Yarbrough, ANA LAURA    Completed 7/9/24   Tranexamic Acid Counseling:  Patient advised of the small risk of bleeding problems with tranexamic acid. They were also instructed to call if they developed any nausea, vomiting or diarrhea. All of the patient's questions and concerns were addressed.

## 2024-07-22 NOTE — TELEPHONE ENCOUNTER
Hub staff attempted to follow warm transfer process and was unsuccessful     Caller: Wai Gay    Relationship to patient: Self    Best call back number: [phone]    Patient is needing: PT CALLING BACK

## 2024-07-24 NOTE — TELEPHONE ENCOUNTER
S/w patient, aware of results  Patient reports he has been out of eliquis for about a month, looking at chart patient should refills at pharmacy, does patient need to restart?  He also reports he has not follow up with hepatology since his CT but does have a follow up 08/02/2024

## 2024-07-25 NOTE — TELEPHONE ENCOUNTER
30 day supply of eliquis sent to pharmacy due to thrombus in portal vein. Please advise patient to discuss with hepatology at next office visit if they wish for patient to continue taking this medication.    Pediatric Sick Visit        Subjective   Patient ID: Theodora is a 17 year old 2005 female   Accompanied by:Pediatric Historian: mother    Chief Complaint   Patient presents with   • Office Visit     ST, ear pain, congestion       Visit Vitals  Pulse 98   Temp 98 °F (36.7 °C) (Temporal)   Wt 67.9 kg (149 lb 12.8 oz)   SpO2 97%        HISTORY:    Current Medications    METHIMAZOLE (TAPAZOLE) 5 MG TABLET          Allergies: No Known Allergies     Since 11/15, has had sore throat, nasal congestion, generalized achiness, and intermittent HA's.  Unsure if has had intermittent low grade temperatures or not (has felt warm at times).    Review of Systems   HENT: Negative for ear discharge, ear pain and mouth sores.    Eyes: Negative for discharge and redness.   Respiratory: Negative for cough, shortness of breath, wheezing and stridor.    Gastrointestinal: Negative for diarrhea and vomiting.       Objective   Vitals:Pulse 98   Temp 98 °F (36.7 °C) (Temporal)   Wt 67.9 kg (149 lb 12.8 oz)   Physical Exam  Vitals reviewed. Exam conducted with a chaperone present.   Constitutional:       General: She is not in acute distress.     Appearance: Normal appearance. She is not ill-appearing or toxic-appearing.   HENT:      Right Ear: Tympanic membrane normal.      Left Ear: Tympanic membrane normal.      Mouth/Throat:      Mouth: Mucous membranes are moist.      Pharynx: No oropharyngeal exudate.      Comments: No oral lesions  ?slight pharyngeal erythema     Neck: Neck supple.   Eyes:      General:         Right eye: No discharge.         Left eye: No discharge.      Conjunctiva/sclera: Conjunctivae normal.   Neck:      Comments: Minimal/normal anterior cervical adenopathy  Cardiovascular:      Rate and Rhythm: Normal rate and regular rhythm.      Heart sounds: Normal heart sounds.   Pulmonary:      Effort: Pulmonary effort is normal. No respiratory distress.      Breath sounds: Normal breath sounds. No stridor. No wheezing,  rhonchi or rales.   Abdominal:      General: Abdomen is flat. There is no distension.      Palpations: Abdomen is soft. There is no mass.      Tenderness: There is no abdominal tenderness. There is no guarding.      Hernia: No hernia is present.      Comments: No hepatosplenomegaly   Neurological:      Mental Status: She is alert.         ASSESSMENT:  1. Viral URI    2. Pharyngitis, unspecified etiology    3. Myalgia      Most likely all secondary to a viral syndrome    PLAN:    Supportive  Check throat culture      ORDERS:  Orders Placed This Encounter   • POCT Rapid strep A   • POCT Flu, Influenza, Rapid A/B   • STREPTOCOCCUS GROUP A (STREPTOCOCCUS PYOGENES), BACTERIAL CULTURE       Results for orders placed or performed in visit on 11/18/22   POCT RAPID STREP A   Result Value Ref Range    GRP A STREP Negative Negative    Internal Procedural Controls Acceptable Yes    POCT INFLUENZA A/B   Result Value Ref Range    Rapid Influenza A Ag Negative Negative, Indeterminate    Rapid Influenza B Ag Negative Negative, Indeterminate    Internal Procedural Controls Acceptable Yes    STREPTOCOCCUS GROUP A (STREPTOCOCCUS PYOGENES), BACTERIAL CULTURE    Specimen: Throat; Swab   Result Value Ref Range    CULTURE, STREPTOCOCCUS GROUP A (STREPTOCOCCUS PYOGENES) Rare Streptococcus, beta hemolytic Not Group A (A)         Jatinder France MD     The patient and parent indicated understanding of the diagnosis and agreed with the plan of care. All questions answered.

## 2024-07-29 ENCOUNTER — HOSPITAL ENCOUNTER (OUTPATIENT)
Dept: RESPIRATORY THERAPY | Facility: HOSPITAL | Age: 39
Discharge: HOME | End: 2024-07-29
Payer: COMMERCIAL

## 2024-07-29 ENCOUNTER — HOSPITAL ENCOUNTER (OUTPATIENT)
Dept: GENERAL RADIOLOGY | Facility: HOSPITAL | Age: 39
Discharge: HOME | End: 2024-07-29
Payer: COMMERCIAL

## 2024-07-29 ENCOUNTER — OFFICE VISIT (OUTPATIENT)
Dept: PULMONOLOGY | Facility: CLINIC | Age: 39
End: 2024-07-29
Payer: COMMERCIAL

## 2024-07-29 VITALS
WEIGHT: 160.2 LBS | DIASTOLIC BLOOD PRESSURE: 75 MMHG | HEART RATE: 82 BPM | HEIGHT: 68 IN | SYSTOLIC BLOOD PRESSURE: 116 MMHG | BODY MASS INDEX: 24.28 KG/M2 | OXYGEN SATURATION: 98 % | TEMPERATURE: 97.8 F | RESPIRATION RATE: 16 BRPM

## 2024-07-29 DIAGNOSIS — J90 PLEURAL EFFUSION: ICD-10-CM

## 2024-07-29 DIAGNOSIS — J41.8 MIXED SIMPLE AND MUCOPURULENT CHRONIC BRONCHITIS: Primary | ICD-10-CM

## 2024-07-29 DIAGNOSIS — Z86.711 PERSONAL HISTORY OF PE (PULMONARY EMBOLISM): ICD-10-CM

## 2024-07-29 DIAGNOSIS — K70.31 ALCOHOLIC CIRRHOSIS OF LIVER WITH ASCITES: ICD-10-CM

## 2024-07-29 DIAGNOSIS — Z72.0 TOBACCO ABUSE: ICD-10-CM

## 2024-07-29 DIAGNOSIS — J96.01 ACUTE HYPOXIC RESPIRATORY FAILURE: ICD-10-CM

## 2024-07-29 DIAGNOSIS — J41.8 MIXED SIMPLE AND MUCOPURULENT CHRONIC BRONCHITIS: ICD-10-CM

## 2024-07-29 PROCEDURE — 99214 OFFICE O/P EST MOD 30 MIN: CPT | Performed by: INTERNAL MEDICINE

## 2024-07-29 PROCEDURE — 94726 PLETHYSMOGRAPHY LUNG VOLUMES: CPT

## 2024-07-29 PROCEDURE — 94060 EVALUATION OF WHEEZING: CPT

## 2024-07-29 PROCEDURE — 71046 X-RAY EXAM CHEST 2 VIEWS: CPT

## 2024-07-29 PROCEDURE — 94729 DIFFUSING CAPACITY: CPT

## 2024-07-29 PROCEDURE — 94618 PULMONARY STRESS TESTING: CPT

## 2024-07-29 RX ORDER — ALBUTEROL SULFATE 90 UG/1
2 AEROSOL, METERED RESPIRATORY (INHALATION) EVERY 4 HOURS PRN
Qty: 1 G | Refills: 3 | Status: SHIPPED | OUTPATIENT
Start: 2024-07-29

## 2024-07-29 RX ORDER — ALBUTEROL SULFATE 2.5 MG/3ML
2.5 SOLUTION RESPIRATORY (INHALATION) ONCE
Status: COMPLETED | OUTPATIENT
Start: 2024-07-29 | End: 2024-07-29

## 2024-07-29 RX ADMIN — ALBUTEROL SULFATE 2.5 MG: 2.5 SOLUTION RESPIRATORY (INHALATION) at 08:53

## 2024-07-29 NOTE — PROGRESS NOTES
Pulmonary Office Follow-up    Subjective     Wai Gay is seen today at the office for   Chief Complaint   Patient presents with    Follow-up     1 MONTH    TOBACCO USE     ACUTE HYPOXIA WITH RESPIRATORY FAILURE    Cough         HPI  Wai Gay is a 38 y.o. male with a PMH significant for liver cirrhosis and right pleural effusion s/p thoracentesis presents for follow-up patient denies any shortness of breath or wheezing but does have cough with mucoid expectoration off-and-on he continues to smoke patient has had PFTs done      Tobacco use history:        Patient Active Problem List   Diagnosis    Acute liver failure without hepatic coma    Acute hepatitis    Hypokalemia    Hyponatremia    Steatohepatitis, alcoholic    History of alcohol abuse    Tobacco abuse    Generalized abdominal pain    Need for influenza vaccination    Essential hypertension    Alcoholic cirrhosis of liver with ascites    Cirrhosis of liver with ascites    Need for hepatitis A and B vaccination    Need for vaccination against Streptococcus pneumoniae    Need for meningococcal vaccination    Need for shingles vaccine    Last's esophagus without dysplasia    Umbilical hernia without obstruction and without gangrene    Acute hypoxic respiratory failure    Personal history of PE (pulmonary embolism)    Pleural effusion    Dyspnea       Review of Systems  Review of Systems   Respiratory:  Positive for cough.    All other systems reviewed and are negative.    As described in the HPI. Otherwise, remainder of ROS (14 systems) were negative.    Medications, Allergies, Social, and Family Histories reviewed as per EMR.    Result Review :            Objective     Vitals:    07/29/24 1111   BP: 116/75   Pulse: 82   Resp: 16   Temp: 97.8 °F (36.6 °C)   SpO2: 98%         07/29/24  1111   Weight: 72.7 kg (160 lb 3.2 oz)       Physical Exam  Vitals and nursing note reviewed.   Constitutional:       Appearance: Normal appearance.    HENT:      Head: Normocephalic and atraumatic.      Nose: Nose normal.      Mouth/Throat:      Mouth: Mucous membranes are moist.      Pharynx: Oropharynx is clear.   Eyes:      Extraocular Movements: Extraocular movements intact.      Conjunctiva/sclera: Conjunctivae normal.      Pupils: Pupils are equal, round, and reactive to light.   Cardiovascular:      Rate and Rhythm: Normal rate and regular rhythm.      Pulses: Normal pulses.      Heart sounds: Normal heart sounds.   Pulmonary:      Comments: Breath sounds diminished right lung base  Abdominal:      General: Abdomen is flat. Bowel sounds are normal.      Palpations: Abdomen is soft.   Musculoskeletal:         General: Normal range of motion.      Cervical back: Normal range of motion and neck supple.   Skin:     General: Skin is warm.      Capillary Refill: Capillary refill takes 2 to 3 seconds.   Neurological:      General: No focal deficit present.      Mental Status: He is alert and oriented to person, place, and time.   Psychiatric:         Mood and Affect: Mood normal.         Behavior: Behavior normal.         CT Abdomen Pelvis With & Without Contrast    Result Date: 7/11/2024  Impression: 1.New moderate right pleural effusion with lower lung airspace disease, likely compressive atelectasis. 2.Similar cirrhotic morphology of the liver with partial portal vein thrombosis. 3.Small to moderate volume ascites currently. 4.Other relatively stable chronic findings. Electronically Signed: Elie Washburn MD  7/11/2024 9:21 AM EDT  Workstation ID: [id number]    US Abdomen Limited    Result Date: 5/28/2024  Impression: 1.  Small amount of right upper quadrant ascites inadequate for safe paracentesis at this time.  Electronically Signed By-AHSAN GERMAIN MD On:5/28/2024 8:50 AM      XR Chest 1 View    Result Date: 5/26/2024  IMPRESSION : Interval thoracentesis with decrease in pleural and parenchymal changes on the right with persistent consolidation and  blunting of the costophrenic angle noted. No immediate post procedure pneumothorax identified [  Electronically Signed ByCleveland Barrett On:5/26/2024 10:53 AM      CT Chest With Contrast Diagnostic    Result Date: 5/25/2024  Impression: 1.  No evidence of pulmonary embolus. 2.  Large right and trace left pleural effusions. 3.  Right basilar atelectasis. 4.  Hepatic cirrhosis with ascites within visualized upper abdomen.        Electronically Signed ByAlcon Porter MD On:5/25/2024 5:27 PM      XR Chest 1 View    Result Date: 5/25/2024  1.  Large right pleural effusion. 2.  Right basilar opacity, which could reflect atelectasis or pneumonia.   Electronically Signed ByAlcon Porter MD On:5/25/2024 3:31 PM      US Paracentesis    Result Date: 5/22/2024  Successful ultrasound guided diagnostic and therapeutic paracentesis was performed.  Electronically Signed ByERON GERMAIN MD On:5/22/2024 3:43 PM      US Paracentesis    Result Date: 5/15/2024  Impression: Ultrasound-guided paracentesis was performed without complication, patient tolerated procedure with 2.4 L of clear, pale yellow ascitic fluid removed. A sample specimen of 125 mL sent to the lab for further diagnostic evaluation. The patient was instructed to obtain follow up care from the referring physician.   AMERICO Strickland PA-C   Electronically Signed ByERON GERMAIN MD On:5/15/2024 12:41 PM      US Paracentesis    Result Date: 5/7/2024  Impression: Ultrasound-guided paracentesis was performed without complication, patient tolerated procedure with 3.6 L of clear, yellow ascitic fluid removed. A sample specimen of 80 mL was sent to the lab for further diagnostic evaluation. The patient was instructed to obtain follow up care from the referring physician.   AMERICO Strickland PA-C  Electronically Signed ByERON GERMAIN MD On:5/7/2024 4:46 PM        Assessment & Plan     Diagnoses and all orders for this visit:    1. Mixed simple and  mucopurulent chronic bronchitis (Primary)  -     XR Chest 2 View; Future    2. Pleural effusion  -     XR Chest 2 View; Future    3. Alcoholic cirrhosis of liver with ascites  -     XR Chest 2 View; Future    Other orders  -     albuterol sulfate HFA (Ventolin HFA) 108 (90 Base) MCG/ACT inhaler; Inhale 2 puffs Every 4 (Four) Hours As Needed for Wheezing or Shortness of Air.  Dispense: 1 g; Refill: 3  -     Umeclidinium-Vilanterol (ANORO ELLIPTA) 62.5-25 MCG/ACT aerosol powder  inhaler; Inhale 1 puff Daily.  Dispense: 1 each; Refill: 11         Discussion/ Recommendations:   We will repeat chest x-ray for his pleural effusion  Advised to stop smoking  PFTs reviewed show airways obstruction  Will start him on Anoro daily  Albuterol inhaler 2 puffs every 6 hours as needed  Vaccinations discussed and recommended    BMI is within normal parameters. No other follow-up for BMI required.        Return in about 3 months (around 10/29/2024).          This document has been electronically signed by Davian Watt MD on July 29, 2024 11:20 EDT

## 2024-07-29 NOTE — PROGRESS NOTES
Exercise Oximetry    Patient Name:Wai Gay   MRN: [id number]   Date: 07/29/24             ROOM AIR BASELINE   SpO2% 98   Heart Rate 99   Blood Pressure 110/67     EXERCISE ON ROOM AIR SpO2% EXERCISE ON O2 @  LPM SpO2%   1 MINUTE 98 1 MINUTE    2 MINUTES 99 2 MINUTES    3 MINUTES 99 3 MINUTES    4 MINUTES 99 4 MINUTES    5 MINUTES 99 5 MINUTES    6 MINUTES 98 6 MINUTES               Distance Walked  1200' Distance Walked   Dyspnea (Olivier Scale)  Pre-0 Post-0 Dyspnea (Olivier Scale)   Fatigue (Olivier Scale)  1 Fatigue (Olivier Scale)   SpO2% Post Exercise  99 SpO2% Post Exercise   HR Post Exercise  97 HR Post Exercise   Time to Recovery  NA Time to Recovery     Comments:

## 2024-08-02 ENCOUNTER — OFFICE VISIT (OUTPATIENT)
Dept: SURGERY | Facility: CLINIC | Age: 39
End: 2024-08-02
Payer: COMMERCIAL

## 2024-08-02 ENCOUNTER — HOSPITAL ENCOUNTER (OUTPATIENT)
Dept: INFUSION THERAPY | Facility: HOSPITAL | Age: 39
Discharge: HOME | End: 2024-08-02
Attending: INTERNAL MEDICINE | Admitting: INTERNAL MEDICINE
Payer: COMMERCIAL

## 2024-08-02 ENCOUNTER — APPOINTMENT (OUTPATIENT)
Dept: GENERAL RADIOLOGY | Facility: HOSPITAL | Age: 39
End: 2024-08-02
Payer: COMMERCIAL

## 2024-08-02 VITALS — RESPIRATION RATE: 16 BRPM | BODY MASS INDEX: 24.1 KG/M2 | WEIGHT: 159 LBS | HEIGHT: 68 IN

## 2024-08-02 DIAGNOSIS — K42.9 UMBILICAL HERNIA WITHOUT OBSTRUCTION AND WITHOUT GANGRENE: ICD-10-CM

## 2024-08-02 DIAGNOSIS — K70.31 ALCOHOLIC CIRRHOSIS OF LIVER WITH ASCITES: Primary | ICD-10-CM

## 2024-08-02 DIAGNOSIS — J90 PLEURAL EFFUSION: ICD-10-CM

## 2024-08-02 DIAGNOSIS — J90 PLEURAL EFFUSION: Primary | ICD-10-CM

## 2024-08-02 PROCEDURE — 71045 X-RAY EXAM CHEST 1 VIEW: CPT

## 2024-08-02 PROCEDURE — 32555 ASPIRATE PLEURA W/ IMAGING: CPT | Performed by: INTERNAL MEDICINE

## 2024-08-02 NOTE — LETTER
August 2, 2024     Callum Peterson DO  [address] [place] [address]    Patient: Wai Gay   YOB: 1985   Date of Visit: 8/2/2024     Dear Callum Peterson DO:       Thank you for referring Wai Gay to me for evaluation. Below are the relevant portions of my assessment and plan of care.    If you have questions, please do not hesitate to call me. I look forward to following Wai along with you.         Sincerely,        David Spencer MD        CC: No Recipients    David Spencer MD  08/02/24 1040  Sign when Signing Visit  Chief Complaint  Hernia    Subjective         Wai Gay presents to [facility]ERY  History of Present Illness    Wai Gay is a 38 y.o. male  who presents today for a postoperative visit.     Patient is here for a follow-up for an umbilical hernia.  The patient has known alcoholic cirrhosis and had a CT scan earlier this month which revealed persistent moderate ascites.  That scan was also notable for anasarca and a moderate to large right pleural effusion.  He denies any significant short of breath currently.  He is not having a lot of abdominal pain.  His most recent coags show an INR of 1.4 and his platelet count was 168.    Past History:  Medical History: has a past medical history of Alcohol abuse (03/14/2017), Anxiety, Essential hypertension (12/27/2019), GERD (gastroesophageal reflux disease), Hypokalemia (03/14/2017), Hyponatremia (03/14/2017), Steatohepatitis, alcoholic (03/14/2017), Tobacco abuse (03/14/2017), and Umbilical hernia.   Surgical History: has a past surgical history that includes Cholecystectomy; Esophagogastroduodenoscopy (N/A, 02/09/2022); Upper gastrointestinal endoscopy; and Esophagogastroduodenoscopy (N/A, 02/10/2023).   Family History: family history includes Alcohol abuse in his mother; Arthritis in his mother; COPD in his mother; Cancer in his  paternal grandfather and paternal grandmother; Heart disease in his maternal grandfather and maternal grandmother; Hypertension in his maternal grandmother; Lung cancer in his maternal grandfather and maternal grandmother; Stroke in his maternal grandmother.   Social History: reports that he has been smoking cigarettes. He started smoking about 16 years ago. He has a 8.3 pack-year smoking history. He has been exposed to tobacco smoke. He has never used smokeless tobacco. He reports that he does not currently use alcohol after a past usage of about 3.0 standard drinks of alcohol per week. He reports that he does not currently use drugs.  Allergies: Patient has no known allergies.       Current Outpatient Medications:   •  albuterol sulfate HFA (Ventolin HFA) 108 (90 Base) MCG/ACT inhaler, Inhale 2 puffs Every 4 (Four) Hours As Needed for Wheezing or Shortness of Air., Disp: 1 g, Rfl: 3  •  apixaban (ELIQUIS) 2.5 MG tablet tablet, Take 1 tablet by mouth 2 (Two) Times a Day for 30 days., Disp: 60 tablet, Rfl: 0  •  pantoprazole (PROTONIX) 40 MG EC tablet, TAKE 1 TABLET BY MOUTH EVERY DAY, Disp: 90 tablet, Rfl: 1  •  spironolactone (ALDACTONE) 50 MG tablet, TAKE 1 TABLET BY MOUTH EVERY DAY, Disp: 90 tablet, Rfl: 1  •  Umeclidinium-Vilanterol (ANORO ELLIPTA) 62.5-25 MCG/ACT aerosol powder  inhaler, Inhale 1 puff Daily., Disp: 1 each, Rfl: 11  •  ursodiol (ACTIGALL) 300 MG capsule, TAKE 2 CAPSULES BY MOUTH 2 (TWO) TIMES A DAY WITH MEALS FOR 90 DAYS., Disp: , Rfl:   •  furosemide (Lasix) 20 MG tablet, Take 1 tablet by mouth 2 (Two) Times a Day for 30 days., Disp: 60 tablet, Rfl: 0  •  gabapentin (NEURONTIN) 100 MG capsule, Take 1 capsule by mouth Every Night for 3 days., Disp: 3 capsule, Rfl: 0  •  rOPINIRole (REQUIP) 0.25 MG tablet, Take 1 tablet by mouth 3 times a day for 30 days. Take 1 hour before bedtime., Disp: 90 tablet, Rfl: 0       Physical Exam  He has an umbilical hernia that probably contains some fluid  "today.  Objective    Vital Signs:   Resp 16   Ht 172.7 cm (67.99\")   Wt 72.1 kg (159 lb)   BMI 24.18 kg/m²              Assessment and Plan    Diagnoses and all orders for this visit:    1. Alcoholic cirrhosis of liver with ascites (Primary)    2. Umbilical hernia without obstruction and without gangrene    At this point I have told Wai that I do not think that we should proceed with any type of hernia repair.  He still has a moderate amount of ascites on his CT scan and he is still coagulopathic with an INR 1.4.  I have encouraged him to continue working with his hepatologist and if at some point his ascites is under better control we could consider a hernia repair at that time.      "

## 2024-08-02 NOTE — PROGRESS NOTES
Chief Complaint  Hernia    Subjective          Wai Gay presents to [facility] GENER[facility] SUR[facility]ERY  History of Present Illness    Wai Gay is a 38 y.o. male  who presents today for a postoperative visit.     Patient is here for a follow-up for an umbilical hernia.  The patient has known alcoholic cirrhosis and had a CT scan earlier this month which revealed persistent moderate ascites.  That scan was also notable for anasarca and a moderate to large right pleural effusion.  He denies any significant short of breath currently.  He is not having a lot of abdominal pain.  His most recent coags show an INR of 1.4 and his platelet count was 168.    Past History:  Medical History: has a past medical history of Alcohol abuse (03/14/2017), Anxiety, Essential hypertension (12/27/2019), GERD (gastroesophageal reflux disease), Hypokalemia (03/14/2017), Hyponatremia (03/14/2017), Steatohepatitis, alcoholic (03/14/2017), Tobacco abuse (03/14/2017), and Umbilical hernia.   Surgical History: has a past surgical history that includes Cholecystectomy; Esophagogastroduodenoscopy (N/A, 02/09/2022); Upper gastrointestinal endoscopy; and Esophagogastroduodenoscopy (N/A, 02/10/2023).   Family History: family history includes Alcohol abuse in his mother; Arthritis in his mother; COPD in his mother; Cancer in his paternal grandfather and paternal grandmother; Heart disease in his maternal grandfather and maternal grandmother; Hypertension in his maternal grandmother; Lung cancer in his maternal grandfather and maternal grandmother; Stroke in his maternal grandmother.   Social History: reports that he has been smoking cigarettes. He started smoking about 16 years ago. He has a 8.3 pack-year smoking history. He has been exposed to tobacco smoke. He has never used smokeless tobacco. He reports that he does not currently use alcohol after a past usage of about 3.0 standard drinks of alcohol per week. He  "reports that he does not currently use drugs.  Allergies: Patient has no known allergies.       Current Outpatient Medications:     albuterol sulfate HFA (Ventolin HFA) 108 (90 Base) MCG/ACT inhaler, Inhale 2 puffs Every 4 (Four) Hours As Needed for Wheezing or Shortness of Air., Disp: 1 g, Rfl: 3    apixaban (ELIQUIS) 2.5 MG tablet tablet, Take 1 tablet by mouth 2 (Two) Times a Day for 30 days., Disp: 60 tablet, Rfl: 0    pantoprazole (PROTONIX) 40 MG EC tablet, TAKE 1 TABLET BY MOUTH EVERY DAY, Disp: 90 tablet, Rfl: 1    spironolactone (ALDACTONE) 50 MG tablet, TAKE 1 TABLET BY MOUTH EVERY DAY, Disp: 90 tablet, Rfl: 1    Umeclidinium-Vilanterol (ANORO ELLIPTA) 62.5-25 MCG/ACT aerosol powder  inhaler, Inhale 1 puff Daily., Disp: 1 each, Rfl: 11    ursodiol (ACTIGALL) 300 MG capsule, TAKE 2 CAPSULES BY MOUTH 2 (TWO) TIMES A DAY WITH MEALS FOR 90 DAYS., Disp: , Rfl:     furosemide (Lasix) 20 MG tablet, Take 1 tablet by mouth 2 (Two) Times a Day for 30 days., Disp: 60 tablet, Rfl: 0    gabapentin (NEURONTIN) 100 MG capsule, Take 1 capsule by mouth Every Night for 3 days., Disp: 3 capsule, Rfl: 0    rOPINIRole (REQUIP) 0.25 MG tablet, Take 1 tablet by mouth 3 times a day for 30 days. Take 1 hour before bedtime., Disp: 90 tablet, Rfl: 0       Physical Exam  He has an umbilical hernia that probably contains some fluid today.  Objective     Vital Signs:   Resp 16   Ht 172.7 cm (67.99\")   Wt 72.1 kg (159 lb)   BMI 24.18 kg/m²              Assessment and Plan    Diagnoses and all orders for this visit:    1. Alcoholic cirrhosis of liver with ascites (Primary)    2. Umbilical hernia without obstruction and without gangrene    At this point I have told Wai that I do not think that we should proceed with any type of hernia repair.  He still has a moderate amount of ascites on his CT scan and he is still coagulopathic with an INR 1.4.  I have encouraged him to continue working with his hepatologist and if at some point his " ascites is under better control we could consider a hernia repair at that time.

## 2024-08-02 NOTE — PROCEDURES
Bedside Thoracentesis Without  Radiology    Date/Time: 8/2/2024 2:05 PM    Performed by: Jorge Montero DO  Authorized by: Jorge Montero DO  Consent: Verbal consent obtained. Written consent obtained.  Risks and benefits: risks, benefits and alternatives were discussed  Consent given by: patient  Patient understanding: patient states understanding of the procedure being performed  Patient consent: the patient's understanding of the procedure matches consent given  Procedure consent: procedure consent matches procedure scheduled  Relevant documents: relevant documents present and verified  Test results: test results available and properly labeled  Site marked: the operative site was marked  Imaging studies: imaging studies available  Patient identity confirmed: verbally with patient  Procedure purpose: diagnostic and therapeutic  Indications: pleural effusion  Preparation: Patient was prepped and draped in the usual sterile fashion.  Local anesthesia used: yes  Anesthesia: local infiltration    Anesthesia:  Local anesthesia used: yes  Local Anesthetic: lidocaine 1% without epinephrine    Sedation:  Patient sedated: no    Preparation: skin prepped with ChloraPrep and sterile field established  Patient position: supported by bedside stand  Ultrasound guidance: yes  Location: right posterior  Puncture method: over-the-needle catheter  Number of attempts: 1  Drainage amount: 2500 ml  Drainage characteristics: serous  Patient tolerance: patient tolerated the procedure well with no immediate complications  Chest x-ray performed: yes  Chest x-ray interpreted by me.

## 2024-08-14 ENCOUNTER — TELEPHONE (OUTPATIENT)
Dept: GASTROENTEROLOGY | Facility: CLINIC | Age: 39
End: 2024-08-14
Payer: COMMERCIAL

## 2024-08-14 DIAGNOSIS — K70.31 ALCOHOLIC CIRRHOSIS OF LIVER WITH ASCITES: Primary | ICD-10-CM

## 2024-08-14 RX ORDER — ALBUMIN (HUMAN) 12.5 G/50ML
25 SOLUTION INTRAVENOUS AS NEEDED
Status: CANCELLED | OUTPATIENT
Start: 2024-08-14

## 2024-08-14 NOTE — TELEPHONE ENCOUNTER
Patient called requesting a paracentesis   Patient feels distended, and some abdominal discomfort  Denies SOB  Okay to scheduled patient? No open orders in epic  Did advise patient if SX'S worsen he can follow up in ED  Voiced understanding

## 2024-08-16 ENCOUNTER — LAB (OUTPATIENT)
Dept: LAB | Facility: HOSPITAL | Age: 39
End: 2024-08-16
Payer: COMMERCIAL

## 2024-08-16 ENCOUNTER — TELEPHONE (OUTPATIENT)
Dept: GASTROENTEROLOGY | Facility: CLINIC | Age: 39
End: 2024-08-16
Payer: COMMERCIAL

## 2024-08-16 ENCOUNTER — HOSPITAL ENCOUNTER (OUTPATIENT)
Dept: INTERVENTIONAL RADIOLOGY/VASCULAR | Facility: HOSPITAL | Age: 39
Discharge: HOME | End: 2024-08-16
Payer: COMMERCIAL

## 2024-08-16 ENCOUNTER — HOSPITAL ENCOUNTER (INPATIENT)
Facility: HOSPITAL | Age: 39
LOS: 1 days | Discharge: HOME | DRG: 373 | End: 2024-08-17
Attending: EMERGENCY MEDICINE | Admitting: STUDENT IN AN ORGANIZED HEALTH CARE EDUCATION/TRAINING PROGRAM
Payer: COMMERCIAL

## 2024-08-16 VITALS
RESPIRATION RATE: 16 BRPM | HEART RATE: 90 BPM | DIASTOLIC BLOOD PRESSURE: 60 MMHG | SYSTOLIC BLOOD PRESSURE: 93 MMHG | OXYGEN SATURATION: 96 %

## 2024-08-16 DIAGNOSIS — K70.31 ALCOHOLIC CIRRHOSIS OF LIVER WITH ASCITES: Primary | ICD-10-CM

## 2024-08-16 DIAGNOSIS — K70.31 ALCOHOLIC CIRRHOSIS OF LIVER WITH ASCITES: ICD-10-CM

## 2024-08-16 DIAGNOSIS — K65.2 SBP (SPONTANEOUS BACTERIAL PERITONITIS): Primary | ICD-10-CM

## 2024-08-16 LAB
ALBUMIN SERPL-MCNC: 2.6 G/DL (ref 3.5–5.2)
ALBUMIN/GLOB SERPL: 0.7 G/DL
ALP SERPL-CCNC: 237 U/L (ref 39–117)
ALPHA1 GLOB MFR UR ELPH: 146 MG/DL (ref 90–200)
ALT SERPL W P-5'-P-CCNC: 14 U/L (ref 1–41)
ANION GAP SERPL CALCULATED.3IONS-SCNC: 8.7 MMOL/L (ref 5–15)
APPEARANCE FLD: ABNORMAL
APTT PPP: 36.7 SECONDS (ref 24.2–34.2)
AST SERPL-CCNC: 33 U/L (ref 1–40)
BASOPHILS # BLD AUTO: 0.02 10*3/MM3 (ref 0–0.2)
BASOPHILS # BLD AUTO: 0.04 10*3/MM3 (ref 0–0.2)
BASOPHILS NFR BLD AUTO: 0.4 % (ref 0–1.5)
BASOPHILS NFR BLD AUTO: 0.6 % (ref 0–1.5)
BILIRUB SERPL-MCNC: 1.8 MG/DL (ref 0–1.2)
BUN SERPL-MCNC: 7 MG/DL (ref 6–20)
BUN/CREAT SERPL: 8.1 (ref 7–25)
CALCIUM SPEC-SCNC: 7.5 MG/DL (ref 8.6–10.5)
CHLORIDE SERPL-SCNC: 100 MMOL/L (ref 98–107)
CO2 SERPL-SCNC: 23.3 MMOL/L (ref 22–29)
COLOR FLD: ABNORMAL
CREAT SERPL-MCNC: 0.86 MG/DL (ref 0.76–1.27)
D-LACTATE SERPL-SCNC: 1.9 MMOL/L (ref 0.5–2)
DEPRECATED RDW RBC AUTO: 55.8 FL (ref 37–54)
DEPRECATED RDW RBC AUTO: 57.3 FL (ref 37–54)
EGFRCR SERPLBLD CKD-EPI 2021: 115.1 ML/MIN/1.73
EOSINOPHIL # BLD AUTO: 0.51 10*3/MM3 (ref 0–0.4)
EOSINOPHIL # BLD AUTO: 0.61 10*3/MM3 (ref 0–0.4)
EOSINOPHIL NFR BLD AUTO: 10.2 % (ref 0.3–6.2)
EOSINOPHIL NFR BLD AUTO: 8.9 % (ref 0.3–6.2)
ERYTHROCYTE [DISTWIDTH] IN BLOOD BY AUTOMATED COUNT: 17.5 % (ref 12.3–15.4)
ERYTHROCYTE [DISTWIDTH] IN BLOOD BY AUTOMATED COUNT: 17.9 % (ref 12.3–15.4)
GLOBULIN UR ELPH-MCNC: 3.5 GM/DL
GLUCOSE SERPL-MCNC: 95 MG/DL (ref 65–99)
HCT VFR BLD AUTO: 30.5 % (ref 37.5–51)
HCT VFR BLD AUTO: 30.9 % (ref 37.5–51)
HGB BLD-MCNC: 10 G/DL (ref 13–17.7)
HGB BLD-MCNC: 9.6 G/DL (ref 13–17.7)
HOLD SPECIMEN: NORMAL
HOLD SPECIMEN: NORMAL
IMM GRANULOCYTES # BLD AUTO: 0.02 10*3/MM3 (ref 0–0.05)
IMM GRANULOCYTES # BLD AUTO: 0.03 10*3/MM3 (ref 0–0.05)
IMM GRANULOCYTES NFR BLD AUTO: 0.4 % (ref 0–0.5)
IMM GRANULOCYTES NFR BLD AUTO: 0.4 % (ref 0–0.5)
INR PPP: 1.67 (ref 0.86–1.15)
LYMPHOCYTES # BLD AUTO: 1.02 10*3/MM3 (ref 0.7–3.1)
LYMPHOCYTES # BLD AUTO: 1.62 10*3/MM3 (ref 0.7–3.1)
LYMPHOCYTES NFR BLD AUTO: 20.3 % (ref 19.6–45.3)
LYMPHOCYTES NFR BLD AUTO: 23.5 % (ref 19.6–45.3)
LYMPHOCYTES NFR FLD MANUAL: 6 %
MCH RBC QN AUTO: 28.1 PG (ref 26.6–33)
MCH RBC QN AUTO: 28.2 PG (ref 26.6–33)
MCHC RBC AUTO-ENTMCNC: 31.5 G/DL (ref 31.5–35.7)
MCHC RBC AUTO-ENTMCNC: 32.4 G/DL (ref 31.5–35.7)
MCV RBC AUTO: 87 FL (ref 79–97)
MCV RBC AUTO: 89.2 FL (ref 79–97)
MONOCYTES # BLD AUTO: 0.66 10*3/MM3 (ref 0.1–0.9)
MONOCYTES # BLD AUTO: 0.88 10*3/MM3 (ref 0.1–0.9)
MONOCYTES NFR BLD AUTO: 12.8 % (ref 5–12)
MONOCYTES NFR BLD AUTO: 13.1 % (ref 5–12)
MONOCYTES NFR FLD: 4 %
NEUTROPHILS NFR BLD AUTO: 2.79 10*3/MM3 (ref 1.7–7)
NEUTROPHILS NFR BLD AUTO: 3.71 10*3/MM3 (ref 1.7–7)
NEUTROPHILS NFR BLD AUTO: 53.8 % (ref 42.7–76)
NEUTROPHILS NFR BLD AUTO: 55.6 % (ref 42.7–76)
NEUTROPHILS NFR FLD MANUAL: 90 %
NRBC BLD AUTO-RTO: 0 /100 WBC (ref 0–0.2)
NRBC BLD AUTO-RTO: 0 /100 WBC (ref 0–0.2)
NUC CELL # FLD: 1168 /MM3
PLATELET # BLD AUTO: 103 10*3/MM3 (ref 140–450)
PLATELET # BLD AUTO: 107 10*3/MM3 (ref 140–450)
PLATELET # BLD AUTO: 107 10*3/MM3 (ref 140–450)
PMV BLD AUTO: 10.9 FL (ref 6–12)
PMV BLD AUTO: 9.1 FL (ref 6–12)
POTASSIUM SERPL-SCNC: 3.2 MMOL/L (ref 3.5–5.2)
PROT SERPL-MCNC: 6.1 G/DL (ref 6–8.5)
PROTHROMBIN TIME: 20 SECONDS (ref 11.8–14.9)
RBC # BLD AUTO: 3.42 10*6/MM3 (ref 4.14–5.8)
RBC # BLD AUTO: 3.55 10*6/MM3 (ref 4.14–5.8)
RBC # FLD AUTO: <2000 /MM3
SODIUM SERPL-SCNC: 132 MMOL/L (ref 136–145)
WBC NRBC COR # BLD AUTO: 5.02 10*3/MM3 (ref 3.4–10.8)
WBC NRBC COR # BLD AUTO: 6.89 10*3/MM3 (ref 3.4–10.8)
WHOLE BLOOD HOLD COAG: NORMAL
WHOLE BLOOD HOLD SPECIMEN: NORMAL

## 2024-08-16 PROCEDURE — 99285 EMERGENCY DEPT VISIT HI MDM: CPT

## 2024-08-16 PROCEDURE — 80053 COMPREHEN METABOLIC PANEL: CPT

## 2024-08-16 PROCEDURE — 82103 ALPHA-1-ANTITRYPSIN TOTAL: CPT | Performed by: NURSE PRACTITIONER

## 2024-08-16 PROCEDURE — 87040 BLOOD CULTURE FOR BACTERIA: CPT

## 2024-08-16 PROCEDURE — 25010000002 CEFTRIAXONE PER 250 MG: Performed by: EMERGENCY MEDICINE

## 2024-08-16 PROCEDURE — 85730 THROMBOPLASTIN TIME PARTIAL: CPT | Performed by: NURSE PRACTITIONER

## 2024-08-16 PROCEDURE — G0378 HOSPITAL OBSERVATION PER HR: HCPCS

## 2024-08-16 PROCEDURE — 87070 CULTURE OTHR SPECIMN AEROBIC: CPT | Performed by: NURSE PRACTITIONER

## 2024-08-16 PROCEDURE — 96365 THER/PROPH/DIAG IV INF INIT: CPT

## 2024-08-16 PROCEDURE — 87205 SMEAR GRAM STAIN: CPT | Performed by: NURSE PRACTITIONER

## 2024-08-16 PROCEDURE — 36415 COLL VENOUS BLD VENIPUNCTURE: CPT

## 2024-08-16 PROCEDURE — 89051 BODY FLUID CELL COUNT: CPT | Performed by: NURSE PRACTITIONER

## 2024-08-16 PROCEDURE — 87040 BLOOD CULTURE FOR BACTERIA: CPT | Performed by: EMERGENCY MEDICINE

## 2024-08-16 PROCEDURE — C1729 CATH, DRAINAGE: HCPCS

## 2024-08-16 PROCEDURE — 76942 ECHO GUIDE FOR BIOPSY: CPT

## 2024-08-16 PROCEDURE — 85049 AUTOMATED PLATELET COUNT: CPT | Performed by: NURSE PRACTITIONER

## 2024-08-16 PROCEDURE — 85610 PROTHROMBIN TIME: CPT | Performed by: NURSE PRACTITIONER

## 2024-08-16 PROCEDURE — 83605 ASSAY OF LACTIC ACID: CPT

## 2024-08-16 PROCEDURE — 85025 COMPLETE CBC W/AUTO DIFF WBC: CPT

## 2024-08-16 RX ORDER — ALBUMIN (HUMAN) 12.5 G/50ML
25 SOLUTION INTRAVENOUS AS NEEDED
Status: DISCONTINUED | OUTPATIENT
Start: 2024-08-16 | End: 2024-08-16

## 2024-08-16 RX ORDER — LIDOCAINE HYDROCHLORIDE 20 MG/ML
20 INJECTION, SOLUTION INFILTRATION; PERINEURAL ONCE
Status: COMPLETED | OUTPATIENT
Start: 2024-08-16 | End: 2024-08-16

## 2024-08-16 RX ADMIN — LIDOCAINE HYDROCHLORIDE 20 ML: 20 INJECTION, SOLUTION INFILTRATION; PERINEURAL at 08:10

## 2024-08-16 RX ADMIN — CEFTRIAXONE SODIUM 2000 MG: 2 INJECTION, POWDER, FOR SOLUTION INTRAMUSCULAR; INTRAVENOUS at 20:14

## 2024-08-16 RX ADMIN — SODIUM BICARBONATE 1 MEQ: 84 INJECTION, SOLUTION INTRAVENOUS at 08:10

## 2024-08-16 NOTE — TELEPHONE ENCOUNTER
S/w patient   Aware of results  Patient is having right side abdominal pain  I advised patient to report to ED now.  Patient voiced understanding  Will call with any questions or concerns

## 2024-08-16 NOTE — TELEPHONE ENCOUNTER
----- Message from Shyla Yarbrough sent at 8/16/2024 12:33 PM EDT -----  I have reviewed the patient's most recent paracentesis which shows significant increase in nucleated cells concerning for peritonitis. CBC also shows a decrease in hemoglobin. Recommend ED for evaluation and work up for possible IV antibiotic treatment.

## 2024-08-16 NOTE — ED TRIAGE NOTES
Pt to ed from home. Pcp stated that pt had infection in fluid from paracentesis. Hx liver cirrhosis. Denies any new pain.

## 2024-08-16 NOTE — ED PROVIDER NOTES
Time: 5:31 PM EDT  Date of encounter:  8/16/2024  Independent Historian/Clinical History and Information was obtained by:   Patient    History is limited by: N/A    Chief Complaint: Infection and abdominal fluid      History of Present Illness:  Patient is a 38 y.o. year old male who presents to the emergency department for evaluation of possible infection of his abdominal fluid.  Patient has a history of alcoholic cirrhosis.  Patient had an outpatient paracentesis done today.  Patient received a phone call from his GI specialist this afternoon stating that the cell counts from his paracentesis fluid is concerning for an infection and that he needed to go to the ER to start on IV antibiotics.  Patient does admit to some moderate abdominal discomfort but states it was not enough to make him seek medical attention.  Patient denies any fevers.  He denies any vomiting or diarrhea.  Patient does report having some nausea.  Patient reports this is a first paracentesis in a couple months.        Patient Care Team  Primary Care Provider: Callum Peterson DO    Past Medical History:     No Known Allergies  Past Medical History:   Diagnosis Date    Alcohol abuse 03/14/2017    Anxiety     Essential hypertension 12/27/2019    GERD (gastroesophageal reflux disease)     Hypokalemia 03/14/2017    Will update    Hyponatremia 03/14/2017    Steatohepatitis, alcoholic 03/14/2017    Tobacco abuse 03/14/2017    Umbilical hernia      Past Surgical History:   Procedure Laterality Date    CHOLECYSTECTOMY      ENDOSCOPY N/A 02/09/2022    Procedure: ESOPHAGOGASTRODUODENOSCOPY WITH BX;  Surgeon: Gino Khan MD;  Location: [facility] ENDOSCOPY;  Service: Gastroenterology;  Laterality: N/A;  RETAINED LIQUID FOOD IN STOMACH, HUFFMAN'S ESOPHAGUS    ENDOSCOPY N/A 02/10/2023    Procedure: ESOPHAGOGASTRODUODENOSCOPY;  Surgeon: Gino Khan MD;  Location: [facility] ENDOSCOPY;  Service: Gastroenterology;  Laterality: N/A;   GASTRITIS, BARRETTS ESOPHAGUS, PHG    UPPER GASTROINTESTINAL ENDOSCOPY       Family History   Problem Relation Age of Onset    Alcohol abuse Mother     Arthritis Mother     COPD Mother     Heart disease Maternal Grandmother     Hypertension Maternal Grandmother     Lung cancer Maternal Grandmother     Stroke Maternal Grandmother     Heart disease Maternal Grandfather     Lung cancer Maternal Grandfather     Cancer Paternal Grandmother     Cancer Paternal Grandfather     Colon cancer Neg Hx     Malig Hyperthermia Neg Hx        Home Medications:  Prior to Admission medications    Medication Sig Start Date End Date Taking? Authorizing Provider   albuterol sulfate HFA (Ventolin HFA) 108 (90 Base) MCG/ACT inhaler Inhale 2 puffs Every 4 (Four) Hours As Needed for Wheezing or Shortness of Air. 7/29/24   Davian Watt MD   apixaban (ELIQUIS) 2.5 MG tablet tablet Take 1 tablet by mouth 2 (Two) Times a Day for 30 days. 7/25/24 8/24/24  Shyla Yarbrough APRN   furosemide (Lasix) 20 MG tablet Take 1 tablet by mouth 2 (Two) Times a Day for 30 days. 5/29/24 6/28/24  Francis Pacheco MD   gabapentin (NEURONTIN) 100 MG capsule Take 1 capsule by mouth Every Night for 3 days. 5/29/24 6/10/24  Francis Pacheco MD   pantoprazole (PROTONIX) 40 MG EC tablet TAKE 1 TABLET BY MOUTH EVERY DAY 6/5/24   Shyla Yarbrough APRN   rOPINIRole (REQUIP) 0.25 MG tablet Take 1 tablet by mouth 3 times a day for 30 days. Take 1 hour before bedtime. 5/29/24 6/28/24  Francis Pacheco MD   spironolactone (ALDACTONE) 50 MG tablet TAKE 1 TABLET BY MOUTH EVERY DAY 6/5/24   Shyla Yarbrough APRN   Umeclidinium-Vilanterol (ANORO ELLIPTA) 62.5-25 MCG/ACT aerosol powder  inhaler Inhale 1 puff Daily. 7/29/24   Davian Watt MD   ursodiol (ACTIGALL) 300 MG capsule TAKE 2 CAPSULES BY MOUTH 2 (TWO) TIMES A DAY WITH MEALS FOR 90 DAYS. 6/8/24   Provider, MD Rose        Social History:   Social History     Tobacco Use    Smoking status: Every Day      "Current packs/day: 0.50     Average packs/day: 0.5 packs/day for 16.6 years (8.3 ttl pk-yrs)     Types: Cigarettes     Start date: 1/1/2008     Passive exposure: Current    Smokeless tobacco: Never   Vaping Use    Vaping status: Never Used   Substance Use Topics    Alcohol use: Not Currently     Alcohol/week: 3.0 standard drinks of alcohol     Comment: FORMER    Drug use: Not Currently         Review of Systems:  Review of Systems   Constitutional:  Negative for chills and fever.   HENT:  Negative for congestion, ear pain and sore throat.    Eyes:  Negative for pain.   Respiratory:  Negative for cough, chest tightness and shortness of breath.    Cardiovascular:  Negative for chest pain.   Gastrointestinal:  Positive for nausea. Negative for abdominal pain (Mild), diarrhea and vomiting.   Genitourinary:  Negative for flank pain and hematuria.   Musculoskeletal:  Negative for joint swelling.   Skin:  Negative for pallor.   Neurological:  Negative for seizures and headaches.   All other systems reviewed and are negative.       Physical Exam:  BP 95/71 (BP Location: Left arm, Patient Position: Lying)   Pulse 91   Temp 98.6 °F (37 °C) (Oral)   Resp 16   Ht 172.7 cm (68\")   Wt 73.3 kg (161 lb 9.6 oz)   SpO2 96%   BMI 24.57 kg/m²   Vital signs were reviewed under triage note.  General appearance - Patient appears well-developed and well-nourished.  Patient is in no acute distress.  Head - Normocephalic, atraumatic.  Pupils - Equal, round, reactive to light.  Extraocular muscles are intact.  Conjunctiva is clear.  Nasal - Normal inspection.  No evidence of trauma or epistaxis.  Tympanic membranes - Gray, intact without erythema or retractions.  Oral mucosa - Pink and moist without lesions or erythema.  Uvula is midline.  Chest wall - Atraumatic.  Chest wall is nontender.  There are no vesicular rashes noted.  Neck - Supple.  Trachea was midline.  There is no palpable lymphadenopathy or thyromegaly.  There are no " meningeal signs  Lungs - Clear to auscultation and percussion bilaterally.  Heart - Regular rate and rhythm without any murmurs, clicks, or gallops.  Abdomen - Soft.  Bowel sounds are present.  There is mild diffuse palpable tenderness.  There is no rebound, guarding, or rigidity.  There is palpable umbilical hernia that is soft and easily reducible..  There are no pulsatile masses.  Back - Spine is straight and midline.  There is no CVA tenderness.  Extremities - Intact x4 with full range of motion.  There is no palpable edema.  Pulses are intact x4 and equal.  Neurologic - Patient is awake, alert, and oriented x3.  Cranial nerves II through XII are grossly intact.  Motor and sensory functions grossly intact.  Cerebellar function was normal.  Integument - There are no rashes.  There are no petechia or purpura lesions noted.  There are no vesicular lesions noted.            Procedures:  Procedures      Medical Decision Making:      Comorbidities that affect care:    Alcoholic cirrhosis, patient reports 3-year sobriety, tobacco use disorder, anxiety    External Notes reviewed:    Telephone Encounter: Telephone encounter from earlier today involving Adrianne Del Rosario, medical assistant and ANA LAURA Munoz for gastroenterology was reviewed by me.      The following orders were placed and all results were independently analyzed by me:  Orders Placed This Encounter   Procedures    Blood Culture - Blood,    Blood Culture - Blood,    Comprehensive Metabolic Panel    [place] Draw    Lactic Acid, Plasma    CBC Auto Differential    Diet: Regular/House; Fluid Consistency: Thin (IDDSI 0)    Vital Signs    Intake & Output    Weigh Patient    Oral Care    Saline Lock & Maintain IV Access    Place Sequential Compression Device    Maintain Sequential Compression Device    Code Status and Medical Interventions: CPR (Attempt to Resuscitate); Full Support    Hospitalist (on-call MD unless specified)    Insert Peripheral IV     Initiate Observation Status    CBC & Differential    Green Top (Gel)    Lavender Top    Gold Top - SST    Light Blue Top       Medications Given in the Emergency Department:  Medications   sodium chloride 0.9 % flush 10 mL (10 mL Intravenous Given 8/17/24 0858)   sodium chloride 0.9 % flush 10 mL (has no administration in time range)   sodium chloride 0.9 % infusion 40 mL (has no administration in time range)   sennosides-docusate (PERICOLACE) 8.6-50 MG per tablet 2 tablet (has no administration in time range)     And   polyethylene glycol (MIRALAX) packet 17 g (has no administration in time range)     And   bisacodyl (DULCOLAX) EC tablet 5 mg (has no administration in time range)     And   bisacodyl (DULCOLAX) suppository 10 mg (has no administration in time range)   ondansetron (ZOFRAN) injection 4 mg (has no administration in time range)   cefTRIAXone (ROCEPHIN) 2,000 mg in sodium chloride 0.9 % 100 mL IVPB-VTB (has no administration in time range)   ipratropium-albuterol (DUO-NEB) nebulizer solution 3 mL (has no administration in time range)   pantoprazole (PROTONIX) EC tablet 40 mg (40 mg Oral Given 8/17/24 0858)   spironolactone (ALDACTONE) tablet 50 mg (50 mg Oral Given 8/17/24 0858)   cefTRIAXone (ROCEPHIN) 2,000 mg in sodium chloride 0.9 % 100 mL IVPB-VTB (0 mg Intravenous Stopped 8/16/24 2046)        ED Course:    ED Course as of 08/17/24 1207   Fri Aug 16, 2024   0913 --- PROVIDER IN TRIAGE NOTE ---    The patient was evaluated by José doe in triage. Orders were placed and the patient is currently awaiting disposition.    [AJ]      ED Course User Index  [AJ] José Best PA-C       The patient was seen and evaluated in the ED by me.  The above history and physical examination was performed as documented.  Diagnostic data was obtained.  Results reviewed.  Findings were discussed with the patient.  Patient's paracentesis results were reviewed.  Findings are concerning for development of  spontaneous bacterial peritonitis.  Patient was started on IV Rocephin.  Hospital service was consulted for further evaluation and monitoring.    Labs:    Lab Results (last 24 hours)       Procedure Component Value Units Date/Time    CBC & Differential [[id number]]  (Abnormal) Collected: 08/16/24 1624    Specimen: Blood from Arm, Right Updated: 08/16/24 1635    Narrative:      The following orders were created for panel order CBC & Differential.  Procedure                               Abnormality         Status                     ---------                               -----------         ------                     CBC Auto Differential[[id number]]        Abnormal            Final result                 Please view results for these tests on the individual orders.    Comprehensive Metabolic Panel [[id number]]  (Abnormal) Collected: 08/16/24 1624    Specimen: Blood from Arm, Right Updated: 08/16/24 1654     Glucose 95 mg/dL      BUN 7 mg/dL      Creatinine 0.86 mg/dL      Sodium 132 mmol/L      Potassium 3.2 mmol/L      Chloride 100 mmol/L      CO2 23.3 mmol/L      Calcium 7.5 mg/dL      Total Protein 6.1 g/dL      Albumin 2.6 g/dL      ALT (SGPT) 14 U/L      AST (SGOT) 33 U/L      Alkaline Phosphatase 237 U/L      Total Bilirubin 1.8 mg/dL      Globulin 3.5 gm/dL      A/G Ratio 0.7 g/dL      BUN/Creatinine Ratio 8.1     Anion Gap 8.7 mmol/L      eGFR 115.1 mL/min/1.73     Narrative:      GFR Normal >60  Chronic Kidney Disease <60  Kidney Failure <15      Lactic Acid, Plasma [[id number]]  (Normal) Collected: 08/16/24 1624    Specimen: Blood from Arm, Right Updated: 08/16/24 1654     Lactate 1.9 mmol/L     CBC Auto Differential [[id number]]  (Abnormal) Collected: 08/16/24 1624    Specimen: Blood from Arm, Right Updated: 08/16/24 1635     WBC 5.02 10*3/mm3      RBC 3.55 10*6/mm3      Hemoglobin 10.0 g/dL      Hematocrit 30.9 %      MCV 87.0 fL      MCH 28.2 pg      MCHC 32.4 g/dL      RDW 17.5 %      RDW-SD 55.8 fl       MPV 9.1 fL      Platelets 103 10*3/mm3      Neutrophil % 55.6 %      Lymphocyte % 20.3 %      Monocyte % 13.1 %      Eosinophil % 10.2 %      Basophil % 0.4 %      Immature Grans % 0.4 %      Neutrophils, Absolute 2.79 10*3/mm3      Lymphocytes, Absolute 1.02 10*3/mm3      Monocytes, Absolute 0.66 10*3/mm3      Eosinophils, Absolute 0.51 10*3/mm3      Basophils, Absolute 0.02 10*3/mm3      Immature Grans, Absolute 0.02 10*3/mm3      nRBC 0.0 /100 WBC     Blood Culture - Blood, Arm, Right [[id number]] Collected: 08/16/24 1624    Specimen: Blood from Arm, Right Updated: 08/16/24 1628    Blood Culture - Blood, Arm, Left [[id number]] Collected: 08/16/24 1843    Specimen: Blood from Arm, Left Updated: 08/16/24 1848             Imaging:    No Radiology Exams Resulted Within Past 24 Hours      Differential Diagnosis and Discussion:    Abdominal Pain: Based on the patient's signs and symptoms, I considered abdominal aortic aneurysm, small bowel obstruction, pancreatitis, acute cholecystitis, acute appendecitis, peptic ulcer disease, gastritis, colitis, endocrine disorders, irritable bowel syndrome and other differential diagnosis an etiology of the patient's abdominal pain.    All labs were reviewed and interpreted by me.    MDM     Amount and/or Complexity of Data Reviewed  Decide to obtain previous medical records or to obtain history from someone other than the patient: yes                 Patient Care Considerations:    CT ABDOMEN AND PELVIS: I considered ordering a CT scan of the abdomen and pelvis however this to be done during inpatient workup if deemed necessary.      Consultants/Shared Management Plan:    Hospitalist: I have discussed the case with Dr. Sutton who agrees to accept the patient for admission.    Social Determinants of Health:    Patient is independent, reliable, and has access to care.       Disposition and Care Coordination:    Admit:   Through independent evaluation of the patient's history,  physical, and imperical data, the patient meets criteria for inpatient admission to the hospital.        Final diagnoses:   SBP (spontaneous bacterial peritonitis)        ED Disposition       ED Disposition   Decision to Admit    Condition   --    Comment   Level of Care: Remote Telemetry [26]   Diagnosis: SBP (spontaneous bacterial peritonitis) [282858]   Admitting Physician: KODY MIGUEL [[id number]]                 This medical record created using voice recognition software.             David Vu DO  08/17/24 1201

## 2024-08-17 ENCOUNTER — READMISSION MANAGEMENT (OUTPATIENT)
Dept: CALL CENTER | Facility: HOSPITAL | Age: 39
End: 2024-08-17
Payer: COMMERCIAL

## 2024-08-17 VITALS
SYSTOLIC BLOOD PRESSURE: 95 MMHG | BODY MASS INDEX: 24.49 KG/M2 | HEIGHT: 68 IN | DIASTOLIC BLOOD PRESSURE: 71 MMHG | HEART RATE: 91 BPM | TEMPERATURE: 98.6 F | OXYGEN SATURATION: 96 % | RESPIRATION RATE: 16 BRPM | WEIGHT: 161.6 LBS

## 2024-08-17 PROBLEM — K65.9 PERITONITIS: Status: ACTIVE | Noted: 2024-08-17

## 2024-08-17 PROCEDURE — 99235 HOSP IP/OBS SAME DATE MOD 70: CPT | Performed by: FAMILY MEDICINE

## 2024-08-17 RX ORDER — SPIRONOLACTONE 25 MG/1
50 TABLET ORAL DAILY
Status: DISCONTINUED | OUTPATIENT
Start: 2024-08-17 | End: 2024-08-17 | Stop reason: HOSPADM

## 2024-08-17 RX ORDER — BISACODYL 5 MG/1
5 TABLET, DELAYED RELEASE ORAL DAILY PRN
Status: DISCONTINUED | OUTPATIENT
Start: 2024-08-17 | End: 2024-08-17 | Stop reason: HOSPADM

## 2024-08-17 RX ORDER — IPRATROPIUM BROMIDE AND ALBUTEROL SULFATE 2.5; .5 MG/3ML; MG/3ML
3 SOLUTION RESPIRATORY (INHALATION) EVERY 4 HOURS PRN
Status: DISCONTINUED | OUTPATIENT
Start: 2024-08-17 | End: 2024-08-17 | Stop reason: HOSPADM

## 2024-08-17 RX ORDER — POLYETHYLENE GLYCOL 3350 17 G/17G
17 POWDER, FOR SOLUTION ORAL DAILY PRN
Status: DISCONTINUED | OUTPATIENT
Start: 2024-08-17 | End: 2024-08-17 | Stop reason: HOSPADM

## 2024-08-17 RX ORDER — AMOXICILLIN 250 MG
2 CAPSULE ORAL 2 TIMES DAILY PRN
Status: DISCONTINUED | OUTPATIENT
Start: 2024-08-17 | End: 2024-08-17 | Stop reason: HOSPADM

## 2024-08-17 RX ORDER — ONDANSETRON 2 MG/ML
4 INJECTION INTRAMUSCULAR; INTRAVENOUS EVERY 6 HOURS PRN
Status: DISCONTINUED | OUTPATIENT
Start: 2024-08-17 | End: 2024-08-17 | Stop reason: HOSPADM

## 2024-08-17 RX ORDER — SODIUM CHLORIDE 0.9 % (FLUSH) 0.9 %
10 SYRINGE (ML) INJECTION AS NEEDED
Status: DISCONTINUED | OUTPATIENT
Start: 2024-08-17 | End: 2024-08-17 | Stop reason: HOSPADM

## 2024-08-17 RX ORDER — PANTOPRAZOLE SODIUM 40 MG/1
40 TABLET, DELAYED RELEASE ORAL DAILY
Status: DISCONTINUED | OUTPATIENT
Start: 2024-08-17 | End: 2024-08-17 | Stop reason: HOSPADM

## 2024-08-17 RX ORDER — CIPROFLOXACIN 500 MG/1
500 TABLET, FILM COATED ORAL 2 TIMES DAILY
Qty: 12 TABLET | Refills: 0 | Status: SHIPPED | OUTPATIENT
Start: 2024-08-17 | End: 2024-08-29

## 2024-08-17 RX ORDER — SODIUM CHLORIDE 9 MG/ML
40 INJECTION, SOLUTION INTRAVENOUS AS NEEDED
Status: DISCONTINUED | OUTPATIENT
Start: 2024-08-17 | End: 2024-08-17 | Stop reason: HOSPADM

## 2024-08-17 RX ORDER — BISACODYL 10 MG
10 SUPPOSITORY, RECTAL RECTAL DAILY PRN
Status: DISCONTINUED | OUTPATIENT
Start: 2024-08-17 | End: 2024-08-17 | Stop reason: HOSPADM

## 2024-08-17 RX ORDER — SODIUM CHLORIDE 0.9 % (FLUSH) 0.9 %
10 SYRINGE (ML) INJECTION EVERY 12 HOURS SCHEDULED
Status: DISCONTINUED | OUTPATIENT
Start: 2024-08-17 | End: 2024-08-17 | Stop reason: HOSPADM

## 2024-08-17 RX ADMIN — PANTOPRAZOLE SODIUM 40 MG: 40 TABLET, DELAYED RELEASE ORAL at 08:58

## 2024-08-17 RX ADMIN — SPIRONOLACTONE 50 MG: 25 TABLET ORAL at 08:58

## 2024-08-17 RX ADMIN — Medication 10 ML: at 08:58

## 2024-08-17 RX ADMIN — Medication 10 ML: at 00:51

## 2024-08-17 NOTE — DISCHARGE SUMMARY
[facility]         HOSPITALIST  DISCHARGE SUMMARY    Patient Name: Wai Gay  : 1985  MRN: [id number]    Date of Admission: 2024  Date of Discharge:  2024    Primary Care Physician: Callum Peterson,     Consults       Date and Time Order Name Status Description    2024  8:30 PM Hospitalist (on-call MD unless specified)              Active and Resolved Hospital Problems:  Active Hospital Problems    Diagnosis POA   • **SBP (spontaneous bacterial peritonitis) [K65.2] Yes   • Peritonitis [K65.9] Yes   • Personal history of PE (pulmonary embolism) [Z86.711] Yes   • Cirrhosis of liver with ascites [K74.60, R18.8] Yes   • Essential hypertension [I10] Yes   • Tobacco abuse [Z72.0] Yes   • History of alcohol abuse [F10.11] Yes      Resolved Hospital Problems   No resolved problems to display.       Hospital Course     Hospital Course:  Wai Gay is a 38 y.o. male with past medical history of alcoholic cirrhosis who was sent by GI for SBP.  He had a routine therapeutic paracentesis which showed almost 1200 nucleated cells at 90% neutrophils.  He was sent for evaluation and treatment of SBP.  He was initiated on IV Rocephin.  He remained asymptomatic.  Fluid culture with no growth.  He was transition to p.o. ciprofloxacin 500 mg twice daily for an additional 6 days.    Patient discharged in stable condition.    DISCHARGE Follow Up Recommendations for labs and diagnostics: Follow-up with PCP in 1 week.  Follow-up with GI in 2 to 4 weeks.      Day of Discharge     Vital Signs:  Temp:  [97.9 °F (36.6 °C)-98.6 °F (37 °C)] 98.6 °F (37 °C)  Heart Rate:  [74-94] 91  Resp:  [13-17] 16  BP: ()/(61-78) 95/71    Physical Exam:   Gen: NAD, Alert and Oriented  Cards: RRR, no murmur   Pulm: CTA b/l, no wheezing  Abd: soft, nondistended  Extremities: no pitting edema      Discharge Details        Discharge Medications        New Medications        Instructions  Start Date   ciprofloxacin 500 MG tablet  Commonly known as: CIPRO   500 mg, Oral, 2 Times Daily             Continue These Medications        Instructions Start Date   albuterol sulfate  (90 Base) MCG/ACT inhaler  Commonly known as: Ventolin HFA   2 puffs, Inhalation, Every 4 Hours PRN      pantoprazole 40 MG EC tablet  Commonly known as: PROTONIX   TAKE 1 TABLET BY MOUTH EVERY DAY      spironolactone 50 MG tablet  Commonly known as: ALDACTONE   TAKE 1 TABLET BY MOUTH EVERY DAY      Umeclidinium-Vilanterol 62.5-25 MCG/ACT aerosol powder  inhaler  Commonly known as: ANORO ELLIPTA   1 puff, Inhalation, Daily      ursodiol 300 MG capsule  Commonly known as: ACTIGALL   Take 2 capsules by mouth 2 (Two) Times a Day.               No Known Allergies    Discharge Disposition:  Home or Self Care    Diet:  Hospital:  Diet Order   Procedures   • Diet: Regular/House; Fluid Consistency: Thin (IDDSI 0)       Discharge Activity:       CODE STATUS:  Code Status and Medical Interventions: CPR (Attempt to Resuscitate); Full Support   Ordered at: 08/16/24 2043     Level Of Support Discussed With:    Patient     Code Status (Patient has no pulse and is not breathing):    CPR (Attempt to Resuscitate)     Medical Interventions (Patient has pulse or is breathing):    Full Support         Future Appointments   Date Time Provider Department Center   9/25/2024  8:00 AM Shyla Yarbrough APRN [facility]   10/15/2024 11:00 AM Davian Watt MD [facility] PCC [facility]   1/6/2025 10:30 AM Callum Peterson DO [facility]   1/9/2025  9:30 AM Marian Ferrell APRN [facility] GE [facility]   3/31/2025  9:00 AM Callum Peterson DO [facility] HFC [facility]       Additional Instructions for the Follow-ups that You Need to Schedule       Discharge Follow-up with PCP   As directed       Currently Documented PCP:    Callum Peterson DO    PCP Phone Number:    [phone]     Follow Up Details: one week        Discharge Follow-up with  Specialty: GI; 1 Week   As directed      Specialty: GI   Follow Up: 1 Week                Pertinent  and/or Most Recent Results         LAB RESULTS:      Lab 08/16/24  1624 08/16/24  0737   WBC 5.02 6.89   HEMOGLOBIN 10.0* 9.6*   HEMATOCRIT 30.9* 30.5*   PLATELETS 103* 107*  107*   NEUTROS ABS 2.79 3.71   IMMATURE GRANS (ABS) 0.02 0.03   LYMPHS ABS 1.02 1.62   MONOS ABS 0.66 0.88   EOS ABS 0.51* 0.61*   MCV 87.0 89.2   LACTATE 1.9  --    PROTIME  --  20.0*   APTT  --  36.7*         Lab 08/16/24  1624   SODIUM 132*   POTASSIUM 3.2*   CHLORIDE 100   CO2 23.3   ANION GAP 8.7   BUN 7   CREATININE 0.86   EGFR 115.1   GLUCOSE 95   CALCIUM 7.5*         Lab 08/16/24  1624   TOTAL PROTEIN 6.1   ALBUMIN 2.6*   GLOBULIN 3.5   ALT (SGPT) 14   AST (SGOT) 33   BILIRUBIN 1.8*   ALK PHOS 237*         Lab 08/16/24  0737   PROTIME 20.0*   INR 1.67*                 Brief Urine Lab Results       None          Microbiology Results (last 10 days)       Procedure Component Value - Date/Time    Body Fluid Culture - Body Fluid, Peritoneum [[id number]] Collected: 08/16/24 0815    Lab Status: Preliminary result Specimen: Body Fluid from Peritoneum Updated: 08/17/24 0810     Body Fluid Culture No growth     Gram Stain No organisms seen            US Paracentesis    Result Date: 8/16/2024  Impression: Ultrasound-guided paracentesis was performed without complication, patient tolerated procedure with 2.3 L of clear, pale yellow ascitic fluid removed. A sample specimen of 125 mL was sent to the lab for further diagnostic evaluation. The patient was instructed to obtain follow up care from the referring physician. Report dictated by: Peace Strickland  I have personally reviewed this case and agree with the findings above: Electronically Signed: Leif Bryan MD  8/16/2024 9:57 AM EDT  Workstation ID: [id number]              Time spent on Discharge including face to face service:  >30 minutes    Electronically signed by Gladys Steward DO,  08/17/24, 3:06 PM EDT.

## 2024-08-17 NOTE — PROGRESS NOTES
I saw and evaluated the patient at bedside.  He is resting comfortably and reports very minimal pain.  Reviewed his cultures, does have SBP.  Continue Rocephin, fluid culture and blood cultures are pending.

## 2024-08-17 NOTE — H&P
[facility]   HISTORY AND PHYSICAL    Patient Name: Wai Gay  : 1985  MRN: [id number]  Primary Care Physician:  Callum Peterson DO  Date of admission: 2024    Subjective   Subjective     Chief Complaint: SBP    HPI:    Wai Gay is a 38 y.o. male with past medical history of alcohol cirrhosis, hypertension, tobacco use, and GERD was sent over to the ED by gastroenterology for likely spontaneous bacterial peritonitis.  Patient states that he went in for therapeutic paracentesis 2 days ago but was contacted earlier today by gastroenterology due to abnormal findings of his peritoneal fluid analysis.  Peritoneal fluid showed a hazy appearance with 1168 nucleated cells.  Patient was instructed to come to the ED bili for further evaluation and antibiotics.  In the ED patient's vitals were all within normal limits on arrival.  Labs show mild anemia, hypokalemia, hyperbilirubinemia and hypoalbuminemia.  Remaining relatively unremarkable given her chronic conditions including a normal WBC and lactic acid.  When seen patient was resting comfortably and although he did have some abdominal discomfort he stated that it is chronic and is not worse than normal.  He denied any recent fevers, chills, headaches, focal weakness, chest pain, palpitation, shortness of breath, cough, nausea, vomiting, constipation, dysuria, hematuria, hematochezia, melena, or anxiety.  Patient admitted for further evaluation and treatment.        Review of Systems   All systems were reviewed and negative except for: As per HPI    Personal History     Past Medical History:   Diagnosis Date   • Alcohol abuse 2017   • Anxiety    • Essential hypertension 2019   • GERD (gastroesophageal reflux disease)    • Hypokalemia 2017    Will update   • Hyponatremia 2017   • Steatohepatitis, alcoholic 2017   • Tobacco abuse 2017   • Umbilical hernia        Past Surgical History:    Procedure Laterality Date   • CHOLECYSTECTOMY     • ENDOSCOPY N/A 02/09/2022    Procedure: ESOPHAGOGASTRODUODENOSCOPY WITH BX;  Surgeon: Gino Khan MD;  Location: [facility] ENDOSCOPY;  Service: Gastroenterology;  Laterality: N/A;  RETAINED LIQUID FOOD IN STOMACH, HUFFMAN'S ESOPHAGUS   • ENDOSCOPY N/A 02/10/2023    Procedure: ESOPHAGOGASTRODUODENOSCOPY;  Surgeon: Gino Khan MD;  Location: [facility] ENDOSCOPY;  Service: Gastroenterology;  Laterality: N/A;  GASTRITIS, BARRETTS ESOPHAGUS, PHG   • UPPER GASTROINTESTINAL ENDOSCOPY         Family History: family history includes Alcohol abuse in his mother; Arthritis in his mother; COPD in his mother; Cancer in his paternal grandfather and paternal grandmother; Heart disease in his maternal grandfather and maternal grandmother; Hypertension in his maternal grandmother; Lung cancer in his maternal grandfather and maternal grandmother; Stroke in his maternal grandmother. Otherwise pertinent FHx was reviewed and not pertinent to current issue.    Social History:  reports that he has been smoking cigarettes. He started smoking about 16 years ago. He has a 8.3 pack-year smoking history. He has been exposed to tobacco smoke. He has never used smokeless tobacco. He reports that he does not currently use alcohol after a past usage of about 3.0 standard drinks of alcohol per week. He reports that he does not currently use drugs.    Home Medications:  Umeclidinium-Vilanterol, albuterol sulfate HFA, pantoprazole, spironolactone, and ursodiol      Allergies:  No Known Allergies    Objective   Objective     Vitals:   Temp:  [97.9 °F (36.6 °C)-98.1 °F (36.7 °C)] 98.1 °F (36.7 °C)  Heart Rate:  [74-94] 76  Resp:  [13-17] 16  BP: ()/(60-78) 99/68  Physical Exam    Constitutional: Awake, alert   Eyes: PERRLA, sclerae anicteric, no conjunctival injection   HENT: NCAT, mucous membranes moist   Neck: Supple, no thyromegaly, no lymphadenopathy, trachea  midline   Respiratory: Clear to auscultation bilaterally, nonlabored respirations    Cardiovascular: RRR, no murmurs, rubs, or gallops, palpable pedal pulses bilaterally   Gastrointestinal: Positive bowel sounds, soft, nontender, nondistended   Musculoskeletal: No bilateral ankle edema, no clubbing or cyanosis to extremities   Psychiatric: Appropriate affect, cooperative   Neurologic: Oriented x 3, strength symmetric in all extremities, Cranial Nerves grossly intact to confrontation, speech clear   Skin: No rashes     Result Review    Result Review:  I have personally reviewed the results from the time of this admission to 8/17/2024 02:18 EDT and agree with these findings:  [x]  Laboratory list / accordion  []  Microbiology  [x]  Radiology  [x]  EKG/Telemetry   []  Cardiology/Vascular   []  Pathology  []  Old records  []  Other:  Most notable findings include: Anemia, hyperbilirubinemia, hypoalbuminemia, normal lactic acid, normal WBC    Assessment & Plan   Assessment / Plan     Brief Patient Summary:  Wai Gay is a 38 y.o. male with past medical history of alcohol cirrhosis, hypertension, tobacco use, and GERD was sent over to the ED by gastroenterology for likely spontaneous bacterial peritonitis    Active Hospital Problems:  Active Hospital Problems    Diagnosis    • **SBP (spontaneous bacterial peritonitis)    • Personal history of PE (pulmonary embolism)    • Cirrhosis of liver with ascites    • Essential hypertension    • Tobacco abuse    • History of alcohol abuse      Plan:     SBP  -Admit to telemetry  -Patient asymptomatic  -Fluid analysis from recent paracentesis showing significantly elevated nucleated cells and RBCs.  -Empiric antibiotics with Rocephin  -Blood cultures  -Consult GI if warranted  -Supportive care    Alcoholic cirrhosis  History of hypertension not on meds  Alcoholism.  Sober for 3 years  Tobacco use      GI ppx  DVT ppx    VTE Prophylaxis:  Mechanical VTE prophylaxis orders  are present.        CODE STATUS:    Level Of Support Discussed With: Patient  Code Status (Patient has no pulse and is not breathing): CPR (Attempt to Resuscitate)  Medical Interventions (Patient has pulse or is breathing): Full Support    Admission Status:  I believe this patient meets observation status.      Electronically signed by Tawanda Sutton MD, 08/17/24, 2:18 AM EDT.

## 2024-08-19 ENCOUNTER — TRANSITIONAL CARE MANAGEMENT TELEPHONE ENCOUNTER (OUTPATIENT)
Dept: CALL CENTER | Facility: HOSPITAL | Age: 39
End: 2024-08-19
Payer: COMMERCIAL

## 2024-08-19 LAB
BACTERIA FLD CULT: NORMAL
CYTO UR: NORMAL
GRAM STN SPEC: NORMAL
LAB AP CASE REPORT: NORMAL
LAB AP CLINICAL INFORMATION: NORMAL
PATH REPORT.FINAL DX SPEC: NORMAL
PATH REPORT.GROSS SPEC: NORMAL

## 2024-08-19 NOTE — OUTREACH NOTE
Call Center TCM Note      Flowsheet Row Responses   [facility] [place] patient discharged from? [place]   Does the patient have one of the following disease processes/diagnoses(primary or secondary)? Other   TCM attempt successful? No   Unsuccessful attempts Attempt 2  [call to pt and then call to mother who referred to pt as reports she is unable to provide an update]            Dinora Talley RN    8/19/2024, 15:55 EDT

## 2024-08-19 NOTE — OUTREACH NOTE
Call Center TCM Note      Flowsheet Row Responses   [facility] facility patient discharged from? [facility]   Does the patient have one of the following disease processes/diagnoses(primary or secondary)? Other   TCM attempt successful? No  [VR for Jasmyn, mother]   Unsuccessful attempts Attempt 1   Call Status Left message            Dinora Talley RN    8/19/2024, 14:34 EDT

## 2024-08-20 ENCOUNTER — TRANSITIONAL CARE MANAGEMENT TELEPHONE ENCOUNTER (OUTPATIENT)
Dept: CALL CENTER | Facility: HOSPITAL | Age: 39
End: 2024-08-20
Payer: COMMERCIAL

## 2024-08-20 NOTE — OUTREACH NOTE
Call Center TCM Note      Flowsheet Row Responses   [facility] patient discharged from? [place]   Does the patient have one of the following disease processes/diagnoses(primary or secondary)? Other   TCM attempt successful? Yes   Call start time 0840   Call end time 0845   Discharge diagnosis SBP (spontaneous bacterial peritonitis), cirrhosis of liver with ascites   Is patient permission given to speak with other caregiver? Yes   Person spoke with today (if not patient) and relationship Mother Jasmyn Gay   Meds reviewed with patient/caregiver? Yes  [New: cipro]   Does the patient have all medications ordered at discharge? Yes  [Mother states that she knows patient picked up new med.]   Is the patient taking all medications as directed (includes completed medication regime)? Yes   Comments PCP DR Peterson. Mother declined to schedule patient a Hospital follow up appt in office with call today. She will tell patient to call office to schedule.   Does the patient have an appointment with their PCP within 7-14 days of discharge? No   Nursing Interventions Patient declined scheduling/rescheduling appointment at this time, Routed TCM call to PCP office   Has home health visited the patient within 72 hours of discharge? N/A   Psychosocial issues? No   Did the patient receive a copy of their discharge instructions? Yes   Nursing interventions Reviewed instructions with patient  [mother]   What is the patient's perception of their health status since discharge? Improving   Is the patient/caregiver able to teach back signs and symptoms related to disease process for when to call PCP? Yes   TCM call completed? Yes   Call end time 0845   Would this patient benefit from a Referral to Amb Social Work? No   Is the patient interested in additional calls from an ambulatory ? No            Peace Jauregui RN    8/20/2024, 08:52 EDT

## 2024-08-21 LAB
BACTERIA SPEC AEROBE CULT: NORMAL
BACTERIA SPEC AEROBE CULT: NORMAL

## 2024-08-22 ENCOUNTER — TELEPHONE (OUTPATIENT)
Dept: FAMILY MEDICINE CLINIC | Facility: CLINIC | Age: 39
End: 2024-08-22
Payer: COMMERCIAL

## 2024-08-27 ENCOUNTER — READMISSION MANAGEMENT (OUTPATIENT)
Dept: CALL CENTER | Facility: HOSPITAL | Age: 39
End: 2024-08-27
Payer: COMMERCIAL

## 2024-08-27 NOTE — OUTREACH NOTE
Medical Week 2 Survey      Flowsheet Row Responses   [facility] facility patient discharged from? [facility]   Does the patient have one of the following disease processes/diagnoses(primary or secondary)? Other   Week 2 attempt successful? No   Unsuccessful attempts Attempt 1            Vale ESTRAAD - Registered Nurse

## 2024-08-29 ENCOUNTER — OFFICE VISIT (OUTPATIENT)
Dept: FAMILY MEDICINE CLINIC | Facility: CLINIC | Age: 39
End: 2024-08-29
Payer: COMMERCIAL

## 2024-08-29 VITALS
HEART RATE: 98 BPM | SYSTOLIC BLOOD PRESSURE: 95 MMHG | TEMPERATURE: 98.2 F | BODY MASS INDEX: 24.71 KG/M2 | DIASTOLIC BLOOD PRESSURE: 62 MMHG | WEIGHT: 163 LBS | OXYGEN SATURATION: 95 % | HEIGHT: 68 IN

## 2024-08-29 DIAGNOSIS — K70.31 ALCOHOLIC CIRRHOSIS OF LIVER WITH ASCITES: Primary | ICD-10-CM

## 2024-08-29 DIAGNOSIS — K65.2 SBP (SPONTANEOUS BACTERIAL PERITONITIS): ICD-10-CM

## 2024-08-29 DIAGNOSIS — Z09 HOSPITAL DISCHARGE FOLLOW-UP: ICD-10-CM

## 2024-08-29 NOTE — ASSESSMENT & PLAN NOTE
He has done well since his discharge.  He has completed all of his antibiotics.  He does have a follow-up with GI in the upcoming weeks.

## 2024-08-29 NOTE — PROGRESS NOTES
Chief Complaint   Patient presents with    Hospital Follow Up Visit        Subjective     Wai Gay  has a past medical history of Alcohol abuse (03/14/2017), Anxiety, Essential hypertension (12/27/2019), GERD (gastroesophageal reflux disease), Hypokalemia (03/14/2017), Hyponatremia (03/14/2017), Steatohepatitis, alcoholic (03/14/2017), Tobacco abuse (03/14/2017), and Umbilical hernia.    Hospital [id number] was admitted at [facility] 8/16 through 8/17/2024.  He had a paracentesis due to his liver disease and recurrent ascites.  His fluid came back a rather hazy with an increased white blood cell count.  As a result of this he was a hospitalized and placed on IV antibiotics.  During this time though he remained asymptomatic without any fever chills or abdominal pain.  He was eventually be able to be discharged home on ciprofloxacin.  He completed that course of antibiotics without any symptoms.  His blood cultures and peritoneal fluid cultures all came back negative.  He did receive a call from the discharge nurse within 48 hours to make sure he was doing fine and had a follow-up appointment.        PHQ-2 Depression Screening  Little interest or pleasure in doing things?     Feeling down, depressed, or hopeless?     PHQ-2 Total Score     PHQ-9 Depression Screening  Little interest or pleasure in doing things?     Feeling down, depressed, or hopeless?     Trouble falling or staying asleep, or sleeping too much?     Feeling tired or having little energy?     Poor appetite or overeating?     Feeling bad about yourself - or that you are a failure or have let yourself or your family down?     Trouble concentrating on things, such as reading the newspaper or watching television?     Moving or speaking so slowly that other people could have noticed? Or the opposite - being so fidgety or restless that you have been moving around a lot more than usual?     Thoughts that you would be better off dead, or  of hurting yourself in some way?     PHQ-9 Total Score     If you checked off any problems, how difficult have these problems made it for you to do your work, take care of things at home, or get along with other people?       No Known Allergies    Prior to Admission medications    Medication Sig Start Date End Date Taking? Authorizing Provider   albuterol sulfate HFA (Ventolin HFA) 108 (90 Base) MCG/ACT inhaler Inhale 2 puffs Every 4 (Four) Hours As Needed for Wheezing or Shortness of Air. 7/29/24  Yes Davian Watt MD   pantoprazole (PROTONIX) 40 MG EC tablet TAKE 1 TABLET BY MOUTH EVERY DAY 6/5/24  Yes Shyla Yarbrough APRN   spironolactone (ALDACTONE) 50 MG tablet TAKE 1 TABLET BY MOUTH EVERY DAY 6/5/24  Yes Shyla Yarbrough APRN   Umeclidinium-Vilanterol (ANORO ELLIPTA) 62.5-25 MCG/ACT aerosol powder  inhaler Inhale 1 puff Daily. 7/29/24  Yes Davian Watt MD   ursodiol (ACTIGALL) 300 MG capsule Take 2 capsules by mouth 2 (Two) Times a Day. 6/8/24  Yes ProviderRose MD   ciprofloxacin (CIPRO) 500 MG tablet Take 1 tablet by mouth 2 (Two) Times a Day.  Patient not taking: Reported on 8/29/2024 8/17/24 8/29/24  Gladys Steward DO        Patient Active Problem List   Diagnosis    Acute liver failure without hepatic coma    Acute hepatitis    Hypokalemia    Hyponatremia    Steatohepatitis, alcoholic    History of alcohol abuse    Tobacco abuse    Generalized abdominal pain    Need for influenza vaccination    Essential hypertension    Alcoholic cirrhosis of liver with ascites    Cirrhosis of liver with ascites    Need for hepatitis A and B vaccination    Need for vaccination against Streptococcus pneumoniae    Need for meningococcal vaccination    Need for shingles vaccine    Last's esophagus without dysplasia    Umbilical hernia without obstruction and without gangrene    Acute hypoxic respiratory failure    Personal history of PE (pulmonary embolism)    Pleural effusion    Dyspnea     SBP (spontaneous bacterial peritonitis)    Peritonitis    Hospital discharge follow-up        Past Surgical History:   Procedure Laterality Date    CHOLECYSTECTOMY      ENDOSCOPY N/A 02/09/2022    Procedure: ESOPHAGOGASTRODUODENOSCOPY WITH BX;  Surgeon: Gino Khan MD;  Location: [facility] ENDOSCOPY;  Service: Gastroenterology;  Laterality: N/A;  RETAINED LIQUID FOOD IN STOMACH, HUFFMAN'S ESOPHAGUS    ENDOSCOPY N/A 02/10/2023    Procedure: ESOPHAGOGASTRODUODENOSCOPY;  Surgeon: Gino Khan MD;  Location: [facility] ENDOSCOPY;  Service: Gastroenterology;  Laterality: N/A;  GASTRITIS, BARRETTS ESOPHAGUS, PHG    UPPER GASTROINTESTINAL ENDOSCOPY         Social History     Socioeconomic History    Marital status: Single   Tobacco Use    Smoking status: Every Day     Current packs/day: 0.50     Average packs/day: 0.5 packs/day for 16.7 years (8.3 ttl pk-yrs)     Types: Cigarettes     Start date: 1/1/2008     Passive exposure: Current    Smokeless tobacco: Never   Vaping Use    Vaping status: Never Used   Substance and Sexual Activity    Alcohol use: Not Currently     Alcohol/week: 3.0 standard drinks of alcohol     Comment: FORMER    Drug use: Not Currently    Sexual activity: Yes     Partners: Female       Family History   Problem Relation Age of Onset    Alcohol abuse Mother     Arthritis Mother     COPD Mother     Heart disease Maternal Grandmother     Hypertension Maternal Grandmother     Lung cancer Maternal Grandmother     Stroke Maternal Grandmother     Heart disease Maternal Grandfather     Lung cancer Maternal Grandfather     Cancer Paternal Grandmother     Cancer Paternal Grandfather     Colon cancer Neg Hx     Malig Hyperthermia Neg Hx        Family history, surgical history, past medical history, Allergies and meds reviewed with patient today and updated in [facility] EMR.     ROS:  Review of Systems   Constitutional:  Negative for chills, fatigue and fever.   Respiratory:  Negative for cough, chest  "tightness, shortness of breath and wheezing.    Cardiovascular:  Negative for chest pain and palpitations.   Gastrointestinal:  Negative for abdominal distention, abdominal pain, blood in stool, constipation, diarrhea, nausea and vomiting.   Neurological:  Negative for headache.   Psychiatric/Behavioral:  Negative for depressed mood. The patient is not nervous/anxious.        OBJECTIVE:  Vitals:    08/29/24 0951   BP: 95/62   BP Location: Left arm   Patient Position: Sitting   Pulse: 98   Temp: 98.2 °F (36.8 °C)   SpO2: 95%   Weight: 73.9 kg (163 lb)   Height: 172.7 cm (68\")     No results found.   Body mass index is 24.78 kg/m².  No LMP for male patient.    The ASCVD Risk score ([place] DK, et al., 2019) failed to calculate for the following reasons:    The 2019 ASCVD risk score is only valid for ages 40 to 79     Physical Exam  Vitals and nursing note reviewed.   Constitutional:       General: He is not in acute distress.     Appearance: Normal appearance. He is normal weight.   HENT:      Head: Normocephalic.      Right Ear: Tympanic membrane, ear canal and external ear normal.      Left Ear: Tympanic membrane, ear canal and external ear normal.      Nose: Nose normal.      Mouth/Throat:      Mouth: Mucous membranes are moist.      Dentition: Has dentures.      Pharynx: Oropharynx is clear.   Eyes:      General: No scleral icterus.     Conjunctiva/sclera: Conjunctivae normal.      Pupils: Pupils are equal, round, and reactive to light.   Cardiovascular:      Rate and Rhythm: Normal rate and regular rhythm.      Pulses: Normal pulses.      Heart sounds: Normal heart sounds. No murmur heard.  Pulmonary:      Effort: Pulmonary effort is normal.      Breath sounds: Normal breath sounds. No wheezing, rhonchi or rales.   Abdominal:      General: Abdomen is protuberant. Bowel sounds are normal.      Palpations: Abdomen is soft. There is fluid wave.      Tenderness: There is no abdominal tenderness.   Musculoskeletal:     "  Cervical back: Neck supple. No rigidity or tenderness.   Lymphadenopathy:      Cervical: No cervical adenopathy.   Skin:     General: Skin is warm and dry.      Coloration: Skin is not jaundiced.      Findings: No rash.   Neurological:      General: No focal deficit present.      Mental Status: He is alert and oriented to person, place, and time.   Psychiatric:         Mood and Affect: Mood normal.         Thought Content: Thought content normal.         Judgment: Judgment normal.         Procedures    Admission on 08/16/2024, Discharged on 08/17/2024   Component Date Value Ref Range Status    Glucose 08/16/2024 95  65 - 99 mg/dL Final    BUN 08/16/2024 7  6 - 20 mg/dL Final    Creatinine 08/16/2024 0.86  0.76 - 1.27 mg/dL Final    Sodium 08/16/2024 132 (L)  136 - 145 mmol/L Final    Potassium 08/16/2024 3.2 (L)  3.5 - 5.2 mmol/L Final    Chloride 08/16/2024 100  98 - 107 mmol/L Final    CO2 08/16/2024 23.3  22.0 - 29.0 mmol/L Final    Calcium 08/16/2024 7.5 (L)  8.6 - 10.5 mg/dL Final    Total Protein 08/16/2024 6.1  6.0 - 8.5 g/dL Final    Albumin 08/16/2024 2.6 (L)  3.5 - 5.2 g/dL Final    ALT (SGPT) 08/16/2024 14  1 - 41 U/L Final    AST (SGOT) 08/16/2024 33  1 - 40 U/L Final    Alkaline Phosphatase 08/16/2024 237 (H)  39 - 117 U/L Final    Total Bilirubin 08/16/2024 1.8 (H)  0.0 - 1.2 mg/dL Final    Globulin 08/16/2024 3.5  gm/dL Final    A/G Ratio 08/16/2024 0.7  g/dL Final    BUN/Creatinine Ratio 08/16/2024 8.1  7.0 - 25.0 Final    Anion Gap 08/16/2024 8.7  5.0 - 15.0 mmol/L Final    eGFR 08/16/2024 115.1  >60.0 mL/min/1.73 Final    Lactate 08/16/2024 1.9  0.5 - 2.0 mmol/L Final    WBC 08/16/2024 5.02  3.40 - 10.80 10*3/mm3 Final    RBC 08/16/2024 3.55 (L)  4.14 - 5.80 10*6/mm3 Final    Hemoglobin 08/16/2024 10.0 (L)  13.0 - 17.7 g/dL Final    Hematocrit 08/16/2024 30.9 (L)  37.5 - 51.0 % Final    MCV 08/16/2024 87.0  79.0 - 97.0 fL Final    MCH 08/16/2024 28.2  26.6 - 33.0 pg Final    MCHC 08/16/2024 32.4   31.5 - 35.7 g/dL Final    RDW 08/16/2024 17.5 (H)  12.3 - 15.4 % Final    RDW-SD 08/16/2024 55.8 (H)  37.0 - 54.0 fl Final    MPV 08/16/2024 9.1  6.0 - 12.0 fL Final    Platelets 08/16/2024 103 (L)  140 - 450 10*3/mm3 Final    Neutrophil % 08/16/2024 55.6  42.7 - 76.0 % Final    Lymphocyte % 08/16/2024 20.3  19.6 - 45.3 % Final    Monocyte % 08/16/2024 13.1 (H)  5.0 - 12.0 % Final    Eosinophil % 08/16/2024 10.2 (H)  0.3 - 6.2 % Final    Basophil % 08/16/2024 0.4  0.0 - 1.5 % Final    Immature Grans % 08/16/2024 0.4  0.0 - 0.5 % Final    Neutrophils, Absolute 08/16/2024 2.79  1.70 - 7.00 10*3/mm3 Final    Lymphocytes, Absolute 08/16/2024 1.02  0.70 - 3.10 10*3/mm3 Final    Monocytes, Absolute 08/16/2024 0.66  0.10 - 0.90 10*3/mm3 Final    Eosinophils, Absolute 08/16/2024 0.51 (H)  0.00 - 0.40 10*3/mm3 Final    Basophils, Absolute 08/16/2024 0.02  0.00 - 0.20 10*3/mm3 Final    Immature Grans, Absolute 08/16/2024 0.02  0.00 - 0.05 10*3/mm3 Final    nRBC 08/16/2024 0.0  0.0 - 0.2 /100 WBC Final    Extra Tube 08/16/2024 Hold for add-ons.   Final    Auto resulted.    Extra Tube 08/16/2024 hold for add-on   Final    Auto resulted    Extra Tube 08/16/2024 Hold for add-ons.   Final    Auto resulted.    Extra Tube 08/16/2024 Hold for add-ons.   Final    Auto resulted    Blood Culture 08/16/2024 No growth at 5 days   Final    Blood Culture 08/16/2024 No growth at 5 days   Final   Hospital Outpatient Visit on 08/16/2024   Component Date Value Ref Range Status    Platelets 08/16/2024 107 (L)  140 - 450 10*3/mm3 Final    Protime 08/16/2024 20.0 (H)  11.8 - 14.9 Seconds Final    INR 08/16/2024 1.67 (H)  0.86 - 1.15 Final    PTT 08/16/2024 36.7 (H)  24.2 - 34.2 seconds Final    Case Report 08/16/2024    Final                    Value:Medical Cytology Report                           Case: [id number]                                  Authorizing Provider:  Oralia Bland,  Collected:           08/16/2024 08:15 AM                                  APRN                                                                         Ordering Location:     [facility]      Received:            08/16/2024 09:47 AM                                 INTERVENTIONAL                                                               Pathologist:           Praful Matthew MD                                                            Specimen:    Peritoneum                                                                                 Final Diagnosis 08/16/2024    Final                    Value:This result contains rich text formatting which cannot be displayed here.    Clinical Information 08/16/2024    Final                    Value:This result contains rich text formatting which cannot be displayed here.    Gross Description 08/16/2024    Final                    Value:This result contains rich text formatting which cannot be displayed here.    Microscopic Description 08/16/2024    Final                    Value:This result contains rich text formatting which cannot be displayed here.    Body Fluid Culture 08/16/2024 No growth at 3 days   Final    Gram Stain 08/16/2024 No organisms seen   Final    Color, Fluid 08/16/2024 Light Yellow   Final    Appearance, Fluid 08/16/2024 Hazy (A)  Clear Final    Nucleated Cells, Fluid 08/16/2024 1,168  /mm3 Final    RBC, Fluid 08/16/2024 <2,000    /mm3 Final    Neutrophils, Fluid 08/16/2024 90  % Final    Lymphocytes, Fluid 08/16/2024 6  % Final    Monocytes, Fluid 08/16/2024 4  % Final    WBC 08/16/2024 6.89  3.40 - 10.80 10*3/mm3 Final    RBC 08/16/2024 3.42 (L)  4.14 - 5.80 10*6/mm3 Final    Hemoglobin 08/16/2024 9.6 (L)  13.0 - 17.7 g/dL Final    Hematocrit 08/16/2024 30.5 (L)  37.5 - 51.0 % Final    MCV 08/16/2024 89.2  79.0 - 97.0 fL Final    MCH 08/16/2024 28.1  26.6 - 33.0 pg Final    MCHC 08/16/2024 31.5  31.5 - 35.7 g/dL Final    RDW 08/16/2024 17.9 (H)  12.3 - 15.4 % Final    RDW-SD 08/16/2024 57.3 (H)   37.0 - 54.0 fl Final    MPV 08/16/2024 10.9  6.0 - 12.0 fL Final    Platelets 08/16/2024 107 (L)  140 - 450 10*3/mm3 Final    Neutrophil % 08/16/2024 53.8  42.7 - 76.0 % Final    Lymphocyte % 08/16/2024 23.5  19.6 - 45.3 % Final    Monocyte % 08/16/2024 12.8 (H)  5.0 - 12.0 % Final    Eosinophil % 08/16/2024 8.9 (H)  0.3 - 6.2 % Final    Basophil % 08/16/2024 0.6  0.0 - 1.5 % Final    Immature Grans % 08/16/2024 0.4  0.0 - 0.5 % Final    Neutrophils, Absolute 08/16/2024 3.71  1.70 - 7.00 10*3/mm3 Final    Lymphocytes, Absolute 08/16/2024 1.62  0.70 - 3.10 10*3/mm3 Final    Monocytes, Absolute 08/16/2024 0.88  0.10 - 0.90 10*3/mm3 Final    Eosinophils, Absolute 08/16/2024 0.61 (H)  0.00 - 0.40 10*3/mm3 Final    Basophils, Absolute 08/16/2024 0.04  0.00 - 0.20 10*3/mm3 Final    Immature Grans, Absolute 08/16/2024 0.03  0.00 - 0.05 10*3/mm3 Final    nRBC 08/16/2024 0.0  0.0 - 0.2 /100 WBC Final       ASSESSMENT/ PLAN:    Diagnoses and all orders for this visit:    1. Alcoholic cirrhosis of liver with ascites (Primary)  Assessment & Plan:  At this time his stable.  He does feel to have some fluid in his abdomen but not grossly distended and not symptomatic.  He continues with his ongoing sobriety.  Encouraged him to talk to his gastroenterologist again about potential liver transplant down the road.      2. SBP (spontaneous bacterial peritonitis)  Assessment & Plan:  He has finished all of his antibiotics.  His blood cultures and peritoneal fluid cultures were all negative.      3. Hospital discharge follow-up  Assessment & Plan:  He has done well since his discharge.  He has completed all of his antibiotics.  He does have a follow-up with GI in the upcoming weeks.          BMI is within normal parameters. No other follow-up for BMI required.      Orders Placed Today:     No orders of the defined types were placed in this encounter.       Management Plan:     An After Visit Summary was printed and given to the patient  at discharge.    Follow-up: Return for Next scheduled follow up.    Callum Peterson,  8/29/2024 10:30 EDT  This note was electronically signed.  Answers submitted by the patient for this visit:  Office Visit on 8/29/2024 10:00 AM with Callum Peterson  Primary Reason for Visit (Submitted on 8/23/2024)  What is the primary reason for your visit?: Other

## 2024-08-29 NOTE — ASSESSMENT & PLAN NOTE
He has finished all of his antibiotics.  His blood cultures and peritoneal fluid cultures were all negative.

## 2024-08-29 NOTE — ASSESSMENT & PLAN NOTE
At this time his stable.  He does feel to have some fluid in his abdomen but not grossly distended and not symptomatic.  He continues with his ongoing sobriety.  Encouraged him to talk to his gastroenterologist again about potential liver transplant down the road.

## 2024-09-09 ENCOUNTER — PREP FOR SURGERY (OUTPATIENT)
Dept: OTHER | Facility: HOSPITAL | Age: 39
End: 2024-09-09
Payer: COMMERCIAL

## 2024-09-09 DIAGNOSIS — J90 RECURRENT PLEURAL EFFUSION ON RIGHT: Primary | ICD-10-CM

## 2024-09-10 ENCOUNTER — HOSPITAL ENCOUNTER (OUTPATIENT)
Dept: INFUSION THERAPY | Facility: HOSPITAL | Age: 39
Discharge: HOME | End: 2024-09-10
Attending: INTERNAL MEDICINE | Admitting: INTERNAL MEDICINE
Payer: COMMERCIAL

## 2024-09-10 VITALS
TEMPERATURE: 98.7 F | DIASTOLIC BLOOD PRESSURE: 72 MMHG | HEART RATE: 89 BPM | SYSTOLIC BLOOD PRESSURE: 108 MMHG | OXYGEN SATURATION: 100 % | RESPIRATION RATE: 18 BRPM

## 2024-09-10 DIAGNOSIS — J90 RECURRENT PLEURAL EFFUSION ON RIGHT: ICD-10-CM

## 2024-09-10 DIAGNOSIS — J90 PLEURAL EFFUSION: Primary | ICD-10-CM

## 2024-09-10 PROCEDURE — 32555 ASPIRATE PLEURA W/ IMAGING: CPT | Performed by: INTERNAL MEDICINE

## 2024-09-10 NOTE — PROCEDURES
"Bedside Thoracentesis Without  Radiology    Date/Time: 9/10/2024 1:22 PM    Performed by: Jorge Montero DO  Authorized by: Jorge Mnotero DO  Consent: Verbal consent obtained. Written consent obtained.  Risks and benefits: risks, benefits and alternatives were discussed  Consent given by: patient  Patient understanding: patient states understanding of the procedure being performed  Patient consent: the patient's understanding of the procedure matches consent given  Procedure consent: procedure consent matches procedure scheduled  Relevant documents: relevant documents present and verified  Test results: test results available and properly labeled  Site marked: the operative site was marked  Imaging studies: imaging studies available  Patient identity confirmed: verbally with patient  Time out: Immediately prior to procedure a \"time out\" was called to verify the correct patient, procedure, equipment, support staff and site/side marked as required.  Procedure purpose: diagnostic and therapeutic  Indications: pleural effusion  Preparation: Patient was prepped and draped in the usual sterile fashion.  Local anesthesia used: yes  Anesthesia: local infiltration    Anesthesia:  Local anesthesia used: yes  Local Anesthetic: lidocaine 1% without epinephrine  Anesthetic total: 5 mL    Sedation:  Patient sedated: no    Preparation: skin prepped with ChloraPrep and sterile field established  Patient position: supported by bedside stand  Ultrasound guidance: yes  Puncture method: over-the-needle catheter  Number of attempts: 1  Drainage amount: 1900 ml  Drainage characteristics: serous  Patient tolerance: patient tolerated the procedure well with no immediate complications  Chest x-ray performed: yes  Chest x-ray interpreted by me.  Chest x-ray findings: Ultrasound showed sliding.        "

## 2024-09-10 NOTE — LETTER
September 10, 2024     Patient: Wai Gay   YOB: 1985   Date of Visit: 9/10/2024       To Whom It May Concern:    Wai Gay was treated here 9/10/24 and may return to work with no restrictions.           Sincerely,    Jorge Montero,       [facility]

## 2024-09-19 ENCOUNTER — HOSPITAL ENCOUNTER (INPATIENT)
Facility: HOSPITAL | Age: 39
LOS: 3 days | Discharge: HOME | DRG: 872 | End: 2024-09-22
Attending: EMERGENCY MEDICINE | Admitting: STUDENT IN AN ORGANIZED HEALTH CARE EDUCATION/TRAINING PROGRAM
Payer: COMMERCIAL

## 2024-09-19 ENCOUNTER — APPOINTMENT (OUTPATIENT)
Dept: GENERAL RADIOLOGY | Facility: HOSPITAL | Age: 39
DRG: 872 | End: 2024-09-19
Payer: COMMERCIAL

## 2024-09-19 DIAGNOSIS — A41.9 SEPSIS, DUE TO UNSPECIFIED ORGANISM, UNSPECIFIED WHETHER ACUTE ORGAN DYSFUNCTION PRESENT: Primary | ICD-10-CM

## 2024-09-19 LAB
ALBUMIN SERPL-MCNC: 2.6 G/DL (ref 3.5–5.2)
ALBUMIN/GLOB SERPL: 0.7 G/DL
ALP SERPL-CCNC: 210 U/L (ref 39–117)
ALT SERPL W P-5'-P-CCNC: 48 U/L (ref 1–41)
ANION GAP SERPL CALCULATED.3IONS-SCNC: 12 MMOL/L (ref 5–15)
AST SERPL-CCNC: 111 U/L (ref 1–40)
BACTERIA UR QL AUTO: ABNORMAL /HPF
BASOPHILS # BLD AUTO: 0.03 10*3/MM3 (ref 0–0.2)
BASOPHILS NFR BLD AUTO: 0.2 % (ref 0–1.5)
BILIRUB SERPL-MCNC: 1.4 MG/DL (ref 0–1.2)
BILIRUB UR QL STRIP: ABNORMAL
BUN SERPL-MCNC: 16 MG/DL (ref 6–20)
BUN/CREAT SERPL: 11.4 (ref 7–25)
CALCIUM SPEC-SCNC: 8.1 MG/DL (ref 8.6–10.5)
CHLORIDE SERPL-SCNC: 91 MMOL/L (ref 98–107)
CLARITY UR: CLEAR
CO2 SERPL-SCNC: 23 MMOL/L (ref 22–29)
COD CRY URNS QL: ABNORMAL /HPF
COLOR UR: ABNORMAL
CREAT SERPL-MCNC: 1.4 MG/DL (ref 0.76–1.27)
D-LACTATE SERPL-SCNC: 2 MMOL/L (ref 0.5–2)
D-LACTATE SERPL-SCNC: 3.7 MMOL/L (ref 0.5–2)
DEPRECATED RDW RBC AUTO: 51.8 FL (ref 37–54)
EGFRCR SERPLBLD CKD-EPI 2021: 66.8 ML/MIN/1.73
EOSINOPHIL # BLD AUTO: 0.09 10*3/MM3 (ref 0–0.4)
EOSINOPHIL NFR BLD AUTO: 0.7 % (ref 0.3–6.2)
ERYTHROCYTE [DISTWIDTH] IN BLOOD BY AUTOMATED COUNT: 16.7 % (ref 12.3–15.4)
FERRITIN SERPL-MCNC: 36.69 NG/ML (ref 30–400)
FLUAV SUBTYP SPEC NAA+PROBE: NOT DETECTED
FLUBV RNA ISLT QL NAA+PROBE: NOT DETECTED
GLOBULIN UR ELPH-MCNC: 3.7 GM/DL
GLUCOSE SERPL-MCNC: 103 MG/DL (ref 65–99)
GLUCOSE UR STRIP-MCNC: NEGATIVE MG/DL
HCT VFR BLD AUTO: 30.6 % (ref 37.5–51)
HGB BLD-MCNC: 10.2 G/DL (ref 13–17.7)
HGB UR QL STRIP.AUTO: NEGATIVE
HOLD SPECIMEN: NORMAL
HYALINE CASTS UR QL AUTO: ABNORMAL /LPF
IMM GRANULOCYTES # BLD AUTO: 0.11 10*3/MM3 (ref 0–0.05)
IMM GRANULOCYTES NFR BLD AUTO: 0.8 % (ref 0–0.5)
IRON 24H UR-MRATE: 20 MCG/DL (ref 59–158)
IRON SATN MFR SERPL: 8 % (ref 20–50)
KETONES UR QL STRIP: ABNORMAL
LEUKOCYTE ESTERASE UR QL STRIP.AUTO: ABNORMAL
LYMPHOCYTES # BLD AUTO: 0.33 10*3/MM3 (ref 0.7–3.1)
LYMPHOCYTES NFR BLD AUTO: 2.5 % (ref 19.6–45.3)
MAGNESIUM SERPL-MCNC: 1.6 MG/DL (ref 1.6–2.6)
MCH RBC QN AUTO: 28 PG (ref 26.6–33)
MCHC RBC AUTO-ENTMCNC: 33.3 G/DL (ref 31.5–35.7)
MCV RBC AUTO: 84.1 FL (ref 79–97)
MONOCYTES # BLD AUTO: 0.92 10*3/MM3 (ref 0.1–0.9)
MONOCYTES NFR BLD AUTO: 6.9 % (ref 5–12)
NEUTROPHILS NFR BLD AUTO: 11.79 10*3/MM3 (ref 1.7–7)
NEUTROPHILS NFR BLD AUTO: 88.9 % (ref 42.7–76)
NITRITE UR QL STRIP: NEGATIVE
NRBC BLD AUTO-RTO: 0 /100 WBC (ref 0–0.2)
NT-PROBNP SERPL-MCNC: 346 PG/ML (ref 0–450)
PH UR STRIP.AUTO: 6 [PH] (ref 5–8)
PLATELET # BLD AUTO: 138 10*3/MM3 (ref 140–450)
PMV BLD AUTO: 10.1 FL (ref 6–12)
POTASSIUM SERPL-SCNC: 2.7 MMOL/L (ref 3.5–5.2)
PROCALCITONIN SERPL-MCNC: 11.74 NG/ML (ref 0–0.25)
PROT SERPL-MCNC: 6.3 G/DL (ref 6–8.5)
PROT UR QL STRIP: ABNORMAL
RBC # BLD AUTO: 3.64 10*6/MM3 (ref 4.14–5.8)
RBC # UR STRIP: ABNORMAL /HPF
REF LAB TEST METHOD: ABNORMAL
RSV RNA NPH QL NAA+NON-PROBE: NOT DETECTED
SARS-COV-2 RNA RESP QL NAA+PROBE: NOT DETECTED
SODIUM SERPL-SCNC: 126 MMOL/L (ref 136–145)
SP GR UR STRIP: 1.02 (ref 1–1.03)
SQUAMOUS #/AREA URNS HPF: ABNORMAL /HPF
TIBC SERPL-MCNC: 255 MCG/DL (ref 298–536)
TRANSFERRIN SERPL-MCNC: 171 MG/DL (ref 200–360)
TROPONIN T SERPL HS-MCNC: 17 NG/L
TSH SERPL DL<=0.05 MIU/L-ACNC: 2.35 UIU/ML (ref 0.27–4.2)
UROBILINOGEN UR QL STRIP: ABNORMAL
WBC # UR STRIP: ABNORMAL /HPF
WBC NRBC COR # BLD AUTO: 13.27 10*3/MM3 (ref 3.4–10.8)
WHOLE BLOOD HOLD COAG: NORMAL
WHOLE BLOOD HOLD SPECIMEN: NORMAL

## 2024-09-19 PROCEDURE — 93005 ELECTROCARDIOGRAM TRACING: CPT

## 2024-09-19 PROCEDURE — 94799 UNLISTED PULMONARY SVC/PX: CPT

## 2024-09-19 PROCEDURE — 93010 ELECTROCARDIOGRAM REPORT: CPT | Performed by: INTERNAL MEDICINE

## 2024-09-19 PROCEDURE — 87040 BLOOD CULTURE FOR BACTERIA: CPT | Performed by: EMERGENCY MEDICINE

## 2024-09-19 PROCEDURE — 85025 COMPLETE CBC W/AUTO DIFF WBC: CPT | Performed by: EMERGENCY MEDICINE

## 2024-09-19 PROCEDURE — 99291 CRITICAL CARE FIRST HOUR: CPT

## 2024-09-19 PROCEDURE — 93005 ELECTROCARDIOGRAM TRACING: CPT | Performed by: EMERGENCY MEDICINE

## 2024-09-19 PROCEDURE — 84145 PROCALCITONIN (PCT): CPT | Performed by: STUDENT IN AN ORGANIZED HEALTH CARE EDUCATION/TRAINING PROGRAM

## 2024-09-19 PROCEDURE — 25010000002 PIPERACILLIN SOD-TAZOBACTAM PER 1 G: Performed by: STUDENT IN AN ORGANIZED HEALTH CARE EDUCATION/TRAINING PROGRAM

## 2024-09-19 PROCEDURE — 84443 ASSAY THYROID STIM HORMONE: CPT | Performed by: STUDENT IN AN ORGANIZED HEALTH CARE EDUCATION/TRAINING PROGRAM

## 2024-09-19 PROCEDURE — 80053 COMPREHEN METABOLIC PANEL: CPT | Performed by: EMERGENCY MEDICINE

## 2024-09-19 PROCEDURE — 82728 ASSAY OF FERRITIN: CPT | Performed by: STUDENT IN AN ORGANIZED HEALTH CARE EDUCATION/TRAINING PROGRAM

## 2024-09-19 PROCEDURE — 94640 AIRWAY INHALATION TREATMENT: CPT

## 2024-09-19 PROCEDURE — 83605 ASSAY OF LACTIC ACID: CPT | Performed by: EMERGENCY MEDICINE

## 2024-09-19 PROCEDURE — 87637 SARSCOV2&INF A&B&RSV AMP PRB: CPT | Performed by: EMERGENCY MEDICINE

## 2024-09-19 PROCEDURE — 87040 BLOOD CULTURE FOR BACTERIA: CPT

## 2024-09-19 PROCEDURE — 25810000003 SODIUM CHLORIDE 0.9 % SOLUTION: Performed by: EMERGENCY MEDICINE

## 2024-09-19 PROCEDURE — 25810000003 SODIUM CHLORIDE 0.9 % SOLUTION: Performed by: STUDENT IN AN ORGANIZED HEALTH CARE EDUCATION/TRAINING PROGRAM

## 2024-09-19 PROCEDURE — 84466 ASSAY OF TRANSFERRIN: CPT | Performed by: STUDENT IN AN ORGANIZED HEALTH CARE EDUCATION/TRAINING PROGRAM

## 2024-09-19 PROCEDURE — 84484 ASSAY OF TROPONIN QUANT: CPT | Performed by: EMERGENCY MEDICINE

## 2024-09-19 PROCEDURE — 71045 X-RAY EXAM CHEST 1 VIEW: CPT

## 2024-09-19 PROCEDURE — 83880 ASSAY OF NATRIURETIC PEPTIDE: CPT | Performed by: EMERGENCY MEDICINE

## 2024-09-19 PROCEDURE — 99222 1ST HOSP IP/OBS MODERATE 55: CPT | Performed by: STUDENT IN AN ORGANIZED HEALTH CARE EDUCATION/TRAINING PROGRAM

## 2024-09-19 PROCEDURE — 81001 URINALYSIS AUTO W/SCOPE: CPT | Performed by: EMERGENCY MEDICINE

## 2024-09-19 PROCEDURE — 83540 ASSAY OF IRON: CPT | Performed by: STUDENT IN AN ORGANIZED HEALTH CARE EDUCATION/TRAINING PROGRAM

## 2024-09-19 PROCEDURE — 25010000002 PIPERACILLIN SOD-TAZOBACTAM PER 1 G: Performed by: EMERGENCY MEDICINE

## 2024-09-19 PROCEDURE — 83735 ASSAY OF MAGNESIUM: CPT | Performed by: EMERGENCY MEDICINE

## 2024-09-19 PROCEDURE — 36415 COLL VENOUS BLD VENIPUNCTURE: CPT | Performed by: STUDENT IN AN ORGANIZED HEALTH CARE EDUCATION/TRAINING PROGRAM

## 2024-09-19 RX ORDER — IPRATROPIUM BROMIDE AND ALBUTEROL SULFATE 2.5; .5 MG/3ML; MG/3ML
3 SOLUTION RESPIRATORY (INHALATION)
Status: DISCONTINUED | OUTPATIENT
Start: 2024-09-19 | End: 2024-09-22 | Stop reason: HOSPADM

## 2024-09-19 RX ORDER — SODIUM CHLORIDE 0.9 % (FLUSH) 0.9 %
10 SYRINGE (ML) INJECTION AS NEEDED
Status: DISCONTINUED | OUTPATIENT
Start: 2024-09-19 | End: 2024-09-22 | Stop reason: HOSPADM

## 2024-09-19 RX ORDER — AMOXICILLIN 250 MG
2 CAPSULE ORAL 2 TIMES DAILY PRN
Status: DISCONTINUED | OUTPATIENT
Start: 2024-09-19 | End: 2024-09-22 | Stop reason: HOSPADM

## 2024-09-19 RX ORDER — URSODIOL 300 MG/1
600 CAPSULE ORAL 2 TIMES DAILY WITH MEALS
Status: DISCONTINUED | OUTPATIENT
Start: 2024-09-19 | End: 2024-09-22 | Stop reason: HOSPADM

## 2024-09-19 RX ORDER — POLYETHYLENE GLYCOL 3350 17 G/17G
17 POWDER, FOR SOLUTION ORAL DAILY PRN
Status: DISCONTINUED | OUTPATIENT
Start: 2024-09-19 | End: 2024-09-22 | Stop reason: HOSPADM

## 2024-09-19 RX ORDER — SODIUM CHLORIDE 0.9 % (FLUSH) 0.9 %
10 SYRINGE (ML) INJECTION EVERY 12 HOURS SCHEDULED
Status: DISCONTINUED | OUTPATIENT
Start: 2024-09-19 | End: 2024-09-22 | Stop reason: HOSPADM

## 2024-09-19 RX ORDER — SPIRONOLACTONE 25 MG/1
50 TABLET ORAL DAILY
Status: DISCONTINUED | OUTPATIENT
Start: 2024-09-20 | End: 2024-09-22 | Stop reason: HOSPADM

## 2024-09-19 RX ORDER — POTASSIUM CHLORIDE 750 MG/1
40 CAPSULE, EXTENDED RELEASE ORAL ONCE
Status: COMPLETED | OUTPATIENT
Start: 2024-09-19 | End: 2024-09-19

## 2024-09-19 RX ORDER — PANTOPRAZOLE SODIUM 40 MG/1
40 TABLET, DELAYED RELEASE ORAL DAILY
Status: DISCONTINUED | OUTPATIENT
Start: 2024-09-20 | End: 2024-09-22 | Stop reason: HOSPADM

## 2024-09-19 RX ORDER — ACETAMINOPHEN 500 MG
500 TABLET ORAL ONCE
Status: COMPLETED | OUTPATIENT
Start: 2024-09-19 | End: 2024-09-19

## 2024-09-19 RX ORDER — ACETAMINOPHEN 500 MG
TABLET ORAL
Status: DISPENSED
Start: 2024-09-19 | End: 2024-09-20

## 2024-09-19 RX ORDER — BISACODYL 10 MG
10 SUPPOSITORY, RECTAL RECTAL DAILY PRN
Status: DISCONTINUED | OUTPATIENT
Start: 2024-09-19 | End: 2024-09-22 | Stop reason: HOSPADM

## 2024-09-19 RX ORDER — IBUPROFEN 400 MG/1
800 TABLET, FILM COATED ORAL ONCE
Status: COMPLETED | OUTPATIENT
Start: 2024-09-19 | End: 2024-09-19

## 2024-09-19 RX ORDER — MENTHOL AND METHYL SALICYLATE 7.6; 29 G/100G; G/100G
1 OINTMENT TOPICAL 4 TIMES DAILY PRN
Status: DISCONTINUED | OUTPATIENT
Start: 2024-09-19 | End: 2024-09-22 | Stop reason: HOSPADM

## 2024-09-19 RX ORDER — ALBUTEROL SULFATE 90 UG/1
2 INHALANT RESPIRATORY (INHALATION) EVERY 4 HOURS PRN
Status: DISCONTINUED | OUTPATIENT
Start: 2024-09-19 | End: 2024-09-22 | Stop reason: HOSPADM

## 2024-09-19 RX ORDER — SODIUM CHLORIDE 9 MG/ML
40 INJECTION, SOLUTION INTRAVENOUS AS NEEDED
Status: DISCONTINUED | OUTPATIENT
Start: 2024-09-19 | End: 2024-09-22 | Stop reason: HOSPADM

## 2024-09-19 RX ORDER — NITROGLYCERIN 0.4 MG/1
0.4 TABLET SUBLINGUAL
Status: DISCONTINUED | OUTPATIENT
Start: 2024-09-19 | End: 2024-09-22 | Stop reason: HOSPADM

## 2024-09-19 RX ORDER — BISACODYL 5 MG/1
5 TABLET, DELAYED RELEASE ORAL DAILY PRN
Status: DISCONTINUED | OUTPATIENT
Start: 2024-09-19 | End: 2024-09-22 | Stop reason: HOSPADM

## 2024-09-19 RX ORDER — SODIUM CHLORIDE 9 MG/ML
75 INJECTION, SOLUTION INTRAVENOUS CONTINUOUS
Status: ACTIVE | OUTPATIENT
Start: 2024-09-19 | End: 2024-09-20

## 2024-09-19 RX ADMIN — PIPERACILLIN AND TAZOBACTAM 3.38 G: 3; .375 INJECTION, POWDER, FOR SOLUTION INTRAVENOUS at 21:00

## 2024-09-19 RX ADMIN — SODIUM CHLORIDE 75 ML/HR: 9 INJECTION, SOLUTION INTRAVENOUS at 21:00

## 2024-09-19 RX ADMIN — PIPERACILLIN AND TAZOBACTAM 3.38 G: 3; .375 INJECTION, POWDER, FOR SOLUTION INTRAVENOUS at 13:51

## 2024-09-19 RX ADMIN — IBUPROFEN 800 MG: 400 TABLET, FILM COATED ORAL at 13:23

## 2024-09-19 RX ADMIN — ACETAMINOPHEN 500 MG: 500 TABLET ORAL at 23:01

## 2024-09-19 RX ADMIN — POTASSIUM CHLORIDE 40 MEQ: 750 CAPSULE, EXTENDED RELEASE ORAL at 16:34

## 2024-09-19 RX ADMIN — SODIUM CHLORIDE 250 ML: 9 INJECTION, SOLUTION INTRAVENOUS at 13:51

## 2024-09-19 RX ADMIN — Medication 10 ML: at 21:01

## 2024-09-19 RX ADMIN — IPRATROPIUM BROMIDE AND ALBUTEROL SULFATE 3 ML: .5; 3 SOLUTION RESPIRATORY (INHALATION) at 20:18

## 2024-09-19 RX ADMIN — URSODIOL 600 MG: 300 CAPSULE ORAL at 21:01

## 2024-09-19 NOTE — LETTER
September 22, 2024     Patient: Wai Gay   YOB: 1985   Date of Visit: 9/19/2024       To Whom It May Concern:    It is my medical opinion that Wai Gay may return to work on 09/23/2024 .           Sincerely,        Abi Leroy RN

## 2024-09-19 NOTE — ED PROVIDER NOTES
Time: 2:17 PM EDT  Date of encounter:  9/19/2024  Independent Historian/Clinical History and Information was obtained by:   Patient    History is limited by: N/A    Chief Complaint: Shortness of breath      History of Present Illness:  Patient is a 38 y.o. year old male who presents to the emergency department for evaluation of shortness of breath and fever noted earlier today.  Patient has no cough or hemoptysis.  Patient denies nausea, vomiting, and diarrhea.  Patient denies abdominal pain.  Patient has no dysuria or urinary frequency.      Patient Care Team  Primary Care Provider: Callum Peterson DO    Past Medical History:     No Known Allergies  Past Medical History:   Diagnosis Date    Alcohol abuse 03/14/2017    Anxiety     Essential hypertension 12/27/2019    GERD (gastroesophageal reflux disease)     Hypokalemia 03/14/2017    Will update    Hyponatremia 03/14/2017    Steatohepatitis, alcoholic 03/14/2017    Tobacco abuse 03/14/2017    Umbilical hernia      Past Surgical History:   Procedure Laterality Date    CHOLECYSTECTOMY      ENDOSCOPY N/A 02/09/2022    Procedure: ESOPHAGOGASTRODUODENOSCOPY WITH BX;  Surgeon: Gino Khan MD;  Location: [facility] ENDOSCOPY;  Service: Gastroenterology;  Laterality: N/A;  RETAINED LIQUID FOOD IN STOMACH, HUFFMAN'S ESOPHAGUS    ENDOSCOPY N/A 02/10/2023    Procedure: ESOPHAGOGASTRODUODENOSCOPY;  Surgeon: Gino Khan MD;  Location: [facility] ENDOSCOPY;  Service: Gastroenterology;  Laterality: N/A;  GASTRITIS, BARRETTS ESOPHAGUS, PHG    UPPER GASTROINTESTINAL ENDOSCOPY       Family History   Problem Relation Age of Onset    Alcohol abuse Mother     Arthritis Mother     COPD Mother     Heart disease Maternal Grandmother     Hypertension Maternal Grandmother     Lung cancer Maternal Grandmother     Stroke Maternal Grandmother     Heart disease Maternal Grandfather     Lung cancer Maternal Grandfather     Cancer Paternal Grandmother     Cancer Paternal  Grandfather     Colon cancer Neg Hx     Malig Hyperthermia Neg Hx        Home Medications:  Prior to Admission medications    Medication Sig Start Date End Date Taking? Authorizing Provider   albuterol sulfate HFA (Ventolin HFA) 108 (90 Base) MCG/ACT inhaler Inhale 2 puffs Every 4 (Four) Hours As Needed for Wheezing or Shortness of Air. 7/29/24   Davian Watt MD   pantoprazole (PROTONIX) 40 MG EC tablet TAKE 1 TABLET BY MOUTH EVERY DAY 6/5/24   Shyla Yarbrough APRN   spironolactone (ALDACTONE) 50 MG tablet TAKE 1 TABLET BY MOUTH EVERY DAY 6/5/24   Shyla Yarbrough APRN   Umeclidinium-Vilanterol (ANORO ELLIPTA) 62.5-25 MCG/ACT aerosol powder  inhaler Inhale 1 puff Daily. 7/29/24   Davian Watt MD   ursodiol (ACTIGALL) 300 MG capsule Take 2 capsules by mouth 2 (Two) Times a Day. 6/8/24   ProviderRose MD        Social History:   Social History     Tobacco Use    Smoking status: Every Day     Current packs/day: 0.50     Average packs/day: 0.5 packs/day for 16.7 years (8.4 ttl pk-yrs)     Types: Cigarettes     Start date: 1/1/2008     Passive exposure: Current    Smokeless tobacco: Never   Vaping Use    Vaping status: Never Used   Substance Use Topics    Alcohol use: Not Currently     Alcohol/week: 3.0 standard drinks of alcohol     Comment: FORMER    Drug use: Not Currently         Review of Systems:  Review of Systems   Constitutional:  Positive for fever. Negative for chills.   HENT:  Negative for congestion, rhinorrhea and sore throat.    Eyes:  Negative for pain and visual disturbance.   Respiratory:  Positive for shortness of breath. Negative for apnea, cough and chest tightness.    Cardiovascular:  Negative for chest pain and palpitations.   Gastrointestinal:  Negative for abdominal pain, diarrhea, nausea and vomiting.   Genitourinary:  Negative for difficulty urinating and dysuria.   Musculoskeletal:  Negative for joint swelling and myalgias.   Skin:  Negative for color change.  "  Neurological:  Negative for seizures and headaches.   Psychiatric/Behavioral: Negative.     All other systems reviewed and are negative.       Physical Exam:  BP 90/64   Pulse 87   Temp 98.2 °F (36.8 °C) (Oral)   Resp 17   Ht 172.7 cm (68\")   Wt 74.8 kg (165 lb)   SpO2 95%   BMI 25.09 kg/m²     Physical Exam  Vitals and nursing note reviewed.   Constitutional:       General: He is not in acute distress.     Appearance: Normal appearance. He is not toxic-appearing.   HENT:      Head: Normocephalic and atraumatic.      Jaw: There is normal jaw occlusion.   Eyes:      General: Lids are normal.      Extraocular Movements: Extraocular movements intact.      Conjunctiva/sclera: Conjunctivae normal.      Pupils: Pupils are equal, round, and reactive to light.   Cardiovascular:      Rate and Rhythm: Normal rate and regular rhythm.      Pulses: Normal pulses.      Heart sounds: Normal heart sounds.   Pulmonary:      Effort: Pulmonary effort is normal. No respiratory distress.      Breath sounds: Normal breath sounds. No wheezing or rhonchi.   Abdominal:      General: Abdomen is flat.      Palpations: Abdomen is soft.      Tenderness: There is no abdominal tenderness. There is no guarding or rebound.   Musculoskeletal:         General: Normal range of motion.      Cervical back: Normal range of motion and neck supple.      Right lower leg: No edema.      Left lower leg: No edema.   Skin:     General: Skin is warm and dry.   Neurological:      Mental Status: He is alert and oriented to person, place, and time. Mental status is at baseline.   Psychiatric:         Mood and Affect: Mood normal.                  Procedures:  Procedures      Medical Decision Making:      Comorbidities that affect care:    Cirrhosis    External Notes reviewed:    Hospital Discharge Summary: Patient was last admitted for SBP      The following orders were placed and all results were independently analyzed by me:  Orders Placed This Encounter "   Procedures    Blood Culture - Blood,    Blood Culture - Blood,    COVID PRE-OP / PRE-PROCEDURE SCREENING ORDER (NO ISOLATION) - Swab, Nasopharynx    COVID-19, FLU A/B, RSV PCR 1 HR TAT - Swab, Nasopharynx    XR Chest 1 View    Jonancy Draw    Comprehensive Metabolic Panel    BNP    Single High Sensitivity Troponin T    CBC Auto Differential    Lactic Acid, Plasma    STAT Lactic Acid, Reflex    Urinalysis With Microscopic If Indicated (No Culture) - Urine, Clean Catch    Magnesium    Urinalysis, Microscopic Only - Urine, Clean Catch    Basic Metabolic Panel    CBC Auto Differential    NPO Diet NPO Type: Strict NPO    Undress & Gown    Vital Signs    Undress & Gown    Vital Signs    Vital Signs    Up in Chair    Intake & Output    Weigh Patient    Oral Care    Place Sequential Compression Device    Maintain Sequential Compression Device    Maintain IV Access    Telemetry - Place Orders & Notify Provider of Results When Patient Experiences Acute Chest Pain, Dysrhythmia or Respiratory Distress    Continuous Pulse Oximetry    Code Status and Medical Interventions: CPR (Attempt to Resuscitate); Full Support    Inpatient Pulmonology Consult    Inpatient Hospitalist Consult    Inpatient Gastroenterology Consult    Oxygen Therapy- Nasal Cannula; Titrate 1-6 LPM Per SpO2; 90 - 95%    ECG 12 Lead ED Triage Standing Order; SOA    Insert Peripheral IV    Insert Peripheral IV    Insert Peripheral IV    Inpatient Admission    CBC & Differential    Green Top (Gel)    Lavender Top    Gold Top - SST    Light Blue Top    Extra Tubes    Green Top (Gel)       Medications Given in the Emergency Department:  Medications   sodium chloride 0.9 % flush 10 mL (has no administration in time range)   sodium chloride 0.9 % flush 10 mL (has no administration in time range)   sodium chloride 0.9 % flush 10 mL (has no administration in time range)   sodium chloride 0.9 % flush 10 mL (has no administration in time range)   sodium chloride 0.9 %  infusion 40 mL (has no administration in time range)   nitroglycerin (NITROSTAT) SL tablet 0.4 mg (has no administration in time range)   Potassium Replacement - Follow Nurse / BPA Driven Protocol (has no administration in time range)   Magnesium Standard Dose Replacement - Follow Nurse / BPA Driven Protocol (has no administration in time range)   Phosphorus Replacement - Follow Nurse / BPA Driven Protocol (has no administration in time range)   Calcium Replacement - Follow Nurse / BPA Driven Protocol (has no administration in time range)   sennosides-docusate (PERICOLACE) 8.6-50 MG per tablet 2 tablet (has no administration in time range)     And   polyethylene glycol (MIRALAX) packet 17 g (has no administration in time range)     And   bisacodyl (DULCOLAX) EC tablet 5 mg (has no administration in time range)     And   bisacodyl (DULCOLAX) suppository 10 mg (has no administration in time range)   piperacillin-tazobactam (ZOSYN) IVPB 3.375 g IVPB in 100 mL NS (VTB) (has no administration in time range)   piperacillin-tazobactam (ZOSYN) IVPB 3.375 g IVPB in 100 mL NS (VTB) (0 g Intravenous Stopped 9/19/24 1429)   sodium chloride 0.9 % bolus 250 mL (0 mL Intravenous Stopped 9/19/24 1515)   ibuprofen (ADVIL,MOTRIN) tablet 800 mg (800 mg Oral Given 9/19/24 1323)   potassium chloride (MICRO-K/KLOR-CON) CR capsule (40 mEq Oral Given 9/19/24 1634)        ED Course:         Labs:    Lab Results (last 24 hours)       Procedure Component Value Units Date/Time    CBC & Differential [[id number]]  (Abnormal) Collected: 09/19/24 1307    Specimen: Blood Updated: 09/19/24 1320    Narrative:      The following orders were created for panel order CBC & Differential.  Procedure                               Abnormality         Status                     ---------                               -----------         ------                     CBC Auto Differential[[id number]]        Abnormal            Final result                 Please  view results for these tests on the individual orders.    Comprehensive Metabolic Panel [[id number]]  (Abnormal) Collected: 09/19/24 1307    Specimen: Blood Updated: 09/19/24 1337     Glucose 103 mg/dL      BUN 16 mg/dL      Creatinine 1.40 mg/dL      Sodium 126 mmol/L      Potassium 2.7 mmol/L      Comment: Slight hemolysis detected by analyzer. Result may be falsely elevated.        Chloride 91 mmol/L      CO2 23.0 mmol/L      Calcium 8.1 mg/dL      Total Protein 6.3 g/dL      Albumin 2.6 g/dL      ALT (SGPT) 48 U/L      AST (SGOT) 111 U/L      Alkaline Phosphatase 210 U/L      Total Bilirubin 1.4 mg/dL      Globulin 3.7 gm/dL      A/G Ratio 0.7 g/dL      BUN/Creatinine Ratio 11.4     Anion Gap 12.0 mmol/L      eGFR 66.8 mL/min/1.73     Narrative:      GFR Normal >60  Chronic Kidney Disease <60  Kidney Failure <15      BNP [[id number]]  (Normal) Collected: 09/19/24 1307    Specimen: Blood Updated: 09/19/24 1334     proBNP 346.0 pg/mL     Narrative:      This assay is used as an aid in the diagnosis of individuals suspected of having heart failure. It can be used as an aid in the diagnosis of acute decompensated heart failure (ADHF) in patients presenting with signs and symptoms of ADHF to the emergency department (ED). In addition, NT-proBNP of <300 pg/mL indicates ADHF is not likely.    Age Range Result Interpretation  NT-proBNP Concentration (pg/mL:      <50             Positive            >450                   Gray                 300-450                    Negative             <300    50-75           Positive            >900                  Gray                300-900                  Negative            <300      >75             Positive            >1800                  Gray                300-1800                  Negative            <300    Single High Sensitivity Troponin T [[id number]]  (Normal) Collected: 09/19/24 1307    Specimen: Blood Updated: 09/19/24 1337     HS Troponin T 17 ng/L     Narrative:       High Sensitive Troponin T Reference Range:  <14.0 ng/L- Negative Female for AMI  <22.0 ng/L- Negative Male for AMI  >=14 - Abnormal Female indicating possible myocardial injury.  >=22 - Abnormal Male indicating possible myocardial injury.   Clinicians would have to utilize clinical acumen, EKG, Troponin, and serial changes to determine if it is an Acute Myocardial Infarction or myocardial injury due to an underlying chronic condition.         CBC Auto Differential [[id number]]  (Abnormal) Collected: 09/19/24 1307    Specimen: Blood Updated: 09/19/24 1320     WBC 13.27 10*3/mm3      RBC 3.64 10*6/mm3      Hemoglobin 10.2 g/dL      Hematocrit 30.6 %      MCV 84.1 fL      MCH 28.0 pg      MCHC 33.3 g/dL      RDW 16.7 %      RDW-SD 51.8 fl      MPV 10.1 fL      Platelets 138 10*3/mm3      Neutrophil % 88.9 %      Lymphocyte % 2.5 %      Monocyte % 6.9 %      Eosinophil % 0.7 %      Basophil % 0.2 %      Immature Grans % 0.8 %      Neutrophils, Absolute 11.79 10*3/mm3      Lymphocytes, Absolute 0.33 10*3/mm3      Monocytes, Absolute 0.92 10*3/mm3      Eosinophils, Absolute 0.09 10*3/mm3      Basophils, Absolute 0.03 10*3/mm3      Immature Grans, Absolute 0.11 10*3/mm3      nRBC 0.0 /100 WBC     Lactic Acid, Plasma [[id number]]  (Abnormal) Collected: 09/19/24 1307    Specimen: Blood Updated: 09/19/24 1343     Lactate 3.7 mmol/L     Blood Culture - Blood, Arm, Left [[id number]] Collected: 09/19/24 1307    Specimen: Blood from Arm, Left Updated: 09/19/24 1314    Magnesium [[id number]]  (Normal) Collected: 09/19/24 1307    Specimen: Blood Updated: 09/19/24 1424     Magnesium 1.6 mg/dL     Blood Culture - Blood, Arm, Right [[id number]] Collected: 09/19/24 1350    Specimen: Blood from Arm, Right Updated: 09/19/24 1400    COVID PRE-OP / PRE-PROCEDURE SCREENING ORDER (NO ISOLATION) - Swab, Nasopharynx [[id number]]  (Normal) Collected: 09/19/24 1422    Specimen: Swab from Nasopharynx Updated: 09/19/24 9059    Narrative:      The  following orders were created for panel order COVID PRE-OP / PRE-PROCEDURE SCREENING ORDER (NO ISOLATION) - Swab, Nasopharynx.  Procedure                               Abnormality         Status                     ---------                               -----------         ------                     COVID-19, FLU A/B, RSV P...[[id number]]  Normal              Final result                 Please view results for these tests on the individual orders.    COVID-19, FLU A/B, RSV PCR 1 HR TAT - Swab, Nasopharynx [[id number]]  (Normal) Collected: 09/19/24 1422    Specimen: Swab from Nasopharynx Updated: 09/19/24 1505     COVID19 Not Detected     Influenza A PCR Not Detected     Influenza B PCR Not Detected     RSV, PCR Not Detected    Narrative:      Fact sheet for providers: https://www.fda.gov/media/509852/download    Fact sheet for patients: https://www.fda.gov/media/072531/download    Test performed by PCR.    Urinalysis With Microscopic If Indicated (No Culture) - Urine, Clean Catch [[id number]]  (Abnormal) Collected: 09/19/24 1600    Specimen: Urine, Clean Catch Updated: 09/19/24 1612     Color, UA Dark Yellow     Appearance, UA Clear     pH, UA 6.0     Specific Gravity, UA 1.020     Glucose, UA Negative     Ketones, UA Trace     Bilirubin, UA Small (1+)     Blood, UA Negative     Protein, UA Trace     Leuk Esterase, UA Trace     Nitrite, UA Negative     Urobilinogen, UA 1.0 E.U./dL    Urinalysis, Microscopic Only - Urine, Clean Catch [[id number]]  (Abnormal) Collected: 09/19/24 1600    Specimen: Urine, Clean Catch Updated: 09/19/24 1621     RBC, UA None Seen /HPF      WBC, UA 0-2 /HPF      Bacteria, UA Trace /HPF      Squamous Epithelial Cells, UA 0-2 /HPF      Hyaline Casts, UA None Seen /LPF      Calcium Oxalate Crystals, UA Large/3+ /HPF      Methodology Manual Light Microscopy    STAT Lactic Acid, Reflex [[id number]]  (Normal) Collected: 09/19/24 1816    Specimen: Blood from Arm, Right Updated: 09/19/24 1847      Lactate 2.0 mmol/L              Imaging:    XR Chest 1 View    Result Date: 9/19/2024  XR CHEST 1 VW Date of Exam: 9/19/2024 12:40 PM EDT Indication: SOA Triage Protocol Comparison: 8/2/2024 Findings: Heart size and pulmonary vasculature are within normal limits moderate right pleural effusion with secondary atelectasis in the lung base. Left lung clear with sharp costophrenic angle     Impression: Moderate right pleural effusion Electronically Signed: Franklin Nowak  9/19/2024 12:52 PM EDT  Workstation ID: [id number]       Differential Diagnosis and Discussion:    Fever: Based on the complaint of fever, differential diagnosis includes but is not limited to meningitis, pneumonia, pyelonephritis, acute uti,  systemic immune response syndrome, sepsis, viral syndrome, fungal infection, tick born illness and other bacterial infections.    All labs were reviewed and interpreted by me.  All X-rays impressions were independently interpreted by me.    MDM     Amount and/or Complexity of Data Reviewed  Decide to obtain previous medical records or to obtain history from someone other than the patient: yes       The patient´s CBC that was reviewed and interpreted by me shows no abnormalities of critical concern. Of note, there is no anemia requiring a blood transfusion and the platelet count is acceptable.  The patient´s CMP that was reviewed and interpretted by me shows no abnormalities of critical concern. Of note, the patient´s sodium and potassium are acceptable. The patient´s liver enzymes are unremarkable. The patient´s renal function (creatinine) is preserved. The patient has a normal anion gap.  Chest x-ray shows an effusion.            Sepsis criteria was met in the emergency department and the Sepsis protocol (including antibiotic administration) was initiated.      SIRS criteria considered:   1.  Temperature > 100.4 or <96.8    2.  Heart Rate > 90    3.  Respiratory Rate > 22    4.  WBC > 12K or <4K.             Severe  Sepsis:     Respiratory: Mechanical Ventilation or Bipap  Hypotension: SBP > 90 or MAP < 65  Renal: Creatinine > 2  Metabolic: Lactic Acid > 2  Hematologic: Platelets < 100K or INR > 1.5  Hepatic: BILI  >  2  CNS: Sudden AMS     Septic Shock:     Severe Sepsis + Persistent hypotension or Lactic Acid > 4     Normal saline bolus, Antibiotics, and final disposition was based on these definitions.        Sepsis was recognized at arrival    Antibiotics were ordered.     30 cc/kg bolus was not indicated.       Patient did not receive the recommended 30ml/kg fluid bolus for sepsis because it would be harmful or detrimental to the patient.    The patient has concern for fluid overload.   The patient was ordered 250 cc of fluids.    The patient presents with 2 out of the 4 SIRS criteria and a suspected source for sepsis.  Patient was evaluated and placed on a cardiac monitor for fear of worsening tachycardia and life-threatening hypotension.  Patient was monitored for shock and signs of end-organ damage.  Mental status was repeatedly checked throughout the ED stay.  Medications were ordered by me which includes fluids and antibiotics.  The case was discussed at length with admitting physician.    Total Critical Care time of 45 minutes. Total critical care time documented does not include time spent on separately billed procedures for services of nurses or physician assistants. I personally saw and examined the patient. I have reviewed all diagnostic interpretations and treatment plans as written. I was present for the key portions of any procedures performed and the inclusive time noted in any critical care statement. Critical care time includes patient management by me, time spent at the patients bedside,  time to review lab and imaging results, discussing patient care, documentation in the medical record, and time spent with family or caregiver.    Patient Care Considerations:    None      Consultants/Shared Management  Plan:    Case was discussed with Dr. Montero who recommends admission.  Case was discussed with Dr. Macdonald who agrees to admit the patient.    Social Determinants of Health:    Patient is independent, reliable, and has access to care.       Disposition and Care Coordination:    Admit:   Through independent evaluation of the patient's history, physical, and imperical data, the patient meets criteria for inpatient admission to the hospital.        Final diagnoses:   Sepsis, due to unspecified organism, unspecified whether acute organ dysfunction present        ED Disposition       ED Disposition   Decision to Admit    Condition   --    Comment   Level of Care: Progressive Care [20]   Diagnosis: Sepsis, due to unspecified organism, unspecified whether acute organ dysfunction present [[id number]]   Isolate for COVID?: No [0]   Certification: I Certify That Inpatient Hospital Services Are Medically Necessary For Greater Than 2 Midnights                 This medical record created using voice recognition software.             Dru Woody MD  09/19/24 0824

## 2024-09-19 NOTE — H&P
[facility]   HISTORY AND PHYSICAL    Patient Name: Wai Gay  : 1985  MRN: [id number]  Primary Care Physician:  Callum Peterson DO  Date of admission: 2024    Subjective   Subjective     Chief Complaint: fever    History of Present Illness  Patient is a 38-year-old male with history of alcoholic cirrhosis with regular paracentesis, SBP, hypertension, GERD, tobacco use, who presents the emergency room with shortness of breath, and fever.  Patient denies any chest pain, chills, diarrhea, dysuria, cough, congestion, runny nose, or recent sick contacts.  Of note, the patient was recently admitted less than a month ago when he presented with similar symptoms and was treated for spontaneous bacterial peritonitis.  On presentation today, the patient's labs were significant for a lactic acid of 3.7, potassium of 2.7, elevated white count of 13.2, hemoglobin of 10.2.  Blood pressure was 97/58, heart rate was 105, and temperature was 99.1.  He met criteria for sepsis.  Imaging of his chest showed moderate right pleural effusion.  The patient is being admitted for further treatment  Shortness of Breath  Associated symptoms include abdominal pain and a fever. Pertinent negatives include no chest pain, leg swelling or vomiting.       Review of Systems   Constitutional:  Positive for fever. Negative for activity change, appetite change and chills.   HENT:  Negative for congestion.    Respiratory:  Positive for shortness of breath.    Cardiovascular:  Negative for chest pain, palpitations and leg swelling.   Gastrointestinal:  Positive for abdominal distention and abdominal pain. Negative for diarrhea, nausea and vomiting.   Endocrine: Negative for cold intolerance and heat intolerance.   Genitourinary:  Negative for difficulty urinating and dysuria.   Skin:  Negative for color change and pallor.   Neurological:  Negative for dizziness and seizures.   Psychiatric/Behavioral:  Negative for  agitation and confusion.         Personal History     Past Medical History:   Diagnosis Date   • Alcohol abuse 03/14/2017   • Anxiety    • Essential hypertension 12/27/2019   • GERD (gastroesophageal reflux disease)    • Hypokalemia 03/14/2017    Will update   • Hyponatremia 03/14/2017   • Steatohepatitis, alcoholic 03/14/2017   • Tobacco abuse 03/14/2017   • Umbilical hernia        Past Surgical History:   Procedure Laterality Date   • CHOLECYSTECTOMY     • ENDOSCOPY N/A 02/09/2022    Procedure: ESOPHAGOGASTRODUODENOSCOPY WITH BX;  Surgeon: Gino Khan MD;  Location: [facility] ENDOSCOPY;  Service: Gastroenterology;  Laterality: N/A;  RETAINED LIQUID FOOD IN STOMACH, HUFFMAN'S ESOPHAGUS   • ENDOSCOPY N/A 02/10/2023    Procedure: ESOPHAGOGASTRODUODENOSCOPY;  Surgeon: Gino Khan MD;  Location: [facility] ENDOSCOPY;  Service: Gastroenterology;  Laterality: N/A;  GASTRITIS, BARRETTS ESOPHAGUS, PHG   • UPPER GASTROINTESTINAL ENDOSCOPY         Family History: family history includes Alcohol abuse in his mother; Arthritis in his mother; COPD in his mother; Cancer in his paternal grandfather and paternal grandmother; Heart disease in his maternal grandfather and maternal grandmother; Hypertension in his maternal grandmother; Lung cancer in his maternal grandfather and maternal grandmother; Stroke in his maternal grandmother. Otherwise pertinent FHx was reviewed and not pertinent to current issue.    Social History:  reports that he has been smoking cigarettes. He started smoking about 16 years ago. He has a 8.4 pack-year smoking history. He has been exposed to tobacco smoke. He has never used smokeless tobacco. He reports that he does not currently use alcohol after a past usage of about 3.0 standard drinks of alcohol per week. He reports that he does not currently use drugs.    Home Medications:  Umeclidinium-Vilanterol, albuterol sulfate HFA, pantoprazole, spironolactone, and ursodiol    Allergies:  No  Known Allergies    Objective    Objective     Vitals:   Temp:  [99.1 °F (37.3 °C)-102.4 °F (39.1 °C)] 99.1 °F (37.3 °C)  Heart Rate:  [102-112] 105  Resp:  [17-19] 19  BP: ()/(54-65) 97/58    Physical Exam  Constitutional:       Appearance: Normal appearance.   HENT:      Head: Normocephalic and atraumatic.      Right Ear: Tympanic membrane normal.      Left Ear: Tympanic membrane normal.      Nose: Nose normal.      Mouth/Throat:      Mouth: Mucous membranes are moist.   Eyes:      Extraocular Movements: Extraocular movements intact.      Conjunctiva/sclera: Conjunctivae normal.      Pupils: Pupils are equal, round, and reactive to light.   Cardiovascular:      Rate and Rhythm: Regular rhythm. Tachycardia present.   Pulmonary:      Effort: Pulmonary effort is normal.      Breath sounds: Normal breath sounds.   Musculoskeletal:      Cervical back: Neck supple.      Right lower leg: No edema.      Left lower leg: Edema present.   Skin:     Capillary Refill: Capillary refill takes less than 2 seconds.   Neurological:      General: No focal deficit present.      Mental Status: He is alert and oriented to person, place, and time.   Psychiatric:         Mood and Affect: Mood normal.         Behavior: Behavior normal.         Result Review    Result Review:  I have personally reviewed the results from the time of this admission to 9/19/2024 16:59 EDT and agree with these findings:  []  Laboratory list / accordion  []  Microbiology  []  Radiology  [x]  EKG/Telemetry   []  Cardiology/Vascular   []  Pathology  [x]  Old records  []  Other:  Most notable findings include:       Assessment & Plan   Assessment / Plan     Brief Patient Summary:  Wai Gay is a 38 y.o. male with history of alcoholic cirrhosis, regular paracentesis, who with fever, abdominal pain, concerning for SBP    Active Hospital Problems:  Active Hospital Problems    Diagnosis    • **Sepsis, due to unspecified organism, unspecified whether  acute organ dysfunction present    Sepsis secondary to intra-abdominal infection  SBP (spontaneous bacterial peritonitis)    Personal history of PE (pulmonary embolism)    Cirrhosis of liver with ascites    Essential hypertension    Tobacco abuse    History of alcohol abuse   Hypokalemia  Normocytic anemia  Acute kidney injury     Plan:     -Admit to telemetry  -Continue antibiotic treatment with Zosyn.  Can be changed to ceftriaxone  -Follow-up blood cultures  -Gentle IV fluid rehydration  -Consult pulm for thoracentesis  -Consult GI  -Supportive care  -Reconcile and resume home medicines  -Follow-up morning labs, and replete electrolytes as needed  -Follow-up anemia workup  -Follow kidney function to resolution      VTE Prophylaxis: SCDs  Mechanical VTE prophylaxis orders are present.        CODE STATUS:    Code Status (Patient has no pulse and is not breathing): CPR (Attempt to Resuscitate)  Medical Interventions (Patient has pulse or is breathing): Full Support    Admission Status:  I believe this patient meets inpatient status.    Tulio Duff MD

## 2024-09-19 NOTE — ED TRIAGE NOTES
"Pt arrives to Ed with complaints of shortness of breath that started a couple days ago.. per stated \"I had my lungs drained a couple weeks ago\" pt also states that he has cirrhosis of the liver   "

## 2024-09-19 NOTE — CASE MANAGEMENT/SOCIAL WORK
Discharge Planning Assessment  MAMTA Abdi     Patient Name: Wai Gay  MRN: [id number]  Today's Date: 9/19/2024    Admit Date: 9/19/2024        Discharge Needs Assessment       Row Name 09/19/24 1723       Living Environment    People in Home alone (P)     Current Living Arrangements home (P)     Potentially Unsafe Housing Conditions none (P)     In the past 12 months has the electric, gas, oil, or water company threatened to shut off services in your home? No (P)     Primary Care Provided by self (P)     Provides Primary Care For no one (P)     Family Caregiver if Needed none (P)     Quality of Family Relationships helpful;involved;supportive (P)     Able to Return to Prior Arrangements yes (P)        Resource/Environmental Concerns    Resource/Environmental Concerns none (P)     Transportation Concerns none (P)        Transportation Needs    In the past 12 months, has lack of transportation kept you from medical appointments or from getting medications? no (P)     In the past 12 months, has lack of transportation kept you from meetings, work, or from getting things needed for daily living? No (P)        Food Insecurity    Within the past 12 months, you worried that your food would run out before you got the money to buy more. Never true (P)     Within the past 12 months, the food you bought just didn't last and you didn't have money to get more. Never true (P)        Transition Planning    Patient/Family Anticipates Transition to home (P)     Patient/Family Anticipated Services at Transition none (P)     Transportation Anticipated family or friend will provide (P)        Discharge Needs Assessment    Readmission Within the Last 30 Days previous discharge plan unsuccessful (P)     Equipment Currently Used at Home none (P)     Concerns to be Addressed employment/school (P)     Anticipated Changes Related to Illness none (P)     Equipment Needed After Discharge none (P)                    Discharge Plan     No documentation.                 Continued Care and Services - Admitted Since 9/19/2024    No active coordination exists for this encounter.          Demographic Summary       Row Name 09/19/24 1716       General Information    Preferred Language English (P)                    Functional Status       Row Name 09/19/24 1718       Physical Activity    On average, how many days per week do you engage in moderate to strenuous exercise (like a brisk walk)? 0 days (P)     On average, how many minutes do you engage in exercise at this level? 0 min (P)     Number of minutes of exercise per week 0 (P)        Functional Status, IADL    Medications independent (P)     Meal Preparation independent (P)     Housekeeping independent (P)     Laundry independent (P)     Shopping independent (P)        Employment/    Employment Status employed full-time (P)                    Psychosocial       Row Name 09/19/24 1719       Mental Health    Little Interest or Pleasure in Doing Things 0-->not at all (P)     Feeling Down, Depressed or Hopeless 0-->not at all (P)        Stress    Do you feel stress - tense, restless, nervous, or anxious, or unable to sleep at night because your mind is troubled all the time - these days? Not at all (P)        Developmental Stage (Eriksson's)    Developmental Stage Stage 7 (35-65 years/Middle Adulthood) Generativity vs. Stagnation (P)                    Abuse/Neglect       Row Name 09/19/24 1720       Personal Safety    Feels Unsafe at Home or Work/School no (P)     Feels Threatened by Someone no (P)     Does Anyone Try to Keep You From Having Contact with Others or Doing Things Outside Your Home? no (P)     Physical Signs of Abuse Present no (P)                    Legal       Row Name 09/19/24 1720       Financial Resource Strain    How hard is it for you to pay for the very basics like food, housing, medical care, and heating? Not hard (P)        Financial/Legal    Source of Income  salary/wages (P)                    Substance Abuse    No documentation.                  Patient Forms    No documentation.                 Sw met with pt at bedside with mother Jasmyn. Pt was recently admitted within the past 30 days, pt stated with the hospital visits he did have concerns about not being able to work with the hospital visits so pt is not able to make money. Pt also stated that he thinks that his inhalers are due for a refill next week. Pt was able to see his PCP between now and his last admission. Pt did not state any current needs at this time.     Jonna Narvaez, Social Work Student

## 2024-09-20 ENCOUNTER — APPOINTMENT (OUTPATIENT)
Dept: GENERAL RADIOLOGY | Facility: HOSPITAL | Age: 39
DRG: 872 | End: 2024-09-20
Payer: COMMERCIAL

## 2024-09-20 ENCOUNTER — APPOINTMENT (OUTPATIENT)
Dept: INTERVENTIONAL RADIOLOGY/VASCULAR | Facility: HOSPITAL | Age: 39
DRG: 872 | End: 2024-09-20
Payer: COMMERCIAL

## 2024-09-20 LAB
ALBUMIN FLD-MCNC: <0.2 G/DL
ANION GAP SERPL CALCULATED.3IONS-SCNC: 10.4 MMOL/L (ref 5–15)
APPEARANCE FLD: CLEAR
APPEARANCE FLD: CLEAR
APTT PPP: 38 SECONDS (ref 24.2–34.2)
BASOPHILS # BLD AUTO: 0.02 10*3/MM3 (ref 0–0.2)
BASOPHILS NFR BLD AUTO: 0.2 % (ref 0–1.5)
BUN SERPL-MCNC: 19 MG/DL (ref 6–20)
BUN/CREAT SERPL: 14 (ref 7–25)
CALCIUM SPEC-SCNC: 7.7 MG/DL (ref 8.6–10.5)
CHLORIDE SERPL-SCNC: 98 MMOL/L (ref 98–107)
CO2 SERPL-SCNC: 21.6 MMOL/L (ref 22–29)
COLOR FLD: NORMAL
COLOR FLD: NORMAL
CREAT SERPL-MCNC: 1.36 MG/DL (ref 0.76–1.27)
DEPRECATED RDW RBC AUTO: 53 FL (ref 37–54)
EGFRCR SERPLBLD CKD-EPI 2021: 69.2 ML/MIN/1.73
EOSINOPHIL # BLD AUTO: 0.03 10*3/MM3 (ref 0–0.4)
EOSINOPHIL NFR BLD AUTO: 0.3 % (ref 0.3–6.2)
ERYTHROCYTE [DISTWIDTH] IN BLOOD BY AUTOMATED COUNT: 16.9 % (ref 12.3–15.4)
GLUCOSE SERPL-MCNC: 107 MG/DL (ref 65–99)
HCT VFR BLD AUTO: 23.7 % (ref 37.5–51)
HGB BLD-MCNC: 7.9 G/DL (ref 13–17.7)
IMM GRANULOCYTES # BLD AUTO: 0.08 10*3/MM3 (ref 0–0.05)
IMM GRANULOCYTES NFR BLD AUTO: 0.7 % (ref 0–0.5)
INR PPP: 1.84 (ref 0.86–1.15)
LDH FLD-CCNC: 25 U/L
LDH FLD-CCNC: 43 U/L
LYMPHOCYTES # BLD AUTO: 0.73 10*3/MM3 (ref 0.7–3.1)
LYMPHOCYTES NFR BLD AUTO: 6.8 % (ref 19.6–45.3)
LYMPHOCYTES NFR FLD MANUAL: 26 %
LYMPHOCYTES NFR FLD MANUAL: 60 %
MACROPHAGE FLUID: 33 %
MACROPHAGE FLUID: 8 %
MCH RBC QN AUTO: 28.4 PG (ref 26.6–33)
MCHC RBC AUTO-ENTMCNC: 33.3 G/DL (ref 31.5–35.7)
MCV RBC AUTO: 85.3 FL (ref 79–97)
MESOTHL CELL NFR FLD MANUAL: 5 %
MESOTHL CELL NFR FLD MANUAL: 6 %
MONOCYTES # BLD AUTO: 0.75 10*3/MM3 (ref 0.1–0.9)
MONOCYTES NFR BLD AUTO: 7 % (ref 5–12)
MONOCYTES NFR FLD: 12 %
MONOCYTES NFR FLD: 20 %
NEUTROPHILS NFR BLD AUTO: 85 % (ref 42.7–76)
NEUTROPHILS NFR BLD AUTO: 9.09 10*3/MM3 (ref 1.7–7)
NEUTROPHILS NFR FLD MANUAL: 24 %
NEUTROPHILS NFR FLD MANUAL: 6 %
NRBC BLD AUTO-RTO: 0 /100 WBC (ref 0–0.2)
NUC CELL # FLD: 48 /MM3
NUC CELL # FLD: 54 /MM3
PH FLD: 7.5 [PH]
PLATELET # BLD AUTO: 93 10*3/MM3 (ref 140–450)
PMV BLD AUTO: 10.9 FL (ref 6–12)
POTASSIUM SERPL-SCNC: 2.9 MMOL/L (ref 3.5–5.2)
PROT FLD-MCNC: <1 G/DL
PROTHROMBIN TIME: 21.6 SECONDS (ref 11.8–14.9)
QT INTERVAL: 349 MS
QTC INTERVAL: 482 MS
RBC # BLD AUTO: 2.78 10*6/MM3 (ref 4.14–5.8)
RBC # FLD AUTO: <2000 /MM3
RBC # FLD AUTO: <2000 /MM3
SODIUM SERPL-SCNC: 130 MMOL/L (ref 136–145)
WBC NRBC COR # BLD AUTO: 10.7 10*3/MM3 (ref 3.4–10.8)

## 2024-09-20 PROCEDURE — 83615 LACTATE (LD) (LDH) ENZYME: CPT | Performed by: NURSE PRACTITIONER

## 2024-09-20 PROCEDURE — 83986 ASSAY PH BODY FLUID NOS: CPT | Performed by: NURSE PRACTITIONER

## 2024-09-20 PROCEDURE — 25010000002 MAGNESIUM SULFATE 2 GM/50ML SOLUTION: Performed by: INTERNAL MEDICINE

## 2024-09-20 PROCEDURE — 84157 ASSAY OF PROTEIN OTHER: CPT | Performed by: INTERNAL MEDICINE

## 2024-09-20 PROCEDURE — 85610 PROTHROMBIN TIME: CPT | Performed by: INTERNAL MEDICINE

## 2024-09-20 PROCEDURE — 87205 SMEAR GRAM STAIN: CPT | Performed by: INTERNAL MEDICINE

## 2024-09-20 PROCEDURE — 71045 X-RAY EXAM CHEST 1 VIEW: CPT

## 2024-09-20 PROCEDURE — C1729 CATH, DRAINAGE: HCPCS

## 2024-09-20 PROCEDURE — 89051 BODY FLUID CELL COUNT: CPT | Performed by: INTERNAL MEDICINE

## 2024-09-20 PROCEDURE — 0W9G30Z DRAINAGE OF PERITONEAL CAVITY WITH DRAINAGE DEVICE, PERCUTANEOUS APPROACH: ICD-10-PCS | Performed by: RADIOLOGY

## 2024-09-20 PROCEDURE — 94664 DEMO&/EVAL PT USE INHALER: CPT

## 2024-09-20 PROCEDURE — 94799 UNLISTED PULMONARY SVC/PX: CPT

## 2024-09-20 PROCEDURE — 76942 ECHO GUIDE FOR BIOPSY: CPT

## 2024-09-20 PROCEDURE — 32555 ASPIRATE PLEURA W/ IMAGING: CPT | Performed by: INTERNAL MEDICINE

## 2024-09-20 PROCEDURE — 99222 1ST HOSP IP/OBS MODERATE 55: CPT | Performed by: INTERNAL MEDICINE

## 2024-09-20 PROCEDURE — 82042 OTHER SOURCE ALBUMIN QUAN EA: CPT | Performed by: INTERNAL MEDICINE

## 2024-09-20 PROCEDURE — 25010000002 PIPERACILLIN SOD-TAZOBACTAM PER 1 G: Performed by: STUDENT IN AN ORGANIZED HEALTH CARE EDUCATION/TRAINING PROGRAM

## 2024-09-20 PROCEDURE — 80048 BASIC METABOLIC PNL TOTAL CA: CPT | Performed by: STUDENT IN AN ORGANIZED HEALTH CARE EDUCATION/TRAINING PROGRAM

## 2024-09-20 PROCEDURE — 88108 CYTOPATH CONCENTRATE TECH: CPT | Performed by: NURSE PRACTITIONER

## 2024-09-20 PROCEDURE — 83615 LACTATE (LD) (LDH) ENZYME: CPT | Performed by: INTERNAL MEDICINE

## 2024-09-20 PROCEDURE — 0W993ZX DRAINAGE OF RIGHT PLEURAL CAVITY, PERCUTANEOUS APPROACH, DIAGNOSTIC: ICD-10-PCS | Performed by: INTERNAL MEDICINE

## 2024-09-20 PROCEDURE — 25810000003 SODIUM CHLORIDE 0.9 % SOLUTION: Performed by: INTERNAL MEDICINE

## 2024-09-20 PROCEDURE — 89051 BODY FLUID CELL COUNT: CPT | Performed by: NURSE PRACTITIONER

## 2024-09-20 PROCEDURE — 85025 COMPLETE CBC W/AUTO DIFF WBC: CPT | Performed by: STUDENT IN AN ORGANIZED HEALTH CARE EDUCATION/TRAINING PROGRAM

## 2024-09-20 PROCEDURE — 99232 SBSQ HOSP IP/OBS MODERATE 35: CPT | Performed by: INTERNAL MEDICINE

## 2024-09-20 PROCEDURE — 87075 CULTR BACTERIA EXCEPT BLOOD: CPT | Performed by: INTERNAL MEDICINE

## 2024-09-20 PROCEDURE — 87070 CULTURE OTHR SPECIMN AEROBIC: CPT | Performed by: INTERNAL MEDICINE

## 2024-09-20 PROCEDURE — 85730 THROMBOPLASTIN TIME PARTIAL: CPT | Performed by: INTERNAL MEDICINE

## 2024-09-20 RX ORDER — MIDODRINE HYDROCHLORIDE 5 MG/1
5 TABLET ORAL
Status: DISCONTINUED | OUTPATIENT
Start: 2024-09-20 | End: 2024-09-22 | Stop reason: HOSPADM

## 2024-09-20 RX ORDER — SODIUM CHLORIDE 9 MG/ML
75 INJECTION, SOLUTION INTRAVENOUS CONTINUOUS
Status: ACTIVE | OUTPATIENT
Start: 2024-09-20 | End: 2024-09-21

## 2024-09-20 RX ORDER — POTASSIUM CHLORIDE 750 MG/1
40 CAPSULE, EXTENDED RELEASE ORAL EVERY 4 HOURS
Status: COMPLETED | OUTPATIENT
Start: 2024-09-20 | End: 2024-09-20

## 2024-09-20 RX ORDER — LIDOCAINE HYDROCHLORIDE 20 MG/ML
20 INJECTION, SOLUTION INFILTRATION; PERINEURAL ONCE
Status: COMPLETED | OUTPATIENT
Start: 2024-09-20 | End: 2024-09-20

## 2024-09-20 RX ORDER — MAGNESIUM SULFATE HEPTAHYDRATE 40 MG/ML
2 INJECTION, SOLUTION INTRAVENOUS ONCE
Status: COMPLETED | OUTPATIENT
Start: 2024-09-20 | End: 2024-09-20

## 2024-09-20 RX ORDER — ACETAMINOPHEN 500 MG
500 TABLET ORAL ONCE
Status: COMPLETED | OUTPATIENT
Start: 2024-09-20 | End: 2024-09-20

## 2024-09-20 RX ADMIN — SODIUM BICARBONATE 1 MEQ: 84 INJECTION, SOLUTION INTRAVENOUS at 12:33

## 2024-09-20 RX ADMIN — PIPERACILLIN AND TAZOBACTAM 3.38 G: 3; .375 INJECTION, POWDER, FOR SOLUTION INTRAVENOUS at 20:16

## 2024-09-20 RX ADMIN — MAGNESIUM SULFATE HEPTAHYDRATE 2 G: 40 INJECTION, SOLUTION INTRAVENOUS at 09:42

## 2024-09-20 RX ADMIN — Medication 10 ML: at 08:35

## 2024-09-20 RX ADMIN — IPRATROPIUM BROMIDE AND ALBUTEROL SULFATE 3 ML: .5; 3 SOLUTION RESPIRATORY (INHALATION) at 14:07

## 2024-09-20 RX ADMIN — URSODIOL 600 MG: 300 CAPSULE ORAL at 17:17

## 2024-09-20 RX ADMIN — Medication 10 ML: at 20:17

## 2024-09-20 RX ADMIN — PANTOPRAZOLE SODIUM 40 MG: 40 TABLET, DELAYED RELEASE ORAL at 08:34

## 2024-09-20 RX ADMIN — POTASSIUM CHLORIDE 40 MEQ: 750 CAPSULE, EXTENDED RELEASE ORAL at 14:01

## 2024-09-20 RX ADMIN — POTASSIUM CHLORIDE 40 MEQ: 750 CAPSULE, EXTENDED RELEASE ORAL at 09:42

## 2024-09-20 RX ADMIN — IPRATROPIUM BROMIDE AND ALBUTEROL SULFATE 3 ML: .5; 3 SOLUTION RESPIRATORY (INHALATION) at 07:39

## 2024-09-20 RX ADMIN — PIPERACILLIN AND TAZOBACTAM 3.38 G: 3; .375 INJECTION, POWDER, FOR SOLUTION INTRAVENOUS at 04:24

## 2024-09-20 RX ADMIN — URSODIOL 600 MG: 300 CAPSULE ORAL at 09:42

## 2024-09-20 RX ADMIN — MIDODRINE HYDROCHLORIDE 5 MG: 5 TABLET ORAL at 17:17

## 2024-09-20 RX ADMIN — PIPERACILLIN AND TAZOBACTAM 3.38 G: 3; .375 INJECTION, POWDER, FOR SOLUTION INTRAVENOUS at 14:01

## 2024-09-20 RX ADMIN — IPRATROPIUM BROMIDE AND ALBUTEROL SULFATE 3 ML: .5; 3 SOLUTION RESPIRATORY (INHALATION) at 01:33

## 2024-09-20 RX ADMIN — SODIUM CHLORIDE 75 ML/HR: 9 INJECTION, SOLUTION INTRAVENOUS at 17:17

## 2024-09-20 RX ADMIN — ACETAMINOPHEN 500 MG: 500 TABLET ORAL at 23:17

## 2024-09-20 RX ADMIN — LIDOCAINE HYDROCHLORIDE 20 ML: 20 INJECTION, SOLUTION INFILTRATION; PERINEURAL at 12:33

## 2024-09-20 NOTE — CONSULTS
Pulmonary / Critical Care Consult Note      Patient Name: Wai Gay  : 1985  MRN: [id number]  Primary Care Physician:  Callum Peterson DO  Referring Physician: No ref. provider found  Date of admission: 2024    Subjective   Subjective     Reason for Consult/ Chief Complaint: Recurrent right pleural effusion    HPI:  Wai Gay is a 38 y.o. male with past medical history of alcoholic cirrhosis with routine paracentesis, SBP, hypertension, recurrent right pleural effusion and tobacco abuse of cigarettes who presented to the emergency department with reports of shortness of breath and fever x 1 day.  In the emergency department, CXR revealed moderate right pleural effusion.  Labs of note include lactate 3.7, WBC 13.27, hemoglobin 10.2, potassium 2.7 and creatinine 1.4.  Patient was then admitted to hospitalist service for sepsis.  Pulmonology was then consulted for further evaluation of pleural effusion.    Upon exam, patient was noted to be resting comfortably in bed on room air.  He reports he had had worsening shortness of breath, lower extremity edema and fever that started yesterday.  He reports he is feeling somewhat better today.  Dyspnea slightly improved.  Of note, patient underwent last thoracentesis on 9/10/2024 at which time 1.9 L of pleural fluid was drained from right side.  He advises he has had 2-3 thoracentesis in the past. Patient has also been undergoing routine paracentesis every 2 weeks but has not needed one recently.  Last paracentesis was completed on 2024 at which time 2.3 L was removed.  Patient did have fever overnight with max temp of 102.4.  Reports generalized fatigue and chills.  Denies any chest pain, cough or hemoptysis.    Review of Systems:  All systems were reviewed and negative except as above      Personal History     Past Medical History:   Diagnosis Date    Alcohol abuse 2017    Anxiety     Essential hypertension 2019     GERD (gastroesophageal reflux disease)     Hypokalemia 03/14/2017    Will update    Hyponatremia 03/14/2017    Steatohepatitis, alcoholic 03/14/2017    Tobacco abuse 03/14/2017    Umbilical hernia        Past Surgical History:   Procedure Laterality Date    CHOLECYSTECTOMY      ENDOSCOPY N/A 02/09/2022    Procedure: ESOPHAGOGASTRODUODENOSCOPY WITH BX;  Surgeon: Gino Khan MD;  [facility] END[facility];  Service: Gastroenterology;  Laterality: N/A;  RETAINED LIQUID FOOD IN STOMACH, HUFFMAN'S ESOPHAGUS    ENDOSCOPY N/A 02/10/2023    Procedure: ESOPHAGOGASTRODUODENOSCOPY;  Surgeon: Gino Khan MD;  Location: [facility] END[facility];  Service: Gastroenterology;  Laterality: N/A;  GASTRITIS, BARRETTS ESOPHAGUS, PHG    UPPER GASTROINTESTINAL ENDOSCOPY         Family History: family history includes Alcohol abuse in his mother; Arthritis in his mother; COPD in his mother; Cancer in his paternal grandfather and paternal grandmother; Heart disease in his maternal grandfather and maternal grandmother; Hypertension in his maternal grandmother; Lung cancer in his maternal grandfather and maternal grandmother; Stroke in his maternal grandmother. Otherwise pertinent FHx was reviewed and not pertinent to current issue.    Social History:  reports that he has been smoking cigarettes. He started smoking about 16 years ago. He has a 8.4 pack-year smoking history. He has been exposed to tobacco smoke. He has never used smokeless tobacco. He reports that he does not currently use alcohol after a past usage of about 3.0 standard drinks of alcohol per week. He reports that he does not currently use drugs.    Home Medications:  Umeclidinium-Vilanterol, albuterol sulfate HFA, pantoprazole, spironolactone, and ursodiol    Allergies:  No Known Allergies    Objective    Objective     Vitals:   Temp:  [97.5 °F (36.4 °C)-102.4 °F (39.1 °C)] 97.5 °F (36.4 °C)  Heart Rate:  [] 72  Resp:  [17-20] 17  BP: ()/(54-66)  92/56    Physical Exam:  Vital Signs Reviewed   General: WDWN, Alert, NAD, sitting up in bed.    Chest: Diminished right lung base, clear to auscultation bilaterally, no work of breathing noted  CV: regular rate and rhythm regular  EXT:  no clubbing, no cyanosis, BLE edema  Neuro:  A&Ox3, moving all 4 extremities spontaneously  Skin: No rashes or lesions noted    Result Review    Result Review:  I have personally reviewed the results from the time of this admission to 9/20/2024 10:05 EDT and agree with these findings:  [x]  Laboratory  [x]  Microbiology  [x]  Radiology  []  EKG/Telemetry   []  Cardiology/Vascular   []  Pathology  [x]  Old records  []  Other:  Most notable findings include:   Procalcitonin 11.74  Lactate 3.7 --> 2.0      Lab 09/20/24  0421 09/19/24  1307   WBC 10.70 13.27*   HEMOGLOBIN 7.9* 10.2*   HEMATOCRIT 23.7* 30.6*   PLATELETS 93* 138*   SODIUM 130* 126*   POTASSIUM 2.9* 2.7*   CHLORIDE 98 91*   CO2 21.6* 23.0   BUN 19 16   CREATININE 1.36* 1.40*   GLUCOSE 107* 103*   CALCIUM 7.7* 8.1*   TOTAL PROTEIN  --  6.3   ALBUMIN  --  2.6*   GLOBULIN  --  3.7   XR Chest 1 View    Result Date: 9/19/2024  XR CHEST 1 VW Date of Exam: 9/19/2024 12:40 PM EDT Indication: SOA Triage Protocol Comparison: 8/2/2024 Findings: Heart size and pulmonary vasculature are within normal limits moderate right pleural effusion with secondary atelectasis in the lung base. Left lung clear with sharp costophrenic angle     Impression: Impression: Moderate right pleural effusion Electronically Signed: Franklin Nowak  9/19/2024 12:52 PM EDT  Workstation ID: [id number]     Assessment & Plan   Assessment / Plan     Active Hospital Problems:  Active Hospital Problems    Diagnosis     **Sepsis, due to unspecified organism, unspecified whether acute organ dysfunction present    Impression:   Recurrent right pleural effusion  Sepsis secondary to intra-abdominal infection  SBP  Decompensated alcoholic  cirrhosis  Ascites  NICKI  Anemia  Hypokalemia  History of PE, not on anticoagulation    Plan:   -Continue to maintain SpO2 greater than 90%  -CXR reviewed revealing moderate right pleural effusion  -Will perform ultrasound-guided thoracentesis of right pleural effusion. I have discussed the risks of the procedure with the patient including pneumothorax, hemothorax, bleeding, hypoxia and death. The patient recognizes these findings, acknowledges these findings and is agreeable to the procedure.  -3.6 L clear yellow pleural fluid drained, sent for cytology and cultures  -Postprocedure CXR pending  -Pending paracentesis per IR today  -Continue Zosyn per primary  -Continue DuoNebs  -Encourage use of incentive spirometry  -GI on board.  Appreciate assistance.  -Trend H&H.  Transfuse for hemoglobin less than 7.  -Trend electrolytes and renal panel.  Replace electrolytes as needed.  -Encourage mobilization.  Out of bed to chair.        Labs, microbiology, radiology, medications, and provider notes personally reviewed.  Discussed with primary services and bedside RN.     Thank you for this consult and allowing me to participate in the care of Mr. Gay    Electronically signed by ANA LAURA Meek, 09/20/24, 10:05 AM EDT.This visit was performed by both a physician and an APC.  I personally evaluated and examined the patient.  I performed all aspects of MDM as documented.  I have reviewed and confirmed the accuracy of the patient's history as documented in this note and I have reexamined the patient and the results are consistent with the previously documented exam.  I have updated the documentation as necessary. Electronically signed by Davian Watt MD, 09/20/24, 2:44 PM EDT.

## 2024-09-20 NOTE — PLAN OF CARE
Goal Outcome Evaluation:  Plan of Care Reviewed With: patient        Progress: improving  Outcome Evaluation: Patient is AO x4, able to make needs known. Patient is currently on room air, no signs of respiratory distress noted. Patient had thoracentesis done today, removed 3600 mLs, tolerated well. Patient went for paracentesis today as well. Patient recieved PO potassium today. PO midodrine added. No complaints of pain or discomfort at this time. No acute changes throughout shift. Continue with current plan of care.

## 2024-09-20 NOTE — PROCEDURES
Thoracentesis Procedure Note    Indication: Moderate right sided pleural effusion    Consent: Yes, placed in chart.    Procedure: The patient was placed in the sitting position and the appropriate landmarks were identified.  Right pleural effusion was confirmed on ultrasound and the skin was marked the skin over the puncture site in the right posterior chest wall was prepped with ChloraPrep and draped in sterile fashion. Local anesthesia was applied with 1% lidocaine. Thoracentesis catheter was inserted with needle guidance. Pleural fluid was straw-colored, drained 3600 mL fluid. The catheter was then withdrawn and a sterile dressing was placed over the site. A post-procedure film is pending at this time.    The patient tolerated the procedure well.    Complications: None    Electronically signed by Davian Watt MD, 09/20/24, 2:44 PM EDT.

## 2024-09-20 NOTE — PLAN OF CARE
Goal Outcome Evaluation:  Plan of Care Reviewed With: patient        Progress: no change  Outcome Evaluation: pt alert and able to make needs known. admitted to unit. no events during shift. continue with plan of care

## 2024-09-20 NOTE — PROGRESS NOTES
[facility]   Hospitalist Progress Note  Date: 2024  Patient Name: Wai Gay  : 1985  MRN: [id number]  Date of admission: 2024  Room/Bed: [facility]      Subjective   Subjective     Chief Complaint:   Fever    Summary:  Wai Gay is a 38 y.o. male with history of alcoholic cirrhosis with regular paracentesis, SBP, hypertension, GERD, tobacco use, who presents the emergency room with shortness of breath, and fever. Patient denies any chest pain, chills, diarrhea, dysuria, cough, congestion, runny nose, or recent sick contacts. Of note, the patient was recently admitted less than a month ago when he presented with similar symptoms and was treated for spontaneous bacterial peritonitis. On presentation, the patient's labs were significant for a lactic acid of 3.7, potassium of 2.7, elevated white count of 13.2, hemoglobin of 10.2. Blood pressure was 97/58, heart rate was 105, and temperature was 99.1. He met criteria for sepsis. Imaging of his chest showed moderate right pleural effusion. The patient is being admitted for further treatment.  Pulmonologist and gastroenterologist consulted    Interval Followup:   Patient sitting in bed resting comfortably.  No tenderness on palpation of abdomen.  Pulmonology service planning on thoracentesis today.  Patient will undergo paracentesis to rule out SBP.  Potassium remains low at 2.9.  Hemoglobin down to 7.9 this morning.  No overt signs of bleeding currently.    Objective   Objective     Vitals:   Temp:  [97.5 °F (36.4 °C)-98.2 °F (36.8 °C)] 97.5 °F (36.4 °C)  Heart Rate:  [72-97] 72  Resp:  [17-20] 18  BP: ()/(54-66) 92/56    Physical Exam   General: Awake, alert, NAD  HENT: NCAT, MMM  Eyes: pupils equal, no scleral icterus  Cardiovascular: RRR, no murmurs   Pulmonary: Diminished breath sounds to right lower lobe, otherwise no wheezing no rhonchi no crackles  Gastrointestinal: Distended, no tenderness on deep palpation, positive  bowel sounds  Musculoskeletal: No gross deformities  Neuro: CN II through XII grossly intact; speech clear; no tremor    Result Review    Result Review:  I have personally reviewed these results:  [x]  Laboratory      Lab 09/20/24 0921 09/20/24 0421 09/19/24 1816 09/19/24  1307   WBC  --  10.70  --  13.27*   HEMOGLOBIN  --  7.9*  --  10.2*   HEMATOCRIT  --  23.7*  --  30.6*   PLATELETS  --  93*  --  138*   NEUTROS ABS  --  9.09*  --  11.79*   IMMATURE GRANS (ABS)  --  0.08*  --  0.11*   LYMPHS ABS  --  0.73  --  0.33*   MONOS ABS  --  0.75  --  0.92*   EOS ABS  --  0.03  --  0.09   MCV  --  85.3  --  84.1   PROCALCITONIN  --   --  11.74*  --    LACTATE  --   --  2.0 3.7*   PROTIME 21.6*  --   --   --    APTT 38.0*  --   --   --          Lab 09/20/24 0421 09/19/24 1816 09/19/24  1307   SODIUM 130*  --  126*   POTASSIUM 2.9*  --  2.7*   CHLORIDE 98  --  91*   CO2 21.6*  --  23.0   ANION GAP 10.4  --  12.0   BUN 19  --  16   CREATININE 1.36*  --  1.40*   EGFR 69.2  --  66.8   GLUCOSE 107*  --  103*   CALCIUM 7.7*  --  8.1*   MAGNESIUM  --   --  1.6   TSH  --  2.350  --          Lab 09/19/24  1307   TOTAL PROTEIN 6.3   ALBUMIN 2.6*   GLOBULIN 3.7   ALT (SGPT) 48*   AST (SGOT) 111*   BILIRUBIN 1.4*   ALK PHOS 210*         Lab 09/20/24 0921 09/19/24  1307   PROBNP  --  346.0   HSTROP T  --  17   PROTIME 21.6*  --    INR 1.84*  --              Lab 09/19/24 1816   IRON 20*   IRON SATURATION (TSAT) 8*   TIBC 255*   TRANSFERRIN 171*   FERRITIN 36.69         Brief Urine Lab Results  (Last result in the past 365 days)        Color   Clarity   Blood   Leuk Est   Nitrite   Protein   CREAT   Urine HCG        09/19/24 1600 Dark Yellow   Clear   Negative   Trace   Negative   Trace                 [x]  Microbiology   Microbiology Results (last 10 days)       Procedure Component Value - Date/Time    Body Fluid Culture - Body Fluid, Peritoneum [[id number]] Collected: 09/20/24 1235    Lab Status: Preliminary result Specimen: Body  Fluid from Peritoneum Updated: 09/20/24 1520     Gram Stain Rare (1+) WBCs seen      No organisms seen    COVID PRE-OP / PRE-PROCEDURE SCREENING ORDER (NO ISOLATION) - Swab, Nasopharynx [[id number]]  (Normal) Collected: 09/19/24 1422    Lab Status: Final result Specimen: Swab from Nasopharynx Updated: 09/19/24 1505    Narrative:      The following orders were created for panel order COVID PRE-OP / PRE-PROCEDURE SCREENING ORDER (NO ISOLATION) - Swab, Nasopharynx.  Procedure                               Abnormality         Status                     ---------                               -----------         ------                     COVID-19, FLU A/B, RSV P...[[id number]]  Normal              Final result                 Please view results for these tests on the individual orders.    COVID-19, FLU A/B, RSV PCR 1 HR TAT - Swab, Nasopharynx [[id number]]  (Normal) Collected: 09/19/24 1422    Lab Status: Final result Specimen: Swab from Nasopharynx Updated: 09/19/24 1505     COVID19 Not Detected     Influenza A PCR Not Detected     Influenza B PCR Not Detected     RSV, PCR Not Detected    Narrative:      Fact sheet for providers: https://www.fda.gov/media/866365/download    Fact sheet for patients: https://www.fda.gov/media/690108/download    Test performed by PCR.    Blood Culture - Blood, Arm, Right [[id number]]  (Normal) Collected: 09/19/24 1350    Lab Status: Preliminary result Specimen: Blood from Arm, Right Updated: 09/20/24 1400     Blood Culture No growth at 24 hours    Blood Culture - Blood, Arm, Left [[id number]]  (Normal) Collected: 09/19/24 1307    Lab Status: Preliminary result Specimen: Blood from Arm, Left Updated: 09/20/24 1315     Blood Culture No growth at 24 hours          [x]  Radiology  US Paracentesis    Result Date: 9/20/2024  Impression: Successful ultrasound-guided diagnostic and therapeutic paracentesis without immediate complication.  Electronically Signed: Leif Bryan MD  9/20/2024 1:02 PM  EDT  Workstation ID: [id number]    XR Chest 1 View    Result Date: 9/20/2024  Impression: 1. No evidence of pneumothorax status post right thoracentesis. Electronically Signed: Chicho Quijano MD  9/20/2024 12:35 PM EDT  Workstation ID: [id number]    XR Chest 1 View    Result Date: 9/19/2024  Impression: Moderate right pleural effusion Electronically Signed: Franklin She  9/19/2024 12:52 PM EDT  Workstation ID: [id number]   []  EKG/Telemetry   []  Cardiology/Vascular   []  Pathology  []  Old records  []  Other:    Assessment & Plan   Assessment / Plan     Assessment:  Sepsis, concern for intra-abdominal infection  Decompensated cirrhosis  Primary biliary cholangitis  Right pleural effusion  Ascites  NICKI  History of SBP  Hypokalemia  Pleural effusion  History of alcohol abuse, no alcohol use for 4 years    Plan:  Patient remains admitted to hospital for further care management  Gastroenterology, pulmonology services consulted, appreciate assistance  Patient underwent paracentesis today, will rule out SBP  Empirically covering with antibiotics, Zosyn to cover intra-abdominal and pulmonary source  Patient will go for thoracentesis with pulmonology service as well today, appreciate assistance  Continue to monitor patient's renal function closely, above baseline  Patient's potassium low at 2.9, replace  Patient will need to be back on diuretics, however given current renal dysfunction we will hold  Continue with low-sodium diet  Gastroenterology indicates patient needs to follow-up with [facility] hepatology to discuss TIPS and/or liver transplant  Even NICKI and soft blood pressures in the setting of cirrhosis will start patient on midodrine     Discussed with RN.  Discussed with pulmonology service    VTE Prophylaxis:  Mechanical VTE prophylaxis orders are present.        CODE STATUS:   Code Status (Patient has no pulse and is not breathing): CPR (Attempt to Resuscitate)  Medical Interventions (Patient has pulse or is breathing):  Full Support      Electronically signed by Asael Chilel MD, 9/20/2024, 16:27 EDT.

## 2024-09-20 NOTE — CONSULTS
[facility]  Initial Inpatient Consult Note    Referring Provider: Hospitalist    Reason for Consultation: Decompensated cirrhosis    Subjective     History of present illness:    38 y.o. male with a past medical history of primary biliary cholangitis, alcohol abuse, anxiety, hypertension, GERD, and tobacco abuse initially presented to the ED due to shortness of breath.  GI was consulted due to patient having decompensated cirrhosis.  Patient was supposed to have EUS performed due to elevated CA 19-9 and follow-up with [facility] hepatology with plans of liver transplant evaluation and/or TIPS, but patient has not done yet.  Patient states he has been having issues with insurance.  He continues to take Melisas and spironolactone.  Patient denies abdominal pain, nausea, vomiting, problems sleeping, constipation, diarrhea, melena, hematochezia, and fevers.    Patient's last paracentesis was performed on 8/16/2024 with 2.3 L removed  Patient states he was getting paracentesis every 2 weeks, but has not needed one recently.  Patient has order for paracentesis to be performed today    Patient's last EGD was performed on 2/10/2023  Findings included-  Esophageal mucosal changes consistent with short segment Last's esophagus.  Portal hypertensive gastropathy.  Nonerosive gastritis.  Normal duodenum.  Path consistent with reflux esophagitis and reactive gastropathy    Past Medical History:  Past Medical History:   Diagnosis Date    Alcohol abuse 03/14/2017    Anxiety     Essential hypertension 12/27/2019    GERD (gastroesophageal reflux disease)     Hypokalemia 03/14/2017    Will update    Hyponatremia 03/14/2017    Steatohepatitis, alcoholic 03/14/2017    Tobacco abuse 03/14/2017    Umbilical hernia      Past Surgical History:  Past Surgical History:   Procedure Laterality Date    CHOLECYSTECTOMY      ENDOSCOPY N/A 02/09/2022    Procedure: ESOPHAGOGASTRODUODENOSCOPY WITH BX;  Surgeon: Gino Khan  MD Gianfranco;  Location: [facility] ENDOSCOPY;  Service: Gastroenterology;  Laterality: N/A;  RETAINED LIQUID FOOD IN STOMACH, HUFFMAN'S ESOPHAGUS    ENDOSCOPY N/A 02/10/2023    Procedure: ESOPHAGOGASTRODUODENOSCOPY;  Surgeon: Gino Khan MD;  Location: [facility] ENDOSCOPY;  Service: Gastroenterology;  Laterality: N/A;  GASTRITIS, BARRETTS ESOPHAGUS, PHG    UPPER GASTROINTESTINAL ENDOSCOPY        Social History:   Social History     Tobacco Use    Smoking status: Every Day     Current packs/day: 0.50     Average packs/day: 0.5 packs/day for 16.7 years (8.4 ttl pk-yrs)     Types: Cigarettes     Start date: 1/1/2008     Passive exposure: Current    Smokeless tobacco: Never   Substance Use Topics    Alcohol use: Not Currently     Alcohol/week: 3.0 standard drinks of alcohol     Comment: FORMER      Family History:  Family History   Problem Relation Age of Onset    Alcohol abuse Mother     Arthritis Mother     COPD Mother     Heart disease Maternal Grandmother     Hypertension Maternal Grandmother     Lung cancer Maternal Grandmother     Stroke Maternal Grandmother     Heart disease Maternal Grandfather     Lung cancer Maternal Grandfather     Cancer Paternal Grandmother     Cancer Paternal Grandfather     Colon cancer Neg Hx     Malig Hyperthermia Neg Hx        Home Meds:  Medications Prior to Admission   Medication Sig Dispense Refill Last Dose    albuterol sulfate HFA (Ventolin HFA) 108 (90 Base) MCG/ACT inhaler Inhale 2 puffs Every 4 (Four) Hours As Needed for Wheezing or Shortness of Air. 1 g 3     pantoprazole (PROTONIX) 40 MG EC tablet TAKE 1 TABLET BY MOUTH EVERY DAY (Patient taking differently: Take 1 tablet by mouth Daily.) 90 tablet 1 9/19/2024 at 0630    spironolactone (ALDACTONE) 50 MG tablet TAKE 1 TABLET BY MOUTH EVERY DAY (Patient taking differently: Take 1 tablet by mouth Daily.) 90 tablet 1 9/19/2024 at 0630    Umeclidinium-Vilanterol (ANORO ELLIPTA) 62.5-25 MCG/ACT aerosol powder  inhaler Inhale 1  puff Daily. 1 each 11 9/19/2024    ursodiol (ACTIGALL) 300 MG capsule Take 2 capsules by mouth 2 (Two) Times a Day With Meals.   9/19/2024 at 0630     Current Meds:   ipratropium-albuterol, 3 mL, Nebulization, 4x Daily - RT  magnesium sulfate, 2 g, Intravenous, Once  pantoprazole, 40 mg, Oral, Daily  piperacillin-tazobactam, 3.375 g, Intravenous, Q8H  potassium chloride, 40 mEq, Oral, Q4H  sodium chloride, 10 mL, Intravenous, Q12H  spironolactone, 50 mg, Oral, Daily  ursodiol, 600 mg, Oral, BID With Meals      Allergies:  No Known Allergies  Review of Systems  Pertinent items are noted in HPI     Objective     Vital Signs  Temp:  [97.5 °F (36.4 °C)-102.4 °F (39.1 °C)] 97.5 °F (36.4 °C)  Heart Rate:  [] 72  Resp:  [17-20] 17  BP: ()/(54-66) 92/54  Physical Exam:  General Appearance:    Alert, cooperative, in no acute distress   Head:    Normocephalic, without obvious abnormality, atraumatic   Eyes:          conjunctivae and sclerae normal, no icterus   Throat:   no thrush, oral mucosa moist   Neck:   Supple, no adenopathy   Lungs:     Unlabored breathing     Heart:    Regular rhythm and normal rate    Chest Wall:    No abnormalities observed   Abdomen:     Soft, distended, non tender   Extremities:   no edema, no redness   Skin:   No bruising or rash   Psychiatric:  normal mood and insight     Results Review:   I reviewed the patient's new clinical results.    Results from last 7 days   Lab Units 09/20/24  0421 09/19/24  1307   WBC 10*3/mm3 10.70 13.27*   HEMOGLOBIN g/dL 7.9* 10.2*   HEMATOCRIT % 23.7* 30.6*   PLATELETS 10*3/mm3 93* 138*     Results from last 7 days   Lab Units 09/20/24  0421 09/19/24  1307   SODIUM mmol/L 130* 126*   POTASSIUM mmol/L 2.9* 2.7*   CHLORIDE mmol/L 98 91*   CO2 mmol/L 21.6* 23.0   BUN mg/dL 19 16   CREATININE mg/dL 1.36* 1.40*   CALCIUM mg/dL 7.7* 8.1*   BILIRUBIN mg/dL  --  1.4*   ALK PHOS U/L  --  210*   ALT (SGPT) U/L  --  48*   AST (SGOT) U/L  --  111*   GLUCOSE mg/dL  107* 103*         Lab Results   Lab Value Date/Time    LIPASE 166 (H) 11/19/2021 1307    LIPASE 142 (H) 09/07/2021 1705       Radiology:  XR Chest 1 View    Result Date: 9/19/2024  Impression: Moderate right pleural effusion Electronically Signed: Franklin Nowak  9/19/2024 12:52 PM EDT  Workstation ID: [id number]      Assessment & Plan     Sepsis, due to unspecified organism, unspecified whether acute organ dysfunction present       Assessment:  Decompensated cirrhosis  Primary biliary cholangitis  Ascites  Pleural effusion  History of alcohol abuse (none x 4 years)    Plan:  Discussed with patient about following up with [facility] hepatology to discuss TIPS and/or liver transplant.  Patient also may benefit from EUS-patient having insurance problems.  Contacted office to see if they could assist him with insurance issues.  Ordered PT/INR  Patient has paracentesis ordered today  Patient states he has appointment with ANA LAURA Brandon on Wednesday advised patient to keep appointment.  Patient understands and agrees to the plan      I discussed the patients findings and my recommendations with patient.    Electronically signed by ANA LAURA Gaviria, 09/20/24, 9:09 AM EDT.    Attending Attestation  I reviewed the below documentation and evaluation and discussed the care plan with ANA LAURA Gaviria, I agree with her findings and plan as documented.    Pt seen and examined by me.  Pt s/p thoracentesis and LVP.  Pt reports feeling much improved.  Abd soft, nt, nd.  Fluid studies negative for SBP.  Agree with spironolactone.  Titrate diuretics to effect and based on renal function.  Discussed 2 g Na diet.  On Zosyn currently.  Pt to continue f/u with [facility]atology.    Electronically signed by Gudelia Victoria MD, 09/20/24, 4:01 PM EDT.    Portions of this documentation were transcribed electronically from a voice recognition software.  I confirm all data accurately represents the service(s) I performed at  today's visit.

## 2024-09-21 PROBLEM — K74.60 CIRRHOSIS OF LIVER WITH ASCITES: Status: ACTIVE | Noted: 2024-09-21

## 2024-09-21 PROBLEM — R18.8 CIRRHOSIS OF LIVER WITH ASCITES: Status: ACTIVE | Noted: 2024-09-21

## 2024-09-21 PROBLEM — K70.31 ALCOHOLIC CIRRHOSIS OF LIVER WITH ASCITES: Status: RESOLVED | Noted: 2021-12-06 | Resolved: 2024-09-21

## 2024-09-21 PROBLEM — D50.9 IRON DEFICIENCY ANEMIA: Status: ACTIVE | Noted: 2024-09-21

## 2024-09-21 PROBLEM — K74.60 CIRRHOSIS OF LIVER WITH ASCITES: Status: RESOLVED | Noted: 2021-12-06 | Resolved: 2024-09-21

## 2024-09-21 PROBLEM — R18.8 CIRRHOSIS OF LIVER WITH ASCITES: Status: RESOLVED | Noted: 2021-12-06 | Resolved: 2024-09-21

## 2024-09-21 LAB
ALBUMIN SERPL-MCNC: 1.9 G/DL (ref 3.5–5.2)
ALBUMIN/GLOB SERPL: 0.7 G/DL
ALP SERPL-CCNC: 155 U/L (ref 39–117)
ALT SERPL W P-5'-P-CCNC: 32 U/L (ref 1–41)
ANION GAP SERPL CALCULATED.3IONS-SCNC: 8 MMOL/L (ref 5–15)
AST SERPL-CCNC: 59 U/L (ref 1–40)
BILIRUB SERPL-MCNC: 0.8 MG/DL (ref 0–1.2)
BUN SERPL-MCNC: 15 MG/DL (ref 6–20)
BUN/CREAT SERPL: 12.7 (ref 7–25)
CALCIUM SPEC-SCNC: 7.2 MG/DL (ref 8.6–10.5)
CHLORIDE SERPL-SCNC: 106 MMOL/L (ref 98–107)
CO2 SERPL-SCNC: 22 MMOL/L (ref 22–29)
CREAT SERPL-MCNC: 1.18 MG/DL (ref 0.76–1.27)
DEPRECATED RDW RBC AUTO: 53.6 FL (ref 37–54)
EGFRCR SERPLBLD CKD-EPI 2021: 82 ML/MIN/1.73
ERYTHROCYTE [DISTWIDTH] IN BLOOD BY AUTOMATED COUNT: 17.1 % (ref 12.3–15.4)
GLOBULIN UR ELPH-MCNC: 2.7 GM/DL
GLUCOSE SERPL-MCNC: 88 MG/DL (ref 65–99)
HCT VFR BLD AUTO: 24 % (ref 37.5–51)
HGB BLD-MCNC: 7.8 G/DL (ref 13–17.7)
INR PPP: 1.66 (ref 0.86–1.15)
MAGNESIUM SERPL-MCNC: 2 MG/DL (ref 1.6–2.6)
MCH RBC QN AUTO: 27.8 PG (ref 26.6–33)
MCHC RBC AUTO-ENTMCNC: 32.5 G/DL (ref 31.5–35.7)
MCV RBC AUTO: 85.4 FL (ref 79–97)
PHOSPHATE SERPL-MCNC: 3.2 MG/DL (ref 2.5–4.5)
PLATELET # BLD AUTO: 77 10*3/MM3 (ref 140–450)
PMV BLD AUTO: 10.2 FL (ref 6–12)
POTASSIUM SERPL-SCNC: 3.9 MMOL/L (ref 3.5–5.2)
PROT SERPL-MCNC: 4.6 G/DL (ref 6–8.5)
PROTHROMBIN TIME: 19.9 SECONDS (ref 11.8–14.9)
RBC # BLD AUTO: 2.81 10*6/MM3 (ref 4.14–5.8)
SODIUM SERPL-SCNC: 136 MMOL/L (ref 136–145)
WBC NRBC COR # BLD AUTO: 4.27 10*3/MM3 (ref 3.4–10.8)

## 2024-09-21 PROCEDURE — 99232 SBSQ HOSP IP/OBS MODERATE 35: CPT | Performed by: INTERNAL MEDICINE

## 2024-09-21 PROCEDURE — 25810000003 SODIUM CHLORIDE 0.9 % SOLUTION: Performed by: INTERNAL MEDICINE

## 2024-09-21 PROCEDURE — 94799 UNLISTED PULMONARY SVC/PX: CPT

## 2024-09-21 PROCEDURE — 85027 COMPLETE CBC AUTOMATED: CPT | Performed by: INTERNAL MEDICINE

## 2024-09-21 PROCEDURE — 80053 COMPREHEN METABOLIC PANEL: CPT | Performed by: INTERNAL MEDICINE

## 2024-09-21 PROCEDURE — 94664 DEMO&/EVAL PT USE INHALER: CPT

## 2024-09-21 PROCEDURE — 85610 PROTHROMBIN TIME: CPT

## 2024-09-21 PROCEDURE — 25010000002 PIPERACILLIN SOD-TAZOBACTAM PER 1 G: Performed by: STUDENT IN AN ORGANIZED HEALTH CARE EDUCATION/TRAINING PROGRAM

## 2024-09-21 PROCEDURE — 84100 ASSAY OF PHOSPHORUS: CPT | Performed by: INTERNAL MEDICINE

## 2024-09-21 PROCEDURE — 25010000002 NA FERRIC GLUC CPLX PER 12.5 MG: Performed by: INTERNAL MEDICINE

## 2024-09-21 PROCEDURE — 25010000002 PHYTONADIONE 10 MG/ML SOLUTION 1 ML AMPULE: Performed by: INTERNAL MEDICINE

## 2024-09-21 PROCEDURE — 83735 ASSAY OF MAGNESIUM: CPT | Performed by: INTERNAL MEDICINE

## 2024-09-21 PROCEDURE — 94761 N-INVAS EAR/PLS OXIMETRY MLT: CPT

## 2024-09-21 RX ORDER — BACLOFEN 10 MG/1
5 TABLET ORAL ONCE AS NEEDED
Status: COMPLETED | OUTPATIENT
Start: 2024-09-21 | End: 2024-09-21

## 2024-09-21 RX ADMIN — IPRATROPIUM BROMIDE AND ALBUTEROL SULFATE 3 ML: .5; 3 SOLUTION RESPIRATORY (INHALATION) at 01:02

## 2024-09-21 RX ADMIN — SODIUM CHLORIDE 75 ML/HR: 9 INJECTION, SOLUTION INTRAVENOUS at 00:11

## 2024-09-21 RX ADMIN — Medication 10 ML: at 08:28

## 2024-09-21 RX ADMIN — IPRATROPIUM BROMIDE AND ALBUTEROL SULFATE 3 ML: .5; 3 SOLUTION RESPIRATORY (INHALATION) at 19:38

## 2024-09-21 RX ADMIN — IPRATROPIUM BROMIDE AND ALBUTEROL SULFATE 3 ML: .5; 3 SOLUTION RESPIRATORY (INHALATION) at 07:18

## 2024-09-21 RX ADMIN — PIPERACILLIN AND TAZOBACTAM 3.38 G: 3; .375 INJECTION, POWDER, FOR SOLUTION INTRAVENOUS at 04:24

## 2024-09-21 RX ADMIN — URSODIOL 600 MG: 300 CAPSULE ORAL at 17:18

## 2024-09-21 RX ADMIN — PIPERACILLIN AND TAZOBACTAM 3.38 G: 3; .375 INJECTION, POWDER, FOR SOLUTION INTRAVENOUS at 13:18

## 2024-09-21 RX ADMIN — MIDODRINE HYDROCHLORIDE 5 MG: 5 TABLET ORAL at 17:18

## 2024-09-21 RX ADMIN — MIDODRINE HYDROCHLORIDE 5 MG: 5 TABLET ORAL at 08:28

## 2024-09-21 RX ADMIN — BACLOFEN 5 MG: 10 TABLET ORAL at 22:43

## 2024-09-21 RX ADMIN — MIDODRINE HYDROCHLORIDE 5 MG: 5 TABLET ORAL at 11:54

## 2024-09-21 RX ADMIN — URSODIOL 600 MG: 300 CAPSULE ORAL at 08:28

## 2024-09-21 RX ADMIN — Medication 10 ML: at 20:27

## 2024-09-21 RX ADMIN — IPRATROPIUM BROMIDE AND ALBUTEROL SULFATE 3 ML: .5; 3 SOLUTION RESPIRATORY (INHALATION) at 13:44

## 2024-09-21 RX ADMIN — PIPERACILLIN AND TAZOBACTAM 3.38 G: 3; .375 INJECTION, POWDER, FOR SOLUTION INTRAVENOUS at 20:25

## 2024-09-21 RX ADMIN — PHYTONADIONE 10 MG: 10 INJECTION, EMULSION INTRAMUSCULAR; INTRAVENOUS; SUBCUTANEOUS at 12:36

## 2024-09-21 RX ADMIN — SODIUM CHLORIDE 75 ML/HR: 9 INJECTION, SOLUTION INTRAVENOUS at 12:16

## 2024-09-21 RX ADMIN — PANTOPRAZOLE SODIUM 40 MG: 40 TABLET, DELAYED RELEASE ORAL at 08:28

## 2024-09-21 RX ADMIN — SODIUM CHLORIDE 125 MG: 9 INJECTION, SOLUTION INTRAVENOUS at 11:26

## 2024-09-21 NOTE — PROGRESS NOTES
[facility]  Inpatient Progress Note    Reason for Follow Up: Cirrhosis, history of alcohol and PBC    Subjective     Interval History:   No events overnight.  Feels much better after paracentesis and thoracentesis yesterday.  No SBP,    No signs of GI bleeding.  Tolerating regular diet.  Hemoglobin 7.8 lower than his normal without overt bleeding    Current Facility-Administered Medications:     albuterol sulfate HFA (PROVENTIL HFA;VENTOLIN HFA;PROAIR HFA) inhaler 2 puff, 2 puff, Inhalation, Q4H PRN, Tulio Duff MD    sennosides-docusate (PERICOLACE) 8.6-50 MG per tablet 2 tablet, 2 tablet, Oral, BID PRN **AND** polyethylene glycol (MIRALAX) packet 17 g, 17 g, Oral, Daily PRN **AND** bisacodyl (DULCOLAX) EC tablet 5 mg, 5 mg, Oral, Daily PRN **AND** bisacodyl (DULCOLAX) suppository 10 mg, 10 mg, Rectal, Daily PRN, Tulio Duff MD    icy hot balm extra strength 7.6-29 % ointment 1 Application, 1 Application, Topical, 4x Daily PRN, Shar Moriah, PA    ipratropium-albuterol (DUO-NEB) nebulizer solution 3 mL, 3 mL, Nebulization, 4x Daily - RT, Tulio Duff MD, 3 mL at 09/21/24 0718    midodrine (PROAMATINE) tablet 5 mg, 5 mg, Oral, TID AC, Asael Chilel MD, 5 mg at 09/21/24 0828    nitroglycerin (NITROSTAT) SL tablet 0.4 mg, 0.4 mg, Sublingual, Q5 Min PRN, Tulio Duff MD    pantoprazole (PROTONIX) EC tablet 40 mg, 40 mg, Oral, Daily, Tulio Duff MD, 40 mg at 09/21/24 0828    piperacillin-tazobactam (ZOSYN) IVPB 3.375 g IVPB in 100 mL NS (VTB), 3.375 g, Intravenous, Q8H, Tulio Duff MD, 3.375 g at 09/21/24 0424    sodium chloride 0.9 % flush 10 mL, 10 mL, Intravenous, PRN, Dru Woody MD    sodium chloride 0.9 % flush 10 mL, 10 mL, Intravenous, PRN, Dru Woody MD    sodium chloride 0.9 % flush 10 mL, 10 mL, Intravenous, Q12H, Tulio Duff MD, 10 mL at 09/21/24 0828    sodium chloride 0.9 % flush 10 mL, 10 mL, Intravenous, PRN, Tulio Duff,  MD    sodium chloride 0.9 % infusion 40 mL, 40 mL, Intravenous, PRN, Tulio Duff MD    sodium chloride 0.9 % infusion, 75 mL/hr, Intravenous, Continuous, Asael Chilel MD, Last Rate: 75 mL/hr at 09/21/24 0011, 75 mL/hr at 09/21/24 0011    [Held by provider] spironolactone (ALDACTONE) tablet 50 mg, 50 mg, Oral, Daily, Tulio Duff MD    ursodiol (ACTIGALL) capsule 600 mg, 600 mg, Oral, BID With Meals, Tulio Duff MD, 600 mg at 09/21/24 0828  Review of Systems:    All systems were reviewed and negative except for that previously mentioned in the HPI    Objective     Vital Signs  Temp:  [97.3 °F (36.3 °C)-97.7 °F (36.5 °C)] 97.7 °F (36.5 °C)  Heart Rate:  [88-96] 96  Resp:  [18] 18  BP: (86-99)/(59-64) 92/59  Body mass index is 25.58 kg/m².    Intake/Output Summary (Last 24 hours) at 9/21/2024 1100  Last data filed at 9/20/2024 1401  Gross per 24 hour   Intake 240 ml   Output --   Net 240 ml     No intake/output data recorded.     Physical Exam:   General: patient awake, alert and cooperative   Eyes: Normal lids and lashes, no scleral icterus   Neck: supple, normal ROM   Skin: warm and dry, not jaundiced   Cardiovascular: regular rhythm and rate, no murmurs auscultated   Pulm: clear to auscultation bilaterally, regular and unlabored   Abdomen: soft, nontender, nondistended; normal bowel sounds   Rectal: deferred   Extremities: no rash or edema   Psychiatric: Normal mood and behavior; memory intact     Results Review:     I reviewed the patient's new clinical results.    Results from last 7 days   Lab Units 09/21/24  0458 09/20/24  0421 09/19/24  1307   WBC 10*3/mm3 4.27 10.70 13.27*   HEMOGLOBIN g/dL 7.8* 7.9* 10.2*   HEMATOCRIT % 24.0* 23.7* 30.6*   PLATELETS 10*3/mm3 77* 93* 138*     Results from last 7 days   Lab Units 09/21/24  0458 09/20/24  0421 09/19/24  1307   SODIUM mmol/L 136 130* 126*   POTASSIUM mmol/L 3.9 2.9* 2.7*   CHLORIDE mmol/L 106 98 91*   CO2 mmol/L 22.0 21.6* 23.0   BUN mg/dL 15 19 16    CREATININE mg/dL 1.18 1.36* 1.40*   CALCIUM mg/dL 7.2* 7.7* 8.1*   BILIRUBIN mg/dL 0.8  --  1.4*   ALK PHOS U/L 155*  --  210*   ALT (SGPT) U/L 32  --  48*   AST (SGOT) U/L 59*  --  111*   GLUCOSE mg/dL 88 107* 103*     Results from last 7 days   Lab Units 09/21/24  0458 09/20/24  0921   INR  1.66* 1.84*     Lab Results   Lab Value Date/Time    LIPASE 166 (H) 11/19/2021 1307    LIPASE 142 (H) 09/07/2021 1705       Radiology:  US Paracentesis    Result Date: 9/20/2024  Impression: Successful ultrasound-guided diagnostic and therapeutic paracentesis without immediate complication.  Electronically Signed: Leif Bryan MD  9/20/2024 1:02 PM EDT  Workstation ID: [id number]    XR Chest 1 View    Result Date: 9/20/2024  Impression: 1. No evidence of pneumothorax status post right thoracentesis. Electronically Signed: Chicho Quijano MD  9/20/2024 12:35 PM EDT  Workstation ID: [id number]    XR Chest 1 View    Result Date: 9/19/2024  Impression: Moderate right pleural effusion Electronically Signed: Franklin Nowak  9/19/2024 12:52 PM EDT  Workstation ID: [id number]         Assessment:       Sepsis, due to unspecified organism, unspecified whether acute organ dysfunction present    Pleural effusion    Cirrhosis of liver with ascites    Iron deficiency anemia       Plan:   Patient feels much better after paracentesis and thoracentesis.  No evidence of SBP.  No overt GI bleeding but hemoglobin is definitely lower than his baseline.  Last upper endoscopy was 2023 without varices.  Portal hypertensive gastropathy was noted.  Will give parenteral iron today.  If hemoglobin stable can likely be discharged home soon.    He has outpatient follow-up with this Wednesday  Can schedule for  outpatient EGD/colonoscopy in 1 to 2 weeks  Continue diuretics upon discharge  Will need [facility]  or  to Consider TIPS    I discussed the patients findings and my recommendations with patient.    Gino Khan,  M.D.  Gastroenterology

## 2024-09-21 NOTE — PROGRESS NOTES
[facility]   Hospitalist Progress Note  Date: 2024  Patient Name: Wai Gay  : 1985  MRN: [id number]  Date of admission: 2024  Room/Bed: [facility]      Subjective   Subjective     Chief Complaint:   Fever    Summary:  Wai Gay is a 38 y.o. male with history of alcoholic cirrhosis with regular paracentesis, SBP, hypertension, GERD, tobacco use, who presents the emergency room with shortness of breath, and fever. Patient denies any chest pain, chills, diarrhea, dysuria, cough, congestion, runny nose, or recent sick contacts. Of note, the patient was recently admitted less than a month ago when he presented with similar symptoms and was treated for spontaneous bacterial peritonitis. On presentation, the patient's labs were significant for a lactic acid of 3.7, potassium of 2.7, elevated white count of 13.2, hemoglobin of 10.2. Blood pressure was 97/58, heart rate was 105, and temperature was 99.1. He met criteria for sepsis. Imaging of his chest showed moderate right pleural effusion. The patient is being admitted for further treatment.  Pulmonologist and gastroenterologist consulted    Interval Followup:   Neither thoracentesis nor paracentesis concerning for infection.  However given presentation we will continue antibiotics at this time.  Patient sitting in bed resting comfortably.  Hemoglobin low at 7.8, platelets of 77 today, continue to monitor for melena.  Patient's blood pressures remain soft.  His renal function is improving, creatinine down to 1.18 today.    Objective   Objective     Vitals:   Temp:  [97.3 °F (36.3 °C)-98.1 °F (36.7 °C)] 98.1 °F (36.7 °C)  Heart Rate:  [79-96] 79  Resp:  [16-18] 16  BP: (86-99)/(59-66) 91/66    Physical Exam   General: Awake, alert, NAD  HENT: NCAT, MMM  Eyes: pupils equal, no scleral icterus  Cardiovascular: RRR, no murmurs   Pulmonary: Provide aeration heard to bilateral lower lobes, no wheezing no rhonchi  Gastrointestinal: Less  distention, no tenderness on deep palpation, positive bowel sounds  Musculoskeletal: No gross deformities  Neuro: CN II through XII grossly intact; speech clear; no tremor    Result Review    Result Review:  I have personally reviewed these results:  [x]  Laboratory      Lab 09/21/24 0458 09/20/24 0921 09/20/24 0421 09/19/24 1816 09/19/24  1307   WBC 4.27  --  10.70  --  13.27*   HEMOGLOBIN 7.8*  --  7.9*  --  10.2*   HEMATOCRIT 24.0*  --  23.7*  --  30.6*   PLATELETS 77*  --  93*  --  138*   NEUTROS ABS  --   --  9.09*  --  11.79*   IMMATURE GRANS (ABS)  --   --  0.08*  --  0.11*   LYMPHS ABS  --   --  0.73  --  0.33*   MONOS ABS  --   --  0.75  --  0.92*   EOS ABS  --   --  0.03  --  0.09   MCV 85.4  --  85.3  --  84.1   PROCALCITONIN  --   --   --  11.74*  --    LACTATE  --   --   --  2.0 3.7*   PROTIME 19.9* 21.6*  --   --   --    APTT  --  38.0*  --   --   --          Lab 09/21/24 0458 09/20/24 0421 09/19/24 1816 09/19/24  1307   SODIUM 136 130*  --  126*   POTASSIUM 3.9 2.9*  --  2.7*   CHLORIDE 106 98  --  91*   CO2 22.0 21.6*  --  23.0   ANION GAP 8.0 10.4  --  12.0   BUN 15 19  --  16   CREATININE 1.18 1.36*  --  1.40*   EGFR 82.0 69.2  --  66.8   GLUCOSE 88 107*  --  103*   CALCIUM 7.2* 7.7*  --  8.1*   MAGNESIUM 2.0  --   --  1.6   PHOSPHORUS 3.2  --   --   --    TSH  --   --  2.350  --          Lab 09/21/24 0458 09/19/24  1307   TOTAL PROTEIN 4.6* 6.3   ALBUMIN 1.9* 2.6*   GLOBULIN 2.7 3.7   ALT (SGPT) 32 48*   AST (SGOT) 59* 111*   BILIRUBIN 0.8 1.4*   ALK PHOS 155* 210*         Lab 09/21/24  0458 09/20/24  0921 09/19/24  1307   PROBNP  --   --  346.0   HSTROP T  --   --  17   PROTIME 19.9* 21.6*  --    INR 1.66* 1.84*  --              Lab 09/19/24  1816   IRON 20*   IRON SATURATION (TSAT) 8*   TIBC 255*   TRANSFERRIN 171*   FERRITIN 36.69         Brief Urine Lab Results  (Last result in the past 365 days)        Color   Clarity   Blood   Leuk Est   Nitrite   Protein   CREAT   Urine HCG         09/19/24 1600 Dark Yellow   Clear   Negative   Trace   Negative   Trace                 [x]  Microbiology   Microbiology Results (last 10 days)       Procedure Component Value - Date/Time    Body Fluid Culture - Body Fluid, Peritoneum [[id number]] Collected: 09/20/24 1235    Lab Status: Preliminary result Specimen: Body Fluid from Peritoneum Updated: 09/21/24 1029     Body Fluid Culture No growth     Gram Stain Rare (1+) WBCs seen      No organisms seen    COVID PRE-OP / PRE-PROCEDURE SCREENING ORDER (NO ISOLATION) - Swab, Nasopharynx [[id number]]  (Normal) Collected: 09/19/24 1422    Lab Status: Final result Specimen: Swab from Nasopharynx Updated: 09/19/24 1505    Narrative:      The following orders were created for panel order COVID PRE-OP / PRE-PROCEDURE SCREENING ORDER (NO ISOLATION) - Swab, Nasopharynx.  Procedure                               Abnormality         Status                     ---------                               -----------         ------                     COVID-19, FLU A/B, RSV P...[[id number]]  Normal              Final result                 Please view results for these tests on the individual orders.    COVID-19, FLU A/B, RSV PCR 1 HR TAT - Swab, Nasopharynx [[id number]]  (Normal) Collected: 09/19/24 1422    Lab Status: Final result Specimen: Swab from Nasopharynx Updated: 09/19/24 1505     COVID19 Not Detected     Influenza A PCR Not Detected     Influenza B PCR Not Detected     RSV, PCR Not Detected    Narrative:      Fact sheet for providers: https://www.fda.gov/media/633979/download    Fact sheet for patients: https://www.fda.gov/media/778564/download    Test performed by PCR.    Blood Culture - Blood, Arm, Right [[id number]]  (Normal) Collected: 09/19/24 1350    Lab Status: Preliminary result Specimen: Blood from Arm, Right Updated: 09/21/24 1415     Blood Culture No growth at 2 days    Blood Culture - Blood, Arm, Left [[id number]]  (Normal) Collected: 09/19/24 1307    Lab Status:  Preliminary result Specimen: Blood from Arm, Left Updated: 09/21/24 1316     Blood Culture No growth at 2 days          [x]  Radiology  US Paracentesis    Result Date: 9/20/2024  Impression: Successful ultrasound-guided diagnostic and therapeutic paracentesis without immediate complication.  Electronically Signed: Leif Bryan MD  9/20/2024 1:02 PM EDT  Workstation ID: [id number]    XR Chest 1 View    Result Date: 9/20/2024  Impression: 1. No evidence of pneumothorax status post right thoracentesis. Electronically Signed: Chicho Quijano MD  9/20/2024 12:35 PM EDT  Workstation ID: [id number]    XR Chest 1 View    Result Date: 9/19/2024  Impression: Moderate right pleural effusion Electronically Signed: Franklin Nowak  9/19/2024 12:52 PM EDT  Workstation ID: [id number]   []  EKG/Telemetry   []  Cardiology/Vascular   []  Pathology  []  Old records  []  Other:    Assessment & Plan   Assessment / Plan     Assessment:  Sepsis, concern for intra-abdominal infection  Decompensated cirrhosis  Primary biliary cholangitis  Right pleural effusion  Ascites  NICKI  History of SBP  Hypokalemia  Pleural effusion  History of alcohol abuse, no alcohol use for 4 years    Plan:  Patient remains admitted to hospital for further care management  Gastroenterology, pulmonology services consulted, appreciate assistance  Patient underwent paracentesis on the labs not consistent with SBP  Patient underwent thoracentesis pulmonology, transudative  Empirically covering with antibiotics, Zosyn to cover intra-abdominal and pulmonary source  His renal function improving, Continue to monitor daily  Improvement in potassium today  Patient will need to be back on diuretics, however given current renal dysfunction we will hold  Continue with low-sodium diet  Gastroenterology indicates patient needs to follow-up with [facility] hepatology to discuss TIPS and/or liver transplant  Continue on midodrine currently, monitor blood pressures     Discussed with RN.   Discussed with gastroenterologist    VTE Prophylaxis:  Mechanical VTE prophylaxis orders are present.        CODE STATUS:   Code Status (Patient has no pulse and is not breathing): CPR (Attempt to Resuscitate)  Medical Interventions (Patient has pulse or is breathing): Full Support      Electronically signed by Asael Chilel MD, 9/21/2024, 15:47 EDT.

## 2024-09-21 NOTE — PLAN OF CARE
Called wife and appt sched for 11-28-22/geraldine   Goal Outcome Evaluation:  Plan of Care Reviewed With: patient        Progress: improving  Outcome Evaluation: pt alert and able to make needs known. no events during shift. continue with plan of care

## 2024-09-21 NOTE — PLAN OF CARE
Goal Outcome Evaluation:  Plan of Care Reviewed With: patient        Progress: improving  Outcome Evaluation: Patient alert and oriented throughout shift. VSS. No complaints of pain or discomfort noted. Patient up ad rancho to bathroom, no gait deficit noted. Hypotension noted, treated per MAR. MD aware of hypotension. IV iron and vitamin K administered this afternoon. Plan of care discussed with patient. Continue with plan of care.

## 2024-09-21 NOTE — PROGRESS NOTES
Pulmonary / Critical Care Progress Note      Patient Name: Wai Gay  : 1985  MRN: [id number]  Primary Care Physician:  Callum Peterson DO  Date of admission: 2024    Subjective   Subjective   Follow-up for pleural effusion    No acute events overnight.    This morning,  Lying in bed on room air  Overall feeling better  Dyspnea significantly improved s/p thoracentesis  Pleural fluid transudative per lights criteria  Underwent paracentesis per IR  Afebrile  BP soft      Objective   Objective     Vitals:   Temp:  [97.3 °F (36.3 °C)-97.7 °F (36.5 °C)] 97.7 °F (36.5 °C)  Heart Rate:  [88-96] 96  Resp:  [18] 18  BP: (86-99)/(56-64) 92/59    Physical Exam   Vital Signs Reviewed   General: Chronically ill-appearing male, Alert, NAD, lying in bed.    Chest: Improved aeration aeration, clear to auscultation bilaterally, no work of breathing noted  CV: regular rate and rhythm regular  EXT:  no clubbing, no cyanosis, BLE edema  Neuro:  A&Ox3, moving all 4 extremities spontaneously  Skin: No rashes or lesions noted      Result Review    Result Review:  I have personally reviewed the results from the time of this admission to 2024 09:38 EDT and agree with these findings:  [x]  Laboratory  [x]  Microbiology  [x]  Radiology  [x]  EKG/Telemetry   []  Cardiology/Vascular   []  Pathology  []  Old records  []  Other:  Most notable findings include:   Procalcitonin 11.74  Lactate 3.7 --> 2.0      Lab 24  0458 24  0421 24  1307   WBC 4.27 10.70 13.27*   HEMOGLOBIN 7.8* 7.9* 10.2*   HEMATOCRIT 24.0* 23.7* 30.6*   PLATELETS 77* 93* 138*   SODIUM 136 130* 126*   POTASSIUM 3.9 2.9* 2.7*   CHLORIDE 106 98 91*   CO2 22.0 21.6* 23.0   BUN 15 19 16   CREATININE 1.18 1.36* 1.40*   GLUCOSE 88 107* 103*   CALCIUM 7.2* 7.7* 8.1*   PHOSPHORUS 3.2  --   --    TOTAL PROTEIN 4.6*  --  6.3   ALBUMIN 1.9*  --  2.6*   GLOBULIN 2.7  --  3.7     Assessment & Plan   Assessment / Plan     Active Hospital  Problems:  Active Hospital Problems    Diagnosis     **Sepsis, due to unspecified organism, unspecified whether acute organ dysfunction present    Impression:  Recurrent right pleural effusion  Decompensated alcoholic cirrhosis  Ascites  NICKI  Anemia  Hypokalemia  History of PE, not on anticoagulation  History of alcohol abuse, no use x 4 years    Plan:  -Continue to maintain SpO2 greater than 90%  -S/p 3.6 L clear yellow pleural fluid drained, transudative per lights criteria, cultures and cytology pending.  -Postprocedure CXR without pneumothorax  -S/p ultrasound-guided paracentesis via IR with 2.1 L yellow fluid drained.  -Continue Zosyn per primary  -Continue DuoNebs  -Encourage use of incentive spirometry  -GI on board.  Appreciate assistance.  -Trend H&H.  Transfuse for hemoglobin less than 7.  -Trend electrolytes and renal panel.  Replace electrolytes as needed.  -Encourage mobilization.  Out of bed to chair.        VTE Prophylaxis:  Mechanical VTE prophylaxis orders are present.      CODE STATUS:   Code Status (Patient has no pulse and is not breathing): CPR (Attempt to Resuscitate)  Medical Interventions (Patient has pulse or is breathing): Full Support      Electronically signed by ANA LAURA Meek, 09/21/24, 9:38 AM EDT.  This visit was performed by both a physician and an APC.  I personally evaluated and examined the patient.  I performed all aspects of MDM as documented.  I have reviewed and confirmed the accuracy of the patient's history as documented in this note and I have reexamined the patient and the results are consistent with the previously documented exam.  I have updated the documentation as necessary. Electronically signed by Davain Watt MD, 09/21/24, 11:59 AM EDT.

## 2024-09-22 ENCOUNTER — READMISSION MANAGEMENT (OUTPATIENT)
Dept: CALL CENTER | Facility: HOSPITAL | Age: 39
End: 2024-09-22
Payer: COMMERCIAL

## 2024-09-22 VITALS
RESPIRATION RATE: 18 BRPM | OXYGEN SATURATION: 95 % | SYSTOLIC BLOOD PRESSURE: 97 MMHG | HEART RATE: 96 BPM | TEMPERATURE: 98.1 F | HEIGHT: 68 IN | WEIGHT: 168.21 LBS | DIASTOLIC BLOOD PRESSURE: 64 MMHG | BODY MASS INDEX: 25.49 KG/M2

## 2024-09-22 LAB
ALBUMIN SERPL-MCNC: 2.1 G/DL (ref 3.5–5.2)
ALBUMIN/GLOB SERPL: 0.7 G/DL
ALP SERPL-CCNC: 152 U/L (ref 39–117)
ALT SERPL W P-5'-P-CCNC: 31 U/L (ref 1–41)
ANION GAP SERPL CALCULATED.3IONS-SCNC: 7.6 MMOL/L (ref 5–15)
AST SERPL-CCNC: 50 U/L (ref 1–40)
BILIRUB SERPL-MCNC: 0.9 MG/DL (ref 0–1.2)
BUN SERPL-MCNC: 15 MG/DL (ref 6–20)
BUN/CREAT SERPL: 14.3 (ref 7–25)
CALCIUM SPEC-SCNC: 7.5 MG/DL (ref 8.6–10.5)
CHLORIDE SERPL-SCNC: 105 MMOL/L (ref 98–107)
CO2 SERPL-SCNC: 21.4 MMOL/L (ref 22–29)
CREAT SERPL-MCNC: 1.05 MG/DL (ref 0.76–1.27)
DEPRECATED RDW RBC AUTO: 54.2 FL (ref 37–54)
EGFRCR SERPLBLD CKD-EPI 2021: 94.3 ML/MIN/1.73
ERYTHROCYTE [DISTWIDTH] IN BLOOD BY AUTOMATED COUNT: 17.3 % (ref 12.3–15.4)
GLOBULIN UR ELPH-MCNC: 2.9 GM/DL
GLUCOSE SERPL-MCNC: 85 MG/DL (ref 65–99)
HCT VFR BLD AUTO: 26 % (ref 37.5–51)
HGB BLD-MCNC: 8.3 G/DL (ref 13–17.7)
MAGNESIUM SERPL-MCNC: 1.9 MG/DL (ref 1.6–2.6)
MCH RBC QN AUTO: 27.2 PG (ref 26.6–33)
MCHC RBC AUTO-ENTMCNC: 31.9 G/DL (ref 31.5–35.7)
MCV RBC AUTO: 85.2 FL (ref 79–97)
PLATELET # BLD AUTO: 98 10*3/MM3 (ref 140–450)
PMV BLD AUTO: 9.7 FL (ref 6–12)
POTASSIUM SERPL-SCNC: 3.9 MMOL/L (ref 3.5–5.2)
PROT SERPL-MCNC: 5 G/DL (ref 6–8.5)
RBC # BLD AUTO: 3.05 10*6/MM3 (ref 4.14–5.8)
SODIUM SERPL-SCNC: 134 MMOL/L (ref 136–145)
WBC NRBC COR # BLD AUTO: 4.79 10*3/MM3 (ref 3.4–10.8)

## 2024-09-22 PROCEDURE — 94799 UNLISTED PULMONARY SVC/PX: CPT

## 2024-09-22 PROCEDURE — 83735 ASSAY OF MAGNESIUM: CPT | Performed by: INTERNAL MEDICINE

## 2024-09-22 PROCEDURE — 85027 COMPLETE CBC AUTOMATED: CPT | Performed by: INTERNAL MEDICINE

## 2024-09-22 PROCEDURE — 99239 HOSP IP/OBS DSCHRG MGMT >30: CPT | Performed by: INTERNAL MEDICINE

## 2024-09-22 PROCEDURE — 25010000002 PIPERACILLIN SOD-TAZOBACTAM PER 1 G: Performed by: STUDENT IN AN ORGANIZED HEALTH CARE EDUCATION/TRAINING PROGRAM

## 2024-09-22 PROCEDURE — 94664 DEMO&/EVAL PT USE INHALER: CPT

## 2024-09-22 PROCEDURE — 80053 COMPREHEN METABOLIC PANEL: CPT | Performed by: INTERNAL MEDICINE

## 2024-09-22 RX ORDER — MIDODRINE HYDROCHLORIDE 5 MG/1
5 TABLET ORAL
Qty: 90 TABLET | Refills: 0 | Status: SHIPPED | OUTPATIENT
Start: 2024-09-22 | End: 2024-10-22

## 2024-09-22 RX ORDER — CIPROFLOXACIN 750 MG/1
750 TABLET, FILM COATED ORAL WEEKLY
Qty: 4 TABLET | Refills: 1 | Status: SHIPPED | OUTPATIENT
Start: 2024-09-22 | End: 2024-11-21

## 2024-09-22 RX ADMIN — IPRATROPIUM BROMIDE AND ALBUTEROL SULFATE 3 ML: .5; 3 SOLUTION RESPIRATORY (INHALATION) at 07:16

## 2024-09-22 RX ADMIN — URSODIOL 600 MG: 300 CAPSULE ORAL at 08:34

## 2024-09-22 RX ADMIN — MIDODRINE HYDROCHLORIDE 5 MG: 5 TABLET ORAL at 08:30

## 2024-09-22 RX ADMIN — PANTOPRAZOLE SODIUM 40 MG: 40 TABLET, DELAYED RELEASE ORAL at 08:30

## 2024-09-22 RX ADMIN — IPRATROPIUM BROMIDE AND ALBUTEROL SULFATE 3 ML: .5; 3 SOLUTION RESPIRATORY (INHALATION) at 00:21

## 2024-09-22 RX ADMIN — Medication 10 ML: at 08:30

## 2024-09-22 RX ADMIN — PIPERACILLIN AND TAZOBACTAM 3.38 G: 3; .375 INJECTION, POWDER, FOR SOLUTION INTRAVENOUS at 04:26

## 2024-09-22 NOTE — DISCHARGE SUMMARY
[facility]         HOSPITALIST  DISCHARGE SUMMARY    Patient Name: Wai Gay  : 1985  MRN: [id number]    Date of Admission: 2024  Date of Discharge:  2024  Primary Care Physician: Callum Peterson DO    Consults:  Pulmonologist  Gastroenterologist    Active and Resolved Hospital Problems:  Sepsis on arrival, [place] cover with antibiotics, however no source of infection found  Decompensated cirrhosis  Primary biliary cholangitis  Right pleural effusion, transudative  Ascites  NICKI resolved  History of SBP  Hypokalemia, resolved  History of alcohol abuse, no alcohol use for 4 years      Hospital Course     Hospital Course:  Wai Gay is a 38 y.o. male with history of cirrhosis PBC and remote history of alcohol use, with regular paracentesis, history of SBP, hypertension, GERD, tobacco use, who presents the emergency room with shortness of breath, and fever. Patient denies any chest pain, chills, diarrhea, dysuria, cough, congestion, runny nose, or recent sick contacts. Of note, the patient was recently admitted less than a month ago when he presented with similar symptoms and was treated for spontaneous bacterial peritonitis. On presentation, the patient's labs were significant for a lactic acid of 3.7, potassium of 2.7, elevated white count of 13.2, hemoglobin of 10.2. Blood pressure was 97/58, heart rate was 105, and temperature was 99.1. He met criteria for sepsis given leukocytosis, tachycardia, elevated lactate and concern for pneumonia versus SBP. Imaging of his chest showed moderate right pleural effusion. The patient is being admitted for further treatment.  Pulmonologist and gastroenterologist consulted.  Patient underwent thoracentesis and paracentesis.  Samples from both showed no concern for infection.  Patient discharged on ciprofloxacin, prophylactic dosing 750 mg once weekly.  Patient's blood pressures have been soft therefore started on  low-dose midodrine.  Patient has a follow-up appointment with gastroenterology office within 1 week.  Overall, patient needs to follow-up with tertiary center for consideration of TIPS and/or transplant.  Patient seen on date of discharge, clinically and hemodynamically stable.  Patient provided concerning signs and symptoms prompting immediate medical attention, patient understanding and agreeable     DISCHARGE Follow Up Recommendations for labs and diagnostics:   Follow-up with primary care physician as soon as possible  Follow-up with gastroenterology as scheduled    Day of Discharge     Vital Signs:  Temp:  [98.1 °F (36.7 °C)] 98.1 °F (36.7 °C)  Heart Rate:  [] 96  Resp:  [18-20] 18  BP: ()/(64-74) 97/64  Physical Exam:   General: Awake, alert, NAD  HENT: NCAT, MMM  Eyes: pupils equal, no scleral icterus  Cardiovascular: RRR, no murmurs   Pulmonary: Improved aeration heard to bilateral lower lobes, no wheezing no rhonchi  Gastrointestinal: Normal distention, no tenderness on deep palpation, positive bowel sounds  Musculoskeletal: No gross deformities  Neuro: CN II through XII grossly intact; speech clear; no tremor       Discharge Details        Discharge Medications        New Medications        Instructions Start Date   ciprofloxacin 750 MG tablet  Commonly known as: CIPRO   750 mg, Oral, Weekly      midodrine 5 MG tablet  Commonly known as: PROAMATINE   5 mg, Oral, 3 Times Daily Before Meals             Continue These Medications        Instructions Start Date   albuterol sulfate  (90 Base) MCG/ACT inhaler  Commonly known as: Ventolin HFA   2 puffs, Inhalation, Every 4 Hours PRN      pantoprazole 40 MG EC tablet  Commonly known as: PROTONIX   TAKE 1 TABLET BY MOUTH EVERY DAY      spironolactone 50 MG tablet  Commonly known as: ALDACTONE   TAKE 1 TABLET BY MOUTH EVERY DAY      Umeclidinium-Vilanterol 62.5-25 MCG/ACT aerosol powder  inhaler  Commonly known as: ANORO ELLIPTA   1 puff,  Inhalation, Daily      ursodiol 300 MG capsule  Commonly known as: ACTIGALL   Take 2 capsules by mouth 2 (Two) Times a Day With Meals.               No Known Allergies    Discharge Disposition:  Home or Self Care    Diet:  Hospital:No active diet order      Discharge Activity:   Activity Instructions       Activity as Tolerated      Gradually Increase Activity Until at Pre-Hospitalization Level              CODE STATUS:  Code Status and Medical Interventions: CPR (Attempt to Resuscitate); Full Support   Ordered at: 09/19/24 0203     Code Status (Patient has no pulse and is not breathing):    CPR (Attempt to Resuscitate)     Medical Interventions (Patient has pulse or is breathing):    Full Support         Future Appointments   Date Time Provider Department Center   9/25/2024  8:00 AM Shyla Yarbrough APRN [facility] GE ETW [facility]   10/15/2024 11:00 AM Davian Watt MD [facility] PCC ETW [facility]   1/6/2025 10:30 AM Callum Peterson DO [facility]   3/31/2025  9:00 AM Callum Peterson,  [facility]       Additional Instructions for the Follow-ups that You Need to Schedule       Discharge Follow-up with PCP   As directed       Currently Documented PCP:    Callum Peterson DO    PCP Phone Number:    [phone]     Follow Up Details: In less than one week        Discharge Follow-up with Specified Provider: GI, has an appointment later this week   As directed      To: GI, has an appointment later this week                Pertinent  and/or Most Recent Results     PROCEDURES:   Thoracentesis Procedure Note     Indication: Moderate right sided pleural effusion     Consent: Yes, placed in chart.     Procedure: The patient was placed in the sitting position and the appropriate landmarks were identified.  Right pleural effusion was confirmed on ultrasound and the skin was marked the skin over the puncture site in the right posterior chest wall was prepped with ChloraPrep and draped in sterile fashion. Local  anesthesia was applied with 1% lidocaine. Thoracentesis catheter was inserted with needle guidance. Pleural fluid was straw-colored, drained 3600 mL fluid. The catheter was then withdrawn and a sterile dressing was placed over the site. A post-procedure film is pending at this time.     The patient tolerated the procedure well.     Complications: None     Electronically signed by Davian Watt MD, 09/20/24, 2:44 PM EDT.        Narrative & Impression   US PARACENTESIS     Date of Exam: 9/20/2024 12:20 PM EDT     Indication: r/o SBP.     Comparison: None available.     CONSENT: Prior to the procedure, the risks, benefits and alternatives were thoroughly explained. This included the risk of bleeding, infection, injury to associated structures, and risk of bowel injury. The patient expressed understanding and wished to   continue. Informed written consent was obtained.     Findings:     Preliminary ultrasound demonstrated appropriate fluid in the right mid abdomen. The overlying skin was prepped and draped in normal sterile fashion. Lidocaine was injected along the anticipated tract of the needle. A 5 Polish 7 cm Paracelsus Labseh catheter was   advanced into the peritoneal space. 2.1 L clear yellow fluid was aspirated. A sample was sent to lab for analysis. The catheter was removed without complication.     IMPRESSION:  Impression:  Successful ultrasound-guided diagnostic and therapeutic paracentesis without immediate complication.                  LAB RESULTS:      Lab 09/22/24  0458 09/21/24  0458 09/20/24  0921 09/20/24  0421 09/19/24  1816 09/19/24  1307   WBC 4.79 4.27  --  10.70  --  13.27*   HEMOGLOBIN 8.3* 7.8*  --  7.9*  --  10.2*   HEMATOCRIT 26.0* 24.0*  --  23.7*  --  30.6*   PLATELETS 98* 77*  --  93*  --  138*   NEUTROS ABS  --   --   --  9.09*  --  11.79*   IMMATURE GRANS (ABS)  --   --   --  0.08*  --  0.11*   LYMPHS ABS  --   --   --  0.73  --  0.33*   MONOS ABS  --   --   --  0.75  --  0.92*   EOS ABS  --   --    --  0.03  --  0.09   MCV 85.2 85.4  --  85.3  --  84.1   PROCALCITONIN  --   --   --   --  11.74*  --    LACTATE  --   --   --   --  2.0 3.7*   PROTIME  --  19.9* 21.6*  --   --   --    APTT  --   --  38.0*  --   --   --          Lab 09/22/24  0457 09/21/24 0458 09/20/24 0421 09/19/24  1816 09/19/24  1307   SODIUM 134* 136 130*  --  126*   POTASSIUM 3.9 3.9 2.9*  --  2.7*   CHLORIDE 105 106 98  --  91*   CO2 21.4* 22.0 21.6*  --  23.0   ANION GAP 7.6 8.0 10.4  --  12.0   BUN 15 15 19  --  16   CREATININE 1.05 1.18 1.36*  --  1.40*   EGFR 94.3 82.0 69.2  --  66.8   GLUCOSE 85 88 107*  --  103*   CALCIUM 7.5* 7.2* 7.7*  --  8.1*   MAGNESIUM 1.9 2.0  --   --  1.6   PHOSPHORUS  --  3.2  --   --   --    TSH  --   --   --  2.350  --          Lab 09/22/24 0457 09/21/24 0458 09/19/24  1307   TOTAL PROTEIN 5.0* 4.6* 6.3   ALBUMIN 2.1* 1.9* 2.6*   GLOBULIN 2.9 2.7 3.7   ALT (SGPT) 31 32 48*   AST (SGOT) 50* 59* 111*   BILIRUBIN 0.9 0.8 1.4*   ALK PHOS 152* 155* 210*         Lab 09/21/24 0458 09/20/24  0921 09/19/24  1307   PROBNP  --   --  346.0   HSTROP T  --   --  17   PROTIME 19.9* 21.6*  --    INR 1.66* 1.84*  --              Lab 09/19/24 1816   IRON 20*   IRON SATURATION (TSAT) 8*   TIBC 255*   TRANSFERRIN 171*   FERRITIN 36.69         Brief Urine Lab Results  (Last result in the past 365 days)        Color   Clarity   Blood   Leuk Est   Nitrite   Protein   CREAT   Urine HCG        09/19/24 1600 Dark Yellow   Clear   Negative   Trace   Negative   Trace                 Microbiology Results (last 10 days)       Procedure Component Value - Date/Time    Body Fluid Culture - Body Fluid, Peritoneum [[id number]] Collected: 09/20/24 1235    Lab Status: Preliminary result Specimen: Body Fluid from Peritoneum Updated: 09/22/24 1041     Body Fluid Culture No growth at 2 days     Gram Stain Rare (1+) WBCs seen      No organisms seen    COVID PRE-OP / PRE-PROCEDURE SCREENING ORDER (NO ISOLATION) - Swab, Nasopharynx [[id number]]   (Normal) Collected: 09/19/24 1422    Lab Status: Final result Specimen: Swab from Nasopharynx Updated: 09/19/24 1505    Narrative:      The following orders were created for panel order COVID PRE-OP / PRE-PROCEDURE SCREENING ORDER (NO ISOLATION) - Swab, Nasopharynx.  Procedure                               Abnormality         Status                     ---------                               -----------         ------                     COVID-19, FLU A/B, RSV P...[[id number]]  Normal              Final result                 Please view results for these tests on the individual orders.    COVID-19, FLU A/B, RSV PCR 1 HR TAT - Swab, Nasopharynx [[id number]]  (Normal) Collected: 09/19/24 1422    Lab Status: Final result Specimen: Swab from Nasopharynx Updated: 09/19/24 1505     COVID19 Not Detected     Influenza A PCR Not Detected     Influenza B PCR Not Detected     RSV, PCR Not Detected    Narrative:      Fact sheet for providers: https://www.fda.gov/media/201644/download    Fact sheet for patients: https://www.fda.gov/media/092060/download    Test performed by PCR.    Blood Culture - Blood, Arm, Right [[id number]]  (Normal) Collected: 09/19/24 1350    Lab Status: Preliminary result Specimen: Blood from Arm, Right Updated: 09/22/24 1400     Blood Culture No growth at 3 days    Blood Culture - Blood, Arm, Left [[id number]]  (Normal) Collected: 09/19/24 1307    Lab Status: Preliminary result Specimen: Blood from Arm, Left Updated: 09/22/24 1315     Blood Culture No growth at 3 days            US Paracentesis    Result Date: 9/20/2024  Impression: Impression: Successful ultrasound-guided diagnostic and therapeutic paracentesis without immediate complication.  Electronically Signed: Leif Bryan MD  9/20/2024 1:02 PM EDT  Workstation ID: [id number]    XR Chest 1 View    Result Date: 9/20/2024  Impression: Impression: 1. No evidence of pneumothorax status post right thoracentesis. Electronically Signed: Chicho Quijano MD   9/20/2024 12:35 PM EDT  Workstation ID: [id number]    XR Chest 1 View    Result Date: 9/19/2024  Impression: Impression: Moderate right pleural effusion Electronically Signed: Franklin Nowak  9/19/2024 12:52 PM EDT  Workstation ID: [id number]     Results for orders placed during the hospital encounter of 05/25/24    Duplex Venous Lower Extremity - Bilateral CV-READ    Interpretation Summary  •  Normal bilateral lower extremity venous duplex scan.      Results for orders placed during the hospital encounter of 05/25/24    Duplex Venous Lower Extremity - Bilateral CV-READ    Interpretation Summary  •  Normal bilateral lower extremity venous duplex scan.          Labs Pending at Discharge:  Pending Labs       Order Current Status    Non-gynecologic Cytology Collected (09/20/24 1332)    Anaerobic Culture - Body Fluid, Peritoneum In process    Blood Culture - Blood, Arm, Left Preliminary result    Blood Culture - Blood, Arm, Right Preliminary result    Body Fluid Culture - Body Fluid, Peritoneum Preliminary result              Time spent on Discharge including face to face service:  38 minutes    Electronically signed by Asael Chilel MD, 09/22/24, 5:57 PM EDT.

## 2024-09-22 NOTE — PLAN OF CARE
Goal Outcome Evaluation:           Progress: improving  Outcome Evaluation: Patient alert and oriented, no events throughout shift thusfar. Patient scheduled for discharge, education completed and IV removed.

## 2024-09-22 NOTE — PLAN OF CARE
Goal Outcome Evaluation:  Plan of Care Reviewed With: patient        Progress: improving  Outcome Evaluation: pt alert and able to make needs known. no events during shift. continue with plan of care

## 2024-09-23 ENCOUNTER — TRANSITIONAL CARE MANAGEMENT TELEPHONE ENCOUNTER (OUTPATIENT)
Dept: CALL CENTER | Facility: HOSPITAL | Age: 39
End: 2024-09-23
Payer: COMMERCIAL

## 2024-09-23 LAB
BACTERIA FLD CULT: NORMAL
GRAM STN SPEC: NORMAL
GRAM STN SPEC: NORMAL

## 2024-09-24 LAB
BACTERIA SPEC AEROBE CULT: NORMAL
BACTERIA SPEC AEROBE CULT: NORMAL
CYTO UR: NORMAL
LAB AP CASE REPORT: NORMAL
LAB AP CLINICAL INFORMATION: NORMAL
PATH REPORT.FINAL DX SPEC: NORMAL
PATH REPORT.GROSS SPEC: NORMAL

## 2024-09-25 ENCOUNTER — OFFICE VISIT (OUTPATIENT)
Dept: GASTROENTEROLOGY | Facility: CLINIC | Age: 39
End: 2024-09-25
Payer: COMMERCIAL

## 2024-09-25 VITALS
DIASTOLIC BLOOD PRESSURE: 59 MMHG | WEIGHT: 168 LBS | BODY MASS INDEX: 25.46 KG/M2 | OXYGEN SATURATION: 100 % | HEIGHT: 68 IN | HEART RATE: 99 BPM | SYSTOLIC BLOOD PRESSURE: 93 MMHG

## 2024-09-25 DIAGNOSIS — K70.31 ALCOHOLIC CIRRHOSIS OF LIVER WITH ASCITES: Primary | ICD-10-CM

## 2024-09-25 DIAGNOSIS — R60.0 PERIPHERAL EDEMA: ICD-10-CM

## 2024-09-25 DIAGNOSIS — K65.2 SBP (SPONTANEOUS BACTERIAL PERITONITIS): ICD-10-CM

## 2024-09-25 DIAGNOSIS — K74.3 PRIMARY BILIARY CHOLANGITIS: ICD-10-CM

## 2024-09-25 LAB — BACTERIA SPEC ANAEROBE CULT: NORMAL

## 2024-09-30 ENCOUNTER — TELEPHONE (OUTPATIENT)
Dept: PULMONOLOGY | Facility: CLINIC | Age: 39
End: 2024-09-30
Payer: COMMERCIAL

## 2024-09-30 ENCOUNTER — HOSPITAL ENCOUNTER (EMERGENCY)
Facility: HOSPITAL | Age: 39
Discharge: LEFT BEFORE DISCHARGE | End: 2024-09-30
Payer: COMMERCIAL

## 2024-09-30 DIAGNOSIS — J90 PLEURAL EFFUSION: Primary | ICD-10-CM

## 2024-09-30 PROCEDURE — 99211 OFF/OP EST MAY X REQ PHY/QHP: CPT

## 2024-09-30 NOTE — TELEPHONE ENCOUNTER
Provider: [facility]    Caller: AMARJIT FERRERA    Relationship to Patient: SELF    Reason for Call: PATIENT BELIEVES TIME FOR A THORACENTESIS. PLEASE CALL PATIENT TO ADVISE. STATES NEEDS AN APPT. PLEASE CALL PATIENT AT HIS PARENTS [facility] [phone]

## 2024-10-02 ENCOUNTER — READMISSION MANAGEMENT (OUTPATIENT)
Dept: CALL CENTER | Facility: HOSPITAL | Age: 39
End: 2024-10-02
Payer: COMMERCIAL

## 2024-10-02 NOTE — OUTREACH NOTE
Sepsis Week 2 Survey      Flowsheet Row Responses   List of hospitals in [place] patient discharged from? [facility]   Does the patient have one of the following disease processes/diagnoses(primary or secondary)? Sepsis   Week 2 attempt successful? Yes   Call start time 1228   Call end time 1231   Discharge diagnosis Sepsis,   Person spoke with today (if not patient) and relationship Patient   Medication alerts for this patient Cipro, Midodrine   Meds reviewed with patient/caregiver? Yes   Is the patient having any side effects they believe may be caused by any medication additions or changes? No   Does the patient have all medications related to this admission filled (includes all antibiotics, inhalers, nebulizers,steroids,etc.) Yes   Is the patient taking all medications as directed (includes completed medication regime)? Yes   Comments regarding appointments GI f/u appt was on 9/25/24   Does the patient have a primary care provider?  Yes   Comments regarding PCP PCP--Dr. Callum Peterson   Does the patient have an appointment with their PCP within 7 days of discharge? Yes   Has the patient kept scheduled appointments due by today? Yes   Has home health visited the patient within 72 hours of discharge? N/A   Psychosocial issues? No   Did the patient receive a copy of their discharge instructions? Yes   Nursing interventions Reviewed instructions with patient   What is the patient's perception of their health status since discharge? Improving   Nursing interventions Nurse provided patient education   Is the patient/caregiver able to teach back TIME? T emperature - higher or lower than normal, I nfection - may have signs and symptoms of an infection, M ental Decline - confused, sleepy, difficult to arouse, E xtremely Ill - severe pain, discomfort, shortness of breath   Nursing interventions Nurse provided reassurance to patient   Is patient/caregiver able to teach back steps to recovery at home? Set small, achievable goals for return  to baseline health, Rest and regain strength   Is the patient/caregiver able to teach back signs and symptoms of worsening condition: Fever, Rapid heart rate (>90)   If the patient is a current smoker, are they able to teach back resources for cessation? Smoking cessation medications   Is the patient/caregiver able to teach back the hierarchy of who to call/visit for symptoms/problems? PCP, Specialist, Home health nurse, Urgent Care, ED, 911 Yes   Week 2 call completed? Yes   Graduated Yes   Is the patient interested in additional calls from an ambulatory ? No   Would this patient benefit from a Referral to Amb Social Work? No   Wrap up additional comments Patient denies any needs or concerns, doing well. No further calls needed.   Call end time 1231            Carito PRABHAKAR - Registered Nurse

## 2024-10-03 ENCOUNTER — APPOINTMENT (OUTPATIENT)
Dept: GENERAL RADIOLOGY | Facility: HOSPITAL | Age: 39
End: 2024-10-03
Payer: COMMERCIAL

## 2024-10-03 ENCOUNTER — HOSPITAL ENCOUNTER (OUTPATIENT)
Dept: INFUSION THERAPY | Facility: HOSPITAL | Age: 39
Discharge: HOME | End: 2024-10-03
Attending: FAMILY MEDICINE | Admitting: INTERNAL MEDICINE
Payer: COMMERCIAL

## 2024-10-03 VITALS
RESPIRATION RATE: 16 BRPM | HEART RATE: 95 BPM | DIASTOLIC BLOOD PRESSURE: 70 MMHG | SYSTOLIC BLOOD PRESSURE: 104 MMHG | TEMPERATURE: 98.3 F | OXYGEN SATURATION: 99 %

## 2024-10-03 DIAGNOSIS — J90 PLEURAL EFFUSION: ICD-10-CM

## 2024-10-03 PROCEDURE — 32555 ASPIRATE PLEURA W/ IMAGING: CPT | Performed by: INTERNAL MEDICINE

## 2024-10-03 PROCEDURE — 71045 X-RAY EXAM CHEST 1 VIEW: CPT

## 2024-10-03 NOTE — PROCEDURES
Procedure name: ultrasound-guided right-sided thoracentesis     Indication - pleural effusion     This procedural risks were explained to the patient including pneumothorax requiring chest tube placement, significant bleeding requiring surgery, and infection , understanding of the risks and benefits acknowledged by the patient and family and he wish to proceed with the procedure.     Timeout performed     Procedure details  Ultrasound was use to visualize a  collection of pleural fluid, the diaphragm, and collapsed lung. At this point, the skin overlying the rib was anesthetized with 5 mL 1% lidocaine without epinephrine. A thoracentesis needle was then inserted into the pleural cavity just over top of the rib and pleural fluid was aspirated back. At this time the thoracentesis catheter was advanced to the pleural cavity over the needle.  Approximately 3200mL of clear yellow fluid was removed. Pleural fluid was sent for analysis. Chest x-ray has shows reexpansion of lung without pneumothorax.      Patient tolerated procedure well     No immediate complications    Electronically signed by Praful Powell MD, 10/03/24, 1:52 PM EDT.

## 2024-10-03 NOTE — PROCEDURES
Bedside thoracic ultrasound:     Right side: Liver, right hemidiaphragm, right lower lobe of lung identified.  Large pleural effusion identified.  Images saved     Site marked for thoracentesis.        CPT 60341 with thoracentesis note    Electronically signed by Praful Powell MD, 10/03/24, 1:51 PM EDT.

## 2024-10-03 NOTE — NURSING NOTE
Patient was here today for a Thoracentesis. 3200cc of pleural fluid was removed from the RT side. No complaints from patient post procedure. CXR showed no pneumothorax, patient was discharged home.

## 2024-10-07 ENCOUNTER — LAB (OUTPATIENT)
Dept: LAB | Facility: HOSPITAL | Age: 39
End: 2024-10-07
Payer: COMMERCIAL

## 2024-10-07 DIAGNOSIS — K70.31 ALCOHOLIC CIRRHOSIS OF LIVER WITH ASCITES: ICD-10-CM

## 2024-10-07 LAB
ALBUMIN SERPL-MCNC: 2.7 G/DL (ref 3.5–5.2)
ALBUMIN/GLOB SERPL: 0.8 G/DL
ALP SERPL-CCNC: 234 U/L (ref 39–117)
ALT SERPL W P-5'-P-CCNC: 24 U/L (ref 1–41)
ANION GAP SERPL CALCULATED.3IONS-SCNC: 10 MMOL/L (ref 5–15)
AST SERPL-CCNC: 41 U/L (ref 1–40)
BASOPHILS # BLD AUTO: 0.04 10*3/MM3 (ref 0–0.2)
BASOPHILS NFR BLD AUTO: 0.7 % (ref 0–1.5)
BILIRUB SERPL-MCNC: 1.3 MG/DL (ref 0–1.2)
BUN SERPL-MCNC: 12 MG/DL (ref 6–20)
BUN/CREAT SERPL: 9.8 (ref 7–25)
CALCIUM SPEC-SCNC: 8.3 MG/DL (ref 8.6–10.5)
CHLORIDE SERPL-SCNC: 97 MMOL/L (ref 98–107)
CO2 SERPL-SCNC: 24 MMOL/L (ref 22–29)
CREAT SERPL-MCNC: 1.23 MG/DL (ref 0.76–1.27)
DEPRECATED RDW RBC AUTO: 51.7 FL (ref 37–54)
EGFRCR SERPLBLD CKD-EPI 2021: 78 ML/MIN/1.73
EOSINOPHIL # BLD AUTO: 0.45 10*3/MM3 (ref 0–0.4)
EOSINOPHIL NFR BLD AUTO: 7.5 % (ref 0.3–6.2)
ERYTHROCYTE [DISTWIDTH] IN BLOOD BY AUTOMATED COUNT: 16.4 % (ref 12.3–15.4)
GLOBULIN UR ELPH-MCNC: 3.5 GM/DL
GLUCOSE SERPL-MCNC: 67 MG/DL (ref 65–99)
HCT VFR BLD AUTO: 31.5 % (ref 37.5–51)
HGB BLD-MCNC: 10.3 G/DL (ref 13–17.7)
IMM GRANULOCYTES # BLD AUTO: 0.06 10*3/MM3 (ref 0–0.05)
IMM GRANULOCYTES NFR BLD AUTO: 1 % (ref 0–0.5)
INR PPP: 1.37 (ref 0.86–1.15)
LYMPHOCYTES # BLD AUTO: 1.01 10*3/MM3 (ref 0.7–3.1)
LYMPHOCYTES NFR BLD AUTO: 16.9 % (ref 19.6–45.3)
MCH RBC QN AUTO: 28.3 PG (ref 26.6–33)
MCHC RBC AUTO-ENTMCNC: 32.7 G/DL (ref 31.5–35.7)
MCV RBC AUTO: 86.5 FL (ref 79–97)
MONOCYTES # BLD AUTO: 0.91 10*3/MM3 (ref 0.1–0.9)
MONOCYTES NFR BLD AUTO: 15.2 % (ref 5–12)
NEUTROPHILS NFR BLD AUTO: 3.51 10*3/MM3 (ref 1.7–7)
NEUTROPHILS NFR BLD AUTO: 58.7 % (ref 42.7–76)
NRBC BLD AUTO-RTO: 0 /100 WBC (ref 0–0.2)
PLATELET # BLD AUTO: 160 10*3/MM3 (ref 140–450)
PMV BLD AUTO: 10.9 FL (ref 6–12)
POTASSIUM SERPL-SCNC: 3.3 MMOL/L (ref 3.5–5.2)
PROT SERPL-MCNC: 6.2 G/DL (ref 6–8.5)
PROTHROMBIN TIME: 17.1 SECONDS (ref 11.8–14.9)
RBC # BLD AUTO: 3.64 10*6/MM3 (ref 4.14–5.8)
SODIUM SERPL-SCNC: 131 MMOL/L (ref 136–145)
WBC NRBC COR # BLD AUTO: 5.98 10*3/MM3 (ref 3.4–10.8)

## 2024-10-07 PROCEDURE — 80053 COMPREHEN METABOLIC PANEL: CPT

## 2024-10-07 PROCEDURE — 85610 PROTHROMBIN TIME: CPT

## 2024-10-07 PROCEDURE — 36415 COLL VENOUS BLD VENIPUNCTURE: CPT

## 2024-10-07 PROCEDURE — 85025 COMPLETE CBC W/AUTO DIFF WBC: CPT

## 2024-10-08 ENCOUNTER — TELEPHONE (OUTPATIENT)
Dept: GASTROENTEROLOGY | Facility: CLINIC | Age: 39
End: 2024-10-08
Payer: COMMERCIAL

## 2024-10-08 RX ORDER — POTASSIUM CHLORIDE 1500 MG/1
20 TABLET, EXTENDED RELEASE ORAL 2 TIMES DAILY
Qty: 1 TABLET | Refills: 0 | Status: SHIPPED | OUTPATIENT
Start: 2024-10-08

## 2024-10-08 NOTE — TELEPHONE ENCOUNTER
I spoke with patient. He was not on a fluid restriction but I have advised him to start that as well as a low sodium diet. Patient did confirm he is taking Aldactone 50MG/ day.

## 2024-10-08 NOTE — TELEPHONE ENCOUNTER
Attempted to contact patient for results. No answer, I LVM for pt to contact me at my direct extension for results.

## 2024-10-08 NOTE — TELEPHONE ENCOUNTER
----- Message from Oralia Bland sent at 10/8/2024  1:19 PM EDT -----  INR is improved, hemoglobin is improved, bilirubin slightly elevated, potassium is low as well as sodium.  Please ensure patient is on 1.5 L fluid restriction, he should also stay on a low-sodium diet (there is not a direct correlation between this lab and sodium intake)  Pt needs K+ supplement, before I send in, can you confirm with pt is he taking Aldactone 50 mg/d?

## 2024-10-14 ENCOUNTER — TELEPHONE (OUTPATIENT)
Dept: GASTROENTEROLOGY | Facility: CLINIC | Age: 39
End: 2024-10-14
Payer: COMMERCIAL

## 2024-10-14 DIAGNOSIS — E87.6 HYPOKALEMIA: Primary | ICD-10-CM

## 2024-10-14 NOTE — TELEPHONE ENCOUNTER
Rec'd a notification from pharmacy requesting clarification on a prescription. Potassium Chloride was prescribed to take 1 tablet 2x daily, only 1 tablet was prescribed. Please advise.

## 2024-10-15 RX ORDER — POTASSIUM CHLORIDE 1500 MG/1
20 TABLET, EXTENDED RELEASE ORAL DAILY
Qty: 1 TABLET | Refills: 0 | Status: SHIPPED | OUTPATIENT
Start: 2024-10-15

## 2024-10-15 NOTE — TELEPHONE ENCOUNTER
S/w patient   Patient aware of appointment with Dr. Storm  Patient will repeat labs with their office.   Lab orders faxed  Will request records after visit

## 2024-10-15 NOTE — TELEPHONE ENCOUNTER
S/w pt, he states he would not be able to complete the lab until Thursday. He is still requesting the potassium be sent as he is having cramping.

## 2024-10-17 ENCOUNTER — LAB (OUTPATIENT)
Dept: LAB | Facility: HOSPITAL | Age: 39
End: 2024-10-17
Payer: COMMERCIAL

## 2024-10-17 DIAGNOSIS — E87.6 HYPOKALEMIA: ICD-10-CM

## 2024-10-17 LAB
ANION GAP SERPL CALCULATED.3IONS-SCNC: 8.1 MMOL/L (ref 5–15)
BUN SERPL-MCNC: 11 MG/DL (ref 6–20)
BUN/CREAT SERPL: 10.8 (ref 7–25)
CALCIUM SPEC-SCNC: 8.1 MG/DL (ref 8.6–10.5)
CHLORIDE SERPL-SCNC: 100 MMOL/L (ref 98–107)
CO2 SERPL-SCNC: 23.9 MMOL/L (ref 22–29)
CREAT SERPL-MCNC: 1.02 MG/DL (ref 0.76–1.27)
EGFRCR SERPLBLD CKD-EPI 2021: 97.7 ML/MIN/1.73
GLUCOSE SERPL-MCNC: 102 MG/DL (ref 65–99)
POTASSIUM SERPL-SCNC: 3.7 MMOL/L (ref 3.5–5.2)
SODIUM SERPL-SCNC: 132 MMOL/L (ref 136–145)

## 2024-10-17 PROCEDURE — 80048 BASIC METABOLIC PNL TOTAL CA: CPT

## 2024-10-17 PROCEDURE — 36415 COLL VENOUS BLD VENIPUNCTURE: CPT

## 2024-10-17 NOTE — PROGRESS NOTES
Primary Care Provider  Callum Peterson DO   Referring Provider  No ref. provider found    Patient Complaint  Follow-up (3 Months) and Mixed simple and mucopurulent chronic bronchitis      Subjective       History of Presenting Illness  Wai Gay is a pleasant 38 y.o. male who presents to [facility] for followup appointment. Patient last saw Dr. Watt 07/29/2024. Patient has history of liver cirrhosis and recurrent right pleural effusions with thoracentesis.  He recently had thoracentesis 10/3/2024 by Dr. Powell and 3200 ml clear yellow fluid was removed from the right side.  Follow-up chest x-ray revealed no pneumothorax.  He is under the care of of gastroenterology for management of his cirrhosis. He states he has appointment today at [facility] for testing for TIPS procedure. Patient states he feels good today. He can tell when fluid is building up in his lungs and needs thoracentesis.    Patient is a current smoker of less than a pack a day for past 18 years.At last visit he was started on Anoro inhaler with albuterol as needed. He states he thinks the Anoro is helping his breathing.  PFT from 7/29/2024 suggest moderately severe mixed obstructive and restrictive airway disease.    At present patient denies dyspnea, coughing, wheezing, headaches, chest pain, weight loss or hemoptysis. Patient denies fevers, chills and night sweats. Wai Gay is able to perform ADLs without difficulties.He states he has appointment today at [facility] for testing for TIPS procedure.     I have personally reviewed the review of systems, past family, social, medical and surgical histories; and agree with their findings.      Review of Systems   Constitutional: Negative.    HENT: Negative.     Respiratory: Negative.     Cardiovascular: Negative.    Musculoskeletal: Negative.    Neurological: Negative.    Psychiatric/Behavioral: Negative.           Family History    Problem Relation Age of Onset    Alcohol abuse Mother     Arthritis Mother     COPD Mother     Heart disease Maternal Grandmother     Hypertension Maternal Grandmother     Lung cancer Maternal Grandmother     Stroke Maternal Grandmother     Heart disease Maternal Grandfather     Lung cancer Maternal Grandfather     Cancer Paternal Grandmother     Cancer Paternal Grandfather     Colon cancer Neg Hx     Malig Hyperthermia Neg Hx         Social History     Socioeconomic History    Marital status: Single   Tobacco Use    Smoking status: Every Day     Current packs/day: 0.50     Average packs/day: 0.5 packs/day for 16.8 years (8.4 ttl pk-yrs)     Types: Cigarettes     Start date: 1/1/2008     Passive exposure: Current    Smokeless tobacco: Never   Vaping Use    Vaping status: Never Used   Substance and Sexual Activity    Alcohol use: Not Currently     Alcohol/week: 3.0 standard drinks of alcohol     Comment: FORMER    Drug use: Not Currently    Sexual activity: Yes     Partners: Female        Past Medical History:   Diagnosis Date    Alcohol abuse 03/14/2017    Anxiety     Essential hypertension 12/27/2019    GERD (gastroesophageal reflux disease)     Hypokalemia 03/14/2017    Will update    Hyponatremia 03/14/2017    Steatohepatitis, alcoholic 03/14/2017    Tobacco abuse 03/14/2017    Umbilical hernia         Immunization History   Administered Date(s) Administered    COVID-19 (PFIZER) Purple Cap Monovalent 11/07/2021, 11/28/2021    Fluzone (or Fluarix & Flulaval for VFC) >6mos 10/18/2021    Hep B, Adolescent or Pediatric 09/09/2003    Hepatitis B 06/28/2022, 08/29/2022    MMR 01/30/1997    Meningococcal Conjugate 06/28/2022    Pneumococcal Conjugate 20-Valent (PCV20) 06/28/2022    Td (TDVAX) 09/09/2003       No Known Allergies       Current Outpatient Medications:     albuterol sulfate HFA (Ventolin HFA) 108 (90 Base) MCG/ACT inhaler, Inhale 2 puffs Every 4 (Four) Hours As Needed for Wheezing or Shortness of Air.,  "Disp: 1 g, Rfl: 3    ciprofloxacin (CIPRO) 750 MG tablet, Take 1 tablet by mouth 1 (One) Time Per Week for 60 days., Disp: 4 tablet, Rfl: 1    midodrine (PROAMATINE) 5 MG tablet, Take 1 tablet by mouth 3 (Three) Times a Day Before Meals for 30 days., Disp: 90 tablet, Rfl: 0    pantoprazole (PROTONIX) 40 MG EC tablet, TAKE 1 TABLET BY MOUTH EVERY DAY (Patient taking differently: Take 1 tablet by mouth Daily.), Disp: 90 tablet, Rfl: 1    spironolactone (ALDACTONE) 50 MG tablet, TAKE 1 TABLET BY MOUTH EVERY DAY (Patient taking differently: Take 1 tablet by mouth Daily.), Disp: 90 tablet, Rfl: 1    Umeclidinium-Vilanterol (ANORO ELLIPTA) 62.5-25 MCG/ACT aerosol powder  inhaler, Inhale 1 puff Daily., Disp: 1 each, Rfl: 11    ursodiol (ACTIGALL) 300 MG capsule, Take 2 capsules by mouth 2 (Two) Times a Day With Meals., Disp: , Rfl:     potassium chloride (KLOR-CON M20) 20 MEQ CR tablet, Take 1 tablet by mouth Daily., Disp: 1 tablet, Rfl: 0         Vital Signs   /69 (BP Location: Left arm, Patient Position: Sitting, Cuff Size: Adult)   Pulse 96   Temp 98 °F (36.7 °C) (Temporal)   Resp 18   Ht 172.7 cm (68\")   Wt 77.1 kg (170 lb)   SpO2 99% Comment: Room air  BMI 25.85 kg/m²       Objective     Physical Exam  Vitals reviewed.   Constitutional:       General: He is not in acute distress.     Appearance: Normal appearance. He is not ill-appearing.   HENT:      Head: Normocephalic and atraumatic.      Nose: Nose normal.      Mouth/Throat:      Mouth: Mucous membranes are moist.      Pharynx: Oropharynx is clear.   Eyes:      Extraocular Movements: Extraocular movements intact.      Conjunctiva/sclera: Conjunctivae normal.      Pupils: Pupils are equal, round, and reactive to light.   Cardiovascular:      Rate and Rhythm: Normal rate and regular rhythm.      Pulses: Normal pulses.      Heart sounds: Normal heart sounds.   Pulmonary:      Effort: Pulmonary effort is normal. No respiratory distress.      Breath " sounds: No stridor. Examination of the right-middle field reveals decreased breath sounds. Examination of the right-lower field reveals decreased breath sounds. Decreased breath sounds present. No wheezing, rhonchi or rales.   Abdominal:      General: Bowel sounds are normal.   Musculoskeletal:         General: Normal range of motion.      Cervical back: Normal range of motion and neck supple.   Skin:     General: Skin is warm and dry.      Coloration: Skin is jaundiced.   Neurological:      Mental Status: He is alert and oriented to person, place, and time.   Psychiatric:         Behavior: Behavior normal.         Results Review  I have personally reviewed the prior office notes, hospital records, labs, and diagnostics.  XR Chest 1 View [[id number]] (Order [id number])  Order  Status: Final result     Appointment Information    PACS Images     Radiology Images  Study Result    Narrative & Impression   XR CHEST 1 VW     Date of Exam: 10/3/2024 1:20 PM EDT     Indication: s/p right thoracentesis     Comparison: Chest AP dated 9/20/2024     Findings:  Small residual right pleural effusion is noted status post thoracentesis. No pneumothorax is seen. Small nodules in the mid to upper left lung field likely represent calcified granulomas. Minimal airspace opacity is noted in the right lung base.         IMPRESSION:  Impression:  Small residual right pleural effusion status post thoracentesis.  No pneumothorax.  Minimal right basilar airspace opacity could represent infiltrate or atelectasis.        Electronically Signed: Carlos Yancey MD    10/3/2024 1:41 PM EDT    Workstation ID: [id number]     Assessment         Patient Active Problem List   Diagnosis    Acute liver failure without hepatic coma    Acute hepatitis    Hypokalemia    Hyponatremia    Steatohepatitis, alcoholic    History of alcohol abuse    Tobacco abuse    Generalized abdominal pain    Need for influenza vaccination    Essential hypertension    Need for  hepatitis A and B vaccination    Need for vaccination against Streptococcus pneumoniae    Need for meningococcal vaccination    Need for shingles vaccine    Last's esophagus without dysplasia    Umbilical hernia without obstruction and without gangrene    Acute hypoxic respiratory failure    Personal history of PE (pulmonary embolism)    Pleural effusion    Dyspnea    SBP (spontaneous bacterial peritonitis)    Peritonitis    Hospital discharge follow-up    ERRONEOUS ENCOUNTER--DISREGARD    Sepsis, due to unspecified organism, unspecified whether acute organ dysfunction present    Cirrhosis of liver with ascites    Iron deficiency anemia        Plan     Diagnoses and all orders for this visit:    1. Recurrent pleural effusion (Primary)  Comments:  Patient has standing order for chest x-ray should he feel fluid building back up, encouraged to and get his chest x-ray if he feels he needs a thoracentesis    2. Alcoholic cirrhosis of liver with ascites    3. Tobacco abuse  Comments:  Encouraged in smoking cessation    4. Personal history of PE (pulmonary embolism)             Smoking status:  reports that he has been smoking cigarettes. He started smoking about 16 years ago. He has a 8.4 pack-year smoking history. He has been exposed to tobacco smoke. He has never used smokeless tobacco.    Vaccination status: Reviewed  Immunization History   Administered Date(s) Administered    COVID-19 (PFIZER) Purple Cap Monovalent 11/07/2021, 11/28/2021    Fluzone (or Fluarix & Flulaval for VFC) >6mos 10/18/2021    Hep B, Adolescent or Pediatric 09/09/2003    Hepatitis B 06/28/2022, 08/29/2022    MMR 01/30/1997    Meningococcal Conjugate 06/28/2022    Pneumococcal Conjugate 20-Valent (PCV20) 06/28/2022    Td (TDVAX) 09/09/2003        Medications personally reviewed    Follow Up  Return in about 2 weeks (around 11/1/2024) for Dr. Montero.    Patient was given instructions and counseling regarding his condition or for health  maintenance advice. Please see specific information pulled into the AVS if appropriate.     I spent 15 minutes caring for Wai Gay on this date of service. This time includes time spent by me in the following activities:preparing for the visit, reviewing tests, obtaining and/or reviewing a separately obtained history, performing a medically appropriate examination and/or evaluation, counseling and educating the patient/family/caregiver, ordering medications, tests, or procedures, documenting information in the medical record, independently interpreting results and communicating that information with the patient/family/caregiver and answered questions family members, discuss medications.

## 2024-10-17 NOTE — TELEPHONE ENCOUNTER
Called [facility] to request records. No labs was drawn at visit   S/w patient. Patient will follow up at [facility] lab to complete lab order.   Will call with any questions or concerns

## 2024-10-18 ENCOUNTER — OFFICE VISIT (OUTPATIENT)
Dept: PULMONOLOGY | Facility: CLINIC | Age: 39
End: 2024-10-18
Payer: COMMERCIAL

## 2024-10-18 ENCOUNTER — TELEPHONE (OUTPATIENT)
Dept: GASTROENTEROLOGY | Facility: CLINIC | Age: 39
End: 2024-10-18
Payer: COMMERCIAL

## 2024-10-18 VITALS
DIASTOLIC BLOOD PRESSURE: 69 MMHG | HEART RATE: 96 BPM | BODY MASS INDEX: 25.76 KG/M2 | OXYGEN SATURATION: 99 % | SYSTOLIC BLOOD PRESSURE: 102 MMHG | TEMPERATURE: 98 F | RESPIRATION RATE: 18 BRPM | HEIGHT: 68 IN | WEIGHT: 170 LBS

## 2024-10-18 DIAGNOSIS — Z72.0 TOBACCO ABUSE: ICD-10-CM

## 2024-10-18 DIAGNOSIS — J90 RECURRENT PLEURAL EFFUSION: Primary | ICD-10-CM

## 2024-10-18 DIAGNOSIS — K70.31 ALCOHOLIC CIRRHOSIS OF LIVER WITH ASCITES: ICD-10-CM

## 2024-10-18 DIAGNOSIS — Z86.711 PERSONAL HISTORY OF PE (PULMONARY EMBOLISM): ICD-10-CM

## 2024-10-18 PROCEDURE — 99214 OFFICE O/P EST MOD 30 MIN: CPT | Performed by: NURSE PRACTITIONER

## 2024-10-18 NOTE — TELEPHONE ENCOUNTER
----- Message from Shyla Yarbrough sent at 10/18/2024 12:20 PM EDT -----  BMP shows improvement in potassium, nor normal. Sodium decreased but stable.

## 2024-10-22 ENCOUNTER — PREP FOR SURGERY (OUTPATIENT)
Dept: OTHER | Facility: HOSPITAL | Age: 39
End: 2024-10-22
Payer: COMMERCIAL

## 2024-10-22 ENCOUNTER — TELEPHONE (OUTPATIENT)
Dept: PULMONOLOGY | Facility: CLINIC | Age: 39
End: 2024-10-22

## 2024-10-22 ENCOUNTER — TELEPHONE (OUTPATIENT)
Dept: GASTROENTEROLOGY | Facility: CLINIC | Age: 39
End: 2024-10-22
Payer: COMMERCIAL

## 2024-10-22 DIAGNOSIS — K70.31 ALCOHOLIC CIRRHOSIS OF LIVER WITH ASCITES: Primary | ICD-10-CM

## 2024-10-22 RX ORDER — ALBUMIN (HUMAN) 12.5 G/50ML
25 SOLUTION INTRAVENOUS AS NEEDED
Status: CANCELLED | OUTPATIENT
Start: 2024-10-22

## 2024-10-22 NOTE — TELEPHONE ENCOUNTER
Received call from patient requesting an updated paracentesis order.   Spoke w/ ALETA Agarwal-per provider ANA LAURA Brandon to place order.   Advised patient Any to call back once order placed with scheduling information.

## 2024-10-22 NOTE — TELEPHONE ENCOUNTER
Caller: Wai Gay    Relationship to patient: Self    Best call back number: [phone]     Type of visit: THORACENTESIS     Requested date: PATIENT WOULD LIKE A CALL TO HAVE THIS SCHEDULED.

## 2024-10-23 DIAGNOSIS — J90 RECURRENT PLEURAL EFFUSION: Primary | ICD-10-CM

## 2024-10-23 NOTE — TELEPHONE ENCOUNTER
Pt has been called and scheduled for tomorrow at 10/24/2024 @ 1:30. Pt was advised to arrive @ 1:15. Pt is aware of date and time of appt.

## 2024-10-23 NOTE — TELEPHONE ENCOUNTER
Caller: Wai Gay    Relationship to patient: Self    Best call back number:     [phone] (Mobile)       Patient is needing: PT RETURNING A CALL TO SEE IF HE CAN BE SCHEDULED OR NOT

## 2024-10-24 ENCOUNTER — HOSPITAL ENCOUNTER (OUTPATIENT)
Dept: INTERVENTIONAL RADIOLOGY/VASCULAR | Facility: HOSPITAL | Age: 39
Discharge: HOME | End: 2024-10-24
Payer: COMMERCIAL

## 2024-10-24 ENCOUNTER — HOSPITAL ENCOUNTER (OUTPATIENT)
Dept: INFUSION THERAPY | Facility: HOSPITAL | Age: 39
Discharge: HOME | End: 2024-10-24
Attending: INTERNAL MEDICINE | Admitting: INTERNAL MEDICINE
Payer: COMMERCIAL

## 2024-10-24 VITALS
OXYGEN SATURATION: 100 % | HEART RATE: 93 BPM | SYSTOLIC BLOOD PRESSURE: 98 MMHG | DIASTOLIC BLOOD PRESSURE: 64 MMHG | TEMPERATURE: 98.7 F | RESPIRATION RATE: 18 BRPM

## 2024-10-24 VITALS
WEIGHT: 170 LBS | HEIGHT: 68 IN | SYSTOLIC BLOOD PRESSURE: 102 MMHG | BODY MASS INDEX: 25.76 KG/M2 | HEART RATE: 96 BPM | OXYGEN SATURATION: 97 % | DIASTOLIC BLOOD PRESSURE: 59 MMHG

## 2024-10-24 DIAGNOSIS — K70.31 ALCOHOLIC CIRRHOSIS OF LIVER WITH ASCITES: ICD-10-CM

## 2024-10-24 DIAGNOSIS — J90 PLEURAL EFFUSION: Primary | ICD-10-CM

## 2024-10-24 DIAGNOSIS — J90 RECURRENT PLEURAL EFFUSION: ICD-10-CM

## 2024-10-24 LAB
ALBUMIN FLD-MCNC: 0.2 G/DL
APPEARANCE FLD: CLEAR
APTT PPP: 35 SECONDS (ref 24.2–34.2)
COLOR FLD: NORMAL
INR PPP: 1.41 (ref 0.86–1.15)
LDH FLD-CCNC: 24 U/L
LYMPHOCYTES NFR FLD MANUAL: 40 %
MACROPHAGE FLUID: 18 %
MESOTHL CELL NFR FLD MANUAL: 4 %
MONOCYTES NFR FLD: 28 %
NEUTROPHILS NFR FLD MANUAL: 10 %
NUC CELL # FLD: 85 /MM3
PLATELET # BLD AUTO: 111 10*3/MM3 (ref 140–450)
PROT FLD-MCNC: <1 G/DL
PROTHROMBIN TIME: 17.5 SECONDS (ref 11.8–14.9)
RBC # FLD AUTO: <2000 /MM3

## 2024-10-24 PROCEDURE — 76942 ECHO GUIDE FOR BIOPSY: CPT

## 2024-10-24 PROCEDURE — 87070 CULTURE OTHR SPECIMN AEROBIC: CPT | Performed by: INTERNAL MEDICINE

## 2024-10-24 PROCEDURE — 87205 SMEAR GRAM STAIN: CPT | Performed by: INTERNAL MEDICINE

## 2024-10-24 PROCEDURE — 87075 CULTR BACTERIA EXCEPT BLOOD: CPT | Performed by: NURSE PRACTITIONER

## 2024-10-24 PROCEDURE — 84157 ASSAY OF PROTEIN OTHER: CPT | Performed by: INTERNAL MEDICINE

## 2024-10-24 PROCEDURE — 25010000002 LIDOCAINE 2% SOLUTION

## 2024-10-24 PROCEDURE — 85730 THROMBOPLASTIN TIME PARTIAL: CPT

## 2024-10-24 PROCEDURE — 32555 ASPIRATE PLEURA W/ IMAGING: CPT | Performed by: INTERNAL MEDICINE

## 2024-10-24 PROCEDURE — 85049 AUTOMATED PLATELET COUNT: CPT

## 2024-10-24 PROCEDURE — 89051 BODY FLUID CELL COUNT: CPT

## 2024-10-24 PROCEDURE — 83615 LACTATE (LD) (LDH) ENZYME: CPT | Performed by: INTERNAL MEDICINE

## 2024-10-24 PROCEDURE — 85610 PROTHROMBIN TIME: CPT

## 2024-10-24 PROCEDURE — 82042 OTHER SOURCE ALBUMIN QUAN EA: CPT | Performed by: INTERNAL MEDICINE

## 2024-10-24 RX ORDER — ALBUMIN (HUMAN) 12.5 G/50ML
25 SOLUTION INTRAVENOUS AS NEEDED
Status: DISCONTINUED | OUTPATIENT
Start: 2024-10-24 | End: 2024-10-25 | Stop reason: HOSPADM

## 2024-10-24 RX ORDER — LIDOCAINE HYDROCHLORIDE 20 MG/ML
20 INJECTION, SOLUTION INFILTRATION; PERINEURAL ONCE
Status: COMPLETED | OUTPATIENT
Start: 2024-10-24 | End: 2024-10-24

## 2024-10-24 RX ADMIN — SODIUM BICARBONATE 1 ML: 84 INJECTION, SOLUTION INTRAVENOUS at 14:53

## 2024-10-24 RX ADMIN — LIDOCAINE HYDROCHLORIDE 9 ML: 20 INJECTION, SOLUTION INFILTRATION; PERINEURAL at 14:53

## 2024-10-24 NOTE — PROCEDURES
"Bedside Thoracentesis Without  Radiology    Date/Time: 10/24/2024 1:26 PM    Performed by: Jorge Montero DO  Authorized by: Jorge Montero DO  Consent: Verbal consent obtained.  Risks and benefits: risks, benefits and alternatives were discussed  Consent given by: patient  Patient understanding: patient states understanding of the procedure being performed  Patient consent: the patient's understanding of the procedure matches consent given  Procedure consent: procedure consent matches procedure scheduled  Relevant documents: relevant documents present and verified  Test results: test results available and properly labeled  Site marked: the operative site was marked  Imaging studies: imaging studies available  Patient identity confirmed: verbally with patient  Time out: Immediately prior to procedure a \"time out\" was called to verify the correct patient, procedure, equipment, support staff and site/side marked as required.  Procedure purpose: diagnostic and therapeutic  Indications: pleural effusion  Preparation: Patient was prepped and draped in the usual sterile fashion.  Local anesthesia used: yes  Anesthesia: local infiltration    Anesthesia:  Local anesthesia used: yes  Local Anesthetic: lidocaine 1% without epinephrine  Anesthetic total: 5 mL    Sedation:  Patient sedated: no    Preparation: skin prepped with ChloraPrep and sterile field established  Patient position: supported by bedside stand  Ultrasound guidance: yes  Puncture method: over-the-needle catheter  Number of attempts: 1  Drainage amount: 3200 ml  Drainage characteristics: serous  Patient tolerance: patient tolerated the procedure well with no immediate complications  Chest x-ray performed: yes  Chest x-ray interpreted by me.  Comments: Ultrasound shows no evidence of pneumothorax post procedure        "

## 2024-10-27 LAB
BACTERIA FLD CULT: NORMAL
BACTERIA SPEC ANAEROBE CULT: NORMAL
GRAM STN SPEC: NORMAL
GRAM STN SPEC: NORMAL

## 2024-10-28 ENCOUNTER — TELEPHONE (OUTPATIENT)
Dept: PULMONOLOGY | Facility: CLINIC | Age: 39
End: 2024-10-28

## 2024-10-28 NOTE — TELEPHONE ENCOUNTER
Caller: Wai Gay    Relationship to patient: Self    Best call back number: [phone] (home)       Patient is needing: PT IS HAVING LEAKING FROM SPOT FROM PROCEDURE ON THURSDAY AND HAS SOME SWELLING ON THE SPOT. PT WOULD LIKE TO KNOW IF THERE IS SOMETHING HE CAN DO OR SHOULD HE HAVE IT LOOKED AT. PLEASE CALL PT TO ADVISE.

## 2024-10-29 ENCOUNTER — APPOINTMENT (OUTPATIENT)
Dept: GENERAL RADIOLOGY | Facility: HOSPITAL | Age: 39
End: 2024-10-29
Payer: COMMERCIAL

## 2024-10-29 ENCOUNTER — OFFICE VISIT (OUTPATIENT)
Dept: PULMONOLOGY | Facility: CLINIC | Age: 39
End: 2024-10-29
Payer: COMMERCIAL

## 2024-10-29 ENCOUNTER — HOSPITAL ENCOUNTER (OUTPATIENT)
Dept: INFUSION THERAPY | Facility: HOSPITAL | Age: 39
Discharge: HOME | End: 2024-10-29
Attending: INTERNAL MEDICINE | Admitting: NURSE PRACTITIONER
Payer: COMMERCIAL

## 2024-10-29 VITALS
TEMPERATURE: 98.3 F | DIASTOLIC BLOOD PRESSURE: 65 MMHG | SYSTOLIC BLOOD PRESSURE: 99 MMHG | OXYGEN SATURATION: 100 % | HEART RATE: 96 BPM | RESPIRATION RATE: 17 BRPM

## 2024-10-29 VITALS
BODY MASS INDEX: 25.76 KG/M2 | DIASTOLIC BLOOD PRESSURE: 72 MMHG | HEART RATE: 92 BPM | SYSTOLIC BLOOD PRESSURE: 108 MMHG | RESPIRATION RATE: 16 BRPM | OXYGEN SATURATION: 100 % | TEMPERATURE: 97.6 F | WEIGHT: 170 LBS | HEIGHT: 68 IN

## 2024-10-29 DIAGNOSIS — R94.2 MIXED OBSTRUCTIVE AND RESTRICTIVE VENTILATORY DEFECT: Primary | ICD-10-CM

## 2024-10-29 DIAGNOSIS — J90 RECURRENT PLEURAL EFFUSION ON RIGHT: Primary | ICD-10-CM

## 2024-10-29 DIAGNOSIS — J90 RECURRENT PLEURAL EFFUSION ON RIGHT: ICD-10-CM

## 2024-10-29 DIAGNOSIS — K70.31 ALCOHOLIC CIRRHOSIS OF LIVER WITH ASCITES: ICD-10-CM

## 2024-10-29 LAB — BACTERIA SPEC ANAEROBE CULT: NORMAL

## 2024-10-29 PROCEDURE — 32555 ASPIRATE PLEURA W/ IMAGING: CPT | Performed by: INTERNAL MEDICINE

## 2024-10-29 PROCEDURE — 71045 X-RAY EXAM CHEST 1 VIEW: CPT

## 2024-10-29 NOTE — PROGRESS NOTES
Primary Care Provider  Callum Peterson DO     Referring Provider  No ref. provider found     Chief Complaint  Follow-up (Swelling/Leakage (clear) from Paracentesis site ) and Pleural Effusion    Subjective          Wai Gay presents to [facility]IT[facility]ARE MEDICINE  History of Present Illness  Wai Gay is a 38 y.o. male patient of Dr. 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    Patient recently had a thoracentesis by Dr. Montero on 10/24/2024.  3200 mL of fluid was removed from the right pleural space.  Cultures and cytology are negative.  He had a paracentesis on the same day with 2 L of fluid removed.  Patient is here stating that he is having leaking and swelling from his drainage site.  Presents in site with minimal leaking from the insertion site.  Patient is diminished on his right side we will send him to 3 [facility]. to have a repeat thoracentesis today.  Will follow-up 1 week after procedure.     His history of smoking is   Tobacco Use: High Risk (10/29/2024)    Patient History     Smoking Tobacco Use: Every Day     Smokeless Tobacco Use: Never     Passive Exposure: Current   .    Review of Systems   Constitutional:  Negative for chills, fatigue, fever, unexpected weight gain and unexpected weight loss.   HENT:  Congestion: Nasal.    Respiratory:  Negative for apnea, cough, shortness of breath and wheezing.         Negative for Hemoptysis     Cardiovascular:  Negative for chest pain, palpitations and leg swelling.   Skin:         Negative for cyanosis      Sleep: Negative for Excessive daytime sleepiness  Negative for morning headaches  Negative for Snoring    Family History   Problem Relation Age of Onset    Alcohol abuse Mother     Arthritis Mother     COPD Mother     Heart disease Maternal Grandmother     Hypertension Maternal Grandmother      Lung cancer Maternal Grandmother     Stroke Maternal Grandmother     Heart disease Maternal Grandfather     Lung cancer Maternal Grandfather     Cancer Paternal Grandmother     Cancer Paternal Grandfather     Colon cancer Neg Hx     Malig Hyperthermia Neg Hx         Social History     Socioeconomic History    Marital status: Single   Tobacco Use    Smoking status: Every Day     Current packs/day: 0.50     Average packs/day: 0.5 packs/day for 16.8 years (8.4 ttl pk-yrs)     Types: Cigarettes     Start date: 1/1/2008     Passive exposure: Current    Smokeless tobacco: Never   Vaping Use    Vaping status: Never Used   Substance and Sexual Activity    Alcohol use: Not Currently     Alcohol/week: 3.0 standard drinks of alcohol     Comment: FORMER    Drug use: Not Currently    Sexual activity: Yes     Partners: Female        Past Medical History:   Diagnosis Date    Alcohol abuse 03/14/2017    Anxiety     Essential hypertension 12/27/2019    GERD (gastroesophageal reflux disease)     Hypokalemia 03/14/2017    Will update    Hyponatremia 03/14/2017    Steatohepatitis, alcoholic 03/14/2017    Tobacco abuse 03/14/2017    Umbilical hernia         Immunization History   Administered Date(s) Administered    COVID-19 (PFIZER) Purple Cap Monovalent 11/07/2021, 11/28/2021    Fluzone (or Fluarix & Flulaval for VFC) >6mos 10/18/2021    Hep B, Adolescent or Pediatric 09/09/2003    Hepatitis B 06/28/2022, 08/29/2022    MMR 01/30/1997    Meningococcal Conjugate 06/28/2022    Pneumococcal Conjugate 20-Valent (PCV20) 06/28/2022    Td (TDVAX) 09/09/2003         No Known Allergies       Current Outpatient Medications:     albuterol sulfate HFA (Ventolin HFA) 108 (90 Base) MCG/ACT inhaler, Inhale 2 puffs Every 4 (Four) Hours As Needed for Wheezing or Shortness of Air., Disp: 1 g, Rfl: 3    ciprofloxacin (CIPRO) 750 MG tablet, Take 1 tablet by mouth 1 (One) Time Per Week for 60 days., Disp: 4 tablet, Rfl: 1    pantoprazole (PROTONIX) 40  MG EC tablet, TAKE 1 TABLET BY MOUTH EVERY DAY (Patient taking differently: Take 1 tablet by mouth Daily.), Disp: 90 tablet, Rfl: 1    spironolactone (ALDACTONE) 50 MG tablet, TAKE 1 TABLET BY MOUTH EVERY DAY (Patient taking differently: Take 1 tablet by mouth Daily.), Disp: 90 tablet, Rfl: 1    Umeclidinium-Vilanterol (ANORO ELLIPTA) 62.5-25 MCG/ACT aerosol powder  inhaler, Inhale 1 puff Daily., Disp: 1 each, Rfl: 11    ursodiol (ACTIGALL) 300 MG capsule, Take 2 capsules by mouth 2 (Two) Times a Day With Meals., Disp: , Rfl:     midodrine (PROAMATINE) 5 MG tablet, Take 1 tablet by mouth 3 (Three) Times a Day Before Meals for 30 days., Disp: 90 tablet, Rfl: 0    potassium chloride (KLOR-CON M20) 20 MEQ CR tablet, Take 1 tablet by mouth Daily., Disp: 1 tablet, Rfl: 0     Objective   Physical Exam  Constitutional:       General: He is not in acute distress.     Appearance: Normal appearance. He is normal weight.   HENT:      Right Ear: Hearing normal.      Left Ear: Hearing normal.      Nose: No nasal tenderness or congestion.      Mouth/Throat:      Mouth: Mucous membranes are moist. No oral lesions.   Eyes:      Extraocular Movements: Extraocular movements intact.      Pupils: Pupils are equal, round, and reactive to light.   Cardiovascular:      Rate and Rhythm: Normal rate and regular rhythm.      Pulses: Normal pulses.      Heart sounds: Normal heart sounds. No murmur heard.  Pulmonary:      Effort: Pulmonary effort is normal.      Breath sounds: Examination of the right-lower field reveals decreased breath sounds. Decreased breath sounds present. No wheezing, rhonchi or rales.   Musculoskeletal:      Right lower leg: No edema.      Left lower leg: No edema.   Skin:     General: Skin is warm and dry.      Findings: No lesion or rash.   Neurological:      General: No focal deficit present.      Mental Status: He is alert and oriented to person, place, and time.   Psychiatric:         Mood and Affect: Affect  "normal. Mood is not anxious or depressed.         Vital Signs:   /72 (BP Location: Right arm, Patient Position: Sitting, Cuff Size: Adult)   Pulse 92   Temp 97.6 °F (36.4 °C) (Oral)   Resp 16   Ht 172.7 cm (68\")   Wt 77.1 kg (170 lb)   SpO2 100% Comment: RA  BMI 25.85 kg/m²        Result Review :   The following data was reviewed by: ANA LAURA Aldridge on 10/29/2024:  CMP          9/22/2024    04:57 10/7/2024    15:27 10/17/2024    16:25   CMP   Glucose 85  67  102    BUN 15  12  11    Creatinine 1.05  1.23  1.02    EGFR 94.3  78.0  97.7    Sodium 134  131  132    Potassium 3.9  3.3  3.7    Chloride 105  97  100    Calcium 7.5  8.3  8.1    Total Protein 5.0  6.2     Albumin 2.1  2.7     Globulin 2.9  3.5     Total Bilirubin 0.9  1.3     Alkaline Phosphatase 152  234     AST (SGOT) 50  41     ALT (SGPT) 31  24     Albumin/Globulin Ratio 0.7  0.8     BUN/Creatinine Ratio 14.3  9.8  10.8    Anion Gap 7.6  10.0  8.1      CBC w/diff          9/22/2024    04:58 10/7/2024    15:27 10/24/2024    13:59   CBC w/Diff   WBC 4.79  5.98     RBC 3.05  3.64     Hemoglobin 8.3  10.3     Hematocrit 26.0  31.5     MCV 85.2  86.5     MCH 27.2  28.3     MCHC 31.9  32.7     RDW 17.3  16.4     Platelets 98  160  111    Neutrophil Rel %  58.7     Immature Granulocyte Rel %  1.0     Lymphocyte Rel %  16.9     Monocyte Rel %  15.2     Eosinophil Rel %  7.5     Basophil Rel %  0.7       Data reviewed : Radiologic studies chest x-ray 9/20/2024, chest x-ray 10/3/2024, paracentesis 10/24/2024, pulmonary function test 7/29/2024, Recent hospitalization notes Dr. Montero thoracentesis procedure note 10/24/2024, and [facility] last office note    Procedures        Assessment and Plan    Diagnoses and all orders for this visit:    1. 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 Recurrent pleural effusion on right    3. Alcoholic cirrhosis of liver with ascites    Patient will have repeat right sided " thoracentesis today.        Follow Up   Return in about 1 week (around 11/5/2024) for Recheck.  Patient was given instructions and counseling regarding his condition or for health maintenance advice. Please see specific information pulled into the AVS if appropriate.

## 2024-10-29 NOTE — PROCEDURES
Bedside thoracic ultrasound:     Right side: Liver, right hemidiaphragm, right lower lobe of lung identified.  Large pleural effusion identified.  Images saved     Site marked for thoracentesis.        CPT 66329 with thoracentesis note    Electronically signed by Praful Powell MD, 10/29/24, 11:45 AM EDT.

## 2024-10-29 NOTE — NURSING NOTE
Pt to 3w for right ultrasound guided thoracentesis, consent signed, time out performed, VSS, pt positioned at side of bed, 3L fluid removed, pt tolerated well, pressure dressing applied, CXR performed. Pt home to self care

## 2024-10-29 NOTE — PROCEDURES
Procedure name: ultrasound-guided right-sided thoracentesis     Indication - pleural effusion     This procedural risks were explained to the patient including pneumothorax requiring chest tube placement, significant bleeding requiring surgery, and infection , understanding of the risks and benefits acknowledged by the patient and family and he wish to proceed with the procedure.     Timeout performed     Procedure details  Ultrasound was use to visualize a  collection of pleural fluid, the diaphragm, and collapsed lung. At this point, the skin overlying the rib was anesthetized with 5 mL 1% lidocaine without epinephrine. A thoracentesis needle was then inserted into the pleural cavity just over top of the rib and pleural fluid was aspirated back. At this time the thoracentesis catheter was advanced to the pleural cavity over the needle.  Approximately 3000mL of clear yellow fluid was removed.  I did hold direct pressure over this incision site for a minute or 2 prior to placing a pressure dressing with Tegaderm as patient previously leaked out of the last thoracentesis night.  Chest x-ray pending     Patient tolerated procedure well     No immediate complications    Electronically signed by Praful Powell MD, 10/29/24, 11:46 AM EDT.

## 2024-11-05 ENCOUNTER — HOSPITAL ENCOUNTER (OUTPATIENT)
Dept: GENERAL RADIOLOGY | Facility: HOSPITAL | Age: 39
Discharge: HOME | End: 2024-11-05
Admitting: NURSE PRACTITIONER
Payer: COMMERCIAL

## 2024-11-05 ENCOUNTER — OFFICE VISIT (OUTPATIENT)
Dept: PULMONOLOGY | Facility: CLINIC | Age: 39
End: 2024-11-05
Payer: COMMERCIAL

## 2024-11-05 VITALS
TEMPERATURE: 97.6 F | SYSTOLIC BLOOD PRESSURE: 101 MMHG | BODY MASS INDEX: 25.76 KG/M2 | OXYGEN SATURATION: 100 % | WEIGHT: 170 LBS | HEIGHT: 68 IN | DIASTOLIC BLOOD PRESSURE: 63 MMHG | RESPIRATION RATE: 16 BRPM | HEART RATE: 96 BPM

## 2024-11-05 DIAGNOSIS — J98.4 MIXED RESTRICTIVE AND OBSTRUCTIVE LUNG DISEASE: ICD-10-CM

## 2024-11-05 DIAGNOSIS — J90 RECURRENT PLEURAL EFFUSION ON RIGHT: ICD-10-CM

## 2024-11-05 DIAGNOSIS — J43.9 MIXED RESTRICTIVE AND OBSTRUCTIVE LUNG DISEASE: ICD-10-CM

## 2024-11-05 DIAGNOSIS — K70.31 ALCOHOLIC CIRRHOSIS OF LIVER WITH ASCITES: ICD-10-CM

## 2024-11-05 DIAGNOSIS — F17.210 NICOTINE DEPENDENCE, CIGARETTES, UNCOMPLICATED: ICD-10-CM

## 2024-11-05 DIAGNOSIS — J90 RECURRENT PLEURAL EFFUSION ON RIGHT: Primary | ICD-10-CM

## 2024-11-05 PROCEDURE — 71046 X-RAY EXAM CHEST 2 VIEWS: CPT

## 2024-11-05 NOTE — PROGRESS NOTES
Primary Care Provider  Callum Peterson DO     Referring Provider  No ref. provider found     Chief Complaint  Follow-up (1 week f/up - Thora 10/29) and Pleural Effusion    Subjective          Wai Gay presents to [facility] PULMON[facility] & [facility]ITICAL CARE MEDICINE  History of Present Illness  Wai aGy is a 38 y.o. male patient of Dr. 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     Patient recently had a repeat thoracentesis by Dr. Powell on 10/29/2024.  Approximately 3000 mL of clear yellow fluid was removed.  Pressure was held over the incision for approximately 2 minutes prior to placing Tegaderm, as patient did previously experience leakage.  Postthoracentesis chest x-ray did show a small right pleural effusion but no pneumothorax.  No labs were sent during this procedure.  He is scheduled to have his TIPS procedure performed on Monday 11/11.  Denies any significant shortness of breath.  States that overall he is doing very well.     His history of smoking is   Tobacco Use: High Risk (11/5/2024)    Patient History     Smoking Tobacco Use: Every Day     Smokeless Tobacco Use: Never     Passive Exposure: Current   .    Review of Systems   Constitutional:  Negative for chills, fatigue, fever, unexpected weight gain and unexpected weight loss.   HENT:  Congestion: Nasal.    Respiratory:  Negative for apnea, cough, shortness of breath and wheezing.         Negative for Hemoptysis     Cardiovascular:  Negative for chest pain, palpitations and leg swelling.   Skin:         Negative for cyanosis      Sleep: Negative for Excessive daytime sleepiness  Negative for morning headaches  Negative for Snoring    Family History   Problem Relation Age of Onset    Alcohol abuse Mother     Arthritis Mother     COPD Mother     Heart disease Maternal Grandmother     Hypertension  Maternal Grandmother     Lung cancer Maternal Grandmother     Stroke Maternal Grandmother     Heart disease Maternal Grandfather     Lung cancer Maternal Grandfather     Cancer Paternal Grandmother     Cancer Paternal Grandfather     Colon cancer Neg Hx     Malig Hyperthermia Neg Hx         Social History     Socioeconomic History    Marital status: Single   Tobacco Use    Smoking status: Every Day     Current packs/day: 0.50     Average packs/day: 0.5 packs/day for 16.8 years (8.4 ttl pk-yrs)     Types: Cigarettes     Start date: 1/1/2008     Passive exposure: Current    Smokeless tobacco: Never   Vaping Use    Vaping status: Never Used   Substance and Sexual Activity    Alcohol use: Not Currently     Alcohol/week: 3.0 standard drinks of alcohol     Comment: FORMER    Drug use: Not Currently    Sexual activity: Yes     Partners: Female        Past Medical History:   Diagnosis Date    Alcohol abuse 03/14/2017    Anxiety     Essential hypertension 12/27/2019    GERD (gastroesophageal reflux disease)     Hypokalemia 03/14/2017    Will update    Hyponatremia 03/14/2017    Steatohepatitis, alcoholic 03/14/2017    Tobacco abuse 03/14/2017    Umbilical hernia         Immunization History   Administered Date(s) Administered    COVID-19 (PFIZER) Purple Cap Monovalent 11/07/2021, 11/28/2021    Fluzone (or Fluarix & Flulaval for VFC) >6mos 10/18/2021    Hep B, Adolescent or Pediatric 09/09/2003    Hepatitis B 06/28/2022, 08/29/2022    MMR 01/30/1997    Meningococcal Conjugate 06/28/2022    Pneumococcal Conjugate 20-Valent (PCV20) 06/28/2022    Td (TDVAX) 09/09/2003         No Known Allergies       Current Outpatient Medications:     albuterol sulfate HFA (Ventolin HFA) 108 (90 Base) MCG/ACT inhaler, Inhale 2 puffs Every 4 (Four) Hours As Needed for Wheezing or Shortness of Air., Disp: 1 g, Rfl: 3    ciprofloxacin (CIPRO) 750 MG tablet, Take 1 tablet by mouth 1 (One) Time Per Week for 60 days., Disp: 4 tablet, Rfl: 1     pantoprazole (PROTONIX) 40 MG EC tablet, TAKE 1 TABLET BY MOUTH EVERY DAY (Patient taking differently: Take 1 tablet by mouth Daily.), Disp: 90 tablet, Rfl: 1    spironolactone (ALDACTONE) 50 MG tablet, TAKE 1 TABLET BY MOUTH EVERY DAY (Patient taking differently: Take 1 tablet by mouth Daily.), Disp: 90 tablet, Rfl: 1    Umeclidinium-Vilanterol (ANORO ELLIPTA) 62.5-25 MCG/ACT aerosol powder  inhaler, Inhale 1 puff Daily., Disp: 1 each, Rfl: 11    ursodiol (ACTIGALL) 300 MG capsule, Take 2 capsules by mouth 2 (Two) Times a Day With Meals., Disp: , Rfl:     midodrine (PROAMATINE) 5 MG tablet, Take 1 tablet by mouth 3 (Three) Times a Day Before Meals for 30 days., Disp: 90 tablet, Rfl: 0    potassium chloride (KLOR-CON M20) 20 MEQ CR tablet, Take 1 tablet by mouth Daily., Disp: 1 tablet, Rfl: 0     Objective   Physical Exam  Constitutional:       General: He is not in acute distress.     Appearance: Normal appearance. He is normal weight.   HENT:      Right Ear: Hearing normal.      Left Ear: Hearing normal.      Nose: No nasal tenderness or congestion.      Mouth/Throat:      Mouth: Mucous membranes are moist. No oral lesions.   Eyes:      Extraocular Movements: Extraocular movements intact.      Pupils: Pupils are equal, round, and reactive to light.   Cardiovascular:      Rate and Rhythm: Normal rate and regular rhythm.      Pulses: Normal pulses.      Heart sounds: Normal heart sounds. No murmur heard.  Pulmonary:      Effort: Pulmonary effort is normal.      Breath sounds: Examination of the right-middle field reveals decreased breath sounds. Examination of the right-lower field reveals decreased breath sounds. Decreased breath sounds present. No wheezing, rhonchi or rales.   Musculoskeletal:      Right lower leg: No edema.      Left lower leg: No edema.   Skin:     General: Skin is warm and dry.      Findings: No lesion or rash.   Neurological:      General: No focal deficit present.      Mental Status: He is  "alert and oriented to person, place, and time.   Psychiatric:         Mood and Affect: Affect normal. Mood is not anxious or depressed.         Vital Signs:   /63 (BP Location: Right arm, Patient Position: Sitting, Cuff Size: Adult)   Pulse 96   Temp 97.6 °F (36.4 °C) (Oral)   Resp 16   Ht 172.7 cm (68\")   Wt 77.1 kg (170 lb)   SpO2 100% Comment: RA  BMI 25.85 kg/m²        Result Review :   The following data was reviewed by: ANA LAURA Aldridge on 11/05/2024:  CMP          9/22/2024    04:57 10/7/2024    15:27 10/17/2024    16:25   CMP   Glucose 85  67  102    BUN 15  12  11    Creatinine 1.05  1.23  1.02    EGFR 94.3  78.0  97.7    Sodium 134  131  132    Potassium 3.9  3.3  3.7    Chloride 105  97  100    Calcium 7.5  8.3  8.1    Total Protein 5.0  6.2     Albumin 2.1  2.7     Globulin 2.9  3.5     Total Bilirubin 0.9  1.3     Alkaline Phosphatase 152  234     AST (SGOT) 50  41     ALT (SGPT) 31  24     Albumin/Globulin Ratio 0.7  0.8     BUN/Creatinine Ratio 14.3  9.8  10.8    Anion Gap 7.6  10.0  8.1      CBC w/diff          9/22/2024    04:58 10/7/2024    15:27 10/24/2024    13:59   CBC w/Diff   WBC 4.79  5.98     RBC 3.05  3.64     Hemoglobin 8.3  10.3     Hematocrit 26.0  31.5     MCV 85.2  86.5     MCH 27.2  28.3     MCHC 31.9  32.7     RDW 17.3  16.4     Platelets 98  160  111    Neutrophil Rel %  58.7     Immature Granulocyte Rel %  1.0     Lymphocyte Rel %  16.9     Monocyte Rel %  15.2     Eosinophil Rel %  7.5     Basophil Rel %  0.7       Data reviewed : Radiologic studies chest xray 10/29/2024 , Recent hospitalization notes Dr. Powell thoracentesis note 10/29/2024, and my last office note    Procedures        Assessment and Plan    Diagnoses and all orders for this visit:    1. Recurrent pleural effusion on right (Primary)  -     XR Chest 2 View; Future    2. Mixed restrictive and obstructive lung disease    3. Alcoholic cirrhosis of liver with ascites    4. Nicotine dependence, " cigarettes, uncomplicated          Follow Up   Return in about 2 weeks (around 11/19/2024) for Recheck.  Patient was given instructions and counseling regarding his condition or for health maintenance advice. Please see specific information pulled into the AVS if appropriate.

## 2024-11-06 ENCOUNTER — PREP FOR SURGERY (OUTPATIENT)
Dept: OTHER | Facility: HOSPITAL | Age: 39
End: 2024-11-06
Payer: COMMERCIAL

## 2024-11-06 DIAGNOSIS — J90 RECURRENT PLEURAL EFFUSION ON RIGHT: Primary | ICD-10-CM

## 2024-11-08 ENCOUNTER — HOSPITAL ENCOUNTER (OUTPATIENT)
Dept: INFUSION THERAPY | Facility: HOSPITAL | Age: 39
Discharge: HOME | End: 2024-11-08
Attending: INTERNAL MEDICINE | Admitting: INTERNAL MEDICINE
Payer: COMMERCIAL

## 2024-11-08 ENCOUNTER — HOSPITAL ENCOUNTER (OUTPATIENT)
Dept: GENERAL RADIOLOGY | Facility: HOSPITAL | Age: 39
Discharge: HOME | End: 2024-11-08
Payer: COMMERCIAL

## 2024-11-08 VITALS
RESPIRATION RATE: 16 BRPM | DIASTOLIC BLOOD PRESSURE: 65 MMHG | TEMPERATURE: 98.5 F | OXYGEN SATURATION: 100 % | HEART RATE: 92 BPM | SYSTOLIC BLOOD PRESSURE: 101 MMHG

## 2024-11-08 DIAGNOSIS — J90 RECURRENT PLEURAL EFFUSION ON RIGHT: ICD-10-CM

## 2024-11-08 PROCEDURE — 71045 X-RAY EXAM CHEST 1 VIEW: CPT

## 2024-11-08 PROCEDURE — 32555 ASPIRATE PLEURA W/ IMAGING: CPT | Performed by: INTERNAL MEDICINE

## 2024-11-08 NOTE — PROCEDURES
Procedure name: ultrasound-guided right-sided thoracentesis     Indication - pleural effusion     This procedural risks were explained to the patient including pneumothorax requiring chest tube placement, significant bleeding requiring surgery, and infection , understanding of the risks and benefits acknowledged by the patient and family and he wish to proceed with the procedure.     Timeout performed     Procedure details  Ultrasound was use to visualize a  collection of pleural fluid, the diaphragm, and collapsed lung. At this point, the skin overlying the rib was anesthetized with 5 mL 1% lidocaine without epinephrine. A thoracentesis needle was then inserted into the pleural cavity just over top of the rib and pleural fluid was aspirated back. At this time the thoracentesis catheter was advanced to the pleural cavity over the needle.  Approximately 2900mL of clear yellow fluid was removed.  Chest x-ray shows reexpansion of lung without pneumothorax     Patient tolerated procedure well     No immediate complications    Electronically signed by Praful Powell MD, 11/08/24, 11:33 AM EST.

## 2024-11-08 NOTE — NURSING NOTE
Ultrasound guided thoracentesis performed on patient by MD at bedside. 2900 mL of pleural fluid removed from right side. Patient tolerated procedure well. Sterile dressing applied over right posterior chest catheter insertion site. Dressing is clean, dry & intact at this time. Chest x-ray obtained and reviewed by MD.

## 2024-11-08 NOTE — PROCEDURES
Bedside thoracic ultrasound:     Right side: Liver, right hemidiaphragm, right lower lobe of lung identified.  Large pleural effusion identified.  Images saved     Site marked for thoracentesis.        CPT 87909 with thoracentesis note    Electronically signed by Praful Powell MD, 11/08/24, 11:33 AM EST.

## 2024-11-18 RX ORDER — ALBUTEROL SULFATE 90 UG/1
2 INHALANT RESPIRATORY (INHALATION) EVERY 4 HOURS PRN
Qty: 54 G | Refills: 3 | Status: SHIPPED | OUTPATIENT
Start: 2024-11-18

## 2024-11-22 ENCOUNTER — HOSPITAL ENCOUNTER (OUTPATIENT)
Dept: GENERAL RADIOLOGY | Facility: HOSPITAL | Age: 39
Discharge: HOME | End: 2024-11-22
Admitting: NURSE PRACTITIONER
Payer: COMMERCIAL

## 2024-11-22 ENCOUNTER — OFFICE VISIT (OUTPATIENT)
Dept: PULMONOLOGY | Facility: CLINIC | Age: 39
End: 2024-11-22
Payer: COMMERCIAL

## 2024-11-22 VITALS
SYSTOLIC BLOOD PRESSURE: 109 MMHG | OXYGEN SATURATION: 100 % | RESPIRATION RATE: 16 BRPM | HEIGHT: 68 IN | HEART RATE: 95 BPM | WEIGHT: 163 LBS | DIASTOLIC BLOOD PRESSURE: 57 MMHG | BODY MASS INDEX: 24.71 KG/M2 | TEMPERATURE: 97.6 F

## 2024-11-22 DIAGNOSIS — J90 PLEURAL EFFUSION: ICD-10-CM

## 2024-11-22 DIAGNOSIS — J90 PLEURAL EFFUSION: Primary | ICD-10-CM

## 2024-11-22 DIAGNOSIS — K70.31 ALCOHOLIC CIRRHOSIS OF LIVER WITH ASCITES: ICD-10-CM

## 2024-11-22 PROCEDURE — 71046 X-RAY EXAM CHEST 2 VIEWS: CPT

## 2024-11-22 RX ORDER — FUROSEMIDE 20 MG/1
TABLET ORAL
COMMUNITY
Start: 2024-11-12

## 2024-11-22 NOTE — PROGRESS NOTES
Primary Care Provider  Callum Peterson DO     Referring Provider  No ref. provider found     Chief Complaint  Follow-up (2 week f/up )    Subjective          Wai Gay presents to [facility] PULMONARY & CRITICAL CARE MEDICINE  History of Present Illness  Wai Gay is a 39 y.o. male patient of Dr. 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     He had a repeat thoracentesis on 11/8/2024.  2900 mL of clear yellow liquid was removed from the right pleural space.  Patient underwent a TIPS procedure on 11/11/2024.  States that he does feel better.  Patient states that they did do a thoracentesis and a paracentesis prior to his procedure on 11/11/2024.  He is unsure of how much fluid was removed.  His breathing is at baseline.  He is due to follow-up with the surgeon this afternoon.  Otherwise, he states that he is doing okay and has no additional questions today.     His history of smoking is   Tobacco Use: High Risk (11/22/2024)    Patient History     Smoking Tobacco Use: Every Day     Smokeless Tobacco Use: Never     Passive Exposure: Current   .    Review of Systems   Constitutional:  Negative for chills, fatigue, fever, unexpected weight gain and unexpected weight loss.   HENT:  Congestion: Nasal.    Respiratory:  Positive for shortness of breath. Negative for apnea, cough and wheezing.         Negative for Hemoptysis     Cardiovascular:  Negative for chest pain, palpitations and leg swelling.   Skin:         Negative for cyanosis      Sleep: Negative for Excessive daytime sleepiness  Negative for morning headaches  Negative for Snoring    Family History   Problem Relation Age of Onset    Alcohol abuse Mother     Arthritis Mother     COPD Mother     Heart disease Maternal Grandmother     Hypertension Maternal Grandmother     Lung cancer Maternal Grandmother      Stroke Maternal Grandmother     Heart disease Maternal Grandfather     Lung cancer Maternal Grandfather     Cancer Paternal Grandmother     Cancer Paternal Grandfather     Colon cancer Neg Hx     Malig Hyperthermia Neg Hx         Social History     Socioeconomic History    Marital status: Single   Tobacco Use    Smoking status: Every Day     Current packs/day: 0.50     Average packs/day: 0.5 packs/day for 16.9 years (8.4 ttl pk-yrs)     Types: Cigarettes     Start date: 1/1/2008     Passive exposure: Current    Smokeless tobacco: Never   Vaping Use    Vaping status: Never Used   Substance and Sexual Activity    Alcohol use: Not Currently     Alcohol/week: 3.0 standard drinks of alcohol     Comment: FORMER    Drug use: Not Currently    Sexual activity: Yes     Partners: Female        Past Medical History:   Diagnosis Date    Alcohol abuse 03/14/2017    Anxiety     Essential hypertension 12/27/2019    GERD (gastroesophageal reflux disease)     Hypokalemia 03/14/2017    Will update    Hyponatremia 03/14/2017    Steatohepatitis, alcoholic 03/14/2017    Tobacco abuse 03/14/2017    Umbilical hernia         Immunization History   Administered Date(s) Administered    COVID-19 (PFIZER) Purple Cap Monovalent 11/07/2021, 11/28/2021    Fluzone (or Fluarix & Flulaval for VFC) >6mos 10/18/2021    Hep B, Adolescent or Pediatric 09/09/2003    Hepatitis B 06/28/2022, 08/29/2022    MMR 01/30/1997    Meningococcal Conjugate 06/28/2022    Pneumococcal Conjugate 20-Valent (PCV20) 06/28/2022    Td (TDVAX) 09/09/2003         No Known Allergies       Current Outpatient Medications:     albuterol sulfate  (90 Base) MCG/ACT inhaler, INHALE 2 PUFFS EVERY 4 HOURS AS NEEDED FOR WHEEZING OR SHORTNESS OF AIR, Disp: 54 g, Rfl: 3    furosemide (LASIX) 20 MG tablet, , Disp: , Rfl:     pantoprazole (PROTONIX) 40 MG EC tablet, TAKE 1 TABLET BY MOUTH EVERY DAY (Patient taking differently: Take 1 tablet by mouth Daily.), Disp: 90 tablet, Rfl: 1     spironolactone (ALDACTONE) 50 MG tablet, TAKE 1 TABLET BY MOUTH EVERY DAY (Patient taking differently: Take 1 tablet by mouth Daily.), Disp: 90 tablet, Rfl: 1    Umeclidinium-Vilanterol (ANORO ELLIPTA) 62.5-25 MCG/ACT aerosol powder  inhaler, Inhale 1 puff Daily., Disp: 1 each, Rfl: 11    ursodiol (ACTIGALL) 300 MG capsule, Take 2 capsules by mouth 2 (Two) Times a Day With Meals., Disp: , Rfl:     midodrine (PROAMATINE) 5 MG tablet, Take 1 tablet by mouth 3 (Three) Times a Day Before Meals for 30 days., Disp: 90 tablet, Rfl: 0    potassium chloride (KLOR-CON M20) 20 MEQ CR tablet, Take 1 tablet by mouth Daily., Disp: 1 tablet, Rfl: 0     Objective   Physical Exam  Constitutional:       General: He is not in acute distress.     Appearance: Normal appearance. He is normal weight.   HENT:      Right Ear: Hearing normal.      Left Ear: Hearing normal.      Nose: No nasal tenderness or congestion.      Mouth/Throat:      Mouth: Mucous membranes are moist. No oral lesions.   Eyes:      Extraocular Movements: Extraocular movements intact.      Pupils: Pupils are equal, round, and reactive to light.   Cardiovascular:      Rate and Rhythm: Normal rate and regular rhythm.      Pulses: Normal pulses.      Heart sounds: Normal heart sounds. No murmur heard.  Pulmonary:      Effort: Pulmonary effort is normal.      Breath sounds: Examination of the right-middle field reveals decreased breath sounds. Examination of the right-lower field reveals decreased breath sounds. Decreased breath sounds present. No wheezing, rhonchi or rales.   Musculoskeletal:      Right lower leg: No edema.      Left lower leg: No edema.   Skin:     General: Skin is warm and dry.      Findings: No lesion or rash.   Neurological:      General: No focal deficit present.      Mental Status: He is alert and oriented to person, place, and time.   Psychiatric:         Mood and Affect: Affect normal. Mood is not anxious or depressed.         Vital Signs:   BP  "109/57 (BP Location: Right arm, Patient Position: Sitting, Cuff Size: Adult)   Pulse 95   Temp 97.6 °F (36.4 °C) (Oral)   Resp 16   Ht 172.7 cm (68\")   Wt 73.9 kg (163 lb)   SpO2 100% Comment: RA  BMI 24.78 kg/m²        Result Review :   The following data was reviewed by: ANA LAURA Aldridge on 11/22/2024:  CMP          9/22/2024    04:57 10/7/2024    15:27 10/17/2024    16:25   CMP   Glucose 85  67  102    BUN 15  12  11    Creatinine 1.05  1.23  1.02    EGFR 94.3  78.0  97.7    Sodium 134  131  132    Potassium 3.9  3.3  3.7    Chloride 105  97  100    Calcium 7.5  8.3  8.1    Total Protein 5.0  6.2     Albumin 2.1  2.7     Globulin 2.9  3.5     Total Bilirubin 0.9  1.3     Alkaline Phosphatase 152  234     AST (SGOT) 50  41     ALT (SGPT) 31  24     Albumin/Globulin Ratio 0.7  0.8     BUN/Creatinine Ratio 14.3  9.8  10.8    Anion Gap 7.6  10.0  8.1      CBC w/diff          9/22/2024    04:58 10/7/2024    15:27 10/24/2024    13:59   CBC w/Diff   WBC 4.79  5.98     RBC 3.05  3.64     Hemoglobin 8.3  10.3     Hematocrit 26.0  31.5     MCV 85.2  86.5     MCH 27.2  28.3     MCHC 31.9  32.7     RDW 17.3  16.4     Platelets 98  160  111    Neutrophil Rel %  58.7     Immature Granulocyte Rel %  1.0     Lymphocyte Rel %  16.9     Monocyte Rel %  15.2     Eosinophil Rel %  7.5     Basophil Rel %  0.7       Data reviewed : Radiologic studies chest x-ray 10/3/2024, chest x-ray 10/29/2024, chest x-ray 11/8/2024, Recent hospitalization notes Dr. Powell thoracentesis operative note 11/8/2024, and my last office note    Procedures        Assessment and Plan    Diagnoses and all orders for this visit:    1. Pleural effusion (Primary)  -     XR Chest 2 View; Future    2. Alcoholic cirrhosis of liver with ascites          Follow Up   Return in about 2 weeks (around 12/6/2024) for Recheck.  Patient was given instructions and counseling regarding his condition or for health maintenance advice. Please see specific " information pulled into the AVS if appropriate.

## 2024-11-22 NOTE — PATIENT INSTRUCTIONS
"Smoking Tobacco Information, Adult  Smoking tobacco can be harmful to your health. Tobacco contains a toxic colorless chemical called nicotine. Nicotine causes changes in your brain that make you want more and more. This is called addiction. This can make it hard to stop smoking once you start. Tobacco also has other toxic chemicals that can hurt your body and raise your risk of many cancers.  Menthol or \"lite\" tobacco or cigarette brands are not safer than regular brands.  How can smoking tobacco affect me?  Smoking tobacco puts you at risk for:  Cancer. Smoking is most commonly associated with lung cancer, but can also lead to cancer in other parts of the body.  Chronic obstructive pulmonary disease (COPD). This is a long-term lung condition that makes it hard to breathe. It also gets worse over time.  High blood pressure (hypertension), heart disease, stroke, heart attack, and lung infections, such as pneumonia.  Cataracts. This is when the lenses in the eyes become clouded.  Digestive problems. This may include peptic ulcers, heartburn, and gastroesophageal reflux disease (GERD).  Oral health problems, such as gum disease, mouth sores, and tooth loss.  Loss of taste and smell.  Smoking also affects how you look and smell. Smoking may cause:  Wrinkles.  Yellow or stained teeth, fingers, and fingernails.  Bad breath.  Bad-smelling clothes and hair.  Smoking tobacco can also affect your social life, because:  It may be challenging to find places to smoke when away from home. Many workplaces, restaurants, hotels, and public places are tobacco-free.  Smoking is expensive. This is due to the cost of tobacco and the long-term costs of treating health problems from smoking.  Secondhand smoke may affect those around you. Secondhand smoke can cause lung cancer, breathing problems, and heart disease. Children of smokers have a higher risk for:  Sudden infant death syndrome (SIDS).  Ear infections.  Lung infections.  What " actions can I take to prevent health problems?  Quit smoking    Do not start smoking. Quit if you already smoke.  Do not replace cigarette smoking with vaping devices, such as e-cigarettes.  Make a plan to quit smoking and commit to it. Look for programs to help you, and ask your health care provider for recommendations and ideas. Set a date and write down all the reasons you want to quit.  Let your friends and family know you are quitting so they can help and support you. Consider finding friends who also want to quit. It can be easier to quit with someone else, so that you can support each other.  Talk with your health care provider about using nicotine replacement medicines to help you quit. These include gum, lozenges, patches, sprays, or pills.  If you try to quit but return to smoking, stay positive. It is common to slip up when you first quit, so take it one day at a time.  Be prepared for cravings. When you feel the urge to smoke, chew gum or suck on hard candy.  Lifestyle  Stay busy.  Take care of your body. Get plenty of exercise, eat a healthy diet, and drink plenty of water.  Find ways to manage your stress, such as meditation, yoga, exercise, or time spent with friends and family.  Ask your health care provider about having regular tests (screenings) to check for cancer. This may include blood tests, imaging tests, and other tests.  Where to find support  To get support to quit smoking, consider:  Asking your health care provider for more information and resources.  Joining a support group for people who want to quit smoking in your local community. There are many effective programs that may help you to quit.  Calling the smokefree.gov counselor helpline at 3-861-QUIT-NOW (1-135.765.3621).  Where to find more information  You may find more information about quitting smoking from:  Centers for Disease Control and Prevention: cdc.gov/tobacco  Smokefree.gov: smokefree.gov  American Lung Association:  [employer]  Contact a health care provider if:  You have problems breathing.  Your lips, nose, or fingers turn blue.  You have chest pain.  You are coughing up blood.  You feel like you will faint.  You have other health changes that cause you to worry.  Summary  Smoking tobacco can negatively affect your health, the health of those around you, your finances, and your social life.  Do not start smoking. Quit if you already smoke. If you need help quitting, ask your health care provider.  Consider joining a support group for people in your local community who want to quit smoking. There are many effective programs that may help you to quit.  This information is not intended to replace advice given to you by your health care provider. Make sure you discuss any questions you have with your health care provider.  Document Revised: 12/13/2022 Document Reviewed: 12/13/2022  Elsevier Patient Education © 2024 [employer]

## 2024-11-26 ENCOUNTER — PREP FOR SURGERY (OUTPATIENT)
Dept: OTHER | Facility: HOSPITAL | Age: 39
End: 2024-11-26
Payer: COMMERCIAL

## 2024-11-26 DIAGNOSIS — J90 PLEURAL EFFUSION: Primary | ICD-10-CM

## 2024-11-27 ENCOUNTER — APPOINTMENT (OUTPATIENT)
Dept: GENERAL RADIOLOGY | Facility: HOSPITAL | Age: 39
End: 2024-11-27
Payer: COMMERCIAL

## 2024-11-27 ENCOUNTER — HOSPITAL ENCOUNTER (OUTPATIENT)
Dept: INFUSION THERAPY | Facility: HOSPITAL | Age: 39
Discharge: HOME | End: 2024-11-27
Attending: INTERNAL MEDICINE | Admitting: INTERNAL MEDICINE
Payer: COMMERCIAL

## 2024-11-27 VITALS
OXYGEN SATURATION: 98 % | SYSTOLIC BLOOD PRESSURE: 90 MMHG | RESPIRATION RATE: 18 BRPM | DIASTOLIC BLOOD PRESSURE: 48 MMHG | HEART RATE: 82 BPM

## 2024-11-27 DIAGNOSIS — J90 PLEURAL EFFUSION: ICD-10-CM

## 2024-11-27 LAB
APPEARANCE FLD: CLEAR
COLOR FLD: YELLOW
EOSINOPHIL NFR FLD MANUAL: 3 %
LDH FLD-CCNC: 68 U/L
LYMPHOCYTES NFR FLD MANUAL: 32 %
MACROPHAGE FLUID: 34 %
MESOTHL CELL NFR FLD MANUAL: 2 %
MONOCYTES NFR FLD: 17 %
NEUTROPHILS NFR FLD MANUAL: 12 %
NUC CELL # FLD: 99 /MM3
PH FLD: 8 [PH]
RBC # FLD AUTO: 2000 /MM3

## 2024-11-27 PROCEDURE — 87205 SMEAR GRAM STAIN: CPT | Performed by: INTERNAL MEDICINE

## 2024-11-27 PROCEDURE — 89051 BODY FLUID CELL COUNT: CPT | Performed by: INTERNAL MEDICINE

## 2024-11-27 PROCEDURE — 32555 ASPIRATE PLEURA W/ IMAGING: CPT | Performed by: INTERNAL MEDICINE

## 2024-11-27 PROCEDURE — 83986 ASSAY PH BODY FLUID NOS: CPT | Performed by: INTERNAL MEDICINE

## 2024-11-27 PROCEDURE — 87206 SMEAR FLUORESCENT/ACID STAI: CPT | Performed by: INTERNAL MEDICINE

## 2024-11-27 PROCEDURE — 87075 CULTR BACTERIA EXCEPT BLOOD: CPT | Performed by: INTERNAL MEDICINE

## 2024-11-27 PROCEDURE — 71045 X-RAY EXAM CHEST 1 VIEW: CPT

## 2024-11-27 PROCEDURE — 88108 CYTOPATH CONCENTRATE TECH: CPT | Performed by: INTERNAL MEDICINE

## 2024-11-27 PROCEDURE — 87102 FUNGUS ISOLATION CULTURE: CPT | Performed by: INTERNAL MEDICINE

## 2024-11-27 PROCEDURE — 83615 LACTATE (LD) (LDH) ENZYME: CPT | Performed by: INTERNAL MEDICINE

## 2024-11-27 PROCEDURE — 87116 MYCOBACTERIA CULTURE: CPT | Performed by: INTERNAL MEDICINE

## 2024-11-27 PROCEDURE — 87070 CULTURE OTHR SPECIMN AEROBIC: CPT | Performed by: INTERNAL MEDICINE

## 2024-11-27 NOTE — PROCEDURES
Procedure name: ultrasound-guided right-sided thoracentesis     Indication - pleural effusion     This procedural risks were explained to the patient including pneumothorax requiring chest tube placement, significant bleeding requiring surgery, and infection , understanding of the risks and benefits acknowledged by the patient and family and he wish to proceed with the procedure.     Timeout performed     Procedure details  Ultrasound was use to visualize a  collection of pleural fluid, the diaphragm, and collapsed lung. At this point, the skin overlying the rib was anesthetized with 5 mL 1% lidocaine without epinephrine. A thoracentesis needle was then inserted into the pleural cavity just over top of the rib and pleural fluid was aspirated back. At this time the thoracentesis catheter was advanced to the pleural cavity over the needle.  Approximately 2300mL of dark, cloudy yellow fluid was removed.  Chest x-ray ordered and pending.     Patient tolerated procedure well     No immediate complications    Electronically signed by Praful Powell MD, 11/27/24, 11:30 AM EST.

## 2024-11-27 NOTE — PROCEDURES
Bedside thoracic ultrasound:     Right side: Liver, right hemidiaphragm, right lower lobe of lung identified.  Large pleural effusion identified.  This fluid was not anechoic at this time.  There were small little floaters consistent with possible exudative process.  This is different than his previous chest ultrasounds.  Images saved     Site marked for thoracentesis.        CPT 34263 with thoracentesis note    Electronically signed by Praful Powell MD, 11/27/24, 11:29 AM EST.

## 2024-11-30 LAB
BACTERIA FLD CULT: NORMAL
GRAM STN SPEC: NORMAL

## 2024-12-04 ENCOUNTER — OFFICE VISIT (OUTPATIENT)
Dept: GASTROENTEROLOGY | Facility: CLINIC | Age: 39
End: 2024-12-04
Payer: COMMERCIAL

## 2024-12-04 VITALS
WEIGHT: 162.9 LBS | BODY MASS INDEX: 24.69 KG/M2 | OXYGEN SATURATION: 100 % | HEART RATE: 83 BPM | HEIGHT: 68 IN | SYSTOLIC BLOOD PRESSURE: 98 MMHG | DIASTOLIC BLOOD PRESSURE: 61 MMHG

## 2024-12-04 DIAGNOSIS — K74.3 PRIMARY BILIARY CHOLANGITIS: ICD-10-CM

## 2024-12-04 DIAGNOSIS — Z95.828 S/P TIPS (TRANSJUGULAR INTRAHEPATIC PORTOSYSTEMIC SHUNT): ICD-10-CM

## 2024-12-04 DIAGNOSIS — K70.31 ALCOHOLIC CIRRHOSIS OF LIVER WITH ASCITES: Primary | ICD-10-CM

## 2024-12-04 DIAGNOSIS — Z86.19 HISTORY OF SPONTANEOUS BACTERIAL PERITONITIS: ICD-10-CM

## 2024-12-04 LAB
BACTERIA SPEC ANAEROBE CULT: NORMAL
FUNGUS WND CULT: NORMAL
MYCOBACTERIUM SPEC CULT: NORMAL
NIGHT BLUE STAIN TISS: NORMAL

## 2024-12-04 NOTE — PROGRESS NOTES
Chief Complaint  Follow-up and Cirrhosis    Wai Gay is a 39 y.o. male who presents to [facility]STRO[facility]OLOG[facility]- [facility] for follow up.     History of present Illness  Patient presents to the office for follow up with a history of alcohol abuse, primary biliary cholangitis, cirrhosis, ascites, splenomegaly, history of SBP (9/2024), Barretts esophagus, and portal hypertensive gastropathy. He has continued sobriety for over 4 years now. Established care with [facility] hepatology - next follow up 3/07/25. Underwent TIPS procedure 11/11/24 and has done very well, has not required a paracentesis since procedure. He did have a thoracentesis last week with pulmonology. He takes Aldactone 50mg and Lasix 20mg daily which controls fluid retentions. Denies abdominal and extremity swelling. MELD 21 based on 11/22/24 labs. Patient continues Melissa 600 mg twice daily to manage underlying PBC. Denies RUQ pain, excessive bruising/bleeding, problems sleeping, and forgetfulness. No GI complaints.      CT abdomen/pelvis w/ w/o contrast 7/9/24  1.New moderate right pleural effusion with lower lung airspace disease, likely compressive atelectasis.  2.Similar cirrhotic morphology of the liver with partial portal vein thrombosis.  3.Small to moderate volume ascites currently.  4.Other relatively stable chronic findings.    CT Needle Biopsy Liver 03/28/2023 -cirrhosis with the possibility of primary biliary cholangitis.     EGD 02/10/2023 by Dr. Khan -mucosa suggestive of short segment and Last's, mild portal hypertensive gastropathy, mild gastritis, and normal duodenum. Esophageal biopsies - reflux esophagitis. Stomach biopsies - mild reactive gastropathy.     Past Medical History:   Diagnosis Date    Alcohol abuse 03/14/2017    Anxiety     Essential hypertension 12/27/2019    GERD (gastroesophageal reflux disease)     Hypokalemia 03/14/2017    Will update    Hyponatremia 03/14/2017    Steatohepatitis,  alcoholic 03/14/2017    Tobacco abuse 03/14/2017    Umbilical hernia        Past Surgical History:   Procedure Laterality Date    CHOLECYSTECTOMY      ENDOSCOPY N/A 02/09/2022    Procedure: ESOPHAGOGASTRODUODENOSCOPY WITH BX;  Surgeon: Gino Khan MD;  Location: [facility] ENDOSCOPY;  Service: Gastroenterology;  Laterality: N/A;  RETAINED LIQUID FOOD IN STOMACH, HUFFMAN'S ESOPHAGUS    ENDOSCOPY N/A 02/10/2023    Procedure: ESOPHAGOGASTRODUODENOSCOPY;  Surgeon: Gino Khan MD;  Location: [facility] ENDOSCOPY;  Service: Gastroenterology;  Laterality: N/A;  GASTRITIS, BARRETTS ESOPHAGUS, PHG    UPPER GASTROINTESTINAL ENDOSCOPY           Current Outpatient Medications:     albuterol sulfate  (90 Base) MCG/ACT inhaler, INHALE 2 PUFFS EVERY 4 HOURS AS NEEDED FOR WHEEZING OR SHORTNESS OF AIR, Disp: 54 g, Rfl: 3    furosemide (LASIX) 20 MG tablet, , Disp: , Rfl:     pantoprazole (PROTONIX) 40 MG EC tablet, TAKE 1 TABLET BY MOUTH EVERY DAY (Patient taking differently: Take 1 tablet by mouth Daily.), Disp: 90 tablet, Rfl: 1    spironolactone (ALDACTONE) 50 MG tablet, TAKE 1 TABLET BY MOUTH EVERY DAY (Patient taking differently: Take 1 tablet by mouth Daily.), Disp: 90 tablet, Rfl: 1    Umeclidinium-Vilanterol (ANORO ELLIPTA) 62.5-25 MCG/ACT aerosol powder  inhaler, Inhale 1 puff Daily., Disp: 1 each, Rfl: 11    ursodiol (ACTIGALL) 300 MG capsule, Take 2 capsules by mouth 2 (Two) Times a Day With Meals., Disp: , Rfl:     midodrine (PROAMATINE) 5 MG tablet, Take 1 tablet by mouth 3 (Three) Times a Day Before Meals for 30 days., Disp: 90 tablet, Rfl: 0    potassium chloride (KLOR-CON M20) 20 MEQ CR tablet, Take 1 tablet by mouth Daily., Disp: 1 tablet, Rfl: 0     No Known Allergies    Family History   Problem Relation Age of Onset    Alcohol abuse Mother     Arthritis Mother     COPD Mother     Heart disease Maternal Grandmother     Hypertension Maternal Grandmother     Lung cancer Maternal Grandmother      "Stroke Maternal Grandmother     Heart disease Maternal Grandfather     Lung cancer Maternal Grandfather     Cancer Paternal Grandmother     Cancer Paternal Grandfather     Colon cancer Neg Hx     Malig Hyperthermia Neg Hx         Social History     Social History Narrative    Not on file       Objective       Vital Signs:   BP 98/61 (BP Location: Left arm, Patient Position: Sitting, Cuff Size: Adult)   Pulse 83   Ht 172.7 cm (67.99\")   Wt 73.9 kg (162 lb 14.4 oz)   SpO2 100%   BMI 24.77 kg/m²       Physical Exam  Constitutional:       Appearance: Normal appearance. He is normal weight.   HENT:      Head: Normocephalic and atraumatic.      Nose: Nose normal.   Pulmonary:      Effort: Pulmonary effort is normal.   Skin:     General: Skin is warm and dry.      Coloration: Skin is jaundiced and pale.   Neurological:      Mental Status: He is alert and oriented to person, place, and time. Mental status is at baseline.   Psychiatric:         Mood and Affect: Mood normal.         Behavior: Behavior normal.         Thought Content: Thought content normal.         Judgment: Judgment normal.         Result Review :       CBC w/diff          10/7/2024    15:27 10/24/2024    13:59 11/22/2024    14:38   CBC w/Diff   WBC 5.98   4.4       RBC 3.64   3.1       Hemoglobin 10.3   9.4       Hematocrit 31.5   28.4       MCV 86.5   91.8       MCH 28.3   30.5       MCHC 32.7   33.2       RDW 16.4   23.2       Platelets 160  111  105       Neutrophil Rel % 58.7      Immature Granulocyte Rel % 1.0      Lymphocyte Rel % 16.9      Monocyte Rel % 15.2      Eosinophil Rel % 7.5      Basophil Rel % 0.7         Details          This result is from an external source.             CMP          9/22/2024    04:57 10/7/2024    15:27 10/17/2024    16:25   CMP   Glucose 85  67  102    BUN 15  12  11    Creatinine 1.05  1.23  1.02    EGFR 94.3  78.0  97.7    Sodium 134  131  132    Potassium 3.9  3.3  3.7    Chloride 105  97  100    Calcium 7.5  " 8.3  8.1    Total Protein 5.0  6.2     Albumin 2.1  2.7     Globulin 2.9  3.5     Total Bilirubin 0.9  1.3     Alkaline Phosphatase 152  234     AST (SGOT) 50  41     ALT (SGPT) 31  24     Albumin/Globulin Ratio 0.7  0.8     BUN/Creatinine Ratio 14.3  9.8  10.8    Anion Gap 7.6  10.0  8.1      Liver Workup   ALPHA -1 ANTITRYPSIN   Date Value Ref Range Status   08/16/2024 146 90 - 200 mg/dL Final     AFP Tumor Marker   Date Value Ref Range Status   11/22/2024 1.3 <6.1 ng/mL Final     Comment:       This test was performed using the PLUQ  chemiluminescent method. Values obtained from  different assay methods cannot be used  interchangeably. AFP levels, regardless of  value, should not be interpreted as absolute  evidence of the presence or absence of disease.     dsDNA   Date Value Ref Range Status   02/27/2023 Negative Negative Final     Expanded LISA Screen   Date Value Ref Range Status   02/27/2023 Negative Negative Final     Smooth Muscle Ab   Date Value Ref Range Status   02/27/2023 21 (H) 0 - 19 Units Final     Comment:                      Negative                     0 - 19                   Weak positive               20 - 30                   Moderate to strong positive     >30   Actin Antibodies are found in 52-85% of patients with   autoimmune hepatitis or chronic active hepatitis and   in 22% of patients with primary biliary cirrhosis.     Ceruloplasmin   Date Value Ref Range Status   02/27/2023 24 16 - 31 mg/dL Final     Ferritin   Date Value Ref Range Status   09/19/2024 36.69 30.00 - 400.00 ng/mL Final     Immunofixation Result, Serum   Date Value Ref Range Status   02/27/2023 Comment  Final     Comment:     No monoclonality detected.     IgG   Date Value Ref Range Status   02/27/2023 1174 603 - 1613 mg/dL Final     IgA   Date Value Ref Range Status   02/27/2023 923 (H) 90 - 386 mg/dL Final     Comment:     Results confirmed on  dilution.     IgM   Date Value Ref Range Status   02/27/2023 100  20 - 172 mg/dL Final     Iron   Date Value Ref Range Status   09/19/2024 20 (L) 59 - 158 mcg/dL Final     TIBC   Date Value Ref Range Status   09/19/2024 255 (L) 298 - 536 mcg/dL Final     Iron Saturation (TSAT)   Date Value Ref Range Status   09/19/2024 8 (L) 20 - 50 % Final     Transferrin   Date Value Ref Range Status   09/19/2024 171 (L) 200 - 360 mg/dL Final     Mitochondrial Ab   Date Value Ref Range Status   02/27/2023 64.5 (H) 0.0 - 20.0 Units Final     Comment:                                     Negative    0.0 - 20.0                                  Equivocal  20.1 - 24.9                                  Positive         >24.9  Mitochondrial (M2) Antibodies are found in 90-96% of  patients with primary biliary cirrhosis.     Protime   Date Value Ref Range Status   11/11/2024 15.2 (H) 9.7 - 13.1 Second Final   09/09/2021 15.9 (H) 9.4 - 12.0 Seconds Final     INR   Date Value Ref Range Status   11/22/2024 1.5 (H) 0.9 - 1.1 Final     Comment:     Moderate-intensity Warfarin Therapy     2.0-3.0  Higher-intensity Warfarin Therapy         3.0-4.0     ALPHA-FETOPROTEIN   Date Value Ref Range Status   03/26/2024 <2 0 - 8.3 ng/mL Final           Assessment and Plan    Diagnoses and all orders for this visit:    1. Alcoholic cirrhosis of liver with ascites (Primary)  -     US Liver; Future    2. Primary biliary cholangitis  -     US Liver; Future    3. S/P TIPS (transjugular intrahepatic portosystemic shunt)    4. History of spontaneous bacterial peritonitis    39 year old patient presents to the office for follow up with a history of alcohol abuse, primary biliary cholangitis, cirrhosis, ascites, splenomegaly, history of SBP (9/2024), Barretts esophagus, and portal hypertensive gastropathy. He has continued sobriety for over 4 years now. Established care with [facility] hepatology - next follow up 3/07/25. Underwent TIPS procedure 11/11/24 and has done very well, has not required a paracentesis since procedure. He did  have a thoracentesis last week with pulmonology. He takes Aldactone 50mg and Lasix 20mg daily which controls fluid retentions. Denies abdominal and extremity swelling. MELD 21 based on 11/22/24 labs. Patient continues Melissa 600 mg twice daily to manage underlying PBC. Denies RUQ pain, excessive bruising/bleeding, problems sleeping, and forgetfulness.  Patient is due for imaging of his liver in January, order for ultrasound placed.  Overall he is doing well on current medication regimen.  He will continue following with [facility] as scheduled.  Patient will follow-up in office in 6 months.  Patient is agreeable to plan call office any questions or concerns.    Follow Up   Return in about 6 months (around 6/4/2025) for cirrhosis.  Patient was given instructions and counseling regarding his condition or for health maintenance advice. Please see specific information pulled into the AVS if appropriate.

## 2024-12-05 NOTE — PROGRESS NOTES
Primary Care Provider  Callum Peterson DO     Referring Provider  No ref. provider found     Chief Complaint  Follow-up (2 week f/up - CXR 11/22) and Pleural Effusion    Subjective          Wai Gay presents to [facility]  History of Present Illness  Wai Gay is a 39 y.o. male patient of Dr. 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      Patient recently had a repeat thoracentesis on 11/27/2024.  300 mL of dark, cloudy yellow fluid was removed from patient's right lung space.  All cultures negative to date.  No malignancy seen.  States that he is doing well since his last office visit.  He does have some slight shortness of breath but nothing significant.  He has been receiving thoracentesis every couple weeks.  She he is agreeable to having 1 performed next Friday.  Overall, he states that he is doing well and has no additional concerns at this time.  Our nurse navigator will place order for patient's thoracentesis     His history of smoking is   Tobacco Use: High Risk (12/6/2024)    Patient History     Smoking Tobacco Use: Every Day     Smokeless Tobacco Use: Never     Passive Exposure: Current   .    Review of Systems   Constitutional:  Negative for chills, fatigue, fever, unexpected weight gain and unexpected weight loss.   HENT:  Congestion: Nasal.    Respiratory:  Positive for shortness of breath. Negative for apnea, cough and wheezing.         Negative for Hemoptysis     Cardiovascular:  Negative for chest pain, palpitations and leg swelling.   Skin:         Negative for cyanosis      Sleep: Negative for Excessive daytime sleepiness  Negative for morning headaches  Negative for Snoring    Family History   Problem Relation Age of Onset    Alcohol abuse Mother     Arthritis Mother     COPD Mother     Heart disease  Maternal Grandmother     Hypertension Maternal Grandmother     Lung cancer Maternal Grandmother     Stroke Maternal Grandmother     Heart disease Maternal Grandfather     Lung cancer Maternal Grandfather     Cancer Paternal Grandmother     Cancer Paternal Grandfather     Colon cancer Neg Hx     Malig Hyperthermia Neg Hx         Social History     Socioeconomic History    Marital status: Single   Tobacco Use    Smoking status: Every Day     Current packs/day: 0.50     Average packs/day: 0.5 packs/day for 16.9 years (8.5 ttl pk-yrs)     Types: Cigarettes     Start date: 1/1/2008     Passive exposure: Current    Smokeless tobacco: Never   Vaping Use    Vaping status: Never Used   Substance and Sexual Activity    Alcohol use: Not Currently     Alcohol/week: 3.0 standard drinks of alcohol     Comment: FORMER    Drug use: Not Currently    Sexual activity: Yes     Partners: Female        Past Medical History:   Diagnosis Date    Alcohol abuse 03/14/2017    Anxiety     Essential hypertension 12/27/2019    GERD (gastroesophageal reflux disease)     Hypokalemia 03/14/2017    Will update    Hyponatremia 03/14/2017    Steatohepatitis, alcoholic 03/14/2017    Tobacco abuse 03/14/2017    Umbilical hernia         Immunization History   Administered Date(s) Administered    COVID-19 (PFIZER) Purple Cap Monovalent 11/07/2021, 11/28/2021    Fluzone (or Fluarix & Flulaval for VFC) >6mos 10/18/2021    Hep B, Adolescent or Pediatric 09/09/2003    Hepatitis B 06/28/2022, 08/29/2022    MMR 01/30/1997    Meningococcal Conjugate 06/28/2022    Pneumococcal Conjugate 20-Valent (PCV20) 06/28/2022    Td (TDVAX) 09/09/2003         No Known Allergies       Current Outpatient Medications:     albuterol sulfate  (90 Base) MCG/ACT inhaler, INHALE 2 PUFFS EVERY 4 HOURS AS NEEDED FOR WHEEZING OR SHORTNESS OF AIR, Disp: 54 g, Rfl: 3    furosemide (LASIX) 20 MG tablet, , Disp: , Rfl:     pantoprazole (PROTONIX) 40 MG EC tablet, TAKE 1 TABLET BY  MOUTH EVERY DAY (Patient taking differently: Take 1 tablet by mouth Daily.), Disp: 90 tablet, Rfl: 1    spironolactone (ALDACTONE) 50 MG tablet, TAKE 1 TABLET BY MOUTH EVERY DAY (Patient taking differently: Take 1 tablet by mouth Daily.), Disp: 90 tablet, Rfl: 1    Umeclidinium-Vilanterol (ANORO ELLIPTA) 62.5-25 MCG/ACT aerosol powder  inhaler, Inhale 1 puff Daily., Disp: 1 each, Rfl: 11    ursodiol (ACTIGALL) 300 MG capsule, Take 2 capsules by mouth 2 (Two) Times a Day With Meals., Disp: , Rfl:     midodrine (PROAMATINE) 5 MG tablet, Take 1 tablet by mouth 3 (Three) Times a Day Before Meals for 30 days., Disp: 90 tablet, Rfl: 0    potassium chloride (KLOR-CON M20) 20 MEQ CR tablet, Take 1 tablet by mouth Daily., Disp: 1 tablet, Rfl: 0     Objective   Physical Exam  Constitutional:       General: He is not in acute distress.     Appearance: Normal appearance. He is normal weight.   HENT:      Right Ear: Hearing normal.      Left Ear: Hearing normal.      Nose: No nasal tenderness or congestion.      Mouth/Throat:      Mouth: Mucous membranes are moist. No oral lesions.   Eyes:      Extraocular Movements: Extraocular movements intact.      Pupils: Pupils are equal, round, and reactive to light.   Cardiovascular:      Rate and Rhythm: Normal rate and regular rhythm.      Pulses: Normal pulses.      Heart sounds: Normal heart sounds. No murmur heard.  Pulmonary:      Effort: Pulmonary effort is normal.      Breath sounds: Examination of the right-lower field reveals decreased breath sounds. Decreased breath sounds present. No wheezing, rhonchi or rales.   Musculoskeletal:      Right lower leg: No edema.      Left lower leg: No edema.   Skin:     General: Skin is warm and dry.      Findings: No lesion or rash.   Neurological:      General: No focal deficit present.      Mental Status: He is alert and oriented to person, place, and time.   Psychiatric:         Mood and Affect: Affect normal. Mood is not anxious or  "depressed.         Vital Signs:   /63 (BP Location: Right arm, Patient Position: Sitting, Cuff Size: Adult)   Pulse 95   Temp 97.6 °F (36.4 °C) (Oral)   Resp 16   Ht 172.7 cm (68\")   Wt 73.5 kg (162 lb)   SpO2 100% Comment: RA  BMI 24.63 kg/m²        Result Review :   The following data was reviewed by: ANA LAURA Aldridge on 12/06/2024:  CMP          9/22/2024    04:57 10/7/2024    15:27 10/17/2024    16:25   CMP   Glucose 85  67  102    BUN 15  12  11    Creatinine 1.05  1.23  1.02    EGFR 94.3  78.0  97.7    Sodium 134  131  132    Potassium 3.9  3.3  3.7    Chloride 105  97  100    Calcium 7.5  8.3  8.1    Total Protein 5.0  6.2     Albumin 2.1  2.7     Globulin 2.9  3.5     Total Bilirubin 0.9  1.3     Alkaline Phosphatase 152  234     AST (SGOT) 50  41     ALT (SGPT) 31  24     Albumin/Globulin Ratio 0.7  0.8     BUN/Creatinine Ratio 14.3  9.8  10.8    Anion Gap 7.6  10.0  8.1      CBC w/diff          10/7/2024    15:27 10/24/2024    13:59 11/22/2024    14:38   CBC w/Diff   WBC 5.98   4.4       RBC 3.64   3.1       Hemoglobin 10.3   9.4       Hematocrit 31.5   28.4       MCV 86.5   91.8       MCH 28.3   30.5       MCHC 32.7   33.2       RDW 16.4   23.2       Platelets 160  111  105       Neutrophil Rel % 58.7      Immature Granulocyte Rel % 1.0      Lymphocyte Rel % 16.9      Monocyte Rel % 15.2      Eosinophil Rel % 7.5      Basophil Rel % 0.7         Details          This result is from an external source.           Personally reviewed fungus culture, anaerobic culture, body fluid culture, AFB culture and cytology from 11/27/2024    Data reviewed : Radiologic studies chest x-ray 11/8/2024, chest x-ray 11/22/2024, chest x-ray 11/27/2024 and my last office note    Procedures        Assessment and Plan    Diagnoses and all orders for this visit:    1. Pleural effusion (Primary)  Comments:  Repeat thoracentesis next week    2. Alcoholic cirrhosis of liver with ascites    3. Nicotine dependence, " cigarettes, uncomplicated          Follow Up   Return in about 6 weeks (around 1/17/2025).  Patient was given instructions and counseling regarding his condition or for health maintenance advice. Please see specific information pulled into the AVS if appropriate.

## 2024-12-06 ENCOUNTER — OFFICE VISIT (OUTPATIENT)
Dept: PULMONOLOGY | Facility: CLINIC | Age: 39
End: 2024-12-06
Payer: COMMERCIAL

## 2024-12-06 ENCOUNTER — PREP FOR SURGERY (OUTPATIENT)
Dept: OTHER | Facility: HOSPITAL | Age: 39
End: 2024-12-06
Payer: COMMERCIAL

## 2024-12-06 VITALS
RESPIRATION RATE: 16 BRPM | SYSTOLIC BLOOD PRESSURE: 101 MMHG | TEMPERATURE: 97.6 F | DIASTOLIC BLOOD PRESSURE: 63 MMHG | OXYGEN SATURATION: 100 % | HEART RATE: 95 BPM | WEIGHT: 162 LBS | HEIGHT: 68 IN | BODY MASS INDEX: 24.55 KG/M2

## 2024-12-06 DIAGNOSIS — K70.31 ALCOHOLIC CIRRHOSIS OF LIVER WITH ASCITES: ICD-10-CM

## 2024-12-06 DIAGNOSIS — F17.210 NICOTINE DEPENDENCE, CIGARETTES, UNCOMPLICATED: ICD-10-CM

## 2024-12-06 DIAGNOSIS — J90 RECURRENT PLEURAL EFFUSION ON RIGHT: Primary | ICD-10-CM

## 2024-12-06 DIAGNOSIS — J90 PLEURAL EFFUSION: Primary | ICD-10-CM

## 2024-12-11 LAB
FUNGUS WND CULT: NORMAL
MYCOBACTERIUM SPEC CULT: NORMAL
NIGHT BLUE STAIN TISS: NORMAL

## 2024-12-13 ENCOUNTER — HOSPITAL ENCOUNTER (OUTPATIENT)
Dept: INFUSION THERAPY | Facility: HOSPITAL | Age: 39
Discharge: HOME | End: 2024-12-13
Attending: SURGERY
Payer: COMMERCIAL

## 2024-12-13 VITALS
OXYGEN SATURATION: 100 % | HEART RATE: 73 BPM | RESPIRATION RATE: 18 BRPM | TEMPERATURE: 97.5 F | SYSTOLIC BLOOD PRESSURE: 105 MMHG | DIASTOLIC BLOOD PRESSURE: 63 MMHG

## 2024-12-13 DIAGNOSIS — J90 RECURRENT PLEURAL EFFUSION ON RIGHT: ICD-10-CM

## 2024-12-13 PROCEDURE — 32555 ASPIRATE PLEURA W/ IMAGING: CPT | Performed by: INTERNAL MEDICINE

## 2024-12-13 NOTE — PROCEDURES
"Bedside Thoracentesis Without  Radiology    Date/Time: 12/13/2024 11:03 AM    Performed by: Jorge Montero DO  Authorized by: Jorge Montero DO  Consent: Verbal consent obtained. Written consent obtained.  Risks and benefits: risks, benefits and alternatives were discussed  Consent given by: patient  Patient understanding: patient states understanding of the procedure being performed  Patient consent: the patient's understanding of the procedure matches consent given  Procedure consent: procedure consent matches procedure scheduled  Relevant documents: relevant documents present and verified  Test results: test results available and properly labeled  Site marked: the operative site was marked  Imaging studies: imaging studies available  Patient identity confirmed: verbally with patient  Time out: Immediately prior to procedure a \"time out\" was called to verify the correct patient, procedure, equipment, support staff and site/side marked as required.  Procedure purpose: diagnostic and therapeutic  Indications: pleural effusion  Preparation: Patient was prepped and draped in the usual sterile fashion.  Local anesthesia used: yes  Anesthesia: local infiltration    Anesthesia:  Local anesthesia used: yes  Local Anesthetic: lidocaine 1% without epinephrine  Anesthetic total: 5 mL    Sedation:  Patient sedated: no    Preparation: skin prepped with ChloraPrep and sterile field established  Patient position: supported by bedside stand  Ultrasound guidance: yes  Location: right posterior  Puncture method: over-the-needle catheter  Number of attempts: 1  Drainage amount: 2000 ml  Drainage characteristics: serous  Patient tolerance: patient tolerated the procedure well with no immediate complications  Chest x-ray performed: yes  Chest x-ray interpreted by me.  Comments: Chest x-ray not performed but ultrasound of the chest performed showing sliding lung sign with very small pleural effusion no " ultrasound evidence of pneumothorax post procedure

## 2024-12-13 NOTE — NURSING NOTE
Ultrasound guided thoracentesis performed on patient by MD at bedside. 2000 mL of pleural fluid removed from right side. Sterile dressing applied over right posterior chest catheter insertion site. Dressing is clean, dry & intact at this time. Patient reports tolerating procedure without issue. No chest x-ray ordered per MD. MD performed bedside ultrasound to verify absence of pneumothorax.

## 2024-12-18 LAB
FUNGUS WND CULT: NORMAL
MYCOBACTERIUM SPEC CULT: NORMAL
NIGHT BLUE STAIN TISS: NORMAL

## 2024-12-25 LAB
FUNGUS WND CULT: NORMAL
MYCOBACTERIUM SPEC CULT: NORMAL
NIGHT BLUE STAIN TISS: NORMAL

## 2025-01-01 LAB
MYCOBACTERIUM SPEC CULT: NORMAL
NIGHT BLUE STAIN TISS: NORMAL

## 2025-01-04 ENCOUNTER — HOSPITAL ENCOUNTER (OUTPATIENT)
Dept: ULTRASOUND IMAGING | Facility: HOSPITAL | Age: 40
Discharge: HOME | End: 2025-01-04
Payer: COMMERCIAL

## 2025-01-04 DIAGNOSIS — K74.3 PRIMARY BILIARY CHOLANGITIS: ICD-10-CM

## 2025-01-04 DIAGNOSIS — K70.31 ALCOHOLIC CIRRHOSIS OF LIVER WITH ASCITES: ICD-10-CM

## 2025-01-04 PROCEDURE — 76705 ECHO EXAM OF ABDOMEN: CPT

## 2025-01-07 ENCOUNTER — OFFICE VISIT (OUTPATIENT)
Dept: FAMILY MEDICINE CLINIC | Facility: CLINIC | Age: 40
End: 2025-01-07
Payer: COMMERCIAL

## 2025-01-07 VITALS
BODY MASS INDEX: 25.46 KG/M2 | SYSTOLIC BLOOD PRESSURE: 114 MMHG | WEIGHT: 168 LBS | TEMPERATURE: 98 F | HEART RATE: 96 BPM | HEIGHT: 68 IN | DIASTOLIC BLOOD PRESSURE: 77 MMHG | OXYGEN SATURATION: 94 %

## 2025-01-07 DIAGNOSIS — K70.31 ALCOHOLIC CIRRHOSIS OF LIVER WITH ASCITES: ICD-10-CM

## 2025-01-07 DIAGNOSIS — I10 ESSENTIAL HYPERTENSION: Primary | ICD-10-CM

## 2025-01-07 PROCEDURE — 99213 OFFICE O/P EST LOW 20 MIN: CPT | Performed by: FAMILY MEDICINE

## 2025-01-07 NOTE — PROGRESS NOTES
Chief Complaint   Patient presents with    Follow-up     6 month     Hypertension     Cirrhosis of liver         Subjective     Wai Gay  has a past medical history of Alcohol abuse (03/14/2017), Anxiety, Essential hypertension (12/27/2019), GERD (gastroesophageal reflux disease), Hypokalemia (03/14/2017), Hyponatremia (03/14/2017), Steatohepatitis, alcoholic (03/14/2017), Tobacco abuse (03/14/2017), and Umbilical hernia.    Cirrhosis-he has a history of alcoholic cirrhosis.  He was previously having paracentesis on a weekly basis.  Approximately 2 months ago he had a TIPS procedure done.  He states since that time as he has only had to have a paracentesis once or twice.  He tolerated the procedure without any complications whatsoever.  He has not consumed any alcohol in well over 4 years now.    Hypertension  This is a chronic problem. The current episode started more than 1 year ago. The problem has been stable since onset. The problem is controlled. Pertinent negatives include no chest pain, palpitations or shortness of breath. Risk factors for coronary artery disease include male gender. Current antihypertension treatment includes diuretics. The current treatment provides significant improvement. There are no compliance problems.        PHQ-2 Depression Screening  Little interest or pleasure in doing things?     Feeling down, depressed, or hopeless?     PHQ-2 Total Score     PHQ-9 Depression Screening  Little interest or pleasure in doing things?     Feeling down, depressed, or hopeless?     Trouble falling or staying asleep, or sleeping too much?     Feeling tired or having little energy?     Poor appetite or overeating?     Feeling bad about yourself - or that you are a failure or have let yourself or your family down?     Trouble concentrating on things, such as reading the newspaper or watching television?     Moving or speaking so slowly that other people could have noticed? Or the opposite -  being so fidgety or restless that you have been moving around a lot more than usual?     Thoughts that you would be better off dead, or of hurting yourself in some way?     PHQ-9 Total Score     If you checked off any problems, how difficult have these problems made it for you to do your work, take care of things at home, or get along with other people?       No Known Allergies    Prior to Admission medications    Medication Sig Start Date End Date Taking? Authorizing Provider   albuterol sulfate  (90 Base) MCG/ACT inhaler INHALE 2 PUFFS EVERY 4 HOURS AS NEEDED FOR WHEEZING OR SHORTNESS OF AIR 11/18/24  Yes Davian Watt MD   furosemide (LASIX) 20 MG tablet  11/12/24  Yes ProviderRose MD   midodrine (PROAMATINE) 5 MG tablet Take 1 tablet by mouth 3 (Three) Times a Day Before Meals for 30 days. 9/22/24 1/7/25 Yes Asael Chilel MD   pantoprazole (PROTONIX) 40 MG EC tablet TAKE 1 TABLET BY MOUTH EVERY DAY  Patient taking differently: Take 1 tablet by mouth Daily. 6/5/24  Yes Shyla Yarbrough APRN   potassium chloride (KLOR-CON M20) 20 MEQ CR tablet Take 1 tablet by mouth Daily. 10/15/24  Yes Shyla Yarbrough APRN   spironolactone (ALDACTONE) 50 MG tablet TAKE 1 TABLET BY MOUTH EVERY DAY  Patient taking differently: Take 1 tablet by mouth Daily. 6/5/24  Yes Shyla Yarbrough APRN   Umeclidinium-Vilanterol (ANORO ELLIPTA) 62.5-25 MCG/ACT aerosol powder  inhaler Inhale 1 puff Daily. 7/29/24  Yes Davian Watt MD   ursodiol (ACTIGALL) 300 MG capsule Take 2 capsules by mouth 2 (Two) Times a Day With Meals. 6/8/24  Yes ProviderRose MD        Patient Active Problem List   Diagnosis    Acute liver failure without hepatic coma    Acute hepatitis    Hypokalemia    Hyponatremia    Steatohepatitis, alcoholic    History of alcohol abuse    Tobacco abuse    Generalized abdominal pain    Need for influenza vaccination    Essential hypertension    Need for hepatitis A and B vaccination    Need  for vaccination against Streptococcus pneumoniae    Need for meningococcal vaccination    Need for shingles vaccine    Huffman's esophagus without dysplasia    Umbilical hernia without obstruction and without gangrene    Acute hypoxic respiratory failure    Personal history of PE (pulmonary embolism)    Pleural effusion    Dyspnea    SBP (spontaneous bacterial peritonitis)    Peritonitis    Hospital discharge follow-up    ERRONEOUS ENCOUNTER--DISREGARD    Sepsis, due to unspecified organism, unspecified whether acute organ dysfunction present    Cirrhosis of liver with ascites    Iron deficiency anemia        Past Surgical History:   Procedure Laterality Date    CHOLECYSTECTOMY      ENDOSCOPY N/A 02/09/2022    Procedure: ESOPHAGOGASTRODUODENOSCOPY WITH BX;  Surgeon: Gino Khan MD;  Location: [facility] ENDOSCOPY;  Service: Gastroenterology;  Laterality: N/A;  RETAINED LIQUID FOOD IN STOMACH, HUFFMAN'S ESOPHAGUS    ENDOSCOPY N/A 02/10/2023    Procedure: ESOPHAGOGASTRODUODENOSCOPY;  Surgeon: Gino Khan MD;  Location: [facility] ENDOSCOPY;  Service: Gastroenterology;  Laterality: N/A;  GASTRITIS, BARRETTS ESOPHAGUS, PHG    UPPER GASTROINTESTINAL ENDOSCOPY         Social History     Socioeconomic History    Marital status: Single   Tobacco Use    Smoking status: Every Day     Current packs/day: 0.50     Average packs/day: 0.5 packs/day for 17.0 years (8.5 ttl pk-yrs)     Types: Cigarettes     Start date: 1/1/2008     Passive exposure: Current    Smokeless tobacco: Never   Vaping Use    Vaping status: Never Used   Substance and Sexual Activity    Alcohol use: Not Currently     Alcohol/week: 3.0 standard drinks of alcohol     Comment: FORMER    Drug use: Not Currently    Sexual activity: Yes     Partners: Female       Family History   Problem Relation Age of Onset    Alcohol abuse Mother     Arthritis Mother     COPD Mother     Heart disease Maternal Grandmother     Hypertension Maternal Grandmother      "Lung cancer Maternal Grandmother     Stroke Maternal Grandmother     Heart disease Maternal Grandfather     Lung cancer Maternal Grandfather     Cancer Paternal Grandmother     Cancer Paternal Grandfather     Colon cancer Neg Hx     Malig Hyperthermia Neg Hx        Family history, surgical history, past medical history, Allergies and meds reviewed with patient today and updated in [facility] EMR.     ROS:  Review of Systems   Constitutional:  Negative for fatigue.   Respiratory:  Negative for cough, chest tightness, shortness of breath and wheezing.    Cardiovascular:  Negative for chest pain and palpitations.   Gastrointestinal:  Negative for abdominal distention.   Neurological:  Negative for headache.       OBJECTIVE:  Vitals:    01/07/25 1421   BP: 114/77   BP Location: Left arm   Patient Position: Sitting   Pulse: 96   Temp: 98 °F (36.7 °C)   SpO2: 94%   Weight: 76.2 kg (168 lb)   Height: 172.7 cm (68\")     No results found.   Body mass index is 25.54 kg/m².  No LMP for male patient.    The ASCVD Risk score (Kent DK, et al., 2019) failed to calculate for the following reasons:    The 2019 ASCVD risk score is only valid for ages 40 to 79     Physical Exam  Vitals and nursing note reviewed.   Constitutional:       General: He is not in acute distress.     Appearance: Normal appearance. He is normal weight.   HENT:      Head: Normocephalic.      Right Ear: Tympanic membrane, ear canal and external ear normal.      Left Ear: Tympanic membrane, ear canal and external ear normal.      Nose: Nose normal.      Mouth/Throat:      Mouth: Mucous membranes are moist.      Dentition: Has dentures.      Pharynx: Oropharynx is clear.   Eyes:      General: No scleral icterus.     Conjunctiva/sclera: Conjunctivae normal.      Pupils: Pupils are equal, round, and reactive to light.   Cardiovascular:      Rate and Rhythm: Normal rate and regular rhythm.      Pulses: Normal pulses.      Heart sounds: Normal heart sounds. No murmur " heard.  Pulmonary:      Effort: Pulmonary effort is normal.      Breath sounds: Normal breath sounds. No wheezing, rhonchi or rales.   Musculoskeletal:      Cervical back: Neck supple. No rigidity or tenderness.   Lymphadenopathy:      Cervical: No cervical adenopathy.   Skin:     General: Skin is warm and dry.      Coloration: Skin is not jaundiced.      Findings: No rash.   Neurological:      General: No focal deficit present.      Mental Status: He is alert and oriented to person, place, and time.   Psychiatric:         Mood and Affect: Mood normal.         Thought Content: Thought content normal.         Judgment: Judgment normal.         Procedures    No visits with results within 30 Day(s) from this visit.   Latest known visit with results is:   Admission on 11/27/2024, Discharged on 11/27/2024   Component Date Value Ref Range Status    pH, Fluid 11/27/2024 8.00   Final    Lactate Dehydrogenase (LD), Fluid 11/27/2024 68  U/L Final    Case Report 11/27/2024    Final                    Value:Medical Cytology Report                           Case: [id number]                                  Authorizing Provider:  Praful Powlel MD       Collected:           11/27/2024 11:39 AM          Ordering Location:     [place] [id number]    Received:            11/29/2024 09:41 AM                                 [facility]                                                    Pathologist:           Praful Matthew MD                                                            Specimen:    Pleural Cavity                                                                             Final Diagnosis 11/27/2024    Final                    Value:Pleural fluid, right, thoracentesis:    - Negative for malignant cells      Clinical Information 11/27/2024    Final                    Value:Pleural effusion. Cirrhosis of liver.      Gross Description 11/27/2024    Final                    Value:1. Pleural  Cavity.  Pleural Fluid, Right  1500 cc dark flores-adelina, thin, opaque fluid received (1 cytospin prep prepared).        Microscopic Description 11/27/2024    Final                    Value:Microscopic examination performed.      AFB Culture 11/27/2024 No AFB isolated at 5 weeks   Preliminary    AFB Stain 11/27/2024 No acid fast bacilli seen   Preliminary    Body Fluid Culture 11/27/2024 No growth at 3 days   Final    Gram Stain 11/27/2024 No organisms seen   Final    Anaerobic Culture 11/27/2024 No anaerobes isolated at 5 days   Final    Fungus Culture 11/27/2024 No fungus isolated at 4 weeks   Final    Color, Fluid 11/27/2024 Yellow   Final    Appearance, Fluid 11/27/2024 Clear  Clear Final    Nucleated Cells, Fluid 11/27/2024 99  /mm3 Final    RBC, Fluid 11/27/2024 2,000    /mm3 Final    Neutrophils, Fluid % 11/27/2024 12  % Final    Lymphocytes, Fluid % 11/27/2024 32  % Final    Monocytes, Fluid % 11/27/2024 17  % Final    Eosinophils, Fluid % 11/27/2024 3  % Final    Mesothelial Cells, Fluid % 11/27/2024 2  % Final    Macrophage, Fluid % 11/27/2024 34  % Final       ASSESSMENT/ PLAN:    Diagnoses and all orders for this visit:    1. Essential hypertension (Primary)  Assessment & Plan:  His blood pressure is good here today.  He will continue his Lasix and spironolactone which is primarily more for his liver disease.      2. Alcoholic cirrhosis of liver with ascites               Orders Placed Today:     No orders of the defined types were placed in this encounter.       Management Plan:     An After Visit Summary was printed and given to the patient at discharge.    Follow-up: Return in about 6 months (around 7/7/2025) for Recheck.    Callum Peterson,  1/7/2025 15:11 EST  This note was electronically signed.

## 2025-01-07 NOTE — ASSESSMENT & PLAN NOTE
He has done well since his TIPS procedure.  His need for paracentesis has become infrequent.  He has remained sober now for over 4 years.  He will continue to work on intake of good sources of protein.

## 2025-01-07 NOTE — ASSESSMENT & PLAN NOTE
His blood pressure is good here today.  He will continue his Lasix and spironolactone which is primarily more for his liver disease.

## 2025-01-08 LAB
MYCOBACTERIUM SPEC CULT: NORMAL
NIGHT BLUE STAIN TISS: NORMAL

## 2025-01-13 ENCOUNTER — TELEPHONE (OUTPATIENT)
Dept: PULMONOLOGY | Facility: CLINIC | Age: 40
End: 2025-01-13
Payer: COMMERCIAL

## 2025-01-13 NOTE — TELEPHONE ENCOUNTER
Patient called and would a different medication than Anoro. He states that the medication cost to much. Please call patient

## 2025-01-14 DIAGNOSIS — J41.8 MIXED SIMPLE AND MUCOPURULENT CHRONIC BRONCHITIS: ICD-10-CM

## 2025-01-14 DIAGNOSIS — R94.2 MIXED OBSTRUCTIVE AND RESTRICTIVE VENTILATORY DEFECT: Primary | ICD-10-CM

## 2025-01-14 RX ORDER — TIOTROPIUM BROMIDE AND OLODATEROL 3.124; 2.736 UG/1; UG/1
2 SPRAY, METERED RESPIRATORY (INHALATION)
Qty: 1 EACH | Refills: 11 | Status: SHIPPED | OUTPATIENT
Start: 2025-01-14

## 2025-01-17 ENCOUNTER — OFFICE VISIT (OUTPATIENT)
Dept: PULMONOLOGY | Facility: CLINIC | Age: 40
End: 2025-01-17
Payer: COMMERCIAL

## 2025-01-17 VITALS
RESPIRATION RATE: 16 BRPM | OXYGEN SATURATION: 100 % | HEIGHT: 68 IN | DIASTOLIC BLOOD PRESSURE: 79 MMHG | SYSTOLIC BLOOD PRESSURE: 121 MMHG | HEART RATE: 95 BPM | WEIGHT: 170 LBS | TEMPERATURE: 97.6 F | BODY MASS INDEX: 25.76 KG/M2

## 2025-01-17 DIAGNOSIS — R06.09 DYSPNEA ON EXERTION: ICD-10-CM

## 2025-01-17 DIAGNOSIS — R94.2 MIXED OBSTRUCTIVE AND RESTRICTIVE VENTILATORY DEFECT: Chronic | ICD-10-CM

## 2025-01-17 DIAGNOSIS — J90 RECURRENT PLEURAL EFFUSION ON RIGHT: Primary | ICD-10-CM

## 2025-01-17 DIAGNOSIS — F17.210 NICOTINE DEPENDENCE, CIGARETTES, UNCOMPLICATED: Chronic | ICD-10-CM

## 2025-01-17 NOTE — PATIENT INSTRUCTIONS
"Smoking Tobacco Information, Adult  Smoking tobacco can be harmful to your health. Tobacco contains a toxic colorless chemical called nicotine. Nicotine causes changes in your brain that make you want more and more. This is called addiction. This can make it hard to stop smoking once you start. Tobacco also has other toxic chemicals that can hurt your body and raise your risk of many cancers.  Menthol or \"lite\" tobacco or cigarette brands are not safer than regular brands.  How can smoking tobacco affect me?  Smoking tobacco puts you at risk for:  Cancer. Smoking is most commonly associated with lung cancer, but can also lead to cancer in other parts of the body.  Chronic obstructive pulmonary disease (COPD). This is a long-term lung condition that makes it hard to breathe. It also gets worse over time.  High blood pressure (hypertension), heart disease, stroke, heart attack, and lung infections, such as pneumonia.  Cataracts. This is when the lenses in the eyes become clouded.  Digestive problems. This may include peptic ulcers, heartburn, and gastroesophageal reflux disease (GERD).  Oral health problems, such as gum disease, mouth sores, and tooth loss.  Loss of taste and smell.  Smoking also affects how you look and smell. Smoking may cause:  Wrinkles.  Yellow or stained teeth, fingers, and fingernails.  Bad breath.  Bad-smelling clothes and hair.  Smoking tobacco can also affect your social life, because:  It may be challenging to find places to smoke when away from home. Many workplaces, restaurants, hotels, and public places are tobacco-free.  Smoking is expensive. This is due to the cost of tobacco and the long-term costs of treating health problems from smoking.  Secondhand smoke may affect those around you. Secondhand smoke can cause lung cancer, breathing problems, and heart disease. Children of smokers have a higher risk for:  Sudden infant death syndrome (SIDS).  Ear infections.  Lung infections.  What " actions can I take to prevent health problems?  Quit smoking    Do not start smoking. Quit if you already smoke.  Do not replace cigarette smoking with vaping devices, such as e-cigarettes.  Make a plan to quit smoking and commit to it. Look for programs to help you, and ask your health care provider for recommendations and ideas. Set a date and write down all the reasons you want to quit.  Let your friends and family know you are quitting so they can help and support you. Consider finding friends who also want to quit. It can be easier to quit with someone else, so that you can support each other.  Talk with your health care provider about using nicotine replacement medicines to help you quit. These include gum, lozenges, patches, sprays, or pills.  If you try to quit but return to smoking, stay positive. It is common to slip up when you first quit, so take it one day at a time.  Be prepared for cravings. When you feel the urge to smoke, chew gum or suck on hard candy.  Lifestyle  Stay busy.  Take care of your body. Get plenty of exercise, eat a healthy diet, and drink plenty of water.  Find ways to manage your stress, such as meditation, yoga, exercise, or time spent with friends and family.  Ask your health care provider about having regular tests (screenings) to check for cancer. This may include blood tests, imaging tests, and other tests.  Where to find support  To get support to quit smoking, consider:  Asking your health care provider for more information and resources.  Joining a support group for people who want to quit smoking in your local community. There are many effective programs that may help you to quit.  Calling the smokefree.gov counselor helpline at 3-255-QUIT-NOW (1-223.566.8967).  Where to find more information  You may find more information about quitting smoking from:  Centers for Disease Control and Prevention: cdc.gov/tobacco  Smokefree.gov: smokefree.gov  American Lung Association:  [employer]  Contact a health care provider if:  You have problems breathing.  Your lips, nose, or fingers turn blue.  You have chest pain.  You are coughing up blood.  You feel like you will faint.  You have other health changes that cause you to worry.  Summary  Smoking tobacco can negatively affect your health, the health of those around you, your finances, and your social life.  Do not start smoking. Quit if you already smoke. If you need help quitting, ask your health care provider.  Consider joining a support group for people in your local community who want to quit smoking. There are many effective programs that may help you to quit.  This information is not intended to replace advice given to you by your health care provider. Make sure you discuss any questions you have with your health care provider.  Document Revised: 12/13/2022 Document Reviewed: 12/13/2022  Elsevier Patient Education © 2024 [employer]

## 2025-01-17 NOTE — PROGRESS NOTES
Primary Care Provider  Callum Peterson DO     Referring Provider  No ref. provider found     Chief Complaint  Follow-up (6 WEEK F/UP ) and Pleural Effusion      Subjective          Wai Gay presents to [facility] PULMONARY [facility]IT[facility] CARE MEDICINE  History of Present Illness  Wai Gay is a 39 y.o. male patient of Dr. 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     Patient states he is doing okay since his last office visit.  He denies using any antibiotics or steroids for his lungs.  He denies any current fevers or chills.  His shortness breath is mild in severity, worse with exertion and improved the rest.  He states he is due to  his Stiolto today.  He uses albuterol every morning as a routine.  Patient's previous thoracentesis was on 12/13/2024 with Dr. Montero with 2000 mL of fluid removed from the right pleural space.  He continues to follow-up with GI for liver cirrhosis with ascites.  Patient is post TIPS procedure November 11, 2024.  Patient is currently smoking 0.5 packs of cigarettes daily and is working on quitting.  Overall, he states he is doing okay and has no additional concerns at this time          His history of smoking is   Tobacco Use: High Risk (1/17/2025)    Patient History     Smoking Tobacco Use: Every Day     Smokeless Tobacco Use: Never     Passive Exposure: Current   Wai Gay  reports that he has been smoking cigarettes. He started smoking about 17 years ago. He has a 8.5 pack-year smoking history. He has been exposed to tobacco smoke. He has never used smokeless tobacco. I have educated him on the risk of diseases from using tobacco products such as cancer, COPD, and heart disease.     I advised him to quit and he is willing to quit. We have discussed the following method/s for tobacco cessation:  Education  Material, Counseling, and [place].  Encourage a quit date for  3 months .  He will follow up with me in 2 months or sooner to check on his progress.    I spent 5 minutes counseling the patient.        Review of Systems   Constitutional:  Negative for chills, fatigue, fever, unexpected weight gain and unexpected weight loss.   HENT:  Congestion: Nasal.    Respiratory:  Positive for shortness of breath. Negative for apnea, cough and wheezing.         Negative for Hemoptysis     Cardiovascular:  Negative for chest pain, palpitations and leg swelling.   Skin:         Negative for cyanosis      Sleep: Negative for Excessive daytime sleepiness  Negative for morning headaches  Negative for Snoring    Family History   Problem Relation Age of Onset    Alcohol abuse Mother     Arthritis Mother     COPD Mother     Heart disease Maternal Grandmother     Hypertension Maternal Grandmother     Lung cancer Maternal Grandmother     Stroke Maternal Grandmother     Heart disease Maternal Grandfather     Lung cancer Maternal Grandfather     Cancer Paternal Grandmother     Cancer Paternal Grandfather     Colon cancer Neg Hx     Malig Hyperthermia Neg Hx         Social History     Socioeconomic History    Marital status: Single   Tobacco Use    Smoking status: Every Day     Current packs/day: 0.50     Average packs/day: 0.5 packs/day for 17.0 years (8.5 ttl pk-yrs)     Types: Cigarettes     Start date: 1/1/2008     Passive exposure: Current    Smokeless tobacco: Never   Vaping Use    Vaping status: Never Used   Substance and Sexual Activity    Alcohol use: Not Currently     Alcohol/week: 3.0 standard drinks of alcohol     Comment: FORMER    Drug use: Not Currently    Sexual activity: Yes     Partners: Female        Past Medical History:   Diagnosis Date    Alcohol abuse 03/14/2017    Anxiety     Essential hypertension 12/27/2019    GERD (gastroesophageal reflux disease)     Hypokalemia 03/14/2017    Will update    Hyponatremia  03/14/2017    Steatohepatitis, alcoholic 03/14/2017    Tobacco abuse 03/14/2017    Umbilical hernia         Immunization History   Administered Date(s) Administered    COVID-19 (PFIZER) Purple Cap Monovalent 11/07/2021, 11/28/2021    Fluzone (or Fluarix & Flulaval for VFC) >6mos 10/18/2021    Hep B, Adolescent or Pediatric 09/09/2003    Hepatitis B 06/28/2022, 08/29/2022    MMR 01/30/1997    Meningococcal Conjugate 06/28/2022    Pneumococcal Conjugate 20-Valent (PCV20) 06/28/2022    Td (TDVAX) 09/09/2003         No Known Allergies       Current Outpatient Medications:     albuterol sulfate  (90 Base) MCG/ACT inhaler, INHALE 2 PUFFS EVERY 4 HOURS AS NEEDED FOR WHEEZING OR SHORTNESS OF AIR, Disp: 54 g, Rfl: 3    pantoprazole (PROTONIX) 40 MG EC tablet, TAKE 1 TABLET BY MOUTH EVERY DAY (Patient taking differently: Take 1 tablet by mouth Daily.), Disp: 90 tablet, Rfl: 1    spironolactone (ALDACTONE) 50 MG tablet, TAKE 1 TABLET BY MOUTH EVERY DAY (Patient taking differently: Take 1 tablet by mouth Daily.), Disp: 90 tablet, Rfl: 1    tiotropium bromide-olodaterol (Stiolto Respimat) 2.5-2.5 MCG/ACT aerosol solution inhaler, Inhale 2 puffs Daily., Disp: 1 each, Rfl: 11    ursodiol (ACTIGALL) 300 MG capsule, Take 2 capsules by mouth 2 (Two) Times a Day With Meals., Disp: , Rfl:     furosemide (LASIX) 20 MG tablet, , Disp: , Rfl:     midodrine (PROAMATINE) 5 MG tablet, Take 1 tablet by mouth 3 (Three) Times a Day Before Meals for 30 days., Disp: 90 tablet, Rfl: 0    potassium chloride (KLOR-CON M20) 20 MEQ CR tablet, Take 1 tablet by mouth Daily., Disp: 1 tablet, Rfl: 0    Umeclidinium-Vilanterol (ANORO ELLIPTA) 62.5-25 MCG/ACT aerosol powder  inhaler, Inhale 1 puff Daily. (Patient not taking: Reported on 1/17/2025), Disp: 1 each, Rfl: 11     Objective   Physical Exam  Constitutional:       General: He is not in acute distress.     Appearance: Normal appearance. He is normal weight.   HENT:      Right Ear: Hearing  "normal.      Left Ear: Hearing normal.      Nose: No nasal tenderness or congestion.      Mouth/Throat:      Mouth: Mucous membranes are moist. No oral lesions.   Eyes:      Extraocular Movements: Extraocular movements intact.      Pupils: Pupils are equal, round, and reactive to light.   Cardiovascular:      Rate and Rhythm: Normal rate and regular rhythm.      Pulses: Normal pulses.      Heart sounds: Normal heart sounds. No murmur heard.  Pulmonary:      Effort: Pulmonary effort is normal.      Breath sounds: Examination of the right-middle field reveals decreased breath sounds. Examination of the right-lower field reveals decreased breath sounds. Decreased breath sounds present. No wheezing, rhonchi or rales.   Musculoskeletal:      Right lower leg: No edema.      Left lower leg: No edema.   Skin:     General: Skin is warm and dry.      Findings: No lesion or rash.   Neurological:      General: No focal deficit present.      Mental Status: He is alert and oriented to person, place, and time.   Psychiatric:         Mood and Affect: Affect normal. Mood is not anxious or depressed.         Vital Signs:   /79 (BP Location: Right arm, Patient Position: Sitting, Cuff Size: Adult)   Pulse 95   Temp 97.6 °F (36.4 °C) (Oral)   Resp 16   Ht 172.7 cm (68\")   Wt 77.1 kg (170 lb)   SpO2 100% Comment: RA  BMI 25.85 kg/m²        Result Review :   The following data was reviewed by: ANA LAURA Aldridge on 01/17/2025:  CMP          9/22/2024    04:57 10/7/2024    15:27 10/17/2024    16:25   CMP   Glucose 85  67  102    BUN 15  12  11    Creatinine 1.05  1.23  1.02    EGFR 94.3  78.0  97.7    Sodium 134  131  132    Potassium 3.9  3.3  3.7    Chloride 105  97  100    Calcium 7.5  8.3  8.1    Total Protein 5.0  6.2     Albumin 2.1  2.7     Globulin 2.9  3.5     Total Bilirubin 0.9  1.3     Alkaline Phosphatase 152  234     AST (SGOT) 50  41     ALT (SGPT) 31  24     Albumin/Globulin Ratio 0.7  0.8     BUN/Creatinine " Ratio 14.3  9.8  10.8    Anion Gap 7.6  10.0  8.1      CBC w/diff          10/7/2024    15:27 10/24/2024    13:59 11/22/2024    14:38   CBC w/Diff   WBC 5.98   4.4       RBC 3.64   3.1       Hemoglobin 10.3   9.4       Hematocrit 31.5   28.4       MCV 86.5   91.8       MCH 28.3   30.5       MCHC 32.7   33.2       RDW 16.4   23.2       Platelets 160  111  105       Neutrophil Rel % 58.7      Immature Granulocyte Rel % 1.0      Lymphocyte Rel % 16.9      Monocyte Rel % 15.2      Eosinophil Rel % 7.5      Basophil Rel % 0.7         Details          This result is from an external source.             Data reviewed : Radiologic studies chest x-ray 11/27/2024, Recent hospitalization notes Dr. Montero thoracentesis procedure note 12/13/2024, and my last office note    Procedures        Assessment and Plan    Diagnoses and all orders for this visit:    1. Recurrent pleural effusion on right (Primary)  Comments:  thoracentesis performed    2. Nicotine dependence, cigarettes, uncomplicated  Comments:  Smoking cessation education    3. Dyspnea on exertion  Comments:  Continue albuterol as needed    4. Mixed obstructive and restrictive ventilatory defect  Comments:  Continue Stiolto    Smoking cessation counseling provided.  I spent 5 minutes today counseling patient on the risks of smoking, including throat cancer, lung cancer, COPD, heart disease and death.  Also discussed the benefits of quitting.         Follow Up   Return in about 2 months (around 3/17/2025) for Recheck with Cliff.  Patient was given instructions and counseling regarding his condition or for health maintenance advice. Please see specific information pulled into the AVS if appropriate.

## 2025-01-29 ENCOUNTER — TELEPHONE (OUTPATIENT)
Dept: GASTROENTEROLOGY | Facility: CLINIC | Age: 40
End: 2025-01-29
Payer: COMMERCIAL

## 2025-01-29 ENCOUNTER — PREP FOR SURGERY (OUTPATIENT)
Dept: OTHER | Facility: HOSPITAL | Age: 40
End: 2025-01-29
Payer: COMMERCIAL

## 2025-01-29 DIAGNOSIS — K70.31 ALCOHOLIC CIRRHOSIS OF LIVER WITH ASCITES: Primary | ICD-10-CM

## 2025-01-29 DIAGNOSIS — R18.8 OTHER ASCITES: ICD-10-CM

## 2025-01-29 RX ORDER — ALBUMIN (HUMAN) 12.5 G/50ML
12.5 SOLUTION INTRAVENOUS ONCE
OUTPATIENT
Start: 2025-01-29 | End: 2025-01-29

## 2025-01-29 NOTE — TELEPHONE ENCOUNTER
Left detailed vm (ok per KAYLAH) informing pt order has been placed.  Provided pt radiology scheduling phone number to call to set up appt. Encouraged to keep his f/u appt with ANA LAURA Brandon in June. [email or web address]

## 2025-01-29 NOTE — TELEPHONE ENCOUNTER
Received call from patient requesting an order for paracentesis placed. He states it has been since November and he feels like he has a lot of fluid. Advised I will forward the request over to clinical for follow up.   Patient request call back to advise once the order has been placed-verified phone # correct in EPIC.   Forwarded to clinical pool for follow up

## 2025-02-03 ENCOUNTER — HOSPITAL ENCOUNTER (INPATIENT)
Facility: HOSPITAL | Age: 40
LOS: 3 days | Discharge: HOME | DRG: 432 | End: 2025-02-07
Attending: EMERGENCY MEDICINE | Admitting: INTERNAL MEDICINE
Payer: COMMERCIAL

## 2025-02-03 ENCOUNTER — APPOINTMENT (OUTPATIENT)
Dept: CT IMAGING | Facility: HOSPITAL | Age: 40
DRG: 432 | End: 2025-02-03
Payer: COMMERCIAL

## 2025-02-03 DIAGNOSIS — R53.1 WEAKNESS GENERALIZED: ICD-10-CM

## 2025-02-03 DIAGNOSIS — J90 RECURRENT PLEURAL EFFUSION ON RIGHT: ICD-10-CM

## 2025-02-03 DIAGNOSIS — R26.2 DIFFICULTY WALKING: ICD-10-CM

## 2025-02-03 DIAGNOSIS — K70.31 ALCOHOLIC CIRRHOSIS OF LIVER WITH ASCITES: ICD-10-CM

## 2025-02-03 DIAGNOSIS — D50.0 IRON DEFICIENCY ANEMIA DUE TO CHRONIC BLOOD LOSS: ICD-10-CM

## 2025-02-03 DIAGNOSIS — K76.82 HEPATIC ENCEPHALOPATHY: Primary | ICD-10-CM

## 2025-02-03 DIAGNOSIS — R18.8 OTHER ASCITES: ICD-10-CM

## 2025-02-03 LAB
ALBUMIN SERPL-MCNC: 2.3 G/DL (ref 3.5–5.2)
ALBUMIN/GLOB SERPL: 0.7 G/DL
ALP SERPL-CCNC: 301 U/L (ref 39–117)
ALT SERPL W P-5'-P-CCNC: 44 U/L (ref 1–41)
ANION GAP SERPL CALCULATED.3IONS-SCNC: 12.8 MMOL/L (ref 5–15)
AST SERPL-CCNC: 51 U/L (ref 1–40)
BASOPHILS # BLD AUTO: 0.02 10*3/MM3 (ref 0–0.2)
BASOPHILS NFR BLD AUTO: 0.2 % (ref 0–1.5)
BILIRUB SERPL-MCNC: 3.7 MG/DL (ref 0–1.2)
BUN SERPL-MCNC: 33 MG/DL (ref 6–20)
BUN/CREAT SERPL: 13.9 (ref 7–25)
CALCIUM SPEC-SCNC: 7.8 MG/DL (ref 8.6–10.5)
CHLORIDE SERPL-SCNC: 91 MMOL/L (ref 98–107)
CO2 SERPL-SCNC: 27.2 MMOL/L (ref 22–29)
CREAT SERPL-MCNC: 2.38 MG/DL (ref 0.76–1.27)
DEPRECATED RDW RBC AUTO: 62.4 FL (ref 37–54)
EGFRCR SERPLBLD CKD-EPI 2021: 35.1 ML/MIN/1.73
EOSINOPHIL # BLD AUTO: 0.2 10*3/MM3 (ref 0–0.4)
EOSINOPHIL NFR BLD AUTO: 1.6 % (ref 0.3–6.2)
ERYTHROCYTE [DISTWIDTH] IN BLOOD BY AUTOMATED COUNT: 18.6 % (ref 12.3–15.4)
FLUAV SUBTYP SPEC NAA+PROBE: NOT DETECTED
FLUBV RNA ISLT QL NAA+PROBE: NOT DETECTED
GLOBULIN UR ELPH-MCNC: 3.5 GM/DL
GLUCOSE SERPL-MCNC: 109 MG/DL (ref 65–99)
HCT VFR BLD AUTO: 28.8 % (ref 37.5–51)
HGB BLD-MCNC: 9.1 G/DL (ref 13–17.7)
HOLD SPECIMEN: NORMAL
HOLD SPECIMEN: NORMAL
IMM GRANULOCYTES # BLD AUTO: 0.09 10*3/MM3 (ref 0–0.05)
IMM GRANULOCYTES NFR BLD AUTO: 0.7 % (ref 0–0.5)
LIPASE SERPL-CCNC: 61 U/L (ref 13–60)
LYMPHOCYTES # BLD AUTO: 0.76 10*3/MM3 (ref 0.7–3.1)
LYMPHOCYTES NFR BLD AUTO: 6.2 % (ref 19.6–45.3)
MCH RBC QN AUTO: 28.8 PG (ref 26.6–33)
MCHC RBC AUTO-ENTMCNC: 31.6 G/DL (ref 31.5–35.7)
MCV RBC AUTO: 91.1 FL (ref 79–97)
MONOCYTES # BLD AUTO: 0.78 10*3/MM3 (ref 0.1–0.9)
MONOCYTES NFR BLD AUTO: 6.4 % (ref 5–12)
NEUTROPHILS NFR BLD AUTO: 10.36 10*3/MM3 (ref 1.7–7)
NEUTROPHILS NFR BLD AUTO: 84.9 % (ref 42.7–76)
NRBC BLD AUTO-RTO: 0 /100 WBC (ref 0–0.2)
PLATELET # BLD AUTO: 100 10*3/MM3 (ref 140–450)
PMV BLD AUTO: 10.2 FL (ref 6–12)
POTASSIUM SERPL-SCNC: 3.3 MMOL/L (ref 3.5–5.2)
PROT SERPL-MCNC: 5.8 G/DL (ref 6–8.5)
RBC # BLD AUTO: 3.16 10*6/MM3 (ref 4.14–5.8)
RSV RNA NPH QL NAA+NON-PROBE: NOT DETECTED
SARS-COV-2 RNA RESP QL NAA+PROBE: NOT DETECTED
SODIUM SERPL-SCNC: 131 MMOL/L (ref 136–145)
WBC NRBC COR # BLD AUTO: 12.21 10*3/MM3 (ref 3.4–10.8)
WHOLE BLOOD HOLD COAG: NORMAL
WHOLE BLOOD HOLD SPECIMEN: NORMAL

## 2025-02-03 PROCEDURE — 36415 COLL VENOUS BLD VENIPUNCTURE: CPT

## 2025-02-03 PROCEDURE — P9612 CATHETERIZE FOR URINE SPEC: HCPCS

## 2025-02-03 PROCEDURE — 85025 COMPLETE CBC W/AUTO DIFF WBC: CPT

## 2025-02-03 PROCEDURE — 99291 CRITICAL CARE FIRST HOUR: CPT

## 2025-02-03 PROCEDURE — 74176 CT ABD & PELVIS W/O CONTRAST: CPT

## 2025-02-03 PROCEDURE — 84145 PROCALCITONIN (PCT): CPT

## 2025-02-03 PROCEDURE — 80053 COMPREHEN METABOLIC PANEL: CPT

## 2025-02-03 PROCEDURE — 87637 SARSCOV2&INF A&B&RSV AMP PRB: CPT

## 2025-02-03 PROCEDURE — 83880 ASSAY OF NATRIURETIC PEPTIDE: CPT

## 2025-02-03 PROCEDURE — 83690 ASSAY OF LIPASE: CPT

## 2025-02-03 PROCEDURE — 86140 C-REACTIVE PROTEIN: CPT

## 2025-02-03 RX ORDER — SPIRONOLACTONE 50 MG/1
50 TABLET, FILM COATED ORAL DAILY
Qty: 30 TABLET | Refills: 3 | Status: SHIPPED | OUTPATIENT
Start: 2025-02-03 | End: 2025-02-07 | Stop reason: HOSPADM

## 2025-02-03 RX ORDER — POTASSIUM CHLORIDE 750 MG/1
40 CAPSULE, EXTENDED RELEASE ORAL ONCE
Status: DISCONTINUED | OUTPATIENT
Start: 2025-02-03 | End: 2025-02-04

## 2025-02-03 RX ORDER — PANTOPRAZOLE SODIUM 40 MG/1
TABLET, DELAYED RELEASE ORAL
Qty: 90 TABLET | Refills: 1 | Status: SHIPPED | OUTPATIENT
Start: 2025-02-03

## 2025-02-03 RX ORDER — SODIUM CHLORIDE 0.9 % (FLUSH) 0.9 %
10 SYRINGE (ML) INJECTION AS NEEDED
Status: DISCONTINUED | OUTPATIENT
Start: 2025-02-03 | End: 2025-02-07 | Stop reason: HOSPADM

## 2025-02-03 RX ORDER — URSODIOL 300 MG/1
CAPSULE ORAL
Qty: 360 CAPSULE | Refills: 2 | Status: SHIPPED | OUTPATIENT
Start: 2025-02-03

## 2025-02-03 NOTE — LETTER
February 7, 2025     Patient: Wai Gay   YOB: 1985   Date of Visit: 2/3/2025       To Whom It May Concern:    It is my medical opinion that Wai Gay {Work release (duty restriction):65320}.           Sincerely,        No name on file    CC: No Recipients

## 2025-02-03 NOTE — ED PROVIDER NOTES
Time: 6:48 PM EST  Date of encounter:  2/3/2025  Independent Historian/Clinical History and Information was obtained by:   Patient    History is limited by: N/A    Chief Complaint   Patient presents with    Abdominal Pain    Flu Symptoms         History of Present Illness:  Patient is a 39 y.o. year old male who presents to the emergency department for evaluation of flulike symptoms that began today.  Patient states he was post to get up to go get his liver drained for chronic cirrhosis however he was so weak he was unable to get up.  Patient thinks that might be the flu because he has been around people with the flu.(Bailey Seaver, APRN, FNP-C)    Patient Care Team  Primary Care Provider: Callum Peterson DO    Past Medical History:     No Known Allergies  Past Medical History:   Diagnosis Date    Alcohol abuse 03/14/2017    Anxiety     Essential hypertension 12/27/2019    GERD (gastroesophageal reflux disease)     Hypokalemia 03/14/2017    Will update    Hyponatremia 03/14/2017    Steatohepatitis, alcoholic 03/14/2017    Tobacco abuse 03/14/2017    Umbilical hernia      Past Surgical History:   Procedure Laterality Date    CHOLECYSTECTOMY      ENDOSCOPY N/A 02/09/2022    Procedure: ESOPHAGOGASTRODUODENOSCOPY WITH BX;  Surgeon: Gino Khan MD;  Location: [facility] ENDOSCOPY;  Service: Gastroenterology;  Laterality: N/A;  RETAINED LIQUID FOOD IN STOMACH, HUFFMAN'S ESOPHAGUS    ENDOSCOPY N/A 02/10/2023    Procedure: ESOPHAGOGASTRODUODENOSCOPY;  Surgeon: Gino Khan MD;  Location: [facility] ENDOSCOPY;  Service: Gastroenterology;  Laterality: N/A;  GASTRITIS, BARRETTS ESOPHAGUS, PHG    UPPER GASTROINTESTINAL ENDOSCOPY       Family History   Problem Relation Age of Onset    Alcohol abuse Mother     Arthritis Mother     COPD Mother     Heart disease Maternal Grandmother     Hypertension Maternal Grandmother     Lung cancer Maternal Grandmother     Stroke Maternal Grandmother     Heart disease  Maternal Grandfather     Lung cancer Maternal Grandfather     Cancer Paternal Grandmother     Cancer Paternal Grandfather     Colon cancer Neg Hx     Malig Hyperthermia Neg Hx        Home Medications:  Prior to Admission medications    Medication Sig Start Date End Date Taking? Authorizing Provider   albuterol sulfate  (90 Base) MCG/ACT inhaler INHALE 2 PUFFS EVERY 4 HOURS AS NEEDED FOR WHEEZING OR SHORTNESS OF AIR 11/18/24   Davian Watt MD   furosemide (LASIX) 20 MG tablet  11/12/24   ProviderRose MD   midodrine (PROAMATINE) 5 MG tablet Take 1 tablet by mouth 3 (Three) Times a Day Before Meals for 30 days. 9/22/24 1/7/25  Asael Chilel MD   pantoprazole (PROTONIX) 40 MG EC tablet TAKE 1 TABLET BY MOUTH EVERY DAY 2/3/25   Marian Ferrell APRN   potassium chloride (KLOR-CON M20) 20 MEQ CR tablet Take 1 tablet by mouth Daily. 10/15/24   Shyla Yarbrough APRN   spironolactone (ALDACTONE) 50 MG tablet Take 1 tablet by mouth Daily. 2/3/25   Marian Ferrell APRN   tiotropium bromide-olodaterol (Stiolto Respimat) 2.5-2.5 MCG/ACT aerosol solution inhaler Inhale 2 puffs Daily. 1/14/25   Елена Macario APRN   Umeclidinium-Vilanterol (ANORO ELLIPTA) 62.5-25 MCG/ACT aerosol powder  inhaler Inhale 1 puff Daily.  Patient not taking: Reported on 1/17/2025 7/29/24   Davian Watt MD   ursodiol (ACTIGALL) 300 MG capsule TAKE 2 CAPSULES BY MOUTH 2 (TWO) TIMES A DAY WITH MEALS FOR 90 DAYS. 2/3/25   Marian Ferrell APRN   pantoprazole (PROTONIX) 40 MG EC tablet TAKE 1 TABLET BY MOUTH EVERY DAY  Patient taking differently: Take 1 tablet by mouth Daily. 6/5/24 2/3/25  Shyla Yarbrough APRN   spironolactone (ALDACTONE) 50 MG tablet TAKE 1 TABLET BY MOUTH EVERY DAY  Patient taking differently: Take 1 tablet by mouth Daily. 6/5/24 2/3/25  Shyla Yarbrough APRN   ursodiol (ACTIGALL) 300 MG capsule Take 2 capsules by mouth 2 (Two) Times a Day With Meals. 6/8/24 2/3/25  Provider, MD Rose        Social  "History:   Social History     Tobacco Use    Smoking status: Every Day     Current packs/day: 0.50     Average packs/day: 0.5 packs/day for 17.1 years (8.5 ttl pk-yrs)     Types: Cigarettes     Start date: 1/1/2008     Passive exposure: Current    Smokeless tobacco: Never   Vaping Use    Vaping status: Never Used   Substance Use Topics    Alcohol use: Not Currently     Alcohol/week: 3.0 standard drinks of alcohol     Comment: FORMER    Drug use: Not Currently         Review of Systems:  Review of Systems   Constitutional:  Positive for fever. Negative for chills.   HENT:  Negative for congestion, ear pain and sore throat.    Eyes:  Negative for pain.   Respiratory:  Positive for cough. Negative for chest tightness and shortness of breath.    Cardiovascular:  Negative for chest pain.   Gastrointestinal:  Positive for abdominal distention, abdominal pain and nausea. Negative for diarrhea and vomiting.   Genitourinary:  Negative for flank pain and hematuria.   Musculoskeletal:  Negative for joint swelling.   Skin:  Negative for pallor.   Neurological:  Negative for seizures and headaches.   All other systems reviewed and are negative.       Physical Exam:  BP 96/65 (BP Location: Right arm, Patient Position: Lying)   Pulse 97   Temp 98 °F (36.7 °C) (Oral)   Resp 18   Ht 172.7 cm (68\")   Wt 71.8 kg (158 lb 4.6 oz)   SpO2 99%   BMI 24.07 kg/m²         Physical Exam  Constitutional:       Appearance: Normal appearance. He is ill-appearing.   HENT:      Head: Normocephalic and atraumatic.      Nose: Nose normal.      Mouth/Throat:      Mouth: Mucous membranes are moist.   Eyes:      Extraocular Movements: Extraocular movements intact.      Conjunctiva/sclera: Conjunctivae normal.      Pupils: Pupils are equal, round, and reactive to light.   Cardiovascular:      Rate and Rhythm: Regular rhythm. Tachycardia present.      Pulses: Normal pulses.      Heart sounds: Normal heart sounds.   Pulmonary:      Effort: Pulmonary " effort is normal.      Breath sounds: Normal breath sounds.   Abdominal:      General: There is no distension.      Palpations: Abdomen is soft.      Tenderness: There is generalized abdominal tenderness. There is no guarding or rebound.   Musculoskeletal:         General: Normal range of motion.      Cervical back: Normal range of motion.   Skin:     General: Skin is warm and dry.      Capillary Refill: Capillary refill takes less than 2 seconds.      Coloration: Skin is not cyanotic.   Neurological:      General: No focal deficit present.      Mental Status: He is alert and oriented to person, place, and time. Mental status is at baseline.   Psychiatric:         Attention and Perception: Attention and perception normal.         Mood and Affect: Mood normal.         Behavior: Behavior normal.                            Medical Decision Making:      Comorbidities that affect care:    cirrhosis, Smoking    External Notes reviewed:    Previous Clinic Note: Patient seen by pulmonology on 1/17/2025 for recurrent pleural effusion on right      The following orders were placed and all results were independently analyzed by me:  Orders Placed This Encounter   Procedures    Thoracentesis at Bedside    COVID-19, FLU A/B, RSV PCR 1 HR TAT - Swab, Nasopharynx    Blood Culture - Blood,    Blood Culture - Blood,    Respiratory Panel PCR w/COVID-19(SARS-CoV-2) DAVID/MEY/AMBAR/PAD/COR/HARLEY In-House, NP Swab in UTM/VTM, 2 HR TAT - Swab, Nasopharynx    Body Fluid Culture - Body Fluid, Peritoneum    Anaerobic Culture - Pleural Fluid, Pleural Cavity    Fungus Culture - Body Fluid, Pleural Cavity    CT Abdomen Pelvis Without Contrast    XR Chest 1 View    XR Chest 1 View    [place] Draw    Comprehensive Metabolic Panel    Lipase    Urinalysis With Microscopic If Indicated (No Culture) - Urine, Clean Catch    CBC Auto Differential    Protime-INR    aPTT    Lactic Acid, Plasma    Ammonia    STAT Lactic Acid, Reflex    Urinalysis, Microscopic  Only - Urine, Clean Catch    Phosphorus    Magnesium    Comprehensive Metabolic Panel    CBC Auto Differential    Procalcitonin    C-reactive Protein    BNP    Sodium, Urine, Random - Urine, Clean Catch    Osmolality, Urine - Urine, Clean Catch    Body Fluid Cell Count With Differential - Body Fluid, Peritoneum    Albumin, Fluid - Body Fluid, Peritoneum    Protein, Body Fluid - Body Fluid, Peritoneum    Body fluid cell count - Body Fluid, Peritoneum    Hemoglobin & Hematocrit, Blood    pH, Body Fluid - Body Fluid, Pleural Cavity    Albumin, Fluid - Pleural Fluid, Pleural Cavity    Protein, Body Fluid - Pleural Fluid, Pleural Cavity    Lactate Dehydrogenase, Body Fluid - Body Fluid, Pleural Cavity    Glucose, Body Fluid - Pleural Fluid, Pleural Cavity    Triglycerides, Body Fluid - Pleural Fluid, Pleural Cavity    Body fluid differential - Body Fluid, Peritoneum    Hemoglobin & Hematocrit, Blood    Diet: Liquid; Full Liquid; Fluid Consistency: Thin (IDDSI 0)    Undress & Gown    Straight Cath    Vital Signs    Intake & Output    Weigh Patient    Oral Care    Place Sequential Compression Device    Maintain Sequential Compression Device    Maintain IV Access    Telemetry - Place Orders & Notify Provider of Results When Patient Experiences Acute Chest Pain, Dysrhythmia or Respiratory Distress    Continuous Pulse Oximetry    Strict Intake & Output    Daily Weights    No Thoracentesis Labs Needed    Obtain Informed Consent    No Thoracentesis Labs Needed    No Thoracentesis Labs Needed    No Thoracentesis Labs Needed    Verify Informed Consent for Blood Product Administration    Verify Informed Consent    Code Status and Medical Interventions: CPR (Attempt to Resuscitate); Full Support    Inpatient Hospitalist Consult    Inpatient Nephrology Consult    Inpatient Pulmonology Consult    Inpatient Gastroenterology Consult    PT Consult: Eval & Treat Functional Mobility Below Baseline    Prepare RBC, 1 Units    Type & Screen     ABO RH Specimen Verification    Insert Peripheral IV    Insert Peripheral IV    Inpatient Admission    CBC & Differential    Green Top (Gel)    Lavender Top    Gold Top - SST    Light Blue Top    CBC & Differential       Medications Given in the Emergency Department:  Medications   sodium chloride 0.9 % flush 10 mL (has no administration in time range)   lactulose (CHRONULAC) 10 GM/15ML solution 30 g (30 g Oral Given 2/4/25 1632)   albumin human 25 % IV SOLN 25 g (has no administration in time range)   sodium chloride 0.9 % flush 10 mL (10 mL Intravenous Given 2/4/25 1013)   sodium chloride 0.9 % flush 10 mL (has no administration in time range)   sodium chloride 0.9 % infusion 40 mL (has no administration in time range)   nitroglycerin (NITROSTAT) SL tablet 0.4 mg (has no administration in time range)   cefTRIAXone (ROCEPHIN) in NS 2 GRAMS/20ml IV PUSH syringe (has no administration in time range)   octreotide (sandoSTATIN) injection 100 mcg (100 mcg Subcutaneous Given 2/4/25 1359)   midodrine (PROAMATINE) tablet 10 mg (10 mg Oral Given 2/4/25 1633)   albumin human 25 % IV SOLN 12.5 g (12.5 g Intravenous New Bag 2/4/25 1500)   dextrose 5 % and sodium chloride 0.9 % with KCl 40 mEq/L infusion (100 mL/hr Intravenous Not Given 2/4/25 1004)   ondansetron (ZOFRAN) injection 4 mg (4 mg Intravenous Given 2/4/25 1632)   pantoprazole (PROTONIX) injection 40 mg (40 mg Intravenous Given 2/4/25 1251)   albuterol (PROVENTIL) nebulizer solution 0.083% 2.5 mg/3mL (has no administration in time range)   ursodiol (ACTIGALL) capsule 600 mg (600 mg Oral Given 2/4/25 1358)   cefTRIAXone (ROCEPHIN) in NS 2 GRAMS/20ml IV PUSH syringe (2,000 mg Intravenous Given 2/4/25 0300)   ondansetron (ZOFRAN) injection 4 mg (4 mg Intravenous Given 2/4/25 0306)   morphine injection 4 mg (4 mg Intravenous Given 2/4/25 0306)   sodium chloride 0.9 % bolus 500 mL (0 mL Intravenous Stopped 2/4/25 0322)   albumin human 25 % IV SOLN 25 g (0 g Intravenous  Stopped 2/4/25 1004)   potassium chloride (MICRO-K/KLOR-CON) CR capsule (40 mEq Oral Given 2/4/25 0653)   albumin human 25 % IV SOLN 25 g (25 g Intravenous New Bag 2/4/25 1030)   albumin human 25 % IV SOLN 25 g (0 g Intravenous Stopped 2/4/25 1051)   potassium chloride (MICRO-K/KLOR-CON) CR capsule (40 mEq Oral Given 2/4/25 1012)   albumin human 25 % IV SOLN 12.5 g (12.5 g Intravenous New Bag 2/4/25 1301)        ED Course:    The patient was initially evaluated in the triage area where orders were placed. The patient was later dispositioned by Bryce No MD.      The patient was advised to stay for completion of workup which includes but is not limited to communication of labs and radiological results, reassessment and plan. The patient was advised that leaving prior to disposition by a provider could result in critical findings that are not communicated to the patient.     ED Course as of 02/04/25 2120 Mon Feb 03, 2025   1850 PROVIDER IN TRIAGE  Patient was evaluated by me in triage, Bailey Seaver, APRN, PATTIE.  Orders were placed and patient is currently awaiting final results and disposition.   [AS]      ED Course User Index  [AS] Seaver, Alyce B, APRN       Labs:    Lab Results (last 24 hours)       Procedure Component Value Units Date/Time    Protime-INR [[id number]]  (Abnormal) Collected: 02/04/25 0127    Specimen: Blood Updated: 02/04/25 0139     Protime 20.9 Seconds      INR 1.77    Narrative:      Suggested Therapeutic Ranges For Oral Anticoagulant Therapy:  Level of Therapy                      INR Target Range  Standard Dose                            2.0-3.0  High Dose                                2.5-3.5  Patients not receiving anticoagulant  Therapy Normal Range                     0.86-1.15    aPTT [[id number]]  (Abnormal) Collected: 02/04/25 0127    Specimen: Blood Updated: 02/04/25 0139     PTT 35.4 seconds     Lactic Acid, Plasma [[id number]]  (Abnormal) Collected: 02/04/25 0241     Specimen: Blood Updated: 02/04/25 0310     Lactate 2.6 mmol/L     Blood Culture - Blood, Arm, Left [[id number]] Collected: 02/04/25 0241    Specimen: Blood from Arm, Left Updated: 02/04/25 0241    Blood Culture - Blood, Arm, Left [[id number]] Collected: 02/04/25 0241    Specimen: Blood from Arm, Left Updated: 02/04/25 0241    Urinalysis With Microscopic If Indicated (No Culture) - Urine, Clean Catch [[id number]]  (Abnormal) Collected: 02/04/25 0300    Specimen: Urine, Clean Catch Updated: 02/04/25 0322     Color, UA Yellow     Appearance, UA Clear     pH, UA 6.0     Specific Gravity, UA 1.025     Glucose, UA Negative     Ketones, UA Trace     Bilirubin, UA Small (1+)     Blood, UA Negative     Protein, UA Trace     Leuk Esterase, UA Negative     Nitrite, UA Negative     Urobilinogen, UA 2.0 E.U./dL    Narrative:      Urine microscopic not indicated.    Urinalysis, Microscopic Only - Urine, Clean Catch [[id number]]  (Abnormal) Collected: 02/04/25 0300    Specimen: Urine, Clean Catch Updated: 02/04/25 0340     RBC, UA 3-5 /HPF      WBC, UA 21-50 /HPF      Bacteria, UA None Seen /HPF      Squamous Epithelial Cells, UA 3-6 /HPF      Renal Epithelial Cells, UA 0-2 /HPF      Hyaline Casts, UA 7-12 /LPF      WBC Casts 13-20 /LPF      Methodology Manual Light Microscopy    Osmolality, Urine - Urine, Clean Catch [[id number]]  (Normal) Collected: 02/04/25 0300    Specimen: Urine, Clean Catch Updated: 02/04/25 0810     Osmolality, Urine 550 mOsm/kg     Ammonia [[id number]]  (Abnormal) Collected: 02/04/25 0341    Specimen: Blood Updated: 02/04/25 0415     Ammonia 172 umol/L     STAT Lactic Acid, Reflex [[id number]]  (Normal) Collected: 02/04/25 0646    Specimen: Blood Updated: 02/04/25 0702     Lactate 1.9 mmol/L     Respiratory Panel PCR w/COVID-19(SARS-CoV-2) DAVID/MEY/AMBAR/PAD/COR/HARLEY In-House, NP Swab in UTM/VTM, 2 HR TAT - Swab, Nasopharynx [[id number]]  (Normal) Collected: 02/04/25 0758    Specimen: Swab from Nasopharynx Updated:  02/04/25 0942     ADENOVIRUS, PCR Not Detected     Coronavirus 229E Not Detected     Coronavirus HKU1 Not Detected     Coronavirus NL63 Not Detected     Coronavirus OC43 Not Detected     COVID19 Not Detected     Human Metapneumovirus Not Detected     Human Rhinovirus/Enterovirus Not Detected     Influenza A PCR Not Detected     Influenza B PCR Not Detected     Parainfluenza Virus 1 Not Detected     Parainfluenza Virus 2 Not Detected     Parainfluenza Virus 3 Not Detected     Parainfluenza Virus 4 Not Detected     RSV, PCR Not Detected     Bordetella pertussis pcr Not Detected     Bordetella parapertussis PCR Not Detected     Chlamydophila pneumoniae PCR Not Detected     Mycoplasma pneumo by PCR Not Detected    Narrative:      In the setting of a positive respiratory panel with a viral infection PLUS a negative procalcitonin without other underlying concern for bacterial infection, consider observing off antibiotics or discontinuation of antibiotics and continue supportive care. If the respiratory panel is positive for atypical bacterial infection (Bordetella pertussis, Chlamydophila pneumoniae, or Mycoplasma pneumoniae), consider antibiotic de-escalation to target atypical bacterial infection.    Phosphorus [[id number]]  (Normal) Collected: 02/04/25 1114    Specimen: Blood from Arm, Right Updated: 02/04/25 1139     Phosphorus 3.7 mg/dL     Magnesium [[id number]]  (Normal) Collected: 02/04/25 1114    Specimen: Blood from Arm, Right Updated: 02/04/25 1139     Magnesium 2.2 mg/dL     Comprehensive Metabolic Panel [[id number]]  (Abnormal) Collected: 02/04/25 1114    Specimen: Blood from Arm, Right Updated: 02/04/25 1139     Glucose 96 mg/dL      BUN 31 mg/dL      Creatinine 2.34 mg/dL      Sodium 134 mmol/L      Potassium 3.5 mmol/L      Chloride 94 mmol/L      CO2 27.5 mmol/L      Calcium 8.3 mg/dL      Total Protein 5.6 g/dL      Albumin 3.3 g/dL      ALT (SGPT) 29 U/L      AST (SGOT) 31 U/L      Alkaline Phosphatase 215  U/L      Total Bilirubin 2.6 mg/dL      Globulin 2.3 gm/dL      A/G Ratio 1.4 g/dL      BUN/Creatinine Ratio 13.2     Anion Gap 12.5 mmol/L      eGFR 35.8 mL/min/1.73     Narrative:      GFR Categories in Chronic Kidney Disease (CKD)      GFR Category          GFR (mL/min/1.73)    Interpretation  G1                     90 or greater         Normal or high (1)  G2                      60-89                Mild decrease (1)  G3a                   45-59                Mild to moderate decrease  G3b                   30-44                Moderate to severe decrease  G4                    15-29                Severe decrease  G5                    14 or less           Kidney failure          (1)In the absence of evidence of kidney disease, neither GFR category G1 or G2 fulfill the criteria for CKD.    eGFR calculation 2021 CKD-EPI creatinine equation, which does not include race as a factor    CBC & Differential [[id number]]  (Abnormal) Collected: 02/04/25 1114    Specimen: Blood from Arm, Right Updated: 02/04/25 1138    Narrative:      The following orders were created for panel order CBC & Differential.  Procedure                               Abnormality         Status                     ---------                               -----------         ------                     CBC Auto Differential[[id number]]        Abnormal            Final result                 Please view results for these tests on the individual orders.    CBC Auto Differential [[id number]]  (Abnormal) Collected: 02/04/25 1114    Specimen: Blood from Arm, Right Updated: 02/04/25 1138     WBC 6.25 10*3/mm3      RBC 2.35 10*6/mm3      Hemoglobin 6.8 g/dL      Hematocrit 21.3 %      MCV 90.6 fL      MCH 28.9 pg      MCHC 31.9 g/dL      RDW 18.7 %      RDW-SD 61.3 fl      MPV 10.9 fL      Platelets 53 10*3/mm3      Neutrophil % 82.1 %      Lymphocyte % 9.4 %      Monocyte % 7.0 %      Eosinophil % 0.8 %      Basophil % 0.2 %      Immature Grans % 0.5 %       Neutrophils, Absolute 5.13 10*3/mm3      Lymphocytes, Absolute 0.59 10*3/mm3      Monocytes, Absolute 0.44 10*3/mm3      Eosinophils, Absolute 0.05 10*3/mm3      Basophils, Absolute 0.01 10*3/mm3      Immature Grans, Absolute 0.03 10*3/mm3      nRBC 0.0 /100 WBC     Albumin, Fluid - Pleural Fluid, Pleural Cavity [[id number]] Collected: 02/04/25 1506    Specimen: Pleural Fluid from Pleural Cavity Updated: 02/04/25 1521    Protein, Body Fluid - Pleural Fluid, Pleural Cavity [[id number]] Collected: 02/04/25 1506    Specimen: Pleural Fluid from Pleural Cavity Updated: 02/04/25 1521    Glucose, Body Fluid - Pleural Fluid, Pleural Cavity [[id number]] Collected: 02/04/25 1506    Specimen: Pleural Fluid from Pleural Cavity Updated: 02/04/25 1521    Triglycerides, Body Fluid - Pleural Fluid, Pleural Cavity [[id number]] Collected: 02/04/25 1506    Specimen: Pleural Fluid from Pleural Cavity Updated: 02/04/25 1521    Anaerobic Culture - Pleural Fluid, Pleural Cavity [[id number]] Collected: 02/04/25 1506    Specimen: Pleural Fluid from Pleural Cavity Updated: 02/04/25 1521    Body Fluid Cell Count With Differential - Body Fluid, Peritoneum [[id number]] Collected: 02/04/25 1511    Specimen: Body Fluid from Peritoneum Updated: 02/04/25 1702    Narrative:      The following orders were created for panel order Body Fluid Cell Count With Differential - Body Fluid, Peritoneum.  Procedure                               Abnormality         Status                     ---------                               -----------         ------                     Body fluid cell count - ...[[id number]]                      Final result               Body fluid differential ...[[id number]]                      Final result                 Please view results for these tests on the individual orders.    Albumin, Fluid - Body Fluid, Peritoneum [[id number]] Collected: 02/04/25 1511    Specimen: Body Fluid from Peritoneum Updated: 02/04/25 1521    Protein,  Body Fluid - Body Fluid, Peritoneum [[id number]] Collected: 02/04/25 1511    Specimen: Body Fluid from Peritoneum Updated: 02/04/25 1521    Body Fluid Culture - Body Fluid, Peritoneum [[id number]] Collected: 02/04/25 1511    Specimen: Body Fluid from Peritoneum Updated: 02/04/25 1621     Gram Stain No organisms seen    Body fluid cell count - Body Fluid, Peritoneum [[id number]] Collected: 02/04/25 1511    Specimen: Body Fluid from Peritoneum Updated: 02/04/25 1542     Color, Fluid Light Yellow     Appearance, Fluid Clear     Nucleated Cells, Fluid 106 /mm3      RBC, Fluid <2,000 /mm3     Narrative:      No reference range established. Physician to interpret results with clinical findings.    pH, Body Fluid - Body Fluid, Pleural Cavity [[id number]] Collected: 02/04/25 1511    Specimen: Body Fluid from Pleural Cavity Updated: 02/04/25 1548     pH, Fluid 8.00    Lactate Dehydrogenase, Body Fluid - Body Fluid, Pleural Cavity [[id number]] Collected: 02/04/25 1511    Specimen: Body Fluid from Pleural Cavity Updated: 02/04/25 1521    Flow Cytometry ([employer]) [[id number]] Collected: 02/04/25 1511    Specimen: Body Fluid from Pleural Cavity Updated: 02/04/25 1521    Fungus Culture - Body Fluid, Pleural Cavity [[id number]] Collected: 02/04/25 1511    Specimen: Body Fluid from Pleural Cavity Updated: 02/04/25 1521    Body fluid differential - Body Fluid, Peritoneum [[id number]] Collected: 02/04/25 1511    Specimen: Body Fluid from Peritoneum Updated: 02/04/25 1702     Neutrophils, Fluid % 61 %      Lymphocytes, Fluid % 18 %      Monocytes, Fluid % 12 %      Mesothelial Cells, Fluid % 3 %      Macrophage, Fluid % 6 %     Hemoglobin & Hematocrit, Blood [[id number]]  (Abnormal) Collected: 02/04/25 1551    Specimen: Blood from Hand, Right Updated: 02/04/25 1613     Hemoglobin 6.9 g/dL      Hematocrit 22.1 %              Imaging:    XR Chest 1 View    Result Date: 2/4/2025  XR CHEST 1 VW Date of Exam: 2/4/2025 2:45 PM EST  Indication: Thoracentesis Comparison: Chest AP dated 2/4/2025 obtained at 04 40 Findings: Right thoracentesis has been performed. There is a trace right pleural effusion remaining. No pneumothorax is seen. There is mild right basilar airspace opacity favored represent atelectasis, considerably improved in the interval. The left lung demonstrates mild patchy airspace opacity. A calcified nodule in the mid left lung is consistent with a granuloma.     Impression: Status post right thoracentesis with a trace residual pleural effusion. No pneumothorax. Mild bibasilar airspace opacity consistent with atelectasis plus or minus superimposed pneumonia. Electronically Signed: Carlos Yancey MD  2/4/2025 3:02 PM EST  Workstation ID: [id number]    XR Chest 1 View    Result Date: 2/4/2025  XR CHEST 1 VW Date of Exam: 2/4/2025 4:32 AM EST Indication: Cough and shortness of air. Comparison: 11/27/2024 Findings: There has been interval development of a large right pleural effusion with consolidation in the lower right lung. Please see abdomen pelvis CT report from 2/3/2025. The left lung is clear. Heart size grossly stable. There is some shift of the mediastinal  structures to the left. A TIPS is noted in the right upper quadrant.     Interval development of a large right pleural effusion with consolidation in the lower right lung. Electronically Signed: Dhruv Mendosa MD  2/4/2025 4:51 AM EST  Workstation ID: [id number]       Differential Diagnosis and Discussion:      Cough: Differential diagnosis includes but is not limited to pneumonia, acute bronchitis, upper respiratory infection, ACE inhibitor use, allergic reaction, epiglottitis, seasonal allergies, chemical irritants, exercise-induced asthma, viral syndrome.    PROCEDURES:    Labs were collected in the emergency department and all labs were reviewed and interpreted by me.  X-ray were performed in the emergency department and all X-ray impressions were independently  interpreted by me.  CT scan was performed in the emergency department and the CT scan radiology impression was interpreted by me.    No orders to display        Procedures    MDM  Number of Diagnoses or Management Options  Diagnosis management comments: Patient presented to the emergency department with generalized weakness, abdominal pain, cough.  Labs were obtained that showed an elevated white count of 12.  Patient also had elevated LFTs.  Creatinine is 2.38 which significantly up from previous.  Patient does have a history of cirrhosis.  CT showed large right-sided pleural effusion.  Patient does report shortness of breath as well.  Patient was given dose of antibiotics.  Discussed patient with hospitalist and will admit for further care.       Amount and/or Complexity of Data Reviewed  Clinical lab tests: reviewed  Tests in the radiology section of CPT®: reviewed  Decide to obtain previous medical records or to obtain history from someone other than the patient: yes  Review and summarize past medical records: yes  Independent visualization of images, tracings, or specimens: yes    Risk of Complications, Morbidity, and/or Mortality  Presenting problems: moderate  Management options: moderate           Total Critical Care time of 45 minutes. Total critical care time documented does not include time spent on separately billed procedures for services of nurses or physician assistants. I personally saw and examined the patient. I have reviewed all diagnostic interpretations and treatment plans as written. I was present for the key portions of any procedures performed and the inclusive time noted in any critical care statement. Critical care time includes patient management by me, time spent at the patients bedside,  time to review lab and imaging results, discussing patient care, documentation in the medical record, and time spent with family or caregiver.          Patient Care Considerations:    CT HEAD: I  considered ordering a noncontrast CT of the head, however no focal deficits      Consultants/Shared Management Plan:    Hospitalist: I have discussed the case with Dr Gallegos who agrees to accept the patient for admission.    Social Determinants of Health:    Patient is independent, reliable, and has access to care.       Disposition and Care Coordination:    Admit:   Through independent evaluation of the patient's history, physical, and imperical data, the patient meets criteria for inpatient admission to the hospital.        Final diagnoses:   Hepatic encephalopathy   Weakness generalized        ED Disposition       ED Disposition   Decision to Admit    Condition   --    Comment   Level of Care: Telemetry [5]   Diagnosis: Cirrhosis of liver with ascites [[id number]]   Certification: I Certify That Inpatient Hospital Services Are Medically Necessary For Greater Than 2 Midnights                 This medical record created using voice recognition software.             Bryce No MD  02/04/25 2985

## 2025-02-03 NOTE — ED TRIAGE NOTES
Pt to ED from home, pt has cirrhosis of his liver, was supposed to and get drained this morning and was unable to get up and do so. Pt states since that he has started feeling worse. Having some abdominal pain today that is worse than normal.

## 2025-02-04 ENCOUNTER — APPOINTMENT (OUTPATIENT)
Dept: GENERAL RADIOLOGY | Facility: HOSPITAL | Age: 40
DRG: 432 | End: 2025-02-04
Payer: COMMERCIAL

## 2025-02-04 ENCOUNTER — PREP FOR SURGERY (OUTPATIENT)
Dept: OTHER | Facility: HOSPITAL | Age: 40
End: 2025-02-04
Payer: COMMERCIAL

## 2025-02-04 DIAGNOSIS — D50.0 IRON DEFICIENCY ANEMIA DUE TO CHRONIC BLOOD LOSS: Primary | ICD-10-CM

## 2025-02-04 PROBLEM — N17.9 ACUTE RENAL FAILURE: Status: ACTIVE | Noted: 2025-02-04

## 2025-02-04 PROBLEM — K76.82 HEPATIC ENCEPHALOPATHY: Status: ACTIVE | Noted: 2025-02-04

## 2025-02-04 LAB
ABO GROUP BLD: NORMAL
ABO GROUP BLD: NORMAL
ALBUMIN FLD-MCNC: 0.2 G/DL
ALBUMIN FLD-MCNC: 0.2 G/DL
ALBUMIN SERPL-MCNC: 3.3 G/DL (ref 3.5–5.2)
ALBUMIN/GLOB SERPL: 1.4 G/DL
ALP SERPL-CCNC: 215 U/L (ref 39–117)
ALT SERPL W P-5'-P-CCNC: 29 U/L (ref 1–41)
AMMONIA BLD-SCNC: 172 UMOL/L (ref 16–60)
ANION GAP SERPL CALCULATED.3IONS-SCNC: 12.5 MMOL/L (ref 5–15)
APPEARANCE FLD: CLEAR
APTT PPP: 35.4 SECONDS (ref 24.2–34.2)
AST SERPL-CCNC: 31 U/L (ref 1–40)
B PARAPERT DNA SPEC QL NAA+PROBE: NOT DETECTED
B PERT DNA SPEC QL NAA+PROBE: NOT DETECTED
BACTERIA UR QL AUTO: ABNORMAL /HPF
BASOPHILS # BLD AUTO: 0.01 10*3/MM3 (ref 0–0.2)
BASOPHILS NFR BLD AUTO: 0.2 % (ref 0–1.5)
BILIRUB SERPL-MCNC: 2.6 MG/DL (ref 0–1.2)
BILIRUB UR QL STRIP: ABNORMAL
BLD GP AB SCN SERPL QL: NEGATIVE
BUN SERPL-MCNC: 31 MG/DL (ref 6–20)
BUN/CREAT SERPL: 13.2 (ref 7–25)
C PNEUM DNA NPH QL NAA+NON-PROBE: NOT DETECTED
CALCIUM SPEC-SCNC: 8.3 MG/DL (ref 8.6–10.5)
CHLORIDE SERPL-SCNC: 94 MMOL/L (ref 98–107)
CLARITY UR: CLEAR
CO2 SERPL-SCNC: 27.5 MMOL/L (ref 22–29)
COLOR FLD: NORMAL
COLOR UR: YELLOW
CREAT SERPL-MCNC: 2.34 MG/DL (ref 0.76–1.27)
CRP SERPL-MCNC: 1.91 MG/DL (ref 0–0.5)
D-LACTATE SERPL-SCNC: 1.9 MMOL/L (ref 0.5–2)
D-LACTATE SERPL-SCNC: 2.6 MMOL/L (ref 0.5–2)
DEPRECATED RDW RBC AUTO: 61.3 FL (ref 37–54)
EGFRCR SERPLBLD CKD-EPI 2021: 35.8 ML/MIN/1.73
EOSINOPHIL # BLD AUTO: 0.05 10*3/MM3 (ref 0–0.4)
EOSINOPHIL NFR BLD AUTO: 0.8 % (ref 0.3–6.2)
ERYTHROCYTE [DISTWIDTH] IN BLOOD BY AUTOMATED COUNT: 18.7 % (ref 12.3–15.4)
FLUAV SUBTYP SPEC NAA+PROBE: NOT DETECTED
FLUBV RNA ISLT QL NAA+PROBE: NOT DETECTED
GLOBULIN UR ELPH-MCNC: 2.3 GM/DL
GLUCOSE FLD-MCNC: 105 MG/DL
GLUCOSE SERPL-MCNC: 96 MG/DL (ref 65–99)
GLUCOSE UR STRIP-MCNC: NEGATIVE MG/DL
HADV DNA SPEC NAA+PROBE: NOT DETECTED
HCOV 229E RNA SPEC QL NAA+PROBE: NOT DETECTED
HCOV HKU1 RNA SPEC QL NAA+PROBE: NOT DETECTED
HCOV NL63 RNA SPEC QL NAA+PROBE: NOT DETECTED
HCOV OC43 RNA SPEC QL NAA+PROBE: NOT DETECTED
HCT VFR BLD AUTO: 21.3 % (ref 37.5–51)
HCT VFR BLD AUTO: 22.1 % (ref 37.5–51)
HCT VFR BLD AUTO: 23.6 % (ref 37.5–51)
HGB BLD-MCNC: 6.8 G/DL (ref 13–17.7)
HGB BLD-MCNC: 6.9 G/DL (ref 13–17.7)
HGB BLD-MCNC: 7.6 G/DL (ref 13–17.7)
HGB UR QL STRIP.AUTO: NEGATIVE
HMPV RNA NPH QL NAA+NON-PROBE: NOT DETECTED
HPIV1 RNA ISLT QL NAA+PROBE: NOT DETECTED
HPIV2 RNA SPEC QL NAA+PROBE: NOT DETECTED
HPIV3 RNA NPH QL NAA+PROBE: NOT DETECTED
HPIV4 P GENE NPH QL NAA+PROBE: NOT DETECTED
HYALINE CASTS UR QL AUTO: ABNORMAL /LPF
IMM GRANULOCYTES # BLD AUTO: 0.03 10*3/MM3 (ref 0–0.05)
IMM GRANULOCYTES NFR BLD AUTO: 0.5 % (ref 0–0.5)
INR PPP: 1.77 (ref 0.86–1.15)
KETONES UR QL STRIP: ABNORMAL
LDH FLD-CCNC: 50 U/L
LEUKOCYTE ESTERASE UR QL STRIP.AUTO: NEGATIVE
LYMPHOCYTES # BLD AUTO: 0.59 10*3/MM3 (ref 0.7–3.1)
LYMPHOCYTES NFR BLD AUTO: 9.4 % (ref 19.6–45.3)
LYMPHOCYTES NFR FLD MANUAL: 18 %
M PNEUMO IGG SER IA-ACNC: NOT DETECTED
MACROPHAGE FLUID %: 6 %
MAGNESIUM SERPL-MCNC: 2.2 MG/DL (ref 1.6–2.6)
MCH RBC QN AUTO: 28.9 PG (ref 26.6–33)
MCHC RBC AUTO-ENTMCNC: 31.9 G/DL (ref 31.5–35.7)
MCV RBC AUTO: 90.6 FL (ref 79–97)
MESOTHL CELL NFR FLD MANUAL: 3 %
MONOCYTES # BLD AUTO: 0.44 10*3/MM3 (ref 0.1–0.9)
MONOCYTES NFR BLD AUTO: 7 % (ref 5–12)
MONOCYTES NFR FLD: 12 %
NEUTROPHILS NFR BLD AUTO: 5.13 10*3/MM3 (ref 1.7–7)
NEUTROPHILS NFR BLD AUTO: 82.1 % (ref 42.7–76)
NEUTROPHILS NFR FLD MANUAL: 61 %
NITRITE UR QL STRIP: NEGATIVE
NRBC BLD AUTO-RTO: 0 /100 WBC (ref 0–0.2)
NT-PROBNP SERPL-MCNC: 301 PG/ML (ref 0–450)
NUC CELL # FLD: 106 /MM3
OSMOLALITY UR: 550 MOSM/KG (ref 50–1400)
PH FLD: 8 [PH]
PH UR STRIP.AUTO: 6 [PH] (ref 5–8)
PHOSPHATE SERPL-MCNC: 3.7 MG/DL (ref 2.5–4.5)
PLATELET # BLD AUTO: 53 10*3/MM3 (ref 140–450)
PMV BLD AUTO: 10.9 FL (ref 6–12)
POTASSIUM SERPL-SCNC: 3.5 MMOL/L (ref 3.5–5.2)
PROCALCITONIN SERPL-MCNC: 0.25 NG/ML (ref 0–0.25)
PROT FLD-MCNC: <1 G/DL
PROT FLD-MCNC: <1 G/DL
PROT SERPL-MCNC: 5.6 G/DL (ref 6–8.5)
PROT UR QL STRIP: ABNORMAL
PROTHROMBIN TIME: 20.9 SECONDS (ref 11.8–14.9)
RBC # BLD AUTO: 2.35 10*6/MM3 (ref 4.14–5.8)
RBC # FLD AUTO: <2000 /MM3
RBC # UR STRIP: ABNORMAL /HPF
REF LAB TEST METHOD: ABNORMAL
RENAL EPI CELLS #/AREA URNS HPF: ABNORMAL /HPF
RH BLD: POSITIVE
RH BLD: POSITIVE
RHINOVIRUS RNA SPEC NAA+PROBE: NOT DETECTED
RSV RNA NPH QL NAA+NON-PROBE: NOT DETECTED
SARS-COV-2 RNA RESP QL NAA+PROBE: NOT DETECTED
SODIUM SERPL-SCNC: 134 MMOL/L (ref 136–145)
SP GR UR STRIP: 1.02 (ref 1–1.03)
SQUAMOUS #/AREA URNS HPF: ABNORMAL /HPF
T&S EXPIRATION DATE: NORMAL
TRIGL FLD-MCNC: <9 MG/DL
UROBILINOGEN UR QL STRIP: ABNORMAL
WBC # UR STRIP: ABNORMAL /HPF
WBC CASTS #/AREA URNS LPF: ABNORMAL /LPF
WBC NRBC COR # BLD AUTO: 6.25 10*3/MM3 (ref 3.4–10.8)

## 2025-02-04 PROCEDURE — 0202U NFCT DS 22 TRGT SARS-COV-2: CPT

## 2025-02-04 PROCEDURE — 87040 BLOOD CULTURE FOR BACTERIA: CPT | Performed by: EMERGENCY MEDICINE

## 2025-02-04 PROCEDURE — 86900 BLOOD TYPING SEROLOGIC ABO: CPT

## 2025-02-04 PROCEDURE — 82042 OTHER SOURCE ALBUMIN QUAN EA: CPT | Performed by: NURSE PRACTITIONER

## 2025-02-04 PROCEDURE — 83935 ASSAY OF URINE OSMOLALITY: CPT | Performed by: INTERNAL MEDICINE

## 2025-02-04 PROCEDURE — 84100 ASSAY OF PHOSPHORUS: CPT | Performed by: STUDENT IN AN ORGANIZED HEALTH CARE EDUCATION/TRAINING PROGRAM

## 2025-02-04 PROCEDURE — P9047 ALBUMIN (HUMAN), 25%, 50ML: HCPCS | Performed by: NURSE PRACTITIONER

## 2025-02-04 PROCEDURE — 83605 ASSAY OF LACTIC ACID: CPT | Performed by: EMERGENCY MEDICINE

## 2025-02-04 PROCEDURE — 25810000003 SODIUM CHLORIDE 0.9 % SOLUTION: Performed by: EMERGENCY MEDICINE

## 2025-02-04 PROCEDURE — 87075 CULTR BACTERIA EXCEPT BLOOD: CPT

## 2025-02-04 PROCEDURE — 84478 ASSAY OF TRIGLYCERIDES: CPT

## 2025-02-04 PROCEDURE — 87070 CULTURE OTHR SPECIMN AEROBIC: CPT | Performed by: NURSE PRACTITIONER

## 2025-02-04 PROCEDURE — 36430 TRANSFUSION BLD/BLD COMPNT: CPT

## 2025-02-04 PROCEDURE — 99222 1ST HOSP IP/OBS MODERATE 55: CPT | Performed by: INTERNAL MEDICINE

## 2025-02-04 PROCEDURE — 25010000002 OCTREOTIDE PER 25 MCG: Performed by: INTERNAL MEDICINE

## 2025-02-04 PROCEDURE — 84157 ASSAY OF PROTEIN OTHER: CPT

## 2025-02-04 PROCEDURE — 82140 ASSAY OF AMMONIA: CPT | Performed by: EMERGENCY MEDICINE

## 2025-02-04 PROCEDURE — 88108 CYTOPATH CONCENTRATE TECH: CPT

## 2025-02-04 PROCEDURE — 86901 BLOOD TYPING SEROLOGIC RH(D): CPT | Performed by: INTERNAL MEDICINE

## 2025-02-04 PROCEDURE — 80053 COMPREHEN METABOLIC PANEL: CPT | Performed by: STUDENT IN AN ORGANIZED HEALTH CARE EDUCATION/TRAINING PROGRAM

## 2025-02-04 PROCEDURE — 87102 FUNGUS ISOLATION CULTURE: CPT

## 2025-02-04 PROCEDURE — 84157 ASSAY OF PROTEIN OTHER: CPT | Performed by: NURSE PRACTITIONER

## 2025-02-04 PROCEDURE — 99223 1ST HOSP IP/OBS HIGH 75: CPT | Performed by: STUDENT IN AN ORGANIZED HEALTH CARE EDUCATION/TRAINING PROGRAM

## 2025-02-04 PROCEDURE — 25010000002 ALBUMIN HUMAN 25% PER 50 ML: Performed by: STUDENT IN AN ORGANIZED HEALTH CARE EDUCATION/TRAINING PROGRAM

## 2025-02-04 PROCEDURE — 85025 COMPLETE CBC W/AUTO DIFF WBC: CPT | Performed by: STUDENT IN AN ORGANIZED HEALTH CARE EDUCATION/TRAINING PROGRAM

## 2025-02-04 PROCEDURE — 87154 CUL TYP ID BLD PTHGN 6+ TRGT: CPT | Performed by: EMERGENCY MEDICINE

## 2025-02-04 PROCEDURE — 85610 PROTHROMBIN TIME: CPT | Performed by: EMERGENCY MEDICINE

## 2025-02-04 PROCEDURE — P9047 ALBUMIN (HUMAN), 25%, 50ML: HCPCS | Performed by: INTERNAL MEDICINE

## 2025-02-04 PROCEDURE — P9047 ALBUMIN (HUMAN), 25%, 50ML: HCPCS | Performed by: STUDENT IN AN ORGANIZED HEALTH CARE EDUCATION/TRAINING PROGRAM

## 2025-02-04 PROCEDURE — 85730 THROMBOPLASTIN TIME PARTIAL: CPT | Performed by: EMERGENCY MEDICINE

## 2025-02-04 PROCEDURE — 25010000002 ALBUMIN HUMAN 25% PER 50 ML: Performed by: INTERNAL MEDICINE

## 2025-02-04 PROCEDURE — 25010000002 MORPHINE PER 10 MG: Performed by: EMERGENCY MEDICINE

## 2025-02-04 PROCEDURE — 85014 HEMATOCRIT: CPT | Performed by: INTERNAL MEDICINE

## 2025-02-04 PROCEDURE — 81001 URINALYSIS AUTO W/SCOPE: CPT | Performed by: EMERGENCY MEDICINE

## 2025-02-04 PROCEDURE — 89051 BODY FLUID CELL COUNT: CPT | Performed by: NURSE PRACTITIONER

## 2025-02-04 PROCEDURE — 85018 HEMOGLOBIN: CPT | Performed by: INTERNAL MEDICINE

## 2025-02-04 PROCEDURE — 86900 BLOOD TYPING SEROLOGIC ABO: CPT | Performed by: INTERNAL MEDICINE

## 2025-02-04 PROCEDURE — 25010000002 ALBUMIN HUMAN 25% PER 50 ML: Performed by: NURSE PRACTITIONER

## 2025-02-04 PROCEDURE — 25010000002 ONDANSETRON PER 1 MG: Performed by: EMERGENCY MEDICINE

## 2025-02-04 PROCEDURE — 86901 BLOOD TYPING SEROLOGIC RH(D): CPT

## 2025-02-04 PROCEDURE — 87147 CULTURE TYPE IMMUNOLOGIC: CPT | Performed by: EMERGENCY MEDICINE

## 2025-02-04 PROCEDURE — 83735 ASSAY OF MAGNESIUM: CPT | Performed by: STUDENT IN AN ORGANIZED HEALTH CARE EDUCATION/TRAINING PROGRAM

## 2025-02-04 PROCEDURE — 71045 X-RAY EXAM CHEST 1 VIEW: CPT

## 2025-02-04 PROCEDURE — 87205 SMEAR GRAM STAIN: CPT | Performed by: NURSE PRACTITIONER

## 2025-02-04 PROCEDURE — 25010000002 CEFTRIAXONE PER 250 MG: Performed by: EMERGENCY MEDICINE

## 2025-02-04 PROCEDURE — 32555 ASPIRATE PLEURA W/ IMAGING: CPT | Performed by: INTERNAL MEDICINE

## 2025-02-04 PROCEDURE — P9016 RBC LEUKOCYTES REDUCED: HCPCS

## 2025-02-04 PROCEDURE — 85018 HEMOGLOBIN: CPT | Performed by: FAMILY MEDICINE

## 2025-02-04 PROCEDURE — 83615 LACTATE (LD) (LDH) ENZYME: CPT

## 2025-02-04 PROCEDURE — 82945 GLUCOSE OTHER FLUID: CPT

## 2025-02-04 PROCEDURE — 76604 US EXAM CHEST: CPT | Performed by: INTERNAL MEDICINE

## 2025-02-04 PROCEDURE — 82042 OTHER SOURCE ALBUMIN QUAN EA: CPT

## 2025-02-04 PROCEDURE — 83986 ASSAY PH BODY FLUID NOS: CPT

## 2025-02-04 PROCEDURE — 25010000002 ONDANSETRON PER 1 MG: Performed by: INTERNAL MEDICINE

## 2025-02-04 PROCEDURE — 85014 HEMATOCRIT: CPT | Performed by: FAMILY MEDICINE

## 2025-02-04 PROCEDURE — 86850 RBC ANTIBODY SCREEN: CPT | Performed by: INTERNAL MEDICINE

## 2025-02-04 PROCEDURE — 86923 COMPATIBILITY TEST ELECTRIC: CPT

## 2025-02-04 PROCEDURE — 0W993ZZ DRAINAGE OF RIGHT PLEURAL CAVITY, PERCUTANEOUS APPROACH: ICD-10-PCS | Performed by: INTERNAL MEDICINE

## 2025-02-04 RX ORDER — ALBUMIN (HUMAN) 12.5 G/50ML
25 SOLUTION INTRAVENOUS ONCE
Status: COMPLETED | OUTPATIENT
Start: 2025-02-04 | End: 2025-02-04

## 2025-02-04 RX ORDER — ALBUTEROL SULFATE 0.83 MG/ML
2.5 SOLUTION RESPIRATORY (INHALATION) EVERY 6 HOURS PRN
Status: DISCONTINUED | OUTPATIENT
Start: 2025-02-04 | End: 2025-02-07 | Stop reason: HOSPADM

## 2025-02-04 RX ORDER — SODIUM CHLORIDE 9 MG/ML
40 INJECTION, SOLUTION INTRAVENOUS AS NEEDED
Status: DISCONTINUED | OUTPATIENT
Start: 2025-02-04 | End: 2025-02-07 | Stop reason: HOSPADM

## 2025-02-04 RX ORDER — ONDANSETRON 2 MG/ML
4 INJECTION INTRAMUSCULAR; INTRAVENOUS EVERY 6 HOURS PRN
Status: DISCONTINUED | OUTPATIENT
Start: 2025-02-04 | End: 2025-02-07 | Stop reason: HOSPADM

## 2025-02-04 RX ORDER — NITROGLYCERIN 0.4 MG/1
0.4 TABLET SUBLINGUAL
Status: DISCONTINUED | OUTPATIENT
Start: 2025-02-04 | End: 2025-02-07 | Stop reason: HOSPADM

## 2025-02-04 RX ORDER — ALBUMIN (HUMAN) 12.5 G/50ML
25 SOLUTION INTRAVENOUS ONCE
Status: DISCONTINUED | OUTPATIENT
Start: 2025-02-04 | End: 2025-02-04

## 2025-02-04 RX ORDER — PANTOPRAZOLE SODIUM 40 MG/10ML
40 INJECTION, POWDER, LYOPHILIZED, FOR SOLUTION INTRAVENOUS EVERY 12 HOURS SCHEDULED
Status: DISCONTINUED | OUTPATIENT
Start: 2025-02-04 | End: 2025-02-06

## 2025-02-04 RX ORDER — ALBUMIN (HUMAN) 12.5 G/50ML
25 SOLUTION INTRAVENOUS ONCE
Status: DISCONTINUED | OUTPATIENT
Start: 2025-02-04 | End: 2025-02-07 | Stop reason: HOSPADM

## 2025-02-04 RX ORDER — POTASSIUM CHLORIDE 750 MG/1
40 CAPSULE, EXTENDED RELEASE ORAL ONCE
Status: COMPLETED | OUTPATIENT
Start: 2025-02-04 | End: 2025-02-04

## 2025-02-04 RX ORDER — MIDODRINE HYDROCHLORIDE 10 MG/1
10 TABLET ORAL
Status: DISCONTINUED | OUTPATIENT
Start: 2025-02-04 | End: 2025-02-04 | Stop reason: SDUPTHER

## 2025-02-04 RX ORDER — DEXTROSE MONOHYDRATE, SODIUM CHLORIDE, AND POTASSIUM CHLORIDE 50; 2.98; 9 G/1000ML; G/1000ML; G/1000ML
100 INJECTION, SOLUTION INTRAVENOUS CONTINUOUS
Status: ACTIVE | OUTPATIENT
Start: 2025-02-04 | End: 2025-02-04

## 2025-02-04 RX ORDER — OCTREOTIDE ACETATE 100 UG/ML
100 INJECTION, SOLUTION INTRAVENOUS; SUBCUTANEOUS EVERY 8 HOURS SCHEDULED
Status: DISCONTINUED | OUTPATIENT
Start: 2025-02-04 | End: 2025-02-07 | Stop reason: HOSPADM

## 2025-02-04 RX ORDER — SODIUM CHLORIDE 0.9 % (FLUSH) 0.9 %
10 SYRINGE (ML) INJECTION EVERY 12 HOURS SCHEDULED
Status: DISCONTINUED | OUTPATIENT
Start: 2025-02-04 | End: 2025-02-07 | Stop reason: HOSPADM

## 2025-02-04 RX ORDER — MIDODRINE HYDROCHLORIDE 10 MG/1
10 TABLET ORAL
Status: DISCONTINUED | OUTPATIENT
Start: 2025-02-04 | End: 2025-02-07 | Stop reason: HOSPADM

## 2025-02-04 RX ORDER — LACTULOSE 10 G/15ML
30 SOLUTION ORAL 3 TIMES DAILY
Status: DISCONTINUED | OUTPATIENT
Start: 2025-02-04 | End: 2025-02-07 | Stop reason: HOSPADM

## 2025-02-04 RX ORDER — ALBUMIN (HUMAN) 12.5 G/50ML
12.5 SOLUTION INTRAVENOUS 3 TIMES DAILY
Status: COMPLETED | OUTPATIENT
Start: 2025-02-04 | End: 2025-02-05

## 2025-02-04 RX ORDER — ALBUMIN (HUMAN) 12.5 G/50ML
12.5 SOLUTION INTRAVENOUS ONCE
Status: COMPLETED | OUTPATIENT
Start: 2025-02-04 | End: 2025-02-04

## 2025-02-04 RX ORDER — SODIUM CHLORIDE 0.9 % (FLUSH) 0.9 %
10 SYRINGE (ML) INJECTION AS NEEDED
Status: DISCONTINUED | OUTPATIENT
Start: 2025-02-04 | End: 2025-02-07 | Stop reason: HOSPADM

## 2025-02-04 RX ORDER — ONDANSETRON 2 MG/ML
4 INJECTION INTRAMUSCULAR; INTRAVENOUS ONCE
Status: COMPLETED | OUTPATIENT
Start: 2025-02-04 | End: 2025-02-04

## 2025-02-04 RX ORDER — URSODIOL 300 MG/1
600 CAPSULE ORAL 2 TIMES DAILY
Status: DISCONTINUED | OUTPATIENT
Start: 2025-02-04 | End: 2025-02-07 | Stop reason: HOSPADM

## 2025-02-04 RX ADMIN — ONDANSETRON 4 MG: 2 INJECTION INTRAMUSCULAR; INTRAVENOUS at 10:55

## 2025-02-04 RX ADMIN — OCTREOTIDE ACETATE 100 MCG: 100 INJECTION, SOLUTION INTRAVENOUS; SUBCUTANEOUS at 13:59

## 2025-02-04 RX ADMIN — ALBUMIN (HUMAN) 12.5 G: 0.25 INJECTION, SOLUTION INTRAVENOUS at 22:01

## 2025-02-04 RX ADMIN — POTASSIUM CHLORIDE 40 MEQ: 750 CAPSULE, EXTENDED RELEASE ORAL at 06:53

## 2025-02-04 RX ADMIN — MORPHINE SULFATE 4 MG: 4 INJECTION, SOLUTION INTRAMUSCULAR; INTRAVENOUS at 03:06

## 2025-02-04 RX ADMIN — MIDODRINE HYDROCHLORIDE 10 MG: 10 TABLET ORAL at 10:12

## 2025-02-04 RX ADMIN — URSODIOL 600 MG: 300 CAPSULE ORAL at 13:58

## 2025-02-04 RX ADMIN — ALBUMIN (HUMAN) 25 G: 0.25 INJECTION, SOLUTION INTRAVENOUS at 10:30

## 2025-02-04 RX ADMIN — ALBUMIN (HUMAN) 25 G: 0.25 INJECTION, SOLUTION INTRAVENOUS at 10:10

## 2025-02-04 RX ADMIN — MIDODRINE HYDROCHLORIDE 10 MG: 10 TABLET ORAL at 16:33

## 2025-02-04 RX ADMIN — ALBUMIN (HUMAN) 12.5 G: 0.25 INJECTION, SOLUTION INTRAVENOUS at 15:00

## 2025-02-04 RX ADMIN — LACTULOSE 30 G: 20 SOLUTION ORAL at 16:32

## 2025-02-04 RX ADMIN — OCTREOTIDE ACETATE 100 MCG: 100 INJECTION, SOLUTION INTRAVENOUS; SUBCUTANEOUS at 21:58

## 2025-02-04 RX ADMIN — ALBUMIN (HUMAN) 12.5 G: 0.25 INJECTION, SOLUTION INTRAVENOUS at 10:50

## 2025-02-04 RX ADMIN — SODIUM CHLORIDE 2000 MG: 9 INJECTION INTRAMUSCULAR; INTRAVENOUS; SUBCUTANEOUS at 03:00

## 2025-02-04 RX ADMIN — Medication 10 ML: at 10:13

## 2025-02-04 RX ADMIN — OCTREOTIDE ACETATE 100 MCG: 100 INJECTION, SOLUTION INTRAVENOUS; SUBCUTANEOUS at 10:12

## 2025-02-04 RX ADMIN — ONDANSETRON 4 MG: 2 INJECTION INTRAMUSCULAR; INTRAVENOUS at 16:32

## 2025-02-04 RX ADMIN — ALBUMIN (HUMAN) 12.5 G: 0.25 INJECTION, SOLUTION INTRAVENOUS at 13:01

## 2025-02-04 RX ADMIN — PANTOPRAZOLE SODIUM 40 MG: 40 INJECTION, POWDER, FOR SOLUTION INTRAVENOUS at 21:38

## 2025-02-04 RX ADMIN — LACTULOSE 30 G: 20 SOLUTION ORAL at 21:38

## 2025-02-04 RX ADMIN — Medication 10 ML: at 21:39

## 2025-02-04 RX ADMIN — URSODIOL 600 MG: 300 CAPSULE ORAL at 22:19

## 2025-02-04 RX ADMIN — POTASSIUM CHLORIDE 40 MEQ: 750 CAPSULE, EXTENDED RELEASE ORAL at 10:12

## 2025-02-04 RX ADMIN — PANTOPRAZOLE SODIUM 40 MG: 40 INJECTION, POWDER, FOR SOLUTION INTRAVENOUS at 12:51

## 2025-02-04 RX ADMIN — ONDANSETRON 4 MG: 2 INJECTION INTRAMUSCULAR; INTRAVENOUS at 03:06

## 2025-02-04 RX ADMIN — SODIUM CHLORIDE 500 ML: 9 INJECTION, SOLUTION INTRAVENOUS at 03:07

## 2025-02-04 RX ADMIN — ALBUMIN (HUMAN) 25 G: 0.25 INJECTION, SOLUTION INTRAVENOUS at 06:46

## 2025-02-04 NOTE — CONSULTS
[facility]  Initial Inpatient Consult Note    Referring Provider: Hospitalist    Reason for Consultation: Cirrhosis, status post TIPS    Subjective     History of present illness:    39 y.o. male with a history of alcohol-related cirrhosis but sober for a few years, refractory right pleural effusion and ascites resulting in TIPS in November 2024, was admitted now with acute onset of nausea and vomiting nonbloody emesis and feeling poorly over the past 2 days.  He states that since his TIPS was performed he had been doing much better and not requiring repeat paracentesis and pleurocentesis like before.  On arrival here he had a CT scan of the abdomen pelvis which showed a large right pleural effusion and recurrent ascites but also was noted to have acute renal failure and creatinine of 2.38.  His initial hemoglobin was 9.1 and he denied any evidence of GI bleeding however since seen the patient his repeat hemoglobin showed 6.9 and he is receiving blood.  Is also on octreotide, Protonix, midodrine and nephrology has been seen the patient.    Past Medical History:  Past Medical History:   Diagnosis Date    Alcohol abuse 03/14/2017    Anxiety     Essential hypertension 12/27/2019    GERD (gastroesophageal reflux disease)     Hypokalemia 03/14/2017    Will update    Hyponatremia 03/14/2017    Steatohepatitis, alcoholic 03/14/2017    Tobacco abuse 03/14/2017    Umbilical hernia      Past Surgical History:  Past Surgical History:   Procedure Laterality Date    CHOLECYSTECTOMY      ENDOSCOPY N/A 02/09/2022    Procedure: ESOPHAGOGASTRODUODENOSCOPY WITH BX;  Surgeon: Gino Khan MD;  Location: [facility] ENDOSCOPY;  Service: Gastroenterology;  Laterality: N/A;  RETAINED LIQUID FOOD IN STOMACH, HUFFMAN'S ESOPHAGUS    ENDOSCOPY N/A 02/10/2023    Procedure: ESOPHAGOGASTRODUODENOSCOPY;  Surgeon: Gino Khan MD;  Location: [facility] ENDOSCOPY;  Service: Gastroenterology;  Laterality: N/A;   GASTRITIS, BARRETTS ESOPHAGUS, PHG    UPPER GASTROINTESTINAL ENDOSCOPY        Social History:   Social History     Tobacco Use    Smoking status: Every Day     Current packs/day: 0.50     Average packs/day: 0.5 packs/day for 17.1 years (8.5 ttl pk-yrs)     Types: Cigarettes     Start date: 1/1/2008     Passive exposure: Current    Smokeless tobacco: Never   Substance Use Topics    Alcohol use: Not Currently     Alcohol/week: 3.0 standard drinks of alcohol     Comment: FORMER      Family History:  Family History   Problem Relation Age of Onset    Alcohol abuse Mother     Arthritis Mother     COPD Mother     Heart disease Maternal Grandmother     Hypertension Maternal Grandmother     Lung cancer Maternal Grandmother     Stroke Maternal Grandmother     Heart disease Maternal Grandfather     Lung cancer Maternal Grandfather     Cancer Paternal Grandmother     Cancer Paternal Grandfather     Colon cancer Neg Hx     Malig Hyperthermia Neg Hx        Home Meds:  Medications Prior to Admission   Medication Sig Dispense Refill Last Dose/Taking    pantoprazole (PROTONIX) 40 MG EC tablet TAKE 1 TABLET BY MOUTH EVERY DAY 90 tablet 1 Past Week    spironolactone (ALDACTONE) 50 MG tablet Take 1 tablet by mouth Daily. 30 tablet 3 Past Week    tiotropium bromide-olodaterol (Stiolto Respimat) 2.5-2.5 MCG/ACT aerosol solution inhaler Inhale 2 puffs Daily. 1 each 11 2/3/2025    ursodiol (ACTIGALL) 300 MG capsule TAKE 2 CAPSULES BY MOUTH 2 (TWO) TIMES A DAY WITH MEALS FOR 90 DAYS. (Patient taking differently: Take 2 capsules by mouth 2 (Two) Times a Day.) 360 capsule 2 Past Week    albuterol sulfate  (90 Base) MCG/ACT inhaler INHALE 2 PUFFS EVERY 4 HOURS AS NEEDED FOR WHEEZING OR SHORTNESS OF AIR 54 g 3      Current Meds:   albumin human, 12.5 g, Intravenous, TID  albumin human, 25 g, Intravenous, Once  [START ON 2/5/2025] cefTRIAXone, 2,000 mg, Intravenous, Q24H  lactulose, 30 g, Oral, TID  midodrine, 10 mg, Oral, TID  AC  octreotide, 100 mcg, Subcutaneous, Q8H  pantoprazole, 40 mg, Intravenous, Q12H  sodium chloride, 10 mL, Intravenous, Q12H  ursodiol, 600 mg, Oral, BID      Allergies:  No Known Allergies  Review of Systems  Pertinent items are noted in HPI, all other systems reviewed and negative         Vital Signs  Temp:  [97.3 °F (36.3 °C)-99.2 °F (37.3 °C)] 97.8 °F (36.6 °C)  Heart Rate:  [] 95  Resp:  [16-18] 18  BP: (101-117)/(56-80) 107/73  Physical Exam:  General Appearance:    Alert, cooperative, in no acute distress   Head:    Normocephalic, without obvious abnormality, atraumatic   Eyes:          conjunctivae and sclerae normal, no   icterus   Throat:   no thrush, oral mucosa moist   Neck:   Supple, no adenopathy   Lungs:     Clear to auscultation bilaterally    Heart:    Regular rhythm and normal rate    Chest Wall:    No abnormalities observed   Abdomen:     Soft, nondistended, nontender; normal bowel sounds   Extremities:   no edema, no redness   Skin:   No bruising or rash, multiple tattoos on upper extremities bilaterally   Psychiatric:  normal mood and insight     Results Review:  [x]  Laboratory   [x]  Radiology  []  Pathology      I reviewed the patient's new clinical results.    Results from last 7 days   Lab Units 02/04/25  1551 02/04/25  1114 02/03/25  1857   WBC 10*3/mm3  --  6.25 12.21*   HEMOGLOBIN g/dL 6.9* 6.8* 9.1*   HEMATOCRIT % 22.1* 21.3* 28.8*   PLATELETS 10*3/mm3  --  53* 100*     Results from last 7 days   Lab Units 02/04/25  1114 02/03/25  1857   SODIUM mmol/L 134* 131*   POTASSIUM mmol/L 3.5 3.3*   CHLORIDE mmol/L 94* 91*   CO2 mmol/L 27.5 27.2   BUN mg/dL 31* 33*   CREATININE mg/dL 2.34* 2.38*   CALCIUM mg/dL 8.3* 7.8*   BILIRUBIN mg/dL 2.6* 3.7*   ALK PHOS U/L 215* 301*   ALT (SGPT) U/L 29 44*   AST (SGOT) U/L 31 51*   GLUCOSE mg/dL 96 109*     Results from last 7 days   Lab Units 02/04/25  0127   INR  1.77*     Lab Results   Lab Value Date/Time    LIPASE 61 (H) 02/03/2025 0585     LIPASE 166 (H) 11/19/2021 1307    LIPASE 142 (H) 09/07/2021 1705       Radiology:  XR Chest 1 View    Result Date: 2/4/2025  Impression: Status post right thoracentesis with a trace residual pleural effusion. No pneumothorax. Mild bibasilar airspace opacity consistent with atelectasis plus or minus superimposed pneumonia. Electronically Signed: Carlos Yancey MD  2/4/2025 3:02 PM EST  Workstation ID: [id number]    XR Chest 1 View    Result Date: 2/4/2025  Interval development of a large right pleural effusion with consolidation in the lower right lung. Electronically Signed: Dhruv Mendosa MD  2/4/2025 4:51 AM EST  Workstation ID: [id number]    CT Abdomen Pelvis Without Contrast    Result Date: 2/3/2025  Impression: 1.Large right-sided pleural effusion. Consolidative changes noted within the right lower lobe, most likely representing compressive atelectasis. Superimposed pneumonia is not completely excluded 2.Cirrhotic liver. 3.Large volume ascites. 4.Diffuse colonic and small bowel wall thickening. These findings are most likely due to portal venous congestion. Infectious or inflammatory enterocolitis is not completely excluded. 5.Patient is status post TIPS. Findings suggestive of periportal varices. Please note that evaluation is very limited on this study performed without IV and oral contrast. 6.Fat-containing paraumbilical hernia also containing a small amount of peritoneal fluid. 7.Diffuse soft tissue edema. Electronically Signed: Maynor Awan DO  2/3/2025 8:47 PM EST  Workstation ID: [id number]     Assessment & Plan       Hypokalemia    Hyponatremia    Cirrhosis of liver with ascites    Acute renal failure    Hepatic encephalopathy  Acute on chronic anemia    Plan:  Patient has been started on Rocephin and underwent pleurocentesis this afternoon.  His hemoglobin dropped without an overt GI bleed and therefore he is receiving blood and will need a total of 2 units PRBC.  We will tentatively plan to pursue  upper endoscopy tomorrow to evaluate his drop in hemoglobin.  Will also order an ultrasound to assess the patency of his TIPS is most likely his TIPS is nonfunctional given his recurrent pleural effusion and ascites.  Will continue Protonix twice daily and monitor his hemoglobin.      I discussed the patients findings and my recommendations with patient and family.    Gino Khan MD

## 2025-02-04 NOTE — PROCEDURES
Thoracentesis Procedure Note    Indication: Large right sided pleural effusion    Consent: Yes, placed in chart.    Procedure: The patient was placed in the sitting position and the appropriate landmarks were identified.  Ultrasound was done and pleural effusion confirmed right the skin over the puncture site in the right posterior chest wall was prepped with ChloraPrep and draped in sterile fashion. Local anesthesia was applied with 1% lidocaine. Thoracentesis catheter was inserted with needle guidance. Pleural fluid was aspirated, drained 2 L fluid. The catheter was then withdrawn and a sterile dressing was placed over the site. A post-procedure film is pending at this time.    The patient tolerated the procedure well.    Complications: None    Electronically signed by Davian Watt MD, 02/04/25, 2:51 PM EST.

## 2025-02-04 NOTE — PLAN OF CARE
Goal Outcome Evaluation:  Plan of Care Reviewed With: patient           Outcome Evaluation: thoracentesis done with right sided with 2L out and sent to the lab.c/o nausea and vomiting noted in this shift, medication given per MAR. blood transfusion began at 1658 , 1st unit of blood. will continue to monitor.

## 2025-02-04 NOTE — H&P
[facility]  Date: 2025   Patient Name: Wai Gay  : 1985  MRN: [id number]  Primary Care Physician:  Callum Peterson DO  Date of admission: 2/3/2025    Subjective   Subjective     Chief Complaint: Generalized weakness, not feeling well, abdominal pain    HPI:    Wai Gay is a 39 y.o. male  with a past medical history of hypertension, anxiety, GERD, alcoholic liver cirrhosis presented to the ED for evaluation of generalized weakness, overall not feeling well from since last 2 days.  Patient is drowsy but able to answer questions appropriately.  States that he has been experiencing generalized weakness since last 2 days.  Denies any fevers.  Noted to have abdominal pain.  Denies any nausea, vomiting, diarrhea, chest pain, shortness of breath.  Does not take any lactulose at home.  He gets routine paracentesis last was 3 and half weeks ago.    Upon arrival, vital signs temperature 99.2, pulse 119, respiratory 16, blood pressure 112/56 on room air saturating around 98%.  Labs showed sodium 131, potassium 3.3, BUN 33, creatinine 2.38, baseline around 1 3 months ago, , AST/ALT 51/44, total bili 3.7, ammonia 172, lactic acid 2.6, lipase 61, INR 1.77, WBC 12.1, hemoglobin 9.1, platelets 100 urinalysis noninfectious.  Blood cultures in process.  CT abdomen pelvis without contrast showed large right-sided pleural effusion.  Consolidative changes noted within the right lower lobe most likely representing compressive atelectasis.  Superimposed pneumonia is not completely excluded.  Large volume ascites, cirrhotic liver.  Diffuse colonic and small bowel wall thickening.  These findings are most likely due to portal venous congestion.  Infectious inflammatory enterocolitis is not completely excluded.  Status post TIPS findings suggestive of periportal varices..  Received ceftriaxone, 500 cc IV fluids.  Patient has been admitted for further  evaluation and management of decompensated liver cirrhosis, NICKI    Personal History     Past Medical History:   Diagnosis Date   • Alcohol abuse 03/14/2017   • Anxiety    • Essential hypertension 12/27/2019   • GERD (gastroesophageal reflux disease)    • Hypokalemia 03/14/2017    Will update   • Hyponatremia 03/14/2017   • Steatohepatitis, alcoholic 03/14/2017   • Tobacco abuse 03/14/2017   • Umbilical hernia          Past Surgical History:   Procedure Laterality Date   • CHOLECYSTECTOMY     • ENDOSCOPY N/A 02/09/2022    Procedure: ESOPHAGOGASTRODUODENOSCOPY WITH BX;  Surgeon: Gino Khan MD;  Location: [facility] ENDOSCOPY;  Service: Gastroenterology;  Laterality: N/A;  RETAINED LIQUID FOOD IN STOMACH, HUFFMAN'S ESOPHAGUS   • ENDOSCOPY N/A 02/10/2023    Procedure: ESOPHAGOGASTRODUODENOSCOPY;  Surgeon: Gino Khan MD;  Location: [facility] ENDOSCOPY;  Service: Gastroenterology;  Laterality: N/A;  GASTRITIS, BARRETTS ESOPHAGUS, PHG   • UPPER GASTROINTESTINAL ENDOSCOPY           Family History   Problem Relation Age of Onset   • Alcohol abuse Mother    • Arthritis Mother    • COPD Mother    • Heart disease Maternal Grandmother    • Hypertension Maternal Grandmother    • Lung cancer Maternal Grandmother    • Stroke Maternal Grandmother    • Heart disease Maternal Grandfather    • Lung cancer Maternal Grandfather    • Cancer Paternal Grandmother    • Cancer Paternal Grandfather    • Colon cancer Neg Hx    • Malig Hyperthermia Neg Hx          Social History     Socioeconomic History   • Marital status: Single   Tobacco Use   • Smoking status: Every Day     Current packs/day: 0.50     Average packs/day: 0.5 packs/day for 17.1 years (8.5 ttl pk-yrs)     Types: Cigarettes     Start date: 1/1/2008     Passive exposure: Current   • Smokeless tobacco: Never   Vaping Use   • Vaping status: Never Used   Substance and Sexual Activity   • Alcohol use: Not Currently     Alcohol/week: 3.0 standard drinks of alcohol      Comment: FORMER   • Drug use: Not Currently   • Sexual activity: Yes     Partners: Female         Home Medications:  Umeclidinium-Vilanterol, albuterol sulfate HFA, furosemide, midodrine, pantoprazole, potassium chloride, spironolactone, tiotropium bromide-olodaterol, and ursodiol    Allergies:  No Known Allergies      Objective   Objective     Vitals:   Temp:  [98.9 °F (37.2 °C)-99.2 °F (37.3 °C)] 98.9 °F (37.2 °C)  Heart Rate:  [] 93  Resp:  [16-18] 16  BP: (104-115)/(56-70) 107/61    Physical Exam    Constitutional: Awake, drowsy, fatigued   Eyes: Pupils equal, sclerae anicteric, no conjunctival injection   HENT: NCAT, mucous membranes moist   Respiratory: Bilateral air entry present, decreased breath sounds on the right lower lobe, nonlabored respirations    Cardiovascular: RRR, no murmurs   Gastrointestinal: soft, mild diffuse abdominal tenderness, distended, ascites   Musculoskeletal: Trace bilateral lower extremity edema,, no clubbing or cyanosis to extremities   Neurologic: Oriented x 3, strength symmetric in all extremities, Cranial Nerves grossly intact to confrontation, speech clear    Result Review    Result Review:  I have personally reviewed the results from the time of this admission to 2/4/2025 05:42 EST and agree with these findings:  [x]  Laboratory  []  Microbiology  [x]  Radiology  []  EKG/Telemetry   []  Cardiology/Vascular   []  Pathology  []  Old records  []  Other:        Imaging Results (Last 24 Hours)       Procedure Component Value Units Date/Time    XR Chest 1 View [[id number]] Collected: 02/04/25 0449     Updated: 02/04/25 0453    Narrative:      XR CHEST 1 VW    Date of Exam: 2/4/2025 4:32 AM EST    Indication: Cough and shortness of air.    Comparison: 11/27/2024    Findings:  There has been interval development of a large right pleural effusion with consolidation in the lower right lung. Please see abdomen pelvis CT report from 2/3/2025. The left lung is clear. Heart size grossly  stable. There is some shift of the mediastinal   structures to the left. A TIPS is noted in the right upper quadrant.      Impression:      Interval development of a large right pleural effusion with consolidation in the lower right lung.        Electronically Signed: Dhruv Mendosa MD    2/4/2025 4:51 AM EST    Workstation ID: [id number]    CT Abdomen Pelvis Without Contrast [[id number]] Collected: 02/03/25 2039     Updated: 02/03/25 2049    Narrative:      CT ABDOMEN PELVIS WO CONTRAST    Date of Exam: 2/3/2025 8:15 PM EST    Indication: abdominal pain.    Comparison: 7/9/2024    Technique: Axial CT images were obtained of the abdomen and pelvis without the administration of contrast. Reconstructed coronal and sagittal images were also obtained. Automated exposure control and iterative construction methods were used.      Findings:  Visualized Chest: Large right-sided pleural effusion. Consolidative changes noted within the right lower lobe, most likely representing compressive atelectasis. There is mild elevation of the left hemidiaphragm., Unchanged    Liver: Cirrhotic liver. Evaluation for focal lesion is very limited on this study    Gallbladder: The gallbladder is normal without evidence of radiopaque stones.    Bile Ducts: Status post cholecystectomy.    Spleen: Spleen is normal in size and CT density.    Pancreas: Pancreas is normal. There is no evidence of pancreatic mass or peripancreatic fluid.    Adrenals: Adrenal glands are unremarkable.    Kidneys: Kidneys are normal in size. There are no stones or hydronephrosis.    Gastrointestinal: Evaluation is significantly limited given the lack of IV and oral contrast. There is evidence of diffuse colonic wall thickening and diffuse small bowel wall thickening. These findings are most likely due to portal venous congestion.    Bladder: The bladder is normal.    Pelvis:  No suspecious mass.    Peritoneum/Mesentery: Large volume ascites noted throughout the abdomen  and pelvis measuring simple fluid density. No pneumoperitoneum.      Lymph Nodes: No definite lymphadenopathy, although evaluation is limited on this study    Vasculature: Patient is status post TIPS. Findings suggestive of periportal varices. Please note that evaluation is very limited on this study performed without IV contrast.    Abdominal Wall: Fat-containing paraumbilical hernia also containing a small amount of peritoneal fluid. Diffuse soft tissue edema.    Bony Structures: No acute osseous abnormality      Impression:      Impression:  1.Large right-sided pleural effusion. Consolidative changes noted within the right lower lobe, most likely representing compressive atelectasis. Superimposed pneumonia is not completely excluded  2.Cirrhotic liver.  3.Large volume ascites.  4.Diffuse colonic and small bowel wall thickening. These findings are most likely due to portal venous congestion. Infectious or inflammatory enterocolitis is not completely excluded.  5.Patient is status post TIPS. Findings suggestive of periportal varices. Please note that evaluation is very limited on this study performed without IV and oral contrast.  6.Fat-containing paraumbilical hernia also containing a small amount of peritoneal fluid.  7.Diffuse soft tissue edema.        Electronically Signed: Maynor Awan DO    2/3/2025 8:47 PM EST    Workstation ID: [id number]             albumin human, 25 g, Intravenous, Once  albumin human, 25 g, Intravenous, Once  albumin human, 25 g, Intravenous, Once  albumin human, 25 g, Intravenous, Once  lactulose, 30 g, Oral, TID  midodrine, 10 mg, Oral, TID AC  potassium chloride, 40 mEq, Oral, Once         Assessment & Plan   Assessment / Plan     Assessment/Plan:   Decompensated liver cirrhosis  NICKI, concern for HRS  Hyperammonemia  Mild hepatic encephalopathy  Large right pleural effusion  Possible right lower lobe pneumonia versus atelectasis  Hypokalemia  Mild hyponatremia  Elevated lactic  acid  Concern for SBP  Alcoholic liver cirrhosis  Hypertension  Anxiety  GERD    Plan  Admit to inpatient, telemetry  Consult IR in the a.m. for diagnostic and therapeutic paracentesis  Received 500 cc IV fluids in the ED, monitor urine output  Nephrology consulted Dr. George   Gastroenterology consult Dr. Khan  Start on midodrine 10 mg 3 times daily  100 g of albumin once for possible SBP and HRS to meet 1.5 g/kg  Monitor urine output  Start on lactulose 30 mg 3 times daily, adjust frequency and dosage for 3 bowel movements a day  Pulmonology consult in the a.m. for evaluation for possible thoracocentesis of the right pleural effusion  Received 40 mEq p.o. potassium, recheck and redose  A.m. labs  Received 2 g IV ceftriaxone empirically for possible SBP, continue while awaiting peritoneal fluid analysis  Heart healthy diet  Resume other appropriate home medications once reconciled      VTE Prophylaxis:  Mechanical VTE prophylaxis orders are signed & held.          CODE STATUS:    Level Of Support Discussed With: Patient  Code Status (Patient has no pulse and is not breathing): CPR (Attempt to Resuscitate)  Medical Interventions (Patient has pulse or is breathing): Full Support      Admission Status:  I believe this patient meets inpatient status.    Part of this note may be an electronic transcription/translation of spoken language to printed text using the Dragon Dictation System    Keith Gallegos MD

## 2025-02-04 NOTE — CASE MANAGEMENT/SOCIAL WORK
Discharge Planning Assessment  MAMTA Abdi     Patient Name: Wai Gay  MRN: [id number]  Today's Date: 2/4/2025    Admit Date: 2/3/2025        Discharge Needs Assessment       Row Name 02/04/25 0942       Living Environment    People in Home alone    Current Living Arrangements home    Potentially Unsafe Housing Conditions none    In the past 12 months has the electric, gas, oil, or water company threatened to shut off services in your home? No    Primary Care Provided by self    Provides Primary Care For no one    Family Caregiver if Needed parent(s)    Family Caregiver Names Mother Jasmyn and father Julian    Quality of Family Relationships helpful;involved;supportive    Able to Return to Prior Arrangements yes       Resource/Environmental Concerns    Resource/Environmental Concerns none    Transportation Concerns none       Transportation Needs    In the past 12 months, has lack of transportation kept you from medical appointments or from getting medications? no    In the past 12 months, has lack of transportation kept you from meetings, work, or from getting things needed for daily living? No       Food Insecurity    Within the past 12 months, you worried that your food would run out before you got the money to buy more. Never true    Within the past 12 months, the food you bought just didn't last and you didn't have money to get more. Never true       Transition Planning    Patient/Family Anticipates Transition to home    Patient/Family Anticipated Services at Transition none       Discharge Needs Assessment    Readmission Within the Last 30 Days no previous admission in last 30 days    Current Outpatient/Agency/Support Group clinic(s)    Equipment Currently Used at Home none    Concerns to be Addressed discharge planning    Do you want help finding or keeping work or a job? I do not need or want help    Do you want help with school or training? For example, starting or completing job training or getting  a high school diploma, GED or equivalent No    Anticipated Changes Related to Illness none    Equipment Needed After Discharge none    Current Discharge Risk lives alone                   Discharge Plan    No documentation.                 Continued Care and Services - Admitted Since 2/3/2025    No active coordination exists for this encounter.          Demographic Summary       Row Name 02/04/25 0940       General Information    Admission Type inpatient    Arrived From home    Referral Source emergency department    Reason for Consult discharge planning    Preferred Language English       Contact Information    Permission Granted to Share Info With ;, insurance;family/designee    Contact Information Obtained for ;, insurance                   Functional Status       Row Name 02/04/25 0940       Functional Status    Usual Activity Tolerance good    Current Activity Tolerance moderate       Physical Activity    On average, how many days per week do you engage in moderate to strenuous exercise (like a brisk walk)? 0 days    On average, how many minutes do you engage in exercise at this level? 0 min    Number of minutes of exercise per week 0       Assessment of Health Literacy    How often do you have someone help you read hospital materials? Never    How often do you have problems learning about your medical condition because of difficulty understanding written information? Never    How often do you have a problem understanding what is told to you about your medical condition? Never    How confident are you filling out medical forms by yourself? Extremely    Health Literacy Excellent       Functional Status, IADL    Medications independent    Meal Preparation independent    Housekeeping independent    Laundry independent    Shopping independent    If for any reason you need help with day-to-day activities such as bathing, preparing meals, shopping, managing finances,  etc., do you get the help you need? I get all the help I need       Mental Status    General Appearance WDL WDL       Mental Status Summary    Recent Changes in Mental Status/Cognitive Functioning no changes       Employment/    Employment Status employed full-time    Shift Worked first shift    Current or Previous Occupation factory work    Employment/ Comments [employer]                   Psychosocial    No documentation.                  Abuse/Neglect       Row Name 02/04/25 0942       Personal Safety    Feels Unsafe at Home or Work/School no    Feels Threatened by Someone no    Does Anyone Try to Keep You From Having Contact with Others or Doing Things Outside Your Home? no    Physical Signs of Abuse Present no                   Legal       Row Name 02/04/25 0942       Financial Resource Strain    How hard is it for you to pay for the very basics like food, housing, medical care, and heating? Not hard       Financial/Legal    Source of Income salary/wages    Financial/Environmental Concerns none    Application for Public Assistance not applied       Legal    Criminal Activity/Legal Involvement none                   Substance Abuse       Row Name 02/04/25 0942       Substance Use    Substance Use Status past alcohol use    Last Alcohol Use 02/04/22    Previous Substance Use Treatment none                   Patient Forms    No documentation.                 SW contacted mother, Jasmyn to complete needs assessment. Pt lives alone and able to complete ADL's independently. Pt works full time at [employer]. Both mother, Jasmyn and father, Julian are patient's support system. Mother reports that not working may be a concern financially for patient in bills getting behind. Pt is established with Pulminary and Gastroenterology. Mother reports that patient has diagnosis of alcoholic cirrhosis of liver. Mother states that patient is 3 years sober. Mother reports that patient has not ever  been to rehab or other treatment before for past alcohol use. Mother reports that patient has had difficulty in memory the past couple of days. Pt sees Dr. Callum Peterson for his PCP and uses [facility] in [place] for his pharmacy needs. No needs reported at this time.    ADI Rain

## 2025-02-04 NOTE — CONSULTS
Pulmonary / Critical Care Consult Note      Patient Name: Wai Gay  : 1985  MRN: [id number]  Primary Care Physician:  Callum Peterson DO  Referring Physician: Leif Lomeli Jr., MD  Date of admission: 2/3/2025    Subjective   Subjective     Reason for Consult/ Chief Complaint:   Large right pleural effusion, ascites    HPI:  Wai Gay is a 39 y.o. male with past medical history of alcohol cirrhosis s/p TIPS procedure, recurrent pleural effusion presents to the hospital after feeling unwell for multiple days.  In ED, lactate was elevated.  All other labs were unremarkable CT abdomen was obtained demonstrating large right pleural effusion and large volume ascites.  The service was consulted to assist in management treatment of patient's acute issues.  Upon assessment, patient lying in bed on room air no pain respiratory distress.  Findings and plan were discussed with patient.  Patient stating that he does not feel like he has fluid on his chest and has not had to be drained since December.  Patient stated that he also has not had to have abdominal fluid removed as well.  Denies being around any sick contacts.  Patient denies any chest pain or chest tightness, fever or chills, nausea or vomiting, or shortness of breath.    Personal History     Past Medical History:   Diagnosis Date   • Alcohol abuse 2017   • Anxiety    • Essential hypertension 2019   • GERD (gastroesophageal reflux disease)    • Hypokalemia 2017    Will update   • Hyponatremia 2017   • Steatohepatitis, alcoholic 2017   • Tobacco abuse 2017   • Umbilical hernia        Past Surgical History:   Procedure Laterality Date   • CHOLECYSTECTOMY     • ENDOSCOPY N/A 2022    Procedure: ESOPHAGOGASTRODUODENOSCOPY WITH BX;  Surgeon: Gino Khan MD;  Location: [facility] ENDOSCOPY;  Service: Gastroenterology;  Laterality: N/A;  RETAINED LIQUID FOOD IN STOMACH, HUFFMAN'S  ESOPHAGUS   • ENDOSCOPY N/A 02/10/2023    Procedure: ESOPHAGOGASTRODUODENOSCOPY;  Surgeon: Gino Khan MD;  Location: [facility] ENDOSCOPY;  Service: Gastroenterology;  Laterality: N/A;  GASTRITIS, BARRETTS ESOPHAGUS, PHG   • UPPER GASTROINTESTINAL ENDOSCOPY         Family History: family history includes Alcohol abuse in his mother; Arthritis in his mother; COPD in his mother; Cancer in his paternal grandfather and paternal grandmother; Heart disease in his maternal grandfather and maternal grandmother; Hypertension in his maternal grandmother; Lung cancer in his maternal grandfather and maternal grandmother; Stroke in his maternal grandmother. Otherwise pertinent FHx was reviewed and not pertinent to current issue.    Social History:  reports that he has been smoking cigarettes. He started smoking about 17 years ago. He has a 8.5 pack-year smoking history. He has been exposed to tobacco smoke. He has never used smokeless tobacco. He reports that he does not currently use alcohol after a past usage of about 3.0 standard drinks of alcohol per week. He reports that he does not currently use drugs.    Home Medications:  albuterol sulfate HFA, pantoprazole, spironolactone, tiotropium bromide-olodaterol, and ursodiol    Allergies:  No Known Allergies    Objective    Objective     Vitals:   Temp:  [97.5 °F (36.4 °C)-99.2 °F (37.3 °C)] 97.5 °F (36.4 °C)  Heart Rate:  [] 92  Resp:  [16-18] 18  BP: (101-117)/(56-80) 101/67    Physical Exam:  Vital Signs Reviewed   General:  WDWN, Alert, NAD.  Severe pallor of mucosa, chronically ill-appearing male, lying in bed  HEENT:  PERRL, EOMI.  OP, nares clear, no sinus tenderness  Neck:  Supple, no JVD, no thyromegaly  Lymph: no axillary, cervical, supraclavicular lymphadenopathy noted bilaterally  Chest:  , diminished breath sounds bilaterally, right greater than left, dullness to percussion right lung base , currently on room air  CV: RRR, no MGR, pulses 2+,  equal.  Abd:  Soft, distended with ascites   EXT:  no clubbing, no cyanosis, no edema, no joint tenderness  Neuro:  A&Ox3, CN grossly intact, no focal deficits.  Skin: No rashes or lesions noted      Result Review    Result Review:  I have personally reviewed the results from the time of this admission to 2/4/2025 14:50 EST and agree with these findings:  [x]  Laboratory  [x]  Microbiology  [x]  Radiology  []  EKG/Telemetry   []  Cardiology/Vascular   []  Pathology  []  Old records  []  Other:  Most notable findings include:         Lab 02/04/25  1114 02/03/25  1857   WBC 6.25 12.21*   HEMOGLOBIN 6.8* 9.1*   HEMATOCRIT 21.3* 28.8*   PLATELETS 53* 100*   SODIUM 134* 131*   POTASSIUM 3.5 3.3*   CHLORIDE 94* 91*   CO2 27.5 27.2   BUN 31* 33*   CREATININE 2.34* 2.38*   GLUCOSE 96 109*   CALCIUM 8.3* 7.8*   PHOSPHORUS 3.7  --    TOTAL PROTEIN 5.6* 5.8*   ALBUMIN 3.3* 2.3*   GLOBULIN 2.3 3.5       Assessment & Plan   Assessment / Plan     Active Hospital Problems:  Active Hospital Problems    Diagnosis    • Acute renal failure    • Hepatic encephalopathy    • Cirrhosis of liver with ascites    • Hypokalemia    • Hyponatremia        Impression:  Large right pleural effusion  Ascites  Severe anemia  Alcoholic cirrhosis  Status post TIPS  Chronic kidney disease  Plan:  -X-rays reviewed showing large right pleural effusion and ascites CT scan reviewed  Ultrasound done at the bedside to confirm the pleural effusion and guide for thoracentesis  Procedure done at the bedside patient is agreeable and  risks of the procedure including pneumothorax hemothorax bleeding hypoxia and death explained to the patient patient has had thoracentesis done before and consent obtained  Almost 2 L of straw-colored fluid was aspirated  Chest x-ray ordered  Patient is already on Rocephin  Has been ordered blood transfusion for severe anemia  Respiratory panel negative  VTE Prophylaxis:  Mechanical VTE prophylaxis orders are present.    Code Status  and Medical Interventions: CPR (Attempt to Resuscitate); Full Support   Ordered at: 02/04/25 0542     Level Of Support Discussed With:    Patient     Code Status (Patient has no pulse and is not breathing):    CPR (Attempt to Resuscitate)     Medical Interventions (Patient has pulse or is breathing):    Full Support      Labs, imaging, notes and medications personally reviewed.    Thank you for involving me in the care of this patient.    Electronically signed by ANA LAURA Hughes, 02/04/25, 2:57 PM EST.This visit was performed by both a physician and an APC.  I personally evaluated and examined the patient.  I performed all aspects of MDM as documented.  I have reviewed and confirmed the accuracy of the patient's history as documented in this note and I have reexamined the patient and the results are consistent with the previously documented exam.  I have updated the documentation as necessary. Electronically signed by Davian Watt MD, 02/04/25, 3:03 PM EST.

## 2025-02-04 NOTE — CONSULTS
[facility]   Consult Note    Patient Name: Wai Gay  : 1985  MRN: [id number]  Primary Care Physician:  Callum Peterson DO  Referring Physician: No ref. provider found  Date of admission: 2/3/2025    Subjective   Subjective     Reason for Consult/ Chief Complaint: Acute kidney injury    HPI:  Wai Gay is a 39 y.o. male with past medical history significant for hypertension anxiety GERD alcoholic liver cirrhosis came to the emergency room not feeling well over the last couple of days and is extremely weak and more drowsy.  Patient had no fever chills but does complain of some abdominal discomfort.  He gets paracentesis very routinely.  In the emergency room patient's labs sodium 131 potassium 3.3 BUN 33 creatinine 2.38 baseline creatinine is around 1 total bili 3.7 and ammonia 172.  Patient had a CT abdomen which showed large right-sided effusion and also large volume ascites.  Patient had TIPS procedure done in the past also.  I was consulted for acute kidney injury    Review of Systems  All review of systems negative except as given below.    Personal History     Past Medical History:   Diagnosis Date   • Alcohol abuse 2017   • Anxiety    • Essential hypertension 2019   • GERD (gastroesophageal reflux disease)    • Hypokalemia 2017    Will update   • Hyponatremia 2017   • Steatohepatitis, alcoholic 2017   • Tobacco abuse 2017   • Umbilical hernia        Past Surgical History:   Procedure Laterality Date   • CHOLECYSTECTOMY     • ENDOSCOPY N/A 2022    Procedure: ESOPHAGOGASTRODUODENOSCOPY WITH BX;  Surgeon: Gino Khan MD;  Location: [facility] ENDOSCOPY;  Service: Gastroenterology;  Laterality: N/A;  RETAINED LIQUID FOOD IN STOMACH, HUFFMAN'S ESOPHAGUS   • ENDOSCOPY N/A 02/10/2023    Procedure: ESOPHAGOGASTRODUODENOSCOPY;  Surgeon: Gino Khan MD;  Location: [facility] ENDOSCOPY;  Service: Gastroenterology;   Laterality: N/A;  GASTRITIS, BARRETTS ESOPHAGUS, PHG   • UPPER GASTROINTESTINAL ENDOSCOPY         Family History: family history includes Alcohol abuse in his mother; Arthritis in his mother; COPD in his mother; Cancer in his paternal grandfather and paternal grandmother; Heart disease in his maternal grandfather and maternal grandmother; Hypertension in his maternal grandmother; Lung cancer in his maternal grandfather and maternal grandmother; Stroke in his maternal grandmother. Otherwise pertinent FHx was reviewed and not pertinent to current issue.    Social History:  reports that he has been smoking cigarettes. He started smoking about 17 years ago. He has a 8.5 pack-year smoking history. He has been exposed to tobacco smoke. He has never used smokeless tobacco. He reports that he does not currently use alcohol after a past usage of about 3.0 standard drinks of alcohol per week. He reports that he does not currently use drugs.    Home Medications:  Umeclidinium-Vilanterol, albuterol sulfate HFA, furosemide, midodrine, pantoprazole, potassium chloride, spironolactone, tiotropium bromide-olodaterol, and ursodiol    Allergies:  No Known Allergies    Objective    Objective     Vitals:   Temp:  [98.9 °F (37.2 °C)-99.2 °F (37.3 °C)] 98.9 °F (37.2 °C)  Heart Rate:  [] 93  Resp:  [16-18] 16  BP: (104-115)/(56-70) 107/61    Physical Exam:             Constitutional:         Awake, alert responsive, conversant, no obvious distress   Eyes:                       PERRLA, sclerae anicteric, no conjunctival injection   HEENT:                   Moist mucous membranes, no nasal or eye discharge, no throat congestion   Neck:                      Supple, no thyromegaly, no lymphadenopathy, trachea midline, no elevated JVD   Respiratory:           Clear to auscultation bilaterally, nonlabored respirations    Cardiovascular:     RRR, no murmurs, rubs, or gallops, palpable pedal pulses bilaterally, No bilateral ankle  edema   Gastrointestinal:   Positive bowel sounds, soft, nontender, non-distended, no organomegaly   Musculoskeletal:  No clubbing or cyanosis to extremities, muscle wasting, joint swelling, muscle weakness   Psychiatric:              Appropriate affect, cooperative   Neurologic:            Awake alert, oriented x 3, strength symmetric in all extremities, Cranial Nerves grossly intact to confrontation, speech clear   Skin:                      No rashes, bruising, skin ulcers, petechiae or ecchymosis    Result Review    Result Review:  I have personally reviewed the results from the time of this admission to 2/4/2025 07:42 EST and agree with these findings:  []  Laboratory  []  Microbiology  []  Radiology  []  EKG/Telemetry   []  Cardiology/Vascular   []  Pathology  []  Old records  []  Other:    Results from last 7 days   Lab Units 02/03/25  1857   WBC 10*3/mm3 12.21*   HEMOGLOBIN g/dL 9.1*   PLATELETS 10*3/mm3 100*     Results from last 7 days   Lab Units 02/03/25  1857   SODIUM mmol/L 131*   POTASSIUM mmol/L 3.3*   CHLORIDE mmol/L 91*   CO2 mmol/L 27.2   ANION GAP mmol/L 12.8   BUN mg/dL 33*   CREATININE mg/dL 2.38*   GLUCOSE mg/dL 109*   EGFR mL/min/1.73 35.1*   CALCIUM mg/dL 7.8*   BILIRUBIN mg/dL 3.7*   ALK PHOS U/L 301*   ALT (SGPT) U/L 44*   AST (SGOT) U/L 51*       Assessment & Plan   Assessment / Plan     Active Hospital Problems:  Active Hospital Problems    Diagnosis    • Acute renal failure    • Hepatic encephalopathy    • Cirrhosis of liver with ascites    • Hypokalemia    • Hyponatremia        Plan:   Start patient on lactulose  Start IV fluids  Place potassium  Gentle hydration  ProAmatine  Octreotide  IV albumin  Electronically signed by Jonathon Jeffers MD, 02/04/25, 7:36 AM EST.

## 2025-02-04 NOTE — PLAN OF CARE
Goal Outcome Evaluation:  Plan of Care Reviewed With: patient           Outcome Evaluation: pt is new in  the unit

## 2025-02-04 NOTE — PROGRESS NOTES
Patient seen and examined.  Receiving lactulose, midodrine, albumin.  GI, nephrology, pulmonology following, appreciate the recommendations.

## 2025-02-04 NOTE — H&P (VIEW-ONLY)
[facility]  Initial Inpatient Consult Note    Referring Provider: Hospitalist    Reason for Consultation: Cirrhosis, status post TIPS    Subjective     History of present illness:    39 y.o. male with a history of alcohol-related cirrhosis but sober for a few years, refractory right pleural effusion and ascites resulting in TIPS in November 2024, was admitted now with acute onset of nausea and vomiting nonbloody emesis and feeling poorly over the past 2 days.  He states that since his TIPS was performed he had been doing much better and not requiring repeat paracentesis and pleurocentesis like before.  On arrival here he had a CT scan of the abdomen pelvis which showed a large right pleural effusion and recurrent ascites but also was noted to have acute renal failure and creatinine of 2.38.  His initial hemoglobin was 9.1 and he denied any evidence of GI bleeding however since seen the patient his repeat hemoglobin showed 6.9 and he is receiving blood.  Is also on octreotide, Protonix, midodrine and nephrology has been seen the patient.    Past Medical History:  Past Medical History:   Diagnosis Date    Alcohol abuse 03/14/2017    Anxiety     Essential hypertension 12/27/2019    GERD (gastroesophageal reflux disease)     Hypokalemia 03/14/2017    Will update    Hyponatremia 03/14/2017    Steatohepatitis, alcoholic 03/14/2017    Tobacco abuse 03/14/2017    Umbilical hernia      Past Surgical History:  Past Surgical History:   Procedure Laterality Date    CHOLECYSTECTOMY      ENDOSCOPY N/A 02/09/2022    Procedure: ESOPHAGOGASTRODUODENOSCOPY WITH BX;  Surgeon: Gino Khan MD;  Location: [facility] ENDOSCOPY;  Service: Gastroenterology;  Laterality: N/A;  RETAINED LIQUID FOOD IN STOMACH, HUFFMAN'S ESOPHAGUS    ENDOSCOPY N/A 02/10/2023    Procedure: ESOPHAGOGASTRODUODENOSCOPY;  Surgeon: Gino Khan MD;  Location: [facility] ENDOSCOPY;  Service: Gastroenterology;  Laterality: N/A;   GASTRITIS, BARRETTS ESOPHAGUS, PHG    UPPER GASTROINTESTINAL ENDOSCOPY        Social History:   Social History     Tobacco Use    Smoking status: Every Day     Current packs/day: 0.50     Average packs/day: 0.5 packs/day for 17.1 years (8.5 ttl pk-yrs)     Types: Cigarettes     Start date: 1/1/2008     Passive exposure: Current    Smokeless tobacco: Never   Substance Use Topics    Alcohol use: Not Currently     Alcohol/week: 3.0 standard drinks of alcohol     Comment: FORMER      Family History:  Family History   Problem Relation Age of Onset    Alcohol abuse Mother     Arthritis Mother     COPD Mother     Heart disease Maternal Grandmother     Hypertension Maternal Grandmother     Lung cancer Maternal Grandmother     Stroke Maternal Grandmother     Heart disease Maternal Grandfather     Lung cancer Maternal Grandfather     Cancer Paternal Grandmother     Cancer Paternal Grandfather     Colon cancer Neg Hx     Malig Hyperthermia Neg Hx        Home Meds:  Medications Prior to Admission   Medication Sig Dispense Refill Last Dose/Taking    pantoprazole (PROTONIX) 40 MG EC tablet TAKE 1 TABLET BY MOUTH EVERY DAY 90 tablet 1 Past Week    spironolactone (ALDACTONE) 50 MG tablet Take 1 tablet by mouth Daily. 30 tablet 3 Past Week    tiotropium bromide-olodaterol (Stiolto Respimat) 2.5-2.5 MCG/ACT aerosol solution inhaler Inhale 2 puffs Daily. 1 each 11 2/3/2025    ursodiol (ACTIGALL) 300 MG capsule TAKE 2 CAPSULES BY MOUTH 2 (TWO) TIMES A DAY WITH MEALS FOR 90 DAYS. (Patient taking differently: Take 2 capsules by mouth 2 (Two) Times a Day.) 360 capsule 2 Past Week    albuterol sulfate  (90 Base) MCG/ACT inhaler INHALE 2 PUFFS EVERY 4 HOURS AS NEEDED FOR WHEEZING OR SHORTNESS OF AIR 54 g 3      Current Meds:   albumin human, 12.5 g, Intravenous, TID  albumin human, 25 g, Intravenous, Once  [START ON 2/5/2025] cefTRIAXone, 2,000 mg, Intravenous, Q24H  lactulose, 30 g, Oral, TID  midodrine, 10 mg, Oral, TID  AC  octreotide, 100 mcg, Subcutaneous, Q8H  pantoprazole, 40 mg, Intravenous, Q12H  sodium chloride, 10 mL, Intravenous, Q12H  ursodiol, 600 mg, Oral, BID      Allergies:  No Known Allergies  Review of Systems  Pertinent items are noted in HPI, all other systems reviewed and negative         Vital Signs  Temp:  [97.3 °F (36.3 °C)-99.2 °F (37.3 °C)] 97.8 °F (36.6 °C)  Heart Rate:  [] 95  Resp:  [16-18] 18  BP: (101-117)/(56-80) 107/73  Physical Exam:  General Appearance:    Alert, cooperative, in no acute distress   Head:    Normocephalic, without obvious abnormality, atraumatic   Eyes:          conjunctivae and sclerae normal, no   icterus   Throat:   no thrush, oral mucosa moist   Neck:   Supple, no adenopathy   Lungs:     Clear to auscultation bilaterally    Heart:    Regular rhythm and normal rate    Chest Wall:    No abnormalities observed   Abdomen:     Soft, nondistended, nontender; normal bowel sounds   Extremities:   no edema, no redness   Skin:   No bruising or rash, multiple tattoos on upper extremities bilaterally   Psychiatric:  normal mood and insight     Results Review:  [x]  Laboratory   [x]  Radiology  []  Pathology      I reviewed the patient's new clinical results.    Results from last 7 days   Lab Units 02/04/25  1551 02/04/25  1114 02/03/25  1857   WBC 10*3/mm3  --  6.25 12.21*   HEMOGLOBIN g/dL 6.9* 6.8* 9.1*   HEMATOCRIT % 22.1* 21.3* 28.8*   PLATELETS 10*3/mm3  --  53* 100*     Results from last 7 days   Lab Units 02/04/25  1114 02/03/25  1857   SODIUM mmol/L 134* 131*   POTASSIUM mmol/L 3.5 3.3*   CHLORIDE mmol/L 94* 91*   CO2 mmol/L 27.5 27.2   BUN mg/dL 31* 33*   CREATININE mg/dL 2.34* 2.38*   CALCIUM mg/dL 8.3* 7.8*   BILIRUBIN mg/dL 2.6* 3.7*   ALK PHOS U/L 215* 301*   ALT (SGPT) U/L 29 44*   AST (SGOT) U/L 31 51*   GLUCOSE mg/dL 96 109*     Results from last 7 days   Lab Units 02/04/25  0127   INR  1.77*     Lab Results   Lab Value Date/Time    LIPASE 61 (H) 02/03/2025 7810     LIPASE 166 (H) 11/19/2021 1307    LIPASE 142 (H) 09/07/2021 1705       Radiology:  XR Chest 1 View    Result Date: 2/4/2025  Impression: Status post right thoracentesis with a trace residual pleural effusion. No pneumothorax. Mild bibasilar airspace opacity consistent with atelectasis plus or minus superimposed pneumonia. Electronically Signed: Carlos Yancey MD  2/4/2025 3:02 PM EST  Workstation ID: [id number]    XR Chest 1 View    Result Date: 2/4/2025  Interval development of a large right pleural effusion with consolidation in the lower right lung. Electronically Signed: Dhruv Mendosa MD  2/4/2025 4:51 AM EST  Workstation ID: [id number]    CT Abdomen Pelvis Without Contrast    Result Date: 2/3/2025  Impression: 1.Large right-sided pleural effusion. Consolidative changes noted within the right lower lobe, most likely representing compressive atelectasis. Superimposed pneumonia is not completely excluded 2.Cirrhotic liver. 3.Large volume ascites. 4.Diffuse colonic and small bowel wall thickening. These findings are most likely due to portal venous congestion. Infectious or inflammatory enterocolitis is not completely excluded. 5.Patient is status post TIPS. Findings suggestive of periportal varices. Please note that evaluation is very limited on this study performed without IV and oral contrast. 6.Fat-containing paraumbilical hernia also containing a small amount of peritoneal fluid. 7.Diffuse soft tissue edema. Electronically Signed: Maynor Awan DO  2/3/2025 8:47 PM EST  Workstation ID: [id number]     Assessment & Plan       Hypokalemia    Hyponatremia    Cirrhosis of liver with ascites    Acute renal failure    Hepatic encephalopathy  Acute on chronic anemia    Plan:  Patient has been started on Rocephin and underwent pleurocentesis this afternoon.  His hemoglobin dropped without an overt GI bleed and therefore he is receiving blood and will need a total of 2 units PRBC.  We will tentatively plan to pursue  upper endoscopy tomorrow to evaluate his drop in hemoglobin.  Will also order an ultrasound to assess the patency of his TIPS is most likely his TIPS is nonfunctional given his recurrent pleural effusion and ascites.  Will continue Protonix twice daily and monitor his hemoglobin.      I discussed the patients findings and my recommendations with patient and family.    Gino Khan MD

## 2025-02-05 ENCOUNTER — ANESTHESIA (OUTPATIENT)
Dept: GASTROENTEROLOGY | Facility: HOSPITAL | Age: 40
End: 2025-02-05
Payer: COMMERCIAL

## 2025-02-05 ENCOUNTER — ANESTHESIA EVENT (OUTPATIENT)
Dept: GASTROENTEROLOGY | Facility: HOSPITAL | Age: 40
End: 2025-02-05
Payer: COMMERCIAL

## 2025-02-05 ENCOUNTER — APPOINTMENT (OUTPATIENT)
Dept: ULTRASOUND IMAGING | Facility: HOSPITAL | Age: 40
DRG: 432 | End: 2025-02-05
Payer: COMMERCIAL

## 2025-02-05 LAB
BACTERIA BLD CULT: ABNORMAL
BOTTLE TYPE: ABNORMAL
HCT VFR BLD AUTO: 22.6 % (ref 37.5–51)
HCT VFR BLD AUTO: 23.1 % (ref 37.5–51)
HGB BLD-MCNC: 7.2 G/DL (ref 13–17.7)
HGB BLD-MCNC: 7.2 G/DL (ref 13–17.7)
HOLD SPECIMEN: NORMAL
IRON 24H UR-MRATE: 25 MCG/DL (ref 59–158)
IRON SATN MFR SERPL: 17 % (ref 20–50)
MRSA DNA SPEC QL NAA+PROBE: NORMAL
TIBC SERPL-MCNC: 150 MCG/DL (ref 298–536)
TRANSFERRIN SERPL-MCNC: 101 MG/DL (ref 200–360)

## 2025-02-05 PROCEDURE — P9047 ALBUMIN (HUMAN), 25%, 50ML: HCPCS | Performed by: INTERNAL MEDICINE

## 2025-02-05 PROCEDURE — 36430 TRANSFUSION BLD/BLD COMPNT: CPT

## 2025-02-05 PROCEDURE — 84466 ASSAY OF TRANSFERRIN: CPT | Performed by: INTERNAL MEDICINE

## 2025-02-05 PROCEDURE — 25010000002 LIDOCAINE PF 2% 2 % SOLUTION: Performed by: NURSE ANESTHETIST, CERTIFIED REGISTERED

## 2025-02-05 PROCEDURE — 25010000002 ALBUMIN HUMAN 25% PER 50 ML: Performed by: INTERNAL MEDICINE

## 2025-02-05 PROCEDURE — 87641 MR-STAPH DNA AMP PROBE: CPT | Performed by: INTERNAL MEDICINE

## 2025-02-05 PROCEDURE — 76705 ECHO EXAM OF ABDOMEN: CPT

## 2025-02-05 PROCEDURE — 0DB68ZX EXCISION OF STOMACH, VIA NATURAL OR ARTIFICIAL OPENING ENDOSCOPIC, DIAGNOSTIC: ICD-10-PCS | Performed by: INTERNAL MEDICINE

## 2025-02-05 PROCEDURE — 25010000002 CEFTRIAXONE PER 250 MG: Performed by: STUDENT IN AN ORGANIZED HEALTH CARE EDUCATION/TRAINING PROGRAM

## 2025-02-05 PROCEDURE — 99232 SBSQ HOSP IP/OBS MODERATE 35: CPT | Performed by: INTERNAL MEDICINE

## 2025-02-05 PROCEDURE — 85018 HEMOGLOBIN: CPT | Performed by: INTERNAL MEDICINE

## 2025-02-05 PROCEDURE — 87040 BLOOD CULTURE FOR BACTERIA: CPT | Performed by: INTERNAL MEDICINE

## 2025-02-05 PROCEDURE — 43239 EGD BIOPSY SINGLE/MULTIPLE: CPT | Performed by: INTERNAL MEDICINE

## 2025-02-05 PROCEDURE — 25010000002 OCTREOTIDE PER 25 MCG: Performed by: INTERNAL MEDICINE

## 2025-02-05 PROCEDURE — 99233 SBSQ HOSP IP/OBS HIGH 50: CPT | Performed by: INTERNAL MEDICINE

## 2025-02-05 PROCEDURE — 25810000003 SODIUM CHLORIDE 0.9 % SOLUTION: Performed by: INTERNAL MEDICINE

## 2025-02-05 PROCEDURE — 94761 N-INVAS EAR/PLS OXIMETRY MLT: CPT

## 2025-02-05 PROCEDURE — 94799 UNLISTED PULMONARY SVC/PX: CPT

## 2025-02-05 PROCEDURE — 85014 HEMATOCRIT: CPT | Performed by: INTERNAL MEDICINE

## 2025-02-05 PROCEDURE — 25010000002 VANCOMYCIN 5 G RECONSTITUTED SOLUTION: Performed by: INTERNAL MEDICINE

## 2025-02-05 PROCEDURE — P9016 RBC LEUKOCYTES REDUCED: HCPCS

## 2025-02-05 PROCEDURE — 83540 ASSAY OF IRON: CPT | Performed by: INTERNAL MEDICINE

## 2025-02-05 PROCEDURE — 25810000003 SODIUM CHLORIDE 0.9 % SOLUTION

## 2025-02-05 PROCEDURE — 86900 BLOOD TYPING SEROLOGIC ABO: CPT

## 2025-02-05 PROCEDURE — 88305 TISSUE EXAM BY PATHOLOGIST: CPT | Performed by: INTERNAL MEDICINE

## 2025-02-05 PROCEDURE — 25010000002 PROPOFOL 10 MG/ML EMULSION: Performed by: NURSE ANESTHETIST, CERTIFIED REGISTERED

## 2025-02-05 RX ORDER — VANCOMYCIN/0.9 % SOD CHLORIDE 1.5G/250ML
1500 PLASTIC BAG, INJECTION (ML) INTRAVENOUS ONCE
Status: COMPLETED | OUTPATIENT
Start: 2025-02-05 | End: 2025-02-05

## 2025-02-05 RX ORDER — POTASSIUM CHLORIDE 750 MG/1
40 CAPSULE, EXTENDED RELEASE ORAL DAILY
Status: DISCONTINUED | OUTPATIENT
Start: 2025-02-05 | End: 2025-02-06

## 2025-02-05 RX ORDER — LIDOCAINE HYDROCHLORIDE 20 MG/ML
INJECTION, SOLUTION EPIDURAL; INFILTRATION; INTRACAUDAL; PERINEURAL AS NEEDED
Status: DISCONTINUED | OUTPATIENT
Start: 2025-02-05 | End: 2025-02-05 | Stop reason: SURG

## 2025-02-05 RX ORDER — SODIUM CHLORIDE 9 MG/ML
50 INJECTION, SOLUTION INTRAVENOUS CONTINUOUS
Status: ACTIVE | OUTPATIENT
Start: 2025-02-05 | End: 2025-02-05

## 2025-02-05 RX ORDER — ACETAMINOPHEN 500 MG
500 TABLET ORAL ONCE
Status: COMPLETED | OUTPATIENT
Start: 2025-02-05 | End: 2025-02-05

## 2025-02-05 RX ORDER — PROPOFOL 10 MG/ML
VIAL (ML) INTRAVENOUS AS NEEDED
Status: DISCONTINUED | OUTPATIENT
Start: 2025-02-05 | End: 2025-02-05 | Stop reason: SURG

## 2025-02-05 RX ADMIN — VANCOMYCIN HYDROCHLORIDE 1500 MG: 5 INJECTION, POWDER, LYOPHILIZED, FOR SOLUTION INTRAVENOUS at 14:13

## 2025-02-05 RX ADMIN — LACTULOSE 30 G: 20 SOLUTION ORAL at 18:16

## 2025-02-05 RX ADMIN — PROPOFOL 20 MG: 10 INJECTION, EMULSION INTRAVENOUS at 11:56

## 2025-02-05 RX ADMIN — SODIUM CHLORIDE 2000 MG: 9 INJECTION INTRAMUSCULAR; INTRAVENOUS; SUBCUTANEOUS at 02:26

## 2025-02-05 RX ADMIN — Medication 10 ML: at 09:18

## 2025-02-05 RX ADMIN — Medication 10 ML: at 20:04

## 2025-02-05 RX ADMIN — Medication 10 MG: at 21:05

## 2025-02-05 RX ADMIN — ALBUMIN (HUMAN) 12.5 G: 0.25 INJECTION, SOLUTION INTRAVENOUS at 09:11

## 2025-02-05 RX ADMIN — OCTREOTIDE ACETATE 100 MCG: 100 INJECTION, SOLUTION INTRAVENOUS; SUBCUTANEOUS at 21:05

## 2025-02-05 RX ADMIN — LACTULOSE 30 G: 20 SOLUTION ORAL at 20:04

## 2025-02-05 RX ADMIN — ALBUMIN (HUMAN) 12.5 G: 0.25 INJECTION, SOLUTION INTRAVENOUS at 18:05

## 2025-02-05 RX ADMIN — URSODIOL 600 MG: 300 CAPSULE ORAL at 20:04

## 2025-02-05 RX ADMIN — PROPOFOL 175 MCG/KG/MIN: 10 INJECTION, EMULSION INTRAVENOUS at 11:55

## 2025-02-05 RX ADMIN — PANTOPRAZOLE SODIUM 40 MG: 40 INJECTION, POWDER, FOR SOLUTION INTRAVENOUS at 20:04

## 2025-02-05 RX ADMIN — MIDODRINE HYDROCHLORIDE 10 MG: 10 TABLET ORAL at 18:09

## 2025-02-05 RX ADMIN — PROPOFOL 50 MG: 10 INJECTION, EMULSION INTRAVENOUS at 11:57

## 2025-02-05 RX ADMIN — OCTREOTIDE ACETATE 100 MCG: 100 INJECTION, SOLUTION INTRAVENOUS; SUBCUTANEOUS at 13:49

## 2025-02-05 RX ADMIN — PROPOFOL 80 MG: 10 INJECTION, EMULSION INTRAVENOUS at 11:55

## 2025-02-05 RX ADMIN — OCTREOTIDE ACETATE 100 MCG: 100 INJECTION, SOLUTION INTRAVENOUS; SUBCUTANEOUS at 05:11

## 2025-02-05 RX ADMIN — ACETAMINOPHEN 500 MG: 500 TABLET ORAL at 21:05

## 2025-02-05 RX ADMIN — LIDOCAINE HYDROCHLORIDE 50 MG: 20 INJECTION, SOLUTION EPIDURAL; INFILTRATION; INTRACAUDAL; PERINEURAL at 11:55

## 2025-02-05 RX ADMIN — PANTOPRAZOLE SODIUM 40 MG: 40 INJECTION, POWDER, FOR SOLUTION INTRAVENOUS at 09:21

## 2025-02-05 RX ADMIN — SODIUM CHLORIDE: 9 INJECTION, SOLUTION INTRAVENOUS at 11:46

## 2025-02-05 NOTE — PROGRESS NOTES
Pulmonary / Critical Care Progress Note      Patient Name: Wai Gay  : 1985  MRN: [id number]  Primary Care Physician:  Callum ePterson DO  Date of admission: 2/3/2025    Subjective   Subjective   Follow-up for pleural effusion with ascites cirrhosis liver    Over past 24 hours:  No acute events overnight.  Patient has had blood transfusion and is status post thoracentesis  Presently complains of nausea with weakness denies any shortness of breath      Review of Systems  Generalized weakness with nausea  No chest pain or shortness of breath      Objective   Objective     Vitals:   Temp:  [97.3 °F (36.3 °C)-98.4 °F (36.9 °C)] 98.4 °F (36.9 °C)  Heart Rate:  [] 94  Resp:  [16-18] 18  BP: ()/(62-73) 103/63  Physical Exam   Vital Signs Reviewed   General: WDWN, Alert, NAD.    HEENT:  PERRL, EOMI.  OP, nares clear, no sinus tenderness  Neck:  Supple, no JVD, no thyromegaly  Chest: Breath sounds diminished right lung base   CV: RRR, no MGR, pulses 2+, equal.  Abd:  Soft, NT, ascites  + BS,   EXT:  no clubbing, no cyanosis, no edema  Neuro:  A&Ox3, CN grossly intact, no focal deficits.  Skin: No rashes or lesions noted      Result Review    Result Review:  I have personally reviewed the results from the time of this admission to 2025 07:50 EST and agree with these findings:  [x]  Laboratory  [x]  Microbiology  [x]  Radiology  []  EKG/Telemetry   []  Cardiology/Vascular   []  Pathology  []  Old records  []  Other:  Most notable findings include: Chest x-ray showing right pleural effusion large    Assessment & Plan   Assessment / Plan     Active Hospital Problems:  Active Hospital Problems    Diagnosis     **Iron deficiency anemia     Acute renal failure     Hepatic encephalopathy     Cirrhosis of liver with ascites     Hypokalemia     Hyponatremia          Impression:  Large right pleural effusion  Ascites  Cirrhosis liver  Anemia  Plan:  Patient is status post thoracentesis  tolerated the procedure well fluid appears to be transudative cultures pending  Status post blood transfusion hemoglobin around 7 now  Continue home human albumin along with lactulose and octreotide  Repeat chest x-ray does not show any pneumothorax    VTE Prophylaxis:  Mechanical VTE prophylaxis orders are present.        CODE STATUS:   Level Of Support Discussed With: Patient  Code Status (Patient has no pulse and is not breathing): CPR (Attempt to Resuscitate)  Medical Interventions (Patient has pulse or is breathing): Full Support      Electronically signed by Davian Watt MD, 02/05/25, 7:50 AM EST.  Electronically signed by Davian Watt MD, 02/05/25, 1:35 PM EST.

## 2025-02-05 NOTE — ANESTHESIA POSTPROCEDURE EVALUATION
Patient: Wai Gay    Procedure Summary       Date: 02/05/25 Room / Location: [facility] ENDOSCOPY 2 / [facility] ENDOSCOPY    Anesthesia Start: 1146 Anesthesia Stop: 1209    Procedure: ESOPHAGOGASTRODUODENOSCOPY WITH BIOPSIES Diagnosis:       Iron deficiency anemia due to chronic blood loss      (Iron deficiency anemia due to chronic blood loss [D50.0])    Surgeons: Gino Khan MD Provider: Juan Burch CRNA    Anesthesia Type: general ASA Status: 3 - Emergent            Anesthesia Type: general    Vitals  Vitals Value Taken Time   /61 02/05/25 1238   Temp 36.9 °C (98.5 °F) 02/05/25 1220   Pulse 86 02/05/25 1239   Resp 16 02/05/25 1220   SpO2 92 % 02/05/25 1239   Vitals shown include unfiled device data.        Post Anesthesia Care and Evaluation    Patient location during evaluation: bedside  Patient participation: complete - patient participated  Level of consciousness: awake  Pain management: adequate    Airway patency: patent  Anesthetic complications: No anesthetic complications  PONV Status: controlled  Cardiovascular status: acceptable and stable  Respiratory status: acceptable

## 2025-02-05 NOTE — PROGRESS NOTES
"[facility] Clinical Pharmacy Services: Vancomycin Pharmacokinetic Initial Consult Note    Wai Gay is a 39 y.o. male who is on day 1 of pharmacy to dose vancomycin for Bacteremia.    Consult Information  Consulting Provider: Dr. Lomeli  Planned Duration of Therapy: 10 days  Was Patient Receiving Prior to Admission/Consult?: No  Loading Dose Ordered or Given: 1500 mg on 25  PK/PD Target: -600 mg/L.hr   Other Antimicrobials: Ceftriaxone    Imaging Reviewed?: Yes    Microbiology Data  MRSA PCR performed: No; Result: Pending  Culture/Source:    Blood Cx: In process   Body Fluid Cx: No growth   Fungal Cx: In process   Respiratory: Nothing detected    Vitals/Labs  Ht: 172.7 cm (68\"); Wt: 71.8 kg (158 lb 4.6 oz)  Temp (24hrs), Av.8 °F (36.6 °C), Min:97.3 °F (36.3 °C), Max:98.4 °F (36.9 °C)   Estimated Creatinine Clearance: 43.9 mL/min (A) (by C-G formula based on SCr of 2.34 mg/dL (H)).       Results from last 7 days   Lab Units 25  1114 25  1857   CREATININE mg/dL 2.34* 2.38*   WBC 10*3/mm3 6.25 12.21*     Assessment/Plan:    Vancomycin loading dose of 1500mg I.V. was ordered followd by 1250mg I.V. every 24 hours; which provides the following predicted parameters:    AUC24 (range)400-600 mg/L.hr   AUC24,ss: 564 mg/L.hr  Probability of AUC24 > 400: 86 %  Ctrough,ss: 17.6 mg/L  Probability of Ctrough,ss > 20: 37 %  Probability of nephrotoxicity (Lodise PRISCA ): 13 %    Vanc Random ordered for 25 at 0600  Patient has order for Basic Metabolic Panel    Pharmacy will follow patient's kidney function and will adjust doses and obtain levels as necessary. Thank you for involving pharmacy in this patient's care. Please contact pharmacy with any questions or concerns.                           Francis Rodriguez  Clinical Pharmacist    "

## 2025-02-05 NOTE — PLAN OF CARE
Goal Outcome Evaluation:  Plan of Care Reviewed With: patient           Outcome Evaluation: Pt VSS. NPO since midnight for possible ESOPHAGOGASTRODUODENOSCOPY. No c/o pain or discomfort throughout. Pt slep well. Continue plan of care.

## 2025-02-05 NOTE — PROGRESS NOTES
[facility]     Progress Note    Patient Name: Wai Gay  : 1985  MRN: [id number]  Primary Care Physician:  Callum Peterson DO  Date of admission: 2/3/2025    Subjective patient is fully awake alert responsive oriented not in any acute distress he is more awake than yesterday he had a BM last night    Review of Systems  All review of systems are negative except as mentioned in subjective complaints.    Objective   Objective     Vitals:   Temp:  [97.3 °F (36.3 °C)-98.4 °F (36.9 °C)] 98.4 °F (36.9 °C)  Heart Rate:  [] 94  Resp:  [16-18] 18  BP: ()/(62-73) 103/63  Physical Exam    Constitutional: Awake, alert responsive, conversant, no obvious distress              Psychiatric:  Appropriate affect, cooperative   Neurologic:  Awake alert ,oriented x 3, strength symmetric in all extremities, Cranial Nerves grossly intact to confrontation, speech clear   Eyes:   PERRLA, sclerae anicteric, no conjunctival injection   HEENT:  Moist mucous membranes, no nasal or eye discharge, no throat congestion   Neck:   Supple, no thyromegaly, no lymphadenopathy, trachea midline, no elevated JVD   Respiratory:  Clear to auscultation bilaterally, nonlabored respirations    Cardiovascular: RRR, no murmurs, rubs, or gallops, palpable pedal pulses bilaterally, No bilateral ankle edema   Gastrointestinal: Positive bowel sounds, soft, nontender, nondistended, no organomegaly   Musculoskeletal:  No clubbing or cyanosis to extremities,muscle wasting, joint swelling, muscle weakness             Skin:                      No rashes, bruising, skin ulcers, petechiae or ecchymosis    Result Review    Result Review:  I have personally reviewed the results from the time of this admission to 2025 08:06 EST and agree with these findings:  []  Laboratory  []  Microbiology  []  Radiology  []  EKG/Telemetry   []  Cardiology/Vascular   []  Pathology  []  Old records  []  Other:    Results from last 7 days    Lab Units 02/05/25  0024 02/04/25  2118 02/04/25  1551 02/04/25  1114 02/03/25  1857   WBC 10*3/mm3  --   --   --  6.25 12.21*   HEMOGLOBIN g/dL 7.2* 7.6* 6.9* 6.8* 9.1*   PLATELETS 10*3/mm3  --   --   --  53* 100*     Results from last 7 days   Lab Units 02/04/25  1114 02/03/25  1857   SODIUM mmol/L 134* 131*   POTASSIUM mmol/L 3.5 3.3*   CHLORIDE mmol/L 94* 91*   CO2 mmol/L 27.5 27.2   ANION GAP mmol/L 12.5 12.8   BUN mg/dL 31* 33*   CREATININE mg/dL 2.34* 2.38*   GLUCOSE mg/dL 96 109*   EGFR mL/min/1.73 35.8* 35.1*   CALCIUM mg/dL 8.3* 7.8*   MAGNESIUM mg/dL 2.2  --    BILIRUBIN mg/dL 2.6* 3.7*   ALK PHOS U/L 215* 301*   ALT (SGPT) U/L 29 44*   AST (SGOT) U/L 31 51*       Assessment & Plan   Assessment / Plan       Active Hospital Problems:    Active Hospital Problems    Diagnosis  POA   • **Iron deficiency anemia [D50.9]  Unknown   • Acute renal failure [N17.9]  Unknown   • Hepatic encephalopathy [K76.82]  Unknown     Start p.o. lactulose     • Cirrhosis of liver with ascites [K74.60, R18.8]  Yes   • Hypokalemia [E87.6]  Yes     Will update     • Hyponatremia [E87.1]  Yes       Plan:   Replace potassium  Continue supportive care and we need to keep hemodynamic stability which will help to improve renal function.  Creatinine is slightly better  Continue lactulose and check ammonia level tomorrow       Electronically signed by Jonathon Jeffers MD, 02/05/25, 8:06 AM EST.

## 2025-02-05 NOTE — NURSING NOTE
Patient was seen by Dr. Khan previously today and in MD note and per day shift nurse Dr. Khan had wanted 2 unit PRBC given due to hgb of 6.9. One unit was ordered and day shift nurse stated that she had reached out to MD about orders for second unit but had not received a response prior to leaving for the shift. I reached out to hospitalist at this time for clarification if another unit was desired for the patient at this time. Currently waiting on a response from hospitalist.

## 2025-02-05 NOTE — PROGRESS NOTES
"    [facility]    S: Patient seen and examined.  Overall feels well.  No acute events noted overnight.    O: /63 (BP Location: Left arm, Patient Position: Lying)   Pulse 94   Temp 98.4 °F (36.9 °C) (Oral)   Resp 18   Ht 172.7 cm (68\")   Wt 71.8 kg (158 lb 4.6 oz)   SpO2 92%   BMI 24.07 kg/m²   HPI  GENERAL: Age-appropriate, no acute distress  EARS,NOSE, MOUTH, THROAT:  MMM  EYES: PERRLA, EOMI  CV:  NL S1/S2  RESPIRATORY:  CTAB no w/c/r  GI: Distended but soft, nontender to palpation.  SKIN: No rash or lesions. No nodules.  INT: No edema. No joint deformity.  PSYCH:  Normal affect, A+O x 3  NEURO: Alert and oriented, grossly nonfocal.    Scheduled Meds:albumin human, 12.5 g, Intravenous, TID  albumin human, 25 g, Intravenous, Once  cefTRIAXone, 2,000 mg, Intravenous, Q24H  lactulose, 30 g, Oral, TID  midodrine, 10 mg, Oral, TID AC  octreotide, 100 mcg, Subcutaneous, Q8H  pantoprazole, 40 mg, Intravenous, Q12H  potassium chloride, 40 mEq, Oral, Daily  sodium chloride, 10 mL, Intravenous, Q12H  ursodiol, 600 mg, Oral, BID  [START ON 2/6/2025] vancomycin, 1,250 mg, Intravenous, Q24H  vancomycin, 1,500 mg, Intravenous, Once      Continuous Infusions:Pharmacy to dose vancomycin,       PRN Meds:.  albuterol    nitroglycerin    ondansetron    Pharmacy to dose vancomycin    sodium chloride    sodium chloride    sodium chloride    Labs: Laboratory information reviewed and reconciled.    Results from last 7 days   Lab Units 02/04/25  1114   WBC 10*3/mm3 6.25         Results from last 7 days   Lab Units 02/04/25  1114   CO2 mmol/L 27.5   BUN mg/dL 31*   CREATININE mg/dL 2.34*   GLUCOSE mg/dL 96         Results from last 7 days   Lab Units 02/04/25  0127   INR  1.77*         Imaging (last 24 hours):    XR Chest 1 View    Result Date: 2/4/2025  Impression: Status post right thoracentesis with a trace residual pleural effusion. No pneumothorax. Mild bibasilar airspace opacity " consistent with atelectasis plus or minus superimposed pneumonia. Electronically Signed: Carlos Yancey MD  2/4/2025 3:02 PM EST  Workstation ID: [id number]    XR Chest 1 View    Result Date: 2/4/2025  Interval development of a large right pleural effusion with consolidation in the lower right lung. Electronically Signed: Dhruv Mendosa MD  2/4/2025 4:51 AM EST  Workstation ID: [id number]    CT Abdomen Pelvis Without Contrast    Result Date: 2/3/2025  Impression: 1.Large right-sided pleural effusion. Consolidative changes noted within the right lower lobe, most likely representing compressive atelectasis. Superimposed pneumonia is not completely excluded 2.Cirrhotic liver. 3.Large volume ascites. 4.Diffuse colonic and small bowel wall thickening. These findings are most likely due to portal venous congestion. Infectious or inflammatory enterocolitis is not completely excluded. 5.Patient is status post TIPS. Findings suggestive of periportal varices. Please note that evaluation is very limited on this study performed without IV and oral contrast. 6.Fat-containing paraumbilical hernia also containing a small amount of peritoneal fluid. 7.Diffuse soft tissue edema. Electronically Signed: Maynor Awan DO  2/3/2025 8:47 PM EST  Workstation ID: [id number]    Assessment/plan:  # Acute on chronic anemia  -No obvious source of bleeding  -GI following and plan for endoscopy today  -Continue Protonix and octreotide  -Continue Rocephin    # Pleural effusion  -Status post thoracentesis  -Follow-up fluid studies    # Hepatic encephalopathy  -Remains on lactulose    # NICKI  -Nephrology following  -Monitor serial labs  -Renally dose medications    # Gram-positive bacteremia  -Possible contaminant  -Vancomycin for now  -Repeat cultures    # Possible right lower lobe pneumonia  -Continue Rocephin as above  -Pulmonology following    # Decompensated cirrhosis  -GI following  -Serial labs  -Serial abdominal exams        DVT Prophylaxis:  SCDs  Disposition: Continue to monitor  Discharge disposition: Home when stable  Prognosis: Guarded    Leif Lomeli Jr, MD  [facility] Hospitalist  02/05/25  08:43 EST

## 2025-02-05 NOTE — ANESTHESIA PREPROCEDURE EVALUATION
Anesthesia Evaluation     Patient summary reviewed and Nursing notes reviewed   NPO Solid Status: > 8 hours  NPO Liquid Status: > 8 hours           Airway   Mallampati: II  TM distance: >3 FB  Neck ROM: full  No difficulty expected  Dental    (+) edentulous    Pulmonary     breath sounds clear to auscultation  (+) pleural effusion, a smoker (socially smokes) Current, Abstained day of surgery, cigarettes,shortness of breath  Cardiovascular - normal exam  Exercise tolerance: good (4-7 METS)    Rhythm: regular  Rate: normal    (+) hypertension well controlled      Neuro/Psych  (+) psychiatric history Anxiety  GI/Hepatic/Renal/Endo    (+) GERD, hepatitis, liver disease cirrhosis, renal disease- ARF    Musculoskeletal     Abdominal    Substance History   (+) alcohol use (sober x's 4 yrs)     OB/GYN          Other        ROS/Med Hx Other: Iron def anemia d/t chronic blood loss    Labs:   02/05/25 07:52  Hemoglobin: 7.2 (L)  Hematocrit: 22.6 (L)  02/04/25 11:14  Sodium: 134 (L)  Potassium: 3.5  Chloride: 94 (L)  CO2: 27.5  Anion Gap: 12.5  BUN: 31 (H)  Creatinine: 2.34 (H)  BUN/Creatinine Ratio: 13.2  eGFR: 35.8 (L)  Glucose: 96  Calcium: 8.3 (L)  Magnesium: 2.2  Phosphorus: 3.7  Alkaline Phosphatase: 215 (H)  Total Protein: 5.6 (L)  Albumin: 3.3 (L)      Currently receiving 2 unit of PRBC's since 02/04      Phys Exam Other: Jaundice               Anesthesia Plan    ASA 3 - emergent     general   total IV anesthesia  (Total IV Anesthesia    Patient understands anesthesia not responsible for dental damage.      Discussed risks with pt including aspiration, allergic reactions, apnea, advanced airway placement. Pt verbalized understanding. All questions answered.     )  intravenous induction     Anesthetic plan, risks, benefits, and alternatives have been provided, discussed and informed consent has been obtained with: patient.  Pre-procedure education provided  Plan discussed with CRNA.      CODE STATUS:    Level Of Support  Discussed With: Patient  Code Status (Patient has no pulse and is not breathing): CPR (Attempt to Resuscitate)  Medical Interventions (Patient has pulse or is breathing): Full Support

## 2025-02-06 LAB
AMMONIA BLD-SCNC: 50 UMOL/L (ref 16–60)
ANION GAP SERPL CALCULATED.3IONS-SCNC: 8 MMOL/L (ref 5–15)
BACTERIA SPEC AEROBE CULT: ABNORMAL
BASOPHILS # BLD AUTO: 0.03 10*3/MM3 (ref 0–0.2)
BASOPHILS NFR BLD AUTO: 0.8 % (ref 0–1.5)
BH BB BLOOD EXPIRATION DATE: NORMAL
BH BB BLOOD EXPIRATION DATE: NORMAL
BH BB BLOOD TYPE BARCODE: 5100
BH BB BLOOD TYPE BARCODE: 5100
BH BB DISPENSE STATUS: NORMAL
BH BB DISPENSE STATUS: NORMAL
BH BB PRODUCT CODE: NORMAL
BH BB PRODUCT CODE: NORMAL
BH BB UNIT NUMBER: NORMAL
BH BB UNIT NUMBER: NORMAL
BUN SERPL-MCNC: 24 MG/DL (ref 6–20)
BUN/CREAT SERPL: 13.5 (ref 7–25)
CALCIUM SPEC-SCNC: 7.6 MG/DL (ref 8.6–10.5)
CHLORIDE SERPL-SCNC: 104 MMOL/L (ref 98–107)
CO2 SERPL-SCNC: 27 MMOL/L (ref 22–29)
CREAT SERPL-MCNC: 1.78 MG/DL (ref 0.76–1.27)
CROSSMATCH INTERPRETATION: NORMAL
CROSSMATCH INTERPRETATION: NORMAL
CYTO UR: NORMAL
DEPRECATED RDW RBC AUTO: 59.3 FL (ref 37–54)
EGFRCR SERPLBLD CKD-EPI 2021: 49.8 ML/MIN/1.73
EOSINOPHIL # BLD AUTO: 0.32 10*3/MM3 (ref 0–0.4)
EOSINOPHIL NFR BLD AUTO: 8.9 % (ref 0.3–6.2)
ERYTHROCYTE [DISTWIDTH] IN BLOOD BY AUTOMATED COUNT: 18.1 % (ref 12.3–15.4)
GLUCOSE SERPL-MCNC: 84 MG/DL (ref 65–99)
GRAM STN SPEC: ABNORMAL
HCT VFR BLD AUTO: 23.8 % (ref 37.5–51)
HGB BLD-MCNC: 7.6 G/DL (ref 13–17.7)
IMM GRANULOCYTES # BLD AUTO: 0.02 10*3/MM3 (ref 0–0.05)
IMM GRANULOCYTES NFR BLD AUTO: 0.6 % (ref 0–0.5)
INR PPP: 2.05 (ref 0.86–1.15)
ISOLATED FROM: ABNORMAL
LAB AP CASE REPORT: NORMAL
LAB AP CLINICAL INFORMATION: NORMAL
LAB AP FLOW CYTOMETRY SUMMARY: NORMAL
LYMPHOCYTES # BLD AUTO: 0.62 10*3/MM3 (ref 0.7–3.1)
LYMPHOCYTES NFR BLD AUTO: 17.3 % (ref 19.6–45.3)
Lab: NORMAL
MAGNESIUM SERPL-MCNC: 2 MG/DL (ref 1.6–2.6)
MCH RBC QN AUTO: 28.9 PG (ref 26.6–33)
MCHC RBC AUTO-ENTMCNC: 31.9 G/DL (ref 31.5–35.7)
MCV RBC AUTO: 90.5 FL (ref 79–97)
MONOCYTES # BLD AUTO: 0.49 10*3/MM3 (ref 0.1–0.9)
MONOCYTES NFR BLD AUTO: 13.7 % (ref 5–12)
NEUTROPHILS NFR BLD AUTO: 2.1 10*3/MM3 (ref 1.7–7)
NEUTROPHILS NFR BLD AUTO: 58.7 % (ref 42.7–76)
NRBC BLD AUTO-RTO: 0 /100 WBC (ref 0–0.2)
PATH REPORT.FINAL DX SPEC: NORMAL
PATH REPORT.GROSS SPEC: NORMAL
PLATELET # BLD AUTO: 56 10*3/MM3 (ref 140–450)
PMV BLD AUTO: 9.6 FL (ref 6–12)
POTASSIUM SERPL-SCNC: 3.3 MMOL/L (ref 3.5–5.2)
PROTHROMBIN TIME: 23.5 SECONDS (ref 11.8–14.9)
RBC # BLD AUTO: 2.63 10*6/MM3 (ref 4.14–5.8)
SODIUM SERPL-SCNC: 139 MMOL/L (ref 136–145)
UNIT  ABO: NORMAL
UNIT  ABO: NORMAL
UNIT  RH: NORMAL
UNIT  RH: NORMAL
VANCOMYCIN SERPL-MCNC: 13.93 MCG/ML (ref 5–40)
WBC NRBC COR # BLD AUTO: 3.58 10*3/MM3 (ref 3.4–10.8)

## 2025-02-06 PROCEDURE — 85610 PROTHROMBIN TIME: CPT | Performed by: INTERNAL MEDICINE

## 2025-02-06 PROCEDURE — 97161 PT EVAL LOW COMPLEX 20 MIN: CPT

## 2025-02-06 PROCEDURE — 25010000002 VANCOMYCIN 5 G RECONSTITUTED SOLUTION: Performed by: INTERNAL MEDICINE

## 2025-02-06 PROCEDURE — 25010000002 CEFTRIAXONE PER 250 MG: Performed by: INTERNAL MEDICINE

## 2025-02-06 PROCEDURE — 80202 ASSAY OF VANCOMYCIN: CPT | Performed by: INTERNAL MEDICINE

## 2025-02-06 PROCEDURE — 25010000002 MORPHINE PER 10 MG: Performed by: STUDENT IN AN ORGANIZED HEALTH CARE EDUCATION/TRAINING PROGRAM

## 2025-02-06 PROCEDURE — 80048 BASIC METABOLIC PNL TOTAL CA: CPT | Performed by: INTERNAL MEDICINE

## 2025-02-06 PROCEDURE — 83735 ASSAY OF MAGNESIUM: CPT | Performed by: PHYSICIAN ASSISTANT

## 2025-02-06 PROCEDURE — 85025 COMPLETE CBC W/AUTO DIFF WBC: CPT | Performed by: INTERNAL MEDICINE

## 2025-02-06 PROCEDURE — 82140 ASSAY OF AMMONIA: CPT | Performed by: INTERNAL MEDICINE

## 2025-02-06 PROCEDURE — 99232 SBSQ HOSP IP/OBS MODERATE 35: CPT | Performed by: INTERNAL MEDICINE

## 2025-02-06 PROCEDURE — 25010000002 OCTREOTIDE PER 25 MCG: Performed by: INTERNAL MEDICINE

## 2025-02-06 PROCEDURE — 99231 SBSQ HOSP IP/OBS SF/LOW 25: CPT | Performed by: INTERNAL MEDICINE

## 2025-02-06 PROCEDURE — 25810000003 SODIUM CHLORIDE 0.9 % SOLUTION: Performed by: INTERNAL MEDICINE

## 2025-02-06 RX ORDER — MORPHINE SULFATE 2 MG/ML
1 INJECTION, SOLUTION INTRAMUSCULAR; INTRAVENOUS ONCE
Status: COMPLETED | OUTPATIENT
Start: 2025-02-06 | End: 2025-02-06

## 2025-02-06 RX ORDER — PANTOPRAZOLE SODIUM 40 MG/1
40 TABLET, DELAYED RELEASE ORAL
Status: DISCONTINUED | OUTPATIENT
Start: 2025-02-07 | End: 2025-02-07 | Stop reason: HOSPADM

## 2025-02-06 RX ORDER — OXYCODONE HYDROCHLORIDE 5 MG/1
2.5 TABLET ORAL ONCE AS NEEDED
Status: COMPLETED | OUTPATIENT
Start: 2025-02-06 | End: 2025-02-06

## 2025-02-06 RX ORDER — TROLAMINE SALICYLATE 10 G/100G
1 CREAM TOPICAL AS NEEDED
Status: DISCONTINUED | OUTPATIENT
Start: 2025-02-06 | End: 2025-02-07 | Stop reason: HOSPADM

## 2025-02-06 RX ORDER — POTASSIUM CHLORIDE 750 MG/1
40 CAPSULE, EXTENDED RELEASE ORAL 2 TIMES DAILY WITH MEALS
Status: COMPLETED | OUTPATIENT
Start: 2025-02-06 | End: 2025-02-06

## 2025-02-06 RX ORDER — CYCLOBENZAPRINE HCL 5 MG
5 TABLET ORAL ONCE AS NEEDED
Status: COMPLETED | OUTPATIENT
Start: 2025-02-06 | End: 2025-02-06

## 2025-02-06 RX ADMIN — SODIUM CHLORIDE 2000 MG: 9 INJECTION INTRAMUSCULAR; INTRAVENOUS; SUBCUTANEOUS at 02:17

## 2025-02-06 RX ADMIN — LACTULOSE 30 G: 20 SOLUTION ORAL at 09:25

## 2025-02-06 RX ADMIN — POTASSIUM CHLORIDE 40 MEQ: 750 CAPSULE, EXTENDED RELEASE ORAL at 17:21

## 2025-02-06 RX ADMIN — MIDODRINE HYDROCHLORIDE 10 MG: 10 TABLET ORAL at 17:21

## 2025-02-06 RX ADMIN — Medication 10 ML: at 21:35

## 2025-02-06 RX ADMIN — MORPHINE SULFATE 1 MG: 2 INJECTION, SOLUTION INTRAMUSCULAR; INTRAVENOUS at 05:20

## 2025-02-06 RX ADMIN — Medication 10 MG: at 21:35

## 2025-02-06 RX ADMIN — PANTOPRAZOLE SODIUM 40 MG: 40 INJECTION, POWDER, FOR SOLUTION INTRAVENOUS at 09:25

## 2025-02-06 RX ADMIN — CYCLOBENZAPRINE HYDROCHLORIDE 5 MG: 5 TABLET, FILM COATED ORAL at 03:44

## 2025-02-06 RX ADMIN — OCTREOTIDE ACETATE 100 MCG: 100 INJECTION, SOLUTION INTRAVENOUS; SUBCUTANEOUS at 05:14

## 2025-02-06 RX ADMIN — OCTREOTIDE ACETATE 100 MCG: 100 INJECTION, SOLUTION INTRAVENOUS; SUBCUTANEOUS at 21:34

## 2025-02-06 RX ADMIN — OCTREOTIDE ACETATE 100 MCG: 100 INJECTION, SOLUTION INTRAVENOUS; SUBCUTANEOUS at 14:54

## 2025-02-06 RX ADMIN — MIDODRINE HYDROCHLORIDE 10 MG: 10 TABLET ORAL at 11:49

## 2025-02-06 RX ADMIN — OXYCODONE HYDROCHLORIDE 2.5 MG: 5 TABLET ORAL at 02:17

## 2025-02-06 RX ADMIN — Medication 10 ML: at 09:29

## 2025-02-06 RX ADMIN — Medication 1 APPLICATION: at 04:39

## 2025-02-06 RX ADMIN — LACTULOSE 30 G: 20 SOLUTION ORAL at 14:54

## 2025-02-06 RX ADMIN — URSODIOL 600 MG: 300 CAPSULE ORAL at 21:35

## 2025-02-06 RX ADMIN — LACTULOSE 30 G: 20 SOLUTION ORAL at 21:34

## 2025-02-06 RX ADMIN — URSODIOL 600 MG: 300 CAPSULE ORAL at 09:25

## 2025-02-06 RX ADMIN — MIDODRINE HYDROCHLORIDE 10 MG: 10 TABLET ORAL at 09:28

## 2025-02-06 RX ADMIN — VANCOMYCIN HYDROCHLORIDE 1250 MG: 5 INJECTION, POWDER, LYOPHILIZED, FOR SOLUTION INTRAVENOUS at 09:26

## 2025-02-06 RX ADMIN — POTASSIUM CHLORIDE 40 MEQ: 750 CAPSULE, EXTENDED RELEASE ORAL at 09:26

## 2025-02-06 NOTE — PLAN OF CARE
Goal Outcome Evaluation:  Plan of Care Reviewed With: patient        Progress: no change  Outcome Evaluation: Vitals WNL. Pt has been resting well. No complaints of pain this shift. Pt did receive 1 unit of RBC this shift. The RBC was hung when the pt was down in postop. The blood was still running and was stopped by the primary nurse when he returned to the floor. Pt had an ultrasound of his liver. No other complanits this shift. Will continue plan of care.

## 2025-02-06 NOTE — PROGRESS NOTES
[facility]     Progress Note    Patient Name: Wai Gay  : 1985  MRN: [id number]  Primary Care Physician:  Callum Peterson DO  Date of admission: 2/3/2025    Subjective patient is fully awake alert responsive interactive not in any acute distress.  His mental status is much more clear ammonia level is down and renal function is improving    Review of Systems  All review of systems are negative except as mentioned in subjective complaints.    Objective   Objective     Vitals:   Temp:  [97.1 °F (36.2 °C)-99.1 °F (37.3 °C)] 98.6 °F (37 °C)  Heart Rate:  [77-90] 80  Resp:  [15-24] 16  BP: ()/(54-76) 112/76  Physical Exam    Constitutional: Awake, alert responsive, conversant, no obvious distress              Psychiatric:  Appropriate affect, cooperative   Neurologic:  Awake alert ,oriented x 3, strength symmetric in all extremities, Cranial Nerves grossly intact to confrontation, speech clear   Eyes:   PERRLA, sclerae anicteric, no conjunctival injection   HEENT:  Moist mucous membranes, no nasal or eye discharge, no throat congestion   Neck:   Supple, no thyromegaly, no lymphadenopathy, trachea midline, no elevated JVD   Respiratory:  Clear to auscultation bilaterally, nonlabored respirations    Cardiovascular: RRR, no murmurs, rubs, or gallops, palpable pedal pulses bilaterally, No bilateral ankle edema   Gastrointestinal: Positive bowel sounds, soft, nontender, nondistended, no organomegaly   Musculoskeletal:  No clubbing or cyanosis to extremities,muscle wasting, joint swelling, muscle weakness             Skin:                      No rashes, bruising, skin ulcers, petechiae or ecchymosis    Result Review    Result Review:  I have personally reviewed the results from the time of this admission to 2025 07:57 EST and agree with these findings:  []  Laboratory  []  Microbiology  []  Radiology  []  EKG/Telemetry   []  Cardiology/Vascular   []  Pathology  []  Old records  []   Other:    Results from last 7 days   Lab Units 02/06/25  0530 02/05/25  0752 02/05/25  0024 02/04/25  2118 02/04/25  1551 02/04/25  1114 02/03/25  1857   WBC 10*3/mm3 3.58  --   --   --   --  6.25 12.21*   HEMOGLOBIN g/dL 7.6* 7.2* 7.2* 7.6* 6.9* 6.8* 9.1*   PLATELETS 10*3/mm3 56*  --   --   --   --  53* 100*     Results from last 7 days   Lab Units 02/06/25  0530 02/04/25  1114 02/03/25  1857 02/03/25  1857   SODIUM mmol/L 139 134*  --  131*   POTASSIUM mmol/L 3.3* 3.5  --  3.3*   CHLORIDE mmol/L 104 94*  --  91*   CO2 mmol/L 27.0 27.5  --  27.2   ANION GAP mmol/L 8.0 12.5  --  12.8   BUN mg/dL 24* 31*  --  33*   CREATININE mg/dL 1.78* 2.34*  --  2.38*   GLUCOSE mg/dL 84 96  --  109*   EGFR mL/min/1.73 49.8* 35.8*  --  35.1*   CALCIUM mg/dL 7.6* 8.3*  --  7.8*   MAGNESIUM mg/dL 2.0 2.2   < >  --    BILIRUBIN mg/dL  --  2.6*  --  3.7*   ALK PHOS U/L  --  215*  --  301*   ALT (SGPT) U/L  --  29  --  44*   AST (SGOT) U/L  --  31  --  51*    < > = values in this interval not displayed.       Assessment & Plan   Assessment / Plan       Active Hospital Problems:    Active Hospital Problems    Diagnosis  POA   • **Iron deficiency anemia [D50.9]  Unknown   • Acute renal failure [N17.9]  Unknown   • Hepatic encephalopathy [K76.82]  Unknown     Start p.o. lactulose     • Cirrhosis of liver with ascites [K74.60, R18.8]  Yes   • Hypokalemia [E87.6]  Yes     Will update     • Hyponatremia [E87.1]  Yes       Plan:   Replace potassium aggressively  Patient should ambulate       Electronically signed by Jonathon Jeffers MD, 02/06/25, 7:57 AM EST.

## 2025-02-06 NOTE — PLAN OF CARE
Goal Outcome Evaluation:  Plan of Care Reviewed With: patient           Outcome Evaluation: VSS. alert and orientedx4. Pt c/o BLE muscle and joints 7/10 pain , one time dose  flexeril 5mg tab given, per pt intervention not effective and frequent ask for pain medication. See MAR for pain medications  ordered. . MRSA swab  obatianed and negative result. Continue plan of care.

## 2025-02-06 NOTE — THERAPY EVALUATION
Acute Care - Physical Therapy Initial Evaluation  [facility]     Patient Name: Wai Gay  : 1985  MRN: [id number]  Today's Date: 2025      Visit Dx:     ICD-10-CM ICD-9-CM   1. Hepatic encephalopathy  K76.82 572.2   2. Alcoholic cirrhosis of liver with ascites  K70.31 571.2   3. Other ascites  R18.8 789.59   4. Recurrent pleural effusion on right  J90 511.9   5. Weakness generalized  R53.1 780.79   6. Iron deficiency anemia due to chronic blood loss  D50.0 280.0   7. Difficulty walking  R26.2 719.7     Patient Active Problem List   Diagnosis    Acute liver failure without hepatic coma    Acute hepatitis    Hypokalemia    Hyponatremia    Steatohepatitis, alcoholic    History of alcohol abuse    Tobacco abuse    Generalized abdominal pain    Need for influenza vaccination    Essential hypertension    Need for hepatitis A and B vaccination    Need for vaccination against Streptococcus pneumoniae    Need for meningococcal vaccination    Need for shingles vaccine    Last's esophagus without dysplasia    Umbilical hernia without obstruction and without gangrene    Acute hypoxic respiratory failure    Personal history of PE (pulmonary embolism)    Pleural effusion    Dyspnea    SBP (spontaneous bacterial peritonitis)    Peritonitis    Hospital discharge follow-up    ERRONEOUS ENCOUNTER--DISREGARD    Sepsis, due to unspecified organism, unspecified whether acute organ dysfunction present    Cirrhosis of liver with ascites    Iron deficiency anemia    Acute renal failure    Hepatic encephalopathy     Past Medical History:   Diagnosis Date    Alcohol abuse 2017    Anxiety     Essential hypertension 2019    GERD (gastroesophageal reflux disease)     Hypokalemia 2017    Will update    Hyponatremia 2017    Steatohepatitis, alcoholic 2017    Tobacco abuse 2017    Umbilical hernia      Past Surgical History:   Procedure Laterality Date    CHOLECYSTECTOMY      ENDOSCOPY  N/A 02/09/2022    Procedure: ESOPHAGOGASTRODUODENOSCOPY WITH BX;  Surgeon: Gino Khan MD;  Location: [facility] ENDOSCOPY;  Service: Gastroenterology;  Laterality: N/A;  RETAINED LIQUID FOOD IN STOMACH, HUFFMAN'S ESOPHAGUS    ENDOSCOPY N/A 02/10/2023    Procedure: ESOPHAGOGASTRODUODENOSCOPY;  Surgeon: Gino Khan MD;  Location: [facility] ENDOSCOPY;  Service: Gastroenterology;  Laterality: N/A;  GASTRITIS, BARRETTS ESOPHAGUS, PHG    ENDOSCOPY N/A 2/5/2025    Procedure: ESOPHAGOGASTRODUODENOSCOPY WITH BIOPSIES;  Surgeon: Gino Khan MD;  Location: [facility] ENDOSCOPY;  Service: Gastroenterology;  Laterality: N/A;  ESOPHAGEAL VARICES, PORTAL HYPERTENSIVE GASTROPATHY    UPPER GASTROINTESTINAL ENDOSCOPY       PT Assessment (Last 12 Hours)       PT Evaluation and Treatment       Row Name 02/06/25 1200          Physical Therapy Time and Intention    Subjective Information no complaints  -AV     Document Type evaluation  -AV     Mode of Treatment individual therapy;physical therapy  -AV       Row Name 02/06/25 1200          General Information    Patient Profile Reviewed yes  -AV     Patient Observations alert;cooperative;agree to therapy  -AV     Prior Level of Function independent:;all household mobility;gait;transfer;ADL's  Ambulated without an assistive device. No home O2.  -AV     Equipment Currently Used at Home none  -AV     Existing Precautions/Restrictions no known precautions/restrictions  -AV       Row Name 02/06/25 1200          Living Environment    Current Living Arrangements home  -AV     Home Accessibility stairs to enter home  -AV     People in Home alone  Reports parents are supportive, able to assist if needed  -AV       Row Name 02/06/25 1200          Cognition    Orientation Status (Cognition) oriented x 3  -AV       Row Name 02/06/25 1200          Range of Motion (ROM)    Range of Motion bilateral lower extremities;ROM is WFL  -AV       Row Name 02/06/25 1200          Strength  (Manual Muscle Testing)    Strength (Manual Muscle Testing) bilateral lower extremities;strength is WFL  -AV       Row Name 02/06/25 1200          Bed Mobility    Bed Mobility bed mobility (all) activities  -AV     All Activities, [place] (Bed Mobility) independent  -AV       Row Name 02/06/25 1200          Transfers    Transfers sit-stand transfer;stand-sit transfer  -AV       Row Name 02/06/25 1200          Sit-Stand Transfer    Sit-Stand [place] (Transfers) independent  -AV     Assistive Device (Sit-Stand Transfers) --  No AD  -AV       Row Name 02/06/25 1200          Stand-Sit Transfer    Stand-Sit [place] (Transfers) independent  -AV     Assistive Device (Stand-Sit Transfers) --  No AD  -AV       Row Name 02/06/25 1200          Gait/Stairs (Locomotion)    Gait/Stairs Locomotion gait/ambulation independence;gait/ambulation assistive device;distance ambulated  -AV     [place] Level (Gait) independent  -AV     Assistive Device (Gait) --  No AD  -AV     Distance in Feet (Gait) 60  -AV     Pattern (Gait) step-through  -AV     Comment, (Gait/Stairs) Patient has been up ad rancho prior to PT arrival  -AV       Row Name 02/06/25 1200          Balance    Balance Assessment standing dynamic balance  -AV     Dynamic Standing Balance independent  -AV     Position/Device Used, Standing Balance unsupported  -AV       Row Name 02/06/25 1200          Plan of Care Review    Plan of Care Reviewed With patient  -AV     Progress no change  -AV     Outcome Evaluation Patient does not present with any significant decline in functional mobility requiring inpatient PT services. He is completing transfers and ambulating independently and is safe to continue doing so until his discharge from the hospital. He is in agreement no services are needed at this time and will be discharged from PT caseload.  -AV       Row Name 02/06/25 1200          Positioning and Restraints    Pre-Treatment Position in bed  -AV     Post  Treatment Position bed  -AV     In Bed supine;call light within reach;encouraged to call for assist  No alarms active upon therapist entry  -AV       Row Name 02/06/25 1200          Therapy Assessment/Plan (PT)    Criteria for Skilled Interventions Met (PT) no problems identified which require skilled intervention  -AV     Therapy Frequency (PT) evaluation only  -AV       Row Name 02/06/25 1200          PT Evaluation Complexity    History, PT Evaluation Complexity no personal factors and/or comorbidities  -AV     Examination of Body Systems (PT Eval Complexity) total of 4 or more elements  -AV     Clinical Presentation (PT Evaluation Complexity) stable  -AV     Clinical Decision Making (PT Evaluation Complexity) low complexity  -AV     Overall Complexity (PT Evaluation Complexity) low complexity  -AV       Row Name 02/06/25 1200          Therapy Plan Review/Discharge Plan (PT)    Therapy Plan Review (PT) evaluation/treatment results reviewed;patient  -AV       Row Name 02/06/25 1200          Physical Therapy Goals    Problem Specific Goal Selection (PT) problem specific goal 1, PT  -AV       Row Name 02/06/25 1200          Problem Specific Goal 1 (PT)    Problem Specific Goal 1 (PT) Complete PT evaluation  -AV     Time Frame (Problem Specific Goal 1, PT) 1 day  -AV     Progress/Outcome (Problem Specific Goal 1, PT) goal met  -AV               User Key  (r) = Recorded By, (t) = Taken By, (c) = Cosigned By      Initials Name Provider Type    Giovani Love, PT Physical Therapist                    Physical Therapy Education       Title: PT OT SLP Therapies (In Progress)       Topic: Physical Therapy (In Progress)       Point: Mobility training (Done)       Learning Progress Summary            Patient Acceptance, E,TB, VU by AV at 2/6/2025 1227                      Point: Home exercise program (Not Started)       Learner Progress:  Not documented in this visit.              Point: Body mechanics (Done)        Learning Progress Summary            Patient Acceptance, E,TB, VU by AV at 2/6/2025 1227                      Point: Precautions (Done)       Learning Progress Summary            Patient Acceptance, E,TB, VU by AV at 2/6/2025 1227                                      User Key       Initials Effective Dates Name Provider Type Discipline    AV 06/11/21 -  Giovani Acevedo, PT Physical Therapist PT                  PT Recommendation and Plan  Anticipated Discharge Disposition (PT): home  Therapy Frequency (PT): evaluation only  Plan of Care Reviewed With: patient  Progress: no change  Outcome Evaluation: Patient does not present with any significant decline in functional mobility requiring inpatient PT services. He is completing transfers and ambulating independently and is safe to continue doing so until his discharge from the hospital. He is in agreement no services are needed at this time and will be discharged from PT caseload.   Outcome Measures       Row Name 02/06/25 1200             How much help from another person do you currently need...    Turning from your back to your side while in flat bed without using bedrails? 4  -AV      Moving from lying on back to sitting on the side of a flat bed without bedrails? 4  -AV      Moving to and from a bed to a chair (including a wheelchair)? 4  -AV      Standing up from a chair using your arms (e.g., wheelchair, bedside chair)? 4  -AV      Climbing 3-5 steps with a railing? 4  -AV      To walk in hospital room? 4  -AV      AM-PAC 6 Clicks Score (PT) 24  -AV         Functional Assessment    Outcome Measure Options AM-PAC 6 Clicks Basic Mobility (PT)  -AV                User Key  (r) = Recorded By, (t) = Taken By, (c) = Cosigned By      Initials Name Provider Type    AV Giovani Acevedo, PT Physical Therapist                     Time Calculation:    PT Charges       Row Name 02/06/25 1227             Time Calculation    PT Received On 02/06/25  -AV         Untimed  Charges    PT Eval/Re-eval Minutes 20  -AV         Total Minutes    Untimed Charges Total Minutes 20  -AV       Total Minutes 20  -AV                User Key  (r) = Recorded By, (t) = Taken By, (c) = Cosigned By      Initials Name Provider Type    AV Giovani Acevedo, PT Physical Therapist                  Therapy Charges for Today       Code Description Service Date Service Provider Modifiers Qty    54400617052 HC PT EVAL LOW COMPLEXITY 2 2/6/2025 Giovani Acevedo, PT GP 1            PT G-Codes  Outcome Measure Options: AM-PAC 6 Clicks Basic Mobility (PT)  AM-PAC 6 Clicks Score (PT): 24    Giovani Acevedo, PT  2/6/2025

## 2025-02-06 NOTE — PROGRESS NOTES
Pulmonary / Critical Care Progress Note      Patient Name: Wai Gay  : 1985  MRN: [id number]  Primary Care Physician:  Callum Peterson DO  Date of admission: 2/3/2025    Subjective   Subjective   Follow-up for pleural effusion with ascites cirrhosis liver    Over past 24 hours:  No acute events overnight.  Patient has had blood transfusion and is status post thoracentesis  Presently sitting up in bed no distress denies any chest pain or hemoptysis       Review of Systems  Generalized weakness with nausea  No chest pain or shortness of breath      Objective   Objective     Vitals:   Temp:  [97.1 °F (36.2 °C)-99.1 °F (37.3 °C)] 98.6 °F (37 °C)  Heart Rate:  [77-94] 86  Resp:  [15-24] 18  BP: ()/(54-73) 109/73  Physical Exam   Vital Signs Reviewed   General: WDWN, Alert, NAD.    HEENT:  PERRL, EOMI.  OP, nares clear, no sinus tenderness  Neck:  Supple, no JVD, no thyromegaly  Chest: Breath sounds diminished right lung base   CV: RRR, no MGR, pulses 2+, equal.  Abd:  Soft, NT, ascites  + BS,   EXT:  no clubbing, no cyanosis, no edema  Neuro:  A&Ox3, CN grossly intact, no focal deficits.  Skin: No rashes or lesions noted      Result Review    Result Review:  I have personally reviewed the results from the time of this admission to 2025 07:12 EST and agree with these findings:  [x]  Laboratory  [x]  Microbiology  [x]  Radiology  []  EKG/Telemetry   []  Cardiology/Vascular   []  Pathology  []  Old records  []  Other:  Most notable findings include: Chest x-ray showing right pleural effusion large    Assessment & Plan   Assessment / Plan     Active Hospital Problems:  Active Hospital Problems    Diagnosis     **Iron deficiency anemia     Acute renal failure     Hepatic encephalopathy     Cirrhosis of liver with ascites     Hypokalemia     Hyponatremia          Impression:  Large right pleural effusion  Ascites  Cirrhosis liver  Anemia  Plan:  Patient is status post thoracentesis  tolerated the procedure well fluid appears to be transudative cultures no growth   Status post blood transfusion hemoglobin around 7 now being followed by gastroenterology  Blood cultures show staph coagulase-negative likely contaminant  VTE Prophylaxis:  Mechanical VTE prophylaxis orders are present.        CODE STATUS:   Level Of Support Discussed With: Patient  Code Status (Patient has no pulse and is not breathing): CPR (Attempt to Resuscitate)  Medical Interventions (Patient has pulse or is breathing): Full Support  Electronically signed by Davian Watt MD, 02/06/25, 2:46 PM EST.

## 2025-02-06 NOTE — PLAN OF CARE
Problem: Adult Inpatient Plan of Care  Goal: Plan of Care Review  Outcome: Progressing  Flowsheets (Taken 2/6/2025 1602)  Progress: no change  Outcome Evaluation: Patient is alert and oriented x4. Patient has had no complaints this shift and has no complaints at this time.  Plan of Care Reviewed With: patient  Goal: Patient-Specific Goal (Individualized)  Outcome: Progressing  Goal: Absence of Hospital-Acquired Illness or Injury  Outcome: Progressing  Intervention: Identify and Manage Fall Risk  Recent Flowsheet Documentation  Taken 2/6/2025 1455 by Lyudmila Londono RN  Safety Promotion/Fall Prevention:   assistive device/personal items within reach   clutter free environment maintained   fall prevention program maintained   lighting adjusted   nonskid shoes/slippers when out of bed   room organization consistent   safety round/check completed  Taken 2/6/2025 1350 by Lyudmila Londono, RN  Safety Promotion/Fall Prevention:   assistive device/personal items within reach   clutter free environment maintained   fall prevention program maintained   lighting adjusted   nonskid shoes/slippers when out of bed   room organization consistent   safety round/check completed  Taken 2/6/2025 1149 by Lyudmila Londono, RN  Safety Promotion/Fall Prevention:   assistive device/personal items within reach   clutter free environment maintained   fall prevention program maintained   lighting adjusted   nonskid shoes/slippers when out of bed   room organization consistent   safety round/check completed  Taken 2/6/2025 1046 by Lyudmila Londono, RN  Safety Promotion/Fall Prevention:   assistive device/personal items within reach   clutter free environment maintained   fall prevention program maintained   lighting adjusted   nonskid shoes/slippers when out of bed   room organization consistent   safety round/check completed  Taken 2/6/2025 0926 by Lyudmila Londono, RN  Safety Promotion/Fall Prevention:   assistive device/personal items within  reach   clutter free environment maintained   fall prevention program maintained   lighting adjusted   nonskid shoes/slippers when out of bed   room organization consistent   safety round/check completed  Taken 2/6/2025 0712 by Lyudmila Londono RN  Safety Promotion/Fall Prevention:   assistive device/personal items within reach   clutter free environment maintained   fall prevention program maintained   lighting adjusted   nonskid shoes/slippers when out of bed   room organization consistent   safety round/check completed  Intervention: Prevent Infection  Recent Flowsheet Documentation  Taken 2/6/2025 1455 by Lyudmila Londono RN  Infection Prevention:   cohorting utilized   environmental surveillance performed   equipment surfaces disinfected   hand hygiene promoted   personal protective equipment utilized   rest/sleep promoted   single patient room provided  Taken 2/6/2025 1350 by Lyudmila Londono RN  Infection Prevention:   cohorting utilized   environmental surveillance performed   equipment surfaces disinfected   hand hygiene promoted   personal protective equipment utilized   rest/sleep promoted   single patient room provided  Taken 2/6/2025 1149 by Lyudmila Londono RN  Infection Prevention:   cohorting utilized   environmental surveillance performed   equipment surfaces disinfected   hand hygiene promoted   personal protective equipment utilized   rest/sleep promoted   single patient room provided  Taken 2/6/2025 1046 by Lyudmila Londono RN  Infection Prevention:   cohorting utilized   environmental surveillance performed   equipment surfaces disinfected   hand hygiene promoted   personal protective equipment utilized   rest/sleep promoted   single patient room provided  Taken 2/6/2025 0926 by Lyudmila Londono RN  Infection Prevention:   cohorting utilized   environmental surveillance performed   equipment surfaces disinfected   hand hygiene promoted   personal protective equipment utilized   rest/sleep  promoted   single patient room provided  Taken 2/6/2025 0712 by Lyudmila Londono RN  Infection Prevention:   cohorting utilized   environmental surveillance performed   equipment surfaces disinfected   hand hygiene promoted   personal protective equipment utilized   rest/sleep promoted   single patient room provided  Goal: Optimal Comfort and Wellbeing  Outcome: Progressing  Intervention: Provide Person-Centered Care  Recent Flowsheet Documentation  Taken 2/6/2025 0926 by Lyudmila Londono RN  Trust Relationship/Rapport:   care explained   choices provided   emotional support provided   empathic listening provided   questions answered   questions encouraged   reassurance provided   thoughts/feelings acknowledged  Goal: Readiness for Transition of Care  Outcome: Progressing     Problem: Nausea and Vomiting  Goal: Nausea and Vomiting Relief  Outcome: Progressing  Intervention: Prevent and Manage Nausea and Vomiting  Recent Flowsheet Documentation  Taken 2/6/2025 0800 by Lyudmila Londono RN  Oral Care: oral rinse provided     Problem: Skin Injury Risk Increased  Goal: Skin Health and Integrity  Outcome: Progressing     Problem: Sepsis/Septic Shock  Goal: Optimal Coping  Outcome: Progressing  Goal: Absence of Bleeding  Outcome: Progressing  Goal: Blood Glucose Level Within Target Range  Outcome: Progressing  Goal: Absence of Infection Signs and Symptoms  Outcome: Progressing  Intervention: Initiate Sepsis Management  Recent Flowsheet Documentation  Taken 2/6/2025 1455 by Lyudmila Londono RN  Infection Prevention:   cohorting utilized   environmental surveillance performed   equipment surfaces disinfected   hand hygiene promoted   personal protective equipment utilized   rest/sleep promoted   single patient room provided  Taken 2/6/2025 1350 by Lyudmila Londono RN  Infection Prevention:   cohorting utilized   environmental surveillance performed   equipment surfaces disinfected   hand hygiene promoted   personal  protective equipment utilized   rest/sleep promoted   single patient room provided  Taken 2/6/2025 1149 by Lyudmila Londono RN  Infection Prevention:   cohorting utilized   environmental surveillance performed   equipment surfaces disinfected   hand hygiene promoted   personal protective equipment utilized   rest/sleep promoted   single patient room provided  Taken 2/6/2025 1046 by Lyudmila Londono RN  Infection Prevention:   cohorting utilized   environmental surveillance performed   equipment surfaces disinfected   hand hygiene promoted   personal protective equipment utilized   rest/sleep promoted   single patient room provided  Taken 2/6/2025 0926 by Lyudmila Londono RN  Infection Prevention:   cohorting utilized   environmental surveillance performed   equipment surfaces disinfected   hand hygiene promoted   personal protective equipment utilized   rest/sleep promoted   single patient room provided  Taken 2/6/2025 0712 by Lyudmila Londono RN  Infection Prevention:   cohorting utilized   environmental surveillance performed   equipment surfaces disinfected   hand hygiene promoted   personal protective equipment utilized   rest/sleep promoted   single patient room provided  Goal: Optimal Nutrition Delivery  Outcome: Progressing     Problem: Fall Injury Risk  Goal: Absence of Fall and Fall-Related Injury  Outcome: Progressing  Intervention: Identify and Manage Contributors  Recent Flowsheet Documentation  Taken 2/6/2025 0926 by Lyudmila Londono RN  Medication Review/Management:   medications reviewed   high-risk medications identified  Intervention: Promote Injury-Free Environment  Recent Flowsheet Documentation  Taken 2/6/2025 1455 by Lyudmila Londono RN  Safety Promotion/Fall Prevention:   assistive device/personal items within reach   clutter free environment maintained   fall prevention program maintained   lighting adjusted   nonskid shoes/slippers when out of bed   room organization consistent   safety  round/check completed  Taken 2/6/2025 1350 by Lyudmila Londono RN  Safety Promotion/Fall Prevention:   assistive device/personal items within reach   clutter free environment maintained   fall prevention program maintained   lighting adjusted   nonskid shoes/slippers when out of bed   room organization consistent   safety round/check completed  Taken 2/6/2025 1149 by Lyudmila Londono RN  Safety Promotion/Fall Prevention:   assistive device/personal items within reach   clutter free environment maintained   fall prevention program maintained   lighting adjusted   nonskid shoes/slippers when out of bed   room organization consistent   safety round/check completed  Taken 2/6/2025 1046 by Lyudmila Londono RN  Safety Promotion/Fall Prevention:   assistive device/personal items within reach   clutter free environment maintained   fall prevention program maintained   lighting adjusted   nonskid shoes/slippers when out of bed   room organization consistent   safety round/check completed  Taken 2/6/2025 0926 by Lyudmila Londono RN  Safety Promotion/Fall Prevention:   assistive device/personal items within reach   clutter free environment maintained   fall prevention program maintained   lighting adjusted   nonskid shoes/slippers when out of bed   room organization consistent   safety round/check completed  Taken 2/6/2025 0712 by Lyudmila Londono RN  Safety Promotion/Fall Prevention:   assistive device/personal items within reach   clutter free environment maintained   fall prevention program maintained   lighting adjusted   nonskid shoes/slippers when out of bed   room organization consistent   safety round/check completed   Goal Outcome Evaluation:  Plan of Care Reviewed With: patient        Progress: no change  Outcome Evaluation: Patient is alert and oriented x4. Patient has had no complaints this shift and has no complaints at this time.

## 2025-02-06 NOTE — PLAN OF CARE
Goal Outcome Evaluation:  Plan of Care Reviewed With: patient        Progress: no change  Outcome Evaluation: Patient does not present with any significant decline in functional mobility requiring inpatient PT services. He is completing transfers and ambulating independently and is safe to continue doing so until his discharge from the hospital. He is in agreement no services are needed at this time and will be discharged from PT caseload.    Anticipated Discharge Disposition (PT): home

## 2025-02-06 NOTE — PROGRESS NOTES
"[facility] Clinical Pharmacy Services: Vancomycin Pharmacokinetic Consult Note    Wai Gay is a 39 y.o. male who is on day 2 of pharmacy to dose vancomycin for Bacteremia.    Consult Information  Consulting Provider: Dr. Lomeli  Planned Duration of Therapy: 10 days  Was Patient Receiving Prior to Admission/Consult?: No  Loading Dose Ordered or Given: 1500 mg on 25  PK/PD Target: -600 mg/L.hr   Other Antimicrobials: Ceftriaxone    Imaging Reviewed?: Yes    Microbiology Data  MRSA PCR performed: Yes; Result: No MRSA (Provider aware)  Culture/Source:    Blood Cx:  Staph spp, not aureus or lugdunensis (possible contaminant, MD wants to continue vancomycin and will repeat cultures; noted in outreach by pharmacy)   Body Fluid Cx: No growth   Fungal Cx: In process   Respiratory: Nothing detected    Vitals/Labs  Ht: 172.7 cm (68\"); Wt: 71.8 kg (158 lb 4.6 oz)  Temp (24hrs), Av.1 °F (36.7 °C), Min:97.1 °F (36.2 °C), Max:99.1 °F (37.3 °C)   Estimated Creatinine Clearance: 57.7 mL/min (A) (by C-G formula based on SCr of 1.78 mg/dL (H)).       Results from last 7 days   Lab Units 25  0530 25  1114 25  1857   VANCOMYCIN RM mcg/mL 13.93  --   --    CREATININE mg/dL 1.78* 2.34* 2.38*   WBC 10*3/mm3 3.58 6.25 12.21*     Assessment/Plan:    Vancomycin level results this AM support continuing the current regimen.  Vancomycin 1250mg I.V. every 24 hours provides the following updated predicted parameters:              AUC24 (range)400-600 mg/L.hr   AUC24,ss: 578 mg/L.hr  Probability of AUC24 > 400: 98 %  Ctrough,ss: 16.3 mg/L  Probability of Ctrough,ss > 20: 23 %  Probability of nephrotoxicity (Lodise PRISCA ): 12 %    No new levels ordered at this time.  Patient has order for Basic Metabolic Panel    Pharmacy will follow patient's kidney function and will adjust doses and obtain levels as necessary. Thank you for involving pharmacy in this patient's care. Please contact " pharmacy with any questions or concerns.                           Ella Joshua  Clinical Pharmacist

## 2025-02-06 NOTE — PROGRESS NOTES
[facility]  Inpatient Progress Note    Reason for Follow Up: Cirrhosis, anemia, status post TIPS, right pleural effusion, ascites  Subjective     Interval History:   Feels better today, denies shortness of breath, hemoglobin stable    Current Facility-Administered Medications:     albumin human 25 % IV SOLN 25 g, 25 g, Intravenous, Once, Keith Gallegos MD    albuterol (PROVENTIL) nebulizer solution 0.083% 2.5 mg/3mL, 2.5 mg, Nebulization, Q6H PRN, Gino Khan MD    cefTRIAXone (ROCEPHIN) in NS 2 GRAMS/20ml IV PUSH syringe, 2,000 mg, Intravenous, Q24H, Gino Khan MD, 2,000 mg at 02/06/25 0217    lactulose (CHRONULAC) 10 GM/15ML solution 30 g, 30 g, Oral, TID, Gino Khan MD, 30 g at 02/06/25 1454    melatonin tablet 10 mg, 10 mg, Oral, Nightly, Shar Odessa, PA, 10 mg at 02/05/25 2105    midodrine (PROAMATINE) tablet 10 mg, 10 mg, Oral, TID AC, Gino Khan MD, 10 mg at 02/06/25 1721    nitroglycerin (NITROSTAT) SL tablet 0.4 mg, 0.4 mg, Sublingual, Q5 Min PRN, Gino Khan MD    octreotide (sandoSTATIN) injection 100 mcg, 100 mcg, Subcutaneous, Q8H, Gino Khan MD, 100 mcg at 02/06/25 1454    ondansetron (ZOFRAN) injection 4 mg, 4 mg, Intravenous, Q6H PRN, Gino Khan MD, 4 mg at 02/04/25 1632    [START ON 2/7/2025] pantoprazole (PROTONIX) EC tablet 40 mg, 40 mg, Oral, Q AM, Leif Lomeli Jr., MD    sodium chloride 0.9 % flush 10 mL, 10 mL, Intravenous, PRN, Gino Khan MD    sodium chloride 0.9 % flush 10 mL, 10 mL, Intravenous, Q12H, Gino Khan MD, 10 mL at 02/06/25 0929    sodium chloride 0.9 % flush 10 mL, 10 mL, Intravenous, PRN, Gino Khan MD    sodium chloride 0.9 % infusion 40 mL, 40 mL, Intravenous, PRN, Gino Khan MD    trolamine salicylate (ASPERCREME) 10 % cream 1 Application, 1 Application, Topical, PRN, Shar, Moriah PA, 1 Application at  02/06/25 0439    ursodiol (ACTIGALL) capsule 600 mg, 600 mg, Oral, BID, Gino Khan MD, 600 mg at 02/06/25 0925  Review of Systems:    All systems were reviewed and negative except for that previously mentioned in the HPI    Objective     Vital Signs  Temp:  [98.1 °F (36.7 °C)-99.1 °F (37.3 °C)] 98.6 °F (37 °C)  Heart Rate:  [75-90] 76  Resp:  [16-18] 18  BP: (104-112)/(66-76) 111/66  Body mass index is 24.07 kg/m².    Intake/Output Summary (Last 24 hours) at 2/6/2025 1725  Last data filed at 2/6/2025 1230  Gross per 24 hour   Intake 960 ml   Output --   Net 960 ml     I/O this shift:  In: 480 [P.O.:480]  Out: -      Physical Exam:   General: patient awake, alert and cooperative   Eyes: Normal lids and lashes, no scleral icterus   Neck: supple, normal ROM   Skin: warm and dry, not jaundiced   Cardiovascular: regular rhythm and rate, no murmurs auscultated   Pulm: clear to auscultation bilaterally, regular and unlabored   Abdomen: soft, nontender, nondistended; normal bowel sounds   Rectal: deferred   Extremities: no rash or edema   Psychiatric: Normal mood and behavior; memory intact     Results Review:     I reviewed the patient's new clinical results.    Results from last 7 days   Lab Units 02/06/25  0530 02/05/25  0752 02/05/25  0024 02/04/25  1551 02/04/25  1114 02/03/25  1857   WBC 10*3/mm3 3.58  --   --   --  6.25 12.21*   HEMOGLOBIN g/dL 7.6* 7.2* 7.2*   < > 6.8* 9.1*   HEMATOCRIT % 23.8* 22.6* 23.1*   < > 21.3* 28.8*   PLATELETS 10*3/mm3 56*  --   --   --  53* 100*    < > = values in this interval not displayed.     Results from last 7 days   Lab Units 02/06/25  0530 02/04/25  1114 02/03/25  1857   SODIUM mmol/L 139 134* 131*   POTASSIUM mmol/L 3.3* 3.5 3.3*   CHLORIDE mmol/L 104 94* 91*   CO2 mmol/L 27.0 27.5 27.2   BUN mg/dL 24* 31* 33*   CREATININE mg/dL 1.78* 2.34* 2.38*   CALCIUM mg/dL 7.6* 8.3* 7.8*   BILIRUBIN mg/dL  --  2.6* 3.7*   ALK PHOS U/L  --  215* 301*   ALT (SGPT) U/L  --  29 44*    AST (SGOT) U/L  --  31 51*   GLUCOSE mg/dL 84 96 109*     Results from last 7 days   Lab Units 02/06/25  0530 02/04/25  0127   INR  2.05* 1.77*     Lab Results   Lab Value Date/Time    LIPASE 61 (H) 02/03/2025 1857    LIPASE 166 (H) 11/19/2021 1307    LIPASE 142 (H) 09/07/2021 1705       Radiology:  US Liver    Result Date: 2/5/2025  Impression: 1. TIPS shunt is patent, but velocities throughout the TIPS shunt are not able to be obtained secondary to technical factors. 2. Right effusion and ascites noted. Electronically Signed: Chicho Quijano MD  2/5/2025 11:48 AM EST  Workstation ID: [id number]         Assessment:       Iron deficiency anemia    Hypokalemia    Hyponatremia    Cirrhosis of liver with ascites    Acute renal failure    Hepatic encephalopathy       Plan:   TIPS patent by abdominal ultrasound yesterday  Hemoglobin stable after EGD, status post right pleurocentesis  Renal function improved  Encourage ambulation  Tolerating diet, continue PPI, defer nadolol given patent TIPS  On octreotide, midodrine, Rocephin    I discussed the patients findings and my recommendations with patient.    Gino Khan M.D.  Gastroenterology

## 2025-02-06 NOTE — PROGRESS NOTES
"    [facility]    S: Patient seen and examined.  Continues to feel well.  No CP or SOB    O: /76 (BP Location: Left arm, Patient Position: Standing)   Pulse 80   Temp 98.6 °F (37 °C) (Oral)   Resp 16   Ht 172.7 cm (68\")   Wt 71.8 kg (158 lb 4.6 oz)   SpO2 97%   BMI 24.07 kg/m²   HPI  GENERAL: Age-appropriate, no acute distress  EARS,NOSE, MOUTH, THROAT:  MMM  EYES: PERRLA, EOMI  CV:  NL S1/S2  RESPIRATORY:  CTAB no w/c/r  GI: Distended but soft, nontender to palpation.  SKIN: No rash or lesions. No nodules.  INT: No edema. No joint deformity.  PSYCH:  Normal affect, A+O x 3  NEURO: Alert and oriented, grossly nonfocal.    Scheduled Meds:albumin human, 25 g, Intravenous, Once  cefTRIAXone, 2,000 mg, Intravenous, Q24H  lactulose, 30 g, Oral, TID  melatonin, 10 mg, Oral, Nightly  midodrine, 10 mg, Oral, TID AC  octreotide, 100 mcg, Subcutaneous, Q8H  [START ON 2/7/2025] pantoprazole, 40 mg, Oral, Q AM  potassium chloride, 40 mEq, Oral, BID With Meals  sodium chloride, 10 mL, Intravenous, Q12H  ursodiol, 600 mg, Oral, BID      Continuous Infusions:     PRN Meds:.  albuterol    nitroglycerin    ondansetron    sodium chloride    sodium chloride    sodium chloride    trolamine salicylate    Labs: Laboratory information reviewed and reconciled.    Results from last 7 days   Lab Units 02/06/25  0530   WBC 10*3/mm3 3.58         Results from last 7 days   Lab Units 02/06/25  0530   CO2 mmol/L 27.0   BUN mg/dL 24*   CREATININE mg/dL 1.78*   GLUCOSE mg/dL 84         Results from last 7 days   Lab Units 02/06/25  0530   INR  2.05*         Imaging (last 24 hours):    US Liver    Result Date: 2/5/2025  Impression: 1. TIPS shunt is patent, but velocities throughout the TIPS shunt are not able to be obtained secondary to technical factors. 2. Right effusion and ascites noted. Electronically Signed: Chicho Quijano MD  2/5/2025 11:48 AM EST  Workstation ID: [id number]    XR Chest 1 " View    Result Date: 2/4/2025  Impression: Status post right thoracentesis with a trace residual pleural effusion. No pneumothorax. Mild bibasilar airspace opacity consistent with atelectasis plus or minus superimposed pneumonia. Electronically Signed: Carlos Yancey MD  2/4/2025 3:02 PM EST  Workstation ID: [id number]    Assessment/plan:  # Acute on chronic anemia  -No obvious source of bleeding  -GI following   - s/p egd 02/05  -Continue Protonix daily  -Continue Rocephin    # Pleural effusion  -Status post thoracentesis  -Follow-up fluid studies    # Hepatic encephalopathy  -Remains on lactulose    # NICKI  - improved  -Nephrology following  -Monitor serial labs  -Renally dose medications    # Gram-positive bacteremia  -suspect contaminant  -Repeat cultures    # Possible right lower lobe pneumonia  -Continue Rocephin as above  -Pulmonology following    # Decompensated cirrhosis  -GI following  -Serial labs  -Serial abdominal exams        DVT Prophylaxis: SCDs  Disposition: Continue to monitor  Discharge disposition: Home when stable, likely in AM if cleared by specialist  Prognosis: Guarded    Leif Lomeli Jr, MD  [facility] Hospitalist  02/06/25  10:07 EST

## 2025-02-07 ENCOUNTER — READMISSION MANAGEMENT (OUTPATIENT)
Dept: CALL CENTER | Facility: HOSPITAL | Age: 40
End: 2025-02-07
Payer: COMMERCIAL

## 2025-02-07 ENCOUNTER — TELEPHONE (OUTPATIENT)
Dept: FAMILY MEDICINE CLINIC | Facility: CLINIC | Age: 40
End: 2025-02-07
Payer: COMMERCIAL

## 2025-02-07 VITALS
RESPIRATION RATE: 18 BRPM | BODY MASS INDEX: 24.06 KG/M2 | HEIGHT: 68 IN | HEART RATE: 83 BPM | SYSTOLIC BLOOD PRESSURE: 107 MMHG | OXYGEN SATURATION: 97 % | DIASTOLIC BLOOD PRESSURE: 73 MMHG | TEMPERATURE: 98.8 F | WEIGHT: 158.73 LBS

## 2025-02-07 LAB
ANION GAP SERPL CALCULATED.3IONS-SCNC: 5.8 MMOL/L (ref 5–15)
BACTERIA FLD CULT: NORMAL
BASOPHILS # BLD AUTO: 0.05 10*3/MM3 (ref 0–0.2)
BASOPHILS NFR BLD AUTO: 1.1 % (ref 0–1.5)
BUN SERPL-MCNC: 18 MG/DL (ref 6–20)
BUN/CREAT SERPL: 13.1 (ref 7–25)
CALCIUM SPEC-SCNC: 7.4 MG/DL (ref 8.6–10.5)
CHLORIDE SERPL-SCNC: 107 MMOL/L (ref 98–107)
CO2 SERPL-SCNC: 28.2 MMOL/L (ref 22–29)
CREAT SERPL-MCNC: 1.37 MG/DL (ref 0.76–1.27)
CYTO UR: NORMAL
DEPRECATED RDW RBC AUTO: 61.5 FL (ref 37–54)
EGFRCR SERPLBLD CKD-EPI 2021: 68.1 ML/MIN/1.73
EOSINOPHIL # BLD AUTO: 0.5 10*3/MM3 (ref 0–0.4)
EOSINOPHIL NFR BLD AUTO: 11 % (ref 0.3–6.2)
ERYTHROCYTE [DISTWIDTH] IN BLOOD BY AUTOMATED COUNT: 17.8 % (ref 12.3–15.4)
GLUCOSE SERPL-MCNC: 132 MG/DL (ref 65–99)
GRAM STN SPEC: NORMAL
HCT VFR BLD AUTO: 28.3 % (ref 37.5–51)
HGB BLD-MCNC: 8.7 G/DL (ref 13–17.7)
IMM GRANULOCYTES # BLD AUTO: 0.02 10*3/MM3 (ref 0–0.05)
IMM GRANULOCYTES NFR BLD AUTO: 0.4 % (ref 0–0.5)
LAB AP CASE REPORT: NORMAL
LAB AP CLINICAL INFORMATION: NORMAL
LYMPHOCYTES # BLD AUTO: 0.7 10*3/MM3 (ref 0.7–3.1)
LYMPHOCYTES NFR BLD AUTO: 15.4 % (ref 19.6–45.3)
MCH RBC QN AUTO: 29 PG (ref 26.6–33)
MCHC RBC AUTO-ENTMCNC: 30.7 G/DL (ref 31.5–35.7)
MCV RBC AUTO: 94.3 FL (ref 79–97)
MONOCYTES # BLD AUTO: 0.67 10*3/MM3 (ref 0.1–0.9)
MONOCYTES NFR BLD AUTO: 14.7 % (ref 5–12)
NEUTROPHILS NFR BLD AUTO: 2.62 10*3/MM3 (ref 1.7–7)
NEUTROPHILS NFR BLD AUTO: 57.4 % (ref 42.7–76)
NRBC BLD AUTO-RTO: 0 /100 WBC (ref 0–0.2)
PATH REPORT.FINAL DX SPEC: NORMAL
PATH REPORT.GROSS SPEC: NORMAL
PLATELET # BLD AUTO: 64 10*3/MM3 (ref 140–450)
PMV BLD AUTO: 9.5 FL (ref 6–12)
POTASSIUM SERPL-SCNC: 4 MMOL/L (ref 3.5–5.2)
RBC # BLD AUTO: 3 10*6/MM3 (ref 4.14–5.8)
SODIUM SERPL-SCNC: 141 MMOL/L (ref 136–145)
WBC NRBC COR # BLD AUTO: 4.56 10*3/MM3 (ref 3.4–10.8)

## 2025-02-07 PROCEDURE — 99239 HOSP IP/OBS DSCHRG MGMT >30: CPT | Performed by: INTERNAL MEDICINE

## 2025-02-07 PROCEDURE — 25010000002 CEFTRIAXONE PER 250 MG: Performed by: INTERNAL MEDICINE

## 2025-02-07 PROCEDURE — 85025 COMPLETE CBC W/AUTO DIFF WBC: CPT | Performed by: INTERNAL MEDICINE

## 2025-02-07 PROCEDURE — 94761 N-INVAS EAR/PLS OXIMETRY MLT: CPT

## 2025-02-07 PROCEDURE — 80048 BASIC METABOLIC PNL TOTAL CA: CPT | Performed by: INTERNAL MEDICINE

## 2025-02-07 PROCEDURE — 94799 UNLISTED PULMONARY SVC/PX: CPT

## 2025-02-07 PROCEDURE — 25010000002 OCTREOTIDE PER 25 MCG: Performed by: INTERNAL MEDICINE

## 2025-02-07 PROCEDURE — 99231 SBSQ HOSP IP/OBS SF/LOW 25: CPT | Performed by: INTERNAL MEDICINE

## 2025-02-07 RX ORDER — CEFDINIR 300 MG/1
300 CAPSULE ORAL 2 TIMES DAILY
Qty: 4 CAPSULE | Refills: 0 | Status: SHIPPED | OUTPATIENT
Start: 2025-02-07 | End: 2025-02-07

## 2025-02-07 RX ORDER — LACTULOSE 10 G/15ML
30 SOLUTION ORAL 3 TIMES DAILY
Qty: 4050 ML | Refills: 0 | Status: SHIPPED | OUTPATIENT
Start: 2025-02-07 | End: 2025-02-07

## 2025-02-07 RX ORDER — MIDODRINE HYDROCHLORIDE 10 MG/1
10 TABLET ORAL
Qty: 90 TABLET | Refills: 0 | Status: SHIPPED | OUTPATIENT
Start: 2025-02-07 | End: 2025-02-07

## 2025-02-07 RX ORDER — LACTULOSE 10 G/15ML
30 SOLUTION ORAL 3 TIMES DAILY
Qty: 4050 ML | Refills: 0 | Status: SHIPPED | OUTPATIENT
Start: 2025-02-07 | End: 2025-03-09

## 2025-02-07 RX ORDER — MIDODRINE HYDROCHLORIDE 10 MG/1
10 TABLET ORAL
Qty: 90 TABLET | Refills: 0 | Status: SHIPPED | OUTPATIENT
Start: 2025-02-07 | End: 2025-03-09

## 2025-02-07 RX ORDER — CEFDINIR 300 MG/1
300 CAPSULE ORAL 2 TIMES DAILY
Qty: 4 CAPSULE | Refills: 0 | Status: SHIPPED | OUTPATIENT
Start: 2025-02-07 | End: 2025-02-09

## 2025-02-07 RX ADMIN — PANTOPRAZOLE SODIUM 40 MG: 40 TABLET, DELAYED RELEASE ORAL at 06:09

## 2025-02-07 RX ADMIN — URSODIOL 600 MG: 300 CAPSULE ORAL at 08:43

## 2025-02-07 RX ADMIN — MIDODRINE HYDROCHLORIDE 10 MG: 10 TABLET ORAL at 08:43

## 2025-02-07 RX ADMIN — SODIUM CHLORIDE 2000 MG: 9 INJECTION INTRAMUSCULAR; INTRAVENOUS; SUBCUTANEOUS at 03:03

## 2025-02-07 RX ADMIN — Medication 10 ML: at 08:44

## 2025-02-07 RX ADMIN — LACTULOSE 30 G: 20 SOLUTION ORAL at 08:43

## 2025-02-07 RX ADMIN — OCTREOTIDE ACETATE 100 MCG: 100 INJECTION, SOLUTION INTRAVENOUS; SUBCUTANEOUS at 06:09

## 2025-02-07 NOTE — PLAN OF CARE
Goal Outcome Evaluation:      Patient is A&OX4, is up ad rancho in room, and has no signs of distress or SOA noted this shift. Patient is without c/o this shift, and is wanting to be D/Cd today if possible. No c/o pain, and/or nausea this shift.

## 2025-02-07 NOTE — PROGRESS NOTES
Pulmonary / Critical Care Progress Note      Patient Name: Wai Gay  : 1985  MRN: [id number]  Primary Care Physician:  Callum Peterson DO  Date of admission: 2/3/2025    Subjective   Subjective   Follow-up for pleural effusion with ascites cirrhosis liver    Over past 24 hours:  No acute events overnight.  Patient seems to be comfortable on room air no shortness of breath   Review of Systems  Generalized weakness No chest pain or shortness of breath      Objective   Objective     Vitals:   Temp:  [98.2 °F (36.8 °C)-98.6 °F (37 °C)] 98.2 °F (36.8 °C)  Heart Rate:  [72-80] 77  Resp:  [16-18] 18  BP: (106-112)/(66-76) 106/68  Physical Exam   Vital Signs Reviewed   General: WDWN, Alert, NAD.    HEENT:  PERRL, EOMI.  OP, nares clear, no sinus tenderness  Neck:  Supple, no JVD, no thyromegaly  Chest: Breath sounds diminished right lung base   CV: RRR, no MGR, pulses 2+, equal.  Abd:  Soft, NT, ascites  + BS,   EXT:  no clubbing, no cyanosis, no edema  Neuro:  A&Ox3, CN grossly intact, no focal deficits.  Skin: No rashes or lesions noted      Result Review    Result Review:  I have personally reviewed the results from the time of this admission to 2025 06:52 EST and agree with these findings:  [x]  Laboratory  [x]  Microbiology  [x]  Radiology  []  EKG/Telemetry   []  Cardiology/Vascular   []  Pathology  []  Old records  []  Other:  Most notable findings include: Chest x-ray showing right pleural effusion large    Assessment & Plan   Assessment / Plan     Active Hospital Problems:  Active Hospital Problems    Diagnosis     **Iron deficiency anemia     Acute renal failure     Hepatic encephalopathy     Cirrhosis of liver with ascites     Hypokalemia     Hyponatremia          Impression:  Large right pleural effusion  Ascites  Cirrhosis liver  Anemia  Plan:  Patient is stable status post thoracentesis  Pleural fluid cultures negative thus far  Ultrasound shows the tips patent  Continue  present medications  VTE Prophylaxis:  Mechanical VTE prophylaxis orders are present.        CODE STATUS:   Level Of Support Discussed With: Patient  Code Status (Patient has no pulse and is not breathing): CPR (Attempt to Resuscitate)  Medical Interventions (Patient has pulse or is breathing): Full Support  Electronically signed by Davian Watt MD, 02/06/25, 2:46 PM EST.      Electronically signed by Davian Watt MD, 02/07/25, 1:01 PM EST.

## 2025-02-07 NOTE — OUTREACH NOTE
Prep Survey      Flowsheet Row Responses   [facility] patient discharged from? [facility]   Is LACE score < 7 ? No   Eligibility [facility]   Date of Admission 02/03/25   Date of Discharge 02/07/25   Discharge Disposition Home or Self Care   Discharge diagnosis Iron deficiency anemia   Does the patient have one of the following disease processes/diagnoses(primary or secondary)? Other   Does the patient have Home health ordered? No   Is there a DME ordered? No   Prep survey completed? Yes            Alejandra ROBERTS - Registered Nurse

## 2025-02-07 NOTE — TELEPHONE ENCOUNTER
Patient is being discharged from [facility] today and the first available Dr. Peterson has was not until 02/27/2025. I did schedule him for that date but if we could get him in sooner, please give Wai a call back.

## 2025-02-07 NOTE — PLAN OF CARE
Goal Outcome Evaluation:              Outcome Evaluation: Pt is alert and orient x4.  VS WNl for pt.  Pt denies any pain/discomfort.  Pt to discharge home this shift with family

## 2025-02-07 NOTE — PROGRESS NOTES
[facility]     Progress Note    Patient Name: Wai Gay  : 1985  MRN: [id number]  Primary Care Physician:  Callum Peterson DO  Date of admission: 2/3/2025    Subjective  patient is feeling great he did not have any new issues    Review of Systems  All review of systems are negative except as mentioned in subjective complaints.    Objective   Objective     Vitals:   Temp:  [98.2 °F (36.8 °C)-98.8 °F (37.1 °C)] 98.8 °F (37.1 °C)  Heart Rate:  [72-83] 83  Resp:  [18] 18  BP: (106-111)/(66-73) 107/73  Physical Exam    Constitutional: Awake, alert responsive, conversant, no obvious distress              Psychiatric:  Appropriate affect, cooperative   Neurologic:  Awake alert ,oriented x 3, strength symmetric in all extremities, Cranial Nerves grossly intact to confrontation, speech clear   Eyes:   PERRLA, sclerae anicteric, no conjunctival injection   HEENT:  Moist mucous membranes, no nasal or eye discharge, no throat congestion   Neck:   Supple, no thyromegaly, no lymphadenopathy, trachea midline, no elevated JVD   Respiratory:  Clear to auscultation bilaterally, nonlabored respirations    Cardiovascular: RRR, no murmurs, rubs, or gallops, palpable pedal pulses bilaterally, No bilateral ankle edema   Gastrointestinal: Positive bowel sounds, soft, nontender, nondistended, no organomegaly   Musculoskeletal:  No clubbing or cyanosis to extremities,muscle wasting, joint swelling, muscle weakness             Skin:                      No rashes, bruising, skin ulcers, petechiae or ecchymosis    Result Review    Result Review:  I have personally reviewed the results from the time of this admission to 2025 11:49 EST and agree with these findings:  []  Laboratory  []  Microbiology  []  Radiology  []  EKG/Telemetry   []  Cardiology/Vascular   []  Pathology  []  Old records  []  Other:    Results from last 7 days   Lab Units 25  0540 25  0530 25  0752 25  0024  02/04/25  2118 02/04/25  1551 02/04/25  1114 02/03/25  1857   WBC 10*3/mm3 4.56 3.58  --   --   --   --  6.25 12.21*   HEMOGLOBIN g/dL 8.7* 7.6* 7.2* 7.2* 7.6* 6.9* 6.8* 9.1*   PLATELETS 10*3/mm3 64* 56*  --   --   --   --  53* 100*     Results from last 7 days   Lab Units 02/07/25  0540 02/06/25  0530 02/04/25  1114 02/03/25  1857 02/03/25  1857   SODIUM mmol/L 141 139 134*  --  131*   POTASSIUM mmol/L 4.0 3.3* 3.5  --  3.3*   CHLORIDE mmol/L 107 104 94*  --  91*   CO2 mmol/L 28.2 27.0 27.5  --  27.2   ANION GAP mmol/L 5.8 8.0 12.5  --  12.8   BUN mg/dL 18 24* 31*  --  33*   CREATININE mg/dL 1.37* 1.78* 2.34*  --  2.38*   GLUCOSE mg/dL 132* 84 96  --  109*   EGFR mL/min/1.73 68.1 49.8* 35.8*  --  35.1*   CALCIUM mg/dL 7.4* 7.6* 8.3*  --  7.8*   MAGNESIUM mg/dL  --  2.0 2.2   < >  --    BILIRUBIN mg/dL  --   --  2.6*  --  3.7*   ALK PHOS U/L  --   --  215*  --  301*   ALT (SGPT) U/L  --   --  29  --  44*   AST (SGOT) U/L  --   --  31  --  51*    < > = values in this interval not displayed.       Assessment & Plan   Assessment / Plan       Active Hospital Problems:    Active Hospital Problems    Diagnosis  POA    **Iron deficiency anemia [D50.9]  Unknown    Acute renal failure [N17.9]  Unknown    Hepatic encephalopathy [K76.82]  Unknown     Start p.o. lactulose      Cirrhosis of liver with ascites [K74.60, R18.8]  Yes    Hypokalemia [E87.6]  Yes     Will update      Hyponatremia [E87.1]  Yes      NICKI slowly and gradually improving  Plan:    Patient is clinically stable   Continue present care       Electronically signed by Jonathon Jeffers MD, 02/07/25, 11:49 AM EST.

## 2025-02-07 NOTE — DISCHARGE SUMMARY
[facility] HOSPITALIST DISCHARGE SUMMARY        Patient ID:  Wai Gay  [id number]  39 y.o.  1985    Admit date: 2/3/2025    Discharge date and time: 02/07/25    Admitting Physician: Leif Lomeli Jr., MD    Discharge Physician: Leif Lomeli    Admission Diagnoses: Cirrhosis of liver with ascites [K74.60, R18.8]    Discharge Diagnoses: Decompensated cirrhosis  Pleural effusion  Acute kidney injury  Hepatic encephalopathy    Admission Condition: poor    Discharged Condition: fair    Indication for Admission: Monitoring    Hospital Course: Wai Gay is a 39 y.o. male  with a past medical history of hypertension, anxiety, GERD, alcoholic liver cirrhosis presented to the ED for evaluation of generalized weakness, overall not feeling well from since last 2 days.  Patient is drowsy but able to answer questions appropriately.  States that he has been experiencing generalized weakness since last 2 days.  Denies any fevers.  Noted to have abdominal pain.  Denies any nausea, vomiting, diarrhea, chest pain, shortness of breath.  Does not take any lactulose at home.  He gets routine paracentesis last was 3 and half weeks ago.     Upon arrival, vital signs temperature 99.2, pulse 119, respiratory 16, blood pressure 112/56 on room air saturating around 98%.  Labs showed sodium 131, potassium 3.3, BUN 33, creatinine 2.38, baseline around 1 3 months ago, , AST/ALT 51/44, total bili 3.7, ammonia 172, lactic acid 2.6, lipase 61, INR 1.77, WBC 12.1, hemoglobin 9.1, platelets 100 urinalysis noninfectious.  Blood cultures in process.  CT abdomen pelvis without contrast showed large right-sided pleural effusion.  Consolidative changes noted within the right lower lobe most likely representing compressive atelectasis.  Superimposed pneumonia is not completely excluded.  Large volume ascites, cirrhotic liver.  Diffuse colonic and small bowel wall thickening.  These findings are most likely  "due to portal venous congestion.  Infectious inflammatory enterocolitis is not completely excluded.  Status post TIPS findings suggestive of periportal varices..  Received ceftriaxone, 500 cc IV fluids.  Patient has been admitted for further evaluation and management of decompensated liver cirrhosis, NICKI    Patient was admitted and started on albumin, octreotide, midodrine with improvement in his renal function.  He was evaluated by pulm and underwent thoracentesis with no growth on fluid culture.  Also evaluated by GI and underwent EGD and ultrasound.  Will have close follow-up with GI.  Nephrology has evaluated as well.  Patient feels well on day of discharge and is requesting discharge today.  No other acute events noted.  Lab work is significantly improved.  Will discharge on current medication regimen with close outpatient follow-up.  Discussed plan at length with patient and he is in agreement.    Consults: pulmonary/intensive care, GI, and nephrology    Significant Diagnostic Studies:  As above    Treatments:  As above    Discharge Exam:  /73 (BP Location: Left arm, Patient Position: Lying)   Pulse 83   Temp 98.8 °F (37.1 °C) (Oral)   Resp 18   Ht 172.7 cm (68\")   Wt 72 kg (158 lb 11.7 oz)   SpO2 97%   BMI 24.14 kg/m²   General appearance: alert, appears stated age, and cooperative  Lungs: clear to auscultation bilaterally  Heart: regular rate and rhythm, S1, S2 normal, no murmur, click, rub or gallop  Abdomen: soft, non-tender; bowel sounds normal; no masses,  no organomegaly  Neurologic: Grossly normal    Disposition: Home or Self Care    Patient Instructions:       Discharge Medications        New Medications        Instructions Start Date   cefdinir 300 MG capsule  Commonly known as: OMNICEF   300 mg, Oral, 2 Times Daily      lactulose 10 GM/15ML solution  Commonly known as: CHRONULAC   30 g, Oral, 3 Times Daily      midodrine 10 MG tablet  Commonly known as: PROAMATINE   10 mg, Oral, 3 Times " Daily Before Meals             Changes to Medications        Instructions Start Date   ursodiol 300 MG capsule  Commonly known as: ACTIGALL  What changed: See the new instructions.   TAKE 2 CAPSULES BY MOUTH 2 (TWO) TIMES A DAY WITH MEALS FOR 90 DAYS.             Continue These Medications        Instructions Start Date   albuterol sulfate  (90 Base) MCG/ACT inhaler  Commonly known as: PROVENTIL HFA;VENTOLIN HFA;PROAIR HFA   2 puffs, Inhalation, Every 4 Hours PRN      pantoprazole 40 MG EC tablet  Commonly known as: PROTONIX   TAKE 1 TABLET BY MOUTH EVERY DAY      Stiolto Respimat 2.5-2.5 MCG/ACT aerosol solution inhaler  Generic drug: tiotropium bromide-olodaterol   2 puffs, Inhalation, Daily - RT             Stop These Medications      spironolactone 50 MG tablet  Commonly known as: ALDACTONE            Activity: activity as tolerated  Diet: cardiac diet  Wound Care: none needed    Follow-up with PCP in 3 days, GI as scheduled, nephrology and pulmonology on in 2 weeks.    Discharge process took 35 minutes.    Pending Labs at Discharge:  Pending Labs       Order Current Status    Fungus Culture - Body Fluid, Pleural Cavity In process    Tissue Pathology Exam In process    Anaerobic Culture - Pleural Fluid, Pleural Cavity Preliminary result    Blood Culture - Blood, Arm, Left Preliminary result    Blood Culture - Blood, Hand, Left Preliminary result    Blood Culture - Blood, Hand, Right Preliminary result             The ASCVD Risk score (Vini CULLEN, et al., 2019) failed to calculate for the following reasons:    The 2019 ASCVD risk score is only valid for ages 40 to 79     Signed:  Leif Lomeli Jr, MD  2/7/2025  09:39 EST

## 2025-02-09 LAB
BACTERIA SPEC AEROBE CULT: NORMAL
BACTERIA SPEC ANAEROBE CULT: NORMAL
FUNGUS WND CULT: NORMAL

## 2025-02-10 ENCOUNTER — TRANSITIONAL CARE MANAGEMENT TELEPHONE ENCOUNTER (OUTPATIENT)
Dept: CALL CENTER | Facility: HOSPITAL | Age: 40
End: 2025-02-10
Payer: COMMERCIAL

## 2025-02-10 LAB
BACTERIA SPEC AEROBE CULT: NORMAL
BACTERIA SPEC AEROBE CULT: NORMAL

## 2025-02-10 NOTE — OUTREACH NOTE
Call Center TCM Note      Flowsheet Row Responses   [facility], [place], operated by [facility] patient discharged from? [facility]   Does the patient have one of the following disease processes/diagnoses(primary or secondary)? Other   TCM attempt successful? Yes  [vr for mother Vines]   Call start time 1244   Call Status Left message   Call end time 1303   Discharge diagnosis Iron deficiency anemia   Person spoke with today (if not patient) and relationship mother Vines   Meds reviewed with patient/caregiver? Yes   Is the patient having any side effects they believe may be caused by any medication additions or changes? No   Does the patient have all medications ordered at discharge? No   What is keeping the patient from filling the prescriptions? Financial  [pt only has a few ursodilol remaining per mother and unable to afford prescription as over $300.  Mother is calling GI to discuss other alternatives to this med today]   Nursing Interventions Nurse provided patient education, Nurse advised patient to call provider, Nurse called pharmacy  [This RN attempted call to GI to see if discount card eligible for ursodilol but unable to reach at time of call]   Is the patient taking all medications as directed (includes completed medication regime)? Yes   Comments Pt has an appt on 2/27/25@1045   Does the patient have an appointment with their PCP within 7-14 days of discharge? No   Nursing Interventions Routed TCM call to PCP office   Has home health visited the patient within 72 hours of discharge? N/A   Psychosocial issues? No   Did the patient receive a copy of their discharge instructions? Yes   Nursing interventions Reviewed instructions with patient   What is the patient's perception of their health status since discharge? Same  [Mother reports pt is doing better except for some complaints of insomnia, will discuss with GI what he can take r/t his liver disease for insomnia. Otherwise better and has returned to work today.]   Is the  patient/caregiver able to teach back signs and symptoms related to disease process for when to call PCP? Yes   TCM call completed? Yes   Call end time [id number]            Dinora Talley RN    2/10/2025, 13:08 EST

## 2025-02-11 LAB — FUNGUS WND CULT: NORMAL

## 2025-02-18 ENCOUNTER — READMISSION MANAGEMENT (OUTPATIENT)
Dept: CALL CENTER | Facility: HOSPITAL | Age: 40
End: 2025-02-18
Payer: COMMERCIAL

## 2025-02-18 LAB — FUNGUS WND CULT: NORMAL

## 2025-02-18 RX ORDER — SPIRONOLACTONE 50 MG/1
50 TABLET, FILM COATED ORAL DAILY
Qty: 90 TABLET | Refills: 1 | OUTPATIENT
Start: 2025-02-18

## 2025-02-18 NOTE — OUTREACH NOTE
Medical Week 3 Survey      Flowsheet Row Responses   [facility] patient discharged from? [facility]   Does the patient have one of the following disease processes/diagnoses(primary or secondary)? Other   Call start time 1455   Call end time 1455   Discharge diagnosis Iron deficiency anemia   Person spoke with today (if not patient) and relationship patient   Meds reviewed with patient/caregiver? Yes   Is the patient having any side effects they believe may be caused by any medication additions or changes? No   Does the patient have all medications ordered at discharge? Yes   Is the patient taking all medications as directed (includes completed medication regime)? Yes   Does the patient have a primary care provider?  Yes   Does the patient have an appointment with their PCP within 7 days of discharge? Yes   Has the patient kept scheduled appointments due by today? Yes   Psychosocial issues? No   Did the patient receive a copy of their discharge instructions? Yes   Nursing interventions Reviewed instructions with patient   What is the patient's perception of their health status since discharge? Improving   Is the patient/caregiver able to teach back signs and symptoms related to disease process for when to call PCP? Yes   Is the patient/caregiver able to teach back signs and symptoms related to disease process for when to call 911? Yes   Is the patient/caregiver able to teach back the hierarchy of who to call/visit for symptoms/problems? PCP, Specialist, Home health nurse, Urgent Care, ED, 911 Yes   Graduated Yes   Call end time 1455            Michel ELLIS - Registered Nurse

## 2025-02-20 ENCOUNTER — HOSPITAL ENCOUNTER (INPATIENT)
Facility: HOSPITAL | Age: 40
LOS: 2 days | Discharge: HOME | DRG: 433 | End: 2025-02-22
Attending: EMERGENCY MEDICINE | Admitting: STUDENT IN AN ORGANIZED HEALTH CARE EDUCATION/TRAINING PROGRAM
Payer: COMMERCIAL

## 2025-02-20 ENCOUNTER — APPOINTMENT (OUTPATIENT)
Dept: CT IMAGING | Facility: HOSPITAL | Age: 40
DRG: 433 | End: 2025-02-20
Payer: COMMERCIAL

## 2025-02-20 ENCOUNTER — APPOINTMENT (OUTPATIENT)
Dept: GENERAL RADIOLOGY | Facility: HOSPITAL | Age: 40
DRG: 433 | End: 2025-02-20
Payer: COMMERCIAL

## 2025-02-20 DIAGNOSIS — K76.82 HEPATIC ENCEPHALOPATHY: ICD-10-CM

## 2025-02-20 DIAGNOSIS — R18.8 CIRRHOSIS OF LIVER WITH ASCITES, UNSPECIFIED HEPATIC CIRRHOSIS TYPE: ICD-10-CM

## 2025-02-20 DIAGNOSIS — J90 PLEURAL EFFUSION: ICD-10-CM

## 2025-02-20 DIAGNOSIS — R50.9 ACUTE FEBRILE ILLNESS: ICD-10-CM

## 2025-02-20 DIAGNOSIS — J18.9 PNEUMONIA DUE TO INFECTIOUS ORGANISM, UNSPECIFIED LATERALITY, UNSPECIFIED PART OF LUNG: ICD-10-CM

## 2025-02-20 DIAGNOSIS — R53.1 GENERALIZED WEAKNESS: Primary | ICD-10-CM

## 2025-02-20 DIAGNOSIS — K74.60 CIRRHOSIS OF LIVER WITH ASCITES, UNSPECIFIED HEPATIC CIRRHOSIS TYPE: ICD-10-CM

## 2025-02-20 PROBLEM — A41.9 SEPSIS: Status: ACTIVE | Noted: 2025-02-20

## 2025-02-20 LAB
ALBUMIN SERPL-MCNC: 2.7 G/DL (ref 3.5–5.2)
ALBUMIN/GLOB SERPL: 0.9 G/DL
ALP SERPL-CCNC: 195 U/L (ref 39–117)
ALT SERPL W P-5'-P-CCNC: 13 U/L (ref 1–41)
AMMONIA BLD-SCNC: 75 UMOL/L (ref 16–60)
AMPHET+METHAMPHET UR QL: NEGATIVE
AMPHETAMINES UR QL: NEGATIVE
ANION GAP SERPL CALCULATED.3IONS-SCNC: 10.1 MMOL/L (ref 5–15)
AST SERPL-CCNC: 30 U/L (ref 1–40)
BACTERIA UR QL AUTO: NORMAL /HPF
BARBITURATES UR QL SCN: NEGATIVE
BASOPHILS # BLD AUTO: 0.03 10*3/MM3 (ref 0–0.2)
BASOPHILS NFR BLD AUTO: 0.6 % (ref 0–1.5)
BENZODIAZ UR QL SCN: NEGATIVE
BILIRUB SERPL-MCNC: 3.4 MG/DL (ref 0–1.2)
BILIRUB UR QL STRIP: ABNORMAL
BUN SERPL-MCNC: 17 MG/DL (ref 6–20)
BUN/CREAT SERPL: 11.4 (ref 7–25)
BUPRENORPHINE SERPL-MCNC: NEGATIVE NG/ML
CALCIUM SPEC-SCNC: 7.7 MG/DL (ref 8.6–10.5)
CANNABINOIDS SERPL QL: POSITIVE
CHLORIDE SERPL-SCNC: 105 MMOL/L (ref 98–107)
CLARITY UR: CLEAR
CO2 SERPL-SCNC: 22.9 MMOL/L (ref 22–29)
COCAINE UR QL: NEGATIVE
COLOR UR: ABNORMAL
CREAT SERPL-MCNC: 1.49 MG/DL (ref 0.76–1.27)
D-LACTATE SERPL-SCNC: 1.5 MMOL/L (ref 0.5–2)
DEPRECATED RDW RBC AUTO: 65.7 FL (ref 37–54)
EGFRCR SERPLBLD CKD-EPI 2021: 61.6 ML/MIN/1.73
EOSINOPHIL # BLD AUTO: 0.11 10*3/MM3 (ref 0–0.4)
EOSINOPHIL NFR BLD AUTO: 2 % (ref 0.3–6.2)
ERYTHROCYTE [DISTWIDTH] IN BLOOD BY AUTOMATED COUNT: 20 % (ref 12.3–15.4)
ETHANOL BLD-MCNC: <10 MG/DL (ref 0–10)
ETHANOL UR QL: <0.01 %
FENTANYL UR-MCNC: NEGATIVE NG/ML
FLUAV SUBTYP SPEC NAA+PROBE: NOT DETECTED
FLUBV RNA ISLT QL NAA+PROBE: NOT DETECTED
GEN 5 1HR TROPONIN T REFLEX: 31 NG/L
GLOBULIN UR ELPH-MCNC: 2.9 GM/DL
GLUCOSE SERPL-MCNC: 89 MG/DL (ref 65–99)
GLUCOSE UR STRIP-MCNC: NEGATIVE MG/DL
HCT VFR BLD AUTO: 30.9 % (ref 37.5–51)
HGB BLD-MCNC: 9.8 G/DL (ref 13–17.7)
HGB UR QL STRIP.AUTO: NEGATIVE
HOLD SPECIMEN: NORMAL
HOLD SPECIMEN: NORMAL
HYALINE CASTS UR QL AUTO: NORMAL /LPF
IMM GRANULOCYTES # BLD AUTO: 0.05 10*3/MM3 (ref 0–0.05)
IMM GRANULOCYTES NFR BLD AUTO: 0.9 % (ref 0–0.5)
INR PPP: 1.51 (ref 0.86–1.15)
KETONES UR QL STRIP: NEGATIVE
LEUKOCYTE ESTERASE UR QL STRIP.AUTO: ABNORMAL
LYMPHOCYTES # BLD AUTO: 0.76 10*3/MM3 (ref 0.7–3.1)
LYMPHOCYTES NFR BLD AUTO: 14 % (ref 19.6–45.3)
MAGNESIUM SERPL-MCNC: 1.6 MG/DL (ref 1.6–2.6)
MCH RBC QN AUTO: 29 PG (ref 26.6–33)
MCHC RBC AUTO-ENTMCNC: 31.7 G/DL (ref 31.5–35.7)
MCV RBC AUTO: 91.4 FL (ref 79–97)
METHADONE UR QL SCN: NEGATIVE
MONOCYTES # BLD AUTO: 0.49 10*3/MM3 (ref 0.1–0.9)
MONOCYTES NFR BLD AUTO: 9 % (ref 5–12)
NEUTROPHILS NFR BLD AUTO: 4 10*3/MM3 (ref 1.7–7)
NEUTROPHILS NFR BLD AUTO: 73.5 % (ref 42.7–76)
NITRITE UR QL STRIP: NEGATIVE
NRBC BLD AUTO-RTO: 0 /100 WBC (ref 0–0.2)
OPIATES UR QL: NEGATIVE
OXYCODONE UR QL SCN: NEGATIVE
PCP UR QL SCN: NEGATIVE
PH UR STRIP.AUTO: >=9 [PH] (ref 5–8)
PLATELET # BLD AUTO: 117 10*3/MM3 (ref 140–450)
PMV BLD AUTO: 10.4 FL (ref 6–12)
POTASSIUM SERPL-SCNC: 3.9 MMOL/L (ref 3.5–5.2)
PROT SERPL-MCNC: 5.6 G/DL (ref 6–8.5)
PROT UR QL STRIP: NEGATIVE
PROTHROMBIN TIME: 18.9 SECONDS (ref 11.8–14.9)
QT INTERVAL: 368 MS
QTC INTERVAL: 452 MS
RBC # BLD AUTO: 3.38 10*6/MM3 (ref 4.14–5.8)
RBC # UR STRIP: NORMAL /HPF
REF LAB TEST METHOD: NORMAL
RSV RNA NPH QL NAA+NON-PROBE: NOT DETECTED
SARS-COV-2 RNA RESP QL NAA+PROBE: NOT DETECTED
SODIUM SERPL-SCNC: 138 MMOL/L (ref 136–145)
SP GR UR STRIP: 1.02 (ref 1–1.03)
SQUAMOUS #/AREA URNS HPF: NORMAL /HPF
TRICYCLICS UR QL SCN: NEGATIVE
TROPONIN T % DELTA: -9
TROPONIN T NUMERIC DELTA: -3 NG/L
TROPONIN T SERPL HS-MCNC: 34 NG/L
UROBILINOGEN UR QL STRIP: ABNORMAL
WBC # UR STRIP: NORMAL /HPF
WBC NRBC COR # BLD AUTO: 5.44 10*3/MM3 (ref 3.4–10.8)
WHOLE BLOOD HOLD COAG: NORMAL
WHOLE BLOOD HOLD SPECIMEN: NORMAL

## 2025-02-20 PROCEDURE — 84484 ASSAY OF TROPONIN QUANT: CPT | Performed by: EMERGENCY MEDICINE

## 2025-02-20 PROCEDURE — 96375 TX/PRO/DX INJ NEW DRUG ADDON: CPT

## 2025-02-20 PROCEDURE — 74176 CT ABD & PELVIS W/O CONTRAST: CPT

## 2025-02-20 PROCEDURE — 96365 THER/PROPH/DIAG IV INF INIT: CPT

## 2025-02-20 PROCEDURE — 93005 ELECTROCARDIOGRAM TRACING: CPT | Performed by: EMERGENCY MEDICINE

## 2025-02-20 PROCEDURE — 83735 ASSAY OF MAGNESIUM: CPT

## 2025-02-20 PROCEDURE — 94640 AIRWAY INHALATION TREATMENT: CPT

## 2025-02-20 PROCEDURE — 85610 PROTHROMBIN TIME: CPT | Performed by: EMERGENCY MEDICINE

## 2025-02-20 PROCEDURE — 99223 1ST HOSP IP/OBS HIGH 75: CPT | Performed by: STUDENT IN AN ORGANIZED HEALTH CARE EDUCATION/TRAINING PROGRAM

## 2025-02-20 PROCEDURE — 83605 ASSAY OF LACTIC ACID: CPT | Performed by: EMERGENCY MEDICINE

## 2025-02-20 PROCEDURE — 85025 COMPLETE CBC W/AUTO DIFF WBC: CPT

## 2025-02-20 PROCEDURE — 82140 ASSAY OF AMMONIA: CPT | Performed by: EMERGENCY MEDICINE

## 2025-02-20 PROCEDURE — 71045 X-RAY EXAM CHEST 1 VIEW: CPT

## 2025-02-20 PROCEDURE — 80053 COMPREHEN METABOLIC PANEL: CPT

## 2025-02-20 PROCEDURE — 82077 ASSAY SPEC XCP UR&BREATH IA: CPT | Performed by: EMERGENCY MEDICINE

## 2025-02-20 PROCEDURE — 80307 DRUG TEST PRSMV CHEM ANLYZR: CPT | Performed by: EMERGENCY MEDICINE

## 2025-02-20 PROCEDURE — P9047 ALBUMIN (HUMAN), 25%, 50ML: HCPCS | Performed by: STUDENT IN AN ORGANIZED HEALTH CARE EDUCATION/TRAINING PROGRAM

## 2025-02-20 PROCEDURE — 99285 EMERGENCY DEPT VISIT HI MDM: CPT

## 2025-02-20 PROCEDURE — 84484 ASSAY OF TROPONIN QUANT: CPT

## 2025-02-20 PROCEDURE — 81001 URINALYSIS AUTO W/SCOPE: CPT

## 2025-02-20 PROCEDURE — 25810000003 SODIUM CHLORIDE 0.9 % SOLUTION 250 ML FLEX CONT: Performed by: EMERGENCY MEDICINE

## 2025-02-20 PROCEDURE — 25010000002 AZITHROMYCIN PER 500 MG: Performed by: EMERGENCY MEDICINE

## 2025-02-20 PROCEDURE — 36415 COLL VENOUS BLD VENIPUNCTURE: CPT

## 2025-02-20 PROCEDURE — 87637 SARSCOV2&INF A&B&RSV AMP PRB: CPT | Performed by: EMERGENCY MEDICINE

## 2025-02-20 PROCEDURE — 93010 ELECTROCARDIOGRAM REPORT: CPT | Performed by: INTERNAL MEDICINE

## 2025-02-20 PROCEDURE — 25010000002 CEFEPIME PER 500 MG: Performed by: STUDENT IN AN ORGANIZED HEALTH CARE EDUCATION/TRAINING PROGRAM

## 2025-02-20 PROCEDURE — 25010000002 CEFTRIAXONE PER 250 MG: Performed by: EMERGENCY MEDICINE

## 2025-02-20 PROCEDURE — 93005 ELECTROCARDIOGRAM TRACING: CPT

## 2025-02-20 PROCEDURE — 87040 BLOOD CULTURE FOR BACTERIA: CPT | Performed by: EMERGENCY MEDICINE

## 2025-02-20 PROCEDURE — 25010000002 ALBUMIN HUMAN 25% PER 50 ML: Performed by: STUDENT IN AN ORGANIZED HEALTH CARE EDUCATION/TRAINING PROGRAM

## 2025-02-20 PROCEDURE — 71250 CT THORAX DX C-: CPT

## 2025-02-20 RX ORDER — LACTULOSE 10 G/15ML
30 SOLUTION ORAL 2 TIMES DAILY
Status: DISCONTINUED | OUTPATIENT
Start: 2025-02-20 | End: 2025-02-22 | Stop reason: HOSPADM

## 2025-02-20 RX ORDER — MIDODRINE HYDROCHLORIDE 10 MG/1
10 TABLET ORAL
Status: DISCONTINUED | OUTPATIENT
Start: 2025-02-20 | End: 2025-02-22 | Stop reason: HOSPADM

## 2025-02-20 RX ORDER — BISACODYL 5 MG/1
5 TABLET, DELAYED RELEASE ORAL DAILY PRN
Status: DISCONTINUED | OUTPATIENT
Start: 2025-02-20 | End: 2025-02-22 | Stop reason: HOSPADM

## 2025-02-20 RX ORDER — FUROSEMIDE 20 MG/1
20 TABLET ORAL 2 TIMES DAILY
COMMUNITY
Start: 2025-02-17 | End: 2025-02-27 | Stop reason: SDUPTHER

## 2025-02-20 RX ORDER — HYDRALAZINE HYDROCHLORIDE 20 MG/ML
10 INJECTION INTRAMUSCULAR; INTRAVENOUS EVERY 6 HOURS PRN
Status: DISCONTINUED | OUTPATIENT
Start: 2025-02-20 | End: 2025-02-22 | Stop reason: HOSPADM

## 2025-02-20 RX ORDER — ALBUMIN (HUMAN) 12.5 G/50ML
25 SOLUTION INTRAVENOUS EVERY 8 HOURS
Status: COMPLETED | OUTPATIENT
Start: 2025-02-20 | End: 2025-02-21

## 2025-02-20 RX ORDER — PANTOPRAZOLE SODIUM 40 MG/1
40 TABLET, DELAYED RELEASE ORAL DAILY
Status: DISCONTINUED | OUTPATIENT
Start: 2025-02-20 | End: 2025-02-21

## 2025-02-20 RX ORDER — AMOXICILLIN 250 MG
2 CAPSULE ORAL 2 TIMES DAILY PRN
Status: DISCONTINUED | OUTPATIENT
Start: 2025-02-20 | End: 2025-02-22 | Stop reason: HOSPADM

## 2025-02-20 RX ORDER — FUROSEMIDE 20 MG/1
20 TABLET ORAL 2 TIMES DAILY
Status: DISCONTINUED | OUTPATIENT
Start: 2025-02-20 | End: 2025-02-22 | Stop reason: HOSPADM

## 2025-02-20 RX ORDER — BISACODYL 10 MG
10 SUPPOSITORY, RECTAL RECTAL DAILY PRN
Status: DISCONTINUED | OUTPATIENT
Start: 2025-02-20 | End: 2025-02-22 | Stop reason: HOSPADM

## 2025-02-20 RX ORDER — ARFORMOTEROL TARTRATE 15 UG/2ML
15 SOLUTION RESPIRATORY (INHALATION)
Status: DISCONTINUED | OUTPATIENT
Start: 2025-02-20 | End: 2025-02-22 | Stop reason: HOSPADM

## 2025-02-20 RX ORDER — SODIUM CHLORIDE 0.9 % (FLUSH) 0.9 %
10 SYRINGE (ML) INJECTION AS NEEDED
Status: DISCONTINUED | OUTPATIENT
Start: 2025-02-20 | End: 2025-02-22 | Stop reason: HOSPADM

## 2025-02-20 RX ORDER — SPIRONOLACTONE 25 MG/1
25 TABLET ORAL
COMMUNITY
Start: 2025-02-17 | End: 2025-02-20

## 2025-02-20 RX ORDER — SODIUM CHLORIDE 9 MG/ML
40 INJECTION, SOLUTION INTRAVENOUS AS NEEDED
Status: DISCONTINUED | OUTPATIENT
Start: 2025-02-20 | End: 2025-02-22 | Stop reason: HOSPADM

## 2025-02-20 RX ORDER — SODIUM CHLORIDE 0.9 % (FLUSH) 0.9 %
10 SYRINGE (ML) INJECTION EVERY 12 HOURS SCHEDULED
Status: DISCONTINUED | OUTPATIENT
Start: 2025-02-20 | End: 2025-02-22 | Stop reason: HOSPADM

## 2025-02-20 RX ORDER — POLYETHYLENE GLYCOL 3350 17 G/17G
17 POWDER, FOR SOLUTION ORAL DAILY PRN
Status: DISCONTINUED | OUTPATIENT
Start: 2025-02-20 | End: 2025-02-22 | Stop reason: HOSPADM

## 2025-02-20 RX ORDER — ACETAMINOPHEN 325 MG/1
650 TABLET ORAL EVERY 6 HOURS PRN
Status: DISCONTINUED | OUTPATIENT
Start: 2025-02-20 | End: 2025-02-22 | Stop reason: HOSPADM

## 2025-02-20 RX ORDER — ONDANSETRON 2 MG/ML
4 INJECTION INTRAMUSCULAR; INTRAVENOUS EVERY 6 HOURS PRN
Status: DISCONTINUED | OUTPATIENT
Start: 2025-02-20 | End: 2025-02-22 | Stop reason: HOSPADM

## 2025-02-20 RX ORDER — URSODIOL 300 MG/1
600 CAPSULE ORAL 2 TIMES DAILY
Status: DISCONTINUED | OUTPATIENT
Start: 2025-02-20 | End: 2025-02-22 | Stop reason: HOSPADM

## 2025-02-20 RX ORDER — IPRATROPIUM BROMIDE AND ALBUTEROL SULFATE 2.5; .5 MG/3ML; MG/3ML
3 SOLUTION RESPIRATORY (INHALATION) EVERY 6 HOURS PRN
Status: DISCONTINUED | OUTPATIENT
Start: 2025-02-20 | End: 2025-02-22 | Stop reason: HOSPADM

## 2025-02-20 RX ADMIN — ARFORMOTEROL TARTRATE 15 MCG: 15 SOLUTION RESPIRATORY (INHALATION) at 20:01

## 2025-02-20 RX ADMIN — LACTULOSE 30 G: 10 SOLUTION ORAL at 20:00

## 2025-02-20 RX ADMIN — URSODIOL 600 MG: 300 CAPSULE ORAL at 20:47

## 2025-02-20 RX ADMIN — SODIUM CHLORIDE 1000 MG: 9 INJECTION INTRAMUSCULAR; INTRAVENOUS; SUBCUTANEOUS at 15:53

## 2025-02-20 RX ADMIN — Medication 10 ML: at 20:47

## 2025-02-20 RX ADMIN — URSODIOL 600 MG: 300 CAPSULE ORAL at 19:59

## 2025-02-20 RX ADMIN — MIDODRINE HYDROCHLORIDE 10 MG: 10 TABLET ORAL at 19:59

## 2025-02-20 RX ADMIN — ALBUMIN (HUMAN) 25 G: 0.25 INJECTION, SOLUTION INTRAVENOUS at 20:45

## 2025-02-20 RX ADMIN — SODIUM CHLORIDE 2000 MG: 9 INJECTION, SOLUTION INTRAVENOUS at 20:00

## 2025-02-20 RX ADMIN — AZITHROMYCIN 500 MG: 500 INJECTION, POWDER, LYOPHILIZED, FOR SOLUTION INTRAVENOUS at 15:57

## 2025-02-20 RX ADMIN — PANTOPRAZOLE SODIUM 40 MG: 40 TABLET, DELAYED RELEASE ORAL at 19:58

## 2025-02-20 NOTE — H&P
[facility]  [facility]   Patient Name: Wai Gay  : 1985  MRN: [id number]  Primary Care Physician:  Callum Peterson DO  Date of admission: 2025    Subjective   Subjective     Chief Complaint: Generalized weakness    HPI:    Wai Gay is a 39 y.o. male past medical history of alcohol abuse, essential hypertension, GERD, alcoholic cirrhosis presents to the ED due to generalized weakness and subjective fever.  Patient states that for the last couple days he has been having left-sided flank pain with pain with urination.  In addition patient been having generalized weakness is been slowly worsening with leading to difficulty to walk.  Patient was recently discharged  for generalized weakness.  Patient was found to have large right-sided pleural effusion and a large volume ascites.  Patient was treated for hepatorenal management and thoracic and paracentesis was negative for SBP.  Denies chest pain, nausea, vomiting, diarrhea, headache, shortness of breath or palpitations.  In the ED, patient's labs showed temperature 100.1, heart rate 95, blood pressure 109/76 and satting 98% on room air.  Labs show troponin 34, sodium 138, creatinine 1.49, hemoglobin 9.8, white blood cell 5.44.  UA shows mild leukocytes.  Chest x-ray shows moderate right-sided pleural effusion.  Patient was started on IV antibiotics.  Patient admitted to floors for further management.    Personal History     Past Medical History:  Past Medical History:   Diagnosis Date   • Alcohol abuse 2017   • Anxiety    • Essential hypertension 2019   • GERD (gastroesophageal reflux disease)    • Hypokalemia 2017    Will update   • Hyponatremia 2017   • Steatohepatitis, alcoholic 2017   • Tobacco abuse 2017   • Umbilical hernia        Past Surgical History:  Past Surgical History:   Procedure Laterality Date   • CHOLECYSTECTOMY     •  ENDOSCOPY N/A 02/09/2022    Procedure: ESOPHAGOGASTRODUODENOSCOPY WITH BX;  Surgeon: Gino Khan MD;  Location: [facility] ENDOSCOPY;  Service: Gastroenterology;  Laterality: N/A;  RETAINED LIQUID FOOD IN STOMACH, HUFFMAN'S ESOPHAGUS   • ENDOSCOPY N/A 02/10/2023    Procedure: ESOPHAGOGASTRODUODENOSCOPY;  Surgeon: Gino Khan MD;  Location: [facility] ENDOSCOPY;  Service: Gastroenterology;  Laterality: N/A;  GASTRITIS, BARRETTS ESOPHAGUS, PHG   • ENDOSCOPY N/A 2/5/2025    Procedure: ESOPHAGOGASTRODUODENOSCOPY WITH BIOPSIES;  Surgeon: Gino Khan MD;  Location: [facility] ENDOSCOPY;  Service: Gastroenterology;  Laterality: N/A;  ESOPHAGEAL VARICES, PORTAL HYPERTENSIVE GASTROPATHY   • UPPER GASTROINTESTINAL ENDOSCOPY         Family History:   Family History   Problem Relation Age of Onset   • Alcohol abuse Mother    • Arthritis Mother    • COPD Mother    • Heart disease Maternal Grandmother    • Hypertension Maternal Grandmother    • Lung cancer Maternal Grandmother    • Stroke Maternal Grandmother    • Heart disease Maternal Grandfather    • Lung cancer Maternal Grandfather    • Cancer Paternal Grandmother    • Cancer Paternal Grandfather    • Colon cancer Neg Hx    • Malig Hyperthermia Neg Hx        Social History:   Social History     Socioeconomic History   • Marital status: Single   Tobacco Use   • Smoking status: Every Day     Current packs/day: 0.50     Average packs/day: 0.5 packs/day for 17.1 years (8.6 ttl pk-yrs)     Types: Cigarettes     Start date: 1/1/2008     Passive exposure: Current   • Smokeless tobacco: Never   Vaping Use   • Vaping status: Never Used   Substance and Sexual Activity   • Alcohol use: Not Currently     Alcohol/week: 3.0 standard drinks of alcohol     Comment: FORMER   • Drug use: Not Currently   • Sexual activity: Yes     Partners: Female       Home Medications:  albuterol sulfate HFA, furosemide, midodrine, pantoprazole, tiotropium bromide-olodaterol, and  ursodiol    Allergies:  No Known Allergies    Review of Systems   All systems were reviewed and negative except for: Above    Objective   Objective     Vitals:   Temp:  [100.1 °F (37.8 °C)] 100.1 °F (37.8 °C)  Heart Rate:  [86-97] 95  Resp:  [21-26] 26  BP: ()/(64-80) 109/76    Physical Exam    Constitutional: Awake, alert, no acute distress   Eyes: Pupils equal, sclerae anicteric, no conjunctival injection   HENT: NCAT, mucous membranes moist   Neck: Supple, no thyromegaly, no lymphadenopathy, trachea midline   Respiratory: Clear to auscultation bilaterally, nonlabored respirations    Cardiovascular: RRR, no murmurs, rubs, or gallops, palpable pedal pulses bilaterally   Gastrointestinal: Positive bowel sounds, soft, mild tenderness to palpation all 4 quadrants   Musculoskeletal: No bilateral ankle edema, no clubbing or cyanosis to extremities   Psychiatric: Appropriate affect, cooperative   Neurologic: Oriented x 3, strength symmetric in all extremities, Cranial Nerves grossly intact to confrontation, speech clear   Skin: No rashes, jaundice    Result Review    Result Review:  I have personally reviewed the results from the time of this admission to 2/20/2025 17:06 EST and agree with these findings:  [x]  Laboratory  []  Microbiology  [x]  Radiology  []  EKG/Telemetry   []  Cardiology/Vascular   []  Pathology  [x]  Old records  []  Other:      Assessment & Plan   Assessment / Plan     Assessment/Plan:     Assessment  Sepsis   Mild abdominal pain, rule out SBP  Decompensated liver cirrhosis  Left flank pain concern for pyelonephritis  Moderate right-sided pleural effusion  NICKI on CKD; likely hepatorenal induced  Alcohol liver cirrhosis status post TIPS  GERD  Essential hypertension  History of COPD    Plan  Admit to [facility]  Monitor vitals  CBC BMP  Trend LFTs  PTT, PT, INR  Blood cultures ordered  Chest x-ray reviewed  CT chest ordered  CT abdomen pelvis ordered  IV cefepime  Hold Lasix for now  Albumin 25  every 8 for now  Start home midodrine  Consider nephrology consult creatinine worsens  Possible IR tomorrow pending CT abdomen pelvis    VTE Prophylaxis:  Mechanical VTE prophylaxis orders are present.        CODE STATUS:         Admission Status:  I believe this patient meets inpt status.    Electronically signed by Malcom Murrell MD, 02/20/25, 5:06 PM EST.

## 2025-02-20 NOTE — ED PROVIDER NOTES
Time: 1:29 PM EST  Date of encounter:  2/20/2025  Independent Historian/Clinical History and Information was obtained by:   Patient and Family    History is limited by: N/A    Chief Complaint: Weakness, cough, inability to walk, low-grade fever, dysuria      History of Present Illness:  Patient is a 39 y.o. year old male who presents to the emergency department for evaluation of weakness, cough, inability to walk, low-grade fever, dysuria.  This patient has a history of alcoholic cirrhosis and he currently lives at home.  Over the last 24 hours he has developed a low-grade fever along with generalized weakness, cough, some headache, dysuria, and inability to walk secondary to weakness.  The patient has no vomiting or diarrhea.      Patient Care Team  Primary Care Provider: Callum Peterson DO    Past Medical History:     No Known Allergies  Past Medical History:   Diagnosis Date    Alcohol abuse 03/14/2017    Anxiety     Essential hypertension 12/27/2019    GERD (gastroesophageal reflux disease)     History of transfusion     Feb. 2024    Hypokalemia 03/14/2017    Will update    Hyponatremia 03/14/2017    Steatohepatitis, alcoholic 03/14/2017    Tobacco abuse 03/14/2017    Umbilical hernia      Past Surgical History:   Procedure Laterality Date    CHOLECYSTECTOMY      ENDOSCOPY N/A 02/09/2022    Procedure: ESOPHAGOGASTRODUODENOSCOPY WITH BX;  Surgeon: Gino Khan MD;  Location: [facility] ENDOSCOPY;  Service: Gastroenterology;  Laterality: N/A;  RETAINED LIQUID FOOD IN STOMACH, HUFFMAN'S ESOPHAGUS    ENDOSCOPY N/A 02/10/2023    Procedure: ESOPHAGOGASTRODUODENOSCOPY;  Surgeon: Gino Khan MD;  Location: [facility] ENDOSCOPY;  Service: Gastroenterology;  Laterality: N/A;  GASTRITIS, BARRETTS ESOPHAGUS, PHG    ENDOSCOPY N/A 02/05/2025    Procedure: ESOPHAGOGASTRODUODENOSCOPY WITH BIOPSIES;  Surgeon: Gino Khan MD;  Location: [facility] ENDOSCOPY;  Service: Gastroenterology;  Laterality:  N/A;  ESOPHAGEAL VARICES, PORTAL HYPERTENSIVE GASTROPATHY    TIPS PROCEDURE Right     11/11/2024    UPPER GASTROINTESTINAL ENDOSCOPY       Family History   Problem Relation Age of Onset    Alcohol abuse Mother     Arthritis Mother     COPD Mother     Heart disease Maternal Grandmother     Hypertension Maternal Grandmother     Lung cancer Maternal Grandmother     Stroke Maternal Grandmother     Heart disease Maternal Grandfather     Lung cancer Maternal Grandfather     Cancer Paternal Grandmother     Cancer Paternal Grandfather     Colon cancer Neg Hx     Malig Hyperthermia Neg Hx        Home Medications:  Prior to Admission medications    Medication Sig Start Date End Date Taking? Authorizing Provider   furosemide (LASIX) 20 MG tablet Take 1 tablet by mouth. 2/17/25 3/19/25 Yes ProviderRose MD   spironolactone (ALDACTONE) 25 MG tablet Take 1 tablet by mouth. 2/17/25 3/19/25 Yes ProviderRose MD   albuterol sulfate  (90 Base) MCG/ACT inhaler INHALE 2 PUFFS EVERY 4 HOURS AS NEEDED FOR WHEEZING OR SHORTNESS OF AIR 11/18/24   Davian Watt MD   lactulose (CHRONULAC) 10 GM/15ML solution Take 45 mL by mouth 3 (Three) Times a Day for 30 days. 2/7/25 3/9/25  Leif Lomeli Jr., MD   midodrine (PROAMATINE) 10 MG tablet Take 1 tablet by mouth 3 (Three) Times a Day Before Meals for 30 days. 2/7/25 3/9/25  Leif Lomeli Jr., MD   pantoprazole (PROTONIX) 40 MG EC tablet TAKE 1 TABLET BY MOUTH EVERY DAY 2/3/25   Marian Ferrell APRN   tiotropium bromide-olodaterol (Stiolto Respimat) 2.5-2.5 MCG/ACT aerosol solution inhaler Inhale 2 puffs Daily. 1/14/25   Елена Macario APRN   ursodiol (ACTIGALL) 300 MG capsule TAKE 2 CAPSULES BY MOUTH 2 (TWO) TIMES A DAY WITH MEALS FOR 90 DAYS.  Patient taking differently: Take 2 capsules by mouth 2 (Two) Times a Day. 2/3/25   Marian Ferrell APRN        Social History:   Social History     Tobacco Use    Smoking status: Every Day     Current packs/day: 0.50      "Average packs/day: 0.5 packs/day for 17.1 years (8.6 ttl pk-yrs)     Types: Cigarettes     Start date: 1/1/2008     Passive exposure: Current    Smokeless tobacco: Never   Vaping Use    Vaping status: Never Used   Substance Use Topics    Alcohol use: Not Currently     Alcohol/week: 3.0 standard drinks of alcohol     Comment: FORMER    Drug use: Not Currently         Review of Systems:  Review of Systems   Constitutional:  Positive for activity change, fatigue and fever. Negative for chills.   HENT:  Negative for congestion, ear pain and sore throat.    Eyes:  Negative for pain.   Respiratory:  Positive for cough. Negative for chest tightness and shortness of breath.    Cardiovascular:  Negative for chest pain.   Gastrointestinal:  Negative for abdominal pain, diarrhea, nausea and vomiting.   Genitourinary:  Negative for flank pain and hematuria.   Musculoskeletal:  Negative for joint swelling.   Skin:  Negative for pallor.   Neurological:  Positive for weakness. Negative for seizures and headaches.   All other systems reviewed and are negative.       Physical Exam:  /74 (BP Location: Right arm, Patient Position: Lying)   Pulse 84   Temp 99.2 °F (37.3 °C) (Oral)   Resp 18   Ht 172.7 cm (68\")   Wt 75.1 kg (165 lb 9.1 oz)   SpO2 98%   BMI 25.17 kg/m²     Physical Exam  Vitals and nursing note reviewed.   Constitutional:       General: He is in acute distress.      Appearance: Normal appearance. He is not toxic-appearing.   HENT:      Head: Normocephalic and atraumatic.      Mouth/Throat:      Mouth: Mucous membranes are moist.   Eyes:      General: No scleral icterus.  Cardiovascular:      Rate and Rhythm: Normal rate and regular rhythm.      Pulses: Normal pulses.      Heart sounds: Normal heart sounds.   Pulmonary:      Effort: Pulmonary effort is normal. No respiratory distress.      Breath sounds: Normal breath sounds.   Abdominal:      General: Abdomen is flat.      Palpations: Abdomen is soft.      " Tenderness: There is abdominal tenderness. There is no guarding or rebound.      Hernia: A hernia is present.      Comments: Reducible umbilical hernia with mild tenderness but no guarding or rebound.   Musculoskeletal:         General: Normal range of motion.      Cervical back: Normal range of motion and neck supple.   Skin:     General: Skin is warm and dry.      Capillary Refill: Capillary refill takes less than 2 seconds.   Neurological:      Mental Status: He is alert and oriented to person, place, and time. Mental status is at baseline.      Motor: Weakness present.                    Medical Decision Making:      Comorbidities that affect care:    Cirrhosis    External Notes reviewed:    Previous Clinic Note: Nephrology office visit for cirrhosis      The following orders were placed and all results were independently analyzed by me:  Orders Placed This Encounter   Procedures    COVID-19, FLU A/B, RSV PCR 1 HR TAT - Swab, Nasopharynx    Blood Culture - Blood,    Blood Culture - Blood,    XR Chest 1 View    CT Chest Without Contrast Diagnostic    CT Abdomen Pelvis Without Contrast    [place] Draw    Comprehensive Metabolic Panel    High Sensitivity Troponin T    Magnesium    Urinalysis With Microscopic If Indicated (No Culture) - Urine, Clean Catch    CBC Auto Differential    High Sensitivity Troponin T 1Hr    Urinalysis, Microscopic Only - Urine, Clean Catch    Ethanol    Urine Drug Screen - Urine, Clean Catch    Lactic Acid, Plasma    Ammonia    Fentanyl, Urine - Urine, Clean Catch    Protime-INR    Comprehensive Metabolic Panel    aPTT    Protime-INR    Ammonia    CBC Auto Differential    Diet: Cardiac, Fluid Restriction (240 mL/tray); No Salt Packet; 1500 mL/day; Fluid Consistency: Thin (IDDSI 0)    Undress & Gown    Continuous Pulse Oximetry    Vital Signs    Orthostatic Blood Pressure    Vital Signs    Intake & Output    Weigh Patient    Oral Care    Saline Lock & Maintain IV Access    Place Sequential  Compression Device    Maintain Sequential Compression Device    Discontinue Cardiac Monitoring    Hospitalist (on-call MD unless specified)    Inpatient Case Management  Consult    OT Consult: Eval & Treat Discharge Placement Assessment    PT Consult: Eval & Treat Discharge Placement Assessment    Oxygen Therapy- Nasal Cannula; Titrate 1-6 LPM Per SpO2; 90 - 95%    POC Glucose Once    ECG 12 Lead ED Triage Standing Order; Weak / Dizzy / AMS    Insert Peripheral IV    Insert Peripheral IV    Inpatient Admission    Fall Precautions    CBC & Differential    Green Top (Gel)    Lavender Top    Gold Top - SST    Light Blue Top    CBC & Differential       Medications Given in the Emergency Department:  Medications   sodium chloride 0.9 % flush 10 mL (has no administration in time range)   sodium chloride 0.9 % flush 10 mL (10 mL Intravenous Given 2/20/25 2047)   sodium chloride 0.9 % flush 10 mL (has no administration in time range)   sodium chloride 0.9 % infusion 40 mL (has no administration in time range)   sennosides-docusate (PERICOLACE) 8.6-50 MG per tablet 2 tablet (has no administration in time range)     And   polyethylene glycol (MIRALAX) packet 17 g (has no administration in time range)     And   bisacodyl (DULCOLAX) EC tablet 5 mg (has no administration in time range)     And   bisacodyl (DULCOLAX) suppository 10 mg (has no administration in time range)   ondansetron (ZOFRAN) injection 4 mg (has no administration in time range)   hydrALAZINE (APRESOLINE) injection 10 mg (has no administration in time range)   acetaminophen (TYLENOL) tablet 650 mg (has no administration in time range)   albumin human 25 % IV SOLN 25 g (25 g Intravenous New Bag 2/20/25 2045)   midodrine (PROAMATINE) tablet 10 mg (10 mg Oral Given 2/20/25 1959)   pantoprazole (PROTONIX) EC tablet 40 mg (40 mg Oral Given 2/20/25 1958)   furosemide (LASIX) tablet 20 mg ( Oral Dose Auto Held 3/19/25 0900)   ursodiol (ACTIGALL) capsule  600 mg (600 mg Oral Given 2/20/25 2047)   arformoterol (BROVANA) nebulizer solution 15 mcg ( Nebulization Canceled Entry 2/20/25 2130)     And   revefenacin (YUPELRI) nebulizer solution 175 mcg (175 mcg Nebulization Not Given 2/20/25 1841)   ipratropium-albuterol (DUO-NEB) nebulizer solution 3 mL (has no administration in time range)   lactulose (CHRONULAC) 10 GM/15ML solution 30 g (30 g Oral Given 2/20/25 2000)   cefepime 2000 mg IVPB in 100 mL NS (VTB) (has no administration in time range)   cefTRIAXone (ROCEPHIN) in NS 1 gram/10ml IV PUSH syringe (1,000 mg Intravenous Given 2/20/25 1553)   azithromycin (ZITHROMAX) 500 mg in sodium chloride 0.9 % 250 mL IVPB-VTB (0 mg Intravenous Stopped 2/20/25 1741)   cefepime 2000 mg IVPB in 100 mL NS (VTB) (2,000 mg Intravenous New Bag 2/20/25 2000)        ED Course:           EKG: Sinus rhythm rate of 90 beats per    No acute ischemia is noted    Labs:    Lab Results (last 24 hours)       Procedure Component Value Units Date/Time    CBC & Differential [[id number]]  (Abnormal) Collected: 02/20/25 1141    Specimen: Blood Updated: 02/20/25 1148    Narrative:      The following orders were created for panel order CBC & Differential.  Procedure                               Abnormality         Status                     ---------                               -----------         ------                     CBC Auto Differential[[id number]]        Abnormal            Final result                 Please view results for these tests on the individual orders.    Comprehensive Metabolic Panel [[id number]]  (Abnormal) Collected: 02/20/25 1141    Specimen: Blood Updated: 02/20/25 1207     Glucose 89 mg/dL      BUN 17 mg/dL      Creatinine 1.49 mg/dL      Sodium 138 mmol/L      Potassium 3.9 mmol/L      Chloride 105 mmol/L      CO2 22.9 mmol/L      Calcium 7.7 mg/dL      Total Protein 5.6 g/dL      Albumin 2.7 g/dL      ALT (SGPT) 13 U/L      AST (SGOT) 30 U/L      Alkaline Phosphatase 195  U/L      Total Bilirubin 3.4 mg/dL      Globulin 2.9 gm/dL      A/G Ratio 0.9 g/dL      BUN/Creatinine Ratio 11.4     Anion Gap 10.1 mmol/L      eGFR 61.6 mL/min/1.73     Narrative:      GFR Categories in Chronic Kidney Disease (CKD)      GFR Category          GFR (mL/min/1.73)    Interpretation  G1                     90 or greater         Normal or high (1)  G2                      60-89                Mild decrease (1)  G3a                   45-59                Mild to moderate decrease  G3b                   30-44                Moderate to severe decrease  G4                    15-29                Severe decrease  G5                    14 or less           Kidney failure          (1)In the absence of evidence of kidney disease, neither GFR category G1 or G2 fulfill the criteria for CKD.    eGFR calculation 2021 CKD-EPI creatinine equation, which does not include race as a factor    High Sensitivity Troponin T [[id number]]  (Abnormal) Collected: 02/20/25 1141    Specimen: Blood Updated: 02/20/25 1207     HS Troponin T 34 ng/L     Narrative:      High Sensitive Troponin T Reference Range:  <14.0 ng/L- Negative Female for AMI  <22.0 ng/L- Negative Male for AMI  >=14 - Abnormal Female indicating possible myocardial injury.  >=22 - Abnormal Male indicating possible myocardial injury.   Clinicians would have to utilize clinical acumen, EKG, Troponin, and serial changes to determine if it is an Acute Myocardial Infarction or myocardial injury due to an underlying chronic condition.         Magnesium [[id number]]  (Normal) Collected: 02/20/25 1141    Specimen: Blood Updated: 02/20/25 1207     Magnesium 1.6 mg/dL     CBC Auto Differential [[id number]]  (Abnormal) Collected: 02/20/25 1141    Specimen: Blood Updated: 02/20/25 1148     WBC 5.44 10*3/mm3      RBC 3.38 10*6/mm3      Hemoglobin 9.8 g/dL      Hematocrit 30.9 %      MCV 91.4 fL      MCH 29.0 pg      MCHC 31.7 g/dL      RDW 20.0 %      RDW-SD 65.7 fl      MPV  10.4 fL      Platelets 117 10*3/mm3      Neutrophil % 73.5 %      Lymphocyte % 14.0 %      Monocyte % 9.0 %      Eosinophil % 2.0 %      Basophil % 0.6 %      Immature Grans % 0.9 %      Neutrophils, Absolute 4.00 10*3/mm3      Lymphocytes, Absolute 0.76 10*3/mm3      Monocytes, Absolute 0.49 10*3/mm3      Eosinophils, Absolute 0.11 10*3/mm3      Basophils, Absolute 0.03 10*3/mm3      Immature Grans, Absolute 0.05 10*3/mm3      nRBC 0.0 /100 WBC     Ethanol [[id number]] Collected: 02/20/25 1141    Specimen: Blood Updated: 02/20/25 1509     Ethanol <10 mg/dL      Ethanol % <0.010 %     Narrative:      Ethanol (Plasma)  <10 Essentially Negative    Toxic Concentrations           mg/dL    Flushing, slowing of reflexes    Impaired visual activity         Depression of CNS              >100  Possible Coma                  >300       Protime-INR [[id number]]  (Abnormal) Collected: 02/20/25 1141    Specimen: Blood Updated: 02/20/25 1756     Protime 18.9 Seconds      INR 1.51    Narrative:      Suggested Therapeutic Ranges For Oral Anticoagulant Therapy:  Level of Therapy                      INR Target Range  Standard Dose                            2.0-3.0  High Dose                                2.5-3.5  Patients not receiving anticoagulant  Therapy Normal Range                     0.86-1.15    Urinalysis With Microscopic If Indicated (No Culture) - Urine, Clean Catch [[id number]]  (Abnormal) Collected: 02/20/25 1216    Specimen: Urine, Clean Catch Updated: 02/20/25 1227     Color, UA Dark Yellow     Appearance, UA Clear     pH, UA >=9.0     Specific Gravity, UA 1.018     Glucose, UA Negative     Ketones, UA Negative     Bilirubin, UA Small (1+)     Blood, UA Negative     Protein, UA Negative     Leuk Esterase, UA Trace     Nitrite, UA Negative     Urobilinogen, UA 4.0 E.U./dL    Urinalysis, Microscopic Only - Urine, Clean Catch [[id number]] Collected: 02/20/25 1216    Specimen: Urine, Clean Catch Updated:  02/20/25 1227     RBC, UA 0-2 /HPF      WBC, UA 0-2 /HPF      Bacteria, UA None Seen /HPF      Squamous Epithelial Cells, UA 0-2 /HPF      Hyaline Casts, UA 0-2 /LPF      Methodology Automated Microscopy    Urine Drug Screen - Urine, Clean Catch [[id number]]  (Abnormal) Collected: 02/20/25 1216    Specimen: Urine, Clean Catch Updated: 02/20/25 1513     THC, Screen, Urine Positive     Phencyclidine (PCP), Urine Negative     Cocaine Screen, Urine Negative     Methamphetamine, Ur Negative     Opiate Screen Negative     Amphetamine Screen, Urine Negative     Benzodiazepine Screen, Urine Negative     Tricyclic Antidepressants Screen Negative     Methadone Screen, Urine Negative     Barbiturates Screen, Urine Negative     Oxycodone Screen, Urine Negative     Buprenorphine, Screen, Urine Negative    Narrative:      Cutoff For Drugs Screened:    Amphetamines               500 ng/ml  Barbiturates               200 ng/ml  Benzodiazepines            150 ng/ml  Cocaine                    150 ng/ml  Methadone                  200 ng/ml  Opiates                    100 ng/ml  Phencyclidine               25 ng/ml  THC                         50 ng/ml  Methamphetamine            500 ng/ml  Tricyclic Antidepressants  300 ng/ml  Oxycodone                  100 ng/ml  Buprenorphine               10 ng/ml    The normal value for all drugs tested is negative. This report includes unconfirmed screening results, with the cutoff values listed, to be used for medical treatment purposes only.  Unconfirmed results must not be used for non-medical purposes such as employment or legal testing.  Clinical consideration should be applied to any drug of abuse test, particularly when unconfirmed results are used.      Fentanyl, Urine - Urine, Clean Catch [[id number]]  (Normal) Collected: 02/20/25 1216    Specimen: Urine, Clean Catch Updated: 02/20/25 1515     Fentanyl, Urine Negative    Narrative:      Negative Threshold:      Fentanyl 5 ng/mL     The  normal value for the drug tested is negative. This report includes final unconfirmed screening results to be used for medical treatment purposes only. Unconfirmed results must not be used for non-medical purposes such as employment or legal testing. Clinical consideration should be applied to any drug of abuse test, particularly when unconfirmed results are used.           COVID-19, FLU A/B, RSV PCR 1 HR TAT - Swab, Nasopharynx [[id number]]  (Normal) Collected: 02/20/25 1317    Specimen: Swab from Nasopharynx Updated: 02/20/25 1408     COVID19 Not Detected     Influenza A PCR Not Detected     Influenza B PCR Not Detected     RSV, PCR Not Detected    Narrative:      Fact sheet for providers: https://www.fda.gov/media/067539/download    Fact sheet for patients: https://www.fda.gov/media/072490/download    Test performed by PCR.    High Sensitivity Troponin T 1Hr [[id number]]  (Abnormal) Collected: 02/20/25 1325    Specimen: Blood Updated: 02/20/25 1441     HS Troponin T 31 ng/L      Troponin T Numeric Delta -3 ng/L      Troponin T % Delta -9    Narrative:      High Sensitive Troponin T Reference Range:  <14.0 ng/L- Negative Female for AMI  <22.0 ng/L- Negative Male for AMI  >=14 - Abnormal Female indicating possible myocardial injury.  >=22 - Abnormal Male indicating possible myocardial injury.   Clinicians would have to utilize clinical acumen, EKG, Troponin, and serial changes to determine if it is an Acute Myocardial Infarction or myocardial injury due to an underlying chronic condition.         Blood Culture - Blood, Arm, Left [[id number]] Collected: 02/20/25 1549    Specimen: Blood from Arm, Left Updated: 02/20/25 1553    Blood Culture - Blood, Arm, Right [[id number]] Collected: 02/20/25 1549    Specimen: Blood from Arm, Right Updated: 02/20/25 1553    Lactic Acid, Plasma [[id number]]  (Normal) Collected: 02/20/25 1549    Specimen: Blood Updated: 02/20/25 1615     Lactate 1.5 mmol/L     Ammonia [[id number]]  (Abnormal)  Collected: 02/20/25 1549    Specimen: Blood Updated: 02/20/25 1613     Ammonia 75 umol/L              Imaging:    CT Chest Without Contrast Diagnostic    Result Date: 2/20/2025  CT CHEST WO CONTRAST DIAGNOSTIC Date of Exam: 2/20/2025 5:48 PM EST Indication: sob. Comparison: 5/25/2024 Technique: Axial CT images were obtained of the chest without contrast administration.  Reconstructed coronal and sagittal images were also obtained. Automated exposure control and iterative construction methods were used. Findings: Large right pleural effusion has decreased in size. There is bilateral gynecomastia. The thoracic aorta has a normal caliber. There is no definite mediastinal, axillary or hilar adenopathy. There is airspace consolidation with air bronchograms in the right lower lobe likely compressive atelectasis. Pneumonia is also possible. There are scattered calcified granulomas. No acute osseous abnormalities are identified. No evidence of coronary artery calcification. Stable patchy groundglass opacity in the left apex.     Impression: 1. Large right pleural effusion is smaller on today's examination. 2. Right lower lobe airspace consolidation with air bronchograms is likely compressive atelectasis. Pneumonia is also possible. Electronically Signed: Elmo Balbuena MD  2/20/2025 6:24 PM EST  Workstation ID: [id number]    CT Abdomen Pelvis Without Contrast    Result Date: 2/20/2025  CT ABDOMEN PELVIS WO CONTRAST Date of Exam: 2/20/2025 5:48 PM EST Indication: abd pain sbp. Comparison: 2/3/2025 Technique: Axial CT images were obtained of the abdomen and pelvis without the administration of contrast. Reconstructed coronal and sagittal images were also obtained. Automated exposure control and iterative construction methods were used. Findings: ABDOMEN: Stable large right pleural effusion. Stable adjacent airspace consolidation with air bronchograms likely compressive atelectasis. Pneumonia is also possible. The liver is  cirrhotic. No evidence of splenomegaly. Prior cholecystectomy and TIPS procedure. Stable moderate ascites. The unenhanced kidneys, pancreas and adrenal glands are normal. PELVIS: There is a moderate amount of ascites. There is a fat-containing umbilical hernia. Small fat-containing left inguinal hernia. No evidence of bowel obstruction or perforation. The abdominal aorta is of normal caliber. There is generalized anasarca. No acute osseous abnormalities are identified. No evidence of bowel obstruction. Questionable wall thickening in the cecum and ascending colon.     Impression: 1. Cirrhosis. Moderate ascites and anasarca. 2. Partially visualized right pleural effusion with adjacent airspace consolidation likely atelectasis. Pneumonia is also possible. 3. Questionable wall thickening in the cecum and ascending colon possibly secondary to nonspecific colitis. Electronically Signed: Elmo Balbuena MD  2/20/2025 6:08 PM EST  Workstation ID: [id number]    XR Chest 1 View    Result Date: 2/20/2025  XR CHEST 1 VW Date of Exam: 2/20/2025 12:23 PM EST Indication: Weak/Dizzy/AMS triage protocol Comparison: Chest radiograph 2/4/2025. Findings: Cardiomediastinal silhouette is within normal limits. Moderate right pleural effusion with right basilar airspace disease. Left lung is clear. No pneumothorax. Osseous structures are unchanged. TIPS projects over the right upper quadrant.     Impression: Moderate right pleural effusion with adjacent airspace disease suspected represent atelectasis, though underlying pneumonia is possible. Electronically Signed: Chepe Davies MD  2/20/2025 12:34 PM EST  Workstation ID: [id number]       Differential Diagnosis and Discussion:    Weakness: Based on the patient's history, signs, and symptoms, the diffential diagnosis includes but is not limited to meningitis, stroke, sepsis, subarachnoid hemorrhage, intracranial bleeding, encephalitis, acute uti, dehydration, MS, myasthenia gravis, Guillan  [place], migraine variant, neuromuscular disorders vertigo, electrolyte imbalance, and metabolic disorders.    PROCEDURES:    Labs were collected in the emergency department and all labs were reviewed and interpreted by me.  X-ray were performed in the emergency department and all X-ray impressions were independently interpreted by me.  An EKG was performed and the EKG was interpreted by me.    ECG 12 Lead ED Triage Standing Order; Weak / Dizzy / AMS   Final Result   HEART RATE=90  bpm   RR Zscosqje=618  ms   NE Tzgcdiru=229  ms   P Horizontal Axis=13  deg   P Front Axis=1  deg   QRSD Interval=84  ms   QT Bqvgomce=682  ms   TIxQ=840  ms   QRS Axis=-9  deg   T Wave Axis=2  deg   - OTHERWISE NORMAL ECG -   Sinus rhythm   Low voltage, precordial leads   When compared with ECG of 19-Sep-2024 12:41:50,   Significant rate decrease   Electronically Signed By: Artem Burgos ([facility]) 2025-02-20 16:11:55   Date and Time of Study:2025-02-20 11:37:46          Procedures    MDM     Amount and/or Complexity of Data Reviewed  Clinical lab tests: reviewed  Tests in the radiology section of CPT®: reviewed  Tests in the medicine section of CPT®: reviewed  Decide to obtain previous medical records or to obtain history from someone other than the patient: yes                       Patient Care Considerations:    CT ABDOMEN AND PELVIS: I considered ordering a CT scan of the abdomen and pelvis however the patient has no abdominal pain or tenderness      Consultants/Shared Management Plan:    Hospitalist: I have discussed the case with hospitalist who agrees to accept the patient for admission.    Social Determinants of Health:    Patient is unable to carry out activities of daily life. Escalation of care is necessary.       Disposition and Care Coordination:    Admit:   Through independent evaluation of the patient's history, physical, and imperical data, the patient meets criteria for inpatient admission to the hospital.        Final  diagnoses:   Generalized weakness   Acute febrile illness   Cirrhosis of liver with ascites, unspecified hepatic cirrhosis type   Hepatic encephalopathy   Pneumonia due to infectious organism, unspecified laterality, unspecified part of lung   Pleural effusion        ED Disposition       ED Disposition   Decision to Admit    Condition   --    Comment   Level of Care: Med/Surg [1]   Diagnosis: Sepsis [[id number]]   Admitting Physician: ISIDRO BLOCK [[id number]]   Certification: I Certify That Inpatient Hospital Services Are Medically Necessary For Greater Than 2 Midnights                 This medical record created using voice recognition software.             Agusto Fernandez, DO  02/21/25 0007

## 2025-02-21 ENCOUNTER — APPOINTMENT (OUTPATIENT)
Dept: CT IMAGING | Facility: HOSPITAL | Age: 40
DRG: 433 | End: 2025-02-21
Payer: COMMERCIAL

## 2025-02-21 ENCOUNTER — APPOINTMENT (OUTPATIENT)
Dept: INTERVENTIONAL RADIOLOGY/VASCULAR | Facility: HOSPITAL | Age: 40
DRG: 433 | End: 2025-02-21
Payer: COMMERCIAL

## 2025-02-21 ENCOUNTER — APPOINTMENT (OUTPATIENT)
Dept: GENERAL RADIOLOGY | Facility: HOSPITAL | Age: 40
DRG: 433 | End: 2025-02-21
Payer: COMMERCIAL

## 2025-02-21 LAB
ALBUMIN FLD-MCNC: 0.6 G/DL
ALBUMIN FLD-MCNC: <0.2 G/DL
ALBUMIN SERPL-MCNC: 3 G/DL (ref 3.5–5.2)
ALBUMIN/GLOB SERPL: 1.4 G/DL
ALP SERPL-CCNC: 123 U/L (ref 39–117)
ALT SERPL W P-5'-P-CCNC: 11 U/L (ref 1–41)
AMMONIA BLD-SCNC: 62 UMOL/L (ref 16–60)
ANION GAP SERPL CALCULATED.3IONS-SCNC: 9.5 MMOL/L (ref 5–15)
APPEARANCE FLD: ABNORMAL
APPEARANCE FLD: CLEAR
APTT PPP: 42.6 SECONDS (ref 24.2–34.2)
AST SERPL-CCNC: 26 U/L (ref 1–40)
BASOPHILS # BLD AUTO: 0.01 10*3/MM3 (ref 0–0.2)
BASOPHILS NFR BLD AUTO: 0.4 % (ref 0–1.5)
BILIRUB SERPL-MCNC: 2.8 MG/DL (ref 0–1.2)
BUN SERPL-MCNC: 17 MG/DL (ref 6–20)
BUN/CREAT SERPL: 13.7 (ref 7–25)
CALCIUM SPEC-SCNC: 7.3 MG/DL (ref 8.6–10.5)
CHLORIDE SERPL-SCNC: 104 MMOL/L (ref 98–107)
CHOLESTEROL FLUID: 6 MG/DL
CO2 SERPL-SCNC: 21.5 MMOL/L (ref 22–29)
COLOR FLD: ABNORMAL
COLOR FLD: NORMAL
CREAT SERPL-MCNC: 1.24 MG/DL (ref 0.76–1.27)
DEPRECATED RDW RBC AUTO: 65.1 FL (ref 37–54)
EGFRCR SERPLBLD CKD-EPI 2021: 76.8 ML/MIN/1.73
EOSINOPHIL # BLD AUTO: 0.07 10*3/MM3 (ref 0–0.4)
EOSINOPHIL NFR BLD AUTO: 2.8 % (ref 0.3–6.2)
ERYTHROCYTE [DISTWIDTH] IN BLOOD BY AUTOMATED COUNT: 19.8 % (ref 12.3–15.4)
GLOBULIN UR ELPH-MCNC: 2.1 GM/DL
GLUCOSE FLD-MCNC: 94 MG/DL
GLUCOSE FLD-MCNC: 98 MG/DL
GLUCOSE SERPL-MCNC: 88 MG/DL (ref 65–99)
HCT VFR BLD AUTO: 23.6 % (ref 37.5–51)
HGB BLD-MCNC: 7.6 G/DL (ref 13–17.7)
IMM GRANULOCYTES # BLD AUTO: 0.02 10*3/MM3 (ref 0–0.05)
IMM GRANULOCYTES NFR BLD AUTO: 0.8 % (ref 0–0.5)
INR PPP: 1.71 (ref 0.86–1.15)
LDH FLD-CCNC: 108 U/L
LDH SERPL-CCNC: 278 U/L (ref 135–225)
LYMPHOCYTES # BLD AUTO: 0.71 10*3/MM3 (ref 0.7–3.1)
LYMPHOCYTES NFR BLD AUTO: 28.2 % (ref 19.6–45.3)
LYMPHOCYTES NFR FLD MANUAL: 31 %
LYMPHOCYTES NFR FLD MANUAL: 6 %
MACROPHAGE FLUID %: 10 %
MACROPHAGE FLUID %: 14 %
MCH RBC QN AUTO: 29.2 PG (ref 26.6–33)
MCHC RBC AUTO-ENTMCNC: 32.2 G/DL (ref 31.5–35.7)
MCV RBC AUTO: 90.8 FL (ref 79–97)
MONOCYTES # BLD AUTO: 0.37 10*3/MM3 (ref 0.1–0.9)
MONOCYTES NFR BLD AUTO: 14.7 % (ref 5–12)
MONOCYTES NFR FLD: 50 %
MONOCYTES NFR FLD: 62 %
NEUTROPHILS NFR BLD AUTO: 1.34 10*3/MM3 (ref 1.7–7)
NEUTROPHILS NFR BLD AUTO: 53.1 % (ref 42.7–76)
NEUTROPHILS NFR FLD MANUAL: 18 %
NEUTROPHILS NFR FLD MANUAL: 9 %
NIGHT BLUE STAIN TISS: NORMAL
NRBC BLD AUTO-RTO: 0 /100 WBC (ref 0–0.2)
NUC CELL # FLD: 314 /MM3
NUC CELL # FLD: 507 /MM3
PH FLD: 7 [PH]
PLATELET # BLD AUTO: 71 10*3/MM3 (ref 140–450)
PMV BLD AUTO: 9.7 FL (ref 6–12)
POTASSIUM SERPL-SCNC: 3.6 MMOL/L (ref 3.5–5.2)
PROT FLD-MCNC: 1 G/DL
PROT FLD-MCNC: <1 G/DL
PROT SERPL-MCNC: 4.9 G/DL (ref 6–8.5)
PROT SERPL-MCNC: 5.1 G/DL (ref 6–8.5)
PROTHROMBIN TIME: 20.9 SECONDS (ref 11.8–14.9)
RBC # BLD AUTO: 2.6 10*6/MM3 (ref 4.14–5.8)
RBC # FLD AUTO: 3000 /MM3
RBC # FLD AUTO: <2000 /MM3
SODIUM SERPL-SCNC: 135 MMOL/L (ref 136–145)
TRIGL FLD-MCNC: <9 MG/DL
WBC NRBC COR # BLD AUTO: 2.52 10*3/MM3 (ref 3.4–10.8)

## 2025-02-21 PROCEDURE — 84311 SPECTROPHOTOMETRY: CPT | Performed by: STUDENT IN AN ORGANIZED HEALTH CARE EDUCATION/TRAINING PROGRAM

## 2025-02-21 PROCEDURE — 71045 X-RAY EXAM CHEST 1 VIEW: CPT

## 2025-02-21 PROCEDURE — 83615 LACTATE (LD) (LDH) ENZYME: CPT | Performed by: STUDENT IN AN ORGANIZED HEALTH CARE EDUCATION/TRAINING PROGRAM

## 2025-02-21 PROCEDURE — 87102 FUNGUS ISOLATION CULTURE: CPT | Performed by: STUDENT IN AN ORGANIZED HEALTH CARE EDUCATION/TRAINING PROGRAM

## 2025-02-21 PROCEDURE — 32555 ASPIRATE PLEURA W/ IMAGING: CPT | Performed by: STUDENT IN AN ORGANIZED HEALTH CARE EDUCATION/TRAINING PROGRAM

## 2025-02-21 PROCEDURE — 25010000002 ALBUMIN HUMAN 25% PER 50 ML: Performed by: STUDENT IN AN ORGANIZED HEALTH CARE EDUCATION/TRAINING PROGRAM

## 2025-02-21 PROCEDURE — 94799 UNLISTED PULMONARY SVC/PX: CPT

## 2025-02-21 PROCEDURE — 83986 ASSAY PH BODY FLUID NOS: CPT | Performed by: STUDENT IN AN ORGANIZED HEALTH CARE EDUCATION/TRAINING PROGRAM

## 2025-02-21 PROCEDURE — 87206 SMEAR FLUORESCENT/ACID STAI: CPT | Performed by: STUDENT IN AN ORGANIZED HEALTH CARE EDUCATION/TRAINING PROGRAM

## 2025-02-21 PROCEDURE — 63710000001 REVEFENACIN 175 MCG/3ML SOLUTION: Performed by: STUDENT IN AN ORGANIZED HEALTH CARE EDUCATION/TRAINING PROGRAM

## 2025-02-21 PROCEDURE — 89051 BODY FLUID CELL COUNT: CPT | Performed by: STUDENT IN AN ORGANIZED HEALTH CARE EDUCATION/TRAINING PROGRAM

## 2025-02-21 PROCEDURE — 87070 CULTURE OTHR SPECIMN AEROBIC: CPT | Performed by: STUDENT IN AN ORGANIZED HEALTH CARE EDUCATION/TRAINING PROGRAM

## 2025-02-21 PROCEDURE — 94664 DEMO&/EVAL PT USE INHALER: CPT

## 2025-02-21 PROCEDURE — 82042 OTHER SOURCE ALBUMIN QUAN EA: CPT | Performed by: STUDENT IN AN ORGANIZED HEALTH CARE EDUCATION/TRAINING PROGRAM

## 2025-02-21 PROCEDURE — 87075 CULTR BACTERIA EXCEPT BLOOD: CPT | Performed by: STUDENT IN AN ORGANIZED HEALTH CARE EDUCATION/TRAINING PROGRAM

## 2025-02-21 PROCEDURE — 82945 GLUCOSE OTHER FLUID: CPT | Performed by: STUDENT IN AN ORGANIZED HEALTH CARE EDUCATION/TRAINING PROGRAM

## 2025-02-21 PROCEDURE — 99223 1ST HOSP IP/OBS HIGH 75: CPT | Performed by: STUDENT IN AN ORGANIZED HEALTH CARE EDUCATION/TRAINING PROGRAM

## 2025-02-21 PROCEDURE — 85730 THROMBOPLASTIN TIME PARTIAL: CPT | Performed by: STUDENT IN AN ORGANIZED HEALTH CARE EDUCATION/TRAINING PROGRAM

## 2025-02-21 PROCEDURE — 97165 OT EVAL LOW COMPLEX 30 MIN: CPT

## 2025-02-21 PROCEDURE — 25010000002 CEFEPIME PER 500 MG: Performed by: STUDENT IN AN ORGANIZED HEALTH CARE EDUCATION/TRAINING PROGRAM

## 2025-02-21 PROCEDURE — 85025 COMPLETE CBC W/AUTO DIFF WBC: CPT | Performed by: STUDENT IN AN ORGANIZED HEALTH CARE EDUCATION/TRAINING PROGRAM

## 2025-02-21 PROCEDURE — 84478 ASSAY OF TRIGLYCERIDES: CPT | Performed by: STUDENT IN AN ORGANIZED HEALTH CARE EDUCATION/TRAINING PROGRAM

## 2025-02-21 PROCEDURE — 99232 SBSQ HOSP IP/OBS MODERATE 35: CPT | Performed by: STUDENT IN AN ORGANIZED HEALTH CARE EDUCATION/TRAINING PROGRAM

## 2025-02-21 PROCEDURE — 0W993ZZ DRAINAGE OF RIGHT PLEURAL CAVITY, PERCUTANEOUS APPROACH: ICD-10-PCS | Performed by: STUDENT IN AN ORGANIZED HEALTH CARE EDUCATION/TRAINING PROGRAM

## 2025-02-21 PROCEDURE — 80053 COMPREHEN METABOLIC PANEL: CPT | Performed by: STUDENT IN AN ORGANIZED HEALTH CARE EDUCATION/TRAINING PROGRAM

## 2025-02-21 PROCEDURE — 25010000002 LIDOCAINE 2% SOLUTION: Performed by: STUDENT IN AN ORGANIZED HEALTH CARE EDUCATION/TRAINING PROGRAM

## 2025-02-21 PROCEDURE — 76942 ECHO GUIDE FOR BIOPSY: CPT

## 2025-02-21 PROCEDURE — 87116 MYCOBACTERIA CULTURE: CPT | Performed by: STUDENT IN AN ORGANIZED HEALTH CARE EDUCATION/TRAINING PROGRAM

## 2025-02-21 PROCEDURE — 84157 ASSAY OF PROTEIN OTHER: CPT | Performed by: STUDENT IN AN ORGANIZED HEALTH CARE EDUCATION/TRAINING PROGRAM

## 2025-02-21 PROCEDURE — 88108 CYTOPATH CONCENTRATE TECH: CPT | Performed by: STUDENT IN AN ORGANIZED HEALTH CARE EDUCATION/TRAINING PROGRAM

## 2025-02-21 PROCEDURE — P9047 ALBUMIN (HUMAN), 25%, 50ML: HCPCS | Performed by: STUDENT IN AN ORGANIZED HEALTH CARE EDUCATION/TRAINING PROGRAM

## 2025-02-21 PROCEDURE — 76604 US EXAM CHEST: CPT | Performed by: STUDENT IN AN ORGANIZED HEALTH CARE EDUCATION/TRAINING PROGRAM

## 2025-02-21 PROCEDURE — 87205 SMEAR GRAM STAIN: CPT | Performed by: STUDENT IN AN ORGANIZED HEALTH CARE EDUCATION/TRAINING PROGRAM

## 2025-02-21 PROCEDURE — 36415 COLL VENOUS BLD VENIPUNCTURE: CPT | Performed by: STUDENT IN AN ORGANIZED HEALTH CARE EDUCATION/TRAINING PROGRAM

## 2025-02-21 PROCEDURE — 82140 ASSAY OF AMMONIA: CPT | Performed by: STUDENT IN AN ORGANIZED HEALTH CARE EDUCATION/TRAINING PROGRAM

## 2025-02-21 PROCEDURE — 88185 FLOWCYTOMETRY/TC ADD-ON: CPT

## 2025-02-21 PROCEDURE — 94760 N-INVAS EAR/PLS OXIMETRY 1: CPT

## 2025-02-21 PROCEDURE — 88184 FLOWCYTOMETRY/ TC 1 MARKER: CPT

## 2025-02-21 PROCEDURE — C1729 CATH, DRAINAGE: HCPCS

## 2025-02-21 PROCEDURE — 85610 PROTHROMBIN TIME: CPT | Performed by: STUDENT IN AN ORGANIZED HEALTH CARE EDUCATION/TRAINING PROGRAM

## 2025-02-21 RX ORDER — PANTOPRAZOLE SODIUM 40 MG/1
40 TABLET, DELAYED RELEASE ORAL EVERY 24 HOURS
Status: DISCONTINUED | OUTPATIENT
Start: 2025-02-22 | End: 2025-02-22 | Stop reason: HOSPADM

## 2025-02-21 RX ORDER — LIDOCAINE HYDROCHLORIDE 20 MG/ML
20 INJECTION, SOLUTION INFILTRATION; PERINEURAL ONCE
Status: COMPLETED | OUTPATIENT
Start: 2025-02-21 | End: 2025-02-21

## 2025-02-21 RX ORDER — ALBUMIN (HUMAN) 12.5 G/50ML
12.5 SOLUTION INTRAVENOUS ONCE
OUTPATIENT
Start: 2025-02-21 | End: 2025-02-21

## 2025-02-21 RX ADMIN — IPRATROPIUM BROMIDE AND ALBUTEROL SULFATE 3 ML: .5; 3 SOLUTION RESPIRATORY (INHALATION) at 05:19

## 2025-02-21 RX ADMIN — URSODIOL 600 MG: 300 CAPSULE ORAL at 21:16

## 2025-02-21 RX ADMIN — REVEFENACIN 175 MCG: 175 SOLUTION RESPIRATORY (INHALATION) at 09:44

## 2025-02-21 RX ADMIN — URSODIOL 600 MG: 300 CAPSULE ORAL at 08:44

## 2025-02-21 RX ADMIN — ARFORMOTEROL TARTRATE 15 MCG: 15 SOLUTION RESPIRATORY (INHALATION) at 19:00

## 2025-02-21 RX ADMIN — PANTOPRAZOLE SODIUM 40 MG: 40 TABLET, DELAYED RELEASE ORAL at 07:40

## 2025-02-21 RX ADMIN — SODIUM CHLORIDE 2000 MG: 9 INJECTION, SOLUTION INTRAVENOUS at 01:24

## 2025-02-21 RX ADMIN — SODIUM BICARBONATE 1 MEQ: 84 INJECTION, SOLUTION INTRAVENOUS at 09:15

## 2025-02-21 RX ADMIN — ACETAMINOPHEN 650 MG: 325 TABLET ORAL at 18:36

## 2025-02-21 RX ADMIN — MIDODRINE HYDROCHLORIDE 10 MG: 10 TABLET ORAL at 17:02

## 2025-02-21 RX ADMIN — ALBUMIN (HUMAN) 25 G: 0.25 INJECTION, SOLUTION INTRAVENOUS at 14:57

## 2025-02-21 RX ADMIN — MIDODRINE HYDROCHLORIDE 10 MG: 10 TABLET ORAL at 07:40

## 2025-02-21 RX ADMIN — MIDODRINE HYDROCHLORIDE 10 MG: 10 TABLET ORAL at 12:34

## 2025-02-21 RX ADMIN — SODIUM CHLORIDE 2000 MG: 9 INJECTION, SOLUTION INTRAVENOUS at 10:31

## 2025-02-21 RX ADMIN — LIDOCAINE HYDROCHLORIDE 20 ML: 20 INJECTION, SOLUTION INFILTRATION; PERINEURAL at 09:15

## 2025-02-21 RX ADMIN — ACETAMINOPHEN 650 MG: 325 TABLET ORAL at 12:39

## 2025-02-21 RX ADMIN — LACTULOSE 30 G: 10 SOLUTION ORAL at 21:16

## 2025-02-21 RX ADMIN — SODIUM CHLORIDE 2000 MG: 9 INJECTION, SOLUTION INTRAVENOUS at 17:57

## 2025-02-21 RX ADMIN — Medication 10 ML: at 07:40

## 2025-02-21 RX ADMIN — ARFORMOTEROL TARTRATE 15 MCG: 15 SOLUTION RESPIRATORY (INHALATION) at 09:44

## 2025-02-21 RX ADMIN — LACTULOSE 30 G: 10 SOLUTION ORAL at 09:36

## 2025-02-21 RX ADMIN — ALBUMIN (HUMAN) 25 G: 0.25 INJECTION, SOLUTION INTRAVENOUS at 05:08

## 2025-02-21 NOTE — PROGRESS NOTES
[facility]   Hospitalist Progress Note  Date: 2025  Patient Name: Wia Gay  : 1985  MRN: [id number]  Date of admission: 2025  Room/Bed: [facility]      Subjective   Subjective     Chief Complaint:   Chief Complaint   Patient presents with   • Weakness - Generalized   • Dysuria   • Headache       Summary:   Wai Gay is a 39 y.o. male being treated facility for generalized weakness and subjective fever.    He has a past medical history of alcohol abuse, essential hypertension, GERD, alcoholic cirrhosis presents to the ED due to generalized weakness and subjective fever.  Patient states that for the last couple days he has been having left-sided flank pain with pain with urination.  In addition patient been having generalized weakness is been slowly worsening with leading to difficulty to walk.  Patient was recently discharged  for generalized weakness.  Patient was found to have large right-sided pleural effusion and a large volume ascites.  Patient was treated for hepatorenal management and thoracic and paracentesis was negative for SBP.  Denies chest pain, nausea, vomiting, diarrhea, headache, shortness of breath or palpitations.  In the ED, patient's labs showed temperature 100.1, heart rate 95, blood pressure 109/76 and satting 98% on room air.  Labs show troponin 34, sodium 138, creatinine 1.49, hemoglobin 9.8, white blood cell 5.44.  UA shows mild leukocytes.  Chest x-ray shows moderate right-sided pleural effusion.  Patient was started on IV antibiotics.  Patient admitted to floors for further management.    Interval Followup:   After initiation of supportive care and and antibiotics, patient feels much better.  He received paracentesis this morning, negative for SBP.  Thoracentesis pending.    Objective   Objective     Vitals:   Temp:  [97.8 °F (36.6 °C)-100 °F (37.8 °C)] 100 °F (37.8 °C)  Heart Rate:  [] 97  Resp:  [16-26] 18  BP: ()/(58-76)  110/60    Physical Exam   General: Awake, alert, in no acute distress  HENT: Atraumatic, normocephalic. Nasal cannula in place 2 L minute oxygen flow rate  Eyes: pupils equal, round, without scleral icterus  Cardiovascular: Regular rate and rhythm, no murmurs   Pulmonary: CTA bilaterally; no wheezes; no conversational dyspnea  Gastrointestinal: Soft nontender nondistended  Musculoskeletal: No gross deformities, or ankle edema  Skin: No jaundice, no rash on exposed skin appreciated  Neuro: speech clear; no tremor  Psych: Mood and affect appropriate  : No suprapubic tenderness    Result Review    Result Review:  I have personally reviewed these results:  [x]  Laboratory      Lab 02/21/25  0548 02/20/25  1549 02/20/25  1141   WBC 2.52*  --  5.44   HEMOGLOBIN 7.6*  --  9.8*   HEMATOCRIT 23.6*  --  30.9*   PLATELETS 71*  --  117*   NEUTROS ABS 1.34*  --  4.00   IMMATURE GRANS (ABS) 0.02  --  0.05   LYMPHS ABS 0.71  --  0.76   MONOS ABS 0.37  --  0.49   EOS ABS 0.07  --  0.11   MCV 90.8  --  91.4   LACTATE  --  1.5  --    PROTIME 20.9*  --  18.9*   APTT 42.6*  --   --          Lab 02/21/25  0548 02/20/25  1141   SODIUM 135* 138   POTASSIUM 3.6 3.9   CHLORIDE 104 105   CO2 21.5* 22.9   ANION GAP 9.5 10.1   BUN 17 17   CREATININE 1.24 1.49*   EGFR 76.8 61.6   GLUCOSE 88 89   CALCIUM 7.3* 7.7*   MAGNESIUM  --  1.6         Lab 02/21/25  0548 02/20/25  1141   TOTAL PROTEIN 5.1* 5.6*   ALBUMIN 3.0* 2.7*   GLOBULIN 2.1 2.9   ALT (SGPT) 11 13   AST (SGOT) 26 30   BILIRUBIN 2.8* 3.4*   ALK PHOS 123* 195*         Lab 02/21/25  0548 02/20/25  1325 02/20/25  1141   HSTROP T  --  31* 34*   PROTIME 20.9*  --  18.9*   INR 1.71*  --  1.51*                 Brief Urine Lab Results  (Last result in the past 365 days)        Color   Clarity   Blood   Leuk Est   Nitrite   Protein   CREAT   Urine HCG        02/20/25 1216 Dark Yellow   Clear   Negative   Trace   Negative   Negative                 [x]  Microbiology   Microbiology Results  (last 10 days)       Procedure Component Value - Date/Time    Body Fluid Culture - Body Fluid, Peritoneum [[id number]] Collected: 02/21/25 0920    Lab Status: Preliminary result Specimen: Body Fluid from Peritoneum Updated: 02/21/25 1145     Gram Stain No organisms seen    COVID-19, FLU A/B, RSV PCR 1 HR TAT - Swab, Nasopharynx [[id number]]  (Normal) Collected: 02/20/25 1317    Lab Status: Final result Specimen: Swab from Nasopharynx Updated: 02/20/25 1408     COVID19 Not Detected     Influenza A PCR Not Detected     Influenza B PCR Not Detected     RSV, PCR Not Detected    Narrative:      Fact sheet for providers: https://www.fda.gov/media/114952/download    Fact sheet for patients: https://www.fda.gov/media/508086/download    Test performed by PCR.          [x]  Radiology  US Paracentesis    Result Date: 2/21/2025  Impression: Ultrasound-guided paracentesis was performed without complication, patient tolerated procedure with 1.6 L of clear, flores ascitic fluid removed. A sample specimen of 125 mL was walked to the lab via tech for further diagnostic evaluation. The patient was transferred back to their hospital room status post procedure for further evaluation medical management. Report dictated by: Peace Strickland  I have personally reviewed this case and agree with the findings above: Electronically Signed: Leif Bryan MD  2/21/2025 10:06 AM EST  Workstation ID: [id number]    CT Chest Without Contrast Diagnostic    Result Date: 2/20/2025  Impression: 1. Large right pleural effusion is smaller on today's examination. 2. Right lower lobe airspace consolidation with air bronchograms is likely compressive atelectasis. Pneumonia is also possible. Electronically Signed: Elmo Balbuena MD  2/20/2025 6:24 PM EST  Workstation ID: [id number]    CT Abdomen Pelvis Without Contrast    Result Date: 2/20/2025  Impression: 1. Cirrhosis. Moderate ascites and anasarca. 2. Partially visualized right pleural effusion with adjacent  airspace consolidation likely atelectasis. Pneumonia is also possible. 3. Questionable wall thickening in the cecum and ascending colon possibly secondary to nonspecific colitis. Electronically Signed: Elmo Balbuena MD  2/20/2025 6:08 PM EST  Workstation ID: [id number]    XR Chest 1 View    Result Date: 2/20/2025  Impression: Moderate right pleural effusion with adjacent airspace disease suspected represent atelectasis, though underlying pneumonia is possible. Electronically Signed: Chepe Davies MD  2/20/2025 12:34 PM EST  Workstation ID: [id number]   []  EKG/Telemetry   []  Cardiology/Vascular   []  Pathology  []  Old records  []  Other:    Assessment & Plan   Assessment / Plan     Sepsis   Mild abdominal pain, rule out SBP  Decompensated liver cirrhosis  Left flank pain concern for pyelonephritis  Moderate right-sided pleural effusion  NICKI on CKD; likely hepatorenal induced  Alcohol liver cirrhosis status post TIPS  GERD  Essential hypertension  History of COPD     Plan  Thora/para ordered  Follow-up paracentesis fluid studies  Follow-up thoracentesis fluid studies  Follow-up blood cultures, fluid cultures  Continue IV cefepime  Hold Lasix for now  Continue albumin, home midodrine  Renal function normalized.    Monitor a.m. hepatic, renal function.  Hemoglobin downtrending, a.m. CBC  Restart home medications as appropriate     Discussed with RN.    VTE Prophylaxis:  Mechanical VTE prophylaxis orders are present.        CODE STATUS:        Electronically signed by Varinder Rhodes MD, 2/21/2025, 12:39 EST.

## 2025-02-21 NOTE — THERAPY EVALUATION
Patient Name: Wai Gay  : 1985    MRN: [id number]                              Today's Date: 2025       Admit Date: 2025    Visit Dx:     ICD-10-CM ICD-9-CM   1. Generalized weakness  R53.1 780.79   2. Acute febrile illness  R50.9 780.60   3. Cirrhosis of liver with ascites, unspecified hepatic cirrhosis type  K74.60 571.5    R18.8    4. Hepatic encephalopathy  K76.82 572.2   5. Pneumonia due to infectious organism, unspecified laterality, unspecified part of lung  J18.9 486   6. Pleural effusion  J90 511.9     Patient Active Problem List   Diagnosis    Acute liver failure without hepatic coma    Acute hepatitis    Hypokalemia    Hyponatremia    Steatohepatitis, alcoholic    History of alcohol abuse    Tobacco abuse    Generalized abdominal pain    Need for influenza vaccination    Essential hypertension    Need for hepatitis A and B vaccination    Need for vaccination against Streptococcus pneumoniae    Need for meningococcal vaccination    Need for shingles vaccine    Last's esophagus without dysplasia    Umbilical hernia without obstruction and without gangrene    Acute hypoxic respiratory failure    Personal history of PE (pulmonary embolism)    Pleural effusion    Dyspnea    SBP (spontaneous bacterial peritonitis)    Peritonitis    Hospital discharge follow-up    ERRONEOUS ENCOUNTER--DISREGARD    Sepsis, due to unspecified organism, unspecified whether acute organ dysfunction present    Cirrhosis of liver with ascites    Iron deficiency anemia    Acute renal failure    Hepatic encephalopathy    Sepsis     Past Medical History:   Diagnosis Date    Alcohol abuse 2017    Anxiety     Essential hypertension 2019    GERD (gastroesophageal reflux disease)     History of transfusion     2024    Hypokalemia 2017    Will update    Hyponatremia 2017    Steatohepatitis, alcoholic 2017    Tobacco abuse 2017    Umbilical hernia      Past Surgical  History:   Procedure Laterality Date    CHOLECYSTECTOMY      ENDOSCOPY N/A 02/09/2022    Procedure: ESOPHAGOGASTRODUODENOSCOPY WITH BX;  Surgeon: Gino Khan MD;  Location: [facility] ENDOSCOPY;  Service: Gastroenterology;  Laterality: N/A;  RETAINED LIQUID FOOD IN STOMACH, HUFFMAN'S ESOPHAGUS    ENDOSCOPY N/A 02/10/2023    Procedure: ESOPHAGOGASTRODUODENOSCOPY;  Surgeon: Gino Khan MD;  Location: [facility] ENDOSCOPY;  Service: Gastroenterology;  Laterality: N/A;  GASTRITIS, BARRETTS ESOPHAGUS, PHG    ENDOSCOPY N/A 02/05/2025    Procedure: ESOPHAGOGASTRODUODENOSCOPY WITH BIOPSIES;  Surgeon: Gino Khan MD;  Location: [facility] ENDOSCOPY;  Service: Gastroenterology;  Laterality: N/A;  ESOPHAGEAL VARICES, PORTAL HYPERTENSIVE GASTROPATHY    TIPS PROCEDURE Right     11/11/2024    UPPER GASTROINTESTINAL ENDOSCOPY        General Information       Row Name 02/21/25 0920          OT Time and Intention    Document Type evaluation  -PG     Mode of Treatment individual therapy;occupational therapy  -PG       Row Name 02/21/25 0920          General Information    Prior Level of Function independent:;transfer;ADL's  -PG     Existing Precautions/Restrictions no known precautions/restrictions  -PG     Barriers to Rehab none identified  -PG       Row Name 02/21/25 0920          Occupational Profile    Reason for Services/Referral (Occupational Profile) Patient is a 39-year-old male admitted for hepatic encephalopathy  -PG       Row Name 02/21/25 0920          Living Environment    People in Home alone  -PG       Row Name 02/21/25 0920          Cognition    Orientation Status (Cognition) oriented x 3  -PG               User Key  (r) = Recorded By, (t) = Taken By, (c) = Cosigned By      Initials Name Provider Type    PG Godfrey Villeda OT Occupational Therapist                     Mobility/ADL's       Row Name 02/21/25 0921          Transfers    Transfers sit-stand transfer;stand-sit transfer  -PG       Row  Name 02/21/25 0921          Sit-Stand Transfer    Sit-Stand [place] (Transfers) independent  -PG       Row Name 02/21/25 0921          Stand-Sit Transfer    Stand-Sit [place] (Transfers) independent  -PG       Row Name 02/21/25 0921          Activities of Daily Living    BADL Assessment/Intervention bathing;upper body dressing;lower body dressing;grooming;toileting  -PG       Row Name 02/21/25 0921          Bathing Assessment/Intervention    [place] Level (Bathing) bathing skills;independent  -PG       Row Name 02/21/25 0921          Upper Body Dressing Assessment/Training    [place] Level (Upper Body Dressing) upper body dressing skills;independent  -PG       Row Name 02/21/25 0921          Lower Body Dressing Assessment/Training    [place] Level (Lower Body Dressing) lower body dressing skills;independent  -PG       Row Name 02/21/25 0921          Grooming Assessment/Training    [place] Level (Grooming) grooming skills;independent  -PG       Row Name 02/21/25 0921          Toileting Assessment/Training    [place] Level (Toileting) toileting skills;independent  -PG               User Key  (r) = Recorded By, (t) = Taken By, (c) = Cosigned By      Initials Name Provider Type    PG Godfrey Villeda OT Occupational Therapist                   Obj/Interventions       Row Name 02/21/25 0924          Sensory Assessment (Somatosensory)    Sensory Assessment (Somatosensory) sensation intact  -PG       Row Name 02/21/25 0924          Vision Assessment/Intervention    Visual Impairment/Limitations WFL  -PG       Row Name 02/21/25 0924          Range of Motion Comprehensive    General Range of Motion no range of motion deficits identified  -PG       Row Name 02/21/25 0924          Strength Comprehensive (MMT)    General Manual Muscle Testing (MMT) Assessment no strength deficits identified  -PG       Row Name 02/21/25 0924          Motor Skills    Motor Skills coordination;functional endurance   -PG     Coordination WFL  -PG     Functional Endurance good minus  -PG               User Key  (r) = Recorded By, (t) = Taken By, (c) = Cosigned By      Initials Name Provider Type    Godfrey Macias, OT Occupational Therapist                   Goals/Plan    No documentation.                  Clinical Impression       Row Name 02/21/25 0925          Pain Assessment    Pretreatment Pain Rating 0/10 - no pain  -PG     Posttreatment Pain Rating 0/10 - no pain  -PG       Row Name 02/21/25 0925          Plan of Care Review    Plan of Care Reviewed With patient  -PG     Progress improving  -PG     Outcome Evaluation OT evaluation completed with no additional therapy services recommended.  Patient is currently independent with all self-care and walking to and from the bathroom.  Patient agreeable no additional therapy is necessary  -PG       Row Name 02/21/25 0925          Therapy Assessment/Plan (OT)    Criteria for Skilled Therapeutic Interventions Met (OT) no;no problems identified which require skilled intervention  -PG     Therapy Frequency (OT) evaluation only  -PG       Row Name 02/21/25 0925          Therapy Plan Review/Discharge Plan (OT)    Anticipated Discharge Disposition (OT) home  -PG               User Key  (r) = Recorded By, (t) = Taken By, (c) = Cosigned By      Initials Name Provider Type    Godfrey Macias, KATHRYN Occupational Therapist                   Outcome Measures       Row Name 02/21/25 6429          How much help from another is currently needed...    Putting on and taking off regular lower body clothing? 4  -PG     Bathing (including washing, rinsing, and drying) 4  -PG     Toileting (which includes using toilet bed pan or urinal) 4  -PG     Putting on and taking off regular upper body clothing 4  -PG     Taking care of personal grooming (such as brushing teeth) 4  -PG     Eating meals 4  -PG     AM-PAC 6 Clicks Score (OT) 24  -PG       Row Name 02/21/25 2612          How much help from another  person do you currently need...    Turning from your back to your side while in flat bed without using bedrails? 4  -MP     Moving from lying on back to sitting on the side of a flat bed without bedrails? 4  -MP     Moving to and from a bed to a chair (including a wheelchair)? 3  -MP     Standing up from a chair using your arms (e.g., wheelchair, bedside chair)? 3  -MP     Climbing 3-5 steps with a railing? 3  -MP     To walk in hospital room? 3  -MP     AM-PAC 6 Clicks Score (PT) 20  -MP       Row Name 02/21/25 0928          Functional Assessment    Outcome Measure Options AM-PAC 6 Clicks Daily Activity (OT);Optimal Instrument  -PG       Row Name 02/21/25 0928          Optimal Instrument    Optimal Instrument Optimal - 3  -PG     Bending/Stooping 1  -PG     Standing 1  -PG     Reaching 1  -PG     From the list, choose the 3 activities you would most like to be able to do without any difficulty Bending/stooping;Standing;Reaching  -PG     Total Score Optimal - 3 3  -PG               User Key  (r) = Recorded By, (t) = Taken By, (c) = Cosigned By      Initials Name Provider Type    Lexie Downey RN Registered Nurse    Godfrey Macias, KATHRYN Occupational Therapist                    Occupational Therapy Education        No education to display                  OT Recommendation and Plan  Therapy Frequency (OT): evaluation only  Plan of Care Review  Plan of Care Reviewed With: patient  Progress: improving  Outcome Evaluation: OT evaluation completed with no additional therapy services recommended.  Patient is currently independent with all self-care and walking to and from the bathroom.  Patient agreeable no additional therapy is necessary     Time Calculation:   Evaluation Complexity (OT)  Review Occupational Profile/Medical/Therapy History Complexity: brief/low complexity  Assessment, Occupational Performance/Identification of Deficit Complexity: 1-3 performance deficits  Clinical Decision Making Complexity  (OT): problem focused assessment/low complexity  Overall Complexity of Evaluation (OT): low complexity     Time Calculation- OT       Row Name 02/21/25 0929             Time Calculation- OT    OT Received On 02/21/25  -PG         Untimed Charges    OT Eval/Re-eval Minutes 30  -PG         Total Minutes    Untimed Charges Total Minutes 30  -PG       Total Minutes 30  -PG                User Key  (r) = Recorded By, (t) = Taken By, (c) = Cosigned By      Initials Name Provider Type    PG Godfrey Villeda OT Occupational Therapist                  Therapy Charges for Today       Code Description Service Date Service Provider Modifiers Qty    [id number] HC OT EVAL LOW COMPLEXITY 2 2/21/2025 Godfrey Villeda OT GO 1                 Godfrey Villeda OT  2/21/2025

## 2025-02-21 NOTE — CONSULTS
Pulmonary / Critical Care Consult Note      Patient Name: Wai Gay  : 1985  MRN: [id number]  Primary Care Physician:  Callum Peterson DO  Referring Physician: Varinder Rhodes MD  Date of admission: 2025    Subjective   Subjective     Reason for Consult/ Chief Complaint:   Pleural effusion    HPI:  Wai Gay is a 39 y.o. male with history of alcoholic cirrhosis, hypertension, GERD, recurrent pleural effusion, status post TIPS procedure presented to the ED with weakness and subjective fevers and chills that has been ongoing for several days.  He also reports some left-sided abdominal pain.  In the ED patient was found to have a low-grade fever.  Initial labs significant for creatinine 1.5, hemoglobin 9.8, WBC 5.4.  Chest CT showed right-sided pleural effusion.  Patient was also found to have paracentesis which 1.6 L was drained today.    Review of Systems  Constitutional symptoms:  Denied complaints   Cardiovascular:  Denied complaints  Respiratory: Denied complaints  Gastrointestinal: Denied complaints  Neurologic: Denied complaints    Personal History     Past Medical History:   Diagnosis Date   • Alcohol abuse 2017   • Anxiety    • Essential hypertension 2019   • GERD (gastroesophageal reflux disease)    • History of transfusion     2024   • Hypokalemia 2017    Will update   • Hyponatremia 2017   • Steatohepatitis, alcoholic 2017   • Tobacco abuse 2017   • Umbilical hernia        Past Surgical History:   Procedure Laterality Date   • CHOLECYSTECTOMY     • ENDOSCOPY N/A 2022    Procedure: ESOPHAGOGASTRODUODENOSCOPY WITH BX;  Surgeon: Gino Khan MD;  Location: [facility] ENDOSCOPY;  Service: Gastroenterology;  Laterality: N/A;  RETAINED LIQUID FOOD IN STOMACH, HUFFMAN'S ESOPHAGUS   • ENDOSCOPY N/A 02/10/2023    Procedure: ESOPHAGOGASTRODUODENOSCOPY;  Surgeon: Gino Khan MD;  Location: [facility] ENDOSCOPY;   Service: Gastroenterology;  Laterality: N/A;  GASTRITIS, BARRETTS ESOPHAGUS, PHG   • ENDOSCOPY N/A 02/05/2025    Procedure: ESOPHAGOGASTRODUODENOSCOPY WITH BIOPSIES;  Surgeon: Gino Khan MD;  Location: [facility] ENDOSCOPY;  Service: Gastroenterology;  Laterality: N/A;  ESOPHAGEAL VARICES, PORTAL HYPERTENSIVE GASTROPATHY   • TIPS PROCEDURE Right     11/11/2024   • UPPER GASTROINTESTINAL ENDOSCOPY         Family History: family history includes Alcohol abuse in his mother; Arthritis in his mother; COPD in his mother; Cancer in his paternal grandfather and paternal grandmother; Heart disease in his maternal grandfather and maternal grandmother; Hypertension in his maternal grandmother; Lung cancer in his maternal grandfather and maternal grandmother; Stroke in his maternal grandmother. Otherwise pertinent FHx was reviewed and not pertinent to current issue.    Social History:  reports that he has been smoking cigarettes. He started smoking about 17 years ago. He has a 8.6 pack-year smoking history. He has been exposed to tobacco smoke. He has never used smokeless tobacco. He reports that he does not currently use alcohol after a past usage of about 3.0 standard drinks of alcohol per week. He reports that he does not currently use drugs.    Home Medications:  albuterol sulfate HFA, furosemide, midodrine, pantoprazole, tiotropium bromide-olodaterol, and ursodiol    Allergies:  No Known Allergies    Objective    Objective     Vitals:   Temp:  [97.8 °F (36.6 °C)-100.1 °F (37.8 °C)] 100 °F (37.8 °C)  Heart Rate:  [] 97  Resp:  [16-26] 18  BP: ()/(58-80) 110/60    Physical Exam:  Vital Signs Reviewed   General:  Alert, NAD. Lying in bed.    HEENT:  PERRL, EOMI.  OP  Neck:  Supple, no JVD, no thyromegaly  Chest:  good aeration, no work of breathing noted on room air  CV: RRR, no MGR, pulses 2+, equal.  Abd:  Soft, NT, ND, + BS, obese  EXT:  no clubbing, no cyanosis, no edema, no joint tenderness  Neuro:   A&Ox3, CN grossly intact, no focal deficits.  Skin: No rashes or lesions noted,    Result Review    Result Review:  I have personally reviewed the results from the time of this admission to 2/21/2025 11:02 EST and agree with these findings:  []  Laboratory  []  Microbiology  []  Radiology  []  EKG/Telemetry   []  Cardiology/Vascular   []  Pathology  []  Old records  []  Other:    Most notable findings include:   -     Assessment & Plan   Assessment / Plan     Active Hospital Problems:  Active Hospital Problems    Diagnosis    • **Sepsis          Impression:  Recurrent pleural effusion  Recurrent ascites  Anemia  Alcoholic cirrhosis status post TIPS  Hyponatremia, clinically insignificant  Elevated ammonia level  Elevated INR  Leukopenia  Positive for THC  Concern for SBP    Plan:  Supplemental oxygen as needed to keep SpO2 greater than 90%  Chest CT personally reviewed there is a moderate right-sided pleural effusion.  Patient had paracentesis done this morning with 1.6 L output  Will perform bedside ultrasound to assess for his right-sided pleural effusion for possible thoracentesis.  Risk and benefits of procedure explained to the patient.  If necessary, patient agreed to proceed with thoracentesis.  Currently on cefepime for possible SBP  Mildly elevated ammonia, continue lactulose  Transfuse to keep hemoglobin greater than 7    VTE Prophylaxis:  Mechanical VTE prophylaxis orders are present.         There are no questions and answers to display.        Labs, imaging, notes, and medications personally reviewed.  Thank you for involving me in the care of this patient.    Electronically signed by Mahogany Morrison MD, 02/21/25, 11:02 AM EST.

## 2025-02-21 NOTE — PLAN OF CARE
Goal Outcome Evaluation:  Plan of Care Reviewed With: spouse        Progress: no change  Outcome Evaluation: Vital signs stable. Paracenthesis done today 1.6L drained and thoracenthesis 2.4L removed. Tylenol administered once for headache today. Continue plan of care.

## 2025-02-21 NOTE — PROCEDURES
Procedure: Bedside thoracic ultrasound:    Left showed B lines, curtain sign seen.    Right side showed moderate to large effusion with anechoic space between lung and diaphragm.     Site marked for thoracentesis.    CPT 91721 with thoracentesis note     Images stored online.     Electronically signed by Mahogany Morrison MD, 2/21/2025, 13:47 EST.

## 2025-02-21 NOTE — PROCEDURES
Procedure:Thoracentesis     Indication: Large right sided pleural effusion    Consent: Yes, placed in chart.    Procedure: The patient was placed in the sitting position and the appropriate landmarks were identified. The skin over the puncture site in the right posterior chest wall was prepped with ChloraPrep and draped in sterile fashion. Local anesthesia was applied with 1% lidocaine. Thoracentesis catheter was inserted with needle guidance. Pleural fluid was straw colored, drained 2400 mL fluid. The catheter was then withdrawn and a sterile dressing was placed over the site. A post-procedure film is pending at this time.    The patient tolerated the procedure well.    Complications: None    Electronically signed by Mahogany Morrison MD, 2/21/2025, 13:47 EST.

## 2025-02-21 NOTE — PLAN OF CARE
Goal Outcome Evaluation:      New admission from home. Pt lying in bed. Respirations even and unlabored on room air. No complaints of pain or discomfort. Albumin and Antibiotic given per order. No acute events this shift.

## 2025-02-21 NOTE — PLAN OF CARE
Goal Outcome Evaluation:  Plan of Care Reviewed With: patient        Progress: improving  Outcome Evaluation: OT evaluation completed with no additional therapy services recommended.  Patient is currently independent with all self-care and walking to and from the bathroom.  Patient agreeable no additional therapy is necessary    Anticipated Discharge Disposition (OT): home

## 2025-02-22 ENCOUNTER — READMISSION MANAGEMENT (OUTPATIENT)
Dept: CALL CENTER | Facility: HOSPITAL | Age: 40
End: 2025-02-22
Payer: COMMERCIAL

## 2025-02-22 VITALS
BODY MASS INDEX: 25.09 KG/M2 | TEMPERATURE: 99.5 F | OXYGEN SATURATION: 99 % | WEIGHT: 165.57 LBS | HEART RATE: 96 BPM | HEIGHT: 68 IN | SYSTOLIC BLOOD PRESSURE: 109 MMHG | DIASTOLIC BLOOD PRESSURE: 65 MMHG | RESPIRATION RATE: 18 BRPM

## 2025-02-22 PROBLEM — A41.9 SEPSIS: Status: RESOLVED | Noted: 2025-02-20 | Resolved: 2025-02-22

## 2025-02-22 LAB
ALBUMIN SERPL-MCNC: 2.5 G/DL (ref 3.5–5.2)
ALP SERPL-CCNC: 127 U/L (ref 39–117)
ALT SERPL W P-5'-P-CCNC: 10 U/L (ref 1–41)
ANION GAP SERPL CALCULATED.3IONS-SCNC: 8.3 MMOL/L (ref 5–15)
AST SERPL-CCNC: 25 U/L (ref 1–40)
BILIRUB CONJ SERPL-MCNC: 1.4 MG/DL (ref 0–0.3)
BILIRUB INDIRECT SERPL-MCNC: 0.6 MG/DL
BILIRUB SERPL-MCNC: 2 MG/DL (ref 0–1.2)
BUN SERPL-MCNC: 14 MG/DL (ref 6–20)
BUN/CREAT SERPL: 11.7 (ref 7–25)
CALCIUM SPEC-SCNC: 7.1 MG/DL (ref 8.6–10.5)
CHLORIDE SERPL-SCNC: 111 MMOL/L (ref 98–107)
CO2 SERPL-SCNC: 19.7 MMOL/L (ref 22–29)
CREAT SERPL-MCNC: 1.2 MG/DL (ref 0.76–1.27)
DEPRECATED RDW RBC AUTO: 64.8 FL (ref 37–54)
EGFRCR SERPLBLD CKD-EPI 2021: 79.9 ML/MIN/1.73
ERYTHROCYTE [DISTWIDTH] IN BLOOD BY AUTOMATED COUNT: 19.8 % (ref 12.3–15.4)
GLUCOSE SERPL-MCNC: 95 MG/DL (ref 65–99)
HCT VFR BLD AUTO: 23 % (ref 37.5–51)
HGB BLD-MCNC: 7.7 G/DL (ref 13–17.7)
Lab: NORMAL
MCH RBC QN AUTO: 30.3 PG (ref 26.6–33)
MCHC RBC AUTO-ENTMCNC: 33.5 G/DL (ref 31.5–35.7)
MCV RBC AUTO: 90.6 FL (ref 79–97)
PHOSPHATE SERPL-MCNC: 2.5 MG/DL (ref 2.5–4.5)
PLATELET # BLD AUTO: 55 10*3/MM3 (ref 140–450)
PMV BLD AUTO: 9.2 FL (ref 6–12)
POTASSIUM SERPL-SCNC: 3.7 MMOL/L (ref 3.5–5.2)
PROT SERPL-MCNC: 4.5 G/DL (ref 6–8.5)
RBC # BLD AUTO: 2.54 10*6/MM3 (ref 4.14–5.8)
SODIUM SERPL-SCNC: 139 MMOL/L (ref 136–145)
WBC NRBC COR # BLD AUTO: 2.06 10*3/MM3 (ref 3.4–10.8)

## 2025-02-22 PROCEDURE — 80076 HEPATIC FUNCTION PANEL: CPT | Performed by: STUDENT IN AN ORGANIZED HEALTH CARE EDUCATION/TRAINING PROGRAM

## 2025-02-22 PROCEDURE — 80048 BASIC METABOLIC PNL TOTAL CA: CPT | Performed by: STUDENT IN AN ORGANIZED HEALTH CARE EDUCATION/TRAINING PROGRAM

## 2025-02-22 PROCEDURE — 84100 ASSAY OF PHOSPHORUS: CPT | Performed by: STUDENT IN AN ORGANIZED HEALTH CARE EDUCATION/TRAINING PROGRAM

## 2025-02-22 PROCEDURE — 99238 HOSP IP/OBS DSCHRG MGMT 30/<: CPT | Performed by: STUDENT IN AN ORGANIZED HEALTH CARE EDUCATION/TRAINING PROGRAM

## 2025-02-22 PROCEDURE — 25010000002 CEFEPIME PER 500 MG: Performed by: STUDENT IN AN ORGANIZED HEALTH CARE EDUCATION/TRAINING PROGRAM

## 2025-02-22 PROCEDURE — 94664 DEMO&/EVAL PT USE INHALER: CPT

## 2025-02-22 PROCEDURE — 85027 COMPLETE CBC AUTOMATED: CPT | Performed by: STUDENT IN AN ORGANIZED HEALTH CARE EDUCATION/TRAINING PROGRAM

## 2025-02-22 PROCEDURE — 99233 SBSQ HOSP IP/OBS HIGH 50: CPT | Performed by: INTERNAL MEDICINE

## 2025-02-22 PROCEDURE — 63710000001 REVEFENACIN 175 MCG/3ML SOLUTION: Performed by: STUDENT IN AN ORGANIZED HEALTH CARE EDUCATION/TRAINING PROGRAM

## 2025-02-22 PROCEDURE — 94799 UNLISTED PULMONARY SVC/PX: CPT

## 2025-02-22 RX ORDER — DOXYCYCLINE 100 MG/1
100 CAPSULE ORAL 2 TIMES DAILY
Qty: 10 CAPSULE | Refills: 0 | Status: SHIPPED | OUTPATIENT
Start: 2025-02-22

## 2025-02-22 RX ADMIN — MIDODRINE HYDROCHLORIDE 10 MG: 10 TABLET ORAL at 07:39

## 2025-02-22 RX ADMIN — SODIUM CHLORIDE 2000 MG: 9 INJECTION, SOLUTION INTRAVENOUS at 02:15

## 2025-02-22 RX ADMIN — URSODIOL 600 MG: 300 CAPSULE ORAL at 08:46

## 2025-02-22 RX ADMIN — REVEFENACIN 175 MCG: 175 SOLUTION RESPIRATORY (INHALATION) at 10:17

## 2025-02-22 RX ADMIN — LACTULOSE 30 G: 10 SOLUTION ORAL at 08:46

## 2025-02-22 RX ADMIN — ARFORMOTEROL TARTRATE 15 MCG: 15 SOLUTION RESPIRATORY (INHALATION) at 10:17

## 2025-02-22 RX ADMIN — PANTOPRAZOLE SODIUM 40 MG: 40 TABLET, DELAYED RELEASE ORAL at 06:27

## 2025-02-22 RX ADMIN — SODIUM CHLORIDE 2000 MG: 9 INJECTION, SOLUTION INTRAVENOUS at 10:10

## 2025-02-22 RX ADMIN — Medication 10 ML: at 08:46

## 2025-02-22 RX ADMIN — MIDODRINE HYDROCHLORIDE 10 MG: 10 TABLET ORAL at 11:20

## 2025-02-22 NOTE — DISCHARGE SUMMARY
[facility]         HOSPITALIST  DISCHARGE SUMMARY    Patient Name: Wai Gay  : 1985  MRN: [id number]    Date of Admission: 2025  Date of Discharge:  2025  Primary Care Physician: Callum Peterson, DO    Consults       Date and Time Order Name Status Description    2025  4:36 PM Hospitalist (on-call MD unless specified)      2025  5:57 AM Inpatient Gastroenterology Consult Completed             Active and Resolved Hospital Problems:  Active Hospital Problems   No active problems to display.      Resolved Hospital Problems    Diagnosis POA   • **Sepsis [A41.9] Yes       Hospital Course     Hospital Course:  Wai Gay is a 39 y.o. male being treated facility for generalized weakness and subjective fever.     He has a past medical history of alcohol abuse, essential hypertension, GERD, alcoholic cirrhosis presents to the ED due to generalized weakness and subjective fever.  Patient states that for the last couple days he has been having left-sided flank pain with pain with urination.  In addition patient been having generalized weakness is been slowly worsening with leading to difficulty to walk.  Patient was recently discharged  for generalized weakness.  Patient was found to have large right-sided pleural effusion and a large volume ascites.  Patient was treated for hepatorenal management and thoracic and paracentesis was negative for SBP.  Denies chest pain, nausea, vomiting, diarrhea, headache, shortness of breath or palpitations.  In the ED, patient's labs showed temperature 100.1, heart rate 95, blood pressure 109/76 and satting 98% on room air.  Labs show troponin 34, sodium 138, creatinine 1.49, hemoglobin 9.8, white blood cell 5.44.  UA shows mild leukocytes.  Chest x-ray shows moderate right-sided pleural effusion.  Patient was started on IV antibiotics.  Patient admitted to floors for further management.      After initiation  of supportive care and and antibiotics, patient felt much better.  He received paracentesis which was negative for SBP.  Thoracentesis ordered and looks more transudative.  Patient states he feels at his baseline today and capable of not just walking but running.  He requested discharge to home.  His renal function is now at baseline, and vital signs are within normal limits.  He will be discharged to follow-up with his gastroenterologist and his primary care doctor.  He is recommended to follow-up fluid studies from paracentesis and thoracentesis, take medications as prescribed and return if his symptoms persist, worsen or recur.        Day of Discharge     Vital Signs:  Temp:  [98 °F (36.7 °C)-100 °F (37.8 °C)] 98 °F (36.7 °C)  Heart Rate:  [77-95] 85  Resp:  [16] 16  BP: ()/(58-70) 105/58    Physical Exam:   General: Awake, alert, in no acute distress  HENT: Atraumatic, normocephalic.   Eyes: pupils equal, round, without scleral icterus  Cardiovascular: Regular rate and rhythm, no murmurs   Pulmonary: CTA bilaterally; no wheezes  Gastrointestinal: Soft nontender nondistended  Musculoskeletal: No gross deformities          Discharge Details        Discharge Medications        New Medications        Instructions Start Date   amoxicillin-clavulanate 875-125 MG per tablet  Commonly known as: AUGMENTIN   1 tablet, Oral, 2 Times Daily      doxycycline 100 MG capsule  Commonly known as: VIBRAMYCIN   100 mg, Oral, 2 Times Daily             Changes to Medications        Instructions Start Date   ursodiol 300 MG capsule  Commonly known as: ACTIGALL  What changed: See the new instructions.   TAKE 2 CAPSULES BY MOUTH 2 (TWO) TIMES A DAY WITH MEALS FOR 90 DAYS.             Continue These Medications        Instructions Start Date   albuterol sulfate  (90 Base) MCG/ACT inhaler  Commonly known as: PROVENTIL HFA;VENTOLIN HFA;PROAIR HFA   2 puffs, Inhalation, Every 4 Hours PRN      furosemide 20 MG tablet  Commonly  known as: LASIX   20 mg, Oral, 2 Times Daily      midodrine 10 MG tablet  Commonly known as: PROAMATINE   10 mg, Oral, 3 Times Daily Before Meals      pantoprazole 40 MG EC tablet  Commonly known as: PROTONIX   TAKE 1 TABLET BY MOUTH EVERY DAY      Stiolto Respimat 2.5-2.5 MCG/ACT aerosol solution inhaler  Generic drug: tiotropium bromide-olodaterol   2 puffs, Inhalation, Daily - RT               No Known Allergies    Discharge Disposition:      Diet:  Hospital:  Diet Order   Procedures   • Diet: Cardiac, Fluid Restriction (240 mL/tray); No Salt Packet; 1500 mL/day; Fluid Consistency: Thin (IDDSI 0)       Discharge Activity:       CODE STATUS:  There are no questions and answers to display.         Future Appointments   Date Time Provider Department Center   2/27/2025 10:45 AM Callum Peterson DO [facility]   3/26/2025  1:30 PM Shyla Yarbrough APRN [facility] GE [facility]   3/27/2025  8:00 AM Jorge Montero DO [facility] PCC [facility]   3/31/2025  9:00 AM Callum Peterson DO [facility]   6/4/2025  8:15 AM Shyla Yarbrough APRN [facility] GE [facility]   7/11/2025  8:00 AM Callum Peterson, [facility]       Additional Instructions for the Follow-ups that You Need to Schedule       Discharge Follow-up with PCP   As directed       Currently Documented PCP:    Callum Peterson DO    PCP Phone Number:    [phone]     Follow Up Details: 3/5 days        Discharge Follow-up with Specialty: GI; 1 Week   As directed      Specialty: GI   Follow Up: 1 Week                Pertinent  and/or Most Recent Results     PROCEDURES:     Thoracentesis  Paracentesis      LAB RESULTS:      Lab 02/22/25  0646 02/21/25  0548 02/20/25  1549 02/20/25  1141   WBC 2.06* 2.52*  --  5.44   HEMOGLOBIN 7.7* 7.6*  --  9.8*   HEMATOCRIT 23.0* 23.6*  --  30.9*   PLATELETS 55* 71*  --  117*   NEUTROS ABS  --  1.34*  --  4.00   IMMATURE GRANS (ABS)  --  0.02  --  0.05   LYMPHS ABS  --  0.71  --  0.76   MONOS ABS   --  0.37  --  0.49   EOS ABS  --  0.07  --  0.11   MCV 90.6 90.8  --  91.4   LACTATE  --   --  1.5  --    LDH  --  278*  --   --    PROTIME  --  20.9*  --  18.9*   APTT  --  42.6*  --   --          Lab 02/22/25  0646 02/21/25  0548 02/20/25  1141   SODIUM 139 135* 138   POTASSIUM 3.7 3.6 3.9   CHLORIDE 111* 104 105   CO2 19.7* 21.5* 22.9   ANION GAP 8.3 9.5 10.1   BUN 14 17 17   CREATININE 1.20 1.24 1.49*   EGFR 79.9 76.8 61.6   GLUCOSE 95 88 89   CALCIUM 7.1* 7.3* 7.7*   MAGNESIUM  --   --  1.6   PHOSPHORUS 2.5  --   --          Lab 02/22/25  0646 02/21/25  0548 02/20/25  1325 02/20/25  1141   TOTAL PROTEIN 4.5* 5.1* 4.9* 5.6*   ALBUMIN 2.5* 3.0*  --  2.7*   GLOBULIN  --  2.1  --  2.9   ALT (SGPT) 10 11  --  13   AST (SGOT) 25 26  --  30   BILIRUBIN 2.0* 2.8*  --  3.4*   INDIRECT BILIRUBIN 0.6  --   --   --    BILIRUBIN DIRECT 1.4*  --   --   --    ALK PHOS 127* 123*  --  195*         Lab 02/21/25  0548 02/20/25  1325 02/20/25  1141   HSTROP T  --  31* 34*   PROTIME 20.9*  --  18.9*   INR 1.71*  --  1.51*                 Brief Urine Lab Results  (Last result in the past 365 days)        Color   Clarity   Blood   Leuk Est   Nitrite   Protein   CREAT   Urine HCG        02/20/25 1216 Dark Yellow   Clear   Negative   Trace   Negative   Negative                 Microbiology Results (last 10 days)       Procedure Component Value - Date/Time    AFB Culture - Body Fluid, Pleural Cavity [[id number]] Collected: 02/21/25 1433    Lab Status: Preliminary result Specimen: Body Fluid from Pleural Cavity Updated: 02/21/25 1626     AFB Stain No acid fast bacilli seen    Body Fluid Culture - Body Fluid, Pleural Cavity [[id number]] Collected: 02/21/25 1433    Lab Status: Preliminary result Specimen: Body Fluid from Pleural Cavity Updated: 02/21/25 1600     Gram Stain Few (2+) WBCs seen      No organisms seen    Body Fluid Culture - Body Fluid, Peritoneum [[id number]] Collected: 02/21/25 0920    Lab Status: Preliminary result Specimen:  Body Fluid from Peritoneum Updated: 02/21/25 1145     Gram Stain No organisms seen    Blood Culture - Blood, Arm, Left [[id number]]  (Normal) Collected: 02/20/25 1549    Lab Status: Preliminary result Specimen: Blood from Arm, Left Updated: 02/21/25 1600     Blood Culture No growth at 24 hours    Narrative:      Less than seven (7) mL's of blood was collected.  Insufficient quantity may yield false negative results.    Blood Culture - Blood, Arm, Right [[id number]]  (Normal) Collected: 02/20/25 1549    Lab Status: Preliminary result Specimen: Blood from Arm, Right Updated: 02/21/25 1600     Blood Culture No growth at 24 hours    Narrative:      Less than seven (7) mL's of blood was collected.  Insufficient quantity may yield false negative results.    COVID-19, FLU A/B, RSV PCR 1 HR TAT - Swab, Nasopharynx [[id number]]  (Normal) Collected: 02/20/25 1317    Lab Status: Final result Specimen: Swab from Nasopharynx Updated: 02/20/25 1408     COVID19 Not Detected     Influenza A PCR Not Detected     Influenza B PCR Not Detected     RSV, PCR Not Detected    Narrative:      Fact sheet for providers: https://www.fda.gov/media/114043/download    Fact sheet for patients: https://www.fda.gov/media/959744/download    Test performed by PCR.            XR Chest 1 View    Result Date: 2/21/2025  Impression: Status post right thoracentesis with small residual effusion remaining. Mild bibasilar airspace opacity, improved on the right following the thoracentesis. Differential includes pulmonary edema, atelectasis and pneumonia. Electronically Signed: Carlos Yancey MD  2/21/2025 2:52 PM EST  Workstation ID: [id number]    US Paracentesis    Result Date: 2/21/2025  Impression: Ultrasound-guided paracentesis was performed without complication, patient tolerated procedure with 1.6 L of clear, flores ascitic fluid removed. A sample specimen of 125 mL was walked to the lab via tech for further diagnostic evaluation. The patient was transferred  back to their hospital room status post procedure for further evaluation medical management. Report dictated by: Peace Strickland  I have personally reviewed this case and agree with the findings above: Electronically Signed: Leif Bryan MD  2/21/2025 10:06 AM EST  Workstation ID: [id number]    CT Chest Without Contrast Diagnostic    Result Date: 2/20/2025  Impression: 1. Large right pleural effusion is smaller on today's examination. 2. Right lower lobe airspace consolidation with air bronchograms is likely compressive atelectasis. Pneumonia is also possible. Electronically Signed: Elmo Balbuena MD  2/20/2025 6:24 PM EST  Workstation ID: [id number]    CT Abdomen Pelvis Without Contrast    Result Date: 2/20/2025  Impression: 1. Cirrhosis. Moderate ascites and anasarca. 2. Partially visualized right pleural effusion with adjacent airspace consolidation likely atelectasis. Pneumonia is also possible. 3. Questionable wall thickening in the cecum and ascending colon possibly secondary to nonspecific colitis. Electronically Signed: Elmo Balbuena MD  2/20/2025 6:08 PM EST  Workstation ID: [id number]    XR Chest 1 View    Result Date: 2/20/2025  Impression: Moderate right pleural effusion with adjacent airspace disease suspected represent atelectasis, though underlying pneumonia is possible. Electronically Signed: Chepe Davies MD  2/20/2025 12:34 PM EST  Workstation ID: [id number]      Results for orders placed during the hospital encounter of 05/25/24    Duplex Venous Lower Extremity - Bilateral CV-READ    Interpretation Summary  •  Normal bilateral lower extremity venous duplex scan.      Results for orders placed during the hospital encounter of 05/25/24    Duplex Venous Lower Extremity - Bilateral CV-READ    Interpretation Summary  •  Normal bilateral lower extremity venous duplex scan.          Labs Pending at Discharge:  Pending Labs       Order Current Status    Anaerobic Culture - Body Fluid, Peritoneum In process     Anaerobic Culture - Pleural Fluid, Pleural Cavity In process    Flow Cytometry ([employer]) In process    Fungus Culture - Body Fluid, Pleural Cavity In process    AFB Culture - Body Fluid, Pleural Cavity Preliminary result    Blood Culture - Blood, Arm, Left Preliminary result    Blood Culture - Blood, Arm, Right Preliminary result    Body Fluid Culture - Body Fluid, Peritoneum Preliminary result    Body Fluid Culture - Body Fluid, Pleural Cavity Preliminary result              Time spent on Discharge including face to face service: Less than 30 minutes    Electronically signed by Varinder Rhodes MD, 02/22/25, 11:03 AM EST.

## 2025-02-22 NOTE — OUTREACH NOTE
Prep Survey      Flowsheet Row Responses   Nondenominational facility patient discharged from? [facility]   Is LACE score < 7 ? No   Eligibility [facility]   Date of Admission 02/20/25   Date of Discharge 02/22/25   Discharge Disposition Home or Self Care   Discharge diagnosis Sepsis   Does the patient have one of the following disease processes/diagnoses(primary or secondary)? Sepsis   Does the patient have Home health ordered? No   Is there a DME ordered? No   Prep survey completed? Yes            NATALIE AVILES - Registered Nurse

## 2025-02-22 NOTE — PLAN OF CARE
Goal Outcome Evaluation:  Plan of Care Reviewed With: spouse     Pt discharged home self care. Discharge information and medications explained to pt. Pt verbalized understanding.

## 2025-02-22 NOTE — PROGRESS NOTES
Pulmonary / Critical Care Progress Note      Patient Name: Wai Gay  : 1985  MRN: [id number]  Attending:  Varinder Rhodes MD  Date of admission: 2025    Subjective   Subjective   Follow-up for pleural effusion    Over past 24 hours:  Pleural effusion transudative consistent with volume overload  Paracentesis negative for SBP  As occasional dry cough but no shortness of breath  On room air  Dyspnea doing better  Did have low-grade temperature  Cultures negative  No fevers or chills        Objective   Objective     Vitals:   Temp:  [98 °F (36.7 °C)-100 °F (37.8 °C)] 98 °F (36.7 °C)  Heart Rate:  [77-95] 85  Resp:  [16] 16  BP: ()/(58-70) 105/58    Physical Exam   Vital Signs Reviewed   General:  WDWN, Alert, NAD.    HEENT:  PERRL, EOMI.  OP, nares clear  Chest:  good aeration, trace rhonchi bilaterally, tympanic to percussion bilaterally, no work of breathing noted  CV: RRR, no MGR, pulses 2+, equal.  Abd:  Soft, NT, ND, + BS, no HSM  EXT:  no clubbing, no cyanosis, no edema  Neuro:  A&Ox3, CN grossly intact, no focal deficits.  Skin: No rashes or lesions noted      Result Review    Result Review:  I have personally reviewed the results from the time of this admission to 2025 11:04 EST and agree with these findings:  [x]  Laboratory  [x]  Microbiology  [x]  Radiology  []  EKG/Telemetry   []  Cardiology/Vascular   []  Pathology  [x]  Old records  []  Other:  Most notable findings include:       Lab 25  0646 25  0548 25  1325 25  1141   WBC 2.06* 2.52*  --  5.44   HEMOGLOBIN 7.7* 7.6*  --  9.8*   HEMATOCRIT 23.0* 23.6*  --  30.9*   PLATELETS 55* 71*  --  117*   SODIUM 139 135*  --  138   POTASSIUM 3.7 3.6  --  3.9   CHLORIDE 111* 104  --  105   CO2 19.7* 21.5*  --  22.9   BUN 14 17  --  17   CREATININE 1.20 1.24  --  1.49*   GLUCOSE 95 88  --  89   CALCIUM 7.1* 7.3*  --  7.7*   PHOSPHORUS 2.5  --   --   --    TOTAL PROTEIN 4.5* 5.1* 4.9* 5.6*   ALBUMIN 2.5*  3.0*  --  2.7*   GLOBULIN  --  2.1  --  2.9     Pleural fluid transudate  Peritoneal fluid negative for SBP  Cultures negative  Renal function improving        Assessment & Plan   Assessment / Plan     Active Hospital Problems:  Active Hospital Problems    Diagnosis     Pleural effusion      Impression:  Recurrent pleural effusion  Recurrent ascites  Anemia  Alcoholic cirrhosis status post TIPS  Hyponatremia, clinically insignificant  Elevated ammonia level  Elevated INR  Leukopenia  Positive for THC  Concern for SBP     Plan:  Pleural fluid transudate consistent with volume overload  Peritoneal fluid negative for SBP  Patient had TIPS procedure which was doing well and no longer working.  Previous ultrasound of liver did show patent flow in TIPS.  Needs to follow-up with his transplant hepatologist up in [place] for further workup and management regarding this  Blood pressure and hemodynamics improved.  Okay from my perspective to restart oral diuretics today  Continue midodrine  Continue nebulizers and bronchopulmonary hygiene  Remains on cefepime blood cultures are negative and procalcitonin.  Okay to de-escalate to ceftriaxone to complete 5 days of therapy in total.  Can ultimately discharge on Augmentin plus doxycycline  Continue lactulose  Trend CBC and transfuse for hemoglobin less than 7  Follow-up with us as outpatient as needed for thoracentesis when symptoms arise  Encourage activity and incentive spirometer use    VTE Prophylaxis:  Mechanical VTE prophylaxis orders are present.    CODE STATUS:        Labs, imaging, microbiology, notes and medications personally reviewed  Discussed with primary    Electronically signed by Praful Powell MD, 02/22/25, 11:06 AM EST.

## 2025-02-22 NOTE — PLAN OF CARE
Goal Outcome Evaluation:         Pt abdomen soft and tender this shift.  Denies any SOA.  Having an occasional dry cough. Pt has denied any pain this shift. Continue plan of care

## 2025-02-24 ENCOUNTER — TRANSITIONAL CARE MANAGEMENT TELEPHONE ENCOUNTER (OUTPATIENT)
Dept: CALL CENTER | Facility: HOSPITAL | Age: 40
End: 2025-02-24
Payer: COMMERCIAL

## 2025-02-24 DIAGNOSIS — R94.2 MIXED OBSTRUCTIVE AND RESTRICTIVE VENTILATORY DEFECT: ICD-10-CM

## 2025-02-24 DIAGNOSIS — J41.8 MIXED SIMPLE AND MUCOPURULENT CHRONIC BRONCHITIS: ICD-10-CM

## 2025-02-24 LAB
BACTERIA FLD CULT: NORMAL
BACTERIA FLD CULT: NORMAL
GRAM STN SPEC: NORMAL

## 2025-02-24 RX ORDER — ALBUTEROL SULFATE 90 UG/1
2 INHALANT RESPIRATORY (INHALATION) EVERY 4 HOURS PRN
Qty: 54 G | Refills: 3 | Status: SHIPPED | OUTPATIENT
Start: 2025-02-24

## 2025-02-24 RX ORDER — URSODIOL 300 MG/1
CAPSULE ORAL
Qty: 360 CAPSULE | Refills: 2 | OUTPATIENT
Start: 2025-02-24

## 2025-02-24 RX ORDER — TIOTROPIUM BROMIDE AND OLODATEROL 3.124; 2.736 UG/1; UG/1
2 SPRAY, METERED RESPIRATORY (INHALATION)
Qty: 3 EACH | Refills: 3 | Status: SHIPPED | OUTPATIENT
Start: 2025-02-24

## 2025-02-24 NOTE — PROGRESS NOTES
Enter Query Response Below      Query Response: unable to determine             If applicable, please update the problem list.

## 2025-02-24 NOTE — OUTREACH NOTE
Call Center TCM Note      Flowsheet Row Responses   [facility] patient discharged from? [facility]   Does the patient have one of the following disease processes/diagnoses(primary or secondary)? Sepsis   TCM attempt successful? Yes  [vr for mother Vines]   Call start time 1503   Call end time 1505   Discharge diagnosis Sepsis   Meds reviewed with patient/caregiver? Yes   Is the patient having any side effects they believe may be caused by any medication additions or changes? No   Does the patient have all medications related to this admission filled (includes all antibiotics, inhalers, nebulizers,steroids,etc.) Yes   Is the patient taking all medications as directed (includes completed medication regime)? Yes   Medication comments Encouraged to complete atbs as ordered   Comments Pt has an appt on 2/27/25@1045   Does the patient have an appointment with their PCP within 7-14 days of discharge? Yes   Has home health visited the patient within 72 hours of discharge? N/A   Psychosocial issues? No   Did the patient receive a copy of their discharge instructions? Yes   Nursing interventions Reviewed instructions with patient   What is the patient's perception of their health status since discharge? Improving  [Pt is doing better, reports had paracentesis and thoracentesis while inpt.  Pt brief with no questions today and offers no complants]   Nursing interventions Nurse provided patient education   Is the patient/caregiver able to teach back TIME? T emperature - higher or lower than normal, E xtremely Ill - severe pain, discomfort, shortness of breath   Nursing interventions Nurse provided patient education   Is the patient/caregiver able to teach back signs and symptoms of worsening condition: Fever   Is the patient/caregiver able to teach back the hierarchy of who to call/visit for symptoms/problems? PCP, Specialist, Home health nurse, Urgent Care, ED, 911 Yes   TCM call completed? Yes   Call end time 1505             Dinora Talley, RN    2/24/2025, 15:07 EST

## 2025-02-24 NOTE — PROGRESS NOTES
Enter Query Response Below      Query Response: Sepsis was not supported             If applicable, please update the problem list.

## 2025-02-25 LAB
BACTERIA SPEC AEROBE CULT: NORMAL
BACTERIA SPEC AEROBE CULT: NORMAL
CYTO UR: NORMAL
FUNGUS WND CULT: NORMAL
LAB AP CASE REPORT: NORMAL
LAB AP CLINICAL INFORMATION: NORMAL
LAB AP DIAGNOSIS COMMENT: NORMAL
PATH REPORT.FINAL DX SPEC: NORMAL
PATH REPORT.GROSS SPEC: NORMAL

## 2025-02-26 ENCOUNTER — TELEPHONE (OUTPATIENT)
Dept: GASTROENTEROLOGY | Facility: CLINIC | Age: 40
End: 2025-02-26
Payer: COMMERCIAL

## 2025-02-26 DIAGNOSIS — R94.2 MIXED OBSTRUCTIVE AND RESTRICTIVE VENTILATORY DEFECT: ICD-10-CM

## 2025-02-26 DIAGNOSIS — J41.8 MIXED SIMPLE AND MUCOPURULENT CHRONIC BRONCHITIS: ICD-10-CM

## 2025-02-26 LAB
BACTERIA SPEC ANAEROBE CULT: NORMAL
BACTERIA SPEC ANAEROBE CULT: NORMAL

## 2025-02-26 RX ORDER — DOXYCYCLINE 100 MG/1
100 CAPSULE ORAL 2 TIMES DAILY
Qty: 10 CAPSULE | Refills: 0 | OUTPATIENT
Start: 2025-02-26

## 2025-02-26 RX ORDER — MIDODRINE HYDROCHLORIDE 10 MG/1
10 TABLET ORAL
Qty: 90 TABLET | Refills: 0 | OUTPATIENT
Start: 2025-02-26 | End: 2025-03-28

## 2025-02-26 RX ORDER — ALBUTEROL SULFATE 90 UG/1
2 INHALANT RESPIRATORY (INHALATION) EVERY 4 HOURS PRN
Qty: 54 G | Refills: 3 | OUTPATIENT
Start: 2025-02-26

## 2025-02-26 RX ORDER — PANTOPRAZOLE SODIUM 40 MG/1
40 TABLET, DELAYED RELEASE ORAL DAILY
Qty: 90 TABLET | Refills: 1 | OUTPATIENT
Start: 2025-02-26

## 2025-02-26 RX ORDER — FUROSEMIDE 20 MG/1
20 TABLET ORAL 2 TIMES DAILY
Qty: 60 TABLET | Refills: 0 | OUTPATIENT
Start: 2025-02-26 | End: 2025-03-28

## 2025-02-26 RX ORDER — TIOTROPIUM BROMIDE AND OLODATEROL 3.124; 2.736 UG/1; UG/1
2 SPRAY, METERED RESPIRATORY (INHALATION)
Qty: 3 EACH | Refills: 3 | OUTPATIENT
Start: 2025-02-26

## 2025-02-26 RX ORDER — URSODIOL 300 MG/1
600 CAPSULE ORAL 2 TIMES DAILY
Qty: 360 CAPSULE | Refills: 2 | OUTPATIENT
Start: 2025-02-26

## 2025-02-26 NOTE — TELEPHONE ENCOUNTER
PATIENT IS REQUESTING MEDICATION SENT TO [facility]     Caller: IBAN OCASIO    Relationship:SELF     Best call back number:   Telephone Information:   Mobile [phone]        Requested Prescriptions:   Requested Prescriptions     Pending Prescriptions Disp Refills    albuterol sulfate  (90 Base) MCG/ACT inhaler 54 g 3     Sig: Inhale 2 puffs Every 4 (Four) Hours As Needed for Wheezing or Shortness of Air.    amoxicillin-clavulanate (AUGMENTIN) 875-125 MG per tablet 10 tablet 0     Sig: Take 1 tablet by mouth 2 (Two) Times a Day.    doxycycline (VIBRAMYCIN) 100 MG capsule 10 capsule 0     Sig: Take 1 capsule by mouth 2 (Two) Times a Day.    furosemide (LASIX) 20 MG tablet 60 tablet 0     Sig: Take 1 tablet by mouth 2 (Two) Times a Day for 30 days.    midodrine (PROAMATINE) 10 MG tablet 90 tablet 0     Sig: Take 1 tablet by mouth 3 (Three) Times a Day Before Meals for 30 days.    pantoprazole (PROTONIX) 40 MG EC tablet 90 tablet 1     Sig: Take 1 tablet by mouth Daily.    tiotropium bromide-olodaterol (Stiolto Respimat) 2.5-2.5 MCG/ACT aerosol solution inhaler 3 each 3     Sig: Inhale 2 puffs Daily.    ursodiol (ACTIGALL) 300 MG capsule 360 capsule 2     Sig: Take 2 capsules by mouth 2 (Two) Times a Day.        Pharmacy where request should be sent: [facility] ([facility]) - [place] [id number] [facility] [phone] [facility] [phone]      Last office visit with prescribing clinician: 1/7/2025   Last telemedicine visit with prescribing clinician: Visit date not found   Next office visit with prescribing clinician: 2/27/2025         Does the patient have less than a 3 day supply:  [] Yes  [x] No

## 2025-02-26 NOTE — TELEPHONE ENCOUNTER
Hub staff attempted to follow warm transfer process and was unsuccessful     Caller: Wai Gay    Relationship to patient: Self    Best call back number: [phone]    Patient is needing: PATIENT CALLED IN AND STATED THAT HE WOULD LIKE TO HAVE ALL OF HIS MEDICATION SENT TO [facility], BUT PATIENT DID NOT HAVE INFORMATION, SYSTEM BRINGS UP 2 LOCATIONS-[place] AND [place]. PATIENT ALSO STATED THAT HE THINKS THE PHARMACY IS LOCATED IN [place]. PATIENT WOULD LIKE A CALL BACK. OKAY TO CALL ANYTIME, OKAY TO LEAVE .

## 2025-02-27 ENCOUNTER — OFFICE VISIT (OUTPATIENT)
Dept: FAMILY MEDICINE CLINIC | Facility: CLINIC | Age: 40
End: 2025-02-27
Payer: COMMERCIAL

## 2025-02-27 VITALS
SYSTOLIC BLOOD PRESSURE: 109 MMHG | WEIGHT: 140 LBS | HEIGHT: 68 IN | HEART RATE: 96 BPM | TEMPERATURE: 97.5 F | BODY MASS INDEX: 21.22 KG/M2 | OXYGEN SATURATION: 96 % | DIASTOLIC BLOOD PRESSURE: 73 MMHG

## 2025-02-27 DIAGNOSIS — Z09 HOSPITAL DISCHARGE FOLLOW-UP: ICD-10-CM

## 2025-02-27 DIAGNOSIS — K70.31 ALCOHOLIC CIRRHOSIS OF LIVER WITH ASCITES: Primary | ICD-10-CM

## 2025-02-27 RX ORDER — MIDODRINE HYDROCHLORIDE 10 MG/1
10 TABLET ORAL
Qty: 270 TABLET | Refills: 1 | Status: SHIPPED | OUTPATIENT
Start: 2025-02-27

## 2025-02-27 RX ORDER — FUROSEMIDE 20 MG/1
20 TABLET ORAL 2 TIMES DAILY
Qty: 180 TABLET | Refills: 3 | Status: SHIPPED | OUTPATIENT
Start: 2025-02-27

## 2025-02-27 RX ORDER — PANTOPRAZOLE SODIUM 40 MG/1
40 TABLET, DELAYED RELEASE ORAL DAILY
Qty: 90 TABLET | Refills: 1 | Status: SHIPPED | OUTPATIENT
Start: 2025-02-27

## 2025-02-27 RX ORDER — UMECLIDINIUM BROMIDE AND VILANTEROL TRIFENATATE 62.5; 25 UG/1; UG/1
POWDER RESPIRATORY (INHALATION)
COMMUNITY
Start: 2025-02-24

## 2025-02-27 NOTE — ASSESSMENT & PLAN NOTE
Since his acute hospitalization he has done well.  Most likely he had had some underlying infectious process that seem to tip him over the edge.  He only has about another day of antibiotics to finish.

## 2025-02-27 NOTE — ASSESSMENT & PLAN NOTE
Since his acute hospitalization he is back to his normal baseline and stable.  He will continue all of his current meds and continue to follow-up with GI.

## 2025-02-27 NOTE — PROGRESS NOTES
Transitional Care Follow Up Visit  Subjective     Wai Gay is a 39 y.o. male who presents for a transitional care management visit.    Within 48 business hours after discharge our office contacted him via telephone to coordinate his care and needs.      I reviewed and discussed the details of that call along with the discharge summary, hospital problems, inpatient lab results, inpatient diagnostic studies, and consultation reports with Wai.     Current outpatient and discharge medications have been reconciled for the patient.  Reviewed by: Callum Peterson DO          2/22/2025     2:00 PM   Date of TCM Phone Call   [facility]   Date of Admission 2/20/2025   Date of Discharge 2/22/2025   Discharge Disposition Home or Self Care     Risk for Readmission (LACE) Score: 14 (2/22/2025  6:00 AM)      History of Present Illness  Hospital sdicnm-sp-ft was admitted at [facility] 2/20 through 2/22/2025.  He had the sudden onset of severe generalized weakness.  He ended up calling 911 to take him to the emergency room.  There they felt that he may have had some sepsis or spontaneous bacterial peritonitis due to his ascites due to his chronic liver disease.  He did have more ascites and a pleural effusion due to an exudate.  He had a para and thoracentesis of both these areas.  His cultures were all negative.  Since he has been home he did get a call in 48 hours from the transition of care nurse.  He states he continues to do well since he has been home.  He continues to remain alcohol free and and takes his medicine daily as prescribed.     Course During Hospital Stay:  2/20 - 2/22/2025     The following portions of the patient's history were reviewed and updated as appropriate: allergies, current medications, past family history, past medical history, past social history, past surgical history, and problem list.    Review of Systems   Constitutional:  Negative for fatigue.   Respiratory:   "Positive for cough. Negative for chest tightness, shortness of breath and wheezing.    Cardiovascular:  Negative for chest pain and leg swelling.   Gastrointestinal:  Negative for abdominal distention, abdominal pain, blood in stool, constipation, diarrhea, nausea and vomiting.       Objective   /73 (BP Location: Left arm, Patient Position: Sitting, Cuff Size: Adult)   Pulse 96   Temp 97.5 °F (36.4 °C) (Temporal)   Ht 172.7 cm (68\")   Wt 63.5 kg (140 lb)   SpO2 96%   BMI 21.29 kg/m²   Physical Exam  Vitals and nursing note reviewed.   Constitutional:       General: He is not in acute distress.     Appearance: Normal appearance. He is normal weight.   HENT:      Head: Normocephalic.      Right Ear: Tympanic membrane, ear canal and external ear normal.      Left Ear: Tympanic membrane, ear canal and external ear normal.      Nose: Nose normal.      Mouth/Throat:      Mouth: Mucous membranes are moist.      Pharynx: Oropharynx is clear.   Eyes:      General: No scleral icterus.     Conjunctiva/sclera: Conjunctivae normal.      Pupils: Pupils are equal, round, and reactive to light.   Cardiovascular:      Rate and Rhythm: Normal rate and regular rhythm.      Pulses: Normal pulses.      Heart sounds: Normal heart sounds. No murmur heard.  Pulmonary:      Effort: Pulmonary effort is normal.      Breath sounds: Normal breath sounds. No wheezing, rhonchi or rales.   Musculoskeletal:      Cervical back: Neck supple. No rigidity or tenderness.   Lymphadenopathy:      Cervical: No cervical adenopathy.   Skin:     General: Skin is warm and dry.      Coloration: Skin is not jaundiced.      Findings: No rash.   Neurological:      General: No focal deficit present.      Mental Status: He is alert and oriented to person, place, and time.   Psychiatric:         Mood and Affect: Mood normal.         Thought Content: Thought content normal.         Judgment: Judgment normal.         Assessment & Plan   Diagnoses and all " orders for this visit:    1. Alcoholic cirrhosis of liver with ascites (Primary)  Assessment & Plan:  Since his acute hospitalization he is back to his normal baseline and stable.  He will continue all of his current meds and continue to follow-up with GI.      2. Hospital discharge follow-up  Assessment & Plan:  Since his acute hospitalization he has done well.  Most likely he had had some underlying infectious process that seem to tip him over the edge.  He only has about another day of antibiotics to finish.                      23-Nov-2019 13-Oct-2022 18:00

## 2025-02-28 LAB
FUNGUS WND CULT: NORMAL
MYCOBACTERIUM SPEC CULT: NORMAL
NIGHT BLUE STAIN TISS: NORMAL

## 2025-02-28 RX ORDER — URSODIOL 300 MG/1
300 CAPSULE ORAL 2 TIMES DAILY
Qty: 180 CAPSULE | Refills: 2 | Status: SHIPPED | OUTPATIENT
Start: 2025-02-28 | End: 2025-05-29

## 2025-02-28 NOTE — TELEPHONE ENCOUNTER
Received fax from [facility].  Stating the current SIG of 2 caps po BID, exceeds the maximum recommended daily dose of 15mg/kg per wt on prescription..      Per ANA LAURA Gramajo:  take 300 mg (one cap) BID.  Will fax and ERX form back to [facility].  [email or web address]

## 2025-03-04 LAB — FUNGUS WND CULT: NORMAL

## 2025-03-05 ENCOUNTER — READMISSION MANAGEMENT (OUTPATIENT)
Dept: CALL CENTER | Facility: HOSPITAL | Age: 40
End: 2025-03-05
Payer: COMMERCIAL

## 2025-03-05 NOTE — OUTREACH NOTE
Sepsis Week 2 Survey      Flowsheet Row Responses   [facility] patient discharged from? [facility]   Does the patient have one of the following disease processes/diagnoses(primary or secondary)? Sepsis   Week 2 attempt successful? Yes   Call start time 0911   Call end time 0918   Discharge diagnosis Sepsis   Is patient permission given to speak with other caregiver? Yes   Person spoke with today (if not patient) and relationship motherJasmyn reviewed with patient/caregiver? Yes   Does the patient have all medications related to this admission filled (includes all antibiotics, inhalers, nebulizers,steroids,etc.) Yes   Is the patient taking all medications as directed (includes completed medication regime)? Yes   Does the patient have a primary care provider?  Yes   Comments regarding PCP Patient has seen PCP since discharge   Does the patient have an appointment with their PCP within 7 days of discharge? Yes   Has the patient kept scheduled appointments due by today? Yes   Has home health visited the patient within 72 hours of discharge? N/A   Psychosocial issues? No   Did the patient receive a copy of their discharge instructions? Yes   Nursing interventions Reviewed instructions with patient  [mother]   What is the patient's perception of their health status since discharge? Improving   Is patient/caregiver able to teach back steps to recovery at home? Set small, achievable goals for return to baseline health   Is the patient/caregiver able to teach back the hierarchy of who to call/visit for symptoms/problems? PCP, Specialist, Home health nurse, Urgent Care, ED, 911 Yes   Week 2 call completed? Yes   Graduated Yes   Is the patient interested in additional calls from an ambulatory ? No   Would this patient benefit from a Referral to Amb Social Work? No   Graduated/Revoked comments Mother reports patient has returned to work. Denies any new questions or needs. No further calls needed.   Call  end time 0918            YAW BOSE - Registered Nurse

## 2025-03-07 LAB
FUNGUS WND CULT: NORMAL
MYCOBACTERIUM SPEC CULT: NORMAL
NIGHT BLUE STAIN TISS: NORMAL

## 2025-03-13 ENCOUNTER — TRANSCRIBE ORDERS (OUTPATIENT)
Dept: ADMINISTRATIVE | Facility: HOSPITAL | Age: 40
End: 2025-03-13
Payer: COMMERCIAL

## 2025-03-13 ENCOUNTER — LAB (OUTPATIENT)
Dept: LAB | Facility: HOSPITAL | Age: 40
End: 2025-03-13
Payer: COMMERCIAL

## 2025-03-13 DIAGNOSIS — D64.9 ANEMIA, UNSPECIFIED TYPE: Primary | ICD-10-CM

## 2025-03-13 DIAGNOSIS — E55.9 VITAMIN D DEFICIENCY: ICD-10-CM

## 2025-03-13 DIAGNOSIS — E53.8 B12 DEFICIENCY: ICD-10-CM

## 2025-03-13 DIAGNOSIS — E83.42 HYPOMAGNESEMIA: ICD-10-CM

## 2025-03-13 DIAGNOSIS — E51.9 THIAMINE DEFICIENCY: ICD-10-CM

## 2025-03-13 DIAGNOSIS — N17.9 ACUTE RENAL FAILURE, UNSPECIFIED ACUTE RENAL FAILURE TYPE: ICD-10-CM

## 2025-03-13 DIAGNOSIS — K74.60 HEPATIC CIRRHOSIS, UNSPECIFIED HEPATIC CIRRHOSIS TYPE, UNSPECIFIED WHETHER ASCITES PRESENT: ICD-10-CM

## 2025-03-13 DIAGNOSIS — D64.9 ANEMIA, UNSPECIFIED TYPE: ICD-10-CM

## 2025-03-13 LAB
25(OH)D3 SERPL-MCNC: 13.5 NG/ML (ref 30–100)
ALBUMIN SERPL-MCNC: 3.3 G/DL (ref 3.5–5.2)
ALBUMIN/GLOB SERPL: 1.3 G/DL
ALP SERPL-CCNC: 265 U/L (ref 39–117)
ALT SERPL W P-5'-P-CCNC: 19 U/L (ref 1–41)
ANION GAP SERPL CALCULATED.3IONS-SCNC: 10 MMOL/L (ref 5–15)
AST SERPL-CCNC: 44 U/L (ref 1–40)
BASOPHILS # BLD AUTO: 0.03 10*3/MM3 (ref 0–0.2)
BASOPHILS NFR BLD AUTO: 0.6 % (ref 0–1.5)
BILIRUB SERPL-MCNC: 3.5 MG/DL (ref 0–1.2)
BUN SERPL-MCNC: 18 MG/DL (ref 6–20)
BUN/CREAT SERPL: 13.7 (ref 7–25)
CALCIUM SPEC-SCNC: 8.5 MG/DL (ref 8.6–10.5)
CHLORIDE SERPL-SCNC: 101 MMOL/L (ref 98–107)
CO2 SERPL-SCNC: 28 MMOL/L (ref 22–29)
CREAT SERPL-MCNC: 1.31 MG/DL (ref 0.76–1.27)
DEPRECATED RDW RBC AUTO: 66.2 FL (ref 37–54)
EGFRCR SERPLBLD CKD-EPI 2021: 71.9 ML/MIN/1.73
EOSINOPHIL # BLD AUTO: 0.47 10*3/MM3 (ref 0–0.4)
EOSINOPHIL NFR BLD AUTO: 9.4 % (ref 0.3–6.2)
ERYTHROCYTE [DISTWIDTH] IN BLOOD BY AUTOMATED COUNT: 19.4 % (ref 12.3–15.4)
FERRITIN SERPL-MCNC: 110 NG/ML (ref 30–400)
FOLATE SERPL-MCNC: 5.81 NG/ML (ref 4.78–24.2)
GLOBULIN UR ELPH-MCNC: 2.6 GM/DL
GLUCOSE SERPL-MCNC: 61 MG/DL (ref 65–99)
HCT VFR BLD AUTO: 30.3 % (ref 37.5–51)
HGB BLD-MCNC: 10 G/DL (ref 13–17.7)
IMM GRANULOCYTES # BLD AUTO: 0.02 10*3/MM3 (ref 0–0.05)
IMM GRANULOCYTES NFR BLD AUTO: 0.4 % (ref 0–0.5)
IRON 24H UR-MRATE: 155 MCG/DL (ref 59–158)
IRON SATN MFR SERPL: 61 % (ref 20–50)
LYMPHOCYTES # BLD AUTO: 1.93 10*3/MM3 (ref 0.7–3.1)
LYMPHOCYTES NFR BLD AUTO: 38.4 % (ref 19.6–45.3)
MAGNESIUM SERPL-MCNC: 1.9 MG/DL (ref 1.6–2.6)
MCH RBC QN AUTO: 30.8 PG (ref 26.6–33)
MCHC RBC AUTO-ENTMCNC: 33 G/DL (ref 31.5–35.7)
MCV RBC AUTO: 93.2 FL (ref 79–97)
MONOCYTES # BLD AUTO: 0.59 10*3/MM3 (ref 0.1–0.9)
MONOCYTES NFR BLD AUTO: 11.8 % (ref 5–12)
NEUTROPHILS NFR BLD AUTO: 1.98 10*3/MM3 (ref 1.7–7)
NEUTROPHILS NFR BLD AUTO: 39.4 % (ref 42.7–76)
NRBC BLD AUTO-RTO: 0 /100 WBC (ref 0–0.2)
PLATELET # BLD AUTO: 119 10*3/MM3 (ref 140–450)
PMV BLD AUTO: 9.7 FL (ref 6–12)
POTASSIUM SERPL-SCNC: 3.9 MMOL/L (ref 3.5–5.2)
PROT SERPL-MCNC: 5.9 G/DL (ref 6–8.5)
RBC # BLD AUTO: 3.25 10*6/MM3 (ref 4.14–5.8)
SODIUM SERPL-SCNC: 139 MMOL/L (ref 136–145)
TIBC SERPL-MCNC: 253 MCG/DL (ref 298–536)
TRANSFERRIN SERPL-MCNC: 170 MG/DL (ref 200–360)
VIT B12 BLD-MCNC: >2000 PG/ML (ref 211–946)
WBC NRBC COR # BLD AUTO: 5.02 10*3/MM3 (ref 3.4–10.8)

## 2025-03-13 PROCEDURE — 82728 ASSAY OF FERRITIN: CPT

## 2025-03-13 PROCEDURE — 83540 ASSAY OF IRON: CPT

## 2025-03-13 PROCEDURE — 36415 COLL VENOUS BLD VENIPUNCTURE: CPT

## 2025-03-13 PROCEDURE — 85025 COMPLETE CBC W/AUTO DIFF WBC: CPT

## 2025-03-13 PROCEDURE — 82746 ASSAY OF FOLIC ACID SERUM: CPT

## 2025-03-13 PROCEDURE — 84466 ASSAY OF TRANSFERRIN: CPT

## 2025-03-13 PROCEDURE — 83735 ASSAY OF MAGNESIUM: CPT

## 2025-03-13 PROCEDURE — 80053 COMPREHEN METABOLIC PANEL: CPT

## 2025-03-13 PROCEDURE — 82607 VITAMIN B-12: CPT

## 2025-03-13 PROCEDURE — 84425 ASSAY OF VITAMIN B-1: CPT

## 2025-03-13 PROCEDURE — 82306 VITAMIN D 25 HYDROXY: CPT

## 2025-03-14 ENCOUNTER — RESULTS FOLLOW-UP (OUTPATIENT)
Dept: ADMINISTRATIVE | Facility: HOSPITAL | Age: 40
End: 2025-03-14
Payer: COMMERCIAL

## 2025-03-14 LAB
FUNGUS WND CULT: NORMAL
MYCOBACTERIUM SPEC CULT: NORMAL
NIGHT BLUE STAIN TISS: NORMAL

## 2025-03-14 RX ORDER — FOLIC ACID 1 MG/1
1 TABLET ORAL DAILY
Qty: 90 TABLET | Refills: 3 | Status: SHIPPED | OUTPATIENT
Start: 2025-03-14 | End: 2026-03-14

## 2025-03-14 RX ORDER — ERGOCALCIFEROL 1.25 MG/1
50000 CAPSULE, LIQUID FILLED ORAL WEEKLY
Qty: 12 CAPSULE | Refills: 1 | Status: SHIPPED | OUTPATIENT
Start: 2025-03-14

## 2025-03-17 LAB — VIT B1 BLD-SCNC: 97.3 NMOL/L (ref 66.5–200)

## 2025-03-18 ENCOUNTER — TELEPHONE (OUTPATIENT)
Dept: GASTROENTEROLOGY | Facility: CLINIC | Age: 40
End: 2025-03-18
Payer: COMMERCIAL

## 2025-03-18 DIAGNOSIS — K70.31 ALCOHOLIC CIRRHOSIS OF LIVER WITH ASCITES: Primary | ICD-10-CM

## 2025-03-18 RX ORDER — SPIRONOLACTONE 50 MG/1
50 TABLET, FILM COATED ORAL DAILY
Qty: 90 TABLET | Refills: 2 | Status: SHIPPED | OUTPATIENT
Start: 2025-03-18

## 2025-03-18 NOTE — TELEPHONE ENCOUNTER
Hub staff attempted to follow warm transfer process and was unsuccessful     Caller: AMARJIT FERRERA    Relationship to patient: SELF    Best call back number: [phone]    Patient is needing: PT MISSED A CALL FROM OUR OFFICE. IT MAY BE IN REFERENCE TO A REFILL REQUEST THAT CHRIS WAS WORKING ON. PT IS AT WORK AND REQUEST THAT IF HE CAN'T BE REACHED FOR YOU TO LEAVE A MESSAGE/

## 2025-03-18 NOTE — TELEPHONE ENCOUNTER
New request for RX     Spironolactone 50mg    Last office visit- 12/4/24    Upcoming appointment 3/26/25

## 2025-03-21 ENCOUNTER — TELEPHONE (OUTPATIENT)
Dept: PULMONOLOGY | Facility: CLINIC | Age: 40
End: 2025-03-21

## 2025-03-21 DIAGNOSIS — J90 RECURRENT PLEURAL EFFUSION ON RIGHT: Primary | ICD-10-CM

## 2025-03-21 LAB
FUNGUS WND CULT: NORMAL
MYCOBACTERIUM SPEC CULT: NORMAL
NIGHT BLUE STAIN TISS: NORMAL

## 2025-03-21 NOTE — TELEPHONE ENCOUNTER
Hub staff attempted to follow warm transfer process and was unsuccessful     Caller: Wai Gay    Relationship to patient: Self    Best call back number: [phone] (home)       Patient is needing: TO SCHEDULE A THORACENTESIS

## 2025-03-24 ENCOUNTER — APPOINTMENT (OUTPATIENT)
Dept: CT IMAGING | Facility: HOSPITAL | Age: 40
End: 2025-03-24
Payer: COMMERCIAL

## 2025-03-24 ENCOUNTER — HOSPITAL ENCOUNTER (EMERGENCY)
Facility: HOSPITAL | Age: 40
Discharge: HOME | End: 2025-03-24
Attending: EMERGENCY MEDICINE | Admitting: EMERGENCY MEDICINE
Payer: COMMERCIAL

## 2025-03-24 ENCOUNTER — APPOINTMENT (OUTPATIENT)
Dept: GENERAL RADIOLOGY | Facility: HOSPITAL | Age: 40
End: 2025-03-24
Payer: COMMERCIAL

## 2025-03-24 VITALS
HEIGHT: 68 IN | SYSTOLIC BLOOD PRESSURE: 111 MMHG | HEART RATE: 102 BPM | WEIGHT: 129.85 LBS | TEMPERATURE: 97.6 F | RESPIRATION RATE: 18 BRPM | BODY MASS INDEX: 19.68 KG/M2 | DIASTOLIC BLOOD PRESSURE: 73 MMHG | OXYGEN SATURATION: 100 %

## 2025-03-24 DIAGNOSIS — R11.10 VOMITING IN ADULT: Primary | ICD-10-CM

## 2025-03-24 LAB
ALBUMIN SERPL-MCNC: 3.9 G/DL (ref 3.5–5.2)
ALBUMIN/GLOB SERPL: 1 G/DL
ALP SERPL-CCNC: 244 U/L (ref 39–117)
ALT SERPL W P-5'-P-CCNC: 24 U/L (ref 1–41)
ANION GAP SERPL CALCULATED.3IONS-SCNC: 20.3 MMOL/L (ref 5–15)
AST SERPL-CCNC: 50 U/L (ref 1–40)
BACTERIA UR QL AUTO: ABNORMAL /HPF
BASOPHILS # BLD AUTO: 0.01 10*3/MM3 (ref 0–0.2)
BASOPHILS NFR BLD AUTO: 0.2 % (ref 0–1.5)
BILIRUB SERPL-MCNC: 5.6 MG/DL (ref 0–1.2)
BILIRUB UR QL STRIP: ABNORMAL
BUN SERPL-MCNC: 33 MG/DL (ref 6–20)
BUN/CREAT SERPL: 18.6 (ref 7–25)
CALCIUM SPEC-SCNC: 9.3 MG/DL (ref 8.6–10.5)
CHLORIDE SERPL-SCNC: 90 MMOL/L (ref 98–107)
CLARITY UR: CLEAR
CO2 SERPL-SCNC: 33.7 MMOL/L (ref 22–29)
COLOR UR: ABNORMAL
CREAT SERPL-MCNC: 1.77 MG/DL (ref 0.76–1.27)
DEPRECATED RDW RBC AUTO: 70.4 FL (ref 37–54)
EGFRCR SERPLBLD CKD-EPI 2021: 50.1 ML/MIN/1.73
EOSINOPHIL # BLD AUTO: 0.01 10*3/MM3 (ref 0–0.4)
EOSINOPHIL NFR BLD AUTO: 0.2 % (ref 0.3–6.2)
ERYTHROCYTE [DISTWIDTH] IN BLOOD BY AUTOMATED COUNT: 20.5 % (ref 12.3–15.4)
FLUAV RNA RESP QL NAA+PROBE: NOT DETECTED
FLUBV RNA RESP QL NAA+PROBE: NOT DETECTED
GLOBULIN UR ELPH-MCNC: 3.9 GM/DL
GLUCOSE BLDC GLUCOMTR-MCNC: 161 MG/DL (ref 70–99)
GLUCOSE SERPL-MCNC: 174 MG/DL (ref 65–99)
GLUCOSE UR STRIP-MCNC: NEGATIVE MG/DL
HCT VFR BLD AUTO: 37.3 % (ref 37.5–51)
HGB BLD-MCNC: 12.2 G/DL (ref 13–17.7)
HGB UR QL STRIP.AUTO: NEGATIVE
HOLD SPECIMEN: NORMAL
HYALINE CASTS UR QL AUTO: ABNORMAL /LPF
IMM GRANULOCYTES # BLD AUTO: 0.03 10*3/MM3 (ref 0–0.05)
IMM GRANULOCYTES NFR BLD AUTO: 0.5 % (ref 0–0.5)
KETONES UR QL STRIP: NEGATIVE
LEUKOCYTE ESTERASE UR QL STRIP.AUTO: ABNORMAL
LIPASE SERPL-CCNC: 19 U/L (ref 13–60)
LYMPHOCYTES # BLD AUTO: 0.92 10*3/MM3 (ref 0.7–3.1)
LYMPHOCYTES NFR BLD AUTO: 15.6 % (ref 19.6–45.3)
MAGNESIUM SERPL-MCNC: 2.3 MG/DL (ref 1.6–2.6)
MCH RBC QN AUTO: 30.9 PG (ref 26.6–33)
MCHC RBC AUTO-ENTMCNC: 32.7 G/DL (ref 31.5–35.7)
MCV RBC AUTO: 94.4 FL (ref 79–97)
MONOCYTES # BLD AUTO: 0.57 10*3/MM3 (ref 0.1–0.9)
MONOCYTES NFR BLD AUTO: 9.6 % (ref 5–12)
NEUTROPHILS NFR BLD AUTO: 4.37 10*3/MM3 (ref 1.7–7)
NEUTROPHILS NFR BLD AUTO: 73.9 % (ref 42.7–76)
NITRITE UR QL STRIP: POSITIVE
NRBC BLD AUTO-RTO: 0 /100 WBC (ref 0–0.2)
PH UR STRIP.AUTO: <=5 [PH] (ref 5–8)
PLATELET # BLD AUTO: 122 10*3/MM3 (ref 140–450)
PMV BLD AUTO: 9.2 FL (ref 6–12)
POTASSIUM SERPL-SCNC: 2.4 MMOL/L (ref 3.5–5.2)
PROT SERPL-MCNC: 7.8 G/DL (ref 6–8.5)
PROT UR QL STRIP: ABNORMAL
QT INTERVAL: 427 MS
QTC INTERVAL: 567 MS
RBC # BLD AUTO: 3.95 10*6/MM3 (ref 4.14–5.8)
RBC # UR STRIP: ABNORMAL /HPF
REF LAB TEST METHOD: ABNORMAL
RSV RNA RESP QL NAA+PROBE: NOT DETECTED
SARS-COV-2 RNA RESP QL NAA+PROBE: NOT DETECTED
SODIUM SERPL-SCNC: 144 MMOL/L (ref 136–145)
SP GR UR STRIP: 1.03 (ref 1–1.03)
SQUAMOUS #/AREA URNS HPF: ABNORMAL /HPF
UROBILINOGEN UR QL STRIP: ABNORMAL
WBC # UR STRIP: ABNORMAL /HPF
WBC NRBC COR # BLD AUTO: 5.91 10*3/MM3 (ref 3.4–10.8)
WHOLE BLOOD HOLD COAG: NORMAL
WHOLE BLOOD HOLD SPECIMEN: NORMAL

## 2025-03-24 PROCEDURE — 87637 SARSCOV2&INF A&B&RSV AMP PRB: CPT | Performed by: EMERGENCY MEDICINE

## 2025-03-24 PROCEDURE — 74177 CT ABD & PELVIS W/CONTRAST: CPT

## 2025-03-24 PROCEDURE — 85025 COMPLETE CBC W/AUTO DIFF WBC: CPT

## 2025-03-24 PROCEDURE — 82948 REAGENT STRIP/BLOOD GLUCOSE: CPT

## 2025-03-24 PROCEDURE — 25510000001 IOPAMIDOL PER 1 ML: Performed by: EMERGENCY MEDICINE

## 2025-03-24 PROCEDURE — 71045 X-RAY EXAM CHEST 1 VIEW: CPT

## 2025-03-24 PROCEDURE — 83735 ASSAY OF MAGNESIUM: CPT

## 2025-03-24 PROCEDURE — 81001 URINALYSIS AUTO W/SCOPE: CPT | Performed by: EMERGENCY MEDICINE

## 2025-03-24 PROCEDURE — 96374 THER/PROPH/DIAG INJ IV PUSH: CPT

## 2025-03-24 PROCEDURE — 36415 COLL VENOUS BLD VENIPUNCTURE: CPT

## 2025-03-24 PROCEDURE — 93005 ELECTROCARDIOGRAM TRACING: CPT

## 2025-03-24 PROCEDURE — 96375 TX/PRO/DX INJ NEW DRUG ADDON: CPT

## 2025-03-24 PROCEDURE — 25810000003 SODIUM CHLORIDE 0.9 % SOLUTION

## 2025-03-24 PROCEDURE — 25010000002 CEFTRIAXONE PER 250 MG

## 2025-03-24 PROCEDURE — 80053 COMPREHEN METABOLIC PANEL: CPT

## 2025-03-24 PROCEDURE — 25010000002 ONDANSETRON PER 1 MG

## 2025-03-24 PROCEDURE — 99285 EMERGENCY DEPT VISIT HI MDM: CPT

## 2025-03-24 PROCEDURE — 83690 ASSAY OF LIPASE: CPT

## 2025-03-24 RX ORDER — ONDANSETRON 2 MG/ML
4 INJECTION INTRAMUSCULAR; INTRAVENOUS ONCE
Status: COMPLETED | OUTPATIENT
Start: 2025-03-24 | End: 2025-03-24

## 2025-03-24 RX ORDER — POTASSIUM CHLORIDE 1500 MG/1
20 TABLET, EXTENDED RELEASE ORAL 2 TIMES DAILY
Qty: 2 TABLET | Refills: 0 | Status: SHIPPED | OUTPATIENT
Start: 2025-03-24 | End: 2025-03-24

## 2025-03-24 RX ORDER — ONDANSETRON 4 MG/1
4 TABLET, ORALLY DISINTEGRATING ORAL EVERY 8 HOURS PRN
Qty: 15 TABLET | Refills: 0 | Status: SHIPPED | OUTPATIENT
Start: 2025-03-24

## 2025-03-24 RX ORDER — IOPAMIDOL 755 MG/ML
100 INJECTION, SOLUTION INTRAVASCULAR
Status: COMPLETED | OUTPATIENT
Start: 2025-03-24 | End: 2025-03-24

## 2025-03-24 RX ORDER — PROMETHAZINE HYDROCHLORIDE 12.5 MG/1
12.5 TABLET ORAL EVERY 6 HOURS PRN
Qty: 20 TABLET | Refills: 0 | Status: SHIPPED | OUTPATIENT
Start: 2025-03-24

## 2025-03-24 RX ORDER — POTASSIUM CHLORIDE 1500 MG/1
40 TABLET, EXTENDED RELEASE ORAL 2 TIMES DAILY
Qty: 2 TABLET | Refills: 0 | Status: SHIPPED | OUTPATIENT
Start: 2025-03-24 | End: 2025-03-25

## 2025-03-24 RX ORDER — POTASSIUM CHLORIDE 750 MG/1
40 CAPSULE, EXTENDED RELEASE ORAL ONCE
Status: COMPLETED | OUTPATIENT
Start: 2025-03-24 | End: 2025-03-24

## 2025-03-24 RX ORDER — SODIUM CHLORIDE 0.9 % (FLUSH) 0.9 %
10 SYRINGE (ML) INJECTION AS NEEDED
Status: DISCONTINUED | OUTPATIENT
Start: 2025-03-24 | End: 2025-03-24 | Stop reason: HOSPADM

## 2025-03-24 RX ADMIN — IOPAMIDOL 75 ML: 755 INJECTION, SOLUTION INTRAVENOUS at 12:23

## 2025-03-24 RX ADMIN — POTASSIUM CHLORIDE 40 MEQ: 750 CAPSULE, EXTENDED RELEASE ORAL at 11:22

## 2025-03-24 RX ADMIN — ONDANSETRON 4 MG: 2 INJECTION INTRAMUSCULAR; INTRAVENOUS at 11:21

## 2025-03-24 RX ADMIN — CEFTRIAXONE SODIUM 1000 MG: 1 INJECTION, POWDER, FOR SOLUTION INTRAMUSCULAR; INTRAVENOUS at 12:23

## 2025-03-24 RX ADMIN — SODIUM CHLORIDE 1000 ML: 0.9 INJECTION, SOLUTION INTRAVENOUS at 11:18

## 2025-03-24 NOTE — ED PROVIDER NOTES
Time: 1:02 PM EDT  Date of encounter:  3/24/2025  Independent Historian/Clinical History and Information was obtained by:   Patient    History is limited by: N/A    Chief Complaint: Vomiting      History of Present Illness:  Patient is a 39 y.o. year old male who presents to the emergency department for evaluation of nausea, vomiting, right upper quadrant abdominal pain and weakness ongoing for 4 days.  Patient denies any fevers, chills, body aches.  Denies flank pain, hematuria or dysuria.  Patient reports he has also been more short of breath over the last 4 days and is scheduled for thoracentesis today with Dr. Montero at 2 PM.      Patient Care Team  Primary Care Provider: Callum Peterson DO    Past Medical History:     No Known Allergies  Past Medical History:   Diagnosis Date    Alcohol abuse 03/14/2017    Anxiety     Essential hypertension 12/27/2019    GERD (gastroesophageal reflux disease)     History of transfusion     Feb. 2024    Hypokalemia 03/14/2017    Will update    Hyponatremia 03/14/2017    Steatohepatitis, alcoholic 03/14/2017    Tobacco abuse 03/14/2017    Umbilical hernia      Past Surgical History:   Procedure Laterality Date    CHOLECYSTECTOMY      ENDOSCOPY N/A 02/09/2022    Procedure: ESOPHAGOGASTRODUODENOSCOPY WITH BX;  Surgeon: Gino Khan MD;  Location: [facility] ENDOSCOPY;  Service: Gastroenterology;  Laterality: N/A;  RETAINED LIQUID FOOD IN STOMACH, HUFFMAN'S ESOPHAGUS    ENDOSCOPY N/A 02/10/2023    Procedure: ESOPHAGOGASTRODUODENOSCOPY;  Surgeon: Gino Khan MD;  Location: [facility] ENDOSCOPY;  Service: Gastroenterology;  Laterality: N/A;  GASTRITIS, BARRETTS ESOPHAGUS, PHG    ENDOSCOPY N/A 02/05/2025    Procedure: ESOPHAGOGASTRODUODENOSCOPY WITH BIOPSIES;  Surgeon: Gino Khan MD;  Location: [facility] ENDOSCOPY;  Service: Gastroenterology;  Laterality: N/A;  ESOPHAGEAL VARICES, PORTAL HYPERTENSIVE GASTROPATHY    TIPS PROCEDURE Right     11/11/2024     UPPER GASTROINTESTINAL ENDOSCOPY       Family History   Problem Relation Age of Onset    Alcohol abuse Mother     Arthritis Mother     COPD Mother     Heart disease Maternal Grandmother     Hypertension Maternal Grandmother     Lung cancer Maternal Grandmother     Stroke Maternal Grandmother     Heart disease Maternal Grandfather     Lung cancer Maternal Grandfather     Cancer Paternal Grandmother     Cancer Paternal Grandfather     Colon cancer Neg Hx     Malig Hyperthermia Neg Hx        Home Medications:  Prior to Admission medications    Medication Sig Start Date End Date Taking? Authorizing Provider   albuterol sulfate  (90 Base) MCG/ACT inhaler Inhale 2 puffs Every 4 (Four) Hours As Needed for Wheezing or Shortness of Air. 2/24/25   Елена Macario APRN   amoxicillin-clavulanate (AUGMENTIN) 875-125 MG per tablet Take 1 tablet by mouth 2 (Two) Times a Day. 2/22/25   Varinder Rhodes MD   Anoro Ellipta 62.5-25 MCG/ACT aerosol powder  inhaler  2/24/25   ProviderRose MD   doxycycline (VIBRAMYCIN) 100 MG capsule Take 1 capsule by mouth 2 (Two) Times a Day. 2/22/25   Varinder Rhdoes MD   folic acid (FOLVITE) 1 MG tablet Take 1 tablet by mouth Daily. 3/14/25 3/14/26  Megan Duran APRN   furosemide (LASIX) 20 MG tablet Take 1 tablet by mouth 2 (Two) Times a Day. 2/27/25   Callum Peterson DO   midodrine (PROAMATINE) 10 MG tablet Take 1 tablet by mouth 3 (Three) Times a Day Before Meals. 2/27/25   Callum Peterson DO   pantoprazole (PROTONIX) 40 MG EC tablet Take 1 tablet by mouth Daily. 2/27/25   Callum Peterson DO   spironolactone (ALDACTONE) 50 MG tablet Take 1 tablet by mouth Daily. 3/18/25   Shyla Yarbrough APRN   tiotropium bromide-olodaterol (Stiolto Respimat) 2.5-2.5 MCG/ACT aerosol solution inhaler Inhale 2 puffs Daily. 2/24/25   Елена Macario APRN   ursodiol (ACTIGALL) 300 MG capsule Take 1 capsule by mouth 2 (Two) Times a Day for 90 days. 2/28/25  "5/29/25  Marian Ferrell APRN   vitamin D (ERGOCALCIFEROL) 1.25 MG (05129 UT) capsule capsule Take 1 capsule by mouth 1 (One) Time Per Week. 3/14/25   Megan Duran APRN        Social History:   Social History     Tobacco Use    Smoking status: Every Day     Current packs/day: 0.50     Average packs/day: 0.5 packs/day for 17.2 years (8.6 ttl pk-yrs)     Types: Cigarettes     Start date: 1/1/2008     Passive exposure: Current    Smokeless tobacco: Never   Vaping Use    Vaping status: Never Used   Substance Use Topics    Alcohol use: Not Currently     Alcohol/week: 3.0 standard drinks of alcohol     Comment: FORMER    Drug use: Not Currently         Review of Systems:  Review of Systems   Constitutional:  Negative for chills, fatigue and fever.   HENT:  Negative for ear pain, rhinorrhea and sore throat.    Eyes:  Negative for visual disturbance.   Respiratory:  Positive for shortness of breath. Negative for cough.    Cardiovascular:  Negative for chest pain.   Gastrointestinal:  Positive for abdominal pain and vomiting. Negative for diarrhea.   Genitourinary:  Negative for difficulty urinating.   Musculoskeletal:  Negative for arthralgias, back pain and myalgias.   Skin:  Negative for rash.   Neurological:  Negative for light-headedness and headaches.   Hematological:  Negative for adenopathy.   Psychiatric/Behavioral: Negative.          Physical Exam:  /73   Pulse 102   Temp 97.6 °F (36.4 °C) (Oral)   Resp 18   Ht 172.7 cm (68\")   Wt 58.9 kg (129 lb 13.6 oz)   SpO2 100%   BMI 19.74 kg/m²     Physical Exam  Vitals and nursing note reviewed.   Constitutional:       General: He is not in acute distress.     Appearance: Normal appearance. He is not toxic-appearing.   HENT:      Head: Normocephalic and atraumatic.      Nose: Nose normal.      Mouth/Throat:      Mouth: Mucous membranes are moist.   Eyes:      Conjunctiva/sclera: Conjunctivae normal.   Cardiovascular:      Rate and Rhythm: Normal rate.     "  Pulses: Normal pulses.      Heart sounds: Normal heart sounds.   Pulmonary:      Effort: Pulmonary effort is normal.      Breath sounds: Normal breath sounds.   Abdominal:      General: Bowel sounds are normal.      Palpations: Abdomen is soft.      Tenderness: There is abdominal tenderness (Right upper quadrant).   Musculoskeletal:         General: Normal range of motion.      Cervical back: Normal range of motion.   Skin:     General: Skin is warm and dry.   Neurological:      General: No focal deficit present.      Mental Status: He is alert and oriented to person, place, and time.   Psychiatric:         Mood and Affect: Mood normal.         Behavior: Behavior normal.         Thought Content: Thought content normal.         Judgment: Judgment normal.                    Medical Decision Making:      Comorbidities that affect care:    None    External Notes reviewed:    None      The following orders were placed and all results were independently analyzed by me:  Orders Placed This Encounter   Procedures    COVID-19, FLU A/B, RSV PCR 1 HR TAT - Swab, Nasopharynx    CT Abdomen Pelvis With Contrast    XR Chest 1 View    [place] Draw    Comprehensive Metabolic Panel    Lipase    Urinalysis With Microscopic If Indicated (No Culture) - Urine, Clean Catch    CBC Auto Differential    Urinalysis, Microscopic Only - Urine, Clean Catch    Magnesium    NPO Diet NPO Type: Strict NPO    Undress & Gown    IP General Consult (Use specialty-specific consult if known)    POC Glucose Once    ECG 12 Lead Electrolyte Imbalance    Insert Peripheral IV    CBC & Differential    Green Top (Gel)    Lavender Top    Gold Top - SST    Light Blue Top    Extra Tubes    Gray Top       Medications Given in the Emergency Department:  Medications   sodium chloride 0.9 % flush 10 mL (has no administration in time range)   potassium chloride (MICRO-K/KLOR-CON) CR capsule (40 mEq Oral Given 3/24/25 1122)   ondansetron (ZOFRAN) injection 4 mg (4 mg  Intravenous Given 3/24/25 1121)   sodium chloride 0.9 % bolus 1,000 mL (0 mL Intravenous Stopped 3/24/25 1148)   cefTRIAXone (ROCEPHIN) in NS 1 gram/10ml IV PUSH syringe (1,000 mg Intravenous Given 3/24/25 1223)   iopamidol (ISOVUE-370) 76 % injection 100 mL (75 mL Intravenous Given 3/24/25 1223)        ED Course:         Labs:    Lab Results (last 24 hours)       Procedure Component Value Units Date/Time    POC Glucose Once [[id number]]  (Abnormal) Collected: 03/24/25 0953    Specimen: Blood Updated: 03/24/25 0955     Glucose 161 mg/dL      Comment: Serial Number: [id number]:  [id number]       CBC & Differential [[id number]]  (Abnormal) Collected: 03/24/25 0956    Specimen: Blood Updated: 03/24/25 1007    Narrative:      The following orders were created for panel order CBC & Differential.  Procedure                               Abnormality         Status                     ---------                               -----------         ------                     CBC Auto Differential[[id number]]        Abnormal            Final result                 Please view results for these tests on the individual orders.    Comprehensive Metabolic Panel [[id number]]  (Abnormal) Collected: 03/24/25 0956    Specimen: Blood Updated: 03/24/25 1028     Glucose 174 mg/dL      BUN 33 mg/dL      Creatinine 1.77 mg/dL      Sodium 144 mmol/L      Potassium 2.4 mmol/L      Chloride 90 mmol/L      CO2 33.7 mmol/L      Calcium 9.3 mg/dL      Total Protein 7.8 g/dL      Albumin 3.9 g/dL      ALT (SGPT) 24 U/L      AST (SGOT) 50 U/L      Alkaline Phosphatase 244 U/L      Total Bilirubin 5.6 mg/dL      Globulin 3.9 gm/dL      A/G Ratio 1.0 g/dL      BUN/Creatinine Ratio 18.6     Anion Gap 20.3 mmol/L      eGFR 50.1 mL/min/1.73     Narrative:      GFR Categories in Chronic Kidney Disease (CKD)      GFR Category          GFR (mL/min/1.73)    Interpretation  G1                     90 or greater         Normal or high (1)  G2                       60-89                Mild decrease (1)  G3a                   45-59                Mild to moderate decrease  G3b                   30-44                Moderate to severe decrease  G4                    15-29                Severe decrease  G5                    14 or less           Kidney failure          (1)In the absence of evidence of kidney disease, neither GFR category G1 or G2 fulfill the criteria for CKD.    eGFR calculation 2021 CKD-EPI creatinine equation, which does not include race as a factor    Lipase [[id number]]  (Normal) Collected: 03/24/25 0956    Specimen: Blood Updated: 03/24/25 1022     Lipase 19 U/L     CBC Auto Differential [[id number]]  (Abnormal) Collected: 03/24/25 0956    Specimen: Blood Updated: 03/24/25 1007     WBC 5.91 10*3/mm3      RBC 3.95 10*6/mm3      Hemoglobin 12.2 g/dL      Hematocrit 37.3 %      MCV 94.4 fL      MCH 30.9 pg      MCHC 32.7 g/dL      RDW 20.5 %      RDW-SD 70.4 fl      MPV 9.2 fL      Platelets 122 10*3/mm3      Neutrophil % 73.9 %      Lymphocyte % 15.6 %      Monocyte % 9.6 %      Eosinophil % 0.2 %      Basophil % 0.2 %      Immature Grans % 0.5 %      Neutrophils, Absolute 4.37 10*3/mm3      Lymphocytes, Absolute 0.92 10*3/mm3      Monocytes, Absolute 0.57 10*3/mm3      Eosinophils, Absolute 0.01 10*3/mm3      Basophils, Absolute 0.01 10*3/mm3      Immature Grans, Absolute 0.03 10*3/mm3      nRBC 0.0 /100 WBC     Magnesium [[id number]]  (Normal) Collected: 03/24/25 0956    Specimen: Blood Updated: 03/24/25 1114     Magnesium 2.3 mg/dL     COVID-19, FLU A/B, RSV PCR 1 HR TAT - Swab, Nasopharynx [[id number]]  (Normal) Collected: 03/24/25 0957    Specimen: Swab from Nasopharynx Updated: 03/24/25 1042     COVID19 Not Detected     Influenza A PCR Not Detected     Influenza B PCR Not Detected     RSV, PCR Not Detected    Narrative:      Fact sheet for providers: https://www.fda.gov/media/270908/download    Fact sheet for patients:  https://www.fda.gov/media/532019/download    Test performed by PCR.    Urinalysis With Microscopic If Indicated (No Culture) - Urine, Clean Catch [[id number]]  (Abnormal) Collected: 03/24/25 1037    Specimen: Urine, Clean Catch Updated: 03/24/25 1116     Color, UA Dark Yellow     Appearance, UA Clear     pH, UA <=5.0     Specific Gravity, UA 1.026     Glucose, UA Negative     Ketones, UA Negative     Bilirubin, UA Small (1+)     Blood, UA Negative     Protein, UA Trace     Leuk Esterase, UA Trace     Nitrite, UA Positive     Urobilinogen, UA 1.0 E.U./dL    Urinalysis, Microscopic Only - Urine, Clean Catch [[id number]]  (Abnormal) Collected: 03/24/25 1037    Specimen: Urine, Clean Catch Updated: 03/24/25 1116     RBC, UA 0-2 /HPF      WBC, UA 0-2 /HPF      Bacteria, UA 1+ /HPF      Squamous Epithelial Cells, UA 0-2 /HPF      Hyaline Casts, UA Too Numerous to Count /LPF      Methodology Manual Light Microscopy             Imaging:    XR Chest 1 View  Result Date: 3/24/2025  XR CHEST 1 VW Date of Exam: 3/24/2025 12:37 PM EDT Indication: soa Comparison: Chest radiograph 2/21/2025. Findings: Cardiomediastinal silhouette is within normal limits. No focal consolidation. No pleural effusion or pneumothorax. Osseous structures are unremarkable. TIPS projects over the right upper quadrant of the abdomen.     Impression: No acute cardiopulmonary findings. Electronically Signed: Chepe Davies MD  3/24/2025 12:56 PM EDT  Workstation ID: [id number]    CT Abdomen Pelvis With Contrast  Result Date: 3/24/2025  CT ABDOMEN PELVIS W CONTRAST Date of Exam: 3/24/2025 12:12 PM EDT Indication: RUQ pain. Comparison: 2/20/2025 Technique: Axial CT images were obtained of the abdomen and pelvis after the uneventful intravenous administration of iodinated contrast. Reconstructed coronal and sagittal images were also obtained. Automated exposure control and iterative construction methods were used. Findings: Liver: The liver is cirrhotic in  morphology. No focal liver lesion is seen. No biliary dilation is seen. TIPS stent is noted and appears patent. Gallbladder: Surgically absent. Pancreas: Unremarkable. Spleen: Unremarkable. Adrenal glands: Unremarkable. Genitourinary tract: Kidneys are unremarkable. No hydronephrosis is seen. The visualized portions of the ureters, urinary bladder, and prostate gland appear unremarkable. Gastrointestinal tract: Moderate fluid distention of the stomach with moderate fluid in the visualized distal esophagus. Hollow viscera appear otherwise unremarkable. There is no evidence of bowel obstruction. Appendix: No findings to suggest acute appendicitis. Other findings: No free air or free fluid is identified. No pathologically enlarged lymph nodes are seen. The abdominal aorta and IVC appear unremarkable. Bones and soft tissues: No acute or suspicious osseous or soft tissue lesion is identified. Lobulated fat-containing umbilical hernia. Lung bases: Left lung base granuloma.     Impression: 1.Moderate fluid distention of the stomach with moderate fluid in the visualized distal esophagus. Findings may be seen in the setting of gastroesophageal reflux. 2.Cirrhotic liver morphology. TIPS stent is noted and appears patent. 3.Lobulated fat-containing umbilical hernia. Just Electronically Signed: Calixto Singh MD  3/24/2025 12:34 PM EDT  Workstation ID: [id number]        Differential Diagnosis and Discussion:    Abdominal Pain: Based on the patient's signs and symptoms, I considered abdominal aortic aneurysm, small bowel obstruction, pancreatitis, acute cholecystitis, acute appendecitis, peptic ulcer disease, gastritis, colitis, endocrine disorders, irritable bowel syndrome and other differential diagnosis an etiology of the patient's abdominal pain.    PROCEDURES:    Labs were collected in the emergency department and all labs were reviewed and interpreted by me.  X-ray were performed in the emergency department and all X-ray  impressions were independently interpreted by me.  CT scan was performed in the emergency department and the CT scan radiology impression was interpreted by me.    ECG 12 Lead Electrolyte Imbalance   Preliminary Result   HEART RSXA=754  bpm   RR Iybkiasw=892  ms   ID Rooxmxyg=534  ms   P Horizontal Axis=-52  deg   P Front Axis=44  deg   QRSD Interval=97  ms   QT Ifvkzzka=749  ms   XXbB=094  ms   QRS Axis=31  deg   T Wave Axis=3  deg   - ABNORMAL ECG -   Sinus tachycardia   Prolonged QT interval   When compared with ECG of 20-Feb-2025 11:37:46,   Significant repolarization change   Significant axis, voltage or hypertrophy change   Date and Time of Study:2025-03-24 13:15:49          Procedures    MDM  Number of Diagnoses or Management Options  Vomiting in adult  Diagnosis management comments: I have explained the patient´s condition, diagnoses and treatment plan based on the information available to me at this time. I have answered questions and addressed any concerns. The patient has a good  understanding of the patient´s diagnosis, condition, and treatment plan as can be expected at this point. The vital signs have been stable. The patient´s condition is stable and appropriate for discharge from the emergency department.      The patient will pursue further outpatient evaluation with the primary care physician or other designated or consulting physician as outlined in the discharge instructions. They are agreeable to this plan of care and follow-up instructions have been explained in detail. The patient has received these instructions in written format and has expressed an understanding of the discharge instructions. The patient is aware that any significant change in condition or worsening of symptoms should prompt an immediate return to this or the closest emergency department or call to 911.         Amount and/or Complexity of Data Reviewed  Decide to obtain previous medical records or to obtain history from  someone other than the patient: yes                       Patient Care Considerations:    SEPSIS was considered but is NOT present in the emergency department as SIRS criteria is not present.      Consultants/Shared Management Plan:    None    Social Determinants of Health:    Patient is independent, reliable, and has access to care.       Disposition and Care Coordination:    Discharged: The patient is suitable and stable for discharge with no need for consideration of admission.    I have explained the patient´s condition, diagnoses and treatment plan based on the information available to me at this time. I have answered questions and addressed any concerns. The patient has a good  understanding of the patient´s diagnosis, condition, and treatment plan as can be expected at this point. The vital signs have been stable. The patient´s condition is stable and appropriate for discharge from the emergency department.      The patient will pursue further outpatient evaluation with the primary care physician or other designated or consulting physician as outlined in the discharge instructions. They are agreeable to this plan of care and follow-up instructions have been explained in detail. The patient has received these instructions in written format and has expressed an understanding of the discharge instructions. The patient is aware that any significant change in condition or worsening of symptoms should prompt an immediate return to this or the closest emergency department or call to 911.  I have explained discharge medications and the need for follow up with the patient/caretakers. This was also printed in the discharge instructions. Patient was discharged with the following medications and follow up:      Medication List        New Prescriptions      ondansetron ODT 4 MG disintegrating tablet  Commonly known as: ZOFRAN-ODT  Place 1 tablet on the tongue Every 8 (Eight) Hours As Needed for Nausea or Vomiting.      potassium chloride 20 MEQ CR tablet  Commonly known as: KLOR-CON M20  Take 2 tablets by mouth 2 (Two) Times a Day for 1 day.     promethazine 12.5 MG tablet  Commonly known as: PHENERGAN  Take 1 tablet by mouth Every 6 (Six) Hours As Needed for Nausea or Vomiting.               Where to Get Your Medications        These medications were sent to [facility]/pharmacy #[id number] - [place], [place] - [address] - [phone]  - [phone]   [address] [place] [address]      Hours: 24-hours Phone: [phone]   ondansetron ODT 4 MG disintegrating tablet  potassium chloride 20 MEQ CR tablet  promethazine 12.5 MG tablet      Callum Peterson, DO  [address] [place] [place] [address]  [phone]    Go to   As needed       Final diagnoses:   Vomiting in adult        ED Disposition       ED Disposition   Discharge    Condition   Stable    Comment   --               This medical record created using voice recognition software.             Sofie Flor, APRN  03/24/25 2440

## 2025-03-24 NOTE — DISCHARGE INSTRUCTIONS
Please follow with your primary care provider this week for reevaluation and repeat blood work.  You need to have your potassium rechecked at this visit.  You have been prescribed nausea medications and potassium  at your pharmacy today and begin taking.  Please take the first dose of this potassium tonight.  Please keep your scheduled follow-up with Dr. Montero on Thursday.  Return to the ED for any new or worsening concerning symptoms.

## 2025-03-26 ENCOUNTER — OFFICE VISIT (OUTPATIENT)
Dept: GASTROENTEROLOGY | Facility: CLINIC | Age: 40
End: 2025-03-26
Payer: COMMERCIAL

## 2025-03-26 VITALS
HEART RATE: 89 BPM | WEIGHT: 138.3 LBS | DIASTOLIC BLOOD PRESSURE: 60 MMHG | BODY MASS INDEX: 20.96 KG/M2 | OXYGEN SATURATION: 100 % | HEIGHT: 68 IN | SYSTOLIC BLOOD PRESSURE: 103 MMHG

## 2025-03-26 DIAGNOSIS — E87.6 HYPOKALEMIA: ICD-10-CM

## 2025-03-26 DIAGNOSIS — Z95.828 S/P TIPS (TRANSJUGULAR INTRAHEPATIC PORTOSYSTEMIC SHUNT): ICD-10-CM

## 2025-03-26 DIAGNOSIS — K74.3 PRIMARY BILIARY CHOLANGITIS: ICD-10-CM

## 2025-03-26 DIAGNOSIS — K22.70 BARRETT'S ESOPHAGUS WITHOUT DYSPLASIA: ICD-10-CM

## 2025-03-26 DIAGNOSIS — K21.9 GASTROESOPHAGEAL REFLUX DISEASE, UNSPECIFIED WHETHER ESOPHAGITIS PRESENT: ICD-10-CM

## 2025-03-26 DIAGNOSIS — Z86.19 HISTORY OF SPONTANEOUS BACTERIAL PERITONITIS: ICD-10-CM

## 2025-03-26 DIAGNOSIS — K70.31 ALCOHOLIC CIRRHOSIS OF LIVER WITH ASCITES: Primary | ICD-10-CM

## 2025-03-26 PROCEDURE — 99214 OFFICE O/P EST MOD 30 MIN: CPT

## 2025-03-26 RX ORDER — LACTULOSE 10 G/15ML
SOLUTION ORAL
COMMUNITY
Start: 2025-02-07

## 2025-03-26 NOTE — PROGRESS NOTES
Chief Complaint  Follow-up and Cirrhosis    Patient or patient representative verbalized consent for the use of Ambient Listening during the visit with  ANA LAURA Fernandez for chart documentation. 3/28/2025  14:02 EDT    Wai Gay is a 39 y.o. male [facility]     History of Present Illness  Patient presents to the office for follow up with a history of alcohol abuse, primary biliary cholangitis, cirrhosis, ascites, splenomegaly, history of SBP (9/2024), Barretts esophagus, esophageal varices, and portal hypertensive gastropathy. He has continued sobriety for over 4 years now. Established care with [facility] hepatology - recent office office visit 3/13/25. Underwent TIPS procedure 11/11/24.    Patient hospitalized 2/20/2025 for generalized weakness. Found to have large right sided pleural effusion and large volume ascites.  Antibiotics given.  Paracentesis performed, negative SBP.  Thoracentesis performed as well.    Patient returned to ED 3/24/2025 for weakness, nausea, vomiting, and right upper quadrant pain. Potassium 2.4.  Given potassium replacement, Zofran, and promethazine.  CT abdomen/pelvis 3/24/25 -   1.Moderate fluid distention of the stomach with moderate fluid in the visualized distal esophagus. Findings may be seen in the setting of gastroesophageal reflux.  2.Cirrhotic liver morphology. TIPS stent is noted and appears patent.  3.Lobulated fat-containing umbilical hernia.     He reports intermittent episodes of lightheadedness and dizziness, which resolve spontaneously. He is on a regimen of Aldactone 50 mg daily and Lasix 20 mg twice daily for his liver condition. A few days prior, he experienced dyspnea, which has since improved. He did not undergo thoracentesis with Dr. Montero due to insufficient fluid accumulation. He has a scheduled appointment with Dr. Montero tomorrow. He continues to take ursodiol for  primary biliary cholangitis (PBC). He uses lactulose as needed and reports having 2 to 3 bowel movements per day. He does not report any hematochezia or melena. Denies increased confusion, foggy memory, or excessive sleepiness. He is currently on Protonix 40 mg daily, which effectively manages his heartburn symptoms.      Gastrointestinal History  EGD 2/05/2025 by Dr. Khan - mucosa suggestive of short segment Huffman's, grade I varices, moderate portal hypertension gastropathy, normal duodenum.     CT Needle Biopsy Liver 03/28/2023 -cirrhosis with the possibility of primary biliary cholangitis.     EGD 02/10/2023 by Dr. Khan -mucosa suggestive of short segment and Huffman's, mild portal hypertensive gastropathy, mild gastritis, and normal duodenum. Esophageal biopsies - reflux esophagitis. Stomach biopsies - mild reactive gastropathy.     Past Medical History:   Diagnosis Date    Alcohol abuse 03/14/2017    Anxiety     Cirrhosis     Essential hypertension 12/27/2019    GERD (gastroesophageal reflux disease)     History of transfusion     Feb. 2024    Hypokalemia 03/14/2017    Will update    Hyponatremia 03/14/2017    Low back pain     Steatohepatitis, alcoholic 03/14/2017    Tobacco abuse 03/14/2017    Umbilical hernia        Past Surgical History:   Procedure Laterality Date    CHOLECYSTECTOMY      ENDOSCOPY N/A 02/09/2022    Procedure: ESOPHAGOGASTRODUODENOSCOPY WITH BX;  Surgeon: Gino Khan MD;  Location: [facility] ENDOSCOPY;  Service: Gastroenterology;  Laterality: N/A;  RETAINED LIQUID FOOD IN STOMACH, HUFFMAN'S ESOPHAGUS    ENDOSCOPY N/A 02/10/2023    Procedure: ESOPHAGOGASTRODUODENOSCOPY;  Surgeon: Gino Khan MD;  Location: [facility] ENDOSCOPY;  Service: Gastroenterology;  Laterality: N/A;  GASTRITIS, BARRETTS ESOPHAGUS, PHG    ENDOSCOPY N/A 02/05/2025    Procedure: ESOPHAGOGASTRODUODENOSCOPY WITH BIOPSIES;  Surgeon: Gino Khan MD;  Location: [facility] ENDOSCOPY;  Service:  Gastroenterology;  Laterality: N/A;  ESOPHAGEAL VARICES, PORTAL HYPERTENSIVE GASTROPATHY    TIPS PROCEDURE Right     11/11/2024    UPPER GASTROINTESTINAL ENDOSCOPY           Current Outpatient Medications:     albuterol sulfate  (90 Base) MCG/ACT inhaler, Inhale 2 puffs Every 4 (Four) Hours As Needed for Wheezing or Shortness of Air., Disp: 54 g, Rfl: 3    amoxicillin-clavulanate (AUGMENTIN) 875-125 MG per tablet, Take 1 tablet by mouth 2 (Two) Times a Day., Disp: 10 tablet, Rfl: 0    doxycycline (VIBRAMYCIN) 100 MG capsule, Take 1 capsule by mouth 2 (Two) Times a Day., Disp: 10 capsule, Rfl: 0    folic acid (FOLVITE) 1 MG tablet, Take 1 tablet by mouth Daily., Disp: 90 tablet, Rfl: 3    furosemide (LASIX) 20 MG tablet, Take 1 tablet by mouth 2 (Two) Times a Day., Disp: 180 tablet, Rfl: 3    lactulose (CHRONULAC) 10 GM/15ML solution, , Disp: , Rfl:     midodrine (PROAMATINE) 10 MG tablet, Take 1 tablet by mouth 3 (Three) Times a Day Before Meals., Disp: 270 tablet, Rfl: 1    ondansetron ODT (ZOFRAN-ODT) 4 MG disintegrating tablet, Place 1 tablet on the tongue Every 8 (Eight) Hours As Needed for Nausea or Vomiting., Disp: 15 tablet, Rfl: 0    pantoprazole (PROTONIX) 40 MG EC tablet, Take 1 tablet by mouth Daily., Disp: 90 tablet, Rfl: 1    promethazine (PHENERGAN) 12.5 MG tablet, Take 1 tablet by mouth Every 6 (Six) Hours As Needed for Nausea or Vomiting., Disp: 20 tablet, Rfl: 0    spironolactone (ALDACTONE) 50 MG tablet, Take 1 tablet by mouth Daily., Disp: 90 tablet, Rfl: 2    ursodiol (ACTIGALL) 300 MG capsule, Take 1 capsule by mouth 2 (Two) Times a Day for 90 days., Disp: 180 capsule, Rfl: 2    vitamin D (ERGOCALCIFEROL) 1.25 MG (50111 UT) capsule capsule, Take 1 capsule by mouth 1 (One) Time Per Week., Disp: 12 capsule, Rfl: 1    Fluticasone-Umeclidin-Vilant (TRELEGY) 100-62.5-25 MCG/ACT inhaler, Inhale 1 puff Daily., Disp: 60 each, Rfl: 11     No Known Allergies    Family History   Problem Relation Age  "of Onset    Alcohol abuse Mother     Arthritis Mother     COPD Mother     Heart disease Maternal Grandmother     Hypertension Maternal Grandmother     Lung cancer Maternal Grandmother     Stroke Maternal Grandmother     Heart disease Maternal Grandfather     Lung cancer Maternal Grandfather     Cancer Paternal Grandmother     Cancer Paternal Grandfather     Colon cancer Neg Hx     Malig Hyperthermia Neg Hx         Social History     Social History Narrative    Not on file       Objective       Vital Signs:   /60 (BP Location: Left arm, Patient Position: Sitting, Cuff Size: Adult)   Pulse 89   Ht 172.7 cm (67.99\")   Wt 62.7 kg (138 lb 4.8 oz)   SpO2 100%   BMI 21.03 kg/m²       Physical Exam  Constitutional:       Appearance: Normal appearance. He is normal weight.   HENT:      Head: Normocephalic and atraumatic.      Nose: Nose normal.   Pulmonary:      Effort: Pulmonary effort is normal.   Skin:     General: Skin is warm and dry.   Neurological:      Mental Status: He is alert and oriented to person, place, and time. Mental status is at baseline.   Psychiatric:         Mood and Affect: Mood normal.         Behavior: Behavior normal.         Thought Content: Thought content normal.         Judgment: Judgment normal.         Result Review :       CBC w/diff          2/22/2025    06:46 3/13/2025    11:31 3/24/2025    09:56   CBC w/Diff   WBC 2.06  5.02  5.91    RBC 2.54  3.25  3.95    Hemoglobin 7.7  10.0  12.2    Hematocrit 23.0  30.3  37.3    MCV 90.6  93.2  94.4    MCH 30.3  30.8  30.9    MCHC 33.5  33.0  32.7    RDW 19.8  19.4  20.5    Platelets 55  119  122    Neutrophil Rel %  39.4  73.9    Immature Granulocyte Rel %  0.4  0.5    Lymphocyte Rel %  38.4  15.6    Monocyte Rel %  11.8  9.6    Eosinophil Rel %  9.4  0.2    Basophil Rel %  0.6  0.2      CMP          2/22/2025    06:46 3/13/2025    11:31 3/24/2025    09:56   CMP   Glucose 95  61  174    BUN 14  18  33    Creatinine 1.20  1.31  1.77    EGFR " 79.9  71.9  50.1    Sodium 139  139  144    Potassium 3.7  3.9  2.4    Chloride 111  101  90    Calcium 7.1  8.5  9.3    Total Protein 4.5  5.9  7.8    Albumin 2.5  3.3  3.9    Globulin  2.6  3.9    Total Bilirubin 2.0  3.5  5.6    Alkaline Phosphatase 127  265  244    AST (SGOT) 25  44  50    ALT (SGPT) 10  19  24    Albumin/Globulin Ratio  1.3  1.0    BUN/Creatinine Ratio 11.7  13.7  18.6    Anion Gap 8.3  10.0  20.3        Liver Workup   ALPHA -1 ANTITRYPSIN   Date Value Ref Range Status   08/16/2024 146 90 - 200 mg/dL Final     AFP Tumor Marker   Date Value Ref Range Status   11/22/2024 1.3 <6.1 ng/mL Final     Comment:       This test was performed using the [employer]  chemiluminescent method. Values obtained from  different assay methods cannot be used  interchangeably. AFP levels, regardless of  value, should not be interpreted as absolute  evidence of the presence or absence of disease.     dsDNA   Date Value Ref Range Status   02/27/2023 Negative Negative Final     Expanded LISA Screen   Date Value Ref Range Status   02/27/2023 Negative Negative Final     Smooth Muscle Ab   Date Value Ref Range Status   02/27/2023 21 (H) 0 - 19 Units Final     Comment:                      Negative                     0 - 19                   Weak positive               20 - 30                   Moderate to strong positive     >30   Actin Antibodies are found in 52-85% of patients with   autoimmune hepatitis or chronic active hepatitis and   in 22% of patients with primary biliary cirrhosis.     Ceruloplasmin   Date Value Ref Range Status   02/27/2023 24 16 - 31 mg/dL Final     Ferritin   Date Value Ref Range Status   03/13/2025 110.00 30.00 - 400.00 ng/mL Final     Immunofixation Result, Serum   Date Value Ref Range Status   02/27/2023 Comment  Final     Comment:     No monoclonality detected.     IgG   Date Value Ref Range Status   02/27/2023 1174 603 - 1613 mg/dL Final     IgA   Date Value Ref Range Status    02/27/2023 923 (H) 90 - 386 mg/dL Final     Comment:     Results confirmed on  dilution.     IgM   Date Value Ref Range Status   02/27/2023 100 20 - 172 mg/dL Final     Iron   Date Value Ref Range Status   03/13/2025 155 59 - 158 mcg/dL Final     TIBC   Date Value Ref Range Status   03/13/2025 253 (L) 298 - 536 mcg/dL Final     Iron Saturation (TSAT)   Date Value Ref Range Status   03/13/2025 61 (H) 20 - 50 % Final     Transferrin   Date Value Ref Range Status   03/13/2025 170 (L) 200 - 360 mg/dL Final     Mitochondrial Ab   Date Value Ref Range Status   02/27/2023 64.5 (H) 0.0 - 20.0 Units Final     Comment:                                     Negative    0.0 - 20.0                                  Equivocal  20.1 - 24.9                                  Positive         >24.9  Mitochondrial (M2) Antibodies are found in 90-96% of  patients with primary biliary cirrhosis.     Protime   Date Value Ref Range Status   02/21/2025 20.9 (H) 11.8 - 14.9 Seconds Final   11/11/2024 15.2 (H) 9.7 - 13.1 Second Final   09/09/2021 15.9 (H) 9.4 - 12.0 Seconds Final     INR   Date Value Ref Range Status   02/21/2025 1.71 (H) 0.86 - 1.15 Final   11/22/2024 1.5 (H) 0.9 - 1.1 Final     Comment:     Moderate-intensity Warfarin Therapy     2.0-3.0  Higher-intensity Warfarin Therapy         3.0-4.0     ALPHA-FETOPROTEIN   Date Value Ref Range Status   03/26/2024 <2 0 - 8.3 ng/mL Final           Assessment and Plan    Diagnoses and all orders for this visit:    1. Alcoholic cirrhosis of liver with ascites (Primary)  -     Comprehensive Metabolic Panel; Future  -     CBC & Differential; Future  -     AFP Tumor Marker; Future  -     Protime-INR; Future  -     Ammonia; Future    2. Primary biliary cholangitis    3. S/P TIPS (transjugular intrahepatic portosystemic shunt)    4. History of spontaneous bacterial peritonitis    5. Hypokalemia    6. 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 Gastroesophageal reflux disease, unspecified whether  esophagitis present        Assessment & Plan  1. Liver disease.  His condition was stable during the last office visit in December 2024, with no signs of progression. However, recent developments necessitate further evaluation. A CT scan conducted in the emergency department revealed a slightly distended stomach with fluid accumulation, suggesting possible esophagitis. A previous endoscopy performed during his hospital stay indicated stability compared to prior assessments. A recent CT scan confirmed the patency of his TIPS procedure. Laboratory results indicated hypokalemia, which was subsequently corrected in the ED. He is advised to continue his current medication regimen, including Aldactone 50 mg and Lasix 20 mg twice a day. Laboratory orders have been placed to monitor his potassium levels and update his MELD score. He is instructed to contact the office immediately if he experiences any worsening symptoms or complications. We will update [facility] with MELD score once labs are completed.     2. Hypokalemia.  His potassium levels were found to be low during his recent hospital visit, and he received potassium replacement therapy. Follow-up labs have been ordered to ensure his potassium levels are improving.     3. Heartburn.  He is currently taking Protonix 40 mg daily but still experiences occasional heartburn. He is advised to supplement with over-the-counter Pepcid if he experiences breakthrough heartburn symptoms.      Follow Up   Return in about 6 weeks (around 5/7/2025).  Patient was given instructions and counseling regarding his condition or for health maintenance advice. Please see specific information pulled into the AVS if appropriate.

## 2025-03-27 ENCOUNTER — OFFICE VISIT (OUTPATIENT)
Dept: PULMONOLOGY | Facility: CLINIC | Age: 40
End: 2025-03-27
Payer: COMMERCIAL

## 2025-03-27 VITALS
DIASTOLIC BLOOD PRESSURE: 76 MMHG | SYSTOLIC BLOOD PRESSURE: 128 MMHG | WEIGHT: 141 LBS | HEIGHT: 68 IN | RESPIRATION RATE: 16 BRPM | HEART RATE: 89 BPM | OXYGEN SATURATION: 100 % | TEMPERATURE: 98.4 F | BODY MASS INDEX: 21.37 KG/M2

## 2025-03-27 DIAGNOSIS — J90 PLEURAL EFFUSION: Primary | ICD-10-CM

## 2025-03-27 DIAGNOSIS — J45.40 MODERATE PERSISTENT ASTHMA, UNSPECIFIED WHETHER COMPLICATED: ICD-10-CM

## 2025-03-27 PROCEDURE — 99213 OFFICE O/P EST LOW 20 MIN: CPT | Performed by: INTERNAL MEDICINE

## 2025-03-28 LAB
MYCOBACTERIUM SPEC CULT: NORMAL
NIGHT BLUE STAIN TISS: NORMAL

## 2025-03-31 ENCOUNTER — OFFICE VISIT (OUTPATIENT)
Dept: FAMILY MEDICINE CLINIC | Facility: CLINIC | Age: 40
End: 2025-03-31
Payer: COMMERCIAL

## 2025-03-31 VITALS
WEIGHT: 141 LBS | SYSTOLIC BLOOD PRESSURE: 110 MMHG | HEIGHT: 68 IN | HEART RATE: 132 BPM | BODY MASS INDEX: 21.37 KG/M2 | DIASTOLIC BLOOD PRESSURE: 77 MMHG | OXYGEN SATURATION: 98 %

## 2025-03-31 DIAGNOSIS — E87.6 HYPOKALEMIA: ICD-10-CM

## 2025-03-31 DIAGNOSIS — F41.9 ANXIETY: ICD-10-CM

## 2025-03-31 DIAGNOSIS — K70.31 ALCOHOLIC CIRRHOSIS OF LIVER WITH ASCITES: Primary | ICD-10-CM

## 2025-03-31 PROBLEM — Z00.00 ANNUAL PHYSICAL EXAM: Status: ACTIVE | Noted: 2025-03-31

## 2025-03-31 LAB
ANION GAP SERPL CALCULATED.3IONS-SCNC: 14.1 MMOL/L (ref 5–15)
BUN SERPL-MCNC: 21 MG/DL (ref 6–20)
BUN/CREAT SERPL: 12.9 (ref 7–25)
CALCIUM SPEC-SCNC: 8.8 MG/DL (ref 8.6–10.5)
CHLORIDE SERPL-SCNC: 95 MMOL/L (ref 98–107)
CO2 SERPL-SCNC: 27.9 MMOL/L (ref 22–29)
CREAT SERPL-MCNC: 1.63 MG/DL (ref 0.76–1.27)
EGFRCR SERPLBLD CKD-EPI 2021: 55.3 ML/MIN/1.73
GLUCOSE SERPL-MCNC: 151 MG/DL (ref 65–99)
POTASSIUM SERPL-SCNC: 3.7 MMOL/L (ref 3.5–5.2)
SODIUM SERPL-SCNC: 137 MMOL/L (ref 136–145)

## 2025-03-31 PROCEDURE — 36415 COLL VENOUS BLD VENIPUNCTURE: CPT | Performed by: FAMILY MEDICINE

## 2025-03-31 PROCEDURE — 80048 BASIC METABOLIC PNL TOTAL CA: CPT | Performed by: FAMILY MEDICINE

## 2025-03-31 PROCEDURE — 99214 OFFICE O/P EST MOD 30 MIN: CPT | Performed by: FAMILY MEDICINE

## 2025-03-31 RX ORDER — PANTOPRAZOLE SODIUM 40 MG/1
40 TABLET, DELAYED RELEASE ORAL DAILY
Qty: 90 TABLET | Refills: 1 | Status: SHIPPED | OUTPATIENT
Start: 2025-03-31

## 2025-03-31 RX ORDER — URSODIOL 300 MG/1
300 CAPSULE ORAL 2 TIMES DAILY
Qty: 180 CAPSULE | Refills: 2 | Status: SHIPPED | OUTPATIENT
Start: 2025-03-31

## 2025-03-31 RX ORDER — FUROSEMIDE 20 MG/1
20 TABLET ORAL 2 TIMES DAILY
Qty: 180 TABLET | Refills: 3 | Status: SHIPPED | OUTPATIENT
Start: 2025-03-31

## 2025-03-31 RX ORDER — MIDODRINE HYDROCHLORIDE 10 MG/1
10 TABLET ORAL
Qty: 270 TABLET | Refills: 1 | Status: SHIPPED | OUTPATIENT
Start: 2025-03-31

## 2025-03-31 RX ORDER — FOLIC ACID 1 MG/1
1 TABLET ORAL DAILY
Qty: 90 TABLET | Refills: 3 | Status: SHIPPED | OUTPATIENT
Start: 2025-03-31 | End: 2026-03-31

## 2025-03-31 RX ORDER — ERGOCALCIFEROL 1.25 MG/1
50000 CAPSULE, LIQUID FILLED ORAL WEEKLY
Qty: 12 CAPSULE | Refills: 1 | Status: SHIPPED | OUTPATIENT
Start: 2025-03-31

## 2025-03-31 RX ORDER — ALBUTEROL SULFATE 90 UG/1
2 INHALANT RESPIRATORY (INHALATION) EVERY 4 HOURS PRN
Qty: 54 G | Refills: 3 | Status: SHIPPED | OUTPATIENT
Start: 2025-03-31

## 2025-03-31 RX ORDER — ESCITALOPRAM OXALATE 5 MG/1
5 TABLET ORAL DAILY
Qty: 90 TABLET | Refills: 0 | Status: SHIPPED | OUTPATIENT
Start: 2025-03-31

## 2025-03-31 RX ORDER — SPIRONOLACTONE 50 MG/1
50 TABLET, FILM COATED ORAL DAILY
Qty: 90 TABLET | Refills: 2 | Status: SHIPPED | OUTPATIENT
Start: 2025-03-31

## 2025-03-31 NOTE — PROGRESS NOTES
Chief Complaint   Patient presents with    Annual Exam     C/O anxiety         Subjective     Wai Gay  has a past medical history of Alcohol abuse (03/14/2017), Anxiety, Essential hypertension (12/27/2019), GERD (gastroesophageal reflux disease), History of transfusion, Hypokalemia (03/14/2017), Hyponatremia (03/14/2017), Low back pain, Steatohepatitis, alcoholic (03/14/2017), Tobacco abuse (03/14/2017), and Umbilical hernia.    Anxiety disorder-he states in the past week his anxiety has been much worse.  Even recently he has had some panic attacks.  He states his anxiety does seem to be worse while he is at work and driving.  Even so bad that today he did not even drive himself to the visit.  He states at this time he is unaware of any new or provoking situations of life causing this.  He is never taking anything historically for depression or anxiety.  He does have ongoing chronic liver disease with cirrhosis.  Recently he was also to the emergency room for a panic attack.  There his evaluation was remarkable for really low potassium at 2.4.  They gave him some potassium upon discharge which she completed.  He has not had his potassium rechecked.  Otherwise his labs though abnormal or stable        PHQ-2 Depression Screening  Little interest or pleasure in doing things?     Feeling down, depressed, or hopeless?     PHQ-2 Total Score     PHQ-9 Depression Screening  Little interest or pleasure in doing things?     Feeling down, depressed, or hopeless?     Trouble falling or staying asleep, or sleeping too much?     Feeling tired or having little energy?     Poor appetite or overeating?     Feeling bad about yourself - or that you are a failure or have let yourself or your family down?     Trouble concentrating on things, such as reading the newspaper or watching television?     Moving or speaking so slowly that other people could have noticed? Or the opposite - being so fidgety or restless that you have  been moving around a lot more than usual?     Thoughts that you would be better off dead, or of hurting yourself in some way?     PHQ-9 Total Score     If you checked off any problems, how difficult have these problems made it for you to do your work, take care of things at home, or get along with other people?       No Known Allergies    Prior to Admission medications    Medication Sig Start Date End Date Taking? Authorizing Provider   albuterol sulfate  (90 Base) MCG/ACT inhaler Inhale 2 puffs Every 4 (Four) Hours As Needed for Wheezing or Shortness of Air. 2/24/25  Yes Елена Macario APRN   Fluticasone-Umeclidin-Vilant (TRELEGY) 100-62.5-25 MCG/ACT inhaler Inhale 1 puff Daily. 3/27/25  Yes Jorge Montero, DO   folic acid (FOLVITE) 1 MG tablet Take 1 tablet by mouth Daily. 3/14/25 3/14/26 Yes Megan Duran APRN   furosemide (LASIX) 20 MG tablet Take 1 tablet by mouth 2 (Two) Times a Day. 2/27/25  Yes Callum Peterson DO   lactulose (CHRONULAC) 10 GM/15ML solution  2/7/25  Yes Provider, MD Rose   midodrine (PROAMATINE) 10 MG tablet Take 1 tablet by mouth 3 (Three) Times a Day Before Meals. 2/27/25  Yes Callum Peterson DO   ondansetron ODT (ZOFRAN-ODT) 4 MG disintegrating tablet Place 1 tablet on the tongue Every 8 (Eight) Hours As Needed for Nausea or Vomiting. 3/24/25  Yes Sofie Flor APRN   pantoprazole (PROTONIX) 40 MG EC tablet Take 1 tablet by mouth Daily. 2/27/25  Yes Callum Peterson DO   spironolactone (ALDACTONE) 50 MG tablet Take 1 tablet by mouth Daily. 3/18/25  Yes Shyla Yarbrough APRN   ursodiol (ACTIGALL) 300 MG capsule Take 1 capsule by mouth 2 (Two) Times a Day for 90 days. 2/28/25 5/29/25 Yes Marian Ferrell APRN   vitamin D (ERGOCALCIFEROL) 1.25 MG (46889 UT) capsule capsule Take 1 capsule by mouth 1 (One) Time Per Week. 3/14/25  Yes Megan Duran APRN   amoxicillin-clavulanate (AUGMENTIN) 875-125 MG per tablet Take 1 tablet  by mouth 2 (Two) Times a Day. 2/22/25 3/31/25 Yes Varinder Rhodes MD   doxycycline (VIBRAMYCIN) 100 MG capsule Take 1 capsule by mouth 2 (Two) Times a Day. 2/22/25 3/31/25 Yes Varinder Rhodes MD   promethazine (PHENERGAN) 12.5 MG tablet Take 1 tablet by mouth Every 6 (Six) Hours As Needed for Nausea or Vomiting. 3/24/25   Sofie Flor, APRN        Patient Active Problem List   Diagnosis    Acute liver failure without hepatic coma    Acute hepatitis    Hypokalemia    Hyponatremia    Steatohepatitis, alcoholic    History of alcohol abuse    Tobacco abuse    Generalized abdominal pain    Need for influenza vaccination    Essential hypertension    Need for hepatitis A and B vaccination    Need for vaccination against Streptococcus pneumoniae    Need for meningococcal vaccination    Need for shingles vaccine    Huffman's esophagus without dysplasia    Umbilical hernia without obstruction and without gangrene    Acute hypoxic respiratory failure    Personal history of PE (pulmonary embolism)    Pleural effusion    Dyspnea    SBP (spontaneous bacterial peritonitis)    Peritonitis    Hospital discharge follow-up    ERRONEOUS ENCOUNTER--DISREGARD    Sepsis, due to unspecified organism, unspecified whether acute organ dysfunction present    Cirrhosis of liver with ascites    Iron deficiency anemia    Acute renal failure    Hepatic encephalopathy    Moderate persistent asthma    Annual physical exam    Anxiety        Past Surgical History:   Procedure Laterality Date    CHOLECYSTECTOMY      ENDOSCOPY N/A 02/09/2022    Procedure: ESOPHAGOGASTRODUODENOSCOPY WITH BX;  Surgeon: Gino Khan MD;  Location: [facility] ENDOSCOPY;  Service: Gastroenterology;  Laterality: N/A;  RETAINED LIQUID FOOD IN STOMACH, HUFFMAN'S ESOPHAGUS    ENDOSCOPY N/A 02/10/2023    Procedure: ESOPHAGOGASTRODUODENOSCOPY;  Surgeon: Gino Khan MD;  Location: [facility] ENDOSCOPY;  Service: Gastroenterology;  Laterality: N/A;  GASTRITIS,  BARRETTS ESOPHAGUS, PHG    ENDOSCOPY N/A 02/05/2025    Procedure: ESOPHAGOGASTRODUODENOSCOPY WITH BIOPSIES;  Surgeon: Gino Khan MD;  Location: [facility] ENDOSCOPY;  Service: Gastroenterology;  Laterality: N/A;  ESOPHAGEAL VARICES, PORTAL HYPERTENSIVE GASTROPATHY    TIPS PROCEDURE Right     11/11/2024    UPPER GASTROINTESTINAL ENDOSCOPY         Social History     Socioeconomic History    Marital status: Single   Tobacco Use    Smoking status: Every Day     Current packs/day: 0.50     Average packs/day: 0.5 packs/day for 17.2 years (8.6 ttl pk-yrs)     Types: Cigarettes     Start date: 1/1/2008     Passive exposure: Current    Smokeless tobacco: Never   Vaping Use    Vaping status: Never Used   Substance and Sexual Activity    Alcohol use: Not Currently     Alcohol/week: 3.0 standard drinks of alcohol     Comment: FORMER    Drug use: Not Currently    Sexual activity: Yes     Partners: Female       Family History   Problem Relation Age of Onset    Alcohol abuse Mother     Arthritis Mother     COPD Mother     Heart disease Maternal Grandmother     Hypertension Maternal Grandmother     Lung cancer Maternal Grandmother     Stroke Maternal Grandmother     Heart disease Maternal Grandfather     Lung cancer Maternal Grandfather     Cancer Paternal Grandmother     Cancer Paternal Grandfather     Colon cancer Neg Hx     Malig Hyperthermia Neg Hx        Family history, surgical history, past medical history, Allergies and meds reviewed with patient today and updated in [employer] EMR.     ROS:  Review of Systems   Constitutional:  Negative for appetite change and fatigue.   Respiratory:  Negative for cough, chest tightness, shortness of breath and wheezing.    Cardiovascular:  Negative for chest pain and palpitations.   Gastrointestinal:  Negative for abdominal distention, constipation and diarrhea.   Neurological:  Negative for headache.   Psychiatric/Behavioral:  Negative for depressed mood. The patient is not  "nervous/anxious.        OBJECTIVE:  Vitals:    03/31/25 0900   BP: 110/77   BP Location: Left arm   Patient Position: Sitting   Pulse: (!) 132   SpO2: 98%   Weight: 64 kg (141 lb)   Height: 172.7 cm (68\")     No results found.   Body mass index is 21.44 kg/m².  No LMP for male patient.    The ASCVD Risk score (Vini DK, et al., 2019) failed to calculate for the following reasons:    The 2019 ASCVD risk score is only valid for ages 40 to 79     Physical Exam  Vitals and nursing note reviewed.   Constitutional:       General: He is not in acute distress.     Appearance: Normal appearance. He is normal weight.   HENT:      Head: Normocephalic.   Cardiovascular:      Rate and Rhythm: Normal rate and regular rhythm.      Heart sounds: Normal heart sounds. No murmur heard.  Pulmonary:      Effort: Pulmonary effort is normal.      Breath sounds: Normal breath sounds. No wheezing or rhonchi.   Neurological:      Mental Status: He is alert and oriented to person, place, and time.   Psychiatric:         Mood and Affect: Mood normal.         Thought Content: Thought content normal.         Judgment: Judgment normal.         Procedures    Admission on 03/24/2025, Discharged on 03/24/2025   Component Date Value Ref Range Status    Glucose 03/24/2025 174 (H)  65 - 99 mg/dL Final    BUN 03/24/2025 33 (H)  6 - 20 mg/dL Final    Creatinine 03/24/2025 1.77 (H)  0.76 - 1.27 mg/dL Final    Sodium 03/24/2025 144  136 - 145 mmol/L Final    Potassium 03/24/2025 2.4 (C)  3.5 - 5.2 mmol/L Final    Chloride 03/24/2025 90 (L)  98 - 107 mmol/L Final    CO2 03/24/2025 33.7 (H)  22.0 - 29.0 mmol/L Final    Calcium 03/24/2025 9.3  8.6 - 10.5 mg/dL Final    Total Protein 03/24/2025 7.8  6.0 - 8.5 g/dL Final    Albumin 03/24/2025 3.9  3.5 - 5.2 g/dL Final    ALT (SGPT) 03/24/2025 24  1 - 41 U/L Final    AST (SGOT) 03/24/2025 50 (H)  1 - 40 U/L Final    Alkaline Phosphatase 03/24/2025 244 (H)  39 - 117 U/L Final    Total Bilirubin 03/24/2025 5.6 " (H)  0.0 - 1.2 mg/dL Final    Globulin 03/24/2025 3.9  gm/dL Final    A/G Ratio 03/24/2025 1.0  g/dL Final    BUN/Creatinine Ratio 03/24/2025 18.6  7.0 - 25.0 Final    Anion Gap 03/24/2025 20.3 (H)  5.0 - 15.0 mmol/L Final    eGFR 03/24/2025 50.1 (L)  >60.0 mL/min/1.73 Final    Lipase 03/24/2025 19  13 - 60 U/L Final    Color, UA 03/24/2025 Dark Yellow (A)  Yellow, Straw Final    Appearance, UA 03/24/2025 Clear  Clear Final    pH, UA 03/24/2025 <=5.0  5.0 - 8.0 Final    Specific Gravity, UA 03/24/2025 1.026  1.005 - 1.030 Final    Glucose, UA 03/24/2025 Negative  Negative Final    Ketones, UA 03/24/2025 Negative  Negative Final    Bilirubin, UA 03/24/2025 Small (1+) (A)  Negative Final    Blood, UA 03/24/2025 Negative  Negative Final    Protein, UA 03/24/2025 Trace (A)  Negative Final    Leuk Esterase, UA 03/24/2025 Trace (A)  Negative Final    Nitrite, UA 03/24/2025 Positive (A)  Negative Final    Urobilinogen, UA 03/24/2025 1.0 E.U./dL  0.2 - 1.0 E.U./dL Final    Extra Tube 03/24/2025 Hold for add-ons.   Final    Auto resulted.    Extra Tube 03/24/2025 hold for add-on   Final    Auto resulted    Extra Tube 03/24/2025 Hold for add-ons.   Final    Auto resulted.    Extra Tube 03/24/2025 Hold for add-ons.   Final    Auto resulted    WBC 03/24/2025 5.91  3.40 - 10.80 10*3/mm3 Final    RBC 03/24/2025 3.95 (L)  4.14 - 5.80 10*6/mm3 Final    Hemoglobin 03/24/2025 12.2 (L)  13.0 - 17.7 g/dL Final    Hematocrit 03/24/2025 37.3 (L)  37.5 - 51.0 % Final    MCV 03/24/2025 94.4  79.0 - 97.0 fL Final    MCH 03/24/2025 30.9  26.6 - 33.0 pg Final    MCHC 03/24/2025 32.7  31.5 - 35.7 g/dL Final    RDW 03/24/2025 20.5 (H)  12.3 - 15.4 % Final    RDW-SD 03/24/2025 70.4 (H)  37.0 - 54.0 fl Final    MPV 03/24/2025 9.2  6.0 - 12.0 fL Final    Platelets 03/24/2025 122 (L)  140 - 450 10*3/mm3 Final    Neutrophil % 03/24/2025 73.9  42.7 - 76.0 % Final    Lymphocyte % 03/24/2025 15.6 (L)  19.6 - 45.3 % Final    Monocyte % 03/24/2025 9.6   5.0 - 12.0 % Final    Eosinophil % 03/24/2025 0.2 (L)  0.3 - 6.2 % Final    Basophil % 03/24/2025 0.2  0.0 - 1.5 % Final    Immature Grans % 03/24/2025 0.5  0.0 - 0.5 % Final    Neutrophils, Absolute 03/24/2025 4.37  1.70 - 7.00 10*3/mm3 Final    Lymphocytes, Absolute 03/24/2025 0.92  0.70 - 3.10 10*3/mm3 Final    Monocytes, Absolute 03/24/2025 0.57  0.10 - 0.90 10*3/mm3 Final    Eosinophils, Absolute 03/24/2025 0.01  0.00 - 0.40 10*3/mm3 Final    Basophils, Absolute 03/24/2025 0.01  0.00 - 0.20 10*3/mm3 Final    Immature Grans, Absolute 03/24/2025 0.03  0.00 - 0.05 10*3/mm3 Final    nRBC 03/24/2025 0.0  0.0 - 0.2 /100 WBC Final    Glucose 03/24/2025 161 (H)  70 - 99 mg/dL Final    Serial Number: [id number]:  [id number]    COVID19 03/24/2025 Not Detected  Not Detected - Ref. Range Final    Influenza A PCR 03/24/2025 Not Detected  Not Detected Final    Influenza B PCR 03/24/2025 Not Detected  Not Detected Final    RSV, PCR 03/24/2025 Not Detected  Not Detected Final    Extra Tube 03/24/2025 Hold for add-ons.   Final    Auto resulted.    RBC, UA 03/24/2025 0-2  None Seen, 0-2 /HPF Final    WBC, UA 03/24/2025 0-2  None Seen, 0-2 /HPF Final    Bacteria, UA 03/24/2025 1+ (A)  None Seen /HPF Final    Squamous Epithelial Cells, UA 03/24/2025 0-2  None Seen, 0-2 /HPF Final    Hyaline Casts, UA 03/24/2025 Too Numerous to Count  None Seen /LPF Final    Methodology 03/24/2025 Manual Light Microscopy   Final    Magnesium 03/24/2025 2.3  1.6 - 2.6 mg/dL Final    QT Interval 03/24/2025 427  ms Preliminary    QTC Interval 03/24/2025 567  ms Preliminary   Lab on 03/13/2025   Component Date Value Ref Range Status    Glucose 03/13/2025 61 (L)  65 - 99 mg/dL Final    BUN 03/13/2025 18  6 - 20 mg/dL Final    Creatinine 03/13/2025 1.31 (H)  0.76 - 1.27 mg/dL Final    Sodium 03/13/2025 139  136 - 145 mmol/L Final    Potassium 03/13/2025 3.9  3.5 - 5.2 mmol/L Final    Chloride 03/13/2025 101  98 - 107 mmol/L Final    CO2 03/13/2025  28.0  22.0 - 29.0 mmol/L Final    Calcium 03/13/2025 8.5 (L)  8.6 - 10.5 mg/dL Final    Total Protein 03/13/2025 5.9 (L)  6.0 - 8.5 g/dL Final    Albumin 03/13/2025 3.3 (L)  3.5 - 5.2 g/dL Final    ALT (SGPT) 03/13/2025 19  1 - 41 U/L Final    AST (SGOT) 03/13/2025 44 (H)  1 - 40 U/L Final    Alkaline Phosphatase 03/13/2025 265 (H)  39 - 117 U/L Final    Total Bilirubin 03/13/2025 3.5 (H)  0.0 - 1.2 mg/dL Final    Globulin 03/13/2025 2.6  gm/dL Final    A/G Ratio 03/13/2025 1.3  g/dL Final    BUN/Creatinine Ratio 03/13/2025 13.7  7.0 - 25.0 Final    Anion Gap 03/13/2025 10.0  5.0 - 15.0 mmol/L Final    eGFR 03/13/2025 71.9  >60.0 mL/min/1.73 Final    Iron 03/13/2025 155  59 - 158 mcg/dL Final    Iron Saturation (TSAT) 03/13/2025 61 (H)  20 - 50 % Final    Transferrin 03/13/2025 170 (L)  200 - 360 mg/dL Final    TIBC 03/13/2025 253 (L)  298 - 536 mcg/dL Final    Ferritin 03/13/2025 110.00  30.00 - 400.00 ng/mL Final    25 Hydroxy, Vitamin D 03/13/2025 13.5 (L)  30.0 - 100.0 ng/ml Final    Magnesium 03/13/2025 1.9  1.6 - 2.6 mg/dL Final    Vitamin B-12 03/13/2025 >2,000 (H)  211 - 946 pg/mL Final    Folate 03/13/2025 5.81  4.78 - 24.20 ng/mL Final    Vitamin B1, Whole Blood 03/13/2025 97.3  66.5 - 200.0 nmol/L Final    WBC 03/13/2025 5.02  3.40 - 10.80 10*3/mm3 Final    RBC 03/13/2025 3.25 (L)  4.14 - 5.80 10*6/mm3 Final    Hemoglobin 03/13/2025 10.0 (L)  13.0 - 17.7 g/dL Final    Hematocrit 03/13/2025 30.3 (L)  37.5 - 51.0 % Final    MCV 03/13/2025 93.2  79.0 - 97.0 fL Final    MCH 03/13/2025 30.8  26.6 - 33.0 pg Final    MCHC 03/13/2025 33.0  31.5 - 35.7 g/dL Final    RDW 03/13/2025 19.4 (H)  12.3 - 15.4 % Final    RDW-SD 03/13/2025 66.2 (H)  37.0 - 54.0 fl Final    MPV 03/13/2025 9.7  6.0 - 12.0 fL Final    Platelets 03/13/2025 119 (L)  140 - 450 10*3/mm3 Final    Neutrophil % 03/13/2025 39.4 (L)  42.7 - 76.0 % Final    Lymphocyte % 03/13/2025 38.4  19.6 - 45.3 % Final    Monocyte % 03/13/2025 11.8  5.0 - 12.0 %  Final    Eosinophil % 03/13/2025 9.4 (H)  0.3 - 6.2 % Final    Basophil % 03/13/2025 0.6  0.0 - 1.5 % Final    Immature Grans % 03/13/2025 0.4  0.0 - 0.5 % Final    Neutrophils, Absolute 03/13/2025 1.98  1.70 - 7.00 10*3/mm3 Final    Lymphocytes, Absolute 03/13/2025 1.93  0.70 - 3.10 10*3/mm3 Final    Monocytes, Absolute 03/13/2025 0.59  0.10 - 0.90 10*3/mm3 Final    Eosinophils, Absolute 03/13/2025 0.47 (H)  0.00 - 0.40 10*3/mm3 Final    Basophils, Absolute 03/13/2025 0.03  0.00 - 0.20 10*3/mm3 Final    Immature Grans, Absolute 03/13/2025 0.02  0.00 - 0.05 10*3/mm3 Final    nRBC 03/13/2025 0.0  0.0 - 0.2 /100 WBC Final       ASSESSMENT/ PLAN:    Diagnoses and all orders for this visit:    1. Alcoholic cirrhosis of liver with ascites (Primary)  Assessment & Plan:  Overall with his liver disease he does seem to be doing better.  His ascites is rather insignificant these days.  In his recent ER visit his ammonia level was normal.    Orders:  -     spironolactone (ALDACTONE) 50 MG tablet; Take 1 tablet by mouth Daily.  Dispense: 90 tablet; Refill: 2    2. Hypokalemia  Assessment & Plan:  His recent ER visit his potassium level is rather low.  He was supplemented but is never been rechecked.  Will recheck that today.    Orders:  -     Basic Metabolic Panel    3. Anxiety  Assessment & Plan:  His anxiety seem to be worse these days.  All these medicines can be metabolized to his liver.  Will start some Lexapro though at a rather low dose.      Other orders  -     escitalopram (LEXAPRO) 5 MG tablet; Take 1 tablet by mouth Daily.  Dispense: 90 tablet; Refill: 0  -     furosemide (LASIX) 20 MG tablet; Take 1 tablet by mouth 2 (Two) Times a Day.  Dispense: 180 tablet; Refill: 3  -     midodrine (PROAMATINE) 10 MG tablet; Take 1 tablet by mouth 3 (Three) Times a Day Before Meals.  Dispense: 270 tablet; Refill: 1  -     pantoprazole (PROTONIX) 40 MG EC tablet; Take 1 tablet by mouth Daily.  Dispense: 90 tablet; Refill: 1  -      ursodiol (ACTIGALL) 300 MG capsule; Take 1 capsule by mouth 2 (Two) Times a Day.  Dispense: 180 capsule; Refill: 2  -     vitamin D (ERGOCALCIFEROL) 1.25 MG (72327 UT) capsule capsule; Take 1 capsule by mouth 1 (One) Time Per Week.  Dispense: 12 capsule; Refill: 1  -     Fluticasone-Umeclidin-Vilant (TRELEGY) 100-62.5-25 MCG/ACT inhaler; Inhale 1 puff Daily.  Dispense: 60 each; Refill: 11  -     albuterol sulfate  (90 Base) MCG/ACT inhaler; Inhale 2 puffs Every 4 (Four) Hours As Needed for Wheezing or Shortness of Air.  Dispense: 54 g; Refill: 3  -     folic acid (FOLVITE) 1 MG tablet; Take 1 tablet by mouth Daily.  Dispense: 90 tablet; Refill: 3        BMI is within normal parameters. No other follow-up for BMI required.      Orders Placed Today:     New Medications Ordered This Visit   Medications    escitalopram (LEXAPRO) 5 MG tablet     Sig: Take 1 tablet by mouth Daily.     Dispense:  90 tablet     Refill:  0    furosemide (LASIX) 20 MG tablet     Sig: Take 1 tablet by mouth 2 (Two) Times a Day.     Dispense:  180 tablet     Refill:  3    midodrine (PROAMATINE) 10 MG tablet     Sig: Take 1 tablet by mouth 3 (Three) Times a Day Before Meals.     Dispense:  270 tablet     Refill:  1    pantoprazole (PROTONIX) 40 MG EC tablet     Sig: Take 1 tablet by mouth Daily.     Dispense:  90 tablet     Refill:  1    spironolactone (ALDACTONE) 50 MG tablet     Sig: Take 1 tablet by mouth Daily.     Dispense:  90 tablet     Refill:  2    ursodiol (ACTIGALL) 300 MG capsule     Sig: Take 1 capsule by mouth 2 (Two) Times a Day.     Dispense:  180 capsule     Refill:  2    vitamin D (ERGOCALCIFEROL) 1.25 MG (96937 UT) capsule capsule     Sig: Take 1 capsule by mouth 1 (One) Time Per Week.     Dispense:  12 capsule     Refill:  1    Fluticasone-Umeclidin-Vilant (TRELEGY) 100-62.5-25 MCG/ACT inhaler     Sig: Inhale 1 puff Daily.     Dispense:  60 each     Refill:  11    albuterol sulfate  (90 Base) MCG/ACT inhaler      Sig: Inhale 2 puffs Every 4 (Four) Hours As Needed for Wheezing or Shortness of Air.     Dispense:  54 g     Refill:  3    folic acid (FOLVITE) 1 MG tablet     Sig: Take 1 tablet by mouth Daily.     Dispense:  90 tablet     Refill:  3        Management Plan:     An After Visit Summary was printed and given to the patient at discharge.    Follow-up: Return in about 6 weeks (around 5/12/2025).    Callum Peterson DO 3/31/2025 09:33 EDT  This note was electronically signed.

## 2025-03-31 NOTE — ASSESSMENT & PLAN NOTE
His anxiety seem to be worse these days.  All these medicines can be metabolized to his liver.  Will start some Lexapro though at a rather low dose.

## 2025-03-31 NOTE — ASSESSMENT & PLAN NOTE
His recent ER visit his potassium level is rather low.  He was supplemented but is never been rechecked.  Will recheck that today.

## 2025-03-31 NOTE — ASSESSMENT & PLAN NOTE
Overall with his liver disease he does seem to be doing better.  His ascites is rather insignificant these days.  In his recent ER visit his ammonia level was normal.

## 2025-04-04 LAB
MYCOBACTERIUM SPEC CULT: NORMAL
NIGHT BLUE STAIN TISS: NORMAL

## 2025-04-12 LAB
QT INTERVAL: 427 MS
QTC INTERVAL: 567 MS

## 2025-04-16 NOTE — PROGRESS NOTES
"Chief Complaint  Follow-up (2 month ), Pleural Effusion, and Shortness of Breath    Subjective        Wai Gay presents to [facility] PULMONARY [facility] CRITICAL CARE MEDICINE  History of Present Illness  History of Present Illness  The patient presents for pleural effusions.    He reports experiencing intermittent episodes of dyspnea. He is currently on Anoro, which he administers concurrently with his other medications.    Supplemental Information  He was vomiting for a week.    MEDICATIONS  Anoro    Objective   Vital Signs:  /76 (BP Location: Left arm, Patient Position: Sitting, Cuff Size: Adult)   Pulse 89   Temp 98.4 °F (36.9 °C) (Temporal)   Resp 16   Ht 172.7 cm (68\")   Wt 64 kg (141 lb)   SpO2 100% Comment: room air  BMI 21.44 kg/m²   Estimated body mass index is 21.44 kg/m² as calculated from the following:    Height as of this encounter: 172.7 cm (68\").    Weight as of this encounter: 64 kg (141 lb).    BMI is within normal parameters. No other follow-up for BMI required.      Physical Exam   Physical Exam  Lungs were auscultated.    Result Review :         Results  Imaging  CAT scan shows no fluid in the lungs.    Testing  Pulmonary function tests (PFTs) show some asthma and a 17% improvement in COPD after using inhalers.              Assessment and Plan   Diagnoses and all orders for this visit:    1. Pleural effusion (Primary)    2. Moderate persistent asthma, unspecified whether complicated    Other orders  -     Discontinue: Fluticasone-Umeclidin-Vilant (TRELEGY) 100-62.5-25 MCG/ACT inhaler; Inhale 1 puff Daily.  Dispense: 60 each; Refill: 11      Assessment & Plan  1. Asthma.  His respiratory distress is likely due to untreated asthma. Pulmonary function tests (PFTs) showed obstruction consistent with COPD, but a 17% improvement was noted with inhaler use, indicating reversible asthma. He is currently on Anoro, which treats COPD but not asthma. A prescription for " Trelegy, an inhaled steroid, will be provided to manage his asthma.    2. Pleural effusion.  The CAT scan shows no fluid in the lungs, indicating that the TIPS procedure is working. If he continues to experience breathing difficulties, an ultrasound of the heart may be considered to rule out high blood pressure in the lungs, which can occur in patients with liver disease.    Follow-up  The patient will follow up in 3 months.         Follow Up   Return in about 3 months (around 6/27/2025).  Patient was given instructions and counseling regarding his condition or for health maintenance advice. Please see specific information pulled into the AVS if appropriate.         Patient or patient representative verbalized consent for the use of Ambient Listening during the visit with  Jorge Montero DO for chart documentation. 4/16/2025  08:17 EDT

## 2025-04-17 ENCOUNTER — OFFICE VISIT (OUTPATIENT)
Dept: FAMILY MEDICINE CLINIC | Facility: CLINIC | Age: 40
End: 2025-04-17
Payer: COMMERCIAL

## 2025-04-17 VITALS
HEIGHT: 68 IN | HEART RATE: 80 BPM | WEIGHT: 157 LBS | BODY MASS INDEX: 23.79 KG/M2 | TEMPERATURE: 96.9 F | OXYGEN SATURATION: 97 % | SYSTOLIC BLOOD PRESSURE: 111 MMHG | DIASTOLIC BLOOD PRESSURE: 75 MMHG

## 2025-04-17 DIAGNOSIS — F41.9 ANXIETY: ICD-10-CM

## 2025-04-17 DIAGNOSIS — N17.9 ACUTE RENAL FAILURE, UNSPECIFIED ACUTE RENAL FAILURE TYPE: Primary | ICD-10-CM

## 2025-04-17 DIAGNOSIS — K42.9 UMBILICAL HERNIA WITHOUT OBSTRUCTION AND WITHOUT GANGRENE: ICD-10-CM

## 2025-04-17 LAB
ANION GAP SERPL CALCULATED.3IONS-SCNC: 9.9 MMOL/L (ref 5–15)
BUN SERPL-MCNC: 14 MG/DL (ref 6–20)
BUN/CREAT SERPL: 13 (ref 7–25)
CALCIUM SPEC-SCNC: 8.5 MG/DL (ref 8.6–10.5)
CHLORIDE SERPL-SCNC: 103 MMOL/L (ref 98–107)
CO2 SERPL-SCNC: 25.1 MMOL/L (ref 22–29)
CREAT SERPL-MCNC: 1.08 MG/DL (ref 0.76–1.27)
EGFRCR SERPLBLD CKD-EPI 2021: 90.6 ML/MIN/1.73
GLUCOSE SERPL-MCNC: 111 MG/DL (ref 65–99)
POTASSIUM SERPL-SCNC: 4 MMOL/L (ref 3.5–5.2)
SODIUM SERPL-SCNC: 138 MMOL/L (ref 136–145)

## 2025-04-17 PROCEDURE — 80048 BASIC METABOLIC PNL TOTAL CA: CPT | Performed by: FAMILY MEDICINE

## 2025-04-17 NOTE — ASSESSMENT & PLAN NOTE
When he was to the emergency room almost 3 weeks ago his kidney function was elevated.  It had improved but still was not back to baseline.  Repeat his BMP.

## 2025-04-17 NOTE — PROGRESS NOTES
Chief Complaint   Patient presents with    Follow-up     ER        Subjective     Wai Gay  has a past medical history of Alcohol abuse (03/14/2017), Anxiety, Essential hypertension (12/27/2019), GERD (gastroesophageal reflux disease), History of transfusion, Hypokalemia (03/14/2017), Hyponatremia (03/14/2017), Low back pain, Steatohepatitis, alcoholic (03/14/2017), and Tobacco abuse (03/14/2017).    Anxiety disorder-her last visit started him on some Lexapro.  He states since taking it he feels that his anxiety overall is improved.    Abdominal Pain  This is a recurrent problem. The current episode started more than 1 year ago. The onset quality is gradual. The problem occurs 2 to 4 times per day. The most recent episode lasted 2 hours. The problem has been coming and going. The pain is located in the generalized abdominal region. The pain is at a severity of 8/10. The quality of the pain is aching, sharp and tearing. The abdominal pain radiates to the LLQ, LUQ, RLQ, RUQ, epigastric region, periumbilical region and suprapubic region. Associated symptoms include frequency, myalgias and nausea. Pertinent negatives include no anorexia, arthralgias, belching, constipation, diarrhea, dysuria, fever, flatus, hematochezia, hematuria, melena, vomiting or weight loss. The pain is aggravated by being still, bowel movement, certain positions, coughing, deep breathing and movement. The pain is relieved by Movement. Prior diagnostic workup includes CT scan.   He does have a known umbilical hernia.  His last CT scan did show a lobulated area of abdominal fat within it.    PHQ-2 Depression Screening  Little interest or pleasure in doing things?     Feeling down, depressed, or hopeless?     PHQ-2 Total Score     PHQ-9 Depression Screening  Little interest or pleasure in doing things?     Feeling down, depressed, or hopeless?     Trouble falling or staying asleep, or sleeping too much?     Feeling tired or having little  energy?     Poor appetite or overeating?     Feeling bad about yourself - or that you are a failure or have let yourself or your family down?     Trouble concentrating on things, such as reading the newspaper or watching television?     Moving or speaking so slowly that other people could have noticed? Or the opposite - being so fidgety or restless that you have been moving around a lot more than usual?     Thoughts that you would be better off dead, or of hurting yourself in some way?     PHQ-9 Total Score     If you checked off any problems, how difficult have these problems made it for you to do your work, take care of things at home, or get along with other people?       No Known Allergies    Prior to Admission medications    Medication Sig Start Date End Date Taking? Authorizing Provider   albuterol sulfate  (90 Base) MCG/ACT inhaler Inhale 2 puffs Every 4 (Four) Hours As Needed for Wheezing or Shortness of Air. 3/31/25  Yes Callum Peterson DO   escitalopram (LEXAPRO) 5 MG tablet Take 1 tablet by mouth Daily. 3/31/25  Yes Callum Peterson DO   Fluticasone-Umeclidin-Vilant (TRELEGY) 100-62.5-25 MCG/ACT inhaler Inhale 1 puff Daily. 3/31/25  Yes Callum Peterson DO   folic acid (FOLVITE) 1 MG tablet Take 1 tablet by mouth Daily. 3/31/25 3/31/26 Yes Callum Peterson DO   furosemide (LASIX) 20 MG tablet Take 1 tablet by mouth 2 (Two) Times a Day. 3/31/25  Yes Callum Peterson DO   lactulose (CHRONULAC) 10 GM/15ML solution  2/7/25  Yes Provider, MD Rose   midodrine (PROAMATINE) 10 MG tablet Take 1 tablet by mouth 3 (Three) Times a Day Before Meals. 3/31/25  Yes Callum Peterson DO   ondansetron ODT (ZOFRAN-ODT) 4 MG disintegrating tablet Place 1 tablet on the tongue Every 8 (Eight) Hours As Needed for Nausea or Vomiting. 3/24/25  Yes Sofie Flor APRN   pantoprazole (PROTONIX) 40 MG EC tablet Take 1 tablet by mouth Daily. 3/31/25  Yes Callum Peterson  DO Jorge   promethazine (PHENERGAN) 12.5 MG tablet Take 1 tablet by mouth Every 6 (Six) Hours As Needed for Nausea or Vomiting. 3/24/25  Yes Sofie Flor APRN   spironolactone (ALDACTONE) 50 MG tablet Take 1 tablet by mouth Daily. 3/31/25  Yes Callum Peterson DO   ursodiol (ACTIGALL) 300 MG capsule Take 1 capsule by mouth 2 (Two) Times a Day. 3/31/25  Yes Callum Peterson DO   vitamin D (ERGOCALCIFEROL) 1.25 MG (09971 UT) capsule capsule Take 1 capsule by mouth 1 (One) Time Per Week. 3/31/25  Yes Callum Peterson DO        Patient Active Problem List   Diagnosis    Acute liver failure without hepatic coma    Acute hepatitis    Hypokalemia    Hyponatremia    Steatohepatitis, alcoholic    History of alcohol abuse    Tobacco abuse    Generalized abdominal pain    Need for influenza vaccination    Essential hypertension    Need for hepatitis A and B vaccination    Need for vaccination against Streptococcus pneumoniae    Need for meningococcal vaccination    Need for shingles vaccine    Huffman's esophagus without dysplasia    Umbilical hernia without obstruction and without gangrene    Acute hypoxic respiratory failure    Personal history of PE (pulmonary embolism)    Pleural effusion    Dyspnea    SBP (spontaneous bacterial peritonitis)    Peritonitis    Hospital discharge follow-up    ERRONEOUS ENCOUNTER--DISREGARD    Sepsis, due to unspecified organism, unspecified whether acute organ dysfunction present    Cirrhosis of liver with ascites    Iron deficiency anemia    Acute renal failure    Hepatic encephalopathy    Moderate persistent asthma    Annual physical exam    Anxiety        Past Surgical History:   Procedure Laterality Date    CHOLECYSTECTOMY      ENDOSCOPY N/A 02/09/2022    Procedure: ESOPHAGOGASTRODUODENOSCOPY WITH BX;  Surgeon: Gino Khan MD;  Location: [facility] ENDOSCOPY;  Service: Gastroenterology;  Laterality: N/A;  RETAINED LIQUID FOOD IN STOMACH, HUFFMAN'S  ESOPHAGUS    ENDOSCOPY N/A 02/10/2023    Procedure: ESOPHAGOGASTRODUODENOSCOPY;  Surgeon: Gino Khan MD;  Location: [facility] ENDOSCOPY;  Service: Gastroenterology;  Laterality: N/A;  GASTRITIS, BARRETTS ESOPHAGUS, PHG    ENDOSCOPY N/A 02/05/2025    Procedure: ESOPHAGOGASTRODUODENOSCOPY WITH BIOPSIES;  Surgeon: Gino Khan MD;  Location: [facility] ENDOSCOPY;  Service: Gastroenterology;  Laterality: N/A;  ESOPHAGEAL VARICES, PORTAL HYPERTENSIVE GASTROPATHY    TIPS PROCEDURE Right     11/11/2024    UPPER GASTROINTESTINAL ENDOSCOPY         Social History     Socioeconomic History    Marital status: Single   Tobacco Use    Smoking status: Every Day     Current packs/day: 0.50     Average packs/day: 0.5 packs/day for 17.3 years (8.6 ttl pk-yrs)     Types: Cigarettes     Start date: 1/1/2008     Passive exposure: Current    Smokeless tobacco: Never   Vaping Use    Vaping status: Never Used   Substance and Sexual Activity    Alcohol use: Not Currently     Alcohol/week: 3.0 standard drinks of alcohol     Comment: FORMER    Drug use: Not Currently    Sexual activity: Yes     Partners: Female       Family History   Problem Relation Age of Onset    Alcohol abuse Mother     Arthritis Mother     COPD Mother     Heart disease Maternal Grandmother     Hypertension Maternal Grandmother     Lung cancer Maternal Grandmother     Stroke Maternal Grandmother     Heart disease Maternal Grandfather     Lung cancer Maternal Grandfather     Cancer Paternal Grandmother     Cancer Paternal Grandfather     Colon cancer Neg Hx     Malig Hyperthermia Neg Hx        Family history, surgical history, past medical history, Allergies and meds reviewed with patient today and updated in [facility] EMR.     ROS:  Review of Systems   Constitutional:  Negative for fatigue, fever and unexpected weight loss.   Respiratory:  Negative for cough, chest tightness, shortness of breath and wheezing.    Cardiovascular:  Negative for leg swelling.  "  Gastrointestinal:  Positive for abdominal pain and nausea. Negative for abdominal distention, anorexia, constipation, diarrhea, flatus, hematochezia, melena and vomiting.   Genitourinary:  Positive for frequency. Negative for dysuria and hematuria.   Musculoskeletal:  Positive for myalgias. Negative for arthralgias.   Psychiatric/Behavioral:  The patient is not nervous/anxious.        OBJECTIVE:  Vitals:    04/17/25 0846   BP: 111/75   BP Location: Left arm   Patient Position: Sitting   Pulse: 80   Temp: 96.9 °F (36.1 °C)   SpO2: 97%   Weight: 71.2 kg (157 lb)   Height: 172.7 cm (68\")     No results found.   Body mass index is 23.87 kg/m².  No LMP for male patient.    The ASCVD Risk score (Vini DK, et al., 2019) failed to calculate for the following reasons:    The 2019 ASCVD risk score is only valid for ages 40 to 79     Physical Exam  Vitals and nursing note reviewed.   Constitutional:       General: He is not in acute distress.     Appearance: Normal appearance. He is normal weight.   HENT:      Head: Normocephalic.   Cardiovascular:      Rate and Rhythm: Normal rate and regular rhythm.      Heart sounds: Normal heart sounds. No murmur heard.  Pulmonary:      Effort: Pulmonary effort is normal.      Breath sounds: Normal breath sounds. No wheezing or rhonchi.   Abdominal:      General: Abdomen is flat. Bowel sounds are normal. There is no distension.      Palpations: Abdomen is soft. There is no mass.      Tenderness: There is abdominal tenderness.      Hernia: Hernia is present in the umbilical area.   Neurological:      Mental Status: He is alert.         Procedures    Office Visit on 03/31/2025   Component Date Value Ref Range Status    Glucose 03/31/2025 151 (H)  65 - 99 mg/dL Final    BUN 03/31/2025 21 (H)  6 - 20 mg/dL Final    Creatinine 03/31/2025 1.63 (H)  0.76 - 1.27 mg/dL Final    Sodium 03/31/2025 137  136 - 145 mmol/L Final    Potassium 03/31/2025 3.7  3.5 - 5.2 mmol/L Final    Chloride " 03/31/2025 95 (L)  98 - 107 mmol/L Final    CO2 03/31/2025 27.9  22.0 - 29.0 mmol/L Final    Calcium 03/31/2025 8.8  8.6 - 10.5 mg/dL Final    BUN/Creatinine Ratio 03/31/2025 12.9  7.0 - 25.0 Final    Anion Gap 03/31/2025 14.1  5.0 - 15.0 mmol/L Final    eGFR 03/31/2025 55.3 (L)  >60.0 mL/min/1.73 Final   Admission on 03/24/2025, Discharged on 03/24/2025   Component Date Value Ref Range Status    Glucose 03/24/2025 174 (H)  65 - 99 mg/dL Final    BUN 03/24/2025 33 (H)  6 - 20 mg/dL Final    Creatinine 03/24/2025 1.77 (H)  0.76 - 1.27 mg/dL Final    Sodium 03/24/2025 144  136 - 145 mmol/L Final    Potassium 03/24/2025 2.4 (C)  3.5 - 5.2 mmol/L Final    Chloride 03/24/2025 90 (L)  98 - 107 mmol/L Final    CO2 03/24/2025 33.7 (H)  22.0 - 29.0 mmol/L Final    Calcium 03/24/2025 9.3  8.6 - 10.5 mg/dL Final    Total Protein 03/24/2025 7.8  6.0 - 8.5 g/dL Final    Albumin 03/24/2025 3.9  3.5 - 5.2 g/dL Final    ALT (SGPT) 03/24/2025 24  1 - 41 U/L Final    AST (SGOT) 03/24/2025 50 (H)  1 - 40 U/L Final    Alkaline Phosphatase 03/24/2025 244 (H)  39 - 117 U/L Final    Total Bilirubin 03/24/2025 5.6 (H)  0.0 - 1.2 mg/dL Final    Globulin 03/24/2025 3.9  gm/dL Final    A/G Ratio 03/24/2025 1.0  g/dL Final    BUN/Creatinine Ratio 03/24/2025 18.6  7.0 - 25.0 Final    Anion Gap 03/24/2025 20.3 (H)  5.0 - 15.0 mmol/L Final    eGFR 03/24/2025 50.1 (L)  >60.0 mL/min/1.73 Final    Lipase 03/24/2025 19  13 - 60 U/L Final    Color, UA 03/24/2025 Dark Yellow (A)  Yellow, Straw Final    Appearance, UA 03/24/2025 Clear  Clear Final    pH, UA 03/24/2025 <=5.0  5.0 - 8.0 Final    Specific Gravity, UA 03/24/2025 1.026  1.005 - 1.030 Final    Glucose, UA 03/24/2025 Negative  Negative Final    Ketones, UA 03/24/2025 Negative  Negative Final    Bilirubin, UA 03/24/2025 Small (1+) (A)  Negative Final    Blood, UA 03/24/2025 Negative  Negative Final    Protein, UA 03/24/2025 Trace (A)  Negative Final    Leuk Esterase, UA 03/24/2025 Trace (A)   Negative Final    Nitrite, UA 03/24/2025 Positive (A)  Negative Final    Urobilinogen, UA 03/24/2025 1.0 E.U./dL  0.2 - 1.0 E.U./dL Final    Extra Tube 03/24/2025 Hold for add-ons.   Final    Auto resulted.    Extra Tube 03/24/2025 hold for add-on   Final    Auto resulted    Extra Tube 03/24/2025 Hold for add-ons.   Final    Auto resulted.    Extra Tube 03/24/2025 Hold for add-ons.   Final    Auto resulted    WBC 03/24/2025 5.91  3.40 - 10.80 10*3/mm3 Final    RBC 03/24/2025 3.95 (L)  4.14 - 5.80 10*6/mm3 Final    Hemoglobin 03/24/2025 12.2 (L)  13.0 - 17.7 g/dL Final    Hematocrit 03/24/2025 37.3 (L)  37.5 - 51.0 % Final    MCV 03/24/2025 94.4  79.0 - 97.0 fL Final    MCH 03/24/2025 30.9  26.6 - 33.0 pg Final    MCHC 03/24/2025 32.7  31.5 - 35.7 g/dL Final    RDW 03/24/2025 20.5 (H)  12.3 - 15.4 % Final    RDW-SD 03/24/2025 70.4 (H)  37.0 - 54.0 fl Final    MPV 03/24/2025 9.2  6.0 - 12.0 fL Final    Platelets 03/24/2025 122 (L)  140 - 450 10*3/mm3 Final    Neutrophil % 03/24/2025 73.9  42.7 - 76.0 % Final    Lymphocyte % 03/24/2025 15.6 (L)  19.6 - 45.3 % Final    Monocyte % 03/24/2025 9.6  5.0 - 12.0 % Final    Eosinophil % 03/24/2025 0.2 (L)  0.3 - 6.2 % Final    Basophil % 03/24/2025 0.2  0.0 - 1.5 % Final    Immature Grans % 03/24/2025 0.5  0.0 - 0.5 % Final    Neutrophils, Absolute 03/24/2025 4.37  1.70 - 7.00 10*3/mm3 Final    Lymphocytes, Absolute 03/24/2025 0.92  0.70 - 3.10 10*3/mm3 Final    Monocytes, Absolute 03/24/2025 0.57  0.10 - 0.90 10*3/mm3 Final    Eosinophils, Absolute 03/24/2025 0.01  0.00 - 0.40 10*3/mm3 Final    Basophils, Absolute 03/24/2025 0.01  0.00 - 0.20 10*3/mm3 Final    Immature Grans, Absolute 03/24/2025 0.03  0.00 - 0.05 10*3/mm3 Final    nRBC 03/24/2025 0.0  0.0 - 0.2 /100 WBC Final    Glucose 03/24/2025 161 (H)  70 - 99 mg/dL Final    Serial Number: [id number]  [id number]    COVID19 03/24/2025 Not Detected  Not Detected - Ref. Range Final    Influenza A PCR 03/24/2025 Not  Detected  Not Detected Final    Influenza B PCR 03/24/2025 Not Detected  Not Detected Final    RSV, PCR 03/24/2025 Not Detected  Not Detected Final    Extra Tube 03/24/2025 Hold for add-ons.   Final    Auto resulted.    RBC, UA 03/24/2025 0-2  None Seen, 0-2 /HPF Final    WBC, UA 03/24/2025 0-2  None Seen, 0-2 /HPF Final    Bacteria, UA 03/24/2025 1+ (A)  None Seen /HPF Final    Squamous Epithelial Cells, UA 03/24/2025 0-2  None Seen, 0-2 /HPF Final    Hyaline Casts, UA 03/24/2025 Too Numerous to Count  None Seen /LPF Final    Methodology 03/24/2025 Manual Light Microscopy   Final    Magnesium 03/24/2025 2.3  1.6 - 2.6 mg/dL Final    QT Interval 03/24/2025 427  ms Final    QTC Interval 03/24/2025 567  ms Final       ASSESSMENT/ PLAN:    Diagnoses and all orders for this visit:    1. Acute renal failure, unspecified acute renal failure type (Primary)  Assessment & Plan:  When he was to the emergency room almost 3 weeks ago his kidney function was elevated.  It had improved but still was not back to baseline.  Repeat his BMP.    Orders:  -     Basic Metabolic Panel    2. Umbilical hernia without obstruction and without gangrene  Assessment & Plan:  He is having a lot more tenderness around his umbilical hernia.  Get him back to see general surgery to see if they feel it is prudent enough to repair or not.  He is not having all the ascites at 1 time he used to have due to his previous TIPS procedure.    Orders:  -     Ambulatory Referral to General Surgery    3. Anxiety  Assessment & Plan:  With the medic patient he feels that his anxiety overall is improved.          BMI is within normal parameters. No other follow-up for BMI required.      Orders Placed Today:     No orders of the defined types were placed in this encounter.       Management Plan:     An After Visit Summary was printed and given to the patient at discharge.    Follow-up: Return in about 3 months (around 7/17/2025) for Recheck.    Callum Patel  DO Nicholas 4/17/2025 09:09 EDT  This note was electronically signed.

## 2025-04-17 NOTE — ASSESSMENT & PLAN NOTE
He is having a lot more tenderness around his umbilical hernia.  Get him back to see general surgery to see if they feel it is prudent enough to repair or not.  He is not having all the ascites at 1 time he used to have due to his previous TIPS procedure.

## 2025-05-02 ENCOUNTER — OFFICE VISIT (OUTPATIENT)
Dept: SURGERY | Facility: CLINIC | Age: 40
End: 2025-05-02
Payer: COMMERCIAL

## 2025-05-02 VITALS
RESPIRATION RATE: 16 BRPM | WEIGHT: 170.9 LBS | SYSTOLIC BLOOD PRESSURE: 122 MMHG | HEART RATE: 104 BPM | DIASTOLIC BLOOD PRESSURE: 76 MMHG | BODY MASS INDEX: 25.9 KG/M2 | TEMPERATURE: 98.6 F | HEIGHT: 68 IN

## 2025-05-02 DIAGNOSIS — K42.9 UMBILICAL HERNIA WITHOUT OBSTRUCTION AND WITHOUT GANGRENE: Primary | ICD-10-CM

## 2025-05-02 RX ORDER — SODIUM CHLORIDE 0.9 % (FLUSH) 0.9 %
10 SYRINGE (ML) INJECTION AS NEEDED
OUTPATIENT
Start: 2025-05-02

## 2025-05-02 RX ORDER — SODIUM CHLORIDE 0.9 % (FLUSH) 0.9 %
10 SYRINGE (ML) INJECTION EVERY 12 HOURS SCHEDULED
OUTPATIENT
Start: 2025-05-02

## 2025-05-02 RX ORDER — SODIUM CHLORIDE 9 MG/ML
40 INJECTION, SOLUTION INTRAVENOUS AS NEEDED
OUTPATIENT
Start: 2025-05-02

## 2025-05-02 RX ORDER — ONDANSETRON 2 MG/ML
4 INJECTION INTRAMUSCULAR; INTRAVENOUS EVERY 6 HOURS PRN
OUTPATIENT
Start: 2025-05-02

## 2025-05-02 NOTE — PROGRESS NOTES
Inpatient History and Physical Surgical Orders    Preadmission Location:   Preadmission Time:  Facility:  Surgery Date:  Surgery Time:  Preadmission Test date:     Chief Complaint  Outpatient History and Physical / Surgical Orders    Primary Care Provider: Callum Peterson DO    Referring Provider: Callum Peterson*    Subjective      Patient Name: Wai Gay : 1985    HPI  The patient is a 39-year-old gentleman that I have seen in the past.  He has a known umbilical hernia and he also has cirrhosis.  When I saw him before believe he had ascites and at that time we decided against proceeding with any type of hernia repair.  He has since gotten a TIPS procedure and is actually doing quite a bit better.  He has not required any type of paracentesis since February.  He had a CT scan at the end of March that showed no ascites fluid.  His lab work actually looks better.  His platelet count at the end of March had increased to 115,000.  His CT scan did show an umbilical hernia defect with a large amount of fat out of the hernia sac.  He is having more discomfort over that hernia and came back to see if we could possibly consider repairing that.    Past History:  Medical History: has a past medical history of Alcohol abuse (2017), Anxiety, Cirrhosis, Essential hypertension (2019), GERD (gastroesophageal reflux disease), History of transfusion, Hypokalemia (2017), Hyponatremia (2017), Low back pain, Steatohepatitis, alcoholic (2017), and Tobacco abuse (2017).   Surgical History: has a past surgical history that includes Cholecystectomy; Esophagogastroduodenoscopy (N/A, 2022); Upper gastrointestinal endoscopy; Esophagogastroduodenoscopy (N/A, 02/10/2023); Esophagogastroduodenoscopy (N/A, 2025); and TIPS procedure (Right).   Family History: family history includes Alcohol abuse in his mother; Arthritis in his mother; COPD in his mother; Cancer in  his paternal grandfather and paternal grandmother; Heart disease in his maternal grandfather and maternal grandmother; Hypertension in his maternal grandmother; Lung cancer in his maternal grandfather and maternal grandmother; Stroke in his maternal grandmother.   Social History: reports that he has been smoking cigarettes. He started smoking about 17 years ago. He has a 8.7 pack-year smoking history. He has been exposed to tobacco smoke. He has never used smokeless tobacco. He reports that he does not currently use alcohol after a past usage of about 3.0 standard drinks of alcohol per week. He reports that he does not currently use drugs.  Allergies: Patient has no known allergies.       Current Outpatient Medications:     albuterol sulfate  (90 Base) MCG/ACT inhaler, Inhale 2 puffs Every 4 (Four) Hours As Needed for Wheezing or Shortness of Air., Disp: 54 g, Rfl: 3    escitalopram (LEXAPRO) 5 MG tablet, Take 1 tablet by mouth Daily., Disp: 90 tablet, Rfl: 0    Fluticasone-Umeclidin-Vilant (TRELEGY) 100-62.5-25 MCG/ACT inhaler, Inhale 1 puff Daily., Disp: 60 each, Rfl: 11    folic acid (FOLVITE) 1 MG tablet, Take 1 tablet by mouth Daily., Disp: 90 tablet, Rfl: 3    furosemide (LASIX) 20 MG tablet, Take 1 tablet by mouth 2 (Two) Times a Day., Disp: 180 tablet, Rfl: 3    lactulose (CHRONULAC) 10 GM/15ML solution, , Disp: , Rfl:     midodrine (PROAMATINE) 10 MG tablet, Take 1 tablet by mouth 3 (Three) Times a Day Before Meals., Disp: 270 tablet, Rfl: 1    ondansetron ODT (ZOFRAN-ODT) 4 MG disintegrating tablet, Place 1 tablet on the tongue Every 8 (Eight) Hours As Needed for Nausea or Vomiting., Disp: 15 tablet, Rfl: 0    pantoprazole (PROTONIX) 40 MG EC tablet, Take 1 tablet by mouth Daily., Disp: 90 tablet, Rfl: 1    promethazine (PHENERGAN) 12.5 MG tablet, Take 1 tablet by mouth Every 6 (Six) Hours As Needed for Nausea or Vomiting., Disp: 20 tablet, Rfl: 0    spironolactone (ALDACTONE) 50 MG tablet, Take 1  "tablet by mouth Daily., Disp: 90 tablet, Rfl: 2    ursodiol (ACTIGALL) 300 MG capsule, Take 1 capsule by mouth 2 (Two) Times a Day., Disp: 180 capsule, Rfl: 2    vitamin D (ERGOCALCIFEROL) 1.25 MG (44862 UT) capsule capsule, Take 1 capsule by mouth 1 (One) Time Per Week., Disp: 12 capsule, Rfl: 1       Objective   Vital Signs:   /76 (BP Location: Left arm, Patient Position: Sitting, Cuff Size: Adult)   Pulse 104   Temp 98.6 °F (37 °C) (Oral)   Resp 16   Ht 172.7 cm (67.99\")   Wt 77.5 kg (170 lb 14.4 oz)   BMI 25.99 kg/m²       Physical Exam  Vitals and nursing note reviewed.   Constitutional:       Appearance: Normal appearance. The patient is well-developed.   Cardiovascular:      Rate and Rhythm: Normal rate and regular rhythm.   Pulmonary:      Effort: Pulmonary effort is normal.      Breath sounds: Normal air entry.   Abdominal:      General: Bowel sounds are normal.      Palpations: Abdomen is soft.  Umbilical hernia noted.     Skin:     General: Skin is warm and dry.   Neurological:      Mental Status: The patient is alert and oriented to person, place, and time.      Motor: Motor function is intact.   Psychiatric:         Mood and Affect: Mood normal.       Result Review :               Assessment and Plan   Diagnoses and all orders for this visit:    1. Umbilical hernia without obstruction and without gangrene (Primary)    He appears to be in better shape now.  His ascites is well-controlled.  His platelet count looks better as well.  I have told him that I think we probably could go ahead and consider doing an open umbilical hernia repair.  I have described that procedure to him as well as the risk and benefits and he is agreeable to proceeding.    I  David Spencer MD  05/02/2025    "

## 2025-05-05 ENCOUNTER — OFFICE VISIT (OUTPATIENT)
Dept: FAMILY MEDICINE CLINIC | Facility: CLINIC | Age: 40
End: 2025-05-05
Payer: COMMERCIAL

## 2025-05-05 VITALS
OXYGEN SATURATION: 98 % | TEMPERATURE: 97.8 F | BODY MASS INDEX: 25.61 KG/M2 | WEIGHT: 169 LBS | DIASTOLIC BLOOD PRESSURE: 72 MMHG | HEART RATE: 85 BPM | HEIGHT: 68 IN | SYSTOLIC BLOOD PRESSURE: 108 MMHG

## 2025-05-05 DIAGNOSIS — N17.9 ACUTE RENAL FAILURE, UNSPECIFIED ACUTE RENAL FAILURE TYPE: ICD-10-CM

## 2025-05-05 DIAGNOSIS — K42.9 UMBILICAL HERNIA WITHOUT OBSTRUCTION AND WITHOUT GANGRENE: Primary | ICD-10-CM

## 2025-05-05 NOTE — ASSESSMENT & PLAN NOTE
His kidney function has normalized since his acute ER visit.  He needs to maintain routine adequate daily hydration.

## 2025-05-05 NOTE — PROGRESS NOTES
Chief Complaint   Patient presents with    Follow-up     6 week acute renal failure         Subjective     Wai Gay  has a past medical history of Alcohol abuse (03/14/2017), Anxiety, Cirrhosis, Essential hypertension (12/27/2019), GERD (gastroesophageal reflux disease), History of transfusion, Hypokalemia (03/14/2017), Hyponatremia (03/14/2017), Low back pain, Steatohepatitis, alcoholic (03/14/2017), and Tobacco abuse (03/14/2017).      Symptoms are: recurrent.   Onset was at an unknown time.   Symptoms occur: intermittently.  Symptoms include: abdominal pain, nasal congestion, headaches, myalgias and nausea.   Pertinent negative symptoms include no anorexia, no joint pain, no change in stool, no chest pain, no chills, no cough, no diaphoresis, no fatigue, no fever, no joint swelling, no neck pain, no numbness, no rash, no sore throat, no swollen glands, no dysuria, no vertigo, no visual change, no vomiting and no weakness.   Aggravating factors include nothing.   Treatments tried include nothing.   Treatment and/or Medications comments include: Nothing       PHQ-2 Depression Screening  Little interest or pleasure in doing things?     Feeling down, depressed, or hopeless?     PHQ-2 Total Score     PHQ-9 Depression Screening  Little interest or pleasure in doing things?     Feeling down, depressed, or hopeless?     Trouble falling or staying asleep, or sleeping too much?     Feeling tired or having little energy?     Poor appetite or overeating?     Feeling bad about yourself - or that you are a failure or have let yourself or your family down?     Trouble concentrating on things, such as reading the newspaper or watching television?     Moving or speaking so slowly that other people could have noticed? Or the opposite - being so fidgety or restless that you have been moving around a lot more than usual?     Thoughts that you would be better off dead, or of hurting yourself in some way?     PHQ-9 Total  Score     If you checked off any problems, how difficult have these problems made it for you to do your work, take care of things at home, or get along with other people?       No Known Allergies    Prior to Admission medications    Medication Sig Start Date End Date Taking? Authorizing Provider   albuterol sulfate  (90 Base) MCG/ACT inhaler Inhale 2 puffs Every 4 (Four) Hours As Needed for Wheezing or Shortness of Air. 3/31/25  Yes Callum Peterson DO   escitalopram (LEXAPRO) 5 MG tablet Take 1 tablet by mouth Daily. 3/31/25  Yes Callum Peterson DO   Fluticasone-Umeclidin-Vilant (TRELEGY) 100-62.5-25 MCG/ACT inhaler Inhale 1 puff Daily. 3/31/25  Yes Callum Peterson DO   folic acid (FOLVITE) 1 MG tablet Take 1 tablet by mouth Daily. 3/31/25 3/31/26 Yes Callum Peterson DO   furosemide (LASIX) 20 MG tablet Take 1 tablet by mouth 2 (Two) Times a Day. 3/31/25  Yes Callum Peterson DO   lactulose (CHRONULAC) 10 GM/15ML solution  2/7/25  Yes Provider, MD Rose   midodrine (PROAMATINE) 10 MG tablet Take 1 tablet by mouth 3 (Three) Times a Day Before Meals. 3/31/25  Yes Callum Peterson DO   ondansetron ODT (ZOFRAN-ODT) 4 MG disintegrating tablet Place 1 tablet on the tongue Every 8 (Eight) Hours As Needed for Nausea or Vomiting. 3/24/25  Yes Sofie Flor APRN   pantoprazole (PROTONIX) 40 MG EC tablet Take 1 tablet by mouth Daily. 3/31/25  Yes Callum Peterson DO   promethazine (PHENERGAN) 12.5 MG tablet Take 1 tablet by mouth Every 6 (Six) Hours As Needed for Nausea or Vomiting. 3/24/25  Yes Sofie Flor APRN   spironolactone (ALDACTONE) 50 MG tablet Take 1 tablet by mouth Daily. 3/31/25  Yes Callum Peterson DO   ursodiol (ACTIGALL) 300 MG capsule Take 1 capsule by mouth 2 (Two) Times a Day. 3/31/25  Yes Callum Peterson,    vitamin D (ERGOCALCIFEROL) 1.25 MG (82184 UT) capsule capsule Take 1 capsule by mouth 1 (One) Time  Per Week. 3/31/25  Yes Callum Peterson DO        Patient Active Problem List   Diagnosis    Acute liver failure without hepatic coma    Acute hepatitis    Hypokalemia    Hyponatremia    Steatohepatitis, alcoholic    History of alcohol abuse    Tobacco abuse    Generalized abdominal pain    Need for influenza vaccination    Essential hypertension    Need for hepatitis A and B vaccination    Need for vaccination against Streptococcus pneumoniae    Need for meningococcal vaccination    Need for shingles vaccine    Huffman's esophagus without dysplasia    Umbilical hernia without obstruction and without gangrene    Acute hypoxic respiratory failure    Personal history of PE (pulmonary embolism)    Pleural effusion    Dyspnea    SBP (spontaneous bacterial peritonitis)    Peritonitis    Hospital discharge follow-up    ERRONEOUS ENCOUNTER--DISREGARD    Sepsis, due to unspecified organism, unspecified whether acute organ dysfunction present    Cirrhosis of liver with ascites    Iron deficiency anemia    Acute renal failure    Hepatic encephalopathy    Moderate persistent asthma    Annual physical exam    Anxiety        Past Surgical History:   Procedure Laterality Date    CHOLECYSTECTOMY      ENDOSCOPY N/A 02/09/2022    Procedure: ESOPHAGOGASTRODUODENOSCOPY WITH BX;  Surgeon: Gino Khan MD;  Location: [facility] ENDOSCOPY;  Service: Gastroenterology;  Laterality: N/A;  RETAINED LIQUID FOOD IN STOMACH, HUFFMAN'S ESOPHAGUS    ENDOSCOPY N/A 02/10/2023    Procedure: ESOPHAGOGASTRODUODENOSCOPY;  Surgeon: Gino Khan MD;  Location: [facility] ENDOSCOPY;  Service: Gastroenterology;  Laterality: N/A;  GASTRITIS, BARRETTS ESOPHAGUS, PHG    ENDOSCOPY N/A 02/05/2025    Procedure: ESOPHAGOGASTRODUODENOSCOPY WITH BIOPSIES;  Surgeon: Gino Khan MD;  Location: [facility] ENDOSCOPY;  Service: Gastroenterology;  Laterality: N/A;  ESOPHAGEAL VARICES, PORTAL HYPERTENSIVE GASTROPATHY    TIPS PROCEDURE Right      11/11/2024    UPPER GASTROINTESTINAL ENDOSCOPY         Social History     Socioeconomic History    Marital status: Single   Tobacco Use    Smoking status: Every Day     Current packs/day: 0.50     Average packs/day: 0.5 packs/day for 17.3 years (8.7 ttl pk-yrs)     Types: Cigarettes     Start date: 1/1/2008     Passive exposure: Current    Smokeless tobacco: Never   Vaping Use    Vaping status: Never Used   Substance and Sexual Activity    Alcohol use: Not Currently     Alcohol/week: 3.0 standard drinks of alcohol     Comment: FORMER    Drug use: Not Currently    Sexual activity: Yes     Partners: Female       Family History   Problem Relation Age of Onset    Alcohol abuse Mother     Arthritis Mother     COPD Mother     Heart disease Maternal Grandmother     Hypertension Maternal Grandmother     Lung cancer Maternal Grandmother     Stroke Maternal Grandmother     Heart disease Maternal Grandfather     Lung cancer Maternal Grandfather     Cancer Paternal Grandmother     Cancer Paternal Grandfather     Colon cancer Neg Hx     Malig Hyperthermia Neg Hx        Family history, surgical history, past medical history, Allergies and meds reviewed with patient today and updated in [employer] EMR.     ROS:  Review of Systems   Constitutional:  Negative for chills, diaphoresis, fatigue and fever.   HENT:  Positive for congestion. Negative for sore throat and swollen glands.    Respiratory:  Negative for cough, chest tightness, shortness of breath and wheezing.    Cardiovascular:  Negative for chest pain.   Gastrointestinal:  Positive for abdominal pain and nausea. Negative for anorexia, blood in stool, constipation, diarrhea and vomiting.   Genitourinary:  Negative for dysuria.   Musculoskeletal:  Positive for myalgias. Negative for joint pain and neck pain.   Skin:  Negative for rash.   Neurological:  Negative for vertigo, weakness and numbness.       OBJECTIVE:  Vitals:    05/05/25 0826   BP: 108/72   BP Location: Left arm  "  Patient Position: Sitting   Pulse: 85   Temp: 97.8 °F (36.6 °C)   SpO2: 98%   Weight: 76.7 kg (169 lb)   Height: 172.7 cm (67.99\")     No results found.   Body mass index is 25.7 kg/m².  No LMP for male patient.    The ASCVD Risk score (Vini DK, et al., 2019) failed to calculate for the following reasons:    The 2019 ASCVD risk score is only valid for ages 40 to 79     Physical Exam  Vitals and nursing note reviewed.   Constitutional:       General: He is not in acute distress.     Appearance: Normal appearance. He is normal weight.   HENT:      Head: Normocephalic.      Right Ear: Tympanic membrane, ear canal and external ear normal.      Left Ear: Tympanic membrane, ear canal and external ear normal.      Nose: Nose normal.      Mouth/Throat:      Mouth: Mucous membranes are moist.      Dentition: Has dentures.      Pharynx: Oropharynx is clear.   Eyes:      General: No scleral icterus.     Conjunctiva/sclera: Conjunctivae normal.      Pupils: Pupils are equal, round, and reactive to light.   Cardiovascular:      Rate and Rhythm: Normal rate and regular rhythm.      Pulses: Normal pulses.      Heart sounds: Normal heart sounds. No murmur heard.  Pulmonary:      Effort: Pulmonary effort is normal.      Breath sounds: Normal breath sounds. No wheezing, rhonchi or rales.   Abdominal:      General: Abdomen is flat. Bowel sounds are normal. There is no distension.      Palpations: Abdomen is soft. There is no hepatomegaly.      Tenderness: There is abdominal tenderness in the periumbilical area.      Hernia: Hernia is present in the umbilical area.   Musculoskeletal:      Cervical back: Neck supple. No rigidity or tenderness.   Lymphadenopathy:      Cervical: No cervical adenopathy.   Skin:     General: Skin is warm and dry.      Coloration: Skin is not jaundiced.      Findings: No rash.   Neurological:      General: No focal deficit present.      Mental Status: He is alert and oriented to person, place, and time. "   Psychiatric:         Mood and Affect: Mood normal.         Thought Content: Thought content normal.         Judgment: Judgment normal.         Procedures    Office Visit on 04/17/2025   Component Date Value Ref Range Status    Glucose 04/17/2025 111 (H)  65 - 99 mg/dL Final    BUN 04/17/2025 14  6 - 20 mg/dL Final    Creatinine 04/17/2025 1.08  0.76 - 1.27 mg/dL Final    Sodium 04/17/2025 138  136 - 145 mmol/L Final    Potassium 04/17/2025 4.0  3.5 - 5.2 mmol/L Final    Chloride 04/17/2025 103  98 - 107 mmol/L Final    CO2 04/17/2025 25.1  22.0 - 29.0 mmol/L Final    Calcium 04/17/2025 8.5 (L)  8.6 - 10.5 mg/dL Final    BUN/Creatinine Ratio 04/17/2025 13.0  7.0 - 25.0 Final    Anion Gap 04/17/2025 9.9  5.0 - 15.0 mmol/L Final    eGFR 04/17/2025 90.6  >60.0 mL/min/1.73 Final       ASSESSMENT/ PLAN:    Diagnoses and all orders for this visit:    1. Umbilical hernia without obstruction and without gangrene (Primary)  Assessment & Plan:  His hernia is unchanged since last time.  His tenderness usually around his umbilical hernia.  He does have surgery later this week to have it surgically repaired.      2. Acute renal failure, unspecified acute renal failure type  Assessment & Plan:  His kidney function has normalized since his acute ER visit.  He needs to maintain routine adequate daily hydration.          BMI is >= 25 and <30. (Overweight) The following options were offered after discussion;: exercise counseling/recommendations and nutrition counseling/recommendations      Orders Placed Today:     No orders of the defined types were placed in this encounter.       Management Plan:     An After Visit Summary was printed and given to the patient at discharge.    Follow-up: Return in about 4 months (around 9/5/2025) for Recheck.    Callum Peterson DO 5/5/2025 08:47 EDT  This note was electronically signed.

## 2025-05-05 NOTE — ASSESSMENT & PLAN NOTE
His hernia is unchanged since last time.  His tenderness usually around his umbilical hernia.  He does have surgery later this week to have it surgically repaired.

## 2025-05-06 NOTE — PRE-PROCEDURE INSTRUCTIONS
IMPORTANT INSTRUCTIONS - PRE-ADMISSION TESTING  DO NOT EAT OR CHEW anything after midnight the night before your procedure.    NPO AFTER MN EXCEPT SIPS OF WATER TO TAKE MEDICATIONS LISTED BELOW  Take the following medications the morning of your procedure with JUST A SIP OF WATER:  ALBUTEROL INHALER IF NEEDED AND BRING WITH YOU, LEXAPRO, USE TRELEGY INHALER, MIDODRINE,ZOFRAN IF NEEDED, PROTONIX, ACTIGALL    DO NOT BRING your medications to the hospital with you, UNLESS something has changed since your PRE-Admission Testing appointment.  Hold all vitamins, supplements, and NSAIDS (Non- steroidal anti-inflammatory meds) for one week prior to surgery (you MAY take Tylenol or Acetaminophen).  If you are diabetic, check your blood sugar the morning of your procedure. If it is less than 70 or if you are feeling symptomatic, call the following number for further instructions: [phone]  Use your inhalers/nebulizers as usual, the morning of your procedure. BRING YOUR INHALERS with you.   Bring your CPAP or BIPAP to hospital, ONLY IF YOU WILL BE SPENDING THE NIGHT.   Make sure you have a ride home and have someone who will stay with you the day of your procedure after you go home.  If you have any questions, please call your Pre-Admission Testing Nurse, ___JACQUE_____________ at [phone]____________.   Per anesthesia request, do not smoke for 24 hours before your procedure or as instructed by your surgeon.    WILL CALL ON  5/7/25       NORMALLY BETWEEN 1 AND 4 PM TO GIVE OFFICIAL ARRIVAL TIME FOR DAY OF PROCEDURE  BATHING INSTRUCTIONS GIVEN. NO JEWELRY OF ANY TYPE OR NAIL POLISH UPPER OR LOWER EXT DAY OF PROCEDURE  COME TO [facility] [address] DAY OF PROCEDURE. GET ON ELEVATOR AND COME TO FIRST FLOOR TAKE LEFT OFF OF ELEVATOR.    NO SMOKING 24 HOURS PRIOR TO PROCEDURE

## 2025-05-08 ENCOUNTER — HOSPITAL ENCOUNTER (OUTPATIENT)
Facility: HOSPITAL | Age: 40
Setting detail: HOSPITAL OUTPATIENT SURGERY
Discharge: HOME | End: 2025-05-08
Attending: SURGERY | Admitting: SURGERY
Payer: COMMERCIAL

## 2025-05-08 ENCOUNTER — ANESTHESIA EVENT (OUTPATIENT)
Dept: PERIOP | Facility: HOSPITAL | Age: 40
End: 2025-05-08
Payer: COMMERCIAL

## 2025-05-08 ENCOUNTER — ANESTHESIA (OUTPATIENT)
Dept: PERIOP | Facility: HOSPITAL | Age: 40
End: 2025-05-08
Payer: COMMERCIAL

## 2025-05-08 VITALS
OXYGEN SATURATION: 98 % | RESPIRATION RATE: 16 BRPM | TEMPERATURE: 97.5 F | HEART RATE: 77 BPM | DIASTOLIC BLOOD PRESSURE: 69 MMHG | SYSTOLIC BLOOD PRESSURE: 103 MMHG

## 2025-05-08 DIAGNOSIS — K42.9 UMBILICAL HERNIA WITHOUT OBSTRUCTION AND WITHOUT GANGRENE: ICD-10-CM

## 2025-05-08 LAB
ALBUMIN SERPL-MCNC: 2.5 G/DL (ref 3.5–5.2)
ALBUMIN/GLOB SERPL: 1 G/DL
ALP SERPL-CCNC: 197 U/L (ref 39–117)
ALT SERPL W P-5'-P-CCNC: 22 U/L (ref 1–41)
ANION GAP SERPL CALCULATED.3IONS-SCNC: 6.7 MMOL/L (ref 5–15)
APTT PPP: 38.4 SECONDS (ref 24.2–34.2)
AST SERPL-CCNC: 39 U/L (ref 1–40)
BASOPHILS # BLD AUTO: 0.03 10*3/MM3 (ref 0–0.2)
BASOPHILS NFR BLD AUTO: 0.8 % (ref 0–1.5)
BILIRUB SERPL-MCNC: 2.2 MG/DL (ref 0–1.2)
BUN SERPL-MCNC: 11 MG/DL (ref 6–20)
BUN/CREAT SERPL: 8.7 (ref 7–25)
CALCIUM SPEC-SCNC: 7.9 MG/DL (ref 8.6–10.5)
CHLORIDE SERPL-SCNC: 98 MMOL/L (ref 98–107)
CO2 SERPL-SCNC: 26.3 MMOL/L (ref 22–29)
CREAT SERPL-MCNC: 1.26 MG/DL (ref 0.76–1.27)
DEPRECATED RDW RBC AUTO: 49.8 FL (ref 37–54)
EGFRCR SERPLBLD CKD-EPI 2021: 75.3 ML/MIN/1.73
EOSINOPHIL # BLD AUTO: 0.22 10*3/MM3 (ref 0–0.4)
EOSINOPHIL NFR BLD AUTO: 5.8 % (ref 0.3–6.2)
ERYTHROCYTE [DISTWIDTH] IN BLOOD BY AUTOMATED COUNT: 14.8 % (ref 12.3–15.4)
GLOBULIN UR ELPH-MCNC: 2.6 GM/DL
GLUCOSE SERPL-MCNC: 89 MG/DL (ref 65–99)
HCT VFR BLD AUTO: 24.5 % (ref 37.5–51)
HGB BLD-MCNC: 7.9 G/DL (ref 13–17.7)
IMM GRANULOCYTES # BLD AUTO: 0.02 10*3/MM3 (ref 0–0.05)
IMM GRANULOCYTES NFR BLD AUTO: 0.5 % (ref 0–0.5)
INR PPP: 1.85 (ref 0.86–1.15)
LYMPHOCYTES # BLD AUTO: 0.94 10*3/MM3 (ref 0.7–3.1)
LYMPHOCYTES NFR BLD AUTO: 24.6 % (ref 19.6–45.3)
MCH RBC QN AUTO: 29.5 PG (ref 26.6–33)
MCHC RBC AUTO-ENTMCNC: 32.2 G/DL (ref 31.5–35.7)
MCV RBC AUTO: 91.4 FL (ref 79–97)
MONOCYTES # BLD AUTO: 0.44 10*3/MM3 (ref 0.1–0.9)
MONOCYTES NFR BLD AUTO: 11.5 % (ref 5–12)
NEUTROPHILS NFR BLD AUTO: 2.17 10*3/MM3 (ref 1.7–7)
NEUTROPHILS NFR BLD AUTO: 56.8 % (ref 42.7–76)
NRBC BLD AUTO-RTO: 0 /100 WBC (ref 0–0.2)
PLATELET # BLD AUTO: 74 10*3/MM3 (ref 140–450)
PMV BLD AUTO: 10.1 FL (ref 6–12)
POTASSIUM SERPL-SCNC: 3.8 MMOL/L (ref 3.5–5.2)
PROT SERPL-MCNC: 5.1 G/DL (ref 6–8.5)
PROTHROMBIN TIME: 22.2 SECONDS (ref 11.8–14.9)
RBC # BLD AUTO: 2.68 10*6/MM3 (ref 4.14–5.8)
SODIUM SERPL-SCNC: 131 MMOL/L (ref 136–145)
WBC NRBC COR # BLD AUTO: 3.82 10*3/MM3 (ref 3.4–10.8)

## 2025-05-08 PROCEDURE — G0463 HOSPITAL OUTPT CLINIC VISIT: HCPCS | Performed by: SURGERY

## 2025-05-08 PROCEDURE — 25810000003 LACTATED RINGERS PER 1000 ML: Performed by: ANESTHESIOLOGY

## 2025-05-08 PROCEDURE — 80053 COMPREHEN METABOLIC PANEL: CPT | Performed by: ANESTHESIOLOGY

## 2025-05-08 PROCEDURE — 85025 COMPLETE CBC W/AUTO DIFF WBC: CPT | Performed by: ANESTHESIOLOGY

## 2025-05-08 PROCEDURE — 85730 THROMBOPLASTIN TIME PARTIAL: CPT | Performed by: ANESTHESIOLOGY

## 2025-05-08 PROCEDURE — 85610 PROTHROMBIN TIME: CPT | Performed by: ANESTHESIOLOGY

## 2025-05-08 RX ORDER — TRAMADOL HYDROCHLORIDE 50 MG/1
50 TABLET ORAL ONCE
Status: COMPLETED | OUTPATIENT
Start: 2025-05-08 | End: 2025-05-08

## 2025-05-08 RX ORDER — SODIUM CHLORIDE 0.9 % (FLUSH) 0.9 %
10 SYRINGE (ML) INJECTION EVERY 12 HOURS SCHEDULED
Status: DISCONTINUED | OUTPATIENT
Start: 2025-05-08 | End: 2025-05-08 | Stop reason: HOSPADM

## 2025-05-08 RX ORDER — SODIUM CHLORIDE 9 MG/ML
40 INJECTION, SOLUTION INTRAVENOUS AS NEEDED
Status: DISCONTINUED | OUTPATIENT
Start: 2025-05-08 | End: 2025-05-08 | Stop reason: HOSPADM

## 2025-05-08 RX ORDER — SODIUM CHLORIDE, SODIUM LACTATE, POTASSIUM CHLORIDE, CALCIUM CHLORIDE 600; 310; 30; 20 MG/100ML; MG/100ML; MG/100ML; MG/100ML
9 INJECTION, SOLUTION INTRAVENOUS CONTINUOUS PRN
Status: DISCONTINUED | OUTPATIENT
Start: 2025-05-08 | End: 2025-05-08 | Stop reason: HOSPADM

## 2025-05-08 RX ORDER — SODIUM CHLORIDE 0.9 % (FLUSH) 0.9 %
10 SYRINGE (ML) INJECTION AS NEEDED
Status: DISCONTINUED | OUTPATIENT
Start: 2025-05-08 | End: 2025-05-08 | Stop reason: HOSPADM

## 2025-05-08 RX ORDER — ACETAMINOPHEN 500 MG
1000 TABLET ORAL ONCE
Status: DISCONTINUED | OUTPATIENT
Start: 2025-05-08 | End: 2025-05-08 | Stop reason: HOSPADM

## 2025-05-08 RX ORDER — MIDAZOLAM HYDROCHLORIDE 2 MG/2ML
2 INJECTION, SOLUTION INTRAMUSCULAR; INTRAVENOUS ONCE
Status: DISCONTINUED | OUTPATIENT
Start: 2025-05-08 | End: 2025-05-08 | Stop reason: HOSPADM

## 2025-05-08 RX ADMIN — SODIUM CHLORIDE, POTASSIUM CHLORIDE, SODIUM LACTATE AND CALCIUM CHLORIDE 9 ML/HR: 600; 310; 30; 20 INJECTION, SOLUTION INTRAVENOUS at 08:03

## 2025-05-08 RX ADMIN — TRAMADOL HYDROCHLORIDE 50 MG: 50 TABLET, COATED ORAL at 08:16

## 2025-05-08 NOTE — PROGRESS NOTES
[facility]     Progress Note    Patient Name: Wai Gay  : 1985  MRN: [id number]  Primary Care Physician:  Callum Peterson DO  Date of admission: 2025    Subjective   Subjective     HPI:  Patient Reports feeling okay today.  He had repeat lab work this morning which revealed an elevated INR of 1.85.  His platelet count had recovered somewhat but was noted to be 74,000 this morning.  He was also noted to be anemic with a hemoglobin of 7.9.    Review of Systems    Objective   Objective     Vitals:   Temp:  [97.5 °F (36.4 °C)] 97.5 °F (36.4 °C)  Heart Rate:  [77] 77  Resp:  [16] 16  BP: (103)/(69) 103/69    Physical Exam   He appears well today.  His abdomen is soft and nontender.  Meds:  Scheduled Meds:acetaminophen, 1,000 mg, Oral, Once  ceFAZolin, 2,000 mg, Intravenous, Once  midazolam, 2 mg, Intravenous, Once  sodium chloride, 10 mL, Intravenous, Q12H      Continuous Infusions:lactated ringers, 9 mL/hr, Last Rate: 9 mL/hr (25 0803)      PRN Meds:.  lactated ringers    sodium chloride    sodium chloride     Result Review    Result Review:  I have personally reviewed the results from the time of this admission to 2025 09:28 EDT and agree with these findings:  [x]  Laboratory  []  Microbiology  []  Radiology  []  EKG/Telemetry   []  Cardiology/Vascular   []  Pathology  []  Old records  []  Other:  Most notable findings include:     Assessment & Plan   Assessment / Plan     Brief Patient Summary:  Wai Gay is a 39 y.o. male who has a symptomatic umbilical hernia as well as cirrhosis.  He had lab work repeated this morning and unfortunately he is coagulopathic with an INR 1.85 and a platelet count of 74.  He is also significantly anemic with an INR 7.9.    Active Hospital Problems:  Active Hospital Problems    Diagnosis     **Umbilical hernia without obstruction and without gangrene        Plan:   I have told Wai that I do not think it would be advisable to  proceed with surgery today.  He is really not in adequate shape from a coagulation standpoint to have a surgical procedure.  I think we will need to refer him back to the hepatology people and see if they have any ideas in terms of getting him more tuned up medically.    VTE Prophylaxis:  Mechanical VTE prophylaxis orders are present.        CODE STATUS:         Electronically signed by David Spencer MD, 05/08/25, 9:28 AM EDT.

## 2025-05-08 NOTE — ANESTHESIA PREPROCEDURE EVALUATION
Anesthesia Evaluation     Patient summary reviewed and Nursing notes reviewed   no history of anesthetic complications:   NPO Solid Status: > 8 hours  NPO Liquid Status: > 2 hours           Airway   Mallampati: II  TM distance: >3 FB  Neck ROM: full  Dental    (+) upper dentures    Pulmonary - normal exam   (+) a smoker Current, asthma,  Cardiovascular - normal exam  Exercise tolerance: good (4-7 METS)    (+) hypertension      Neuro/Psych  (+) psychiatric history Anxiety  GI/Hepatic/Renal/Endo    (+) GERD, liver disease fatty liver disease cirrhosis    Musculoskeletal (-) negative ROS    Abdominal  - normal exam   Substance History   (+) alcohol use     OB/GYN negative ob/gyn ROS         Other - negative ROS       ROS/Med Hx Other: HX OF ALCOHOLIC CIRRHOISIS.(HYPOKALEMIA & HYPONATREMIA), HX OF PARACENTIESIS  2/20/25 AND THORACENTESIS DURING LAST ADMISSION.  ECHO 10/18/24 EF 60-65%, NO VALVE ABNORMALITIES.  METS>4. ELM                 Anesthesia Plan    ASA 3     general     (50mg of tramadol given)  intravenous induction     Anesthetic plan, risks, benefits, and alternatives have been provided, discussed and informed consent has been obtained with: patient.    Plan discussed with CRNA.    CODE STATUS:

## 2025-05-13 ENCOUNTER — OFFICE VISIT (OUTPATIENT)
Dept: GASTROENTEROLOGY | Facility: CLINIC | Age: 40
End: 2025-05-13
Payer: COMMERCIAL

## 2025-05-13 VITALS
HEIGHT: 68 IN | OXYGEN SATURATION: 100 % | DIASTOLIC BLOOD PRESSURE: 67 MMHG | HEART RATE: 89 BPM | BODY MASS INDEX: 25.91 KG/M2 | SYSTOLIC BLOOD PRESSURE: 109 MMHG | WEIGHT: 171 LBS

## 2025-05-13 DIAGNOSIS — K74.3 PRIMARY BILIARY CIRRHOSIS: ICD-10-CM

## 2025-05-13 DIAGNOSIS — K70.31 ALCOHOLIC CIRRHOSIS OF LIVER WITH ASCITES: Primary | ICD-10-CM

## 2025-05-13 DIAGNOSIS — K42.9 UMBILICAL HERNIA WITHOUT OBSTRUCTION AND WITHOUT GANGRENE: ICD-10-CM

## 2025-05-13 DIAGNOSIS — K22.70 BARRETT'S ESOPHAGUS WITHOUT DYSPLASIA: ICD-10-CM

## 2025-05-13 DIAGNOSIS — K65.2 SBP (SPONTANEOUS BACTERIAL PERITONITIS): ICD-10-CM

## 2025-05-13 PROCEDURE — 99214 OFFICE O/P EST MOD 30 MIN: CPT

## 2025-05-13 NOTE — PROGRESS NOTES
Chief Complaint  Follow-up, Cirrhosis, Abdominal Pain (Epigastric pain ), and Nausea    Wai Gay is a 39 y.o. male who presents to [facility] [place] for follow up.     History of present Illness  Patient presents to the office for follow up with a history of alcohol abuse, primary biliary cholangitis, cirrhosis, ascites, splenomegaly, history of SBP (9/2024), Barretts esophagus, esophageal varices, and portal hypertensive gastropathy. He has continued sobriety for over 4 years now. Established care with [facility] hepatology - recent office office visit 3/13/25. Underwent TIPS procedure 11/11/24. He is on a regimen of Aldactone 50 mg daily and Lasix 20 mg twice daily for his liver condition.  He continues to take ursodiol for primary biliary cholangitis (PBC). He uses lactulose as needed and reports having 2 to 3 bowel movements per day. He does not report any hematochezia or melena. Denies increased confusion, foggy memory, or excessive sleepiness. He is currently on Protonix 40 mg daily, which effectively manages his heartburn symptoms. He was scheduled for umbilical hernia repair but procedure deferred due to low platelets and elevated INR.     EGD 2/05/2025 by Dr. Khan - mucosa suggestive of short segment Last's, grade I varices, moderate portal hypertension gastropathy, normal duodenum.      CT Needle Biopsy Liver 03/28/2023 -cirrhosis with the possibility of primary biliary cholangitis.     EGD 02/10/2023 by Dr. Khan -mucosa suggestive of short segment and Last's, mild portal hypertensive gastropathy, mild gastritis, and normal duodenum. Esophageal biopsies - reflux esophagitis. Stomach biopsies - mild reactive gastropathy.       Past Medical History:   Diagnosis Date    NICKI (acute kidney injury)     IN [facility] 3/24/25 NV, RIGHT UPPER QUAD PAIN, HYPOKALEMIA, AND HYPONATREMIA. CREAT 1.77, GFR 50.1, TOTAL BILI 5.6.  4/17/25 CREAT 1.O8 AND GFR 90.6    Alcohol  abuse 03/14/2017    REPORTS HAS BEEN SOBER OVER 5 YEARS    Anxiety     Ascites     Asthma     FOLLOWED BY DR SCHAEFER    Huffman esophagus     Cirrhosis     Essential hypertension 12/27/2019    GERD (gastroesophageal reflux disease)     History of transfusion     Feb. 2024    Hypokalemia 03/14/2017    LAST POTASSIUM 4 ON 4/17/25    Hyponatremia 03/14/2017    LAST SODIUM 138 ON 4/17/25    Low back pain     Pleural effusion     Primary biliary cholangitis     Splenomegaly     Steatohepatitis, alcoholic 03/14/2017    Tobacco abuse 03/14/2017    Umbilical hernia        Past Surgical History:   Procedure Laterality Date    CHOLECYSTECTOMY      ENDOSCOPY N/A 02/09/2022    Procedure: ESOPHAGOGASTRODUODENOSCOPY WITH BX;  Surgeon: Gino Khan MD;  Location: [facility] ENDOSCOPY;  Service: Gastroenterology;  Laterality: N/A;  RETAINED LIQUID FOOD IN STOMACH, HUFFMAN'S ESOPHAGUS    ENDOSCOPY N/A 02/10/2023    Procedure: ESOPHAGOGASTRODUODENOSCOPY;  Surgeon: Gino Khan MD;  Location: [facility] ENDOSCOPY;  Service: Gastroenterology;  Laterality: N/A;  GASTRITIS, BARRETTS ESOPHAGUS, PHG    ENDOSCOPY N/A 02/05/2025    Procedure: ESOPHAGOGASTRODUODENOSCOPY WITH BIOPSIES;  Surgeon: Gino Khan MD;  Location: [facility] ENDOSCOPY;  Service: Gastroenterology;  Laterality: N/A;  ESOPHAGEAL VARICES, PORTAL HYPERTENSIVE GASTROPATHY    PARACENTESIS      2/20/25    THORACENTESIS      LAST ONE DONE 12/13/24 WITH DR SCHAEFER    TIPS PROCEDURE Right     11/11/2024    UPPER GASTROINTESTINAL ENDOSCOPY           Current Outpatient Medications:     albuterol sulfate  (90 Base) MCG/ACT inhaler, Inhale 2 puffs Every 4 (Four) Hours As Needed for Wheezing or Shortness of Air., Disp: 54 g, Rfl: 3    escitalopram (LEXAPRO) 5 MG tablet, Take 1 tablet by mouth Daily., Disp: 90 tablet, Rfl: 0    Fluticasone-Umeclidin-Vilant (TRELEGY) 100-62.5-25 MCG/ACT inhaler, Inhale 1 puff Daily., Disp: 60 each, Rfl: 11    folic acid  "(FOLVITE) 1 MG tablet, Take 1 tablet by mouth Daily., Disp: 90 tablet, Rfl: 3    furosemide (LASIX) 20 MG tablet, Take 1 tablet by mouth 2 (Two) Times a Day., Disp: 180 tablet, Rfl: 3    midodrine (PROAMATINE) 10 MG tablet, Take 1 tablet by mouth 3 (Three) Times a Day Before Meals., Disp: 270 tablet, Rfl: 1    ondansetron ODT (ZOFRAN-ODT) 4 MG disintegrating tablet, Place 1 tablet on the tongue Every 8 (Eight) Hours As Needed for Nausea or Vomiting., Disp: 15 tablet, Rfl: 0    pantoprazole (PROTONIX) 40 MG EC tablet, Take 1 tablet by mouth Daily., Disp: 90 tablet, Rfl: 1    spironolactone (ALDACTONE) 50 MG tablet, Take 1 tablet by mouth Daily., Disp: 90 tablet, Rfl: 2    ursodiol (ACTIGALL) 300 MG capsule, Take 1 capsule by mouth 2 (Two) Times a Day., Disp: 180 capsule, Rfl: 2    vitamin D (ERGOCALCIFEROL) 1.25 MG (79394 UT) capsule capsule, Take 1 capsule by mouth 1 (One) Time Per Week., Disp: 12 capsule, Rfl: 1     No Known Allergies    Family History   Problem Relation Age of Onset    Alcohol abuse Mother     Arthritis Mother     COPD Mother     Heart disease Maternal Grandmother     Hypertension Maternal Grandmother     Lung cancer Maternal Grandmother     Stroke Maternal Grandmother     Heart disease Maternal Grandfather     Lung cancer Maternal Grandfather     Cancer Paternal Grandmother     Cancer Paternal Grandfather     Colon cancer Neg Hx     Malig Hyperthermia Neg Hx         Social History     Social History Narrative    Not on file       Objective       Vital Signs:   /67 (BP Location: Left arm, Patient Position: Sitting, Cuff Size: Adult)   Pulse 89   Ht 172.7 cm (67.99\")   Wt 77.6 kg (171 lb)   SpO2 100%   BMI 26.01 kg/m²       Physical Exam  Constitutional:       Appearance: Normal appearance. He is normal weight.   HENT:      Head: Normocephalic and atraumatic.      Nose: Nose normal.   Pulmonary:      Effort: Pulmonary effort is normal.   Skin:     General: Skin is warm and dry. "   Neurological:      Mental Status: He is alert and oriented to person, place, and time. Mental status is at baseline.   Psychiatric:         Mood and Affect: Mood normal.         Behavior: Behavior normal.         Thought Content: Thought content normal.         Judgment: Judgment normal.         Result Review :       CBC w/diff          3/13/2025    11:31 3/24/2025    09:56 5/8/2025    07:39   CBC w/Diff   WBC 5.02  5.91  3.82    RBC 3.25  3.95  2.68    Hemoglobin 10.0  12.2  7.9    Hematocrit 30.3  37.3  24.5    MCV 93.2  94.4  91.4    MCH 30.8  30.9  29.5    MCHC 33.0  32.7  32.2    RDW 19.4  20.5  14.8    Platelets 119  122  74    Neutrophil Rel % 39.4  73.9  56.8    Immature Granulocyte Rel % 0.4  0.5  0.5    Lymphocyte Rel % 38.4  15.6  24.6    Monocyte Rel % 11.8  9.6  11.5    Eosinophil Rel % 9.4  0.2  5.8    Basophil Rel % 0.6  0.2  0.8      CMP          3/31/2025    09:47 4/17/2025    09:22 5/8/2025    07:39   CMP   Glucose 151  111  89    BUN 21  14  11    Creatinine 1.63  1.08  1.26    EGFR 55.3  90.6  75.3    Sodium 137  138  131    Potassium 3.7  4.0  3.8    Chloride 95  103  98    Calcium 8.8  8.5  7.9    Total Protein   5.1    Albumin   2.5    Globulin   2.6    Total Bilirubin   2.2    Alkaline Phosphatase   197    AST (SGOT)   39    ALT (SGPT)   22    Albumin/Globulin Ratio   1.0    BUN/Creatinine Ratio 12.9  13.0  8.7    Anion Gap 14.1  9.9  6.7        Liver Workup   ALPHA -1 ANTITRYPSIN   Date Value Ref Range Status   08/16/2024 146 90 - 200 mg/dL Final     AFP Tumor Marker   Date Value Ref Range Status   11/22/2024 1.3 <6.1 ng/mL Final     Comment:       This test was performed using the [employer]  chemiluminescent method. Values obtained from  different assay methods cannot be used  interchangeably. AFP levels, regardless of  value, should not be interpreted as absolute  evidence of the presence or absence of disease.     dsDNA   Date Value Ref Range Status   02/27/2023 Negative Negative  Final     Expanded LISA Screen   Date Value Ref Range Status   02/27/2023 Negative Negative Final     Smooth Muscle Ab   Date Value Ref Range Status   02/27/2023 21 (H) 0 - 19 Units Final     Comment:                      Negative                     0 - 19                   Weak positive               20 - 30                   Moderate to strong positive     >30   Actin Antibodies are found in 52-85% of patients with   autoimmune hepatitis or chronic active hepatitis and   in 22% of patients with primary biliary cirrhosis.     Ceruloplasmin   Date Value Ref Range Status   02/27/2023 24 16 - 31 mg/dL Final     Ferritin   Date Value Ref Range Status   03/13/2025 110.00 30.00 - 400.00 ng/mL Final     Immunofixation Result, Serum   Date Value Ref Range Status   02/27/2023 Comment  Final     Comment:     No monoclonality detected.     IgG   Date Value Ref Range Status   02/27/2023 1174 603 - 1613 mg/dL Final     IgA   Date Value Ref Range Status   02/27/2023 923 (H) 90 - 386 mg/dL Final     Comment:     Results confirmed on  dilution.     IgM   Date Value Ref Range Status   02/27/2023 100 20 - 172 mg/dL Final     Iron   Date Value Ref Range Status   03/13/2025 155 59 - 158 mcg/dL Final     TIBC   Date Value Ref Range Status   03/13/2025 253 (L) 298 - 536 mcg/dL Final     Iron Saturation (TSAT)   Date Value Ref Range Status   03/13/2025 61 (H) 20 - 50 % Final     Transferrin   Date Value Ref Range Status   03/13/2025 170 (L) 200 - 360 mg/dL Final     Mitochondrial Ab   Date Value Ref Range Status   02/27/2023 64.5 (H) 0.0 - 20.0 Units Final     Comment:                                     Negative    0.0 - 20.0                                  Equivocal  20.1 - 24.9                                  Positive         >24.9  Mitochondrial (M2) Antibodies are found in 90-96% of  patients with primary biliary cirrhosis.     Protime   Date Value Ref Range Status   05/08/2025 22.2 (H) 11.8 - 14.9 Seconds Final   11/11/2024 15.2  (H) 9.7 - 13.1 Second Final   09/09/2021 15.9 (H) 9.4 - 12.0 Seconds Final     INR   Date Value Ref Range Status   05/08/2025 1.85 (H) 0.86 - 1.15 Final   11/22/2024 1.5 (H) 0.9 - 1.1 Final     Comment:     Moderate-intensity Warfarin Therapy     2.0-3.0  Higher-intensity Warfarin Therapy         3.0-4.0     ALPHA-FETOPROTEIN   Date Value Ref Range Status   03/26/2024 <2 0 - 8.3 ng/mL Final           Assessment and Plan    Diagnoses and all orders for this visit:    1. Alcoholic cirrhosis of liver with ascites (Primary)    2. SBP (spontaneous bacterial peritonitis)    3. Umbilical hernia without obstruction and without gangrene    4. Last's esophagus without dysplasia    5. Primary biliary cirrhosis    39 year old patient presents to the office for follow up with a history of alcohol abuse, primary biliary cholangitis, cirrhosis, ascites, splenomegaly, history of SBP (9/2024), Barretts esophagus, esophageal varices, and portal hypertensive gastropathy. He has continued sobriety for over 4 years now. [facility] - recent office office visit 3/13/25. Underwent TIPS procedure 11/11/24. He is on a regimen of Aldactone 50 mg daily and Lasix 20 mg twice daily for his liver condition.  He continues to take ursodiol for primary biliary cholangitis (PBC). He uses lactulose as needed and reports having 2 to 3 bowel movements per day. He does not report any hematochezia or melena. Denies increased confusion, foggy memory, or excessive sleepiness. He is currently on Protonix 40 mg daily, which effectively manages his heartburn symptoms. He was scheduled for umbilical hernia repair but procedure deferred due to low platelets and elevated INR. Up to date on MELD labs and imaging. Maintain follow up with hepatology as scheduled. Follow up with our office in 6 months.     Follow Up   Return in about 6 months (around 11/13/2025).  Patient was given instructions and counseling regarding his condition or  for health maintenance advice. Please see specific information pulled into the AVS if appropriate.

## 2025-05-20 ENCOUNTER — TRANSCRIBE ORDERS (OUTPATIENT)
Dept: ADMINISTRATIVE | Facility: HOSPITAL | Age: 40
End: 2025-05-20
Payer: COMMERCIAL

## 2025-05-20 DIAGNOSIS — D50.9 IRON DEFICIENCY ANEMIA, UNSPECIFIED IRON DEFICIENCY ANEMIA TYPE: Primary | ICD-10-CM

## 2025-05-20 DIAGNOSIS — N18.2 CHRONIC KIDNEY DISEASE, STAGE II (MILD): ICD-10-CM

## 2025-05-21 ENCOUNTER — TELEPHONE (OUTPATIENT)
Dept: SURGERY | Facility: CLINIC | Age: 40
End: 2025-05-21
Payer: COMMERCIAL

## 2025-05-21 DIAGNOSIS — D63.1 ANEMIA OF CHRONIC RENAL FAILURE, STAGE 2 (MILD): Primary | ICD-10-CM

## 2025-05-21 DIAGNOSIS — N18.2 ANEMIA OF CHRONIC RENAL FAILURE, STAGE 2 (MILD): Primary | ICD-10-CM

## 2025-05-21 DIAGNOSIS — E61.1 IRON DEFICIENCY: ICD-10-CM

## 2025-05-21 DIAGNOSIS — K90.9 IRON MALABSORPTION: ICD-10-CM

## 2025-05-25 ENCOUNTER — HOSPITAL ENCOUNTER (OUTPATIENT)
Facility: HOSPITAL | Age: 40
Setting detail: OBSERVATION
Discharge: HOME | End: 2025-05-26
Attending: EMERGENCY MEDICINE | Admitting: INTERNAL MEDICINE
Payer: COMMERCIAL

## 2025-05-25 ENCOUNTER — APPOINTMENT (OUTPATIENT)
Dept: GENERAL RADIOLOGY | Facility: HOSPITAL | Age: 40
End: 2025-05-25
Payer: COMMERCIAL

## 2025-05-25 ENCOUNTER — APPOINTMENT (OUTPATIENT)
Dept: CARDIOLOGY | Facility: HOSPITAL | Age: 40
End: 2025-05-25
Payer: COMMERCIAL

## 2025-05-25 DIAGNOSIS — E61.1 IRON DEFICIENCY: ICD-10-CM

## 2025-05-25 DIAGNOSIS — J90 PLEURAL EFFUSION, RIGHT: Primary | ICD-10-CM

## 2025-05-25 LAB
ALBUMIN SERPL-MCNC: 2.9 G/DL (ref 3.5–5.2)
ALBUMIN/GLOB SERPL: 0.9 G/DL
ALP SERPL-CCNC: 202 U/L (ref 39–117)
ALT SERPL W P-5'-P-CCNC: 24 U/L (ref 1–41)
ANION GAP SERPL CALCULATED.3IONS-SCNC: 8.3 MMOL/L (ref 5–15)
AORTIC DIMENSIONLESS INDEX: 0.87 (DI)
APPEARANCE FLD: CLEAR
AST SERPL-CCNC: 45 U/L (ref 1–40)
AV MEAN PRESS GRAD SYS DOP V1V2: 5 MMHG
AV VMAX SYS DOP: 147 CM/SEC
B PARAPERT DNA SPEC QL NAA+PROBE: NOT DETECTED
B PERT DNA SPEC QL NAA+PROBE: NOT DETECTED
BACTERIA UR QL AUTO: ABNORMAL /HPF
BASOPHILS # BLD AUTO: 0.02 10*3/MM3 (ref 0–0.2)
BASOPHILS NFR BLD AUTO: 0.3 % (ref 0–1.5)
BH CV ECHO MEAS - AO MAX PG: 8.6 MMHG
BH CV ECHO MEAS - AO ROOT DIAM: 3.3 CM
BH CV ECHO MEAS - AO V2 VTI: 23.2 CM
BH CV ECHO MEAS - AVA(I,D): 2.7 CM2
BH CV ECHO MEAS - EDV(CUBED): 110.6 ML
BH CV ECHO MEAS - EDV(MOD-SP2): 52 ML
BH CV ECHO MEAS - EDV(MOD-SP4): 71.9 ML
BH CV ECHO MEAS - EF(MOD-SP2): 57.5 %
BH CV ECHO MEAS - EF(MOD-SP4): 67.6 %
BH CV ECHO MEAS - ESV(CUBED): 17.6 ML
BH CV ECHO MEAS - ESV(MOD-SP2): 22.1 ML
BH CV ECHO MEAS - ESV(MOD-SP4): 23.3 ML
BH CV ECHO MEAS - FS: 45.8 %
BH CV ECHO MEAS - IVS/LVPW: 0.9 CM
BH CV ECHO MEAS - IVSD: 0.9 CM
BH CV ECHO MEAS - LA DIMENSION: 3.1 CM
BH CV ECHO MEAS - LAT PEAK E' VEL: 19.8 CM/SEC
BH CV ECHO MEAS - LV DIASTOLIC VOL/BSA (35-75): 37.1 CM2
BH CV ECHO MEAS - LV MASS(C)D: 158.8 GRAMS
BH CV ECHO MEAS - LV MAX PG: 6.6 MMHG
BH CV ECHO MEAS - LV MEAN PG: 4 MMHG
BH CV ECHO MEAS - LV SYSTOLIC VOL/BSA (12-30): 12 CM2
BH CV ECHO MEAS - LV V1 MAX: 128 CM/SEC
BH CV ECHO MEAS - LV V1 VTI: 20.1 CM
BH CV ECHO MEAS - LVIDD: 4.8 CM
BH CV ECHO MEAS - LVIDS: 2.6 CM
BH CV ECHO MEAS - LVOT AREA: 3.1 CM2
BH CV ECHO MEAS - LVOT DIAM: 2 CM
BH CV ECHO MEAS - LVPWD: 1 CM
BH CV ECHO MEAS - MED PEAK E' VEL: 11.3 CM/SEC
BH CV ECHO MEAS - MV A MAX VEL: 90.3 CM/SEC
BH CV ECHO MEAS - MV DEC SLOPE: 666 CM/SEC2
BH CV ECHO MEAS - MV DEC TIME: 0.16 SEC
BH CV ECHO MEAS - MV E MAX VEL: 105 CM/SEC
BH CV ECHO MEAS - MV E/A: 1.16
BH CV ECHO MEAS - MV MEAN PG: 3 MMHG
BH CV ECHO MEAS - MV V2 VTI: 21.3 CM
BH CV ECHO MEAS - MVA(VTI): 3 CM2
BH CV ECHO MEAS - RVDD: 2.4 CM
BH CV ECHO MEAS - SV(LVOT): 63.1 ML
BH CV ECHO MEAS - SV(MOD-SP2): 29.9 ML
BH CV ECHO MEAS - SV(MOD-SP4): 48.6 ML
BH CV ECHO MEAS - SVI(LVOT): 32.6 ML/M2
BH CV ECHO MEAS - SVI(MOD-SP2): 15.4 ML/M2
BH CV ECHO MEAS - SVI(MOD-SP4): 25.1 ML/M2
BH CV ECHO MEAS - TAPSE (>1.6): 3.4 CM
BH CV ECHO MEASUREMENTS AVERAGE E/E' RATIO: 6.75
BILIRUB SERPL-MCNC: 3.7 MG/DL (ref 0–1.2)
BILIRUB UR QL STRIP: ABNORMAL
BUN SERPL-MCNC: 18 MG/DL (ref 6–20)
BUN/CREAT SERPL: 13.6 (ref 7–25)
C PNEUM DNA NPH QL NAA+NON-PROBE: NOT DETECTED
CALCIUM SPEC-SCNC: 8.3 MG/DL (ref 8.6–10.5)
CHLORIDE SERPL-SCNC: 98 MMOL/L (ref 98–107)
CLARITY UR: CLEAR
CO2 SERPL-SCNC: 27.7 MMOL/L (ref 22–29)
COD CRY URNS QL: ABNORMAL /HPF
COLOR FLD: NORMAL
COLOR UR: ABNORMAL
CREAT SERPL-MCNC: 1.32 MG/DL (ref 0.76–1.27)
DEPRECATED RDW RBC AUTO: 58.7 FL (ref 37–54)
EGFRCR SERPLBLD CKD-EPI 2021: 71.2 ML/MIN/1.73
EOSINOPHIL # BLD AUTO: 0.18 10*3/MM3 (ref 0–0.4)
EOSINOPHIL NFR BLD AUTO: 2.3 % (ref 0.3–6.2)
ERYTHROCYTE [DISTWIDTH] IN BLOOD BY AUTOMATED COUNT: 17.7 % (ref 12.3–15.4)
FERRITIN SERPL-MCNC: 25.2 NG/ML (ref 30–400)
FLUAV SUBTYP SPEC NAA+PROBE: NOT DETECTED
FLUBV RNA ISLT QL NAA+PROBE: NOT DETECTED
GEN 5 1HR TROPONIN T REFLEX: 25 NG/L
GLOBULIN UR ELPH-MCNC: 3.3 GM/DL
GLUCOSE SERPL-MCNC: 105 MG/DL (ref 65–99)
GLUCOSE UR STRIP-MCNC: NEGATIVE MG/DL
HADV DNA SPEC NAA+PROBE: NOT DETECTED
HCOV 229E RNA SPEC QL NAA+PROBE: NOT DETECTED
HCOV HKU1 RNA SPEC QL NAA+PROBE: NOT DETECTED
HCOV NL63 RNA SPEC QL NAA+PROBE: NOT DETECTED
HCOV OC43 RNA SPEC QL NAA+PROBE: NOT DETECTED
HCT VFR BLD AUTO: 30.6 % (ref 37.5–51)
HGB BLD-MCNC: 9.5 G/DL (ref 13–17.7)
HGB UR QL STRIP.AUTO: NEGATIVE
HMPV RNA NPH QL NAA+NON-PROBE: NOT DETECTED
HOLD SPECIMEN: NORMAL
HOLD SPECIMEN: NORMAL
HPIV1 RNA ISLT QL NAA+PROBE: NOT DETECTED
HPIV2 RNA SPEC QL NAA+PROBE: NOT DETECTED
HPIV3 RNA NPH QL NAA+PROBE: NOT DETECTED
HPIV4 P GENE NPH QL NAA+PROBE: NOT DETECTED
HYALINE CASTS UR QL AUTO: ABNORMAL /LPF
IMM GRANULOCYTES # BLD AUTO: 0.04 10*3/MM3 (ref 0–0.05)
IMM GRANULOCYTES NFR BLD AUTO: 0.5 % (ref 0–0.5)
INR PPP: 1.55 (ref 0.86–1.15)
IRON 24H UR-MRATE: 98 MCG/DL (ref 59–158)
IRON SATN MFR SERPL: 28 % (ref 20–50)
IVRT: 63 MS
KETONES UR QL STRIP: NEGATIVE
L PNEUMO1 AG UR QL IA: NEGATIVE
LDH SERPL-CCNC: 342 U/L (ref 135–225)
LEFT ATRIUM VOLUME INDEX: 16 ML/M2
LEUKOCYTE ESTERASE UR QL STRIP.AUTO: NEGATIVE
LV EF BIPLANE MOD: 62.6 %
LYMPHOCYTES # BLD AUTO: 1.16 10*3/MM3 (ref 0.7–3.1)
LYMPHOCYTES NFR BLD AUTO: 15.1 % (ref 19.6–45.3)
LYMPHOCYTES NFR FLD MANUAL: 6 %
M PNEUMO IGG SER IA-ACNC: NOT DETECTED
MACROPHAGE FLUID %: 14 %
MAGNESIUM SERPL-MCNC: 1.8 MG/DL (ref 1.6–2.6)
MCH RBC QN AUTO: 28.2 PG (ref 26.6–33)
MCHC RBC AUTO-ENTMCNC: 31 G/DL (ref 31.5–35.7)
MCV RBC AUTO: 90.8 FL (ref 79–97)
MESOTHL CELL NFR FLD MANUAL: 2 %
MONOCYTES # BLD AUTO: 0.72 10*3/MM3 (ref 0.1–0.9)
MONOCYTES NFR BLD AUTO: 9.4 % (ref 5–12)
MONOCYTES NFR FLD: 3 %
NEUTROPHILS NFR BLD AUTO: 5.56 10*3/MM3 (ref 1.7–7)
NEUTROPHILS NFR BLD AUTO: 72.4 % (ref 42.7–76)
NEUTROPHILS NFR FLD MANUAL: 75 %
NITRITE UR QL STRIP: POSITIVE
NRBC BLD AUTO-RTO: 0 /100 WBC (ref 0–0.2)
NT-PROBNP SERPL-MCNC: 130.6 PG/ML (ref 0–450)
NUC CELL # FLD: 159 /MM3
PH FLD: 8 [PH]
PH UR STRIP.AUTO: 6 [PH] (ref 5–8)
PHOSPHATE SERPL-MCNC: 2.8 MG/DL (ref 2.5–4.5)
PLATELET # BLD AUTO: 107 10*3/MM3 (ref 140–450)
PMV BLD AUTO: 10.4 FL (ref 6–12)
POTASSIUM SERPL-SCNC: 3.6 MMOL/L (ref 3.5–5.2)
PROCALCITONIN SERPL-MCNC: 0.19 NG/ML (ref 0–0.25)
PROT SERPL-MCNC: 5.6 G/DL (ref 6–8.5)
PROT SERPL-MCNC: 6.2 G/DL (ref 6–8.5)
PROT UR QL STRIP: ABNORMAL
PROTHROMBIN TIME: 19.3 SECONDS (ref 11.8–14.9)
QT INTERVAL: 372 MS
QTC INTERVAL: 465 MS
RBC # BLD AUTO: 3.37 10*6/MM3 (ref 4.14–5.8)
RBC # FLD AUTO: <2000 /MM3
RBC # UR STRIP: ABNORMAL /HPF
REF LAB TEST METHOD: ABNORMAL
RETICS # AUTO: 0.11 10*6/MM3 (ref 0.02–0.13)
RETICS/RBC NFR AUTO: 3.57 % (ref 0.7–1.9)
RHINOVIRUS RNA SPEC NAA+PROBE: NOT DETECTED
RSV RNA NPH QL NAA+NON-PROBE: NOT DETECTED
S PNEUM AG SPEC QL LA: NEGATIVE
SARS-COV-2 RNA RESP QL NAA+PROBE: NOT DETECTED
SODIUM SERPL-SCNC: 134 MMOL/L (ref 136–145)
SP GR UR STRIP: 1.02 (ref 1–1.03)
SQUAMOUS #/AREA URNS HPF: ABNORMAL /HPF
TIBC SERPL-MCNC: 350 MCG/DL (ref 298–536)
TRANSFERRIN SERPL-MCNC: 235 MG/DL (ref 200–360)
TROPONIN T % DELTA: 0
TROPONIN T NUMERIC DELTA: 0 NG/L
TROPONIN T SERPL HS-MCNC: 25 NG/L
UROBILINOGEN UR QL STRIP: ABNORMAL
WBC # UR STRIP: ABNORMAL /HPF
WBC NRBC COR # BLD AUTO: 7.68 10*3/MM3 (ref 3.4–10.8)
WHOLE BLOOD HOLD COAG: NORMAL
WHOLE BLOOD HOLD SPECIMEN: NORMAL

## 2025-05-25 PROCEDURE — 85610 PROTHROMBIN TIME: CPT | Performed by: EMERGENCY MEDICINE

## 2025-05-25 PROCEDURE — 93306 TTE W/DOPPLER COMPLETE: CPT

## 2025-05-25 PROCEDURE — 83986 ASSAY PH BODY FLUID NOS: CPT | Performed by: NURSE PRACTITIONER

## 2025-05-25 PROCEDURE — 0202U NFCT DS 22 TRGT SARS-COV-2: CPT | Performed by: HOSPITALIST

## 2025-05-25 PROCEDURE — 94799 UNLISTED PULMONARY SVC/PX: CPT

## 2025-05-25 PROCEDURE — 84466 ASSAY OF TRANSFERRIN: CPT | Performed by: HOSPITALIST

## 2025-05-25 PROCEDURE — 93306 TTE W/DOPPLER COMPLETE: CPT | Performed by: INTERNAL MEDICINE

## 2025-05-25 PROCEDURE — 89051 BODY FLUID CELL COUNT: CPT | Performed by: NURSE PRACTITIONER

## 2025-05-25 PROCEDURE — 84100 ASSAY OF PHOSPHORUS: CPT | Performed by: HOSPITALIST

## 2025-05-25 PROCEDURE — 85025 COMPLETE CBC W/AUTO DIFF WBC: CPT | Performed by: EMERGENCY MEDICINE

## 2025-05-25 PROCEDURE — 63710000001 ONDANSETRON ODT 4 MG TABLET DISPERSIBLE: Performed by: HOSPITALIST

## 2025-05-25 PROCEDURE — 87206 SMEAR FLUORESCENT/ACID STAI: CPT | Performed by: NURSE PRACTITIONER

## 2025-05-25 PROCEDURE — 87075 CULTR BACTERIA EXCEPT BLOOD: CPT | Performed by: NURSE PRACTITIONER

## 2025-05-25 PROCEDURE — 32555 ASPIRATE PLEURA W/ IMAGING: CPT | Performed by: INTERNAL MEDICINE

## 2025-05-25 PROCEDURE — 83540 ASSAY OF IRON: CPT | Performed by: HOSPITALIST

## 2025-05-25 PROCEDURE — 87015 SPECIMEN INFECT AGNT CONCNTJ: CPT | Performed by: NURSE PRACTITIONER

## 2025-05-25 PROCEDURE — 83615 LACTATE (LD) (LDH) ENZYME: CPT | Performed by: NURSE PRACTITIONER

## 2025-05-25 PROCEDURE — G0378 HOSPITAL OBSERVATION PER HR: HCPCS

## 2025-05-25 PROCEDURE — 87205 SMEAR GRAM STAIN: CPT | Performed by: NURSE PRACTITIONER

## 2025-05-25 PROCEDURE — 36415 COLL VENOUS BLD VENIPUNCTURE: CPT | Performed by: HOSPITALIST

## 2025-05-25 PROCEDURE — 87102 FUNGUS ISOLATION CULTURE: CPT | Performed by: NURSE PRACTITIONER

## 2025-05-25 PROCEDURE — 63710000001 REVEFENACIN 175 MCG/3ML SOLUTION: Performed by: HOSPITALIST

## 2025-05-25 PROCEDURE — 82607 VITAMIN B-12: CPT | Performed by: HOSPITALIST

## 2025-05-25 PROCEDURE — 87449 NOS EACH ORGANISM AG IA: CPT | Performed by: HOSPITALIST

## 2025-05-25 PROCEDURE — 87070 CULTURE OTHR SPECIMN AEROBIC: CPT | Performed by: NURSE PRACTITIONER

## 2025-05-25 PROCEDURE — 25010000002 CEFTRIAXONE PER 250 MG: Performed by: HOSPITALIST

## 2025-05-25 PROCEDURE — 82728 ASSAY OF FERRITIN: CPT | Performed by: HOSPITALIST

## 2025-05-25 PROCEDURE — 93005 ELECTROCARDIOGRAM TRACING: CPT | Performed by: EMERGENCY MEDICINE

## 2025-05-25 PROCEDURE — 84145 PROCALCITONIN (PCT): CPT | Performed by: HOSPITALIST

## 2025-05-25 PROCEDURE — 87040 BLOOD CULTURE FOR BACTERIA: CPT | Performed by: HOSPITALIST

## 2025-05-25 PROCEDURE — 83880 ASSAY OF NATRIURETIC PEPTIDE: CPT | Performed by: EMERGENCY MEDICINE

## 2025-05-25 PROCEDURE — 84157 ASSAY OF PROTEIN OTHER: CPT | Performed by: NURSE PRACTITIONER

## 2025-05-25 PROCEDURE — 94640 AIRWAY INHALATION TREATMENT: CPT

## 2025-05-25 PROCEDURE — 84484 ASSAY OF TROPONIN QUANT: CPT | Performed by: EMERGENCY MEDICINE

## 2025-05-25 PROCEDURE — 80053 COMPREHEN METABOLIC PANEL: CPT | Performed by: EMERGENCY MEDICINE

## 2025-05-25 PROCEDURE — 71045 X-RAY EXAM CHEST 1 VIEW: CPT

## 2025-05-25 PROCEDURE — 93005 ELECTROCARDIOGRAM TRACING: CPT

## 2025-05-25 PROCEDURE — 81001 URINALYSIS AUTO W/SCOPE: CPT | Performed by: HOSPITALIST

## 2025-05-25 PROCEDURE — 85045 AUTOMATED RETICULOCYTE COUNT: CPT | Performed by: HOSPITALIST

## 2025-05-25 PROCEDURE — 82945 GLUCOSE OTHER FLUID: CPT | Performed by: NURSE PRACTITIONER

## 2025-05-25 PROCEDURE — 82746 ASSAY OF FOLIC ACID SERUM: CPT | Performed by: HOSPITALIST

## 2025-05-25 PROCEDURE — 99285 EMERGENCY DEPT VISIT HI MDM: CPT

## 2025-05-25 PROCEDURE — 99222 1ST HOSP IP/OBS MODERATE 55: CPT | Performed by: HOSPITALIST

## 2025-05-25 PROCEDURE — 96374 THER/PROPH/DIAG INJ IV PUSH: CPT

## 2025-05-25 PROCEDURE — 83735 ASSAY OF MAGNESIUM: CPT | Performed by: HOSPITALIST

## 2025-05-25 PROCEDURE — 87116 MYCOBACTERIA CULTURE: CPT | Performed by: NURSE PRACTITIONER

## 2025-05-25 PROCEDURE — 82042 OTHER SOURCE ALBUMIN QUAN EA: CPT | Performed by: NURSE PRACTITIONER

## 2025-05-25 RX ORDER — ARFORMOTEROL TARTRATE 15 UG/2ML
15 SOLUTION RESPIRATORY (INHALATION)
Status: DISCONTINUED | OUTPATIENT
Start: 2025-05-25 | End: 2025-05-26

## 2025-05-25 RX ORDER — BISACODYL 10 MG
10 SUPPOSITORY, RECTAL RECTAL DAILY PRN
Status: DISCONTINUED | OUTPATIENT
Start: 2025-05-25 | End: 2025-05-26 | Stop reason: HOSPADM

## 2025-05-25 RX ORDER — AZITHROMYCIN 250 MG/1
500 TABLET, FILM COATED ORAL
Status: DISCONTINUED | OUTPATIENT
Start: 2025-05-25 | End: 2025-05-26

## 2025-05-25 RX ORDER — BISACODYL 5 MG/1
5 TABLET, DELAYED RELEASE ORAL DAILY PRN
Status: DISCONTINUED | OUTPATIENT
Start: 2025-05-25 | End: 2025-05-26 | Stop reason: HOSPADM

## 2025-05-25 RX ORDER — SODIUM CHLORIDE 0.9 % (FLUSH) 0.9 %
10 SYRINGE (ML) INJECTION AS NEEDED
Status: DISCONTINUED | OUTPATIENT
Start: 2025-05-25 | End: 2025-05-26 | Stop reason: HOSPADM

## 2025-05-25 RX ORDER — SODIUM CHLORIDE 9 MG/ML
40 INJECTION, SOLUTION INTRAVENOUS AS NEEDED
Status: DISCONTINUED | OUTPATIENT
Start: 2025-05-25 | End: 2025-05-26 | Stop reason: HOSPADM

## 2025-05-25 RX ORDER — ACETAMINOPHEN 325 MG/1
325 TABLET ORAL EVERY 4 HOURS PRN
Status: DISCONTINUED | OUTPATIENT
Start: 2025-05-25 | End: 2025-05-26 | Stop reason: HOSPADM

## 2025-05-25 RX ORDER — AMOXICILLIN 250 MG
2 CAPSULE ORAL 2 TIMES DAILY PRN
Status: DISCONTINUED | OUTPATIENT
Start: 2025-05-25 | End: 2025-05-26 | Stop reason: HOSPADM

## 2025-05-25 RX ORDER — ONDANSETRON 4 MG/1
4 TABLET, ORALLY DISINTEGRATING ORAL EVERY 6 HOURS PRN
Status: DISCONTINUED | OUTPATIENT
Start: 2025-05-25 | End: 2025-05-26 | Stop reason: HOSPADM

## 2025-05-25 RX ORDER — ESCITALOPRAM OXALATE 10 MG/1
5 TABLET ORAL DAILY
Status: DISCONTINUED | OUTPATIENT
Start: 2025-05-25 | End: 2025-05-26 | Stop reason: HOSPADM

## 2025-05-25 RX ORDER — URSODIOL 300 MG/1
300 CAPSULE ORAL 2 TIMES DAILY
Status: DISCONTINUED | OUTPATIENT
Start: 2025-05-25 | End: 2025-05-26 | Stop reason: HOSPADM

## 2025-05-25 RX ORDER — FUROSEMIDE 20 MG/1
20 TABLET ORAL
Status: DISCONTINUED | OUTPATIENT
Start: 2025-05-25 | End: 2025-05-26

## 2025-05-25 RX ORDER — ACETAMINOPHEN 650 MG/1
325 SUPPOSITORY RECTAL EVERY 4 HOURS PRN
Status: DISCONTINUED | OUTPATIENT
Start: 2025-05-25 | End: 2025-05-26 | Stop reason: HOSPADM

## 2025-05-25 RX ORDER — PANTOPRAZOLE SODIUM 40 MG/1
40 TABLET, DELAYED RELEASE ORAL DAILY
Status: DISCONTINUED | OUTPATIENT
Start: 2025-05-25 | End: 2025-05-26 | Stop reason: HOSPADM

## 2025-05-25 RX ORDER — FOLIC ACID 1 MG/1
1 TABLET ORAL DAILY
Status: DISCONTINUED | OUTPATIENT
Start: 2025-05-25 | End: 2025-05-26 | Stop reason: HOSPADM

## 2025-05-25 RX ORDER — ACETAMINOPHEN 160 MG/5ML
650 SOLUTION ORAL EVERY 4 HOURS PRN
Status: DISCONTINUED | OUTPATIENT
Start: 2025-05-25 | End: 2025-05-26 | Stop reason: HOSPADM

## 2025-05-25 RX ORDER — ALBUTEROL SULFATE 90 UG/1
2 INHALANT RESPIRATORY (INHALATION) EVERY 4 HOURS PRN
Status: DISCONTINUED | OUTPATIENT
Start: 2025-05-25 | End: 2025-05-25 | Stop reason: CLARIF

## 2025-05-25 RX ORDER — ALBUTEROL SULFATE 0.83 MG/ML
2.5 SOLUTION RESPIRATORY (INHALATION) EVERY 4 HOURS PRN
Status: DISCONTINUED | OUTPATIENT
Start: 2025-05-25 | End: 2025-05-26 | Stop reason: HOSPADM

## 2025-05-25 RX ORDER — SODIUM CHLORIDE 0.9 % (FLUSH) 0.9 %
10 SYRINGE (ML) INJECTION EVERY 12 HOURS SCHEDULED
Status: DISCONTINUED | OUTPATIENT
Start: 2025-05-25 | End: 2025-05-26 | Stop reason: HOSPADM

## 2025-05-25 RX ORDER — BUDESONIDE 0.5 MG/2ML
0.5 INHALANT ORAL
Status: DISCONTINUED | OUTPATIENT
Start: 2025-05-25 | End: 2025-05-26

## 2025-05-25 RX ORDER — SPIRONOLACTONE 25 MG/1
50 TABLET ORAL DAILY
Status: DISCONTINUED | OUTPATIENT
Start: 2025-05-25 | End: 2025-05-26 | Stop reason: HOSPADM

## 2025-05-25 RX ORDER — MIDODRINE HYDROCHLORIDE 10 MG/1
10 TABLET ORAL
Status: DISCONTINUED | OUTPATIENT
Start: 2025-05-25 | End: 2025-05-26 | Stop reason: HOSPADM

## 2025-05-25 RX ORDER — ONDANSETRON 2 MG/ML
4 INJECTION INTRAMUSCULAR; INTRAVENOUS EVERY 6 HOURS PRN
Status: DISCONTINUED | OUTPATIENT
Start: 2025-05-25 | End: 2025-05-26 | Stop reason: HOSPADM

## 2025-05-25 RX ORDER — POLYETHYLENE GLYCOL 3350 17 G/17G
17 POWDER, FOR SOLUTION ORAL DAILY PRN
Status: DISCONTINUED | OUTPATIENT
Start: 2025-05-25 | End: 2025-05-26 | Stop reason: HOSPADM

## 2025-05-25 RX ADMIN — SPIRONOLACTONE 50 MG: 25 TABLET ORAL at 21:02

## 2025-05-25 RX ADMIN — MIDODRINE HYDROCHLORIDE 10 MG: 10 TABLET ORAL at 21:03

## 2025-05-25 RX ADMIN — ARFORMOTEROL TARTRATE 15 MCG: 15 SOLUTION RESPIRATORY (INHALATION) at 20:44

## 2025-05-25 RX ADMIN — Medication 5 MG: at 21:06

## 2025-05-25 RX ADMIN — CEFTRIAXONE SODIUM 1000 MG: 1 INJECTION, POWDER, FOR SOLUTION INTRAMUSCULAR; INTRAVENOUS at 18:24

## 2025-05-25 RX ADMIN — Medication 10 ML: at 21:04

## 2025-05-25 RX ADMIN — FUROSEMIDE 20 MG: 20 TABLET ORAL at 18:19

## 2025-05-25 RX ADMIN — AZITHROMYCIN DIHYDRATE 500 MG: 250 TABLET ORAL at 18:19

## 2025-05-25 RX ADMIN — FOLIC ACID 1 MG: 1 TABLET ORAL at 21:03

## 2025-05-25 RX ADMIN — ESCITALOPRAM OXALATE 5 MG: 10 TABLET ORAL at 21:03

## 2025-05-25 RX ADMIN — URSODIOL 300 MG: 300 CAPSULE ORAL at 21:02

## 2025-05-25 RX ADMIN — PANTOPRAZOLE SODIUM 40 MG: 40 TABLET, DELAYED RELEASE ORAL at 21:03

## 2025-05-25 RX ADMIN — REVEFENACIN 175 MCG: 175 SOLUTION RESPIRATORY (INHALATION) at 20:44

## 2025-05-25 RX ADMIN — ONDANSETRON 4 MG: 4 TABLET, ORALLY DISINTEGRATING ORAL at 21:06

## 2025-05-25 RX ADMIN — BUDESONIDE 0.5 MG: 0.5 SUSPENSION RESPIRATORY (INHALATION) at 20:44

## 2025-05-25 NOTE — PLAN OF CARE
Goal Outcome Evaluation:  Plan of Care Reviewed With: patient        Progress: no change  Outcome Evaluation: Patient was an admission from ED this afternoon. Had thora completed in the ED. C/O abdominal pain and tenderness. Respiratory panel swab sent. Will continue with POC.

## 2025-05-25 NOTE — H&P
[facility]  Date: 2025   Patient Name: Wai Gay  : 1985  MRN: [id number]  Primary Care Physician:  Callum Peterson DO  Date of admission: 2025    Subjective shortness of breath  Subjective   Chief Complaint: Shortness of breath    HPI: Patient is a 39-year-old male who presents with shortness of breath.  He has a past medical history of alcohol abuse, essential hypertension, GERD, alcoholic cirrhosis (sobriety for 5 years), prior TIPS procedure (2024), asthma, and anxiety.    On arrival to the ED, patient has a temperature 98.8, pulse 103, respiratory rate of 16, blood pressure 128/92, and he saturating 98% on room air.  Patient later spiked a low-grade fever.    Chest x-ray shows large right pleural effusion, likely secondary collapse of the right middle and lower lobes.  Suggestion of interval enlargement of cardiac silhouette, raising the possibility of pericardial effusion.    Initial troponin was 25, second troponin was 25.  proBNP is 130.6.  Sodium is 134.  Creatinine is 1.32.  Glucose is 105.  Calcium is 8.3.  Alkaline phosphatase is 242.  INR is 1.55.  Hemoglobin is 9.5.  Platelets are 107.    Patient had thoracentesis in the ER.  Personal History     Past Medical History:  Past Medical History:   Diagnosis Date    NICKI (acute kidney injury)     IN [facility] 3/24/25 NV, RIGHT UPPER QUAD PAIN, HYPOKALEMIA, AND HYPONATREMIA. CREAT 1.77, GFR 50.1, TOTAL BILI 5.6.  25 CREAT 1.O8 AND GFR 90.6    Alcohol abuse 2017    REPORTS HAS BEEN SOBER OVER 5 YEARS    Anxiety     Ascites     Asthma     FOLLOWED BY DR SCHAEFER    Last esophagus     Cirrhosis     Essential hypertension 2019    GERD (gastroesophageal reflux disease)     History of transfusion     2024    Hypokalemia 2017    LAST POTASSIUM 4 ON 25    Hyponatremia 2017    LAST SODIUM 138 ON 25    Low back pain     Pleural effusion     Primary  biliary cholangitis     Splenomegaly     Steatohepatitis, alcoholic 03/14/2017    Tobacco abuse 03/14/2017    Umbilical hernia        Past Surgical History:  Past Surgical History:   Procedure Laterality Date    CHOLECYSTECTOMY      ENDOSCOPY N/A 02/09/2022    Procedure: ESOPHAGOGASTRODUODENOSCOPY WITH BX;  Surgeon: Gino Khan MD;  Location: [facility] ENDOSCOPY;  Service: Gastroenterology;  Laterality: N/A;  RETAINED LIQUID FOOD IN STOMACH, HUFFMAN'S ESOPHAGUS    ENDOSCOPY N/A 02/10/2023    Procedure: ESOPHAGOGASTRODUODENOSCOPY;  Surgeon: Gino Khan MD;  Location: [facility] ENDOSCOPY;  Service: Gastroenterology;  Laterality: N/A;  GASTRITIS, BARRETTS ESOPHAGUS, PHG    ENDOSCOPY N/A 02/05/2025    Procedure: ESOPHAGOGASTRODUODENOSCOPY WITH BIOPSIES;  Surgeon: Gino Khan MD;  Location: [facility] ENDOSCOPY;  Service: Gastroenterology;  Laterality: N/A;  ESOPHAGEAL VARICES, PORTAL HYPERTENSIVE GASTROPATHY    PARACENTESIS      2/20/25    THORACENTESIS      LAST ONE DONE 12/13/24 WITH DR SCHAEFER    TIPS PROCEDURE Right     11/11/2024    UPPER GASTROINTESTINAL ENDOSCOPY         Family History:   Family History   Problem Relation Age of Onset    Alcohol abuse Mother     Arthritis Mother     COPD Mother     Heart disease Maternal Grandmother     Hypertension Maternal Grandmother     Lung cancer Maternal Grandmother     Stroke Maternal Grandmother     Heart disease Maternal Grandfather     Lung cancer Maternal Grandfather     Cancer Paternal Grandmother     Cancer Paternal Grandfather     Colon cancer Neg Hx     Malig Hyperthermia Neg Hx        Social History:   Social History     Socioeconomic History    Marital status: Single   Tobacco Use    Smoking status: Every Day     Current packs/day: 0.50     Average packs/day: 0.5 packs/day for 17.4 years (8.7 ttl pk-yrs)     Types: Cigarettes     Start date: 1/1/2008     Passive exposure: Current    Smokeless tobacco: Never    Tobacco comments:     Last  2330 5/7/25   Vaping Use    Vaping status: Never Used   Substance and Sexual Activity    Alcohol use: Not Currently     Alcohol/week: 3.0 standard drinks of alcohol     Comment: NO ALCOHOL IN OVER 5 YEARS    Drug use: Never    Sexual activity: Defer       Home Medications:  Fluticasone-Umeclidin-Vilant, albuterol sulfate HFA, escitalopram, folic acid, furosemide, midodrine, ondansetron ODT, pantoprazole, spironolactone, ursodiol, and vitamin D    Allergies:  No Known Allergies    Review of Systems   All systems were reviewed and negative except for: Shortness of breath    Objective   Objective     Vitals:   Temp:  [98.8 °F (37.1 °C)-100.4 °F (38 °C)] 100.4 °F (38 °C)  Heart Rate:  [103-114] 114  Resp:  [16-27] 27  BP: (121-128)/(83-92) 126/83    Physical Exam    Constitutional: Awake, alert, no acute distress   Eyes: Pupils equal, sclerae anicteric, no conjunctival injection   HENT: NCAT, mucous membranes moist   Neck: Supple, no thyromegaly, no lymphadenopathy, trachea midline   Respiratory: Tachypnea; diminished   Cardiovascular: Tachycardia   Gastrointestinal: Positive bowel sounds, soft, nontender, nondistended   Musculoskeletal: No bilateral ankle edema, no clubbing or cyanosis to extremities   Psychiatric: Appropriate affect, cooperative   Neurologic: Oriented x 3, strength symmetric in all extremities, Cranial Nerves grossly intact to confrontation, speech clear   Skin: No rashes     Result Review    Result Review:  I have personally reviewed the results from the time of this admission to 5/25/2025 16:32 EDT and agree with these findings:  [x]  Laboratory  [x]  Microbiology  [x]  Radiology  [x]  EKG/Telemetry   [x]  Cardiology/Vascular   [x]  Pathology  [x]  Old records  []  Other:      Assessment & Plan   Assessment / Plan   #1 large right pleural effusion, likely with secondary collapse of the right middle and lower lobes.  -Status post thoracentesis.  Culture sent.  -Patient empirically started on Rocephin  and azithromycin.  Complete respiratory panel ordered.    #2 possible pericardial effusion  -Echo ordered    #3 decompensated alcoholic cirrhosis (patient has been sober for 5 years) status post TIPS  -Continue Lasix and spironolactone.    #4 asthma continue Brovana, Pulmicort, Yupelri.    #5 depression continue Lexapro    #6 hypotension continue midodrine    #7 GERD continue Protonix        VTE Prophylaxis:  Mechanical VTE prophylaxis orders are signed & held.          CODE STATUS:    Code Status (Patient has no pulse and is not breathing): CPR (Attempt to Resuscitate)  Medical Interventions (Patient has pulse or is breathing): Full Support  Level Of Support Discussed With: Patient      Admission Status:  I believe this patient meets inpatient status.    Electronically signed by Shai Harden DO, 05/25/25, 4:18 PM EDT.

## 2025-05-25 NOTE — Clinical Note
Level of Care: Progressive Care [20]  Diagnosis: Pleural effusion [109031]  Admitting Physician: DARLENE FARMER [[id number]]  Attending Physician: DARLENE FARMER [[id number]]  Certification: I Certify That Inpatient Hospital Services Are Medically Necessary  For Greater Than 2 Midnights

## 2025-05-25 NOTE — NURSING NOTE
Patient Wai Gay admitted to 4MTU from the ED. Patient is alert and oriented to person, place, time, and situation. Patient oriented to unit, call light system and bed alarm use, teaching effective. Patient despite education, refused and signed refusal form for bed alarm use. Cierra Auris screening revealed patient will NOT need tested, contact isolation and bleach cleaning due to no risk factors. Patient currently is not a concern for an active CDIFF infection.    4 eyes skin assessment completed with Bina Burch LPN. Per policy, the following skin interventions were put in place as a result of the skin assessment below: Avoiding use of disposable briefs and Pillow supprt for offloading pressure, floating heels or padding for bony prominences    Skin Assessments (most recent)      Flowsheet Row Most Recent Value   Sensory Perception 4-->no impairment   Moisture 4-->rarely moist   Activity 4-->walks frequently   Mobility 4-->no limitation   Nutrition 3-->adequate   Friction and Shear 3-->no apparent problem   Sina Score 22            Fall assessment completed. Per policy, the patient was determined be a Low fall risk due to Mcgregor Erik score 14 or less (only used within the first 24 hours of admission). Patient assessed to need the following mobility assistance: Standby Assist with the following assistive devices: None, and will be using the bathroom for toileting. Per policy, the following fall interventions were put in place: Standard Fall Precautions - oriented to room and call light, bed low and locked, clutter free environment, adequate lighting, directed to call for assistance, non-skid footwear, hourly rounding and personal belongings within reach..     Patient's belongings that were sent with family: None  Patient's belongings that were sent to security: None  Patient's belongings locked in safe box in room: None  Patient's belongings that were sent to pharmacy: None  Patient's belongings  kept at bedside per patient: Purse/Wallet, Dentures, Clothing, and Other:phone

## 2025-05-25 NOTE — PROCEDURES
Procedure:  R thoracentesis    Indication:  Recurrent right pleural effusion; Shortness of breath    H/o prior thoracenteses, TIPS, alcoholic cirrhosis although he hasn't had a drink for 5 years    Consent was obtained.  Time out was performed.  U/S was used to identify the pleural effusion.  A kris was placed to guide the procedure.  The area was prepped in sterile fashion.  1% lidocaine was used.  A seeker needle confirmed the presence of fluid and a catheter was advanced.  3.5 L of clear yellow fluid was obtained.  Specimens were sent for analysis.  Post-procedural CXR demonstrated improvement in the effusion.    There were no immediate complications.    Mo Catalan MD

## 2025-05-25 NOTE — ED PROVIDER NOTES
Time: 2:46 PM EDT  Date of encounter:  5/25/2025  Independent Historian/Clinical History and Information was obtained by:   Patient  Chief Complaint: Difficulty breathing    History is limited by: N/A    History of Present Illness:  Patient is a 39 y.o. year old male who presents to the emergency department for evaluation of difficulty breathing.  Patient states he started getting short of breath 1 to 2 weeks ago.  Patient is progressively worsened.  Patient denies any fevers or cough.  Patient does report generalized weakness.  Patient reports he has a history of alcoholic cirrhosis.  He also states he has chronic abdominal distention and soreness.    Patient Care Team  Primary Care Provider: Callum Peterson DO    Past Medical History:     No Known Allergies  Past Medical History:   Diagnosis Date    NICKI (acute kidney injury)     [facility] [place], RIGHT UPPER QUAD PAIN, HYPOKALEMIA, AND HYPONATREMIA. CREAT 1.77, GFR 50.1, TOTAL BILI 5.6.  4/17/25 CREAT 1.O8 AND GFR 90.6    Alcohol abuse 03/14/2017    REPORTS HAS BEEN SOBER OVER 5 YEARS    Anxiety     Ascites     Asthma     FOLLOWED BY DR SCHAEFER    Huffman esophagus     Cirrhosis     Essential hypertension 12/27/2019    GERD (gastroesophageal reflux disease)     History of transfusion     Feb. 2024    Hypokalemia 03/14/2017    LAST POTASSIUM 4 ON 4/17/25    Hyponatremia 03/14/2017    LAST SODIUM 138 ON 4/17/25    Low back pain     Pleural effusion     Primary biliary cholangitis     Splenomegaly     Steatohepatitis, alcoholic 03/14/2017    Tobacco abuse 03/14/2017    Umbilical hernia      Past Surgical History:   Procedure Laterality Date    CHOLECYSTECTOMY      ENDOSCOPY N/A 02/09/2022    Procedure: ESOPHAGOGASTRODUODENOSCOPY WITH BX;  Surgeon: Gino Khan MD;  Location: [facility] ENDOSCOPY;  Service: Gastroenterology;  Laterality: N/A;  RETAINED LIQUID FOOD IN STOMACH, HUFFMAN'S ESOPHAGUS    ENDOSCOPY N/A 02/10/2023    Procedure:  ESOPHAGOGASTRODUODENOSCOPY;  Surgeon: Gino Khan MD;  Location: [facility] ENDOSCOPY;  Service: Gastroenterology;  Laterality: N/A;  GASTRITIS, BARRETTS ESOPHAGUS, PHG    ENDOSCOPY N/A 02/05/2025    Procedure: ESOPHAGOGASTRODUODENOSCOPY WITH BIOPSIES;  Surgeon: Gino Khan MD;  Location: [facility] ENDOSCOPY;  Service: Gastroenterology;  Laterality: N/A;  ESOPHAGEAL VARICES, PORTAL HYPERTENSIVE GASTROPATHY    PARACENTESIS      2/20/25    THORACENTESIS      LAST ONE DONE 12/13/24 WITH DR SCHAEFER    TIPS PROCEDURE Right     11/11/2024    UPPER GASTROINTESTINAL ENDOSCOPY       Family History   Problem Relation Age of Onset    Alcohol abuse Mother     Arthritis Mother     COPD Mother     Heart disease Maternal Grandmother     Hypertension Maternal Grandmother     Lung cancer Maternal Grandmother     Stroke Maternal Grandmother     Heart disease Maternal Grandfather     Lung cancer Maternal Grandfather     Cancer Paternal Grandmother     Cancer Paternal Grandfather     Colon cancer Neg Hx     Malig Hyperthermia Neg Hx        Home Medications:  Prior to Admission medications    Medication Sig Start Date End Date Taking? Authorizing Provider   albuterol sulfate  (90 Base) MCG/ACT inhaler Inhale 2 puffs Every 4 (Four) Hours As Needed for Wheezing or Shortness of Air. 3/31/25   Callum Peterson, DO   escitalopram (LEXAPRO) 5 MG tablet Take 1 tablet by mouth Daily. 3/31/25   Callum Peterson, DO   Fluticasone-Umeclidin-Vilant (TRELEGY) 100-62.5-25 MCG/ACT inhaler Inhale 1 puff Daily. 3/31/25   Callum Peterson DO   folic acid (FOLVITE) 1 MG tablet Take 1 tablet by mouth Daily. 3/31/25 3/31/26  Callum Peterson, DO   furosemide (LASIX) 20 MG tablet Take 1 tablet by mouth 2 (Two) Times a Day. 3/31/25   Callum Peterson DO   midodrine (PROAMATINE) 10 MG tablet Take 1 tablet by mouth 3 (Three) Times a Day Before Meals. 3/31/25   Callum Peterson, DO   ondansetron  ODT (ZOFRAN-ODT) 4 MG disintegrating tablet Place 1 tablet on the tongue Every 8 (Eight) Hours As Needed for Nausea or Vomiting. 3/24/25   Sofie Flor APRN   pantoprazole (PROTONIX) 40 MG EC tablet Take 1 tablet by mouth Daily. 3/31/25   Callum Peterson DO   spironolactone (ALDACTONE) 50 MG tablet Take 1 tablet by mouth Daily. 3/31/25   Callum Peterson DO   ursodiol (ACTIGALL) 300 MG capsule Take 1 capsule by mouth 2 (Two) Times a Day. 3/31/25   Callum Peterson DO   vitamin D (ERGOCALCIFEROL) 1.25 MG (70871 UT) capsule capsule Take 1 capsule by mouth 1 (One) Time Per Week. 3/31/25   Callum Peterson DO        Social History:   Social History     Tobacco Use    Smoking status: Every Day     Current packs/day: 0.50     Average packs/day: 0.5 packs/day for 17.4 years (8.7 ttl pk-yrs)     Types: Cigarettes     Start date: 1/1/2008     Passive exposure: Current    Smokeless tobacco: Never    Tobacco comments:     Last 2330 5/7/25   Vaping Use    Vaping status: Never Used   Substance Use Topics    Alcohol use: Not Currently     Alcohol/week: 3.0 standard drinks of alcohol     Comment: NO ALCOHOL IN OVER 5 YEARS    Drug use: Never         Review of Systems:  Review of Systems   Constitutional:  Negative for chills and fever.   HENT:  Negative for congestion, ear pain and sore throat.    Eyes:  Negative for pain.   Respiratory:  Positive for shortness of breath. Negative for cough and chest tightness.    Cardiovascular:  Negative for chest pain.   Gastrointestinal:  Positive for abdominal distention. Negative for abdominal pain, diarrhea, nausea and vomiting.   Genitourinary:  Negative for flank pain and hematuria.   Musculoskeletal:  Negative for joint swelling.   Skin:  Negative for pallor.   Neurological:  Negative for seizures and headaches.   All other systems reviewed and are negative.       Physical Exam:  /77   Pulse 98   Temp 98.2 °F (36.8 °C) (Oral)   Resp 20   Ht  "172.7 cm (68\")   Wt 79.9 kg (176 lb 2.4 oz)   SpO2 95%   BMI 26.78 kg/m²     Physical Exam    Vital signs were reviewed under triage note.  General appearance - Patient appears well-developed and well-nourished.  Patient is in no acute distress.  Head - Normocephalic, atraumatic.  Pupils - Equal, round, reactive to light.  Extraocular muscles are intact.  Conjunctiva is clear.  Nasal - Normal inspection.  No evidence of trauma or epistaxis.  Tympanic membranes - Gray, intact without erythema or retractions.  Oral mucosa - Pink and moist without lesions or erythema.  Uvula is midline.  Chest wall - Atraumatic.  Chest wall is nontender.  There are no vesicular rashes noted.  Neck - Supple.  Trachea was midline.  There is no palpable lymphadenopathy or thyromegaly.  There are no meningeal signs  Lungs - The left lung is clear to auscultation and percussion bilaterally.  The right lung is absent breath sounds.  There is dullness to percussion.  Heart - Tachycardic rate and rhythm without any murmurs, clicks, or gallops.  Abdomen - Soft.  Moderately distended with a palpable fluid wave.  Patient also has a soft reducible umbilical hernia.  Bowel sounds are present.  There is no palpable tenderness.  There is no rebound, guarding, or rigidity.  There are no palpable masses.  There are no pulsatile masses.  Back - Spine is straight and midline.  There is no CVA tenderness.  Extremities - Intact x4 with full range of motion.  There is no palpable edema.  Pulses are intact x4 and equal.  Neurologic - Patient is awake, alert, and oriented x3.  Cranial nerves II through XII are grossly intact.  Motor and sensory functions grossly intact.  Cerebellar function was normal.  Integument - There are no rashes.  There are no petechia or purpura lesions noted.  There are no vesicular lesions noted.           Procedures:  Procedures      Medical Decision Making:      Comorbidities that affect care:    History of alcoholic cirrhosis. "  Patient is 5 years sober.  Hypertension, GERD, primary biliary cholangitis, asthma    External Notes reviewed:    Previous Clinic Note: Office visit with ANA LAURA Munoz on 5/13/2025 was reviewed by me.      The following orders were placed and all results were independently analyzed by me:  Orders Placed This Encounter   Procedures    Thoracentesis at Bedside    Respiratory Culture - Sputum, Throat    Legionella Antigen, Urine - Urine, Urine, Clean Catch    S. Pneumo Ag Urine or CSF - Urine, Urine, Clean Catch    Respiratory Panel PCR w/COVID-19(SARS-CoV-2) DAVID/MEY/AMBAR/PAD/COR/HARLEY In-House, NP Swab in UTM/VTM, 2 HR TAT - Swab, Nasopharynx    Blood Culture - Blood,    Blood Culture - Blood,    AFB Culture - Body Fluid, Pleural Cavity    Body Fluid Culture - Body Fluid, Pleural Cavity    Anaerobic Culture - Pleural Fluid, Pleural Cavity    Fungus Culture - Body Fluid, Pleural Cavity    XR Chest 1 View    XR Chest 1 View    [place] Draw    Comprehensive Metabolic Panel    BNP    High Sensitivity Troponin T    CBC Auto Differential    High Sensitivity Troponin T 1Hr    Protime-INR    Procalcitonin    Urinalysis With Culture If Indicated -    Vitamin B12    Folate    Iron Profile    Ferritin    Occult Blood, Fecal By Immunoassay - Stool, Per Rectum    Reticulocytes    Body Fluid Cell Count With Differential - Body Fluid, Pleural Cavity    pH, Body Fluid - Body Fluid, Pleural Cavity    Albumin, Fluid - Pleural Fluid, Pleural Cavity    Protein, Body Fluid - Pleural Fluid, Pleural Cavity    Lactate Dehydrogenase, Body Fluid - Body Fluid, Pleural Cavity    Glucose, Body Fluid - Pleural Fluid, Pleural Cavity    Lactate Dehydrogenase    Protein, Total    Body fluid cell count - Body Fluid, Pleural Cavity    Body fluid differential - Body Fluid, Pleural Cavity    Basic Metabolic Panel    Magnesium    Phosphorus    Urinalysis, Microscopic Only - Urine, Clean Catch    Diet: Regular/House; Texture: Regular (IDDSI 7); Fluid  Consistency: Thin (IDDSI 0)    Undress & Gown    Continuous Pulse Oximetry    Vital Signs    Verify Informed Consent    Vital Signs    Intake & Output    Weigh Patient    Oral Care    Saline Lock & Maintain IV Access    Place Sequential Compression Device    Maintain Sequential Compression Device    Code Status and Medical Interventions: CPR (Attempt to Resuscitate); Full Support    Hospitalist (on-call MD unless specified)    Inpatient Pulmonology Consult    Inpatient Case Management  Consult    Dietary Nutrition Supplements Boost Plus (Ensure Plus)    OT Consult: Eval & Treat ADL Performance Below Baseline    PT Consult: Eval & Treat As Tolerated; Discharge Placement Assessment    Oxygen Therapy- Nasal Cannula; Titrate 1-6 LPM Per SpO2; 90 - 95%    ECG 12 Lead ED Triage Standing Order; SOA    Adult Transthoracic Echo Complete W/ Cont if Necessary Per Protocol    Insert Peripheral IV    Insert Peripheral IV    Inpatient Admission    Initiate Observation Status    CBC & Differential    Green Top (Gel)    Lavender Top    Gold Top - SST    Light Blue Top    CBC & Differential       Medications Given in the Emergency Department:  Medications   sodium chloride 0.9 % flush 10 mL (has no administration in time range)   cefTRIAXone (ROCEPHIN) in NS 1 gram/10ml IV PUSH syringe (1,000 mg Intravenous Given 5/25/25 1824)   azithromycin (ZITHROMAX) tablet 500 mg (500 mg Oral Given 5/25/25 1819)   escitalopram (LEXAPRO) tablet 5 mg (5 mg Oral Given 5/25/25 2103)   arformoterol (BROVANA) nebulizer solution 15 mcg (15 mcg Nebulization Given 5/25/25 2044)     And   budesonide (PULMICORT) nebulizer solution 0.5 mg (0.5 mg Nebulization Given 5/25/25 2044)     And   revefenacin (YUPELRI) nebulizer solution 175 mcg (175 mcg Nebulization Given 5/25/25 2044)   folic acid (FOLVITE) tablet 1 mg (1 mg Oral Given 5/25/25 2103)   furosemide (LASIX) tablet 20 mg (20 mg Oral Given 5/25/25 1819)   midodrine (PROAMATINE) tablet 10  mg (10 mg Oral Given 5/25/25 2103)   pantoprazole (PROTONIX) EC tablet 40 mg (40 mg Oral Given 5/25/25 2103)   spironolactone (ALDACTONE) tablet 50 mg (50 mg Oral Given 5/25/25 2102)   ursodiol (ACTIGALL) capsule 300 mg (300 mg Oral Given 5/25/25 2102)   sodium chloride 0.9 % flush 10 mL (10 mL Intravenous Given 5/25/25 2104)   sodium chloride 0.9 % flush 10 mL (has no administration in time range)   sodium chloride 0.9 % infusion 40 mL (has no administration in time range)   ondansetron ODT (ZOFRAN-ODT) disintegrating tablet 4 mg (4 mg Oral Given 5/25/25 2106)     Or   ondansetron (ZOFRAN) injection 4 mg ( Intravenous Not Given:  See Alt 5/25/25 2106)   melatonin tablet 5 mg (5 mg Oral Given 5/25/25 2106)   acetaminophen (TYLENOL) tablet 325 mg (has no administration in time range)     Or   acetaminophen (TYLENOL) 160 MG/5ML oral solution 650 mg (has no administration in time range)     Or   acetaminophen (TYLENOL) suppository 325 mg (has no administration in time range)   sennosides-docusate (PERICOLACE) 8.6-50 MG per tablet 2 tablet (has no administration in time range)     And   polyethylene glycol (MIRALAX) packet 17 g (has no administration in time range)     And   bisacodyl (DULCOLAX) EC tablet 5 mg (has no administration in time range)     And   bisacodyl (DULCOLAX) suppository 10 mg (has no administration in time range)   albuterol (PROVENTIL) nebulizer solution 0.083% 2.5 mg/3mL (has no administration in time range)        ED Course:    ED Course as of 05/25/25 2119   Sun May 25, 2025   2118 EKG performed at 1219 was interpreted by me to show a normal sinus rhythm with a ventricular rate of 94 bpm.  NH interval is 139 ms.  P waves were normal.  QRS interval is normal.  Axis was at -7 degrees.  There were no acute ischemic ST or T wave changes identified.  QT corrected was 465 ms. [TB]      ED Course User Index  [TB] David Vu DO   The patient was seen and evaluated in the ED by me.  The above history and  physical examination was performed as documented.  Diagnostic data was obtained.  Results reviewed.  Patient was found to have a near-total opacification of his right lung.  This correlates with the patient's history and physical.  I have consult the hospitalist service for admission.  Dr. Harden will consult pulmonary for possible thoracentesis.    Labs:    Lab Results (last 24 hours)       Procedure Component Value Units Date/Time    CBC & Differential [[id number]]  (Abnormal) Collected: 05/25/25 1230    Specimen: Blood from Arm, Right Updated: 05/25/25 1240    Narrative:      The following orders were created for panel order CBC & Differential.  Procedure                               Abnormality         Status                     ---------                               -----------         ------                     CBC Auto Differential[[id number]]        Abnormal            Final result                 Please view results for these tests on the individual orders.    Comprehensive Metabolic Panel [[id number]]  (Abnormal) Collected: 05/25/25 1230    Specimen: Blood from Arm, Right Updated: 05/25/25 1308     Glucose 105 mg/dL      BUN 18 mg/dL      Creatinine 1.32 mg/dL      Sodium 134 mmol/L      Potassium 3.6 mmol/L      Chloride 98 mmol/L      CO2 27.7 mmol/L      Calcium 8.3 mg/dL      Total Protein 6.2 g/dL      Albumin 2.9 g/dL      ALT (SGPT) 24 U/L      AST (SGOT) 45 U/L      Alkaline Phosphatase 202 U/L      Total Bilirubin 3.7 mg/dL      Globulin 3.3 gm/dL      A/G Ratio 0.9 g/dL      BUN/Creatinine Ratio 13.6     Anion Gap 8.3 mmol/L      eGFR 71.2 mL/min/1.73     Narrative:      GFR Categories in Chronic Kidney Disease (CKD)              GFR Category          GFR (mL/min/1.73)    Interpretation  G1                    90 or greater        Normal or high (1)  G2                    60-89                Mild decrease (1)  G3a                   45-59                Mild to moderate decrease  G3b                    30-44                Moderate to severe decrease  G4                    15-29                Severe decrease  G5                    14 or less           Kidney failure    (1)In the absence of evidence of kidney disease, neither GFR category G1 or G2 fulfill the criteria for CKD.    eGFR calculation 2021 CKD-EPI creatinine equation, which does not include race as a factor    BNP [[id number]]  (Normal) Collected: 05/25/25 1230    Specimen: Blood from Arm, Right Updated: 05/25/25 1304     proBNP 130.6 pg/mL     Narrative:      This assay is used as an aid in the diagnosis of individuals suspected of having heart failure. It can be used as an aid in the diagnosis of acute decompensated heart failure (ADHF) in patients presenting with signs and symptoms of ADHF to the emergency department (ED). In addition, NT-proBNP of <300 pg/mL indicates ADHF is not likely.    Age Range Result Interpretation  NT-proBNP Concentration (pg/mL:      <50             Positive            >450                   Gray                 300-450                    Negative             <300    50-75           Positive            >900                  Gray                300-900                  Negative            <300      >75             Positive            >1800                  Gray                300-1800                  Negative            <300    High Sensitivity Troponin T [[id number]]  (Abnormal) Collected: 05/25/25 1230    Specimen: Blood from Arm, Right Updated: 05/25/25 1308     HS Troponin T 25 ng/L     Narrative:      High Sensitive Troponin T Reference Range:  <14.0 ng/L- Negative Female for AMI  <22.0 ng/L- Negative Male for AMI  >=14 - Abnormal Female indicating possible myocardial injury.  >=22 - Abnormal Male indicating possible myocardial injury.   Clinicians would have to utilize clinical acumen, EKG, Troponin, and serial changes to determine if it is an Acute Myocardial Infarction or myocardial injury due to an underlying  chronic condition.         CBC Auto Differential [[id number]]  (Abnormal) Collected: 05/25/25 1230    Specimen: Blood from Arm, Right Updated: 05/25/25 1240     WBC 7.68 10*3/mm3      RBC 3.37 10*6/mm3      Hemoglobin 9.5 g/dL      Hematocrit 30.6 %      MCV 90.8 fL      MCH 28.2 pg      MCHC 31.0 g/dL      RDW 17.7 %      RDW-SD 58.7 fl      MPV 10.4 fL      Platelets 107 10*3/mm3      Neutrophil % 72.4 %      Lymphocyte % 15.1 %      Monocyte % 9.4 %      Eosinophil % 2.3 %      Basophil % 0.3 %      Immature Grans % 0.5 %      Neutrophils, Absolute 5.56 10*3/mm3      Lymphocytes, Absolute 1.16 10*3/mm3      Monocytes, Absolute 0.72 10*3/mm3      Eosinophils, Absolute 0.18 10*3/mm3      Basophils, Absolute 0.02 10*3/mm3      Immature Grans, Absolute 0.04 10*3/mm3      nRBC 0.0 /100 WBC     Protime-INR [[id number]]  (Abnormal) Collected: 05/25/25 1230    Specimen: Blood from Arm, Right Updated: 05/25/25 1413     Protime 19.3 Seconds      INR 1.55    Narrative:      Suggested Therapeutic Ranges For Oral Anticoagulant Therapy:  Level of Therapy                      INR Target Range  Standard Dose                            2.0-3.0  High Dose                                2.5-3.5  Patients not receiving anticoagulant  Therapy Normal Range                     0.86-1.15    Vitamin B12 [[id number]] Collected: 05/25/25 1230    Specimen: Blood from Arm, Right Updated: 05/25/25 1619    Folate [[id number]] Collected: 05/25/25 1230    Specimen: Blood from Arm, Right Updated: 05/25/25 1619    High Sensitivity Troponin T 1Hr [[id number]]  (Abnormal) Collected: 05/25/25 1531    Specimen: Blood from Arm, Right Updated: 05/25/25 1610     HS Troponin T 25 ng/L      Troponin T Numeric Delta 0 ng/L      Troponin T % Delta 0    Narrative:      High Sensitive Troponin T Reference Range:  <14.0 ng/L- Negative Female for AMI  <22.0 ng/L- Negative Male for AMI  >=14 - Abnormal Female indicating possible myocardial injury.  >=22 - Abnormal  "Male indicating possible myocardial injury.   Clinicians would have to utilize clinical acumen, EKG, Troponin, and serial changes to determine if it is an Acute Myocardial Infarction or myocardial injury due to an underlying chronic condition.         Procalcitonin [[id number]]  (Normal) Collected: 05/25/25 1531    Specimen: Blood from Arm, Right Updated: 05/25/25 1728     Procalcitonin 0.19 ng/mL     Narrative:      As a Marker for Sepsis (Non-Neonates):    1. <0.5 ng/mL represents a low risk of severe sepsis and/or septic shock.  2. >2 ng/mL represents a high risk of severe sepsis and/or septic shock.    As a Marker for Lower Respiratory Tract Infections that require antibiotic therapy:    PCT on Admission    Antibiotic Therapy       6-12 Hrs later    >0.5                Strongly Recommended  >0.25 - <0.5        Recommended  0.1 - 0.25          Discouraged              Remeasure/reassess PCT  <0.1                Strongly Discouraged     Remeasure/reassess PCT    As 28 day mortality risk marker: \"Change in Procalcitonin Result\" (>80% or <=80%) if Day 0 (or Day 1) and Day 4 values are available. Refer to http://www.[facility] [place]-pct-calculator.com    Change in PCT <=80%  A decrease of PCT levels below or equal to 80% defines a positive change in PCT test result representing a higher risk for 28-day all-cause mortality of patients diagnosed with severe sepsis for septic shock.    Change in PCT >80%  A decrease of PCT levels of more than 80% defines a negative change in PCT result representing a lower risk for 28-day all-cause mortality of patients diagnosed with severe sepsis or septic shock.    This test is Prognostic not Diagnostic, if elevated correlate with clinical findings before administering antibiotic treatment.        Iron Profile [[id number]]  (Normal) Collected: 05/25/25 1531    Specimen: Blood from Arm, Right Updated: 05/25/25 1625     Iron 98 mcg/dL      Iron Saturation (TSAT) 28 %      Transferrin 235 mg/dL     "  TIBC 350 mcg/dL     Ferritin [[id number]]  (Abnormal) Collected: 05/25/25 1531    Specimen: Blood from Arm, Right Updated: 05/25/25 1631     Ferritin 25.20 ng/mL     Narrative:      <12 ng/mL usually associated with Iron Deficiency Anemia. Above normal range levels may be due to Hepatic and/or Chronic Inflammatory Disease.  Results may be falsely decreased if patient taking Biotin.      Lactate Dehydrogenase [[id number]]  (Abnormal) Collected: 05/25/25 1531    Specimen: Blood from Arm, Right Updated: 05/25/25 1639      U/L     Protein, Total [[id number]]  (Abnormal) Collected: 05/25/25 1531    Specimen: Blood from Arm, Right Updated: 05/25/25 1639     Total Protein 5.6 g/dL     Body Fluid Cell Count With Differential - Body Fluid, Pleural Cavity [[id number]] Collected: 05/25/25 1621    Specimen: Body Fluid from Pleural Cavity Updated: 05/25/25 1729    Narrative:      The following orders were created for panel order Body Fluid Cell Count With Differential - Body Fluid, Pleural Cavity.  Procedure                               Abnormality         Status                     ---------                               -----------         ------                     Body fluid cell count - ...[[id number]]                      Final result               Body fluid differential ...[[id number]]                      Final result                 Please view results for these tests on the individual orders.    pH, Body Fluid - Body Fluid, Pleural Cavity [[id number]] Collected: 05/25/25 1621    Specimen: Body Fluid from Pleural Cavity Updated: 05/25/25 1651     pH, Fluid 8.00    Lactate Dehydrogenase, Body Fluid - Body Fluid, Pleural Cavity [[id number]] Collected: 05/25/25 1621    Specimen: Body Fluid from Pleural Cavity Updated: 05/25/25 1621    AFB Culture - Body Fluid, Pleural Cavity [[id number]] Collected: 05/25/25 1621    Specimen: Body Fluid from Pleural Cavity Updated: 05/25/25 1621    Body Fluid Culture - Body Fluid, Pleural  Cavity [[id number]] Collected: 05/25/25 1621    Specimen: Body Fluid from Pleural Cavity Updated: 05/25/25 1746     Gram Stain Few (2+) Red blood cells      No organisms seen    Fungus Culture - Body Fluid, Pleural Cavity [[id number]] Collected: 05/25/25 1621    Specimen: Body Fluid from Pleural Cavity Updated: 05/25/25 1621    Body fluid cell count - Body Fluid, Pleural Cavity [[id number]] Collected: 05/25/25 1621    Specimen: Body Fluid from Pleural Cavity Updated: 05/25/25 1630     Color, Fluid Light Yellow     Appearance, Fluid Clear     Nucleated Cells, Fluid 159 /mm3      RBC, Fluid <2,000 /mm3     Narrative:      No reference range established. Physician to interpret results with clinical findings.    Body fluid differential - Body Fluid, Pleural Cavity [[id number]] Collected: 05/25/25 1621    Specimen: Body Fluid from Pleural Cavity Updated: 05/25/25 1729     Neutrophils, Fluid % 75 %      Lymphocytes, Fluid % 6 %      Monocytes, Fluid % 3 %      Mesothelial Cells, Fluid % 2 %      Macrophage, Fluid % 14 %     Narrative:      No reference range established. Physician to interpret results with clinical findings.    Albumin, Fluid - Pleural Fluid, Pleural Cavity [[id number]] Collected: 05/25/25 1623    Specimen: Pleural Fluid from Pleural Cavity Updated: 05/25/25 1623    Protein, Body Fluid - Pleural Fluid, Pleural Cavity [[id number]] Collected: 05/25/25 1623    Specimen: Pleural Fluid from Pleural Cavity Updated: 05/25/25 1623    Glucose, Body Fluid - Pleural Fluid, Pleural Cavity [[id number]] Collected: 05/25/25 1623    Specimen: Pleural Fluid from Pleural Cavity Updated: 05/25/25 1623    Anaerobic Culture - Pleural Fluid, Pleural Cavity [[id number]] Collected: 05/25/25 1623    Specimen: Pleural Fluid from Pleural Cavity Updated: 05/25/25 1623    Respiratory Panel PCR w/COVID-19(SARS-CoV-2) DAVID/MEY/AMBAR/PAD/COR/HARLEY In-House, NP Swab in UTM/VTM, 2 HR TAT - Swab, Nasopharynx [[id number]]  (Normal) Collected: 05/25/25  1820    Specimen: Swab from Nasopharynx Updated: 05/25/25 1919     ADENOVIRUS, PCR Not Detected     Coronavirus 229E Not Detected     Coronavirus HKU1 Not Detected     Coronavirus NL63 Not Detected     Coronavirus OC43 Not Detected     COVID19 Not Detected     Human Metapneumovirus Not Detected     Human Rhinovirus/Enterovirus Not Detected     Influenza A PCR Not Detected     Influenza B PCR Not Detected     Parainfluenza Virus 1 Not Detected     Parainfluenza Virus 2 Not Detected     Parainfluenza Virus 3 Not Detected     Parainfluenza Virus 4 Not Detected     RSV, PCR Not Detected     Bordetella pertussis pcr Not Detected     Bordetella parapertussis PCR Not Detected     Chlamydophila pneumoniae PCR Not Detected     Mycoplasma pneumo by PCR Not Detected    Narrative:      In the setting of a positive respiratory panel with a viral infection PLUS a negative procalcitonin without other underlying concern for bacterial infection, consider observing off antibiotics or discontinuation of antibiotics and continue supportive care. If the respiratory panel is positive for atypical bacterial infection (Bordetella pertussis, Chlamydophila pneumoniae, or Mycoplasma pneumoniae), consider antibiotic de-escalation to target atypical bacterial infection.    Reticulocytes [[id number]]  (Abnormal) Collected: 05/25/25 1824    Specimen: Blood from Arm, Left Updated: 05/25/25 1840     Reticulocyte % 3.57 %      Reticulocyte Absolute 0.1092 10*6/mm3     Blood Culture - Blood, Arm, Left [[id number]] Collected: 05/25/25 1824    Specimen: Blood from Arm, Left Updated: 05/25/25 1836    Blood Culture - Blood, Arm, Right [[id number]] Collected: 05/25/25 1824    Specimen: Blood from Arm, Right Updated: 05/25/25 1836    Magnesium [[id number]]  (Normal) Collected: 05/25/25 1824    Specimen: Blood from Arm, Left Updated: 05/25/25 1859     Magnesium 1.8 mg/dL     Phosphorus [[id number]]  (Normal) Collected: 05/25/25 1824    Specimen: Blood from Arm,  Left Updated: 05/25/25 1859     Phosphorus 2.8 mg/dL     Urinalysis With Culture If Indicated - Urine, Clean Catch [[id number]]  (Abnormal) Collected: 05/25/25 2023    Specimen: Urine, Clean Catch Updated: 05/25/25 2053     Color, UA Dark Yellow     Appearance, UA Clear     pH, UA 6.0     Specific Gravity, UA 1.020     Glucose, UA Negative     Ketones, UA Negative     Bilirubin, UA Moderate (2+)     Blood, UA Negative     Protein, UA Trace     Leuk Esterase, UA Negative     Nitrite, UA Positive     Urobilinogen, UA 4.0 E.U./dL    Narrative:      In absence of clinical symptoms, the presence of pyuria, bacteria, and/or nitrites on the urinalysis result does not correlate with infection.    Urinalysis, Microscopic Only - Urine, Clean Catch [[id number]]  (Abnormal) Collected: 05/25/25 2023    Specimen: Urine, Clean Catch Updated: 05/25/25 2101     RBC, UA 0-2 /HPF      WBC, UA 3-5 /HPF      Comment: Urine culture not indicated.        Bacteria, UA None Seen /HPF      Squamous Epithelial Cells, UA None Seen /HPF      Hyaline Casts, UA 0-2 /LPF      Calcium Oxalate Crystals, UA Large/3+ /HPF      Methodology Manual Light Microscopy    Narrative:      Calcium Oxalate Monohydrate     Legionella Antigen, Urine - Urine, Urine, Clean Catch [[id number]]  (Normal) Collected: 05/25/25 2024    Specimen: Urine, Clean Catch Updated: 05/25/25 2101     LEGIONELLA ANTIGEN, URINE Negative    S. Pneumo Ag Urine or CSF - Urine, Urine, Clean Catch [[id number]]  (Normal) Collected: 05/25/25 2024    Specimen: Urine, Clean Catch Updated: 05/25/25 2102     Strep Pneumo Ag Negative             Imaging:    Adult Transthoracic Echo Complete W/ Cont if Necessary Per Protocol  Result Date: 5/25/2025    Left ventricular ejection fraction appears to be 61 - 65%.   Left ventricular diastolic function was normal.   There is a pericardial effusion. There is no evidence of cardiac tamponade.   There is a large right pleural effusion.     XR Chest 1  View  Result Date: 5/25/2025  XR CHEST 1 VW Date of Exam: 5/25/2025 4:36 PM EDT Indication: Thoracentesis Comparison: 5/25/2025 at 1223 hours Findings: There is marked improvement in aeration of the right hemithorax following thoracentesis. A tiny right pleural effusion remains. No left-sided pleural effusion. There is probably some atelectasis in the right lung base. The lungs are otherwise clear. No pneumothorax is seen on either side of the chest. A TIPS is noted in the right upper quadrant.     Marked improvement in aeration of the right hemithorax following thoracentesis. No pneumothorax. Electronically Signed: Dhruv Mendosa MD  5/25/2025 7:06 PM EDT  Workstation ID: [id number]    XR Chest 1 View  Result Date: 5/25/2025  XR CHEST 1 VW Date of Exam: 5/25/2025 12:20 PM EDT Indication: SOA Triage Protocol Comparison: 3/24/2025, 2/21/2025 Findings: There is a large right pleural effusion. There is some aerated lung in the right upper lobe, and the remainder of the right lung is opacified. The cardiac silhouette appears enlarged since the comparison exams. The left lung is clear     Impression: 1. Large right pleural effusion, likely with secondary collapse of the right middle and right lower lobes 2. Suggestion of interval enlargement of the cardiac silhouette, raising the possibility of a pericardial effusion Electronically Signed: Franklin Nowak  5/25/2025 12:56 PM EDT  Workstation ID: [id number]        Differential Diagnosis and Discussion:    Dyspnea: Differential diagnosis includes but is not limited to metabolic acidosis, neurological disorders, psychogenic, asthma, pneumothorax, upper airway obstruction, COPD, pneumonia, noncardiogenic pulmonary edema, interstitial lung disease, anemia, congestive heart failure, and pulmonary embolism    Labs were collected in the emergency department and all labs were reviewed and interpreted by me.  X-ray were performed in the emergency department and all X-ray impressions  were independently interpreted by me.  An EKG was performed and the EKG was interpreted by me.    MDM     Amount and/or Complexity of Data Reviewed  Decide to obtain previous medical records or to obtain history from someone other than the patient: yes             Patient Care Considerations:    SEPSIS IS NOT PRESENT IN THE EMERGENCY DEPARTMENT: Patient meets SIRS criteria in the emergency department however the patient does not have a known source of bacterial infection to confirm the diagnosis of sepsis.        Consultants/Shared Management Plan:    Hospitalist: I have discussed the case with Dr. Farmer who agrees to accept the patient for admission.    Social Determinants of Health:    Patient is independent, reliable, and has access to care.       Disposition and Care Coordination:    Admit:   Through independent evaluation of the patient's history, physical, and imperical data, the patient meets criteria for inpatient admission to the hospital.        Final diagnoses:   Pleural effusion, right        ED Disposition       ED Disposition   Decision to Admit    Condition   --    Comment   Level of Care: Telemetry [5]   Diagnosis: Pleural effusion [[id number]]   Admitting Physician: DARLENE FARMER [[id number]]   Attending Physician: DARLENE FARMER [[id number]]   Certification: I Certify That Inpatient Hospital Services Are Medically Necessary For Greater Than 2 Midnights                 This medical record created using voice recognition software.             David Vu DO  05/25/25 [id number]

## 2025-05-26 ENCOUNTER — READMISSION MANAGEMENT (OUTPATIENT)
Dept: CALL CENTER | Facility: HOSPITAL | Age: 40
End: 2025-05-26
Payer: COMMERCIAL

## 2025-05-26 VITALS
HEIGHT: 68 IN | SYSTOLIC BLOOD PRESSURE: 114 MMHG | DIASTOLIC BLOOD PRESSURE: 79 MMHG | TEMPERATURE: 98.6 F | RESPIRATION RATE: 18 BRPM | BODY MASS INDEX: 26.7 KG/M2 | WEIGHT: 176.15 LBS | OXYGEN SATURATION: 92 % | HEART RATE: 83 BPM

## 2025-05-26 LAB
ALBUMIN FLD-MCNC: <0.2 G/DL
ANION GAP SERPL CALCULATED.3IONS-SCNC: 7.9 MMOL/L (ref 5–15)
BASOPHILS # BLD AUTO: 0.03 10*3/MM3 (ref 0–0.2)
BASOPHILS NFR BLD AUTO: 0.4 % (ref 0–1.5)
BUN SERPL-MCNC: 20 MG/DL (ref 6–20)
BUN/CREAT SERPL: 13.5 (ref 7–25)
CALCIUM SPEC-SCNC: 7.7 MG/DL (ref 8.6–10.5)
CHLORIDE SERPL-SCNC: 96 MMOL/L (ref 98–107)
CO2 SERPL-SCNC: 27.1 MMOL/L (ref 22–29)
CREAT SERPL-MCNC: 1.48 MG/DL (ref 0.76–1.27)
DEPRECATED RDW RBC AUTO: 55.7 FL (ref 37–54)
EGFRCR SERPLBLD CKD-EPI 2021: 62.1 ML/MIN/1.73
EOSINOPHIL # BLD AUTO: 0.25 10*3/MM3 (ref 0–0.4)
EOSINOPHIL NFR BLD AUTO: 3 % (ref 0.3–6.2)
ERYTHROCYTE [DISTWIDTH] IN BLOOD BY AUTOMATED COUNT: 17.6 % (ref 12.3–15.4)
FOLATE SERPL-MCNC: >20 NG/ML (ref 4.78–24.2)
GLUCOSE FLD-MCNC: 109 MG/DL
GLUCOSE SERPL-MCNC: 93 MG/DL (ref 65–99)
HCT VFR BLD AUTO: 24.4 % (ref 37.5–51)
HGB BLD-MCNC: 7.7 G/DL (ref 13–17.7)
IMM GRANULOCYTES # BLD AUTO: 0.06 10*3/MM3 (ref 0–0.05)
IMM GRANULOCYTES NFR BLD AUTO: 0.7 % (ref 0–0.5)
LDH FLD-CCNC: 35 U/L
LYMPHOCYTES # BLD AUTO: 1.31 10*3/MM3 (ref 0.7–3.1)
LYMPHOCYTES NFR BLD AUTO: 15.8 % (ref 19.6–45.3)
MAGNESIUM SERPL-MCNC: 1.7 MG/DL (ref 1.6–2.6)
MCH RBC QN AUTO: 27.9 PG (ref 26.6–33)
MCHC RBC AUTO-ENTMCNC: 31.6 G/DL (ref 31.5–35.7)
MCV RBC AUTO: 88.4 FL (ref 79–97)
MONOCYTES # BLD AUTO: 0.98 10*3/MM3 (ref 0.1–0.9)
MONOCYTES NFR BLD AUTO: 11.8 % (ref 5–12)
NEUTROPHILS NFR BLD AUTO: 5.68 10*3/MM3 (ref 1.7–7)
NEUTROPHILS NFR BLD AUTO: 68.3 % (ref 42.7–76)
NRBC BLD AUTO-RTO: 0 /100 WBC (ref 0–0.2)
PHOSPHATE SERPL-MCNC: 2.8 MG/DL (ref 2.5–4.5)
PLATELET # BLD AUTO: 88 10*3/MM3 (ref 140–450)
PMV BLD AUTO: 10.6 FL (ref 6–12)
POTASSIUM SERPL-SCNC: 3.7 MMOL/L (ref 3.5–5.2)
PROT FLD-MCNC: <1 G/DL
RBC # BLD AUTO: 2.76 10*6/MM3 (ref 4.14–5.8)
SODIUM SERPL-SCNC: 131 MMOL/L (ref 136–145)
VIT B12 BLD-MCNC: >2000 PG/ML (ref 211–946)
WBC NRBC COR # BLD AUTO: 8.31 10*3/MM3 (ref 3.4–10.8)

## 2025-05-26 PROCEDURE — 94799 UNLISTED PULMONARY SVC/PX: CPT

## 2025-05-26 PROCEDURE — 99239 HOSP IP/OBS DSCHRG MGMT >30: CPT | Performed by: INTERNAL MEDICINE

## 2025-05-26 PROCEDURE — 85025 COMPLETE CBC W/AUTO DIFF WBC: CPT | Performed by: HOSPITALIST

## 2025-05-26 PROCEDURE — 84100 ASSAY OF PHOSPHORUS: CPT | Performed by: HOSPITALIST

## 2025-05-26 PROCEDURE — 25010000002 MAGNESIUM SULFATE 2 GM/50ML SOLUTION

## 2025-05-26 PROCEDURE — G0378 HOSPITAL OBSERVATION PER HR: HCPCS

## 2025-05-26 PROCEDURE — 94664 DEMO&/EVAL PT USE INHALER: CPT

## 2025-05-26 PROCEDURE — 99223 1ST HOSP IP/OBS HIGH 75: CPT | Performed by: INTERNAL MEDICINE

## 2025-05-26 PROCEDURE — 25010000002 FUROSEMIDE PER 20 MG: Performed by: INTERNAL MEDICINE

## 2025-05-26 PROCEDURE — 80048 BASIC METABOLIC PNL TOTAL CA: CPT | Performed by: HOSPITALIST

## 2025-05-26 PROCEDURE — 63710000001 REVEFENACIN 175 MCG/3ML SOLUTION: Performed by: INTERNAL MEDICINE

## 2025-05-26 PROCEDURE — 96375 TX/PRO/DX INJ NEW DRUG ADDON: CPT

## 2025-05-26 PROCEDURE — 83735 ASSAY OF MAGNESIUM: CPT | Performed by: HOSPITALIST

## 2025-05-26 RX ORDER — POTASSIUM CHLORIDE 750 MG/1
40 CAPSULE, EXTENDED RELEASE ORAL ONCE
Status: COMPLETED | OUTPATIENT
Start: 2025-05-26 | End: 2025-05-26

## 2025-05-26 RX ORDER — MAGNESIUM SULFATE 1 G/100ML
2 INJECTION INTRAVENOUS ONCE
Status: DISCONTINUED | OUTPATIENT
Start: 2025-05-26 | End: 2025-05-26

## 2025-05-26 RX ORDER — FUROSEMIDE 10 MG/ML
40 INJECTION INTRAMUSCULAR; INTRAVENOUS ONCE
Status: COMPLETED | OUTPATIENT
Start: 2025-05-26 | End: 2025-05-26

## 2025-05-26 RX ORDER — MAGNESIUM SULFATE HEPTAHYDRATE 40 MG/ML
2 INJECTION, SOLUTION INTRAVENOUS ONCE
Status: COMPLETED | OUTPATIENT
Start: 2025-05-26 | End: 2025-05-26

## 2025-05-26 RX ORDER — BUDESONIDE 0.5 MG/2ML
0.5 INHALANT ORAL
Status: DISCONTINUED | OUTPATIENT
Start: 2025-05-26 | End: 2025-05-26 | Stop reason: HOSPADM

## 2025-05-26 RX ORDER — ARFORMOTEROL TARTRATE 15 UG/2ML
15 SOLUTION RESPIRATORY (INHALATION)
Status: DISCONTINUED | OUTPATIENT
Start: 2025-05-26 | End: 2025-05-26 | Stop reason: HOSPADM

## 2025-05-26 RX ADMIN — PANTOPRAZOLE SODIUM 40 MG: 40 TABLET, DELAYED RELEASE ORAL at 10:07

## 2025-05-26 RX ADMIN — REVEFENACIN 175 MCG: 175 SOLUTION RESPIRATORY (INHALATION) at 07:47

## 2025-05-26 RX ADMIN — MAGNESIUM SULFATE HEPTAHYDRATE 2 G: 40 INJECTION, SOLUTION INTRAVENOUS at 10:06

## 2025-05-26 RX ADMIN — ARFORMOTEROL TARTRATE 15 MCG: 15 SOLUTION RESPIRATORY (INHALATION) at 07:47

## 2025-05-26 RX ADMIN — FOLIC ACID 1 MG: 1 TABLET ORAL at 10:06

## 2025-05-26 RX ADMIN — BUDESONIDE 0.5 MG: 0.5 SUSPENSION RESPIRATORY (INHALATION) at 07:47

## 2025-05-26 RX ADMIN — SPIRONOLACTONE 50 MG: 25 TABLET ORAL at 10:07

## 2025-05-26 RX ADMIN — ESCITALOPRAM OXALATE 5 MG: 10 TABLET ORAL at 10:06

## 2025-05-26 RX ADMIN — URSODIOL 300 MG: 300 CAPSULE ORAL at 10:06

## 2025-05-26 RX ADMIN — POTASSIUM CHLORIDE 40 MEQ: 750 CAPSULE, EXTENDED RELEASE ORAL at 10:07

## 2025-05-26 RX ADMIN — MIDODRINE HYDROCHLORIDE 10 MG: 10 TABLET ORAL at 10:06

## 2025-05-26 RX ADMIN — FUROSEMIDE 40 MG: 10 INJECTION, SOLUTION INTRAMUSCULAR; INTRAVENOUS at 10:06

## 2025-05-26 RX ADMIN — Medication 10 ML: at 10:07

## 2025-05-26 NOTE — OUTREACH NOTE
Prep Survey      Flowsheet Row Responses   [facility] patient discharged from? [facility]   Is LACE score < 7 ? No   Eligibility [facility]   Date of Admission 05/25/25   Date of Discharge 05/26/25   Discharge Disposition Home or Self Care   Discharge diagnosis Pleural effusion   Does the patient have one of the following disease processes/diagnoses(primary or secondary)? Other   Does the patient have Home health ordered? No   Is there a DME ordered? No   Prep survey completed? Yes            Carito PRABHAKAR - Registered Nurse              Carito PRABHAKAR - Registered Nurse

## 2025-05-26 NOTE — CONSULTS
Pulmonary / Critical Care Consult Note      Patient Name: Wai Gay  : 1985  MRN: [id number]  Primary Care Physician:  Callum Peterson DO  Referring Physician: Yared Saldivar MD  Date of admission: 2025    Subjective   Subjective     Reason for Consult/ Chief Complaint:   Cortrak pleural effusion plan atelectasis of the right middle and right lower lobe    HPI:  Wai Gay is a 39 y.o. male with past medical history of recurrent pleural effusion, restrictive lung disease, liver cirrhosis s/p TIPS procedure, tobacco abuse of cigarettes, presents to the ED for evaluation of worsening shortness of breath.  In the ED, patient was found to have a large right pleural effusion with secondary collapse of the right middle lobe and right lower lobe.  Patient underwent thoracentesis in the ED with 3.5 L removed.  The service was consulted to assist in management during the patient's acute issues.  Upon assessment, patient lying in bed on room air no permastore distress.  Findings and plan were discussed with the patient.  Patient stated this been a couple months as last time he was underwent thoracentesis.        Personal History     Past Medical History:   Diagnosis Date    NICKI (acute kidney injury)     IN [facility] 3/24/25 [place], RIGHT UPPER QUAD PAIN, HYPOKALEMIA, AND HYPONATREMIA. CREAT 1.77, GFR 50.1, TOTAL BILI 5.6.  25 CREAT 1.O8 AND GFR 90.6    Alcohol abuse 2017    REPORTS HAS BEEN SOBER OVER 5 YEARS    Anxiety     Ascites     Asthma     FOLLOWED BY DR SCHAEFER    Last esophagus     Cirrhosis     Essential hypertension 2019    GERD (gastroesophageal reflux disease)     History of transfusion     2024    Hypokalemia 2017    LAST POTASSIUM 4 ON 25    Hyponatremia 2017    LAST SODIUM 138 ON 25    Low back pain     Pleural effusion     Primary biliary cholangitis     Splenomegaly     Steatohepatitis, alcoholic 2017    Tobacco abuse  03/14/2017    Umbilical hernia        Past Surgical History:   Procedure Laterality Date    CHOLECYSTECTOMY      ENDOSCOPY N/A 02/09/2022    Procedure: ESOPHAGOGASTRODUODENOSCOPY WITH BX;  Surgeon: Gino Khan MD;  Location: [facility] ENDOSCOPY;  Service: Gastroenterology;  Laterality: N/A;  RETAINED LIQUID FOOD IN STOMACH, HUFFMAN'S ESOPHAGUS    ENDOSCOPY N/A 02/10/2023    Procedure: ESOPHAGOGASTRODUODENOSCOPY;  Surgeon: Gino Khan MD;  Location: [facility] ENDOSCOPY;  Service: Gastroenterology;  Laterality: N/A;  GASTRITIS, BARRETTS ESOPHAGUS, PHG    ENDOSCOPY N/A 02/05/2025    Procedure: ESOPHAGOGASTRODUODENOSCOPY WITH BIOPSIES;  Surgeon: Gino Khan MD;  Location: [facility] ENDOSCOPY;  Service: Gastroenterology;  Laterality: N/A;  ESOPHAGEAL VARICES, PORTAL HYPERTENSIVE GASTROPATHY    PARACENTESIS      2/20/25    THORACENTESIS      LAST ONE DONE 12/13/24 WITH DR SCHAEFER    TIPS PROCEDURE Right     11/11/2024    UPPER GASTROINTESTINAL ENDOSCOPY         Family History: family history includes Alcohol abuse in his mother; Arthritis in his mother; COPD in his mother; Cancer in his paternal grandfather and paternal grandmother; Heart disease in his maternal grandfather and maternal grandmother; Hypertension in his maternal grandmother; Lung cancer in his maternal grandfather and maternal grandmother; Stroke in his maternal grandmother. Otherwise pertinent FHx was reviewed and not pertinent to current issue.    Social History:  reports that he has been smoking cigarettes. He started smoking about 17 years ago. He has a 8.7 pack-year smoking history. He has been exposed to tobacco smoke. He has never used smokeless tobacco. He reports that he does not currently use alcohol after a past usage of about 3.0 standard drinks of alcohol per week. He reports that he does not use drugs.    Home Medications:  Fluticasone-Umeclidin-Vilant, albuterol sulfate HFA, escitalopram, folic acid, furosemide,  midodrine, ondansetron ODT, pantoprazole, spironolactone, ursodiol, and vitamin D    Allergies:  No Known Allergies    Objective    Objective     Vitals:   Temp:  [98.1 °F (36.7 °C)-100.4 °F (38 °C)] 98.6 °F (37 °C)  Heart Rate:  [] 83  Resp:  [16-27] 18  BP: (112-126)/(63-85) 114/79    Physical Exam:  Vital Signs Reviewed   General:  WDWN, Alert, NAD.  Well-appearing male, lying in bed  HEENT:  PERRL, EOMI.  OP, nares clear, no sinus tenderness  Neck:  Supple, no JVD, no thyromegaly  Lymph: no axillary, cervical, supraclavicular lymphadenopathy noted bilaterally  Chest: Good aeration with scattered crackles, no work of breathing on room air while at rest  CV: RRR, no MGR, pulses 2+, equal.  Abd:  Soft, NT, ND, + BS, no HSM  EXT:  no clubbing, no cyanosis, no edema, no joint tenderness  Neuro:  A&Ox3, CN grossly intact, no focal deficits.  Skin: No rashes or lesions noted      Result Review    Result Review:  I have personally reviewed the results from the time of this admission to 5/26/2025 13:01 EDT and agree with these findings:  [x]  Laboratory  [x]  Microbiology  [x]  Radiology  [x]  EKG/Telemetry   [x]  Cardiology/Vascular   []  Pathology  []  Old records  []  Other:  Most notable findings include:       Lab 05/26/25  0442 05/25/25  1824 05/25/25  1531 05/25/25  1230   WBC 8.31  --   --  7.68   HEMOGLOBIN 7.7*  --   --  9.5*   HEMATOCRIT 24.4*  --   --  30.6*   PLATELETS 88*  --   --  107*   SODIUM 131*  --   --  134*   POTASSIUM 3.7  --   --  3.6   CHLORIDE 96*  --   --  98   CO2 27.1  --   --  27.7   BUN 20  --   --  18   CREATININE 1.48*  --   --  1.32*   GLUCOSE 93  --   --  105*   CALCIUM 7.7*  --   --  8.3*   PHOSPHORUS 2.8 2.8  --   --    TOTAL PROTEIN  --   --  5.6* 6.2   ALBUMIN  --   --   --  2.9*   GLOBULIN  --   --   --  3.3     Chest x-ray with large right pleural effusion  Pleural fluid transudate    Assessment & Plan   Assessment / Plan     Active Hospital Problems:  Active Hospital  Problems    Diagnosis     **Pleural effusion          Impression:  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  Has not drank in a number of years  Alcoholic cirrhosis    Plan:  On room air  Had therapeutic thoracentesis with over 3 L pleural fluid removed.  Pleural fluid transudate  Continue current diuretic regimen  Discontinue antibiotics as this is not pneumonia  He sees his hepatologist next week  May need reultrasound of the abdomen to see if his TIPS is still patent.  He has not had a pleural fluid development in the last couple of months so this is relatively new  Trend renal panel electrolytes.  Replace magnesium and potassium orally and IV  Follow-up with us as outpatient to monitor for pleural fluid reaccumulation    VTE Prophylaxis:  Mechanical VTE prophylaxis orders are present.    Code Status and Medical Interventions: CPR (Attempt to Resuscitate); Full Support   Ordered at: 05/25/25 1630     Code Status (Patient has no pulse and is not breathing):    CPR (Attempt to Resuscitate)     Medical Interventions (Patient has pulse or is breathing):    Full Support     Level Of Support Discussed With:    Patient        Labs, imaging, notes and medications personally reviewed.  Discussed with primary    I, Dr. Praful Powell, have spent more than 50% of the total time managing the patient in this encounter today.  This included personally reviewing all pertinent labs, imaging, microbiology and documentation. Also discussing the case with the patient and any available family, the admitting physician and any available ancillary staff.    Thank you for involving me in the care of this patient.    Electronically signed by ANA LAURA Hughes,  05/26/25, 11:53 AM EDT.  Electronically signed by Praful Powell MD, 05/26/25, 1:01 PM EDT.

## 2025-05-26 NOTE — PLAN OF CARE
Goal Outcome Evaluation:           Progress: no change  Outcome Evaluation: Patient alert and oriented x4 on room air. No complaints of pain this shift. Nausea treated per MAR. Patient sleeping between care.

## 2025-05-26 NOTE — DISCHARGE SUMMARY
[facility]         HOSPITALIST  DISCHARGE SUMMARY    Patient Name: Wai Gay  : 1985  MRN: [id number]    Date of Admission: 2025  Date of Discharge: 2025  Primary Care Physician: Callum Peterson,     Consults       Date and Time Order Name Status Description    2025  8:30 AM Inpatient Pulmonology Consult      2025  2:04 PM Hospitalist (on-call MD unless specified)              Active and Resolved Hospital Problems:  Active Hospital Problems    Diagnosis POA   • **Pleural effusion [J90] Yes      Resolved Hospital Problems   No resolved problems to display.   Large right pleural effusion secondary to volume overload and cirrhosis  Acute decompensated alcoholic cirrhosis status post TIPS  Pericardial effusion without evidence of tamponade  Asthma  Depression  Chronic hypotension on midodrine  GERD  Chronic thrombocytopenia in setting of cirrhosis  Chronic anemia secondary to cirrhosis    Hospital Course     Hospital Course:  Wai Gay is a 39 y.o. male who presents with shortness of breath. He has a past medical history of alcohol abuse, essential hypertension, GERD, alcoholic cirrhosis (sobriety for 5 years), prior TIPS procedure (2024), asthma, and anxiety. Chest x-ray shows large right pleural effusion, likely secondary collapse of the right middle and lower lobes. Suggestion of interval enlargement of cardiac silhouette, raising the possibility of pericardial effusion.  He was admitted for further care, 2D echo was obtained which did show pericardial effusion with no evidence of tamponade.  Pulmonology is consulted performed thoracentesis, consistent with transudate likely in setting of cirrhosis and volume overload.  He has a follow-up with his hepatologist to evaluate the flow of his TIPS as he has not required thoracentesis for a number of months now.  Infectious workup was negative, pulmonology recommended discontinue  antibiotics.  He was discharged home in stable condition on 5/26/2025, recommend follow-up PCP within a week, hepatology and pulmonology within 2 weeks.    Day of Discharge     Vital Signs:  Temp:  [98.1 °F (36.7 °C)-100.4 °F (38 °C)] 98.6 °F (37 °C)  Heart Rate:  [] 83  Resp:  [16-27] 18  BP: (112-128)/(63-92) 114/79  Physical Exam:   Gen: NAD, WDWN  ENT: PERRL, EOMI   CV: RRR no MRG  Pulm: CTAB, no w/r/r  GI: Abd soft, NTND, +bs  Neuro: Moving all extremities spontaneously, CN II-XII grossly intact   Psych: A&O*3, normal mood and affect  Skin: No lesions or rashes noted    Discharge Details        Discharge Medications        Continue These Medications        Instructions Start Date   albuterol sulfate  (90 Base) MCG/ACT inhaler  Commonly known as: PROVENTIL HFA;VENTOLIN HFA;PROAIR HFA   2 puffs, Inhalation, Every 4 Hours PRN      escitalopram 5 MG tablet  Commonly known as: LEXAPRO   5 mg, Oral, Daily      Fluticasone-Umeclidin-Vilant 100-62.5-25 MCG/ACT inhaler  Commonly known as: TRELEGY   1 puff, Inhalation, Daily - RT      folic acid 1 MG tablet  Commonly known as: FOLVITE   1 mg, Oral, Daily      furosemide 20 MG tablet  Commonly known as: LASIX   20 mg, Oral, 2 Times Daily      midodrine 10 MG tablet  Commonly known as: PROAMATINE   10 mg, Oral, 3 Times Daily Before Meals      ondansetron ODT 4 MG disintegrating tablet  Commonly known as: ZOFRAN-ODT   4 mg, Translingual, Every 8 Hours PRN      pantoprazole 40 MG EC tablet  Commonly known as: PROTONIX   40 mg, Oral, Daily      spironolactone 50 MG tablet  Commonly known as: ALDACTONE   50 mg, Oral, Daily      ursodiol 300 MG capsule  Commonly known as: ACTIGALL   300 mg, Oral, 2 Times Daily      vitamin D 1.25 MG (52614 UT) capsule capsule  Commonly known as: ERGOCALCIFEROL   50,000 Units, Oral, Weekly               No Known Allergies    Discharge Disposition:  Home or Self Care    Diet:  Hospital:  Diet Order   Procedures   • Diet:  Regular/House; Texture: Regular (IDDSI 7); Fluid Consistency: Thin (IDDSI 0)       Discharge Activity:   Activity Instructions       Activity as Tolerated              CODE STATUS:  Code Status and Medical Interventions: CPR (Attempt to Resuscitate); Full Support   Ordered at: 05/25/25 1630     Code Status (Patient has no pulse and is not breathing):    CPR (Attempt to Resuscitate)     Medical Interventions (Patient has pulse or is breathing):    Full Support     Level Of Support Discussed With:    Patient         Future Appointments   Date Time Provider Department [place]   5/28/2025  1:00 PM [facility]   6/4/2025  1:00 PM CHAIR 4 [facility]   6/27/2025  9:00 AM Елена Macario APRN [facility] [place]   7/11/2025  8:00 AM Callum Peterson, [facility]   7/17/2025 10:30 AM Callum Peterson DO [facility]   9/5/2025  9:45 AM Callum Peterson [facility]   11/19/2025  8:00 AM Shyla Yarbrough APRN [facility] GE ETW [facility] [place]       Additional Instructions for the Follow-ups that You Need to Schedule       Discharge Follow-up with PCP   As directed       Currently Documented PCP:    Callum Peterson DO    PCP Phone Number:    [phone]     Follow Up Details: 3-5 days                Pertinent  and/or Most Recent Results     PROCEDURES:   Thoracentesis    LAB RESULTS:      Lab 05/26/25 0442 05/25/25  1531 05/25/25  1230   WBC 8.31  --  7.68   HEMOGLOBIN 7.7*  --  9.5*   HEMATOCRIT 24.4*  --  30.6*   PLATELETS 88*  --  107*   NEUTROS ABS 5.68  --  5.56   IMMATURE GRANS (ABS) 0.06*  --  0.04   LYMPHS ABS 1.31  --  1.16   MONOS ABS 0.98*  --  0.72   EOS ABS 0.25  --  0.18   MCV 88.4  --  90.8   PROCALCITONIN  --  0.19  --    LDH  --  342*  --    PROTIME  --   --  19.3*         Lab 05/26/25  0442 05/25/25  1824 05/25/25  1230   SODIUM 131*  --  134*   POTASSIUM 3.7  --  3.6   CHLORIDE 96*  --  98   CO2 27.1  --  27.7   ANION GAP 7.9  --  8.3   BUN 20  --   18   CREATININE 1.48*  --  1.32*   EGFR 62.1  --  71.2   GLUCOSE 93  --  105*   CALCIUM 7.7*  --  8.3*   MAGNESIUM 1.7 1.8  --    PHOSPHORUS 2.8 2.8  --          Lab 05/25/25  1531 05/25/25  1230   TOTAL PROTEIN 5.6* 6.2   ALBUMIN  --  2.9*   GLOBULIN  --  3.3   ALT (SGPT)  --  24   AST (SGOT)  --  45*   BILIRUBIN  --  3.7*   ALK PHOS  --  202*         Lab 05/25/25  1531 05/25/25  1230   PROBNP  --  130.6   HSTROP T 25* 25*   PROTIME  --  19.3*   INR  --  1.55*             Lab 05/25/25  1531   IRON 98   IRON SATURATION (TSAT) 28   TIBC 350   TRANSFERRIN 235   FERRITIN 25.20*         Brief Urine Lab Results  (Last result in the past 365 days)        Color   Clarity   Blood   Leuk Est   Nitrite   Protein   CREAT   Urine HCG        05/25/25 2023 Dark Yellow   Clear   Negative   Negative   Positive   Trace                 Microbiology Results (last 10 days)       Procedure Component Value - Date/Time    Legionella Antigen, Urine - Urine, Urine, Clean Catch [[id number]]  (Normal) Collected: 05/25/25 2024    Lab Status: Final result Specimen: Urine, Clean Catch Updated: 05/25/25 2101     LEGIONELLA ANTIGEN, URINE Negative    S. Pneumo Ag Urine or CSF - Urine, Urine, Clean Catch [[id number]]  (Normal) Collected: 05/25/25 2024    Lab Status: Final result Specimen: Urine, Clean Catch Updated: 05/25/25 2102     Strep Pneumo Ag Negative    Respiratory Panel PCR w/COVID-19(SARS-CoV-2) DAVID/MEY/AMBAR/PAD/COR/[facility] In-House, NP Swab in UTM/VTM, 2 HR TAT - Swab, Nasopharynx [[id number]]  (Normal) Collected: 05/25/25 1820    Lab Status: Final result Specimen: Swab from Nasopharynx Updated: 05/25/25 1919     ADENOVIRUS, PCR Not Detected     Coronavirus 229E Not Detected     Coronavirus HKU1 Not Detected     Coronavirus NL63 Not Detected     Coronavirus OC43 Not Detected     COVID19 Not Detected     Human Metapneumovirus Not Detected     Human Rhinovirus/Enterovirus Not Detected     Influenza A PCR Not Detected     Influenza B PCR Not  Detected     Parainfluenza Virus 1 Not Detected     Parainfluenza Virus 2 Not Detected     Parainfluenza Virus 3 Not Detected     Parainfluenza Virus 4 Not Detected     RSV, PCR Not Detected     Bordetella pertussis pcr Not Detected     Bordetella parapertussis PCR Not Detected     Chlamydophila pneumoniae PCR Not Detected     Mycoplasma pneumo by PCR Not Detected    Narrative:      In the setting of a positive respiratory panel with a viral infection PLUS a negative procalcitonin without other underlying concern for bacterial infection, consider observing off antibiotics or discontinuation of antibiotics and continue supportive care. If the respiratory panel is positive for atypical bacterial infection (Bordetella pertussis, Chlamydophila pneumoniae, or Mycoplasma pneumoniae), consider antibiotic de-escalation to target atypical bacterial infection.    Body Fluid Culture - Body Fluid, Pleural Cavity [[id number]] Collected: 05/25/25 1621    Lab Status: Preliminary result Specimen: Body Fluid from Pleural Cavity Updated: 05/25/25 1741     Gram Stain Few (2+) Red blood cells      No organisms seen            XR Chest 1 View  Result Date: 5/25/2025  Marked improvement in aeration of the right hemithorax following thoracentesis. No pneumothorax. Electronically Signed: Dhruv Mendosa MD  5/25/2025 7:06 PM EDT  Workstation ID: [id number]    XR Chest 1 View  Result Date: 5/25/2025  Impression: 1. Large right pleural effusion, likely with secondary collapse of the right middle and right lower lobes 2. Suggestion of interval enlargement of the cardiac silhouette, raising the possibility of a pericardial effusion Electronically Signed: Franklin Nowak  5/25/2025 12:56 PM EDT  Workstation ID: [id number]       Results for orders placed during the hospital encounter of 05/25/24    Duplex Venous Lower Extremity - Bilateral CV-READ    Interpretation Summary  •  Normal bilateral lower extremity venous duplex scan.      Results for orders  placed during the hospital encounter of 05/25/24    Duplex Venous Lower Extremity - Bilateral CV-READ    Interpretation Summary  •  Normal bilateral lower extremity venous duplex scan.      Results for orders placed during the hospital encounter of 05/25/25    Adult Transthoracic Echo Complete W/ Cont if Necessary Per Protocol    Interpretation Summary  •  Left ventricular ejection fraction appears to be 61 - 65%.  •  Left ventricular diastolic function was normal.  •  There is a pericardial effusion. There is no evidence of cardiac tamponade.  •  There is a large right pleural effusion.      Labs Pending at Discharge:  Pending Labs       Order Current Status    AFB Culture - Body Fluid, Pleural Cavity In process    Albumin, Fluid - Pleural Fluid, Pleural Cavity In process    Anaerobic Culture - Pleural Fluid, Pleural Cavity In process    Blood Culture - Blood, Arm, Left In process    Blood Culture - Blood, Arm, Right In process    Folate In process    Fungus Culture - Body Fluid, Pleural Cavity In process    Glucose, Body Fluid - Pleural Fluid, Pleural Cavity In process    Lactate Dehydrogenase, Body Fluid - Body Fluid, Pleural Cavity In process    Protein, Body Fluid - Pleural Fluid, Pleural Cavity In process    Vitamin B12 In process    Body Fluid Culture - Body Fluid, Pleural Cavity Preliminary result              Time spent on Discharge including face to face service: 34 minutes    Electronically signed by Yared Fernandez MD, 05/26/25, 10:35 AM EDT.

## 2025-05-27 ENCOUNTER — TRANSITIONAL CARE MANAGEMENT TELEPHONE ENCOUNTER (OUTPATIENT)
Dept: CALL CENTER | Facility: HOSPITAL | Age: 40
End: 2025-05-27
Payer: COMMERCIAL

## 2025-05-27 NOTE — OUTREACH NOTE
Call Center TCM Note      Flowsheet Row Responses   [facility] patient discharged from? [facility]   Does the patient have one of the following disease processes/diagnoses(primary or secondary)? Other   TCM attempt successful? Yes  [vr for Jasmyn, mother]   Call start time 1507   Call end time 1510   Discharge diagnosis Pleural effusion   Meds reviewed with patient/caregiver? Yes   Does the patient have all medications ordered at discharge? N/A   Prescription comments no changes to medications,  no questions regarding historical meds   Is the patient taking all medications as directed (includes completed medication regime)? Yes   Comments Hospital f/u 6/6/25@1045am   Does the patient have an appointment with their PCP within 7-14 days of discharge? Yes   Nursing Interventions Assisted patient with making appointment per protocol   Has home health visited the patient within 72 hours of discharge? N/A   Psychosocial issues? No   Did the patient receive a copy of their discharge instructions? Yes   Nursing interventions Reviewed instructions with patient   What is the patient's perception of their health status since discharge? Improving  [Feels much better with improved breathing after thoracentesis,  aware to monitor for return of s/s and any s/s edema. Takes meds as ordered and aware of need for f/u with specialists]   Is the patient/caregiver able to teach back signs and symptoms related to disease process for when to call PCP? Yes   Is the patient/caregiver able to teach back signs and symptoms related to disease process for when to call 911? Yes   TCM call completed? Yes   Call end time 1510            RAJESH DENSON - Registered Nurse    5/27/2025, 15:12 EDT         How Did The Hair Loss Occur?: gradual in onset How Severe Is Your Hair Loss?: moderate

## 2025-05-28 ENCOUNTER — HOSPITAL ENCOUNTER (OUTPATIENT)
Dept: INFUSION THERAPY | Facility: HOSPITAL | Age: 40
Discharge: HOME | End: 2025-05-28
Admitting: INTERNAL MEDICINE
Payer: COMMERCIAL

## 2025-05-28 VITALS
OXYGEN SATURATION: 100 % | TEMPERATURE: 98.4 F | SYSTOLIC BLOOD PRESSURE: 119 MMHG | RESPIRATION RATE: 18 BRPM | HEIGHT: 68 IN | BODY MASS INDEX: 26.06 KG/M2 | WEIGHT: 171.96 LBS | DIASTOLIC BLOOD PRESSURE: 72 MMHG | HEART RATE: 79 BPM

## 2025-05-28 DIAGNOSIS — D63.1 ANEMIA OF CHRONIC RENAL FAILURE, STAGE 2 (MILD): Primary | ICD-10-CM

## 2025-05-28 DIAGNOSIS — N18.2 ANEMIA OF CHRONIC RENAL FAILURE, STAGE 2 (MILD): Primary | ICD-10-CM

## 2025-05-28 DIAGNOSIS — K90.9 IRON MALABSORPTION: ICD-10-CM

## 2025-05-28 PROCEDURE — 96366 THER/PROPH/DIAG IV INF ADDON: CPT

## 2025-05-28 PROCEDURE — 25810000003 SODIUM CHLORIDE 0.9 % SOLUTION 250 ML FLEX CONT: Performed by: INTERNAL MEDICINE

## 2025-05-28 PROCEDURE — 96365 THER/PROPH/DIAG IV INF INIT: CPT

## 2025-05-28 PROCEDURE — 25010000002 IRON SUCROSE PER 1 MG: Performed by: INTERNAL MEDICINE

## 2025-05-28 RX ADMIN — IRON SUCROSE 500 MG: 20 INJECTION, SOLUTION INTRAVENOUS at 13:32

## 2025-05-29 LAB
BACTERIA FLD CULT: NORMAL
GRAM STN SPEC: NORMAL
GRAM STN SPEC: NORMAL

## 2025-05-30 LAB
BACTERIA SPEC AEROBE CULT: NORMAL
BACTERIA SPEC AEROBE CULT: NORMAL

## 2025-05-31 LAB — BACTERIA SPEC ANAEROBE CULT: NORMAL

## 2025-06-01 ENCOUNTER — APPOINTMENT (OUTPATIENT)
Dept: GENERAL RADIOLOGY | Facility: HOSPITAL | Age: 40
End: 2025-06-01
Payer: COMMERCIAL

## 2025-06-01 ENCOUNTER — APPOINTMENT (OUTPATIENT)
Dept: CT IMAGING | Facility: HOSPITAL | Age: 40
End: 2025-06-01
Payer: COMMERCIAL

## 2025-06-01 ENCOUNTER — READMISSION MANAGEMENT (OUTPATIENT)
Dept: CALL CENTER | Facility: HOSPITAL | Age: 40
End: 2025-06-01
Payer: COMMERCIAL

## 2025-06-01 ENCOUNTER — HOSPITAL ENCOUNTER (OUTPATIENT)
Facility: HOSPITAL | Age: 40
Setting detail: OBSERVATION
Discharge: ANOTHER HOSPITAL | End: 2025-06-03
Attending: EMERGENCY MEDICINE | Admitting: INTERNAL MEDICINE
Payer: COMMERCIAL

## 2025-06-01 DIAGNOSIS — I26.99 OTHER PULMONARY EMBOLISM WITHOUT ACUTE COR PULMONALE, UNSPECIFIED CHRONICITY: ICD-10-CM

## 2025-06-01 DIAGNOSIS — K74.60 DECOMPENSATED CIRRHOSIS: Primary | ICD-10-CM

## 2025-06-01 DIAGNOSIS — R18.8 OTHER ASCITES: ICD-10-CM

## 2025-06-01 DIAGNOSIS — D72.829 LEUKOCYTOSIS, UNSPECIFIED TYPE: ICD-10-CM

## 2025-06-01 DIAGNOSIS — J96.01 ACUTE RESPIRATORY FAILURE WITH HYPOXIA: ICD-10-CM

## 2025-06-01 DIAGNOSIS — R26.2 DIFFICULTY WALKING: ICD-10-CM

## 2025-06-01 DIAGNOSIS — K72.90 DECOMPENSATED CIRRHOSIS: Primary | ICD-10-CM

## 2025-06-01 LAB
ALBUMIN SERPL-MCNC: 2.9 G/DL (ref 3.5–5.2)
ALBUMIN/GLOB SERPL: 0.9 G/DL
ALP SERPL-CCNC: 192 U/L (ref 39–117)
ALT SERPL W P-5'-P-CCNC: 28 U/L (ref 1–41)
AMMONIA BLD-SCNC: 154 UMOL/L (ref 16–60)
AMPHET+METHAMPHET UR QL: NEGATIVE
AMPHETAMINES UR QL: NEGATIVE
ANION GAP SERPL CALCULATED.3IONS-SCNC: 12.1 MMOL/L (ref 5–15)
APPEARANCE FLD: CLEAR
APTT PPP: 40.8 SECONDS (ref 78–95.9)
AST SERPL-CCNC: 54 U/L (ref 1–40)
BACTERIA UR QL AUTO: ABNORMAL /HPF
BARBITURATES UR QL SCN: NEGATIVE
BASOPHILS # BLD AUTO: 0.05 10*3/MM3 (ref 0–0.2)
BASOPHILS NFR BLD AUTO: 0.2 % (ref 0–1.5)
BENZODIAZ UR QL SCN: NEGATIVE
BILIRUB SERPL-MCNC: 4.1 MG/DL (ref 0–1.2)
BILIRUB UR QL STRIP: ABNORMAL
BUN SERPL-MCNC: 22.3 MG/DL (ref 6–20)
BUN/CREAT SERPL: 14.7 (ref 7–25)
BUPRENORPHINE SERPL-MCNC: NEGATIVE NG/ML
CALCIUM SPEC-SCNC: 8.2 MG/DL (ref 8.6–10.5)
CANNABINOIDS SERPL QL: POSITIVE
CHLORIDE SERPL-SCNC: 97 MMOL/L (ref 98–107)
CLARITY UR: CLEAR
CO2 SERPL-SCNC: 18.9 MMOL/L (ref 22–29)
COCAINE UR QL: NEGATIVE
COLOR FLD: NORMAL
COLOR UR: ABNORMAL
CREAT SERPL-MCNC: 1.52 MG/DL (ref 0.76–1.27)
D-LACTATE SERPL-SCNC: 1.9 MMOL/L (ref 0.5–2)
D-LACTATE SERPL-SCNC: 2.8 MMOL/L (ref 0.5–2)
DEPRECATED RDW RBC AUTO: 63.3 FL (ref 37–54)
EGFRCR SERPLBLD CKD-EPI 2021: 60.1 ML/MIN/1.73
EOSINOPHIL # BLD AUTO: 0.1 10*3/MM3 (ref 0–0.4)
EOSINOPHIL NFR BLD AUTO: 0.5 % (ref 0.3–6.2)
ERYTHROCYTE [DISTWIDTH] IN BLOOD BY AUTOMATED COUNT: 20.6 % (ref 12.3–15.4)
ETHANOL BLD-MCNC: <10 MG/DL (ref 0–10)
ETHANOL UR QL: <0.01 %
FENTANYL UR-MCNC: POSITIVE NG/ML
FUNGUS WND CULT: NORMAL
GEN 5 1HR TROPONIN T REFLEX: 25 NG/L
GLOBULIN UR ELPH-MCNC: 3.2 GM/DL
GLUCOSE SERPL-MCNC: 92 MG/DL (ref 65–99)
GLUCOSE UR STRIP-MCNC: NEGATIVE MG/DL
HCT VFR BLD AUTO: 30.1 % (ref 37.5–51)
HGB BLD-MCNC: 9.4 G/DL (ref 13–17.7)
HGB UR QL STRIP.AUTO: NEGATIVE
HOLD SPECIMEN: NORMAL
HOLD SPECIMEN: NORMAL
HYALINE CASTS UR QL AUTO: ABNORMAL /LPF
IMM GRANULOCYTES # BLD AUTO: 0.39 10*3/MM3 (ref 0–0.05)
IMM GRANULOCYTES NFR BLD AUTO: 1.9 % (ref 0–0.5)
INR PPP: 1.56 (ref 0.86–1.15)
KETONES UR QL STRIP: NEGATIVE
LEUKOCYTE ESTERASE UR QL STRIP.AUTO: ABNORMAL
LYMPHOCYTES # BLD AUTO: 2.32 10*3/MM3 (ref 0.7–3.1)
LYMPHOCYTES NFR BLD AUTO: 11.4 % (ref 19.6–45.3)
MACROPHAGE FLUID %: 1 %
MCH RBC QN AUTO: 28.9 PG (ref 26.6–33)
MCHC RBC AUTO-ENTMCNC: 31.2 G/DL (ref 31.5–35.7)
MCV RBC AUTO: 92.6 FL (ref 79–97)
METHADONE UR QL SCN: NEGATIVE
MONOCYTES # BLD AUTO: 0.67 10*3/MM3 (ref 0.1–0.9)
MONOCYTES NFR BLD AUTO: 3.3 % (ref 5–12)
MONOCYTES NFR FLD: 22 %
MYCOBACTERIUM SPEC CULT: NORMAL
NEUTROPHILS NFR BLD AUTO: 16.83 10*3/MM3 (ref 1.7–7)
NEUTROPHILS NFR BLD AUTO: 82.7 % (ref 42.7–76)
NEUTROPHILS NFR FLD MANUAL: 77 %
NIGHT BLUE STAIN TISS: NORMAL
NITRITE UR QL STRIP: NEGATIVE
NRBC BLD AUTO-RTO: 0 /100 WBC (ref 0–0.2)
NT-PROBNP SERPL-MCNC: 364.8 PG/ML (ref 0–450)
NUC CELL # FLD: 1010 /MM3
OPIATES UR QL: NEGATIVE
OXYCODONE UR QL SCN: NEGATIVE
PCP UR QL SCN: NEGATIVE
PH FLD: 8 [PH]
PH UR STRIP.AUTO: 6 [PH] (ref 5–8)
PLATELET # BLD AUTO: 138 10*3/MM3 (ref 140–450)
PMV BLD AUTO: 9.7 FL (ref 6–12)
POTASSIUM SERPL-SCNC: 5.3 MMOL/L (ref 3.5–5.2)
PROT SERPL-MCNC: 6.1 G/DL (ref 6–8.5)
PROT UR QL STRIP: ABNORMAL
PROTHROMBIN TIME: 19.4 SECONDS (ref 11.8–14.9)
RBC # BLD AUTO: 3.25 10*6/MM3 (ref 4.14–5.8)
RBC # FLD AUTO: <2000 /MM3
RBC # UR STRIP: ABNORMAL /HPF
REF LAB TEST METHOD: ABNORMAL
SODIUM SERPL-SCNC: 128 MMOL/L (ref 136–145)
SP GR UR STRIP: >1.03 (ref 1–1.03)
SQUAMOUS #/AREA URNS HPF: ABNORMAL /HPF
TRICYCLICS UR QL SCN: NEGATIVE
TROPONIN T % DELTA: -14
TROPONIN T NUMERIC DELTA: -4 NG/L
TROPONIN T SERPL HS-MCNC: 29 NG/L
UROBILINOGEN UR QL STRIP: ABNORMAL
WBC # UR STRIP: ABNORMAL /HPF
WBC NRBC COR # BLD AUTO: 20.36 10*3/MM3 (ref 3.4–10.8)
WHOLE BLOOD HOLD COAG: NORMAL
WHOLE BLOOD HOLD SPECIMEN: NORMAL

## 2025-06-01 PROCEDURE — 93005 ELECTROCARDIOGRAM TRACING: CPT

## 2025-06-01 PROCEDURE — 85610 PROTHROMBIN TIME: CPT | Performed by: EMERGENCY MEDICINE

## 2025-06-01 PROCEDURE — 93005 ELECTROCARDIOGRAM TRACING: CPT | Performed by: EMERGENCY MEDICINE

## 2025-06-01 PROCEDURE — 83605 ASSAY OF LACTIC ACID: CPT | Performed by: EMERGENCY MEDICINE

## 2025-06-01 PROCEDURE — 96375 TX/PRO/DX INJ NEW DRUG ADDON: CPT

## 2025-06-01 PROCEDURE — 25010000002 FENTANYL CITRATE (PF) 50 MCG/ML SOLUTION: Performed by: EMERGENCY MEDICINE

## 2025-06-01 PROCEDURE — 99291 CRITICAL CARE FIRST HOUR: CPT | Performed by: INTERNAL MEDICINE

## 2025-06-01 PROCEDURE — 99285 EMERGENCY DEPT VISIT HI MDM: CPT

## 2025-06-01 PROCEDURE — 82077 ASSAY SPEC XCP UR&BREATH IA: CPT | Performed by: EMERGENCY MEDICINE

## 2025-06-01 PROCEDURE — 87075 CULTR BACTERIA EXCEPT BLOOD: CPT | Performed by: INTERNAL MEDICINE

## 2025-06-01 PROCEDURE — 83986 ASSAY PH BODY FLUID NOS: CPT | Performed by: INTERNAL MEDICINE

## 2025-06-01 PROCEDURE — 25010000002 HEPARIN (PORCINE) 25000-0.45 UT/250ML-% SOLUTION: Performed by: INTERNAL MEDICINE

## 2025-06-01 PROCEDURE — 82140 ASSAY OF AMMONIA: CPT | Performed by: EMERGENCY MEDICINE

## 2025-06-01 PROCEDURE — 74177 CT ABD & PELVIS W/CONTRAST: CPT

## 2025-06-01 PROCEDURE — 96365 THER/PROPH/DIAG IV INF INIT: CPT

## 2025-06-01 PROCEDURE — 84157 ASSAY OF PROTEIN OTHER: CPT | Performed by: INTERNAL MEDICINE

## 2025-06-01 PROCEDURE — 87015 SPECIMEN INFECT AGNT CONCNTJ: CPT | Performed by: INTERNAL MEDICINE

## 2025-06-01 PROCEDURE — 32555 ASPIRATE PLEURA W/ IMAGING: CPT | Performed by: INTERNAL MEDICINE

## 2025-06-01 PROCEDURE — 82945 GLUCOSE OTHER FLUID: CPT | Performed by: INTERNAL MEDICINE

## 2025-06-01 PROCEDURE — 81001 URINALYSIS AUTO W/SCOPE: CPT | Performed by: INTERNAL MEDICINE

## 2025-06-01 PROCEDURE — 96366 THER/PROPH/DIAG IV INF ADDON: CPT

## 2025-06-01 PROCEDURE — 82150 ASSAY OF AMYLASE: CPT | Performed by: INTERNAL MEDICINE

## 2025-06-01 PROCEDURE — 93010 ELECTROCARDIOGRAM REPORT: CPT | Performed by: INTERNAL MEDICINE

## 2025-06-01 PROCEDURE — 25810000003 SODIUM CHLORIDE 0.9 % SOLUTION: Performed by: PHYSICIAN ASSISTANT

## 2025-06-01 PROCEDURE — 85730 THROMBOPLASTIN TIME PARTIAL: CPT | Performed by: INTERNAL MEDICINE

## 2025-06-01 PROCEDURE — 82042 OTHER SOURCE ALBUMIN QUAN EA: CPT | Performed by: INTERNAL MEDICINE

## 2025-06-01 PROCEDURE — 85025 COMPLETE CBC W/AUTO DIFF WBC: CPT | Performed by: EMERGENCY MEDICINE

## 2025-06-01 PROCEDURE — 87040 BLOOD CULTURE FOR BACTERIA: CPT | Performed by: EMERGENCY MEDICINE

## 2025-06-01 PROCEDURE — 89051 BODY FLUID CELL COUNT: CPT | Performed by: INTERNAL MEDICINE

## 2025-06-01 PROCEDURE — 87205 SMEAR GRAM STAIN: CPT | Performed by: INTERNAL MEDICINE

## 2025-06-01 PROCEDURE — 87086 URINE CULTURE/COLONY COUNT: CPT | Performed by: INTERNAL MEDICINE

## 2025-06-01 PROCEDURE — 36415 COLL VENOUS BLD VENIPUNCTURE: CPT | Performed by: EMERGENCY MEDICINE

## 2025-06-01 PROCEDURE — 71045 X-RAY EXAM CHEST 1 VIEW: CPT

## 2025-06-01 PROCEDURE — 49083 ABD PARACENTESIS W/IMAGING: CPT | Performed by: INTERNAL MEDICINE

## 2025-06-01 PROCEDURE — 87070 CULTURE OTHR SPECIMN AEROBIC: CPT | Performed by: INTERNAL MEDICINE

## 2025-06-01 PROCEDURE — 25510000001 IOPAMIDOL PER 1 ML: Performed by: EMERGENCY MEDICINE

## 2025-06-01 PROCEDURE — 94760 N-INVAS EAR/PLS OXIMETRY 1: CPT

## 2025-06-01 PROCEDURE — G0378 HOSPITAL OBSERVATION PER HR: HCPCS

## 2025-06-01 PROCEDURE — 80307 DRUG TEST PRSMV CHEM ANLYZR: CPT | Performed by: INTERNAL MEDICINE

## 2025-06-01 PROCEDURE — 83615 LACTATE (LD) (LDH) ENZYME: CPT | Performed by: INTERNAL MEDICINE

## 2025-06-01 PROCEDURE — 25010000002 CEFTRIAXONE PER 250 MG: Performed by: PHYSICIAN ASSISTANT

## 2025-06-01 PROCEDURE — 83880 ASSAY OF NATRIURETIC PEPTIDE: CPT | Performed by: EMERGENCY MEDICINE

## 2025-06-01 PROCEDURE — 94799 UNLISTED PULMONARY SVC/PX: CPT

## 2025-06-01 PROCEDURE — 71275 CT ANGIOGRAPHY CHEST: CPT

## 2025-06-01 PROCEDURE — 84484 ASSAY OF TROPONIN QUANT: CPT | Performed by: EMERGENCY MEDICINE

## 2025-06-01 PROCEDURE — 80053 COMPREHEN METABOLIC PANEL: CPT | Performed by: EMERGENCY MEDICINE

## 2025-06-01 PROCEDURE — 76705 ECHO EXAM OF ABDOMEN: CPT | Performed by: INTERNAL MEDICINE

## 2025-06-01 PROCEDURE — 25010000002 ONDANSETRON PER 1 MG: Performed by: EMERGENCY MEDICINE

## 2025-06-01 PROCEDURE — 88108 CYTOPATH CONCENTRATE TECH: CPT | Performed by: INTERNAL MEDICINE

## 2025-06-01 PROCEDURE — 99223 1ST HOSP IP/OBS HIGH 75: CPT | Performed by: INTERNAL MEDICINE

## 2025-06-01 RX ORDER — SODIUM CHLORIDE 9 MG/ML
40 INJECTION, SOLUTION INTRAVENOUS AS NEEDED
Status: DISCONTINUED | OUTPATIENT
Start: 2025-06-01 | End: 2025-06-04 | Stop reason: HOSPADM

## 2025-06-01 RX ORDER — ONDANSETRON 4 MG/1
4 TABLET, ORALLY DISINTEGRATING ORAL EVERY 8 HOURS PRN
Status: DISCONTINUED | OUTPATIENT
Start: 2025-06-01 | End: 2025-06-04 | Stop reason: HOSPADM

## 2025-06-01 RX ORDER — ONDANSETRON 2 MG/ML
4 INJECTION INTRAMUSCULAR; INTRAVENOUS EVERY 6 HOURS PRN
Status: DISCONTINUED | OUTPATIENT
Start: 2025-06-01 | End: 2025-06-04 | Stop reason: HOSPADM

## 2025-06-01 RX ORDER — FOLIC ACID 1 MG/1
1 TABLET ORAL DAILY
Status: DISCONTINUED | OUTPATIENT
Start: 2025-06-01 | End: 2025-06-04 | Stop reason: HOSPADM

## 2025-06-01 RX ORDER — SPIRONOLACTONE 25 MG/1
25 TABLET ORAL 2 TIMES DAILY
COMMUNITY

## 2025-06-01 RX ORDER — BUDESONIDE 0.5 MG/2ML
0.5 INHALANT ORAL
Status: DISCONTINUED | OUTPATIENT
Start: 2025-06-01 | End: 2025-06-04 | Stop reason: HOSPADM

## 2025-06-01 RX ORDER — BISACODYL 10 MG
10 SUPPOSITORY, RECTAL RECTAL DAILY PRN
Status: DISCONTINUED | OUTPATIENT
Start: 2025-06-01 | End: 2025-06-04 | Stop reason: HOSPADM

## 2025-06-01 RX ORDER — BISACODYL 5 MG/1
5 TABLET, DELAYED RELEASE ORAL DAILY PRN
Status: DISCONTINUED | OUTPATIENT
Start: 2025-06-01 | End: 2025-06-04 | Stop reason: HOSPADM

## 2025-06-01 RX ORDER — POLYETHYLENE GLYCOL 3350 17 G/17G
17 POWDER, FOR SOLUTION ORAL DAILY PRN
Status: DISCONTINUED | OUTPATIENT
Start: 2025-06-01 | End: 2025-06-04 | Stop reason: HOSPADM

## 2025-06-01 RX ORDER — FUROSEMIDE 20 MG/1
20 TABLET ORAL 2 TIMES DAILY
Status: DISCONTINUED | OUTPATIENT
Start: 2025-06-01 | End: 2025-06-04 | Stop reason: HOSPADM

## 2025-06-01 RX ORDER — IOPAMIDOL 755 MG/ML
100 INJECTION, SOLUTION INTRAVASCULAR
Status: COMPLETED | OUTPATIENT
Start: 2025-06-01 | End: 2025-06-01

## 2025-06-01 RX ORDER — AMOXICILLIN 250 MG
2 CAPSULE ORAL 2 TIMES DAILY PRN
Status: DISCONTINUED | OUTPATIENT
Start: 2025-06-01 | End: 2025-06-04 | Stop reason: HOSPADM

## 2025-06-01 RX ORDER — LACTULOSE 10 G/15ML
30 SOLUTION ORAL DAILY
Status: COMPLETED | OUTPATIENT
Start: 2025-06-01 | End: 2025-06-01

## 2025-06-01 RX ORDER — URSODIOL 300 MG/1
300 CAPSULE ORAL 2 TIMES DAILY
Status: DISCONTINUED | OUTPATIENT
Start: 2025-06-01 | End: 2025-06-04 | Stop reason: HOSPADM

## 2025-06-01 RX ORDER — LACTULOSE 10 G/15ML
10 SOLUTION ORAL 2 TIMES DAILY
Status: DISCONTINUED | OUTPATIENT
Start: 2025-06-01 | End: 2025-06-04 | Stop reason: HOSPADM

## 2025-06-01 RX ORDER — CALCIUM CARBONATE 500 MG/1
2 TABLET, CHEWABLE ORAL 2 TIMES DAILY PRN
Status: DISCONTINUED | OUTPATIENT
Start: 2025-06-01 | End: 2025-06-04 | Stop reason: HOSPADM

## 2025-06-01 RX ORDER — ALBUTEROL SULFATE 90 UG/1
2 INHALANT RESPIRATORY (INHALATION) EVERY 4 HOURS PRN
Status: DISCONTINUED | OUTPATIENT
Start: 2025-06-01 | End: 2025-06-01

## 2025-06-01 RX ORDER — ONDANSETRON 2 MG/ML
4 INJECTION INTRAMUSCULAR; INTRAVENOUS ONCE
Status: COMPLETED | OUTPATIENT
Start: 2025-06-01 | End: 2025-06-01

## 2025-06-01 RX ORDER — ALBUTEROL SULFATE 0.83 MG/ML
2.5 SOLUTION RESPIRATORY (INHALATION) EVERY 4 HOURS PRN
Status: DISCONTINUED | OUTPATIENT
Start: 2025-06-01 | End: 2025-06-04 | Stop reason: HOSPADM

## 2025-06-01 RX ORDER — PANTOPRAZOLE SODIUM 40 MG/1
40 TABLET, DELAYED RELEASE ORAL DAILY
Status: DISCONTINUED | OUTPATIENT
Start: 2025-06-01 | End: 2025-06-04 | Stop reason: HOSPADM

## 2025-06-01 RX ORDER — SODIUM CHLORIDE 0.9 % (FLUSH) 0.9 %
10 SYRINGE (ML) INJECTION AS NEEDED
Status: DISCONTINUED | OUTPATIENT
Start: 2025-06-01 | End: 2025-06-04 | Stop reason: HOSPADM

## 2025-06-01 RX ORDER — SPIRONOLACTONE 25 MG/1
25 TABLET ORAL 2 TIMES DAILY
Status: DISCONTINUED | OUTPATIENT
Start: 2025-06-01 | End: 2025-06-04 | Stop reason: HOSPADM

## 2025-06-01 RX ORDER — SODIUM CHLORIDE 0.9 % (FLUSH) 0.9 %
10 SYRINGE (ML) INJECTION EVERY 12 HOURS SCHEDULED
Status: DISCONTINUED | OUTPATIENT
Start: 2025-06-01 | End: 2025-06-04 | Stop reason: HOSPADM

## 2025-06-01 RX ORDER — HEPARIN SODIUM 10000 [USP'U]/100ML
18 INJECTION, SOLUTION INTRAVENOUS
Status: DISCONTINUED | OUTPATIENT
Start: 2025-06-01 | End: 2025-06-03

## 2025-06-01 RX ORDER — FENTANYL CITRATE 50 UG/ML
50 INJECTION, SOLUTION INTRAMUSCULAR; INTRAVENOUS ONCE
Refills: 0 | Status: COMPLETED | OUTPATIENT
Start: 2025-06-01 | End: 2025-06-01

## 2025-06-01 RX ORDER — DICYCLOMINE HYDROCHLORIDE 10 MG/1
20 CAPSULE ORAL ONCE
Status: COMPLETED | OUTPATIENT
Start: 2025-06-01 | End: 2025-06-01

## 2025-06-01 RX ORDER — MIDODRINE HYDROCHLORIDE 10 MG/1
10 TABLET ORAL
Status: DISCONTINUED | OUTPATIENT
Start: 2025-06-01 | End: 2025-06-04 | Stop reason: HOSPADM

## 2025-06-01 RX ORDER — MIDODRINE HYDROCHLORIDE 10 MG/1
10 TABLET ORAL ONCE
Status: COMPLETED | OUTPATIENT
Start: 2025-06-01 | End: 2025-06-01

## 2025-06-01 RX ORDER — ARFORMOTEROL TARTRATE 15 UG/2ML
15 SOLUTION RESPIRATORY (INHALATION)
Status: DISCONTINUED | OUTPATIENT
Start: 2025-06-01 | End: 2025-06-04 | Stop reason: HOSPADM

## 2025-06-01 RX ADMIN — PANTOPRAZOLE SODIUM 40 MG: 40 TABLET, DELAYED RELEASE ORAL at 18:20

## 2025-06-01 RX ADMIN — ARFORMOTEROL TARTRATE 15 MCG: 15 SOLUTION RESPIRATORY (INHALATION) at 20:23

## 2025-06-01 RX ADMIN — LACTULOSE 30 G: 10 SOLUTION ORAL at 14:55

## 2025-06-01 RX ADMIN — SPIRONOLACTONE 25 MG: 25 TABLET ORAL at 20:43

## 2025-06-01 RX ADMIN — IOPAMIDOL 100 ML: 755 INJECTION, SOLUTION INTRAVENOUS at 11:29

## 2025-06-01 RX ADMIN — ONDANSETRON 4 MG: 2 INJECTION INTRAMUSCULAR; INTRAVENOUS at 10:23

## 2025-06-01 RX ADMIN — HEPARIN SODIUM 18 UNITS/KG/HR: 10000 INJECTION, SOLUTION INTRAVENOUS at 18:10

## 2025-06-01 RX ADMIN — URSODIOL 300 MG: 300 CAPSULE ORAL at 20:43

## 2025-06-01 RX ADMIN — SODIUM CHLORIDE 500 ML: 9 INJECTION, SOLUTION INTRAVENOUS at 23:56

## 2025-06-01 RX ADMIN — FENTANYL CITRATE 50 MCG: 50 INJECTION, SOLUTION INTRAMUSCULAR; INTRAVENOUS at 10:24

## 2025-06-01 RX ADMIN — DICYCLOMINE HYDROCHLORIDE 20 MG: 10 CAPSULE ORAL at 10:23

## 2025-06-01 RX ADMIN — IOPAMIDOL 100 ML: 755 INJECTION, SOLUTION INTRAVENOUS at 10:42

## 2025-06-01 RX ADMIN — LACTULOSE SOLUTION USP, 10 G/15 ML 10 G: 10 SOLUTION ORAL; RECTAL at 20:42

## 2025-06-01 RX ADMIN — CEFTRIAXONE SODIUM 2000 MG: 2 INJECTION, POWDER, FOR SOLUTION INTRAMUSCULAR; INTRAVENOUS at 23:54

## 2025-06-01 RX ADMIN — CHOLECALCIFEROL CAP 1.25 MG (50000 UNIT) 50000 UNITS: 1.25 CAP at 18:23

## 2025-06-01 RX ADMIN — Medication 10 ML: at 20:44

## 2025-06-01 RX ADMIN — MIDODRINE HYDROCHLORIDE 10 MG: 10 TABLET ORAL at 18:20

## 2025-06-01 RX ADMIN — MIDODRINE HYDROCHLORIDE 10 MG: 10 TABLET ORAL at 23:41

## 2025-06-01 RX ADMIN — FOLIC ACID 1 MG: 1 TABLET ORAL at 18:20

## 2025-06-01 RX ADMIN — BUDESONIDE 0.5 MG: 0.5 SUSPENSION RESPIRATORY (INHALATION) at 20:24

## 2025-06-01 RX ADMIN — FUROSEMIDE 20 MG: 20 TABLET ORAL at 20:42

## 2025-06-01 NOTE — ED PROVIDER NOTES
Time: 10:12 AM EDT  Date of encounter:  6/1/2025  Independent Historian/Clinical History and Information was obtained by:   Patient    History is limited by: N/A    Chief Complaint: Shortness of breath      History of Present Illness:  Patient is a 39 y.o. year old male who presents to the emergency department for evaluation of shortness of breath.  This patient presents to the emergency department complaining of increasing shortness of breath over the last couple of days.  The patient was admitted to the hospital from May 25 through 26 and discharged home.  The patient has a history of decompensated alcoholic cirrhosis status post TIPS, he has a known pericardial effusion, asthma, thrombocytopenia and anemia.  Today he presents with worsening shortness of breath and an oxygen saturation that was approximately 74% on room air.  He was put on 4 L nasal cannula and currently saturating above 90%.  The patient is had no fever, he has had some vomiting complains of increasing abdominal girth associated with his ascites.      Patient Care Team  Primary Care Provider: Callum Peterson DO    Past Medical History:     No Known Allergies  Past Medical History:   Diagnosis Date    NICKI (acute kidney injury)     IN [facility] 3/24/25 [place], RIGHT UPPER QUAD PAIN, HYPOKALEMIA, AND HYPONATREMIA. CREAT 1.77, GFR 50.1, TOTAL BILI 5.6.  4/17/25 CREAT 1.O8 AND GFR 90.6    Alcohol abuse 03/14/2017    REPORTS HAS BEEN SOBER OVER 5 YEARS    Anxiety     Ascites     Asthma     FOLLOWED BY DR SCHAEFER    Last esophagus     Cirrhosis     Essential hypertension 12/27/2019    GERD (gastroesophageal reflux disease)     History of transfusion     Feb. 2024    Hypokalemia 03/14/2017    LAST POTASSIUM 4 ON 4/17/25    Hyponatremia 03/14/2017    LAST SODIUM 138 ON 4/17/25    Low back pain     Pleural effusion     Primary biliary cholangitis     Splenomegaly     Steatohepatitis, alcoholic 03/14/2017    Tobacco abuse 03/14/2017    Umbilical hernia       Past Surgical History:   Procedure Laterality Date    CHOLECYSTECTOMY      ENDOSCOPY N/A 02/09/2022    Procedure: ESOPHAGOGASTRODUODENOSCOPY WITH BX;  Surgeon: Gino Khan MD;  Location: [facility] ENDOSCOPY;  Service: Gastroenterology;  Laterality: N/A;  RETAINED LIQUID FOOD IN STOMACH, HUFFMAN'S ESOPHAGUS    ENDOSCOPY N/A 02/10/2023    Procedure: ESOPHAGOGASTRODUODENOSCOPY;  Surgeon: Gino Khan MD;  Location: [facility] ENDOSCOPY;  Service: Gastroenterology;  Laterality: N/A;  GASTRITIS, BARRETTS ESOPHAGUS, PHG    ENDOSCOPY N/A 02/05/2025    Procedure: ESOPHAGOGASTRODUODENOSCOPY WITH BIOPSIES;  Surgeon: Gino Khan MD;  Location: [facility] ENDOSCOPY;  Service: Gastroenterology;  Laterality: N/A;  ESOPHAGEAL VARICES, PORTAL HYPERTENSIVE GASTROPATHY    PARACENTESIS      2/20/25    THORACENTESIS      LAST ONE DONE 12/13/24 WITH DR SCHAEFER    TIPS PROCEDURE Right     11/11/2024    UPPER GASTROINTESTINAL ENDOSCOPY       Family History   Problem Relation Age of Onset    Alcohol abuse Mother     Arthritis Mother     COPD Mother     Heart disease Maternal Grandmother     Hypertension Maternal Grandmother     Lung cancer Maternal Grandmother     Stroke Maternal Grandmother     Heart disease Maternal Grandfather     Lung cancer Maternal Grandfather     Cancer Paternal Grandmother     Cancer Paternal Grandfather     Colon cancer Neg Hx     Malig Hyperthermia Neg Hx        Home Medications:  Prior to Admission medications    Medication Sig Start Date End Date Taking? Authorizing Provider   albuterol sulfate  (90 Base) MCG/ACT inhaler Inhale 2 puffs Every 4 (Four) Hours As Needed for Wheezing or Shortness of Air. 3/31/25   Callum Peterson, DO   escitalopram (LEXAPRO) 5 MG tablet Take 1 tablet by mouth Daily. 3/31/25   Callum Peterson, DO   Fluticasone-Umeclidin-Vilant (TRELEGY) 100-62.5-25 MCG/ACT inhaler Inhale 1 puff Daily. 3/31/25   Callum Peterson, DO   folic acid  (FOLVITE) 1 MG tablet Take 1 tablet by mouth Daily. 3/31/25 3/31/26  Callum Peterson DO   furosemide (LASIX) 20 MG tablet Take 1 tablet by mouth 2 (Two) Times a Day. 3/31/25   Callum Peterson DO   midodrine (PROAMATINE) 10 MG tablet Take 1 tablet by mouth 3 (Three) Times a Day Before Meals. 3/31/25   Callum Peterson DO   ondansetron ODT (ZOFRAN-ODT) 4 MG disintegrating tablet Place 1 tablet on the tongue Every 8 (Eight) Hours As Needed for Nausea or Vomiting. 3/24/25   Sofie Flor APRN   pantoprazole (PROTONIX) 40 MG EC tablet Take 1 tablet by mouth Daily. 3/31/25   Callum Peterson DO   spironolactone (ALDACTONE) 50 MG tablet Take 1 tablet by mouth Daily. 3/31/25   Callum Peterson DO   ursodiol (ACTIGALL) 300 MG capsule Take 1 capsule by mouth 2 (Two) Times a Day. 3/31/25   Callum Peterson DO   vitamin D (ERGOCALCIFEROL) 1.25 MG (72271 UT) capsule capsule Take 1 capsule by mouth 1 (One) Time Per Week. 3/31/25   Callum Peterson DO        Social History:   Social History     Tobacco Use    Smoking status: Every Day     Current packs/day: 0.50     Average packs/day: 0.5 packs/day for 17.4 years (8.7 ttl pk-yrs)     Types: Cigarettes     Start date: 1/1/2008     Passive exposure: Current    Smokeless tobacco: Never    Tobacco comments:     Last 2330 5/7/25   Vaping Use    Vaping status: Never Used   Substance Use Topics    Alcohol use: Not Currently     Alcohol/week: 3.0 standard drinks of alcohol     Comment: NO ALCOHOL IN OVER 5 YEARS    Drug use: Never         Review of Systems:  Review of Systems   Constitutional:  Positive for activity change, appetite change and fatigue. Negative for chills, diaphoresis and fever.   HENT:  Negative for congestion, ear pain and sore throat.    Eyes:  Negative for pain.   Respiratory:  Positive for shortness of breath. Negative for cough and chest tightness.    Cardiovascular:  Negative for chest pain.  "  Gastrointestinal:  Positive for abdominal distention, abdominal pain, nausea and vomiting. Negative for blood in stool and diarrhea.   Genitourinary:  Negative for flank pain and hematuria.   Musculoskeletal:  Negative for joint swelling.   Skin:  Negative for pallor.   Neurological:  Negative for seizures and headaches.   All other systems reviewed and are negative.       Physical Exam:  /78   Pulse 106   Temp 98.6 °F (37 °C) (Oral)   Resp 26   Ht 172.7 cm (68\")   Wt 84 kg (185 lb 3 oz)   SpO2 94%   BMI 28.16 kg/m²     Physical Exam  Vitals and nursing note reviewed.   Constitutional:       General: He is in acute distress.      Appearance: Normal appearance. He is not toxic-appearing.   HENT:      Head: Normocephalic and atraumatic.      Mouth/Throat:      Mouth: Mucous membranes are moist.   Eyes:      General: No scleral icterus.     Pupils: Pupils are equal, round, and reactive to light.   Cardiovascular:      Rate and Rhythm: Normal rate and regular rhythm.      Pulses: Normal pulses.      Heart sounds: Normal heart sounds.   Pulmonary:      Effort: Tachypnea present.      Breath sounds: Examination of the right-middle field reveals decreased breath sounds. Examination of the left-middle field reveals decreased breath sounds. Decreased breath sounds present.   Abdominal:      General: Abdomen is flat.      Palpations: Abdomen is soft.      Tenderness: There is abdominal tenderness.      Comments: The abdomen is soft with some mild diffuse tenderness there is no guarding or rebound noted.  There is distention with dullness to percussion and there is also an umbilical hernia.   Musculoskeletal:         General: Normal range of motion.      Cervical back: Normal range of motion and neck supple.   Skin:     General: Skin is warm and dry.   Neurological:      Mental Status: He is oriented to person, place, and time. Mental status is at baseline.      Motor: Weakness present.      Comments: " Generalized weakness without any focal findings.                    Medical Decision Making:      Comorbidities that affect care:    Decompensated cirrhosis, anemia, thrombocytopenia,    External Notes reviewed:    Previous Admission Note: Recent admission for similar symptoms.      The following orders were placed and all results were independently analyzed by me:  Orders Placed This Encounter   Procedures    Blood Culture - Blood,    Blood Culture - Blood,    Body Fluid Culture - Body Fluid, Pleural Cavity    Anaerobic Culture - Pleural Fluid, Pleural Cavity    Urine Culture - Urine,    XR Chest 1 View    CT Abdomen Pelvis With Contrast    CT Angiogram Chest Pulmonary Embolism    XR Chest 1 View    US Paracentesis    Stanton Draw    Comprehensive Metabolic Panel    BNP    High Sensitivity Troponin T    CBC Auto Differential    Lactic Acid, Plasma    High Sensitivity Troponin T 1Hr    Protime-INR    Ammonia    STAT Lactic Acid, Reflex    Urinalysis With Culture If Indicated - Urine, Clean Catch    Ethanol    Urine Drug Screen - Urine, Clean Catch    Basic Metabolic Panel    Body Fluid Cell Count With Differential - Body Fluid, Pleural Cavity    pH, Body Fluid - Body Fluid, Pleural Cavity    Albumin, Fluid - Pleural Fluid, Pleural Cavity    Protein, Body Fluid - Pleural Fluid, Pleural Cavity    Lactate Dehydrogenase, Body Fluid - Body Fluid, Pleural Cavity    Glucose, Body Fluid - Pleural Fluid, Pleural Cavity    Body fluid cell count - Body Fluid, Pleural Cavity    aPTT    aPTT    aPTT    Fentanyl, Urine - Urine, Clean Catch    Body fluid differential - Body Fluid, Pleural Cavity    Urinalysis, Microscopic Only - Urine, Clean Catch    Diet: Cardiac; Low Sodium (2g); Fluid Consistency: Thin (IDDSI 0)    Undress & Gown    Continuous Pulse Oximetry    Vital Signs    Vital Signs    Intake & Output    Weigh Patient    Oral Care    Saline Lock & Maintain IV Access    Obtain Informed Consent    Notify Provider Platelet  Count Less Than [id number]    Notify Provider if PTT Not in Therapeutic Range After 24 Hours    Stop Infusion & Notify Provider if Bleeding Occurs    RN To Release aPTT Order 6 Hours After Heparin Bolus & 6 Hours After Any Heparin Rate Change    After 2 Consecutive Therapeutic aPTTs, Obtain aPTT Daily.  If a Rate Adjustment is Necessary, Resume Every 6 Hour aPTT Draws    Code Status and Medical Interventions: CPR (Attempt to Resuscitate); Full Support    Hospitalist (on-call MD unless specified)    Pulmonology (on-call MD unless specified)    PT Consult: Eval & Treat Functional Mobility Below Baseline    Oxygen Therapy- Nasal Cannula; Titrate 1-6 LPM Per SpO2; 90 - 95%    ECG 12 Lead ED Triage Standing Order; SOA    Adult Transthoracic Echo Complete W/ Cont if Necessary Per Protocol    Insert Peripheral IV    Insert Peripheral IV    Initiate Observation Status    CBC & Differential    Green Top (Gel)    Lavender Top    Gold Top - SST    Light Blue Top    CBC & Differential       Medications Given in the Emergency Department:  Medications   sodium chloride 0.9 % flush 10 mL (has no administration in time range)   arformoterol (BROVANA) nebulizer solution 15 mcg (has no administration in time range)     And   budesonide (PULMICORT) nebulizer solution 0.5 mg (has no administration in time range)     And   revefenacin (YUPELRI) nebulizer solution 175 mcg (has no administration in time range)   folic acid (FOLVITE) tablet 1 mg (1 mg Oral Given 6/1/25 1820)   furosemide (LASIX) tablet 20 mg (has no administration in time range)   midodrine (PROAMATINE) tablet 10 mg (10 mg Oral Given 6/1/25 1820)   ondansetron ODT (ZOFRAN-ODT) disintegrating tablet 4 mg (has no administration in time range)   pantoprazole (PROTONIX) EC tablet 40 mg (40 mg Oral Given 6/1/25 1820)   spironolactone (ALDACTONE) tablet 25 mg (has no administration in time range)   ursodiol (ACTIGALL) capsule 300 mg (has no administration in time range)    cholecalciferol (VITAMIN D3) capsule 50,000 Units (50,000 Units Oral Given 6/1/25 1823)   sodium chloride 0.9 % flush 10 mL (has no administration in time range)   sodium chloride 0.9 % flush 10 mL (has no administration in time range)   sodium chloride 0.9 % infusion 40 mL (has no administration in time range)   sennosides-docusate (PERICOLACE) 8.6-50 MG per tablet 2 tablet (has no administration in time range)     And   polyethylene glycol (MIRALAX) packet 17 g (has no administration in time range)     And   bisacodyl (DULCOLAX) EC tablet 5 mg (has no administration in time range)     And   bisacodyl (DULCOLAX) suppository 10 mg (has no administration in time range)   ondansetron (ZOFRAN) injection 4 mg (has no administration in time range)   calcium carbonate (TUMS) chewable tablet 500 mg (200 mg elemental) (has no administration in time range)   lactulose (CHRONULAC) 10 GM/15ML solution 10 g (has no administration in time range)   albuterol (PROVENTIL) nebulizer solution 0.083% 2.5 mg/3mL (has no administration in time range)   heparin 76601 units/250 mL (100 units/mL) in 0.45 % NaCl infusion (18 Units/kg/hr × 84 kg Intravenous Currently Infusing 6/1/25 1814)   heparin bolus from bag solution 4,200 Units (has no administration in time range)   heparin bolus from bag solution 2,100 Units (has no administration in time range)   dicyclomine (BENTYL) capsule 20 mg (20 mg Oral Given 6/1/25 1023)   fentaNYL citrate (PF) (SUBLIMAZE) injection 50 mcg (50 mcg Intravenous Given 6/1/25 1024)   ondansetron (ZOFRAN) injection 4 mg (4 mg Intravenous Given 6/1/25 1023)   iopamidol (ISOVUE-370) 76 % injection 100 mL (100 mL Intravenous Given 6/1/25 1042)   iopamidol (ISOVUE-370) 76 % injection 100 mL (100 mL Intravenous Given 6/1/25 1129)   lactulose (CHRONULAC) 10 GM/15ML solution 30 g (30 g Oral Given 6/1/25 1455)   heparin bolus from bag solution 6,700 Units (6,700 Units Intravenous Bolus from Bag 6/1/25 1807)        ED  Course:         EKG: Sinus tachycardia with rate of 110 beats per  No acute ischemic changes are noted      Labs:    Lab Results (last 24 hours)       Procedure Component Value Units Date/Time    CBC & Differential [[id number]]  (Abnormal) Collected: 06/01/25 0929    Specimen: Blood Updated: 06/01/25 0933    Narrative:      The following orders were created for panel order CBC & Differential.  Procedure                               Abnormality         Status                     ---------                               -----------         ------                     CBC Auto Differential[[id number]]        Abnormal            Final result                 Please view results for these tests on the individual orders.    Comprehensive Metabolic Panel [[id number]]  (Abnormal) Collected: 06/01/25 0929    Specimen: Blood Updated: 06/01/25 1000     Glucose 92 mg/dL      BUN 22.3 mg/dL      Creatinine 1.52 mg/dL      Sodium 128 mmol/L      Potassium 5.3 mmol/L      Comment: Slight hemolysis detected by analyzer. Result may be falsely elevated.        Chloride 97 mmol/L      CO2 18.9 mmol/L      Calcium 8.2 mg/dL      Total Protein 6.1 g/dL      Albumin 2.9 g/dL      ALT (SGPT) 28 U/L      AST (SGOT) 54 U/L      Alkaline Phosphatase 192 U/L      Total Bilirubin 4.1 mg/dL      Globulin 3.2 gm/dL      A/G Ratio 0.9 g/dL      BUN/Creatinine Ratio 14.7     Anion Gap 12.1 mmol/L      eGFR 60.1 mL/min/1.73     Narrative:      GFR Categories in Chronic Kidney Disease (CKD)              GFR Category          GFR (mL/min/1.73)    Interpretation  G1                    90 or greater        Normal or high (1)  G2                    60-89                Mild decrease (1)  G3a                   45-59                Mild to moderate decrease  G3b                   30-44                Moderate to severe decrease  G4                    15-29                Severe decrease  G5                    14 or less           Kidney failure    (1)In the  absence of evidence of kidney disease, neither GFR category G1 or G2 fulfill the criteria for CKD.    eGFR calculation 2021 CKD-EPI creatinine equation, which does not include race as a factor    BNP [[id number]]  (Normal) Collected: 06/01/25 0929    Specimen: Blood Updated: 06/01/25 0951     proBNP 364.8 pg/mL     Narrative:      This assay is used as an aid in the diagnosis of individuals suspected of having heart failure. It can be used as an aid in the diagnosis of acute decompensated heart failure (ADHF) in patients presenting with signs and symptoms of ADHF to the emergency department (ED). In addition, NT-proBNP of <300 pg/mL indicates ADHF is not likely.    Age Range Result Interpretation  NT-proBNP Concentration (pg/mL:      <50             Positive            >450                   Gray                 300-450                    Negative             <300    50-75           Positive            >900                  Gray                300-900                  Negative            <300      >75             Positive            >1800                  Gray                300-1800                  Negative            <300    High Sensitivity Troponin T [[id number]]  (Abnormal) Collected: 06/01/25 0929    Specimen: Blood Updated: 06/01/25 0955     HS Troponin T 29 ng/L     Narrative:      High Sensitive Troponin T Reference Range:  <14.0 ng/L- Negative Female for AMI  <22.0 ng/L- Negative Male for AMI  >=14 - Abnormal Female indicating possible myocardial injury.  >=22 - Abnormal Male indicating possible myocardial injury.   Clinicians would have to utilize clinical acumen, EKG, Troponin, and serial changes to determine if it is an Acute Myocardial Infarction or myocardial injury due to an underlying chronic condition.         CBC Auto Differential [[id number]]  (Abnormal) Collected: 06/01/25 0929    Specimen: Blood Updated: 06/01/25 0933     WBC 20.36 10*3/mm3      RBC 3.25 10*6/mm3      Hemoglobin 9.4 g/dL       Hematocrit 30.1 %      MCV 92.6 fL      MCH 28.9 pg      MCHC 31.2 g/dL      RDW 20.6 %      RDW-SD 63.3 fl      MPV 9.7 fL      Platelets 138 10*3/mm3      Neutrophil % 82.7 %      Lymphocyte % 11.4 %      Monocyte % 3.3 %      Eosinophil % 0.5 %      Basophil % 0.2 %      Immature Grans % 1.9 %      Neutrophils, Absolute 16.83 10*3/mm3      Lymphocytes, Absolute 2.32 10*3/mm3      Monocytes, Absolute 0.67 10*3/mm3      Eosinophils, Absolute 0.10 10*3/mm3      Basophils, Absolute 0.05 10*3/mm3      Immature Grans, Absolute 0.39 10*3/mm3      nRBC 0.0 /100 WBC     Protime-INR [[id number]]  (Abnormal) Collected: 06/01/25 0929    Specimen: Blood Updated: 06/01/25 1020     Protime 19.4 Seconds      INR 1.56    Narrative:      Suggested Therapeutic Ranges For Oral Anticoagulant Therapy:  Level of Therapy                      INR Target Range  Standard Dose                            2.0-3.0  High Dose                                2.5-3.5  Patients not receiving anticoagulant  Therapy Normal Range                     0.86-1.15    Blood Culture - Blood, Arm, Right [[id number]] Collected: 06/01/25 1008    Specimen: Blood from Arm, Right Updated: 06/01/25 1014    Blood Culture - Blood, Arm, Left [[id number]] Collected: 06/01/25 1008    Specimen: Blood from Arm, Left Updated: 06/01/25 1014    Lactic Acid, Plasma [[id number]]  (Abnormal) Collected: 06/01/25 1046    Specimen: Blood from Hand, Right Updated: 06/01/25 1126     Lactate 2.8 mmol/L     High Sensitivity Troponin T 1Hr [[id number]]  (Abnormal) Collected: 06/01/25 1046    Specimen: Blood from Hand, Right Updated: 06/01/25 1114     HS Troponin T 25 ng/L      Troponin T Numeric Delta -4 ng/L      Troponin T % Delta -14    Narrative:      High Sensitive Troponin T Reference Range:  <14.0 ng/L- Negative Female for AMI  <22.0 ng/L- Negative Male for AMI  >=14 - Abnormal Female indicating possible myocardial injury.  >=22 - Abnormal Male indicating possible myocardial injury.    Clinicians would have to utilize clinical acumen, EKG, Troponin, and serial changes to determine if it is an Acute Myocardial Infarction or myocardial injury due to an underlying chronic condition.         Ammonia [[id number]]  (Abnormal) Collected: 06/01/25 1046    Specimen: Blood from Hand, Right Updated: 06/01/25 1112     Ammonia 154 umol/L     Ethanol [[id number]] Collected: 06/01/25 1046    Specimen: Blood from Hand, Right Updated: 06/01/25 1347     Ethanol <10 mg/dL      Ethanol % <0.010 %     Narrative:      Ethanol (Plasma)  <10 Essentially Negative    Toxic Concentrations           mg/dL    Flushing, slowing of reflexes    Impaired visual activity         Depression of CNS              >100  Possible Coma                  >300       STAT Lactic Acid, Reflex [[id number]]  (Normal) Collected: 06/01/25 1350    Specimen: Blood Updated: 06/01/25 1423     Lactate 1.9 mmol/L     aPTT [[id number]]  (Abnormal) Collected: 06/01/25 1756    Specimen: Blood from Hand, Left Updated: 06/01/25 1813     PTT 40.8 seconds     Albumin, Fluid - Pleural Fluid, Pleural Cavity [[id number]] Collected: 06/01/25 1757    Specimen: Pleural Fluid from Pleural Cavity Updated: 06/01/25 1812    Protein, Body Fluid - Pleural Fluid, Pleural Cavity [[id number]] Collected: 06/01/25 1757    Specimen: Pleural Fluid from Pleural Cavity Updated: 06/01/25 1812    Glucose, Body Fluid - Pleural Fluid, Pleural Cavity [[id number]] Collected: 06/01/25 1757    Specimen: Pleural Fluid from Pleural Cavity Updated: 06/01/25 1812    Anaerobic Culture - Pleural Fluid, Pleural Cavity [[id number]] Collected: 06/01/25 1757    Specimen: Pleural Fluid from Pleural Cavity Updated: 06/01/25 1812    Body Fluid Cell Count With Differential - Body Fluid, Pleural Cavity [[id number]] Collected: 06/01/25 1758    Specimen: Body Fluid from Pleural Cavity Updated: 06/01/25 1826    Narrative:      The following orders were created for panel order Body Fluid Cell Count  With Differential - Body Fluid, Pleural Cavity.  Procedure                               Abnormality         Status                     ---------                               -----------         ------                     Body fluid cell count - ...[[id number]]                      Final result               Body fluid differential ...[[id number]]                      In process                   Please view results for these tests on the individual orders.    pH, Body Fluid - Body Fluid, Pleural Cavity [[id number]] Collected: 06/01/25 1758    Specimen: Body Fluid from Pleural Cavity Updated: 06/01/25 1825     pH, Fluid 8.00    Lactate Dehydrogenase, Body Fluid - Body Fluid, Pleural Cavity [[id number]] Collected: 06/01/25 1758    Specimen: Body Fluid from Pleural Cavity Updated: 06/01/25 1812    Body Fluid Culture - Body Fluid, Pleural Cavity [[id number]] Collected: 06/01/25 1758    Specimen: Body Fluid from Pleural Cavity Updated: 06/01/25 1812    Body fluid cell count - Body Fluid, Pleural Cavity [[id number]] Collected: 06/01/25 1758    Specimen: Body Fluid from Pleural Cavity Updated: 06/01/25 1826     Color, Fluid Light Yellow     Appearance, Fluid Clear     Nucleated Cells, Fluid 1,010 /mm3      RBC, Fluid <2,000 /mm3     Narrative:      No reference range established. Physician to interpret results with clinical findings.    Body fluid differential - Body Fluid, Pleural Cavity [[id number]] Collected: 06/01/25 1758    Specimen: Body Fluid from Pleural Cavity Updated: 06/01/25 1826    Urinalysis With Culture If Indicated - Urine, Clean Catch [[id number]]  (Abnormal) Collected: 06/01/25 1818    Specimen: Urine, Clean Catch Updated: 06/01/25 1832     Color, UA Dark Yellow     Appearance, UA Clear     pH, UA 6.0     Specific Gravity, UA >1.030     Glucose, UA Negative     Ketones, UA Negative     Bilirubin, UA Small (1+)     Blood, UA Negative     Protein, UA Trace     Leuk Esterase, UA Trace     Nitrite, UA Negative      Urobilinogen, UA 1.0 E.U./dL    Narrative:      In absence of clinical symptoms, the presence of pyuria, bacteria, and/or nitrites on the urinalysis result does not correlate with infection.    Urine Drug Screen - Urine, Clean Catch [[id number]] Collected: 06/01/25 1818    Specimen: Urine, Clean Catch Updated: 06/01/25 1823    Fentanyl, Urine - Urine, Clean Catch [[id number]] Collected: 06/01/25 1818    Specimen: Urine, Clean Catch Updated: 06/01/25 1823    Urinalysis, Microscopic Only - Urine, Clean Catch [[id number]]  (Abnormal) Collected: 06/01/25 1818    Specimen: Urine, Clean Catch Updated: 06/01/25 1832     RBC, UA 0-2 /HPF      WBC, UA 3-5 /HPF      Bacteria, UA None Seen /HPF      Squamous Epithelial Cells, UA 0-2 /HPF      Hyaline Casts, UA 0-2 /LPF      Methodology Automated Microscopy    Urine Culture - Urine, Urine, Clean Catch [[id number]] Collected: 06/01/25 1818    Specimen: Urine, Clean Catch Updated: 06/01/25 1830             Imaging:    XR Chest 1 View  Result Date: 6/1/2025  XR CHEST 1 VW Date of Exam: 6/1/2025 5:45 PM EDT Indication: Thoracentesis Comparison: Chest CT PE protocol and portable chest radiograph from 6/1/2025 Findings: The large right pleural effusion has significantly decreased in size with a small amount of residual remaining. The right lung is much better aerated. No pneumothorax is demonstrated. The left lung is clear except for stable calcified granulomas. Trachea  is midline. Cardiac, hilar, and mediastinal silhouettes are unremarkable.     Impression: No pneumothorax status post right thoracentesis. Small amount of residual pleural effusion is present, otherwise, no active cardiopulmonary disease. Electronically Signed: James Gordon DO  6/1/2025 6:25 PM EDT  Workstation ID: [id number]    CT Angiogram Chest Pulmonary Embolism  Result Date: 6/1/2025  CT ANGIOGRAM CHEST PULMONARY EMBOLISM Date of Exam: 6/1/2025 11:28 AM EDT Indication: sob. Cirrhosis, TIPS, recurrent pleural  effusions and ascites. Comparison: Portable chest radiograph dated 6/1/2025, 525 2025x2 and 3/24/2025. Chest CT without contrast from 2/20/2025 Technique: Axial CT images were obtained of the chest after the uneventful intravenous administration of iodinated contrast utilizing pulmonary embolism protocol.  Reconstructed coronal and sagittal images were also obtained. In addition, a 3-D volume rendered image was created for interpretation.  Automated exposure control and iterative construction methods were used. Findings: There is a large Loculated right pleural effusion filling most of the right hemithorax with adjacent complete atelectasis of the right lower and right middle lobes and near complete atelectasis of the left upper lobe with only a small amount of aerated lung anteriorly. The pleural fluid extends across the midline anterior to the pericardium which accounts for the appearance of the cardiac silhouette on the recent radiograph. No discrete pericardial effusion. No left-sided pleural effusion. There is mild mediastinal shift of approximately 1 cm to the left of midline. No pneumothorax. There is minimal focal groundglass opacity in the left pulmonary apex the left lung is otherwise clear. No thoracic or axillary adenopathy is identified. The esophagus is distended and fluid-filled up to 4 cm above the level of the gabe. Pulmonary arteries are normal in caliber with partially occlusive thrombus in the distal left main pulmonary artery with several occlusive and nonocclusive filling defects within the segmental and subsegmental left lower lobe and to a lesser extent the left upper lobe pulmonary artery branches. No right sided emboli are identified. No pathologically enlarged lymph nodes. Bilateral symmetric gynecomastia is present. There is anasarca and partially visualized ascites. Please refer to the separately dictated CT of the abdomen pelvis dictated under that order performed the same day for  further details regarding the abdominal findings..     Impression: 1.Large nonloculated right pleural effusion with mild mediastinal shift to the left of midline. With near complete atelectasis of the right lung. The pleural fluid extends across the midline anterior to and abutting the pericardium which accounts for the  appearance of the cardiac silhouette on the recent radiograph. 2.Partially occlusive thrombus in the distal left main pulmonary artery with several occlusive and nonocclusive filling defects in the segmental and subsegmental left lower lobe and to a lesser extent the left upper lobe pulmonary artery branches. No right-sided emboli are identified. No CT signs of elevated right heart pressures. 3.The esophagus is distended and fluid-filled up to 4 cm above the level of the gabe. 4.Anasarca and partially visualized ascites. Please refer to the separately dictated CT of the abdomen and pelvis report from same date dictated under separate order for further details regarding the abdominal findings.. Electronically Signed: James Gordon DO  6/1/2025 11:59 AM EDT  Workstation ID: [id number]    CT Abdomen Pelvis With Contrast  Result Date: 6/1/2025  CT ABDOMEN PELVIS W CONTRAST Date of Exam: 6/1/2025 10:23 AM EDT Indication: Cirrhosis, umbilical hernia, abdominal pain. Comparison: 3/24/2025 and prior Technique: Axial CT images were obtained of the abdomen and pelvis after the uneventful intravenous administration of iodinated contrast. Reconstructed coronal and sagittal images were also obtained. Automated exposure control and iterative construction methods were used. FINDINGS: Abdomen/Pelvis: Lower Chest: There is a large right-sided pleural effusion which is new from prior CT. Additionally there appears to be a large pericardial effusion incompletely evaluated measuring at least 4.7 cm. There are filling defects within the distal left main pulmonary artery compatible with pulmonary emboli incompletely  evaluated. There is collapse of the right middle and right lower lungs and partial collapse of the right upper lobe. There is evidence of old granulomatous disease at the left base No definite free air noted below the diaphragm Bilateral gynecomastia noted Organs: Heterogeneous appearance of the liver which is small and nodular in contour compatible with known cirrhosis is again noted. A TIPS is again appreciated. The patient is status post cholecystectomy. The pancreas, spleen, kidneys and adrenal glands demonstrate no obvious acute abnormality extensive collateral vessel formation, variceal formation is again noted in the upper abdomen. The spleen is borderline enlarged. GI/Bowel: Motion limited imaging of the stomach demonstrates mild wall thickening compatible with third spacing. Small bowel also demonstrates diffuse wall thickening, edematous appearance. The colon also demonstrates diffuse edematous wall thickening. Findings are compatible with sequela of third spacing and appear more prominent than seen on comparison study from 3/24/2025. There is possible thrombosis within the portal vein which appears new from the comparison. Flow within the TIPS is indeterminate. Moderate ascites noted. Umbilical hernia containing fluid and evidence of collateral vessel formation. Pelvis: Bladder is grossly unremarkable in appearance. Bilateral left greater than right inguinal hernias noted Peritoneum/Retroperitoneum: Aorta is normal in caliber. Lymph nodes within the retroperitoneum noted. Bones/Soft Tissues: Extensive body wall anasarca noted. No destructive bone lesion     1.Large right-sided pleural effusion with collapse of the right middle and right lower lungs. 2.Pulmonary emboli within the left main pulmonary artery. This is incompletely evaluated. Dedicated pulmonary angiogram is suggested. 3.Large pericardial effusion. This appears new from comparison. Please correlate clinically. This should be further evaluated  at time of CT pulmonary angiogram and additional intervention should be considered on a clinical basis. 4.Cirrhosis of the liver with evidence of portal hypertension. There is new thrombosis suspected within the portal vein extending into the TIPS. Patency of the TIPS is indeterminate and should be further evaluated. 5.Diffuse wall thickening of the bowel compatible with third spacing. 6.Moderate ascites. 7.Extensive body wall anasarca. 8.Other findings as above. Extensive new pericardial effusion, pleural effusion and ascites noted from the comparison. Further evaluation, drainage should be considered. Dedicated pulmonary angiogram to further evaluate pulmonary emboli recommended. Evaluation of the patency of the TIPS shunt is recommended. Findings were discussed with Dr. Fernandez at the time of interpretation. Electronically Signed: Mo Barrett MD  6/1/2025 11:07 AM EDT  Workstation ID: [id number]    XR Chest 1 View  Result Date: 6/1/2025  XR CHEST 1 VW Date of Exam: 6/1/2025 9:29 AM EDT Indication: SOA Triage Protocol cirrhosis, history of TIPS procedure, volume overload Comparison: Single view chest radiograph dated 5/25/2025 x2, 3/24/2025 Findings: There is now near complete opacification of the right hemithorax with a small amount of aerated lung in the right upper lobe. No significant volume loss the overall appearance is consistent with a large right sided pleural effusion which has reaccumulated. Some component of underlying atelectasis and/or airspace disease is not excluded. The left lung is clear. Trachea is midline. Cardiac silhouette is more prominent on today's exam, pericardial effusion is not excluded. No pneumothorax.     Impression: Reaccumulation of a large right pleural effusion and probable pericardial effusion since 5/25/2025. With near complete opacification of the right hemithorax. The pleural effusion is larger than on the first radiograph from 5/25/2025 at 1223. No significant volume  loss or mediastinal shift. Electronically Signed: James GordonDO  6/1/2025 10:23 AM EDT  Workstation ID: [id number]        Differential Diagnosis and Discussion:    Dyspnea: Differential diagnosis includes but is not limited to metabolic acidosis, neurological disorders, psychogenic, asthma, pneumothorax, upper airway obstruction, COPD, pneumonia, noncardiogenic pulmonary edema, interstitial lung disease, anemia, congestive heart failure, and pulmonary embolism    PROCEDURES:    Labs were collected in the emergency department and all labs were reviewed and interpreted by me.  X-ray were performed in the emergency department and all X-ray impressions were independently interpreted by me.  An EKG was performed and the EKG was interpreted by me.    ECG 12 Lead ED Triage Standing Order; SOA   Preliminary Result   HEART UNSL=100  bpm   RR Hxomygfp=994  ms   TN Ztojjfwp=912  ms   P Horizontal Axis=-51  deg   P Front Axis=58  deg   QRSD Interval=84  ms   QT Zzicewqe=842  ms   SBvI=694  ms   QRS Axis=25  deg   T Wave Axis=  deg   - BORDERLINE ECG -   Sinus tachycardia   Ventricular premature complex   Low voltage, precordial leads   Borderline ST depression, lateral leads   Date and Time of Study:2025-06-01 09:18:30          Procedures    MDM     Amount and/or Complexity of Data Reviewed  Clinical lab tests: reviewed  Tests in the radiology section of CPT®: reviewed  Tests in the medicine section of CPT®: reviewed  Decide to obtain previous medical records or to obtain history from someone other than the patient: yes             Total Critical Care time of 55 minutes. Total critical care time documented does not include time spent on separately billed procedures for services of nurses or physician assistants. I personally saw and examined the patient. I have reviewed all diagnostic interpretations and treatment plans as written. I was present for the key portions of any procedures performed and the inclusive time noted in  any critical care statement. Critical care time includes patient management by me, time spent at the patients bedside,  time to review lab and imaging results, discussing patient care, documentation in the medical record, and time spent with family or caregiver.          Patient Care Considerations:    CT HEAD: I considered ordering a noncontrast CT of the head, however the patient had no head trauma or loss of consciousness      Consultants/Shared Management Plan:    Hospitalist: I have discussed the case with hospitalist who agrees to accept the patient for admission.    Social Determinants of Health:    Patient is unable to carry out activities of daily life. Escalation of care is necessary.       Disposition and Care Coordination:    Admit:   Through independent evaluation of the patient's history, physical, and imperical data, the patient meets criteria for inpatient admission to the hospital.        Final diagnoses:   Decompensated cirrhosis   Leukocytosis, unspecified type   Other pulmonary embolism without acute cor pulmonale, unspecified chronicity   Acute respiratory failure with hypoxia   Other ascites        ED Disposition       ED Disposition   Decision to Admit    Condition   --    Comment   Level of Care: Telemetry [5]   Diagnosis: Pleural effusion [[id number]]   Admitting Physician: ALMA BELTRÁN [[id number]]   Attending Physician: ALMA BELTRÁN [[id number]]                 This medical record created using voice recognition software.             Agusto Fernandez,   06/01/25 1415

## 2025-06-01 NOTE — CASE MANAGEMENT/SOCIAL WORK
Discharge Planning Assessment  [facility]     Patient Name: Wia Gay  MRN: [id number]  Today's Date: 6/1/2025    Admit Date: 6/1/2025        Discharge Needs Assessment       Row Name 06/01/25 1438       Living Environment    People in Home alone    Current Living Arrangements home    Potentially Unsafe Housing Conditions none    In the past 12 months has the electric, gas, oil, or water company threatened to shut off services in your home? No    Primary Care Provided by self    Provides Primary Care For no one    Family Caregiver if Needed parent(s)    Family Caregiver Names Mother Jasmyn    Quality of Family Relationships supportive    Able to Return to Prior Arrangements yes       Resource/Environmental Concerns    Transportation Concerns none       Transportation Needs    In the past 12 months, has lack of transportation kept you from medical appointments or from getting medications? no    In the past 12 months, has lack of transportation kept you from meetings, work, or from getting things needed for daily living? No       Food Insecurity    Within the past 12 months, you worried that your food would run out before you got the money to buy more. Never true    Within the past 12 months, the food you bought just didn't last and you didn't have money to get more. Never true       Transition Planning    Patient/Family Anticipates Transition to home    Patient/Family Anticipated Services at Transition none    Transportation Anticipated car, drives self       Discharge Needs Assessment    Readmission Within the Last 30 Days no previous admission in last 30 days    Equipment Currently Used at Home none    Concerns to be Addressed no discharge needs identified    Do you want help finding or keeping work or a job? I do not need or want help    Do you want help with school or training? For example, starting or completing job training or getting a high school diploma, GED or equivalent No    Anticipated Changes  Related to Illness none                   Discharge Plan    No documentation.                      Demographic Summary       Row Name 06/01/25 1435       General Information    Admission Type inpatient    Arrived From home    Referral Source emergency department    Reason for Consult discharge planning    Preferred Language English                   Functional Status       Row Name 06/01/25 1435       Functional Status    Usual Activity Tolerance good    Current Activity Tolerance moderate       Physical Activity    On average, how many days per week do you engage in moderate to strenuous exercise (like a brisk walk)? 5 days    On average, how many minutes do you engage in exercise at this level? 30 min    Number of minutes of exercise per week 150       Assessment of Health Literacy    How often do you have someone help you read hospital materials? Never    How often do you have problems learning about your medical condition because of difficulty understanding written information? Never    How often do you have a problem understanding what is told to you about your medical condition? Never    How confident are you filling out medical forms by yourself? Quite a bit    Health Literacy Excellent       Functional Status, IADL    Medications independent    Meal Preparation independent    Housekeeping independent    Laundry independent    Shopping independent    If for any reason you need help with day-to-day activities such as bathing, preparing meals, shopping, managing finances, etc., do you get the help you need? I don't need any help       Mental Status    General Appearance WDL WDL       Mental Status Summary    Recent Changes in Mental Status/Cognitive Functioning no changes       Employment/    Employment Status employed full-time    Shift Worked first shift    Current or Previous Occupation factory work                   Psychosocial       Row Name 06/01/25 1430       Mental Health    Little interest or  pleasure in doing things Not at all    Feeling down, depressed, or hopeless Not at all       Stress    Do you feel stress - tense, restless, nervous, or anxious, or unable to sleep at night because your mind is troubled all the time - these days? Not at all       Coping/Stress    Sources of Support parent(s)                   Abuse/Neglect       Row Name 06/01/25 1437       Personal Safety    Feels Unsafe at Home or Work/School no    Feels Threatened by Someone no    Does Anyone Try to Keep You From Having Contact with Others or Doing Things Outside Your Home? no    Physical Signs of Abuse Present no                   Legal       Row Name 06/01/25 1437       Financial Resource Strain    How hard is it for you to pay for the very basics like food, housing, medical care, and heating? Somewhat       Financial/Legal    Source of Income salary/wages    Financial/Environmental Concerns none    Application for Public Assistance not applied                   Substance Abuse    No documentation.                  Patient Forms    No documentation.                 SW met with Pt at bedside to complete discharge planning. Pt advised that he lives alone. Pt advised that he provides all care to himself independently with no concerns. Pt stated that he works a full time job. Pt has the assistance if needed of his parents Jasmyn and Julian Gay. Pt states that at this time he intends to discharge home and does not need any further assistance. PCP is Dr. Ad Garcia. Preferred Pharmacy is [facility].     ADI Calles

## 2025-06-01 NOTE — H&P
[facility]  Date: 2025   Patient Name: Wai Gay  : 1985  MRN: [id number]  Primary Care Physician:  Callum Peterson DO  Date of admission: 2025    Subjective   Subjective     Chief Complaint: SOA    HPI:    Wai Gay is a 39 y.o. male recently admitted to the  hospital who presented with SOA.  Below is recent DC summary    Wai Gay is a 39 y.o. male who presents with shortness of breath. He has a past medical history of alcohol abuse, essential hypertension, GERD, alcoholic cirrhosis (sobriety for 5 years), prior TIPS procedure (2024), asthma, and anxiety. Chest x-ray shows large right pleural effusion, likely secondary collapse of the right middle and lower lobes. Suggestion of interval enlargement of cardiac silhouette, raising the possibility of pericardial effusion.  He was admitted for further care, 2D echo was obtained which did show pericardial effusion with no evidence of tamponade.  Pulmonology is consulted performed thoracentesis, consistent with transudate likely in setting of cirrhosis and volume overload.  He has a follow-up with his hepatologist to evaluate the flow of his TIPS as he has not required thoracentesis for a number of months now.  Infectious workup was negative, pulmonology recommended discontinue antibiotics.  He was discharged home in stable condition on 2025, recommend follow-up PCP within a week, hepatology and pulmonology within 2 weeks.     Patient was discharged on 26 May and had been doing well up until yesterday when he developed shortness of breath.  He denied any fevers.  Or productive cough.  Chest x-ray in the ED showed large pleural effusion with large pericardial effusion.  When I saw him he reports requiring oxygen but felt well denies chest pain and reports shortness of breath improved with oxygen.        Personal History     Past Medical History:  Past Medical  History:   Diagnosis Date    NICKI (acute kidney injury)     IN [facility] 3/24/25 [place], RIGHT UPPER QUAD PAIN, HYPOKALEMIA, AND HYPONATREMIA. CREAT 1.77, GFR 50.1, TOTAL BILI 5.6.  4/17/25 CREAT 1.O8 AND GFR 90.6    Alcohol abuse 03/14/2017    REPORTS HAS BEEN SOBER OVER 5 YEARS    Anxiety     Ascites     Asthma     FOLLOWED BY DR SCHAEFER    Huffman esophagus     Cirrhosis     Essential hypertension 12/27/2019    GERD (gastroesophageal reflux disease)     History of transfusion     Feb. 2024    Hypokalemia 03/14/2017    LAST POTASSIUM 4 ON 4/17/25    Hyponatremia 03/14/2017    LAST SODIUM 138 ON 4/17/25    Low back pain     Pleural effusion     Primary biliary cholangitis     Splenomegaly     Steatohepatitis, alcoholic 03/14/2017    Tobacco abuse 03/14/2017    Umbilical hernia        Past Surgical History:  Past Surgical History:   Procedure Laterality Date    CHOLECYSTECTOMY      ENDOSCOPY N/A 02/09/2022    Procedure: ESOPHAGOGASTRODUODENOSCOPY WITH BX;  Surgeon: Gino Khan MD;  Location: [facility] ENDOSCOPY;  Service: Gastroenterology;  Laterality: N/A;  RETAINED LIQUID FOOD IN STOMACH, HUFFMAN'S ESOPHAGUS    ENDOSCOPY N/A 02/10/2023    Procedure: ESOPHAGOGASTRODUODENOSCOPY;  Surgeon: Gino Khan MD;  Location: [facility] ENDOSCOPY;  Service: Gastroenterology;  Laterality: N/A;  GASTRITIS, BARRETTS ESOPHAGUS, PHG    ENDOSCOPY N/A 02/05/2025    Procedure: ESOPHAGOGASTRODUODENOSCOPY WITH BIOPSIES;  Surgeon: Gino Khan MD;  Location: [facility] ENDOSCOPY;  Service: Gastroenterology;  Laterality: N/A;  ESOPHAGEAL VARICES, PORTAL HYPERTENSIVE GASTROPATHY    PARACENTESIS      2/20/25    THORACENTESIS      LAST ONE DONE 12/13/24 WITH DR SCHAEFER    TIPS PROCEDURE Right     11/11/2024    UPPER GASTROINTESTINAL ENDOSCOPY         Family History:   Family History   Problem Relation Age of Onset    Alcohol abuse Mother     Arthritis Mother     COPD Mother     Heart disease Maternal Grandmother     Hypertension  Maternal Grandmother     Lung cancer Maternal Grandmother     Stroke Maternal Grandmother     Heart disease Maternal Grandfather     Lung cancer Maternal Grandfather     Cancer Paternal Grandmother     Cancer Paternal Grandfather     Colon cancer Neg Hx     Malig Hyperthermia Neg Hx        Social History:   Social History     Socioeconomic History    Marital status: Single   Tobacco Use    Smoking status: Every Day     Current packs/day: 0.50     Average packs/day: 0.5 packs/day for 17.4 years (8.7 ttl pk-yrs)     Types: Cigarettes     Start date: 1/1/2008     Passive exposure: Current    Smokeless tobacco: Never    Tobacco comments:     Last 2330 5/7/25   Vaping Use    Vaping status: Never Used   Substance and Sexual Activity    Alcohol use: Not Currently     Alcohol/week: 3.0 standard drinks of alcohol     Comment: NO ALCOHOL IN OVER 5 YEARS    Drug use: Never    Sexual activity: Defer       Home Medications:  Fluticasone-Umeclidin-Vilant, albuterol sulfate HFA, folic acid, furosemide, midodrine, ondansetron ODT, pantoprazole, spironolactone, ursodiol, and vitamin D    Allergies:  No Known Allergies    Review of Systems      Objective   Objective     Vitals:   Temp:  [98.6 °F (37 °C)] 98.6 °F (37 °C)  Heart Rate:  [102-112] 102  Resp:  [26] 26  BP: (106-135)/(56-74) 109/74  Flow (L/min) (Oxygen Therapy):  [3] 3    Result Review    Result Review:  I have personally reviewed the results from the time of this admission to 6/1/2025 15:34 EDT and agree with these findings:  [x]  Laboratory  [x]  Microbiology  [x]  Radiology  []  EKG/Telemetry   []  Cardiology/Vascular   []  Pathology  []  Old records  []  Other:      Assessment & Plan   Assessment / Plan     Assessment/Plan:     SOA  CT revealed large pleural effusion.  Recently had thoracentesis last admission.  Was transudative consistent with cirrhosis.      1.Large nonloculated right pleural effusion with mild mediastinal shift to the left of midline. With near  complete atelectasis of the right lung. The pleural fluid extends across the midline anterior to and abutting the pericardium which accounts for the appearance of the cardiac silhouette on the recent radiograph.    2.Partially occlusive thrombus in the distal left main pulmonary artery with several occlusive and nonocclusive filling defects in the segmental and subsegmental left lower lobe and to a lesser extent the left upper lobe pulmonary artery branches. No   right-sided emboli are identified. No CT signs of elevated right heart pressures.    Pulmonary following.  Pulmonary following.  Discussed case with Dr. Barrientos.  Plan for thoracentesis today.  -INR 1.5 today, after thoracentesis may need AC for PE despite pericardial effusion  -will check echo given effusion, was done last admit as well.  No signs of tamponade    2. Cirrhosis  -cont home meds  Aldactone, lasix, lactulose, PPI  -reports not needed paracentesis since TIPS in November    VTE Prophylaxis:  No VTE prophylaxis order currently exists.        CODE STATUS:    Code Status (Patient has no pulse and is not breathing): CPR (Attempt to Resuscitate)  Medical Interventions (Patient has pulse or is breathing): Full Support  Level Of Support Discussed With: Patient      Admission Status:  I believe this patient meets Obs status.    Electronically signed by Joni Shine DO, 06/01/25, 3:34 PM EDT.

## 2025-06-01 NOTE — PLAN OF CARE
Goal Outcome Evaluation:  Plan of Care Reviewed With: patient        Progress: improving  Outcome Evaluation: AOx4, VSS, up x1 assist. Patient had bedside thoracentesis and paracentesis. Patient states he feels better. Skin assessment completed with Megan Gunter RN. Red, blanchable rash noted on L side of abdomen and scabbed area on R side of chest from previous procedure. Patient diet upgraded.

## 2025-06-01 NOTE — CONSULTS
Pulmonary / Critical Care Consult Note      Patient Name: Wai Gay  : 1985  MRN: [id number]  Primary Care Physician:  Callum Peterson DO  Referring Physician: No Known Provider  Date of admission: 2025    Subjective   Subjective     Reason for Consult/ Chief Complaint: Acute hypoxic respiratory failure, pulmonary embolism, pleural effusion    HPI:  Wai Gay is a 39 y.o. male with past medical history of alcoholic cirrhosis status post TIPS, tobacco abuse, spontaneous bacterial peritonitis, hypertension, asthma and anemia presented to the ED with increased shortness of breath over the past several days.  Of note patient was recently admitted from 2025 to  for pleural effusion and ascites.  On arrival to the ED the patient was tachycardic and had SpO2 of 79% on room air.  He was placed on 3 L nasal cannula.  Initial labs showed sodium 128, potassium 5.3, creatinine 1.52, ammonia 154, lactate 2.8, INR 1.56, WBC 20.36.  CT of his chest showed large nonloculated right pleural effusion with mild mediastinal shift to the left of the midline and near complete atelectasis of the right lung, partially occlusive thrombus in the distal left main pulmonary artery with several occlusive and nonocclusive filling defects in the segmental and subsegmental left lower lobe and to a lesser extent the left upper lobe pulmonary artery branches.  CT of abdomen showed cirrhosis of the liver with evidence of portal hypertension, new thrombosis suspected within the portal vein extending to the TIPS and moderate ascites.  Patient is being admitted to med surg floor.  Pulmonary critical care services consulted for management of acute issues. He has been short of breath for few days. Has cough, no fever. Has had thoracentesis recently with more than 3 lits fluid removal. Hasn't had para recently.         Personal History     Past Medical History:   Diagnosis Date    NICKI (acute kidney  injury)     IN [facility] 3/24/25 [place], RIGHT UPPER QUAD PAIN, HYPOKALEMIA, AND HYPONATREMIA. CREAT 1.77, GFR 50.1, TOTAL BILI 5.6.  4/17/25 CREAT 1.O8 AND GFR 90.6    Alcohol abuse 03/14/2017    REPORTS HAS BEEN SOBER OVER 5 YEARS    Anxiety     Ascites     Asthma     FOLLOWED BY DR SCHAEFER    Huffman esophagus     Cirrhosis     Essential hypertension 12/27/2019    GERD (gastroesophageal reflux disease)     History of transfusion     Feb. 2024    Hypokalemia 03/14/2017    LAST POTASSIUM 4 ON 4/17/25    Hyponatremia 03/14/2017    LAST SODIUM 138 ON 4/17/25    Low back pain     Pleural effusion     Primary biliary cholangitis     Splenomegaly     Steatohepatitis, alcoholic 03/14/2017    Tobacco abuse 03/14/2017    Umbilical hernia        Past Surgical History:   Procedure Laterality Date    CHOLECYSTECTOMY      ENDOSCOPY N/A 02/09/2022    Procedure: ESOPHAGOGASTRODUODENOSCOPY WITH BX;  Surgeon: Gino Khan MD;  Location: [facility] ENDOSCOPY;  Service: Gastroenterology;  Laterality: N/A;  RETAINED LIQUID FOOD IN STOMACH, HUFFMAN'S ESOPHAGUS    ENDOSCOPY N/A 02/10/2023    Procedure: ESOPHAGOGASTRODUODENOSCOPY;  Surgeon: Gino Khan MD;  Location: [facility] ENDOSCOPY;  Service: Gastroenterology;  Laterality: N/A;  GASTRITIS, BARRETTS ESOPHAGUS, PHG    ENDOSCOPY N/A 02/05/2025    Procedure: ESOPHAGOGASTRODUODENOSCOPY WITH BIOPSIES;  Surgeon: Gino Khan MD;  Location: [facility] ENDOSCOPY;  Service: Gastroenterology;  Laterality: N/A;  ESOPHAGEAL VARICES, PORTAL HYPERTENSIVE GASTROPATHY    PARACENTESIS      2/20/25    THORACENTESIS      LAST ONE DONE 12/13/24 WITH DR SCHAEFER    TIPS PROCEDURE Right     11/11/2024    UPPER GASTROINTESTINAL ENDOSCOPY         Family History: family history includes Alcohol abuse in his mother; Arthritis in his mother; COPD in his mother; Cancer in his paternal grandfather and paternal grandmother; Heart disease in his maternal grandfather and maternal grandmother; Hypertension  in his maternal grandmother; Lung cancer in his maternal grandfather and maternal grandmother; Stroke in his maternal grandmother. Otherwise pertinent FHx was reviewed.     Social History:  reports that he has been smoking cigarettes. He started smoking about 17 years ago. He has a 8.7 pack-year smoking history. He has been exposed to tobacco smoke. He has never used smokeless tobacco. He reports that he does not currently use alcohol after a past usage of about 3.0 standard drinks of alcohol per week. He reports that he does not use drugs.    Home Medications:  Fluticasone-Umeclidin-Vilant, albuterol sulfate HFA, folic acid, furosemide, midodrine, ondansetron ODT, pantoprazole, spironolactone, ursodiol, and vitamin D    Allergies:  No Known Allergies    Objective    Objective     Vitals:   Temp:  [98.6 °F (37 °C)] 98.6 °F (37 °C)  Heart Rate:  [103-112] 105  Resp:  [26] 26  BP: (106-135)/(56-73) 106/63  Flow (L/min) (Oxygen Therapy):  [3] 3    Physical Exam:  Vital Signs Reviewed   Pale and icteric male, bed ridden, in mild distress  HEENT:  PERRL, EOMI.  OP, nares clear, no sinus tenderness  Neck:  Supple, no JVD, no thyromegaly  Lymph: no axillary, cervical, supraclavicular lymphadenopathy noted bilaterally  Chest:  poor aeration, dull to percussion right mid and lower chest, scattered rhonchi, mild work of breathing noted  CV: RRR, no MGR, pulses 2+, equal  Abd:  Soft, NT, distended, + BS, no HSM, shifting dullness and fluid thrill+  EXT:  no clubbing, no cyanosis, no edema, no joint tenderness  Neuro:  A&Ox3, CN grossly intact, no focal deficits, asterexis+  Skin: No rashes or lesions noted      Result Review    Result Review:  I have personally reviewed the results from the time of this admission to 6/1/2025 14:54 EDT and agree with these findings:  [x]  Laboratory  [x]  Microbiology  [x]  Radiology  [x]  EKG/Telemetry   [x]  Cardiology/Vascular   []  Pathology  []  Old records  []  Other:  Most notable  findings include:         Lab 06/01/25  0929 05/26/25  0442 05/25/25  1824 05/25/25  1531   WBC 20.36* 8.31  --   --    HEMOGLOBIN 9.4* 7.7*  --   --    HEMATOCRIT 30.1* 24.4*  --   --    PLATELETS 138* 88*  --   --    SODIUM 128* 131*  --   --    POTASSIUM 5.3* 3.7  --   --    CHLORIDE 97* 96*  --   --    CO2 18.9* 27.1  --   --    BUN 22.3* 20  --   --    CREATININE 1.52* 1.48*  --   --    GLUCOSE 92 93  --   --    CALCIUM 8.2* 7.7*  --   --    PHOSPHORUS  --  2.8 2.8  --    TOTAL PROTEIN 6.1  --   --  5.6*   ALBUMIN 2.9*  --   --   --    GLOBULIN 3.2  --   --   --          Assessment & Plan   Assessment / Plan     Active Hospital Problems:  There are no active hospital problems to display for this patient.        Impression:  Acute pulmonary embolus  Acute hypoxic respiratory failure  Pleural effusion  Pericardial effusion  NICKI on admission  Ascites  Alcoholic cirrhosis  Lactic acidosis  Hepatic encephalopathy  Metabolic acidosis  Hyperkalemia  Leukocytosis  Asthma  Hypertension    Plan:  CT angiogram showed pulmonary emboli, start heparin drip after thora para.   Discussed thora para at bedside. He was agreeable. R/B discussed in detail.   Paracentesis done with 1850 ml ascites fluid removal.  Thoacentesis done with 4500 fluid removal.   Monitor for signs of bleeding  Start heparin drip given pulm embolism.   Supplemental O2 to maintain SpO2 greater than 90%  Monitor renal function, monitor and replace electrolytes as needed  Monitor liver function and ammonia  Start lactulose, may need to add rifaximin.   Blood cultures in process  Check sputum culture  Trend lactate until cleared  Start Brovana, Pulmicort, DuoNebs  Continue lasix 20 mg po bid. Spironlactone 25 mg orally bid.    Pantoprazole 40 mg orally daily.  Has poor prognosis.      Patient is critically ill with acute pulmonary emboli, pleural effusion, pericardial effusion, ascites, volume overload, acute on chronic liver disease, metabolic acidosis,  We  spent 35 minutes of critical care time, excluding any procedure notes, in review, analysis, obtaining history and physical, formulating care plan, and I led multi-disciplinary critical care rounds with bedside nurse, respiratory therapist, clinical pharmacist and other allied services. I have discussed the case with primary service and other consultants as well.     Electronically signed by Stefan Barrientos MD, 6/1/2025, 14:54 EDT.

## 2025-06-01 NOTE — PROCEDURES
Thoracentesis Procedure Note    Indication: Moderate to large sided pleural effusion    Consent: Yes, placed in chart.    Procedure: The patient was placed in the left lateral decubitus position and the appropriate landmarks were identified. The skin over the puncture site in the right posterior chest wall was prepped with ChloraPrep and draped in sterile fashion. Local anesthesia was applied with 1% lidocaine. Thoracentesis catheter was inserted with needle guidance. Pleural fluid was clear yellow, drained 4500 mL fluid. The catheter was then withdrawn and a sterile dressing was placed over the site. A post-procedure film is pending at this time.    The patient tolerated the procedure well.    Complications: None    Electronically signed by Stefan Barrientos MD, 06/01/25, 5:29 PM EDT.

## 2025-06-01 NOTE — PROCEDURES
Abdominal ultrasound:    Showed ascites with anechoic space in all 4 quadrants. Right lower quadrant was marked for paracentesis.    Images stored online

## 2025-06-01 NOTE — PROCEDURES
Paracentesis Procedure Note    Indication: Large ascites    Consent: Yes, placed in chart    Procedure: The patient was placed in the reclined position and the appropriate landmarks were identified. The skin over the puncture site in the right lower quadrant of abdomen was marked, draped and prepped in sterile fashion. Local anesthesia was applied with 1% lidocaine. The catheter was inserted with needle guidance, then catheter kept in place while needle was removed. Peritoneal fluid was adelina colored, drained 1.85 liters fluid. The catheter was then withdrawn and a sterile dressing was placed over the site.  Specimen is sent for test.    The patient tolerated the procedure well

## 2025-06-02 ENCOUNTER — APPOINTMENT (OUTPATIENT)
Dept: CARDIOLOGY | Facility: HOSPITAL | Age: 40
End: 2025-06-02
Payer: COMMERCIAL

## 2025-06-02 ENCOUNTER — APPOINTMENT (OUTPATIENT)
Dept: INTERVENTIONAL RADIOLOGY/VASCULAR | Facility: HOSPITAL | Age: 40
End: 2025-06-02
Payer: COMMERCIAL

## 2025-06-02 LAB
ALBUMIN FLD-MCNC: 0.2 G/DL
ALBUMIN FLD-MCNC: <0.2 G/DL
AMYLASE FLD-CCNC: 21 U/L
ANION GAP SERPL CALCULATED.3IONS-SCNC: 8.7 MMOL/L (ref 5–15)
APTT PPP: 86.6 SECONDS (ref 78–95.9)
APTT PPP: >200 SECONDS (ref 78–95.9)
BACTERIA SPEC AEROBE CULT: NO GROWTH
BASOPHILS # BLD AUTO: 0.05 10*3/MM3 (ref 0–0.2)
BASOPHILS NFR BLD AUTO: 0.3 % (ref 0–1.5)
BUN SERPL-MCNC: 25.9 MG/DL (ref 6–20)
BUN/CREAT SERPL: 17.5 (ref 7–25)
CALCIUM SPEC-SCNC: 7.3 MG/DL (ref 8.6–10.5)
CHLORIDE SERPL-SCNC: 100 MMOL/L (ref 98–107)
CO2 SERPL-SCNC: 20.3 MMOL/L (ref 22–29)
CREAT SERPL-MCNC: 1.48 MG/DL (ref 0.76–1.27)
DEPRECATED RDW RBC AUTO: 64 FL (ref 37–54)
EGFRCR SERPLBLD CKD-EPI 2021: 62.1 ML/MIN/1.73
EOSINOPHIL # BLD AUTO: 0.13 10*3/MM3 (ref 0–0.4)
EOSINOPHIL NFR BLD AUTO: 0.8 % (ref 0.3–6.2)
ERYTHROCYTE [DISTWIDTH] IN BLOOD BY AUTOMATED COUNT: 21 % (ref 12.3–15.4)
GLUCOSE FLD-MCNC: 101 MG/DL
GLUCOSE FLD-MCNC: 102 MG/DL
GLUCOSE SERPL-MCNC: 89 MG/DL (ref 65–99)
HCT VFR BLD AUTO: 24 % (ref 37.5–51)
HGB BLD-MCNC: 7.7 G/DL (ref 13–17.7)
IMM GRANULOCYTES # BLD AUTO: 0.36 10*3/MM3 (ref 0–0.05)
IMM GRANULOCYTES NFR BLD AUTO: 2.3 % (ref 0–0.5)
LDH FLD-CCNC: 39 U/L
LDH FLD-CCNC: 61 U/L
LYMPHOCYTES # BLD AUTO: 1.21 10*3/MM3 (ref 0.7–3.1)
LYMPHOCYTES NFR BLD AUTO: 7.7 % (ref 19.6–45.3)
MCH RBC QN AUTO: 28.8 PG (ref 26.6–33)
MCHC RBC AUTO-ENTMCNC: 32.1 G/DL (ref 31.5–35.7)
MCV RBC AUTO: 89.9 FL (ref 79–97)
MONOCYTES # BLD AUTO: 0.72 10*3/MM3 (ref 0.1–0.9)
MONOCYTES NFR BLD AUTO: 4.6 % (ref 5–12)
NEUTROPHILS NFR BLD AUTO: 13.3 10*3/MM3 (ref 1.7–7)
NEUTROPHILS NFR BLD AUTO: 84.3 % (ref 42.7–76)
NRBC BLD AUTO-RTO: 0 /100 WBC (ref 0–0.2)
PLATELET # BLD AUTO: 106 10*3/MM3 (ref 140–450)
PMV BLD AUTO: 10.2 FL (ref 6–12)
POTASSIUM SERPL-SCNC: 4.7 MMOL/L (ref 3.5–5.2)
PROT FLD-MCNC: <1 G/DL
PROT FLD-MCNC: <1 G/DL
RBC # BLD AUTO: 2.67 10*6/MM3 (ref 4.14–5.8)
SODIUM SERPL-SCNC: 129 MMOL/L (ref 136–145)
WBC NRBC COR # BLD AUTO: 15.77 10*3/MM3 (ref 3.4–10.8)

## 2025-06-02 PROCEDURE — G0378 HOSPITAL OBSERVATION PER HR: HCPCS

## 2025-06-02 PROCEDURE — 94799 UNLISTED PULMONARY SVC/PX: CPT

## 2025-06-02 PROCEDURE — 96376 TX/PRO/DX INJ SAME DRUG ADON: CPT

## 2025-06-02 PROCEDURE — 80048 BASIC METABOLIC PNL TOTAL CA: CPT | Performed by: INTERNAL MEDICINE

## 2025-06-02 PROCEDURE — 93308 TTE F-UP OR LMTD: CPT

## 2025-06-02 PROCEDURE — 85730 THROMBOPLASTIN TIME PARTIAL: CPT | Performed by: INTERNAL MEDICINE

## 2025-06-02 PROCEDURE — 96366 THER/PROPH/DIAG IV INF ADDON: CPT

## 2025-06-02 PROCEDURE — 63710000001 REVEFENACIN 175 MCG/3ML SOLUTION: Performed by: INTERNAL MEDICINE

## 2025-06-02 PROCEDURE — 85025 COMPLETE CBC W/AUTO DIFF WBC: CPT | Performed by: INTERNAL MEDICINE

## 2025-06-02 PROCEDURE — 25010000002 CEFTRIAXONE PER 250 MG: Performed by: PHYSICIAN ASSISTANT

## 2025-06-02 PROCEDURE — 99233 SBSQ HOSP IP/OBS HIGH 50: CPT | Performed by: INTERNAL MEDICINE

## 2025-06-02 PROCEDURE — 99233 SBSQ HOSP IP/OBS HIGH 50: CPT | Performed by: STUDENT IN AN ORGANIZED HEALTH CARE EDUCATION/TRAINING PROGRAM

## 2025-06-02 PROCEDURE — 97161 PT EVAL LOW COMPLEX 20 MIN: CPT

## 2025-06-02 PROCEDURE — 25010000002 HEPARIN (PORCINE) 25000-0.45 UT/250ML-% SOLUTION: Performed by: INTERNAL MEDICINE

## 2025-06-02 PROCEDURE — 85730 THROMBOPLASTIN TIME PARTIAL: CPT | Performed by: PHYSICIAN ASSISTANT

## 2025-06-02 RX ORDER — MENTHOL AND METHYL SALICYLATE 7.6; 29 G/100G; G/100G
1 OINTMENT TOPICAL 4 TIMES DAILY PRN
Status: DISCONTINUED | OUTPATIENT
Start: 2025-06-02 | End: 2025-06-04 | Stop reason: HOSPADM

## 2025-06-02 RX ORDER — MENTHOL AND METHYL SALICYLATE 7.6; 29 G/100G; G/100G
1 OINTMENT TOPICAL AS NEEDED
Status: DISCONTINUED | OUTPATIENT
Start: 2025-06-02 | End: 2025-06-02

## 2025-06-02 RX ADMIN — SPIRONOLACTONE 25 MG: 25 TABLET ORAL at 21:03

## 2025-06-02 RX ADMIN — MIDODRINE HYDROCHLORIDE 10 MG: 10 TABLET ORAL at 11:12

## 2025-06-02 RX ADMIN — SPIRONOLACTONE 25 MG: 25 TABLET ORAL at 09:05

## 2025-06-02 RX ADMIN — PANTOPRAZOLE SODIUM 40 MG: 40 TABLET, DELAYED RELEASE ORAL at 09:08

## 2025-06-02 RX ADMIN — URSODIOL 300 MG: 300 CAPSULE ORAL at 21:03

## 2025-06-02 RX ADMIN — FUROSEMIDE 20 MG: 20 TABLET ORAL at 21:03

## 2025-06-02 RX ADMIN — FUROSEMIDE 20 MG: 20 TABLET ORAL at 09:05

## 2025-06-02 RX ADMIN — URSODIOL 300 MG: 300 CAPSULE ORAL at 09:14

## 2025-06-02 RX ADMIN — ARFORMOTEROL TARTRATE 15 MCG: 15 SOLUTION RESPIRATORY (INHALATION) at 20:13

## 2025-06-02 RX ADMIN — MIDODRINE HYDROCHLORIDE 10 MG: 10 TABLET ORAL at 07:32

## 2025-06-02 RX ADMIN — CEFTRIAXONE SODIUM 2000 MG: 2 INJECTION, POWDER, FOR SOLUTION INTRAMUSCULAR; INTRAVENOUS at 23:01

## 2025-06-02 RX ADMIN — FOLIC ACID 1 MG: 1 TABLET ORAL at 09:08

## 2025-06-02 RX ADMIN — BUDESONIDE 0.5 MG: 0.5 SUSPENSION RESPIRATORY (INHALATION) at 20:14

## 2025-06-02 RX ADMIN — REVEFENACIN 175 MCG: 175 SOLUTION RESPIRATORY (INHALATION) at 00:02

## 2025-06-02 RX ADMIN — LACTULOSE SOLUTION USP, 10 G/15 ML 10 G: 10 SOLUTION ORAL; RECTAL at 09:07

## 2025-06-02 RX ADMIN — REVEFENACIN 175 MCG: 175 SOLUTION RESPIRATORY (INHALATION) at 09:30

## 2025-06-02 RX ADMIN — MIDODRINE HYDROCHLORIDE 10 MG: 10 TABLET ORAL at 17:16

## 2025-06-02 RX ADMIN — BUDESONIDE 0.5 MG: 0.5 SUSPENSION RESPIRATORY (INHALATION) at 09:30

## 2025-06-02 RX ADMIN — ARFORMOTEROL TARTRATE 15 MCG: 15 SOLUTION RESPIRATORY (INHALATION) at 09:30

## 2025-06-02 RX ADMIN — Medication 10 ML: at 21:04

## 2025-06-02 RX ADMIN — HEPARIN SODIUM 14 UNITS/KG/HR: 10000 INJECTION, SOLUTION INTRAVENOUS at 06:56

## 2025-06-02 RX ADMIN — MENTHOL AND METHYL SALICYLATE 1 APPLICATION: 7.6; 29 OINTMENT TOPICAL at 18:24

## 2025-06-02 NOTE — PLAN OF CARE
Goal Outcome Evaluation:  Plan of Care Reviewed With: patient        Progress: improving  Outcome Evaluation: Heparin drip at 10units/kg/hr, next ptt at 2016. Titrated off oxygen, 94% and above. Marked abdominal redness to monitor for spreading, MD aware. Ambulated in room and up to chair.

## 2025-06-02 NOTE — PROGRESS NOTES
[facility]   Hospitalist Progress Note  Date: 2025  Patient Name: Wai Gay  : 1985  MRN: [id number]  Date of admission: 2025      Subjective   Subjective     Chief Complaint: Follow up for shortness of air    Summary: 39-year-old male with alcoholic cirrhosis, history of TIPS, hypertension who presented with difficulty breathing.  Discharged from the hospital on  following treatment for large right pleural effusion status post thoracentesis with transudative fluid.  He returned with worsening shortness of breath productive cough.  Chest x-ray with reaccumulation of large right sided effusion as well as pericardial effusion which was seen previously.  No tamponade physiology on echo.  Underwent thoracentesis with 4.5 L removed and paracentesis with 1.8 L removed.    Interval Followup:   Afebrile, blood pressure soft  On 2 L oxygen, no respiratory complaint  Some pain in the right upper quadrant although intermittent and not severe  No nausea or vomiting  No lower extremity edema    Objective   Objective     Vitals:   Temp:  [97.7 °F (36.5 °C)-100.4 °F (38 °C)] 97.9 °F (36.6 °C)  Heart Rate:  [] 93  Resp:  [16-26] 16  BP: ()/(50-78) 92/50  Flow (L/min) (Oxygen Therapy):  [3-5] 4  Physical Exam    Constitutional: conversant, NAD   Respiratory:  nonlabored respirations    Cardiovascular:  RRR, no peripheral edema   Gastrointestinal: soft, nondistended, dependent abdominal edema   Neurologic: Alert, speech clear   Skin: Extremities warm    Result Review    Result Review:  I have personally reviewed the following over the last 24 hours (07:00 to 07:00) and agree with the following findings  [x]  Laboratory  CBC          2025    04:42 2025    09:29 2025    01:18   CBC   WBC 8.31  20.36  15.77    RBC 2.76  3.25  2.67    Hemoglobin 7.7  9.4  7.7    Hematocrit 24.4  30.1  24.0    MCV 88.4  92.6  89.9    MCH 27.9  28.9  28.8    MCHC 31.6  31.2  32.1    RDW 17.6   20.6  21.0    Platelets 88  138  106      CMP          5/26/2025    04:42 6/1/2025    09:29 6/2/2025    01:18   CMP   Glucose 93  92  89    BUN 20  22.3  25.9    Creatinine 1.48  1.52  1.48    EGFR 62.1  60.1  62.1    Sodium 131  128  129    Potassium 3.7  5.3  4.7    Chloride 96  97  100    Calcium 7.7  8.2  7.3    Total Protein  6.1     Albumin  2.9     Globulin  3.2     Total Bilirubin  4.1     Alkaline Phosphatase  192     AST (SGOT)  54     ALT (SGPT)  28     Albumin/Globulin Ratio  0.9     BUN/Creatinine Ratio 13.5  14.7  17.5    Anion Gap 7.9  12.1  8.7      [x]  Microbiology  [x]  Radiology   XR Chest 1 View  Result Date: 6/1/2025  XR CHEST 1 VW Date of Exam: 6/1/2025 5:45 PM EDT Indication: Thoracentesis Comparison: Chest CT PE protocol and portable chest radiograph from 6/1/2025 Findings: The large right pleural effusion has significantly decreased in size with a small amount of residual remaining. The right lung is much better aerated. No pneumothorax is demonstrated. The left lung is clear except for stable calcified granulomas. Trachea  is midline. Cardiac, hilar, and mediastinal silhouettes are unremarkable.     Impression: No pneumothorax status post right thoracentesis. Small amount of residual pleural effusion is present, otherwise, no active cardiopulmonary disease. Electronically Signed: James Gordon DO  6/1/2025 6:25 PM EDT  Workstation ID: [id number]    CT Angiogram Chest Pulmonary Embolism  Result Date: 6/1/2025  CT ANGIOGRAM CHEST PULMONARY EMBOLISM Date of Exam: 6/1/2025 11:28 AM EDT Indication: sob. Cirrhosis, TIPS, recurrent pleural effusions and ascites. Comparison: Portable chest radiograph dated 6/1/2025, 525 2025x2 and 3/24/2025. Chest CT without contrast from 2/20/2025 Technique: Axial CT images were obtained of the chest after the uneventful intravenous administration of iodinated contrast utilizing pulmonary embolism protocol.  Reconstructed coronal and sagittal images were also  obtained. In addition, a 3-D volume rendered image was created for interpretation.  Automated exposure control and iterative construction methods were used. Findings: There is a large Loculated right pleural effusion filling most of the right hemithorax with adjacent complete atelectasis of the right lower and right middle lobes and near complete atelectasis of the left upper lobe with only a small amount of aerated lung anteriorly. The pleural fluid extends across the midline anterior to the pericardium which accounts for the appearance of the cardiac silhouette on the recent radiograph. No discrete pericardial effusion. No left-sided pleural effusion. There is mild mediastinal shift of approximately 1 cm to the left of midline. No pneumothorax. There is minimal focal groundglass opacity in the left pulmonary apex the left lung is otherwise clear. No thoracic or axillary adenopathy is identified. The esophagus is distended and fluid-filled up to 4 cm above the level of the gabe. Pulmonary arteries are normal in caliber with partially occlusive thrombus in the distal left main pulmonary artery with several occlusive and nonocclusive filling defects within the segmental and subsegmental left lower lobe and to a lesser extent the left upper lobe pulmonary artery branches. No right sided emboli are identified. No pathologically enlarged lymph nodes. Bilateral symmetric gynecomastia is present. There is anasarca and partially visualized ascites. Please refer to the separately dictated CT of the abdomen pelvis dictated under that order performed the same day for further details regarding the abdominal findings..     Impression: 1.Large nonloculated right pleural effusion with mild mediastinal shift to the left of midline. With near complete atelectasis of the right lung. The pleural fluid extends across the midline anterior to and abutting the pericardium which accounts for the  appearance of the cardiac silhouette on  the recent radiograph. 2.Partially occlusive thrombus in the distal left main pulmonary artery with several occlusive and nonocclusive filling defects in the segmental and subsegmental left lower lobe and to a lesser extent the left upper lobe pulmonary artery branches. No right-sided emboli are identified. No CT signs of elevated right heart pressures. 3.The esophagus is distended and fluid-filled up to 4 cm above the level of the gabe. 4.Anasarca and partially visualized ascites. Please refer to the separately dictated CT of the abdomen and pelvis report from same date dictated under separate order for further details regarding the abdominal findings.. Electronically Signed: James Gordon DO  6/1/2025 11:59 AM EDT  Workstation ID: [id number]    CT Abdomen Pelvis With Contrast  Result Date: 6/1/2025  CT ABDOMEN PELVIS W CONTRAST Date of Exam: 6/1/2025 10:23 AM EDT Indication: Cirrhosis, umbilical hernia, abdominal pain. Comparison: 3/24/2025 and prior Technique: Axial CT images were obtained of the abdomen and pelvis after the uneventful intravenous administration of iodinated contrast. Reconstructed coronal and sagittal images were also obtained. Automated exposure control and iterative construction methods were used. FINDINGS: Abdomen/Pelvis: Lower Chest: There is a large right-sided pleural effusion which is new from prior CT. Additionally there appears to be a large pericardial effusion incompletely evaluated measuring at least 4.7 cm. There are filling defects within the distal left main pulmonary artery compatible with pulmonary emboli incompletely evaluated. There is collapse of the right middle and right lower lungs and partial collapse of the right upper lobe. There is evidence of old granulomatous disease at the left base No definite free air noted below the diaphragm Bilateral gynecomastia noted Organs: Heterogeneous appearance of the liver which is small and nodular in contour compatible with known  cirrhosis is again noted. A TIPS is again appreciated. The patient is status post cholecystectomy. The pancreas, spleen, kidneys and adrenal glands demonstrate no obvious acute abnormality extensive collateral vessel formation, variceal formation is again noted in the upper abdomen. The spleen is borderline enlarged. GI/Bowel: Motion limited imaging of the stomach demonstrates mild wall thickening compatible with third spacing. Small bowel also demonstrates diffuse wall thickening, edematous appearance. The colon also demonstrates diffuse edematous wall thickening. Findings are compatible with sequela of third spacing and appear more prominent than seen on comparison study from 3/24/2025. There is possible thrombosis within the portal vein which appears new from the comparison. Flow within the TIPS is indeterminate. Moderate ascites noted. Umbilical hernia containing fluid and evidence of collateral vessel formation. Pelvis: Bladder is grossly unremarkable in appearance. Bilateral left greater than right inguinal hernias noted Peritoneum/Retroperitoneum: Aorta is normal in caliber. Lymph nodes within the retroperitoneum noted. Bones/Soft Tissues: Extensive body wall anasarca noted. No destructive bone lesion     1.Large right-sided pleural effusion with collapse of the right middle and right lower lungs. 2.Pulmonary emboli within the left main pulmonary artery. This is incompletely evaluated. Dedicated pulmonary angiogram is suggested. 3.Large pericardial effusion. This appears new from comparison. Please correlate clinically. This should be further evaluated at time of CT pulmonary angiogram and additional intervention should be considered on a clinical basis. 4.Cirrhosis of the liver with evidence of portal hypertension. There is new thrombosis suspected within the portal vein extending into the TIPS. Patency of the TIPS is indeterminate and should be further evaluated. 5.Diffuse wall thickening of the bowel  compatible with third spacing. 6.Moderate ascites. 7.Extensive body wall anasarca. 8.Other findings as above. Extensive new pericardial effusion, pleural effusion and ascites noted from the comparison. Further evaluation, drainage should be considered. Dedicated pulmonary angiogram to further evaluate pulmonary emboli recommended. Evaluation of the patency of the TIPS shunt is recommended. Findings were discussed with Dr. Fernandez at the time of interpretation. Electronically Signed: Mo Barrett MD  6/1/2025 11:07 AM EDT  Workstation ID: [id number]    XR Chest 1 View  Result Date: 6/1/2025  XR CHEST 1 VW Date of Exam: 6/1/2025 9:29 AM EDT Indication: SOA Triage Protocol cirrhosis, history of TIPS procedure, volume overload Comparison: Single view chest radiograph dated 5/25/2025 x2, 3/24/2025 Findings: There is now near complete opacification of the right hemithorax with a small amount of aerated lung in the right upper lobe. No significant volume loss the overall appearance is consistent with a large right sided pleural effusion which has reaccumulated. Some component of underlying atelectasis and/or airspace disease is not excluded. The left lung is clear. Trachea is midline. Cardiac silhouette is more prominent on today's exam, pericardial effusion is not excluded. No pneumothorax.     Impression: Reaccumulation of a large right pleural effusion and probable pericardial effusion since 5/25/2025. With near complete opacification of the right hemithorax. The pleural effusion is larger than on the first radiograph from 5/25/2025 at 1223. No significant volume loss or mediastinal shift. Electronically Signed: James Gordon DO  6/1/2025 10:23 AM EDT  Workstation ID: [id number]      [x]  EKG/Telemetry monitor personally reviewed and independently interpreted: NSR/sinus tachycardia  [x]  Cardiology/Vascular   Adult Transthoracic Echo Complete W/ Cont if Necessary Per Protocol  Result Date: 5/25/2025    Left  ventricular ejection fraction appears to be 61 - 65%.   Left ventricular diastolic function was normal.   There is a pericardial effusion. There is no evidence of cardiac tamponade.   There is a large right pleural effusion.   []  Pathology  []  Old records  []  Other:    Assessment & Plan   Assessment / Plan     Assessment/Plan:  Recurrent right-sided pleural effusion, hepatic hydrothorax  Decompensated alcoholic cirrhosis with ascites  Portal vein thrombosis  Acute pulmonary embolus  Pericardial effusion  Therapeutic drug monitoring  History of TIPS  Hyperammonemia  Chronic normocytic anemia  Leukocytosis  Elevated lactate, clinically significant  Hyperkalemia, resolved          Pulmonology following, appreciate assistance. Status post right-sided thoracentesis, 4.5 L removed.  Transudative per Lights criteria. Follow-up chest x-ray no PTX, trace pleural effusion. Pleural fluid culture pending.   Continue scheduled nebulizers. Wean oxygen, SpO2 goal of 90%  Unfortunately imaging showing new pulmonary embolism involving left main pulmonary artery as well as portal vein thrombosis with extension into the TIPS.  Obtain ultrasound to evaluate for TIPS patency  Continue heparin drip/PTT monitoring per protocol  Pericardial effusion was seen on echo last week.  No tamponade physiology.   Chest pain-free, HS troponin T 29 with negative delta. Limited ECHO pending.  MELD Sodium of 25. Follows with hepatology at [facility]. Status post paracentesis 1.8 L removed.  Peritoneum culture pending. White count improving.  Lactate normalized.  No fevers.  On Rocephin 2 g daily for SBP prophylaxis  Hemoglobin 7.7, down from 9.4 on admission.  Platelets 106,000.  INR 1.56 no signs of overt GI bleed.  Monitor cell counts  Creatinine 1.48, stable from yesterday.  Not far from baseline.  Continued home Lasix/spironolactone regimen  Continue midodrine 10 mg TID before meals  Ammonia 154, no confusion this morning.  Continue lactulose,  titrate to 2-3 bowel movements daily  Continue Protonix 40 mg daily  Continue Ursodial  CBC, CMP in AM    Discussed plan with Dr Morrison  Discussed with RN.    VTE Prophylaxis:  Pharmacologic VTE prophylaxis orders are present.    CODE STATUS:   Code Status (Patient has no pulse and is not breathing): CPR (Attempt to Resuscitate)  Medical Interventions (Patient has pulse or is breathing): Full Support  Level Of Support Discussed With: Patient    Electronically signed by Adalberto Kendall DO, 06/02/25, 12:11 PM EDT.

## 2025-06-02 NOTE — PROGRESS NOTES
Pulmonary / Critical Care Progress Note      Patient Name: Wai Gay  : 1985  MRN: [id number]  Attending:  Adalberto Kendall DO  Date of admission: 2025    Subjective   Subjective   Follow-up for acute hypoxic respiratory failure, pulmonary embolism, pleural effusion    No acute events overnight.    This morning,  Resting in bed  Overall feeling better  Dyspnea improved  S/p paracentesis yesterday  S/p thoracentesis yesterday  Cultures NGTD  No fever or chills      Objective   Objective     Vitals:   Temp:  [97.7 °F (36.5 °C)-100.4 °F (38 °C)] 97.9 °F (36.6 °C)  Heart Rate:  [] 91  Resp:  [16-20] 20  BP: ()/(50-78) 93/56  Flow (L/min) (Oxygen Therapy):  [3-5] 3    Physical Exam   Vital Signs Reviewed   General: Chronically ill-appearing male, Alert, NAD, lying in bed.    HEENT:  PERRL, EOMI.  OP, nares clear  Chest: Improved aeration, diminished to auscultation bilaterally, no work of breathing noted  CV: RRR, no MGR, pulses 2+, equal.  Abd:  Soft, NT, ND, + BS  EXT:  no clubbing, no cyanosis, no edema  Neuro:  A&Ox3, CN grossly intact, no focal deficits.  Skin: No rashes or lesions noted      Result Review    Result Review:  I have personally reviewed the results from the time of this admission to 2025 09:53 EDT and agree with these findings:  [x]  Laboratory  [x]  Microbiology  [x]  Radiology  [x]  EKG/Telemetry   []  Cardiology/Vascular   []  Pathology  []  Old records  []  Other:  Most notable findings include:       Lab 25  0118 25  0929   WBC 15.77* 20.36*   HEMOGLOBIN 7.7* 9.4*   HEMATOCRIT 24.0* 30.1*   PLATELETS 106* 138*   SODIUM 129* 128*   POTASSIUM 4.7 5.3*   CHLORIDE 100 97*   CO2 20.3* 18.9*   BUN 25.9* 22.3*   CREATININE 1.48* 1.52*   GLUCOSE 89 92   CALCIUM 7.3* 8.2*   TOTAL PROTEIN  --  6.1   ALBUMIN  --  2.9*   GLOBULIN  --  3.2     Assessment & Plan   Assessment / Plan     Active Hospital Problems:  Active Hospital Problems    Diagnosis     **Pleural  effusion      Impression:  Acute pulmonary embolus  Acute hypoxic respiratory failure  Pleural effusion  Pericardial effusion  NICKI on admission  Ascites  Alcoholic cirrhosis  Lactic acidosis  Hepatic encephalopathy  Metabolic acidosis  Hyperkalemia  Leukocytosis  Asthma  Hypertension     Plan:  CT angiogram showed pulmonary emboli  Continue heparin drip at this time  Can transition to oral anticoagulation at discharge.  Status post paracentesis with 1.8 L ascitic fluid removed, cultures pending  Status post thoracentesis with 4.5 L removed, transudative per lights criteria, pending culture and cytology  Supplemental O2 to maintain SpO2 greater than 90%  Monitor renal function, monitor and replace electrolytes as needed  2D echo pending  Liver ultrasound pending  Continue lactulose  Blood culture no growth to date  Trend lactate till clearance   Continue Brovana, Pulmicort and DuoNebs  Continue lasix 20 mg po bid. Spironlactone 25 mg orally bid.    Has poor prognosis.    VTE Prophylaxis:  Pharmacologic VTE prophylaxis orders are present.      CODE STATUS:   Code Status (Patient has no pulse and is not breathing): CPR (Attempt to Resuscitate)  Medical Interventions (Patient has pulse or is breathing): Full Support  Level Of Support Discussed With: Patient      Labs, images, and medications personally reviewed.  Discussed with patient  Electronically signed by ANA LAURA Meek, 06/02/25, 3:22 PM EDT.  This patient was seen by both a physician and a NP. I, Mahogany Morrison MD, spent >50% of time in accordance with split shared billing. This included personally reviewing all pertinent labs, imaging, microbiology and documentation. Also discussing the case with the patient and any available family, the admitting physician and any available ancillary staff.   Electronically signed by Mahogany Morrison MD, 06/02/25, 3:56 PM EDT.

## 2025-06-02 NOTE — PLAN OF CARE
Goal Outcome Evaluation:  Last Ptt was 200, per protocol rate reduced by 4 unit/kg to 14 and held for 90mins.   Midodrine and 500ml bolus NS given for hypotension. Repeat Bilirubin body fluid test needed, last sample was exposed to light, hospitalist made aware.  Pt resting in be bed with no noted signs of distress.

## 2025-06-02 NOTE — PLAN OF CARE
Goal Outcome Evaluation:  Plan of Care Reviewed With: patient        Progress: no change  Outcome Evaluation: Patient does not present with any significant decline in functional mobility. He is safe to continue transferring and ambulating with staff supervision until his discharge from the hospital. He is safe to return home once medically able.    Anticipated Discharge Disposition (PT): home

## 2025-06-02 NOTE — THERAPY EVALUATION
Acute Care - Physical Therapy Initial Evaluation  [facility]     Patient Name: Wai Gay  : 1985  MRN: [id number]  Today's Date: 2025      Visit Dx:     ICD-10-CM ICD-9-CM   1. Decompensated cirrhosis  K72.90 571.5    K74.60 572.8   2. Leukocytosis, unspecified type  D72.829 288.60   3. Other pulmonary embolism without acute cor pulmonale, unspecified chronicity  I26.99 415.19   4. Acute respiratory failure with hypoxia  J96.01 518.81   5. Other ascites  R18.8 789.59   6. Difficulty walking  R26.2 719.7     Patient Active Problem List   Diagnosis    Acute liver failure without hepatic coma    Acute hepatitis    Hypokalemia    Hyponatremia    Steatohepatitis, alcoholic    History of alcohol abuse    Tobacco abuse    Generalized abdominal pain    Need for influenza vaccination    Essential hypertension    Need for hepatitis A and B vaccination    Need for vaccination against Streptococcus pneumoniae    Need for meningococcal vaccination    Need for shingles vaccine    Last's esophagus without dysplasia    Umbilical hernia without obstruction and without gangrene    Acute hypoxic respiratory failure    Personal history of PE (pulmonary embolism)    Pleural effusion    Dyspnea    SBP (spontaneous bacterial peritonitis)    Peritonitis    Hospital discharge follow-up    ERRONEOUS ENCOUNTER--DISREGARD    Sepsis, due to unspecified organism, unspecified whether acute organ dysfunction present    Cirrhosis of liver with ascites    Iron deficiency anemia    Acute renal failure    Hepatic encephalopathy    Moderate persistent asthma    Annual physical exam    Anxiety    Anemia of chronic renal failure, stage 2 (mild)    Iron deficiency    Iron malabsorption     Past Medical History:   Diagnosis Date    NICKI (acute kidney injury)     IN [facility] 3/24/25 [place], RIGHT UPPER QUAD PAIN, HYPOKALEMIA, AND HYPONATREMIA. CREAT 1.77, GFR 50.1, TOTAL BILI 5.6.  25 CREAT 1.O8 AND GFR 90.6    Alcohol abuse 2017     REPORTS HAS BEEN SOBER OVER 5 YEARS    Anxiety     Ascites     Asthma     FOLLOWED BY DR SCHAEFER    Huffman esophagus     Cirrhosis     Essential hypertension 12/27/2019    GERD (gastroesophageal reflux disease)     History of transfusion     Feb. 2024    Hypokalemia 03/14/2017    LAST POTASSIUM 4 ON 4/17/25    Hyponatremia 03/14/2017    LAST SODIUM 138 ON 4/17/25    Low back pain     Pleural effusion     Primary biliary cholangitis     Splenomegaly     Steatohepatitis, alcoholic 03/14/2017    Tobacco abuse 03/14/2017    Umbilical hernia      Past Surgical History:   Procedure Laterality Date    CHOLECYSTECTOMY      ENDOSCOPY N/A 02/09/2022    Procedure: ESOPHAGOGASTRODUODENOSCOPY WITH BX;  Surgeon: Gino Khan MD;  Location: [facility] ENDOSCOPY;  Service: Gastroenterology;  Laterality: N/A;  RETAINED LIQUID FOOD IN STOMACH, HUFFMAN'S ESOPHAGUS    ENDOSCOPY N/A 02/10/2023    Procedure: ESOPHAGOGASTRODUODENOSCOPY;  Surgeon: Gino Khan MD;  Location: [facility] ENDOSCOPY;  Service: Gastroenterology;  Laterality: N/A;  GASTRITIS, BARRETTS ESOPHAGUS, PHG    ENDOSCOPY N/A 02/05/2025    Procedure: ESOPHAGOGASTRODUODENOSCOPY WITH BIOPSIES;  Surgeon: Gino Khan MD;  Location: [facility] ENDOSCOPY;  Service: Gastroenterology;  Laterality: N/A;  ESOPHAGEAL VARICES, PORTAL HYPERTENSIVE GASTROPATHY    PARACENTESIS      2/20/25    THORACENTESIS      LAST ONE DONE 12/13/24 WITH DR SCHAEFER    TIPS PROCEDURE Right     11/11/2024    UPPER GASTROINTESTINAL ENDOSCOPY       PT Assessment (Last 12 Hours)       PT Evaluation and Treatment       Row Name 06/02/25 1500          Physical Therapy Time and Intention    Subjective Information no complaints  -AV     Document Type evaluation  -AV     Mode of Treatment individual therapy;physical therapy  -AV     Symptoms Noted During/After Treatment --  No c/o lightheadedness or dizziness throughout evaluation  -AV       Row Name 06/02/25 1500          General  Information    Patient Profile Reviewed yes  -AV     Patient Observations alert;cooperative;agree to therapy  -AV     Prior Level of Function independent:;all household mobility;gait;transfer;ADL's  Ambulated without an assistive device. No home O2.  -AV     Equipment Currently Used at Home none  -AV     Existing Precautions/Restrictions oxygen therapy device and L/min  -AV       Row Name 06/02/25 1500          Living Environment    Current Living Arrangements home  -AV     Home Accessibility stairs to enter home  -AV     People in Home alone  -AV       Row Name 06/02/25 1500          Home Main Entrance    Number of Stairs, Main Entrance --  15 from garage  -AV       Row Name 06/02/25 1500          Pain    Pretreatment Pain Rating 0/10 - no pain  -AV     Posttreatment Pain Rating 0/10 - no pain  -AV       Row Name 06/02/25 1500          Cognition    Orientation Status (Cognition) oriented x 3  -AV       Row Name 06/02/25 1500          Range of Motion (ROM)    Range of Motion bilateral lower extremities;ROM is WFL  -AV       Row Name 06/02/25 1500          Strength (Manual Muscle Testing)    Strength (Manual Muscle Testing) bilateral lower extremities;strength is WFL  -AV       Row Name 06/02/25 1500          Bed Mobility    Bed Mobility bed mobility (all) activities  -AV     All Activities, [place] (Bed Mobility) supervision  -AV       Row Name 06/02/25 1500          Transfers    Transfers sit-stand transfer;stand-sit transfer  -AV       Row Name 06/02/25 1500          Sit-Stand Transfer    Sit-Stand [place] (Transfers) supervision  -AV     Assistive Device (Sit-Stand Transfers) --  No AD  -AV       Row Name 06/02/25 1500          Stand-Sit Transfer    Stand-Sit [place] (Transfers) supervision  -AV     Assistive Device (Stand-Sit Transfers) --  No AD  -AV       Row Name 06/02/25 1500          Gait/Stairs (Locomotion)    Gait/Stairs Locomotion gait/ambulation independence;gait/ambulation assistive  device;distance ambulated  -AV     [place] Level (Gait) supervision  -AV     Assistive Device (Gait) --  No AD  -AV     Distance in Feet (Gait) 20  + 30'  -AV     Pattern (Gait) step-through  -AV       Row Name 06/02/25 1500          Balance    Balance Assessment standing dynamic balance  -AV     Dynamic Standing Balance supervision  -AV     Position/Device Used, Standing Balance unsupported  -AV       Row Name 06/02/25 1500          Plan of Care Review    Plan of Care Reviewed With patient  -AV     Progress no change  -AV     Outcome Evaluation Patient does not present with any significant decline in functional mobility. He is safe to continue transferring and ambulating with staff supervision until his discharge from the hospital. He is safe to return home once medically able.  -AV       Row Name 06/02/25 1500          Positioning and Restraints    Pre-Treatment Position in bed  -AV     Post Treatment Position chair  -AV     In Chair sitting;call light within reach;encouraged to call for assist;notified nsg  No alarms active upon therapist entry  -AV       Row Name 06/02/25 1500          Therapy Assessment/Plan (PT)    Criteria for Skilled Interventions Met (PT) no problems identified which require skilled intervention  -AV     Therapy Frequency (PT) evaluation only  -AV       Row Name 06/02/25 1500          PT Evaluation Complexity    History, PT Evaluation Complexity 1-2 personal factors and/or comorbidities  -AV     Examination of Body Systems (PT Eval Complexity) total of 4 or more elements  -AV     Clinical Presentation (PT Evaluation Complexity) stable  -AV     Clinical Decision Making (PT Evaluation Complexity) low complexity  -AV     Overall Complexity (PT Evaluation Complexity) low complexity  -AV       Row Name 06/02/25 1500          Therapy Plan Review/Discharge Plan (PT)    Therapy Plan Review (PT) evaluation/treatment results reviewed;patient  -AV       Row Name 06/02/25 1500          Physical  Therapy Goals    Problem Specific Goal Selection (PT) problem specific goal 1, PT  -AV       Row Name 06/02/25 1500          Problem Specific Goal 1 (PT)    Problem Specific Goal 1 (PT) Complete PT evaluation  -AV     Time Frame (Problem Specific Goal 1, PT) 1 day  -AV     Progress/Outcome (Problem Specific Goal 1, PT) goal met  -AV               User Key  (r) = Recorded By, (t) = Taken By, (c) = Cosigned By      Initials Name Provider Type    AV Giovani Acevedo, PT Physical Therapist                    Physical Therapy Education       Title: PT OT SLP Therapies (In Progress)       Topic: Physical Therapy (In Progress)       Point: Mobility training (Done)       Learning Progress Summary            Patient Acceptance, E,TB, VU by AV at 6/2/2025 1603                      Point: Home exercise program (Not Started)       Learner Progress:  Not documented in this visit.              Point: Body mechanics (Done)       Learning Progress Summary            Patient Acceptance, E,TB, VU by AV at 6/2/2025 1603                      Point: Precautions (Done)       Learning Progress Summary            Patient Acceptance, E,TB, VU by AV at 6/2/2025 1603                                      User Key       Initials Effective Dates Name Provider Type Discipline    AV 06/11/21 -  Giovani Acevedo, PT Physical Therapist PT                  PT Recommendation and Plan  Anticipated Discharge Disposition (PT): home  Therapy Frequency (PT): evaluation only  Plan of Care Reviewed With: patient  Progress: no change  Outcome Evaluation: Patient does not present with any significant decline in functional mobility. He is safe to continue transferring and ambulating with staff supervision until his discharge from the hospital. He is safe to return home once medically able.   Outcome Measures       Row Name 06/02/25 1600             How much help from another person do you currently need...    Turning from your back to your side while in flat  bed without using bedrails? 4  -AV      Moving from lying on back to sitting on the side of a flat bed without bedrails? 4  -AV      Moving to and from a bed to a chair (including a wheelchair)? 4  -AV      Standing up from a chair using your arms (e.g., wheelchair, bedside chair)? 4  -AV      Climbing 3-5 steps with a railing? 3  -AV      To walk in hospital room? 4  -AV      AM-PAC 6 Clicks Score (PT) 23  -AV         Functional Assessment    Outcome Measure Options AM-PAC 6 Clicks Basic Mobility (PT)  -AV                User Key  (r) = Recorded By, (t) = Taken By, (c) = Cosigned By      Initials Name Provider Type    AV Giovani Acevedo, PT Physical Therapist                     Time Calculation:    PT Charges       Row Name 06/02/25 1603             Time Calculation    PT Received On 06/02/25  -AV         Untimed Charges    PT Eval/Re-eval Minutes 32  -AV         Total Minutes    Untimed Charges Total Minutes 32  -AV       Total Minutes 32  -AV                User Key  (r) = Recorded By, (t) = Taken By, (c) = Cosigned By      Initials Name Provider Type    Giovani Love, PT Physical Therapist                  Therapy Charges for Today       Code Description Service Date Service Provider Modifiers Qty    59060980551 HC PT EVAL LOW COMPLEXITY 3 6/2/2025 Giovani Acevedo, PT GP 1            PT G-Codes  Outcome Measure Options: AM-PAC 6 Clicks Basic Mobility (PT)  AM-PAC 6 Clicks Score (PT): 23    Giovani Acevedo, TODD  6/2/2025

## 2025-06-02 NOTE — OUTREACH NOTE
Medical Week 2 Survey      Flowsheet Row Responses   [facility] patient discharged from? [facility]   Does the patient have one of the following disease processes/diagnoses(primary or secondary)? Other   Week 2 attempt successful? No   Unsuccessful attempts Attempt 1   Revoke Readmitted            Alejandra ROBERTS - Registered Nurse

## 2025-06-03 ENCOUNTER — APPOINTMENT (OUTPATIENT)
Dept: ULTRASOUND IMAGING | Facility: HOSPITAL | Age: 40
End: 2025-06-03
Payer: COMMERCIAL

## 2025-06-03 VITALS
WEIGHT: 185.19 LBS | OXYGEN SATURATION: 100 % | DIASTOLIC BLOOD PRESSURE: 75 MMHG | HEART RATE: 105 BPM | RESPIRATION RATE: 20 BRPM | HEIGHT: 68 IN | TEMPERATURE: 98.2 F | SYSTOLIC BLOOD PRESSURE: 124 MMHG | BODY MASS INDEX: 28.07 KG/M2

## 2025-06-03 PROBLEM — J90 PLEURAL EFFUSION: Status: RESOLVED | Noted: 2024-06-10 | Resolved: 2025-06-03

## 2025-06-03 LAB
ALBUMIN SERPL-MCNC: 2.2 G/DL (ref 3.5–5.2)
ALBUMIN/GLOB SERPL: 0.9 G/DL
ALP SERPL-CCNC: 156 U/L (ref 39–117)
ALT SERPL W P-5'-P-CCNC: 21 U/L (ref 1–41)
ANION GAP SERPL CALCULATED.3IONS-SCNC: 9.9 MMOL/L (ref 5–15)
APTT PPP: 39.5 SECONDS (ref 78–95.9)
APTT PPP: 76.2 SECONDS (ref 78–95.9)
APTT PPP: 95.5 SECONDS (ref 78–95.9)
AST SERPL-CCNC: 35 U/L (ref 1–40)
BILIRUB SERPL-MCNC: 2.1 MG/DL (ref 0–1.2)
BUN SERPL-MCNC: 30.8 MG/DL (ref 6–20)
BUN/CREAT SERPL: 18 (ref 7–25)
CALCIUM SPEC-SCNC: 7.7 MG/DL (ref 8.6–10.5)
CHLORIDE SERPL-SCNC: 100 MMOL/L (ref 98–107)
CO2 SERPL-SCNC: 20.1 MMOL/L (ref 22–29)
CREAT SERPL-MCNC: 1.71 MG/DL (ref 0.76–1.27)
CYTO UR: NORMAL
DEPRECATED RDW RBC AUTO: 66 FL (ref 37–54)
EGFRCR SERPLBLD CKD-EPI 2021: 52.2 ML/MIN/1.73
ERYTHROCYTE [DISTWIDTH] IN BLOOD BY AUTOMATED COUNT: 21.4 % (ref 12.3–15.4)
GLOBULIN UR ELPH-MCNC: 2.5 GM/DL
GLUCOSE BLDC GLUCOMTR-MCNC: 101 MG/DL (ref 70–99)
GLUCOSE SERPL-MCNC: 111 MG/DL (ref 65–99)
HCT VFR BLD AUTO: 24.6 % (ref 37.5–51)
HGB BLD-MCNC: 8 G/DL (ref 13–17.7)
INR PPP: 1.75 (ref 0.86–1.15)
LAB AP CASE REPORT: NORMAL
LAB AP CLINICAL INFORMATION: NORMAL
MCH RBC QN AUTO: 29.1 PG (ref 26.6–33)
MCHC RBC AUTO-ENTMCNC: 32.5 G/DL (ref 31.5–35.7)
MCV RBC AUTO: 89.5 FL (ref 79–97)
PATH REPORT.FINAL DX SPEC: NORMAL
PATH REPORT.GROSS SPEC: NORMAL
PLATELET # BLD AUTO: 113 10*3/MM3 (ref 140–450)
PMV BLD AUTO: 9.3 FL (ref 6–12)
POTASSIUM SERPL-SCNC: 4.4 MMOL/L (ref 3.5–5.2)
PROT SERPL-MCNC: 4.7 G/DL (ref 6–8.5)
PROTHROMBIN TIME: 21.2 SECONDS (ref 11.8–14.9)
RBC # BLD AUTO: 2.75 10*6/MM3 (ref 4.14–5.8)
SODIUM SERPL-SCNC: 130 MMOL/L (ref 136–145)
WBC NRBC COR # BLD AUTO: 18.45 10*3/MM3 (ref 3.4–10.8)

## 2025-06-03 PROCEDURE — 99239 HOSP IP/OBS DSCHRG MGMT >30: CPT | Performed by: STUDENT IN AN ORGANIZED HEALTH CARE EDUCATION/TRAINING PROGRAM

## 2025-06-03 PROCEDURE — 80053 COMPREHEN METABOLIC PANEL: CPT | Performed by: INTERNAL MEDICINE

## 2025-06-03 PROCEDURE — 76705 ECHO EXAM OF ABDOMEN: CPT

## 2025-06-03 PROCEDURE — 99233 SBSQ HOSP IP/OBS HIGH 50: CPT | Performed by: STUDENT IN AN ORGANIZED HEALTH CARE EDUCATION/TRAINING PROGRAM

## 2025-06-03 PROCEDURE — 94799 UNLISTED PULMONARY SVC/PX: CPT

## 2025-06-03 PROCEDURE — 82948 REAGENT STRIP/BLOOD GLUCOSE: CPT

## 2025-06-03 PROCEDURE — 94664 DEMO&/EVAL PT USE INHALER: CPT

## 2025-06-03 PROCEDURE — 25010000002 HEPARIN (PORCINE) 25000-0.45 UT/250ML-% SOLUTION: Performed by: STUDENT IN AN ORGANIZED HEALTH CARE EDUCATION/TRAINING PROGRAM

## 2025-06-03 PROCEDURE — G0378 HOSPITAL OBSERVATION PER HR: HCPCS

## 2025-06-03 PROCEDURE — 85027 COMPLETE CBC AUTOMATED: CPT | Performed by: INTERNAL MEDICINE

## 2025-06-03 PROCEDURE — 85730 THROMBOPLASTIN TIME PARTIAL: CPT | Performed by: PHYSICIAN ASSISTANT

## 2025-06-03 PROCEDURE — 25010000002 HEPARIN (PORCINE) 25000-0.45 UT/250ML-% SOLUTION: Performed by: INTERNAL MEDICINE

## 2025-06-03 PROCEDURE — 85730 THROMBOPLASTIN TIME PARTIAL: CPT | Performed by: STUDENT IN AN ORGANIZED HEALTH CARE EDUCATION/TRAINING PROGRAM

## 2025-06-03 PROCEDURE — 85610 PROTHROMBIN TIME: CPT | Performed by: STUDENT IN AN ORGANIZED HEALTH CARE EDUCATION/TRAINING PROGRAM

## 2025-06-03 PROCEDURE — 63710000001 REVEFENACIN 175 MCG/3ML SOLUTION: Performed by: INTERNAL MEDICINE

## 2025-06-03 PROCEDURE — 96366 THER/PROPH/DIAG IV INF ADDON: CPT

## 2025-06-03 RX ORDER — HEPARIN SODIUM 10000 [USP'U]/100ML
18 INJECTION, SOLUTION INTRAVENOUS
Status: DISCONTINUED | OUTPATIENT
Start: 2025-06-03 | End: 2025-06-04 | Stop reason: HOSPADM

## 2025-06-03 RX ADMIN — BUDESONIDE 0.5 MG: 0.5 SUSPENSION RESPIRATORY (INHALATION) at 20:36

## 2025-06-03 RX ADMIN — REVEFENACIN 175 MCG: 175 SOLUTION RESPIRATORY (INHALATION) at 09:28

## 2025-06-03 RX ADMIN — MIDODRINE HYDROCHLORIDE 10 MG: 10 TABLET ORAL at 09:19

## 2025-06-03 RX ADMIN — ARFORMOTEROL TARTRATE 15 MCG: 15 SOLUTION RESPIRATORY (INHALATION) at 20:36

## 2025-06-03 RX ADMIN — PANTOPRAZOLE SODIUM 40 MG: 40 TABLET, DELAYED RELEASE ORAL at 09:19

## 2025-06-03 RX ADMIN — SPIRONOLACTONE 25 MG: 25 TABLET ORAL at 09:19

## 2025-06-03 RX ADMIN — HEPARIN SODIUM 18 UNITS/KG/HR: 10000 INJECTION, SOLUTION INTRAVENOUS at 18:02

## 2025-06-03 RX ADMIN — FOLIC ACID 1 MG: 1 TABLET ORAL at 09:19

## 2025-06-03 RX ADMIN — BUDESONIDE 0.5 MG: 0.5 SUSPENSION RESPIRATORY (INHALATION) at 09:28

## 2025-06-03 RX ADMIN — APIXABAN 10 MG: 5 TABLET, FILM COATED ORAL at 10:50

## 2025-06-03 RX ADMIN — LACTULOSE SOLUTION USP, 10 G/15 ML 10 G: 10 SOLUTION ORAL; RECTAL at 09:19

## 2025-06-03 RX ADMIN — Medication 10 ML: at 09:20

## 2025-06-03 RX ADMIN — MENTHOL AND METHYL SALICYLATE 1 APPLICATION: 7.6; 29 OINTMENT TOPICAL at 00:38

## 2025-06-03 RX ADMIN — HEPARIN SODIUM 12.12 UNITS/KG/HR: 10000 INJECTION, SOLUTION INTRAVENOUS at 06:58

## 2025-06-03 RX ADMIN — URSODIOL 300 MG: 300 CAPSULE ORAL at 09:19

## 2025-06-03 RX ADMIN — MIDODRINE HYDROCHLORIDE 10 MG: 10 TABLET ORAL at 10:50

## 2025-06-03 RX ADMIN — FUROSEMIDE 20 MG: 20 TABLET ORAL at 09:19

## 2025-06-03 RX ADMIN — ARFORMOTEROL TARTRATE 15 MCG: 15 SOLUTION RESPIRATORY (INHALATION) at 09:28

## 2025-06-03 RX ADMIN — MIDODRINE HYDROCHLORIDE 10 MG: 10 TABLET ORAL at 17:43

## 2025-06-03 NOTE — PLAN OF CARE
Goal Outcome Evaluation: Pt remains in stable condition, VSS, NPO per orders.  Heparin infusing at 12.12units/kg, next PTT at 0900h.  Will continue to monitor.

## 2025-06-03 NOTE — PROGRESS NOTES
Pulmonary / Critical Care Progress Note      Patient Name: Wai Gay  : 1985  MRN: [id number]  Attending:  Darci Sullivan MD  Date of admission: 2025    Subjective   Subjective   Follow-up for acute hypoxic respiratory failure, pulmonary embolism, pleural effusion    No acute events overnight.    This morning,  Resting in bed  Overall feeling better  Currently on room air  Dyspnea definitely improved  S/p paracentesis/thoracentesis  Cultures NGTD  No fever or chills      Objective   Objective     Vitals:   Temp:  [97.9 °F (36.6 °C)-99.5 °F (37.5 °C)] 98.1 °F (36.7 °C)  Heart Rate:  [] 92  Resp:  [18] 18  BP: (100-133)/(54-74) 114/74  Flow (L/min) (Oxygen Therapy):  [2] 2    Physical Exam   Vital Signs Reviewed   General: Chronically ill-appearing male, Alert, NAD, lying in bed.    HEENT:  PERRL, EOMI.  OP, nares clear  Chest: Improved aeration, diminished to auscultation bilaterally, no work of breathing noted on room air  CV: RRR, no MGR, pulses 2+, equal.  Abd:  Soft, NT, ND, + BS  EXT:  no clubbing, no cyanosis, no edema  Neuro:  A&Ox3, CN grossly intact, no focal deficits.  Skin: No rashes or lesions noted      Result Review    Result Review:  I have personally reviewed the results from the time of this admission to 6/3/2025 10:37 EDT and agree with these findings:  [x]  Laboratory  [x]  Microbiology  [x]  Radiology  [x]  EKG/Telemetry   []  Cardiology/Vascular   []  Pathology  []  Old records  []  Other:  Most notable findings include:       Lab 25  0219 25  0118 25  0929   WBC 18.45* 15.77* 20.36*   HEMOGLOBIN 8.0* 7.7* 9.4*   HEMATOCRIT 24.6* 24.0* 30.1*   PLATELETS 113* 106* 138*   SODIUM 130* 129* 128*   POTASSIUM 4.4 4.7 5.3*   CHLORIDE 100 100 97*   CO2 20.1* 20.3* 18.9*   BUN 30.8* 25.9* 22.3*   CREATININE 1.71* 1.48* 1.52*   GLUCOSE 111* 89 92   CALCIUM 7.7* 7.3* 8.2*   TOTAL PROTEIN 4.7*  --  6.1   ALBUMIN 2.2*  --  2.9*   GLOBULIN 2.5  --  3.2      Assessment & Plan   Assessment / Plan     Active Hospital Problems:  Active Hospital Problems    Diagnosis     **Pleural effusion      Impression:  Acute pulmonary embolus  Acute hypoxic respiratory failure  Pleural effusion  Pericardial effusion  NICKI on admission  Ascites  Alcoholic cirrhosis  Lactic acidosis  Hepatic encephalopathy  Metabolic acidosis  Hyperkalemia  Leukocytosis  Asthma  Hypertension     Plan:  CT angiogram showed pulmonary emboli  We will start heparin drip and transition to Eliquis twice daily  Status post paracentesis with 1.8 L ascitic fluid removed, cultures pending  Status post thoracentesis with 4.5 L removed, transudative per lights criteria, pending culture and cytology  Supplemental O2 to maintain SpO2 greater than 90%  Monitor renal function, monitor and replace electrolytes as needed  2D echo with EF 51 to 55%.  Liver ultrasound with thrombosed to TIPS.  Continue lactulose  Blood culture no growth to date  Lactate cleared  Continue Brovana, Pulmicort and DuoNebs  Continue lasix 20 mg po bid. Spironlactone 25 mg orally bid.    Has poor prognosis.    VTE Prophylaxis:  Pharmacologic VTE prophylaxis orders are present.      CODE STATUS:   Code Status (Patient has no pulse and is not breathing): CPR (Attempt to Resuscitate)  Medical Interventions (Patient has pulse or is breathing): Full Support  Level Of Support Discussed With: Patient      Labs, images, and medications personally reviewed.  Discussed with patient    Electronically signed by ANA LAURA Meek, 06/03/25, 10:54 AM EDT.  This patient was seen by both a physician and a NP. Mahogany THORNTON MD, spent >50% of time in accordance with split shared billing. This included personally reviewing all pertinent labs, imaging, microbiology and documentation. Also discussing the case with the patient and any available family, the admitting physician and any available ancillary staff.   Electronically signed by Mahogany Morrison MD,  06/03/25, 3:53 PM EDT.

## 2025-06-03 NOTE — PLAN OF CARE
Goal Outcome Evaluation:  Plan of Care Reviewed With: patient        Heparin dc and eliquis started this shift. Needs transfer to [facility], awaiting bed.   Heparin drip restarted at 18u/kg/hr, next ptt at 0001.

## 2025-06-03 NOTE — DISCHARGE SUMMARY
[facility]         HOSPITALIST  DISCHARGE SUMMARY    Patient Name: Wai Gay  : 1985  MRN: [id number]    Date of Admission: 2025  Date of Discharge:  6/3/25  Primary Care Physician: Callum Peterson, DO    Consults       Date and Time Order Name Status Description    2025  1:41 PM Pulmonology (on-call MD unless specified) Completed     2025  1:41 PM Hospitalist (on-call MD unless specified)      2025  8:30 AM Inpatient Pulmonology Consult Completed             Active and Resolved Hospital Problems:  Active Hospital Problems   No active problems to display.      Resolved Hospital Problems    Diagnosis POA    **Pleural effusion [J90] Yes       Hospital Course     Hospital Course:  Wai Gay is a 39 y.o. male with alcoholic cirrhosis, history of TIPS, hypertension who presented with difficulty breathing.  Discharged from the hospital on  following treatment for large right pleural effusion status post thoracentesis with transudative fluid.  He returned with worsening shortness of breath productive cough.  Chest x-ray with reaccumulation of large right sided effusion as well as pericardial effusion which was seen previously.  No tamponade physiology on echo.  Underwent thoracentesis with 4.5 L removed and paracentesis with 1.8 L removed.  Found to have acute pulmonary embolus.  Started on heparin drip.  Ultrasound of liver shows that TIPS is thrombosed.  Called [facility] where patient is known by their hepatology team.  [facility] accepted patient to transfer.  Patient needs IR for redo of TIPS.  Patient is medically stable.  He is stable to transfer to [facility].        DISCHARGE Follow Up Recommendations for labs and diagnostics:   - Consult interventional radiology  - Consult hepatology      Day of Discharge     Vital Signs:  Temp:  [98.1 °F (36.7 °C)-99.5 °F (37.5 °C)] 98.1 °F (36.7  °C)  Heart Rate:  [] 99  Resp:  [16-18] 16  BP: (100-133)/(54-76) 106/67  Physical Exam:   General: No acute distress  Pulmonary: Normal breathing  Cardiovascular: Normal S1, S2  GI: Abdomen soft and nondistended      Discharge Details        Discharge Medications        Continue These Medications        Instructions Start Date   albuterol sulfate  (90 Base) MCG/ACT inhaler  Commonly known as: PROVENTIL HFA;VENTOLIN HFA;PROAIR HFA   2 puffs, Inhalation, Every 4 Hours PRN      Fluticasone-Umeclidin-Vilant 100-62.5-25 MCG/ACT inhaler  Commonly known as: TRELEGY   1 puff, Inhalation, Daily - RT      folic acid 1 MG tablet  Commonly known as: FOLVITE   1 mg, Oral, Daily      furosemide 20 MG tablet  Commonly known as: LASIX   20 mg, Oral, 2 Times Daily      midodrine 10 MG tablet  Commonly known as: PROAMATINE   10 mg, Oral, 3 Times Daily Before Meals      ondansetron ODT 4 MG disintegrating tablet  Commonly known as: ZOFRAN-ODT   4 mg, Translingual, Every 8 Hours PRN      pantoprazole 40 MG EC tablet  Commonly known as: PROTONIX   40 mg, Oral, Daily      spironolactone 25 MG tablet  Commonly known as: ALDACTONE   25 mg, 2 Times Daily      ursodiol 300 MG capsule  Commonly known as: ACTIGALL   300 mg, Oral, 2 Times Daily      vitamin D 1.25 MG (79967 UT) capsule capsule  Commonly known as: ERGOCALCIFEROL   50,000 Units, Oral, Weekly               No Known Allergies    Discharge Disposition:  Short Term Hospital (DC)    Diet:  Hospital:  Diet Order   Procedures    Diet: Cardiac; Low Sodium (2g); Fluid Consistency: Thin (IDDSI 0)       Discharge Activity:   Activity Instructions       Activity as Tolerated              CODE STATUS:  Code Status and Medical Interventions: CPR (Attempt to Resuscitate); Full Support   Ordered at: 06/01/25 1524     Code Status (Patient has no pulse and is not breathing):    CPR (Attempt to Resuscitate)     Medical Interventions (Patient has pulse or is breathing):    Full Support      Level Of Support Discussed With:    Patient         Future Appointments   Date Time Provider Department [place]   6/4/2025  1:00 PM [facility]   6/6/2025 10:45 AM Callum Peterson, DO [facility]   6/27/2025  9:00 AM Елена Macario APRN [facility] PCC [facility]   7/11/2025  8:00 AM Callum Peterson, DO [facility]   7/17/2025 10:30 AM Callum Peterson, DO [facility]   9/5/2025  9:45 AM Callum Peterson, DO [facility]   11/19/2025  8:00 AM Shyla Yarbrough APRN [facility] GE [facility]       Additional Instructions for the Follow-ups that You Need to Schedule       Discharge Follow-up with PCP   As directed       Currently Documented PCP:    Callum Peterson DO    PCP Phone Number:    [phone]     Follow Up Details: 1 week                Pertinent  and/or Most Recent Results     PROCEDURES:   Thoracentesis  Paracentesis    LAB RESULTS:      Lab 06/03/25  0917 06/03/25  0219 06/02/25  2029 06/02/25  1055 06/02/25  0229 06/02/25  0118 06/01/25  1756 06/01/25  1350 06/01/25  1046 06/01/25  0929   WBC  --  18.45*  --   --   --  15.77*  --   --   --  20.36*   HEMOGLOBIN  --  8.0*  --   --   --  7.7*  --   --   --  9.4*   HEMATOCRIT  --  24.6*  --   --   --  24.0*  --   --   --  30.1*   PLATELETS  --  113*  --   --   --  106*  --   --   --  138*   NEUTROS ABS  --   --   --   --   --  13.30*  --   --   --  16.83*   IMMATURE GRANS (ABS)  --   --   --   --   --  0.36*  --   --   --  0.39*   LYMPHS ABS  --   --   --   --   --  1.21  --   --   --  2.32   MONOS ABS  --   --   --   --   --  0.72  --   --   --  0.67   EOS ABS  --   --   --   --   --  0.13  --   --   --  0.10   MCV  --  89.5  --   --   --  89.9  --   --   --  92.6   LACTATE  --   --   --   --   --   --   --  1.9 2.8*  --    PROTIME  --   --   --   --   --   --   --   --   --  19.4*   APTT 95.5 76.2* 86.6 >200.0* >200.0* >200.0*   < >  --   --   --     < > = values in this interval not displayed.          Lab 06/03/25  0219 06/02/25  0118 06/01/25  0929   SODIUM 130* 129* 128*   POTASSIUM 4.4 4.7 5.3*   CHLORIDE 100 100 97*   CO2 20.1* 20.3* 18.9*   ANION GAP 9.9 8.7 12.1   BUN 30.8* 25.9* 22.3*   CREATININE 1.71* 1.48* 1.52*   EGFR 52.2* 62.1 60.1   GLUCOSE 111* 89 92   CALCIUM 7.7* 7.3* 8.2*         Lab 06/03/25  0219 06/01/25  0929   TOTAL PROTEIN 4.7* 6.1   ALBUMIN 2.2* 2.9*   GLOBULIN 2.5 3.2   ALT (SGPT) 21 28   AST (SGOT) 35 54*   BILIRUBIN 2.1* 4.1*   ALK PHOS 156* 192*         Lab 06/01/25  1046 06/01/25  0929   PROBNP  --  364.8   HSTROP T 25* 29*   PROTIME  --  19.4*   INR  --  1.56*                 Brief Urine Lab Results  (Last result in the past 365 days)        Color   Clarity   Blood   Leuk Est   Nitrite   Protein   CREAT   Urine HCG        06/01/25 1818 Dark Yellow   Clear   Negative   Trace   Negative   Trace                 Microbiology Results (last 10 days)       Procedure Component Value - Date/Time    Urine Culture - Urine, Urine, Clean Catch [[id number]]  (Normal) Collected: 06/01/25 1818    Lab Status: Final result Specimen: Urine, Clean Catch Updated: 06/02/25 2116     Urine Culture No growth    Body Fluid Culture - Body Fluid, Pleural Cavity [[id number]] Collected: 06/01/25 1758    Lab Status: Preliminary result Specimen: Body Fluid from Pleural Cavity Updated: 06/03/25 0730     Body Fluid Culture No growth     Gram Stain Rare (1+) WBCs seen      No organisms seen    Body Fluid Culture - Body Fluid, Peritoneum [[id number]] Collected: 06/01/25 1757    Lab Status: Preliminary result Specimen: Body Fluid from Peritoneum Updated: 06/03/25 0730     Body Fluid Culture No growth     Gram Stain Occasional WBCs seen      No organisms seen    Blood Culture - Blood, Arm, Right [[id number]]  (Normal) Collected: 06/01/25 1008    Lab Status: Preliminary result Specimen: Blood from Arm, Right Updated: 06/03/25 1015     Blood Culture No growth at 2 days    Narrative:      Less than seven (7) mL's of blood  was collected.  Insufficient quantity may yield false negative results.    Blood Culture - Blood, Arm, Left [[id number]]  (Normal) Collected: 06/01/25 1008    Lab Status: Preliminary result Specimen: Blood from Arm, Left Updated: 06/03/25 1015     Blood Culture No growth at 2 days    Narrative:      Less than seven (7) mL's of blood was collected.  Insufficient quantity may yield false negative results.    Legionella Antigen, Urine - Urine, Urine, Clean Catch [[id number]]  (Normal) Collected: 05/25/25 2024    Lab Status: Final result Specimen: Urine, Clean Catch Updated: 05/25/25 2101     LEGIONELLA ANTIGEN, URINE Negative    S. Pneumo Ag Urine or CSF - Urine, Urine, Clean Catch [[id number]]  (Normal) Collected: 05/25/25 2024    Lab Status: Final result Specimen: Urine, Clean Catch Updated: 05/25/25 2102     Strep Pneumo Ag Negative    Blood Culture - Blood, Arm, Left [[id number]]  (Normal) Collected: 05/25/25 1824    Lab Status: Final result Specimen: Blood from Arm, Left Updated: 05/30/25 1845     Blood Culture No growth at 5 days    Narrative:      Less than seven (7) mL's of blood was collected.  Insufficient quantity may yield false negative results.    Blood Culture - Blood, Arm, Right [[id number]]  (Normal) Collected: 05/25/25 1824    Lab Status: Final result Specimen: Blood from Arm, Right Updated: 05/30/25 1845     Blood Culture No growth at 5 days    Narrative:      Less than seven (7) mL's of blood was collected.  Insufficient quantity may yield false negative results.    Respiratory Panel PCR w/COVID-19(SARS-CoV-2) DAVID/MEY/AMBAR/PAD/COR/HARLEY In-House, NP Swab in [place], 2 HR TAT - Swab, Nasopharynx [[id number]]  (Normal) Collected: 05/25/25 1820    Lab Status: Final result Specimen: Swab from Nasopharynx Updated: 05/25/25 1919     ADENOVIRUS, PCR Not Detected     Coronavirus 229E Not Detected     Coronavirus HKU1 Not Detected     Coronavirus NL63 Not Detected     Coronavirus OC43 Not Detected     COVID19 Not  Detected     Human Metapneumovirus Not Detected     Human Rhinovirus/Enterovirus Not Detected     Influenza A PCR Not Detected     Influenza B PCR Not Detected     Parainfluenza Virus 1 Not Detected     Parainfluenza Virus 2 Not Detected     Parainfluenza Virus 3 Not Detected     Parainfluenza Virus 4 Not Detected     RSV, PCR Not Detected     Bordetella pertussis pcr Not Detected     Bordetella parapertussis PCR Not Detected     Chlamydophila pneumoniae PCR Not Detected     Mycoplasma pneumo by PCR Not Detected    Narrative:      In the setting of a positive respiratory panel with a viral infection PLUS a negative procalcitonin without other underlying concern for bacterial infection, consider observing off antibiotics or discontinuation of antibiotics and continue supportive care. If the respiratory panel is positive for atypical bacterial infection (Bordetella pertussis, Chlamydophila pneumoniae, or Mycoplasma pneumoniae), consider antibiotic de-escalation to target atypical bacterial infection.    Anaerobic Culture - Pleural Fluid, Pleural Cavity [[id number]]  (Normal) Collected: 05/25/25 1623    Lab Status: Final result Specimen: Pleural Fluid from Pleural Cavity Updated: 05/31/25 0929     Anaerobic Culture No anaerobes isolated at 5 days    AFB Culture - Body Fluid, Pleural Cavity [[id number]] Collected: 05/25/25 1621    Lab Status: Preliminary result Specimen: Body Fluid from Pleural Cavity Updated: 06/01/25 1631     AFB Culture No AFB isolated at 1 week     AFB Stain No acid fast bacilli seen    Body Fluid Culture - Body Fluid, Pleural Cavity [[id number]] Collected: 05/25/25 1621    Lab Status: Final result Specimen: Body Fluid from Pleural Cavity Updated: 05/29/25 0656     Body Fluid Culture No growth at 3 days     Gram Stain Few (2+) Red blood cells      No organisms seen    Fungus Culture - Body Fluid, Pleural Cavity [[id number]] Collected: 05/25/25 1621    Lab Status: Preliminary result Specimen: Body Fluid  from Pleural Cavity Updated: 06/01/25 1631     Fungus Culture No fungus isolated at 1 week            US Liver  Result Date: 6/3/2025  Impression: 1.Thrombosed TIPS and visualized portal vein. Electronically Signed: Elie Washburn MD  6/3/2025 8:54 AM EDT  Workstation ID: [id number]    XR Chest 1 View  Result Date: 6/1/2025  Impression: No pneumothorax status post right thoracentesis. Small amount of residual pleural effusion is present, otherwise, no active cardiopulmonary disease. Electronically Signed: James Gordon DO  6/1/2025 6:25 PM EDT  Workstation ID: [id number]    CT Angiogram Chest Pulmonary Embolism  Result Date: 6/1/2025  Impression: 1.Large nonloculated right pleural effusion with mild mediastinal shift to the left of midline. With near complete atelectasis of the right lung. The pleural fluid extends across the midline anterior to and abutting the pericardium which accounts for the  appearance of the cardiac silhouette on the recent radiograph. 2.Partially occlusive thrombus in the distal left main pulmonary artery with several occlusive and nonocclusive filling defects in the segmental and subsegmental left lower lobe and to a lesser extent the left upper lobe pulmonary artery branches. No right-sided emboli are identified. No CT signs of elevated right heart pressures. 3.The esophagus is distended and fluid-filled up to 4 cm above the level of the gabe. 4.Anasarca and partially visualized ascites. Please refer to the separately dictated CT of the abdomen and pelvis report from same date dictated under separate order for further details regarding the abdominal findings.. Electronically Signed: James Gordon DO  6/1/2025 11:59 AM EDT  Workstation ID: [id number]    CT Abdomen Pelvis With Contrast  Result Date: 6/1/2025  1.Large right-sided pleural effusion with collapse of the right middle and right lower lungs. 2.Pulmonary emboli within the left main pulmonary artery. This is incompletely evaluated.  Dedicated pulmonary angiogram is suggested. 3.Large pericardial effusion. This appears new from comparison. Please correlate clinically. This should be further evaluated at time of CT pulmonary angiogram and additional intervention should be considered on a clinical basis. 4.Cirrhosis of the liver with evidence of portal hypertension. There is new thrombosis suspected within the portal vein extending into the TIPS. Patency of the TIPS is indeterminate and should be further evaluated. 5.Diffuse wall thickening of the bowel compatible with third spacing. 6.Moderate ascites. 7.Extensive body wall anasarca. 8.Other findings as above. Extensive new pericardial effusion, pleural effusion and ascites noted from the comparison. Further evaluation, drainage should be considered. Dedicated pulmonary angiogram to further evaluate pulmonary emboli recommended. Evaluation of the patency of the TIPS shunt is recommended. Findings were discussed with Dr. Fernandez at the time of interpretation. Electronically Signed: Mo Barrett MD  6/1/2025 11:07 AM EDT  Workstation ID: [id number]    XR Chest 1 View  Result Date: 6/1/2025  Impression: Reaccumulation of a large right pleural effusion and probable pericardial effusion since 5/25/2025. With near complete opacification of the right hemithorax. The pleural effusion is larger than on the first radiograph from 5/25/2025 at 1223. No significant volume loss or mediastinal shift. Electronically Signed: James Gordon DO  6/1/2025 10:23 AM EDT  Workstation ID: [id number]       Results for orders placed during the hospital encounter of 05/25/24    Duplex Venous Lower Extremity - Bilateral CV-READ    Interpretation Summary    Normal bilateral lower extremity venous duplex scan.      Results for orders placed during the hospital encounter of 05/25/24    Duplex Venous Lower Extremity - Bilateral CV-READ    Interpretation Summary    Normal bilateral lower extremity venous duplex  scan.      Results for orders placed during the hospital encounter of 06/01/25    Adult Transthoracic Echo Limited W/ Cont if Necessary Per Protocol    Interpretation Summary    Left ventricular ejection fraction appears to be 51 - 55%.      Labs Pending at Discharge:  Pending Labs       Order Current Status    Anaerobic Culture - Pleural Fluid, Pleural Cavity In process    Anaerobic Culture - Swab, Peritoneum In process    Non-gynecologic Cytology In process    Blood Culture - Blood, Arm, Left Preliminary result    Blood Culture - Blood, Arm, Right Preliminary result    Body Fluid Culture - Body Fluid, Peritoneum Preliminary result    Body Fluid Culture - Body Fluid, Pleural Cavity Preliminary result              Time spent on Discharge including face to face service:  50 minutes    Electronically signed by Darci Sullivan MD, 06/03/25, 4:49 PM EDT.

## 2025-06-04 NOTE — NURSING NOTE
Patient AAOx4, /75, 105, 20, 98.2, 100% RA,Report given to Eduardo Aparicio RN [address]E At [address] [phone], Sent face sheet as requested, Report given to [facility], patient transferred via stretcher.   Dennis notified, Family notified by patient Mother Jasmyn Gay.

## 2025-06-05 LAB
BACTERIA FLD CULT: NORMAL
BACTERIA FLD CULT: NORMAL
GRAM STN SPEC: NORMAL

## 2025-06-06 LAB
BACTERIA SPEC AEROBE CULT: NORMAL
BACTERIA SPEC AEROBE CULT: NORMAL

## 2025-06-07 LAB
BACTERIA SPEC ANAEROBE CULT: NORMAL
BACTERIA SPEC ANAEROBE CULT: NORMAL

## 2025-06-08 LAB
FUNGUS WND CULT: NORMAL
MYCOBACTERIUM SPEC CULT: NORMAL
NIGHT BLUE STAIN TISS: NORMAL
QT INTERVAL: 281 MS
QTC INTERVAL: 380 MS

## 2025-06-09 LAB
QT INTERVAL: 372 MS
QTC INTERVAL: 465 MS

## 2025-06-10 ENCOUNTER — LAB (OUTPATIENT)
Dept: LAB | Facility: HOSPITAL | Age: 40
End: 2025-06-10
Payer: COMMERCIAL

## 2025-06-10 DIAGNOSIS — K70.31 ALCOHOLIC CIRRHOSIS OF LIVER WITH ASCITES: ICD-10-CM

## 2025-06-10 LAB
ALBUMIN SERPL-MCNC: 2.7 G/DL (ref 3.5–5.2)
ALBUMIN/GLOB SERPL: 1 G/DL
ALP SERPL-CCNC: 159 U/L (ref 39–117)
ALPHA-FETOPROTEIN: <2 NG/ML (ref 0–8.3)
ALT SERPL W P-5'-P-CCNC: 18 U/L (ref 1–41)
AMMONIA BLD-SCNC: 10 UMOL/L (ref 16–60)
ANION GAP SERPL CALCULATED.3IONS-SCNC: 7 MMOL/L (ref 5–15)
AST SERPL-CCNC: 28 U/L (ref 1–40)
BASOPHILS # BLD AUTO: 0.03 10*3/MM3 (ref 0–0.2)
BASOPHILS NFR BLD AUTO: 0.2 % (ref 0–1.5)
BILIRUB SERPL-MCNC: 3.7 MG/DL (ref 0–1.2)
BUN SERPL-MCNC: 17 MG/DL (ref 6–20)
BUN/CREAT SERPL: 13.8 (ref 7–25)
CALCIUM SPEC-SCNC: 8 MG/DL (ref 8.6–10.5)
CHLORIDE SERPL-SCNC: 96 MMOL/L (ref 98–107)
CO2 SERPL-SCNC: 28 MMOL/L (ref 22–29)
CREAT SERPL-MCNC: 1.23 MG/DL (ref 0.76–1.27)
DEPRECATED RDW RBC AUTO: 61.6 FL (ref 37–54)
EGFRCR SERPLBLD CKD-EPI 2021: 77.5 ML/MIN/1.73
EOSINOPHIL # BLD AUTO: 0.41 10*3/MM3 (ref 0–0.4)
EOSINOPHIL NFR BLD AUTO: 2.2 % (ref 0.3–6.2)
ERYTHROCYTE [DISTWIDTH] IN BLOOD BY AUTOMATED COUNT: 18.6 % (ref 12.3–15.4)
GLOBULIN UR ELPH-MCNC: 2.7 GM/DL
GLUCOSE SERPL-MCNC: 98 MG/DL (ref 65–99)
HCT VFR BLD AUTO: 28.8 % (ref 37.5–51)
HGB BLD-MCNC: 9.3 G/DL (ref 13–17.7)
IMM GRANULOCYTES # BLD AUTO: 0.39 10*3/MM3 (ref 0–0.05)
IMM GRANULOCYTES NFR BLD AUTO: 2.1 % (ref 0–0.5)
INR PPP: 1.67 (ref 0.86–1.15)
LYMPHOCYTES # BLD AUTO: 1.07 10*3/MM3 (ref 0.7–3.1)
LYMPHOCYTES NFR BLD AUTO: 5.7 % (ref 19.6–45.3)
MCH RBC QN AUTO: 29.8 PG (ref 26.6–33)
MCHC RBC AUTO-ENTMCNC: 32.3 G/DL (ref 31.5–35.7)
MCV RBC AUTO: 92.3 FL (ref 79–97)
MONOCYTES # BLD AUTO: 0.67 10*3/MM3 (ref 0.1–0.9)
MONOCYTES NFR BLD AUTO: 3.6 % (ref 5–12)
NEUTROPHILS NFR BLD AUTO: 16.22 10*3/MM3 (ref 1.7–7)
NEUTROPHILS NFR BLD AUTO: 86.2 % (ref 42.7–76)
PLATELET # BLD AUTO: 61 10*3/MM3 (ref 140–450)
PMV BLD AUTO: 12.1 FL (ref 6–12)
POTASSIUM SERPL-SCNC: 5.3 MMOL/L (ref 3.5–5.2)
PROT SERPL-MCNC: 5.4 G/DL (ref 6–8.5)
PROTHROMBIN TIME: 20.5 SECONDS (ref 11.8–14.9)
RBC # BLD AUTO: 3.12 10*6/MM3 (ref 4.14–5.8)
SODIUM SERPL-SCNC: 131 MMOL/L (ref 136–145)
WBC NRBC COR # BLD AUTO: 18.79 10*3/MM3 (ref 3.4–10.8)

## 2025-06-10 PROCEDURE — 80053 COMPREHEN METABOLIC PANEL: CPT

## 2025-06-10 PROCEDURE — 82140 ASSAY OF AMMONIA: CPT

## 2025-06-10 PROCEDURE — 85610 PROTHROMBIN TIME: CPT

## 2025-06-10 PROCEDURE — 82105 ALPHA-FETOPROTEIN SERUM: CPT

## 2025-06-10 PROCEDURE — 36415 COLL VENOUS BLD VENIPUNCTURE: CPT

## 2025-06-10 PROCEDURE — 85025 COMPLETE CBC W/AUTO DIFF WBC: CPT

## 2025-06-15 LAB
FUNGUS WND CULT: NORMAL
MYCOBACTERIUM SPEC CULT: NORMAL
NIGHT BLUE STAIN TISS: NORMAL

## 2025-06-16 ENCOUNTER — TRANSCRIBE ORDERS (OUTPATIENT)
Dept: ADMINISTRATIVE | Facility: HOSPITAL | Age: 40
End: 2025-06-16
Payer: COMMERCIAL

## 2025-06-16 ENCOUNTER — LAB (OUTPATIENT)
Dept: LAB | Facility: HOSPITAL | Age: 40
End: 2025-06-16
Payer: COMMERCIAL

## 2025-06-16 DIAGNOSIS — K70.30 CIRRHOSIS, LAENNEC'S: ICD-10-CM

## 2025-06-16 DIAGNOSIS — K70.30 CIRRHOSIS, LAENNEC'S: Primary | ICD-10-CM

## 2025-06-16 DIAGNOSIS — K74.3 CHOLESTATIC CIRRHOSIS: ICD-10-CM

## 2025-06-16 LAB
ANION GAP SERPL CALCULATED.3IONS-SCNC: 9 MMOL/L (ref 5–15)
BUN SERPL-MCNC: 13 MG/DL (ref 6–20)
BUN/CREAT SERPL: 9.7 (ref 7–25)
CALCIUM SPEC-SCNC: 8.2 MG/DL (ref 8.6–10.5)
CHLORIDE SERPL-SCNC: 100 MMOL/L (ref 98–107)
CO2 SERPL-SCNC: 26 MMOL/L (ref 22–29)
CREAT SERPL-MCNC: 1.34 MG/DL (ref 0.76–1.27)
EGFRCR SERPLBLD CKD-EPI 2021: 70 ML/MIN/1.73
GLUCOSE SERPL-MCNC: 59 MG/DL (ref 65–99)
POTASSIUM SERPL-SCNC: 4.4 MMOL/L (ref 3.5–5.2)
SODIUM SERPL-SCNC: 135 MMOL/L (ref 136–145)

## 2025-06-16 PROCEDURE — 36415 COLL VENOUS BLD VENIPUNCTURE: CPT

## 2025-06-16 PROCEDURE — 80048 BASIC METABOLIC PNL TOTAL CA: CPT

## 2025-06-22 LAB
FUNGUS WND CULT: NORMAL
MYCOBACTERIUM SPEC CULT: NORMAL
NIGHT BLUE STAIN TISS: NORMAL

## 2025-06-25 RX ORDER — ESCITALOPRAM OXALATE 5 MG/1
5 TABLET ORAL DAILY
Qty: 90 TABLET | Refills: 0 | OUTPATIENT
Start: 2025-06-25

## 2025-06-27 ENCOUNTER — OFFICE VISIT (OUTPATIENT)
Dept: PULMONOLOGY | Facility: CLINIC | Age: 40
End: 2025-06-27
Payer: COMMERCIAL

## 2025-06-27 ENCOUNTER — HOSPITAL ENCOUNTER (OUTPATIENT)
Facility: HOSPITAL | Age: 40
Discharge: HOME | End: 2025-06-27
Admitting: NURSE PRACTITIONER
Payer: COMMERCIAL

## 2025-06-27 VITALS
HEIGHT: 68 IN | BODY MASS INDEX: 21.7 KG/M2 | RESPIRATION RATE: 16 BRPM | SYSTOLIC BLOOD PRESSURE: 108 MMHG | OXYGEN SATURATION: 100 % | TEMPERATURE: 97.6 F | DIASTOLIC BLOOD PRESSURE: 80 MMHG | WEIGHT: 143.2 LBS | HEART RATE: 101 BPM

## 2025-06-27 DIAGNOSIS — R42 LIGHTHEADEDNESS: ICD-10-CM

## 2025-06-27 DIAGNOSIS — Z71.6 ENCOUNTER FOR SMOKING CESSATION COUNSELING: ICD-10-CM

## 2025-06-27 DIAGNOSIS — R06.09 DYSPNEA ON EXERTION: ICD-10-CM

## 2025-06-27 DIAGNOSIS — J90 RECURRENT PLEURAL EFFUSION ON RIGHT: ICD-10-CM

## 2025-06-27 DIAGNOSIS — J45.40 MODERATE PERSISTENT ASTHMA, UNSPECIFIED WHETHER COMPLICATED: Primary | Chronic | ICD-10-CM

## 2025-06-27 DIAGNOSIS — R51.9 FREQUENT HEADACHES: ICD-10-CM

## 2025-06-27 DIAGNOSIS — F17.210 NICOTINE DEPENDENCE, CIGARETTES, UNCOMPLICATED: ICD-10-CM

## 2025-06-27 DIAGNOSIS — I26.94 MULTIPLE SUBSEGMENTAL PULMONARY EMBOLI WITHOUT ACUTE COR PULMONALE: ICD-10-CM

## 2025-06-27 PROCEDURE — 71046 X-RAY EXAM CHEST 2 VIEWS: CPT

## 2025-06-27 RX ORDER — POTASSIUM CHLORIDE 1500 MG/1
TABLET, EXTENDED RELEASE ORAL
COMMUNITY
Start: 2025-03-27

## 2025-06-27 NOTE — PROGRESS NOTES
Primary Care Provider  Callum Peterson DO     Referring Provider  No ref. provider found       Patient or patient representative verbalized consent for the use of Ambient Listening during the visit with  ANA LAURA Aldridge for chart documentation. 6/27/2025  09:03 EDT    Chief Complaint  Cough (X 2-3 days ), Shortness of Breath, Wheezing, and Follow-up (3 month f/up )    Subjective          Wai Gay presents to [facility]ULMON[facility]ICINE  History of Present Illness  Wai Gay is a 39 y.o. male patient of Dr. 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     Patient was recently admitted to [facility] from 6/1/2025 until 6/3/2025.  Per his discharge summary he had presented with difficulty breathing.  He had previously been hospitalized on 5/26/2025 for a large right pleural effusion status postthoracentesis with transudative fluid.  He had return with a productive cough and shortness of breath.  Chest x-ray showed reaccumulation of large right-sided pleural effusion as well as pericardial effusion.  Patient underwent a thoracentesis with 4.5 L removed and a paracentesis with 1.8 L removed.  He was also found to have an acute pulmonary embolus and started on a heparin drip.  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  Patient transferred to [facility] to redo his TIPS procedure.    History of Present Illness  The patient is a 39-year-old male who presents for evaluation of shortness of breath, headaches, and lightheadedness.    He has been experiencing respiratory distress for the past few days. He continues to use the Trelegy inhaler, which he finds more effective than Anoro. He reports smoking approximately half a pack of cigarettes daily and is attempting to quit.    He underwent thoracentesis procedures in  05/2025 and 06/2025 during his last two hospital admissions. During a recent visit to the hospital, two blood clots were found, one at the bottom of a stent. He was diagnosed with pulmonary embolism and was prescribed Eliquis 5 mg twice daily. He also takes spironolactone and Lasix.    He reports severe headaches, a symptom he rarely experiences, which have been occurring daily for approximately one week. He also mentions episodes of lightheadedness.    SOCIAL HISTORY  Tobacco: The patient smokes not much more than half a pack a day and is trying to quit.       His history of smoking is   Tobacco Use: High Risk (6/27/2025)    Patient History     Smoking Tobacco Use: Every Day     Smokeless Tobacco Use: Never     Passive Exposure: Current   Wai Gay  reports that he has been smoking cigarettes. He started smoking about 17 years ago. He has a 8.7 pack-year smoking history. He has been exposed to tobacco smoke. He has never used smokeless tobacco. I have educated him on the risk of diseases from using tobacco products such as cancer, COPD, and heart disease.     I advised him to quit and he is willing to quit. We have discussed the following method/s for tobacco cessation:  [place].  Together we have set a quit date for 3 months.  He will follow up with me in 3 months or sooner to check on his progress.    I spent 5 minutes counseling the patient.           Review of Systems   Constitutional:  Negative for chills, fatigue, fever, unexpected weight gain and unexpected weight loss.   HENT:  Congestion: Nasal.    Respiratory:  Positive for shortness of breath. Negative for apnea, cough and wheezing.         Negative for Hemoptysis     Cardiovascular:  Negative for chest pain, palpitations and leg swelling.   Skin:         Negative for cyanosis      Sleep: Negative for Excessive daytime sleepiness  Negative for morning headaches  Negative for Snoring    Family History   Problem Relation Age of Onset     Alcohol abuse Mother     Arthritis Mother     COPD Mother     Heart disease Maternal Grandmother     Hypertension Maternal Grandmother     Lung cancer Maternal Grandmother     Stroke Maternal Grandmother     Heart disease Maternal Grandfather     Lung cancer Maternal Grandfather     Cancer Paternal Grandmother     Cancer Paternal Grandfather     Colon cancer Neg Hx     Malig Hyperthermia Neg Hx         Social History     Socioeconomic History    Marital status: Single   Tobacco Use    Smoking status: Every Day     Current packs/day: 0.50     Average packs/day: 0.5 packs/day for 17.5 years (8.7 ttl pk-yrs)     Types: Cigarettes     Start date: 1/1/2008     Passive exposure: Current    Smokeless tobacco: Never   Vaping Use    Vaping status: Never Used   Substance and Sexual Activity    Alcohol use: Not Currently     Alcohol/week: 3.0 standard drinks of alcohol     Comment: NO ALCOHOL IN OVER 5 YEARS    Drug use: Never    Sexual activity: Defer        Past Medical History:   Diagnosis Date    NICKI (acute kidney injury)     IN Merged with [facility] 3/24/25 [place], RIGHT UPPER QUAD PAIN, HYPOKALEMIA, AND HYPONATREMIA. CREAT 1.77, GFR 50.1, TOTAL BILI 5.6.  4/17/25 CREAT 1.O8 AND GFR 90.6    Alcohol abuse 03/14/2017    REPORTS HAS BEEN SOBER OVER 5 YEARS    Anxiety     Ascites     Asthma     FOLLOWED BY DR SCHAEFER    Last esophagus     Cirrhosis     Essential hypertension 12/27/2019    GERD (gastroesophageal reflux disease)     History of transfusion     Feb. 2024    Hypokalemia 03/14/2017    LAST POTASSIUM 4 ON 4/17/25    Hyponatremia 03/14/2017    LAST SODIUM 138 ON 4/17/25    Low back pain     Pleural effusion     Primary biliary cholangitis     Splenomegaly     Steatohepatitis, alcoholic 03/14/2017    Tobacco abuse 03/14/2017    Umbilical hernia         Immunization History   Administered Date(s) Administered    COVID-19 (PFIZER) Purple Cap Monovalent 11/07/2021, 11/28/2021    Fluzone (or Fluarix & Flulaval for VFC) >6mos 10/18/2021     Hep A / Hep B 06/05/2025    Hep B, Adolescent or Pediatric 09/09/2003    Hepatitis B 06/28/2022, 08/29/2022    MMR 01/30/1997    Meningococcal Conjugate 06/28/2022    Pneumococcal Conjugate 20-Valent (PCV20) 06/28/2022, 06/06/2025    Td (TDVAX) 09/09/2003         No Known Allergies       Current Outpatient Medications:     albuterol sulfate  (90 Base) MCG/ACT inhaler, Inhale 2 puffs Every 4 (Four) Hours As Needed for Wheezing or Shortness of Air., Disp: 54 g, Rfl: 3    apixaban (ELIQUIS) 5 MG tablet tablet, Take 1 tablet by mouth 2 (Two) Times a Day., Disp: , Rfl:     Fluticasone-Umeclidin-Vilant (TRELEGY) 100-62.5-25 MCG/ACT inhaler, Inhale 1 puff Daily., Disp: 60 each, Rfl: 11    folic acid (FOLVITE) 1 MG tablet, Take 1 tablet by mouth Daily., Disp: 90 tablet, Rfl: 3    furosemide (LASIX) 20 MG tablet, Take 1 tablet by mouth 2 (Two) Times a Day., Disp: 180 tablet, Rfl: 3    midodrine (PROAMATINE) 10 MG tablet, Take 1 tablet by mouth 3 (Three) Times a Day Before Meals., Disp: 270 tablet, Rfl: 1    ondansetron ODT (ZOFRAN-ODT) 4 MG disintegrating tablet, Place 1 tablet on the tongue Every 8 (Eight) Hours As Needed for Nausea or Vomiting., Disp: 15 tablet, Rfl: 0    pantoprazole (PROTONIX) 40 MG EC tablet, Take 1 tablet by mouth Daily., Disp: 90 tablet, Rfl: 1    potassium chloride (KLOR-CON M20) 20 MEQ CR tablet, , Disp: , Rfl:     spironolactone (ALDACTONE) 25 MG tablet, Take 1 tablet by mouth 2 (Two) Times a Day., Disp: , Rfl:     ursodiol (ACTIGALL) 300 MG capsule, Take 1 capsule by mouth 2 (Two) Times a Day. (Patient taking differently: Take 1 capsule by mouth 2 (Two) Times a Day. 3 times a day), Disp: 180 capsule, Rfl: 2    vitamin D (ERGOCALCIFEROL) 1.25 MG (91001 UT) capsule capsule, Take 1 capsule by mouth 1 (One) Time Per Week., Disp: 12 capsule, Rfl: 1     Objective   Physical Exam  Constitutional:       General: He is not in acute distress.     Appearance: Normal appearance. He is normal weight.  "  HENT:      Right Ear: Hearing normal.      Left Ear: Hearing normal.      Nose: No nasal tenderness or congestion.      Mouth/Throat:      Mouth: Mucous membranes are moist. No oral lesions.   Eyes:      Extraocular Movements: Extraocular movements intact.      Pupils: Pupils are equal, round, and reactive to light.   Cardiovascular:      Rate and Rhythm: Normal rate and regular rhythm.      Pulses: Normal pulses.      Heart sounds: Normal heart sounds. No murmur heard.  Pulmonary:      Effort: Pulmonary effort is normal.      Breath sounds: Examination of the right-lower field reveals decreased breath sounds. Decreased breath sounds present. No wheezing, rhonchi or rales.   Musculoskeletal:      Right lower leg: No edema.      Left lower leg: No edema.   Skin:     General: Skin is warm and dry.      Findings: No lesion or rash.   Neurological:      General: No focal deficit present.      Mental Status: He is alert and oriented to person, place, and time.   Psychiatric:         Mood and Affect: Affect normal. Mood is not anxious or depressed.         Vital Signs:   /80 (BP Location: Right arm, Patient Position: Sitting, Cuff Size: Adult)   Pulse 101   Temp 97.6 °F (36.4 °C) (Oral)   Resp 16   Ht 172.7 cm (68\")   Wt 65 kg (143 lb 3.2 oz)   SpO2 100% Comment: RA  BMI 21.77 kg/m²        Result Review :   The following data was reviewed by: ANA LAURA Aldridge on 06/27/2025:  CMP          6/3/2025    02:19 6/10/2025    10:09 6/16/2025    15:22   CMP   Glucose 111  98  59    BUN 30.8  17.0  13.0    Creatinine 1.71  1.23  1.34    EGFR 52.2  77.5  70.0    Sodium 130  131  135    Potassium 4.4  5.3  4.4    Chloride 100  96  100    Calcium 7.7  8.0  8.2    Total Protein 4.7  5.4     Albumin 2.2  2.7     Globulin 2.5  2.7     Total Bilirubin 2.1  3.7     Alkaline Phosphatase 156  159     AST (SGOT) 35  28     ALT (SGPT) 21  18     Albumin/Globulin Ratio 0.9  1.0     BUN/Creatinine Ratio 18.0  13.8  9.7  "   Anion Gap 9.9  7.0  9.0      CBC w/diff          6/2/2025    01:18 6/3/2025    02:19 6/10/2025    10:09   CBC w/Diff   WBC 15.77  18.45  18.79    RBC 2.67  2.75  3.12    Hemoglobin 7.7  8.0  9.3    Hematocrit 24.0  24.6  28.8    MCV 89.9  89.5  92.3    MCH 28.8  29.1  29.8    MCHC 32.1  32.5  32.3    RDW 21.0  21.4  18.6    Platelets 106  113  61    Neutrophil Rel % 84.3   86.2    Immature Granulocyte Rel % 2.3   2.1    Lymphocyte Rel % 7.7   5.7    Monocyte Rel % 4.6   3.6    Eosinophil Rel % 0.8   2.2    Basophil Rel % 0.3   0.2      Data reviewed : Radiologic studies CT abdomen /pelvis 3/24/2025,CTA chest 61/2025, chest xray 6/1/2025, liver US 6/3/2025 , Cardiology studies echocardiogram 6/2/2025, Consultant notes Dr. Barrientos consult note 6/1/2025, Recent hospitalization notes Dr. Barrientos thoracentesis note 6/1/2025, hospital discharge summary 6/3/2025, and Dr. Montero's last office note   Procedures        Assessment and Plan    Diagnoses and all orders for this visit:    1. Moderate persistent asthma, unspecified whether complicated (Primary)  Comments:  continue Trelegy    2. Recurrent pleural effusion on right  -     Cancel: XR Chest 2 View; Future  -     XR Chest 2 View; Future    3. Nicotine dependence, cigarettes, uncomplicated  Comments:  smoking cessation education    4. Dyspnea on exertion  Comments:  albuterol as needed  Orders:  -     Cancel: XR Chest 2 View; Future  -     XR Chest 2 View; Future    5. Multiple subsegmental pulmonary emboli without acute cor pulmonale  Comments:  continue Eliquis    6. Frequent headaches  -     CT Head Without Contrast; Future    7. Lightheadedness  -     CT Head Without Contrast; Future    8. Encounter for smoking cessation counseling      Smoking cessation counseling provided.  I spent 5 minutes today counseling patient on the risks of smoking, including throat cancer, lung cancer, COPD, heart disease and death.  Also discussed the benefits of quitting.  Patient will  be considered a former smoker after 3 months smoking cessation.   Assessment & Plan  1. Shortness of breath.  Reports having trouble breathing for the past couple of days. Currently on Eliquis 5 mg twice a day for blood clots and is also taking spironolactone and Lasix. A chest x-ray will be ordered to evaluate his current respiratory status. Advised to continue using the Trelegy inhaler and to reduce smoking to prevent further deterioration of respiratory condition. If the chest x-ray shows any abnormalities, further treatment will be considered.    2. Headaches.  Experiencing daily headaches for about a week, along with lightheadedness. Headaches are not expected to be caused by the blood thinner but could be related to diuretic medications causing low blood pressure. A CT scan of the head will be ordered to investigate the cause of headaches and dizziness. If the CT scan shows any abnormalities, appropriate treatment will be initiated.    Follow-up  Follow up in 3 months.      I spent 47 minutes caring for Wai on this date of service. This time includes time spent by me in the following activities:preparing for the visit, reviewing tests, obtaining and/or reviewing a separately obtained history, performing a medically appropriate examination and/or evaluation , counseling and educating the patient/family/caregiver, ordering medications, tests, or procedures, and documenting information in the medical record    Follow Up   Return in about 3 months (around 9/27/2025) for Recheck.  Patient was given instructions and counseling regarding his condition or for health maintenance advice. Please see specific information pulled into the AVS if appropriate.

## 2025-06-29 LAB
MYCOBACTERIUM SPEC CULT: NORMAL
NIGHT BLUE STAIN TISS: NORMAL

## 2025-07-06 LAB
MYCOBACTERIUM SPEC CULT: NORMAL
NIGHT BLUE STAIN TISS: NORMAL

## 2025-07-17 ENCOUNTER — TRANSCRIBE ORDERS (OUTPATIENT)
Dept: LAB | Facility: HOSPITAL | Age: 40
End: 2025-07-17
Payer: COMMERCIAL

## 2025-07-17 DIAGNOSIS — D50.9 IRON DEFICIENCY ANEMIA, UNSPECIFIED IRON DEFICIENCY ANEMIA TYPE: Primary | ICD-10-CM

## 2025-07-17 DIAGNOSIS — N17.9 ACUTE RENAL FAILURE, UNSPECIFIED ACUTE RENAL FAILURE TYPE: ICD-10-CM

## 2025-07-18 ENCOUNTER — OFFICE VISIT (OUTPATIENT)
Dept: FAMILY MEDICINE CLINIC | Facility: CLINIC | Age: 40
End: 2025-07-18
Payer: COMMERCIAL

## 2025-07-18 VITALS
WEIGHT: 143 LBS | SYSTOLIC BLOOD PRESSURE: 102 MMHG | DIASTOLIC BLOOD PRESSURE: 68 MMHG | OXYGEN SATURATION: 100 % | HEIGHT: 68 IN | BODY MASS INDEX: 21.67 KG/M2 | TEMPERATURE: 97 F | HEART RATE: 92 BPM

## 2025-07-18 DIAGNOSIS — K70.31 ALCOHOLIC CIRRHOSIS OF LIVER WITH ASCITES: ICD-10-CM

## 2025-07-18 DIAGNOSIS — L29.9 ITCH: ICD-10-CM

## 2025-07-18 DIAGNOSIS — D50.9 IRON DEFICIENCY ANEMIA, UNSPECIFIED IRON DEFICIENCY ANEMIA TYPE: ICD-10-CM

## 2025-07-18 DIAGNOSIS — N17.9 ACUTE RENAL FAILURE, UNSPECIFIED ACUTE RENAL FAILURE TYPE: Primary | ICD-10-CM

## 2025-07-18 DIAGNOSIS — G44.52 NEW DAILY PERSISTENT HEADACHE: ICD-10-CM

## 2025-07-18 LAB
ALBUMIN SERPL-MCNC: 2.9 G/DL (ref 3.5–5.2)
ALBUMIN UR-MCNC: <1.2 MG/DL
ALBUMIN/GLOB SERPL: 0.9 G/DL
ALP SERPL-CCNC: 197 U/L (ref 39–117)
ALT SERPL W P-5'-P-CCNC: 24 U/L (ref 1–41)
ANION GAP SERPL CALCULATED.3IONS-SCNC: 12 MMOL/L (ref 5–15)
AST SERPL-CCNC: 53 U/L (ref 1–40)
BACTERIA UR QL AUTO: NORMAL /HPF
BASOPHILS # BLD AUTO: 0.02 10*3/MM3 (ref 0–0.2)
BASOPHILS NFR BLD AUTO: 0.5 % (ref 0–1.5)
BILIRUB SERPL-MCNC: 3.5 MG/DL (ref 0–1.2)
BILIRUB UR QL STRIP: NEGATIVE
BUN SERPL-MCNC: 18 MG/DL (ref 6–20)
BUN/CREAT SERPL: 15.1 (ref 7–25)
CALCIUM SPEC-SCNC: 8.4 MG/DL (ref 8.6–10.5)
CHLORIDE SERPL-SCNC: 99 MMOL/L (ref 98–107)
CLARITY UR: CLEAR
CO2 SERPL-SCNC: 24 MMOL/L (ref 22–29)
COLOR UR: ABNORMAL
CREAT SERPL-MCNC: 1.19 MG/DL (ref 0.76–1.27)
CREAT UR-MCNC: 230.2 MG/DL
CREAT UR-MCNC: 255.5 MG/DL
DEPRECATED RDW RBC AUTO: 57.6 FL (ref 37–54)
EGFRCR SERPLBLD CKD-EPI 2021: 80.7 ML/MIN/1.73
EOSINOPHIL # BLD AUTO: 0.31 10*3/MM3 (ref 0–0.4)
EOSINOPHIL NFR BLD AUTO: 7.7 % (ref 0.3–6.2)
ERYTHROCYTE [DISTWIDTH] IN BLOOD BY AUTOMATED COUNT: 17.7 % (ref 12.3–15.4)
GLOBULIN UR ELPH-MCNC: 3.2 GM/DL
GLUCOSE SERPL-MCNC: 86 MG/DL (ref 65–99)
GLUCOSE UR STRIP-MCNC: NEGATIVE MG/DL
HCT VFR BLD AUTO: 30.4 % (ref 37.5–51)
HGB BLD-MCNC: 10.5 G/DL (ref 13–17.7)
HGB UR QL STRIP.AUTO: NEGATIVE
HYALINE CASTS UR QL AUTO: NORMAL /LPF
IMM GRANULOCYTES # BLD AUTO: 0.03 10*3/MM3 (ref 0–0.05)
IMM GRANULOCYTES NFR BLD AUTO: 0.7 % (ref 0–0.5)
KETONES UR QL STRIP: NEGATIVE
LEUKOCYTE ESTERASE UR QL STRIP.AUTO: ABNORMAL
LYMPHOCYTES # BLD AUTO: 1.15 10*3/MM3 (ref 0.7–3.1)
LYMPHOCYTES NFR BLD AUTO: 28.5 % (ref 19.6–45.3)
MCH RBC QN AUTO: 31.2 PG (ref 26.6–33)
MCHC RBC AUTO-ENTMCNC: 34.5 G/DL (ref 31.5–35.7)
MCV RBC AUTO: 90.2 FL (ref 79–97)
MICROALBUMIN/CREAT UR: NORMAL MG/G{CREAT}
MONOCYTES # BLD AUTO: 0.45 10*3/MM3 (ref 0.1–0.9)
MONOCYTES NFR BLD AUTO: 11.2 % (ref 5–12)
NEUTROPHILS NFR BLD AUTO: 2.07 10*3/MM3 (ref 1.7–7)
NEUTROPHILS NFR BLD AUTO: 51.4 % (ref 42.7–76)
NITRITE UR QL STRIP: POSITIVE
NRBC BLD AUTO-RTO: 0 /100 WBC (ref 0–0.2)
PH UR STRIP.AUTO: 7.5 [PH] (ref 5–8)
PLATELET # BLD AUTO: 81 10*3/MM3 (ref 140–450)
PMV BLD AUTO: 10.2 FL (ref 6–12)
POTASSIUM SERPL-SCNC: 4 MMOL/L (ref 3.5–5.2)
PROT ?TM UR-MCNC: 17.1 MG/DL
PROT SERPL-MCNC: 6.1 G/DL (ref 6–8.5)
PROT UR QL STRIP: ABNORMAL
PROT/CREAT UR: 0.07 MG/G{CREAT}
RBC # BLD AUTO: 3.37 10*6/MM3 (ref 4.14–5.8)
RBC # UR STRIP: NORMAL /HPF
REF LAB TEST METHOD: NORMAL
SODIUM SERPL-SCNC: 135 MMOL/L (ref 136–145)
SP GR UR STRIP: 1.03 (ref 1–1.03)
SQUAMOUS #/AREA URNS HPF: NORMAL /HPF
UROBILINOGEN UR QL STRIP: ABNORMAL
WBC # UR STRIP: NORMAL /HPF
WBC NRBC COR # BLD AUTO: 4.03 10*3/MM3 (ref 3.4–10.8)

## 2025-07-18 PROCEDURE — 82043 UR ALBUMIN QUANTITATIVE: CPT | Performed by: INTERNAL MEDICINE

## 2025-07-18 PROCEDURE — 85025 COMPLETE CBC W/AUTO DIFF WBC: CPT | Performed by: INTERNAL MEDICINE

## 2025-07-18 PROCEDURE — 81001 URINALYSIS AUTO W/SCOPE: CPT | Performed by: INTERNAL MEDICINE

## 2025-07-18 PROCEDURE — 82570 ASSAY OF URINE CREATININE: CPT | Performed by: INTERNAL MEDICINE

## 2025-07-18 PROCEDURE — 84156 ASSAY OF PROTEIN URINE: CPT | Performed by: INTERNAL MEDICINE

## 2025-07-18 PROCEDURE — 80053 COMPREHEN METABOLIC PANEL: CPT | Performed by: INTERNAL MEDICINE

## 2025-07-18 RX ORDER — ERGOCALCIFEROL 1.25 MG/1
50000 CAPSULE, LIQUID FILLED ORAL WEEKLY
Qty: 4 CAPSULE | Refills: 12 | Status: SHIPPED | OUTPATIENT
Start: 2025-07-18

## 2025-07-18 RX ORDER — FOLIC ACID 1 MG/1
1 TABLET ORAL DAILY
Qty: 30 TABLET | Refills: 12 | Status: SHIPPED | OUTPATIENT
Start: 2025-07-18 | End: 2026-07-18

## 2025-07-18 NOTE — ASSESSMENT & PLAN NOTE
He is itching could be related to some bile salts but he is already on medication for that.  It may just be some dryness.  Will have him do some Benadryl orally as needed for itching but be cautious some sedation.  Will also have him do some good dry skin care with short tepid showers and adequate emollients.

## 2025-07-18 NOTE — ASSESSMENT & PLAN NOTE
This is disease at this time seems to be otherwise stable.  He will continue to follow-up with gastroenterology.

## 2025-07-18 NOTE — ASSESSMENT & PLAN NOTE
Nephrology did order a CT scan of his head but that is not for another month.  We offered him to do it at an alternative location but at this time wants to keep it where it is locally.

## 2025-07-18 NOTE — PROGRESS NOTES
Chief Complaint   Patient presents with    Follow-up     6 mo f/u  Acute renal failure, unspecified acute renal failure type    Headache    Itching     Patient stated he has been itching all over         Subjective     Wai Travis Gay  has a past medical history of NICKI (acute kidney injury), Alcohol abuse (03/14/2017), Anxiety, Ascites, Asthma, Last esophagus, Essential hypertension (12/27/2019), GERD (gastroesophageal reflux disease), History of transfusion, Hypokalemia (03/14/2017), Hyponatremia (03/14/2017), Low back pain, Pleural effusion, Primary biliary cholangitis, Splenomegaly, Steatohepatitis, alcoholic (03/14/2017), Tobacco abuse (03/14/2017), and Umbilical hernia.    Itching-he has had some itching over his body.  It varies from place to place.  He has not really had any associated rash.  He has noticed that for about the last 2 weeks.  He has not placed anything on it or taken anything for it.    Headache-he states he has had a headache now for at least a month.  It is in the frontal region above his eyes.  He describes it more as a sharp headache.  He does not have any associated head congestion postnasal drainage or changes in his vision.  His nephrologist has referred him to neurology.        PHQ-2 Depression Screening  Little interest or pleasure in doing things?     Feeling down, depressed, or hopeless?     PHQ-2 Total Score     PHQ-9 Depression Screening  Little interest or pleasure in doing things?     Feeling down, depressed, or hopeless?     Trouble falling or staying asleep, or sleeping too much?     Feeling tired or having little energy?     Poor appetite or overeating?     Feeling bad about yourself - or that you are a failure or have let yourself or your family down?     Trouble concentrating on things, such as reading the newspaper or watching television?     Moving or speaking so slowly that other people could have noticed? Or the opposite - being so fidgety or restless that you  have been moving around a lot more than usual?     Thoughts that you would be better off dead, or of hurting yourself in some way?     PHQ-9 Total Score     If you checked off any problems, how difficult have these problems made it for you to do your work, take care of things at home, or get along with other people?       No Known Allergies    Prior to Admission medications    Medication Sig Start Date End Date Taking? Authorizing Provider   albuterol sulfate  (90 Base) MCG/ACT inhaler Inhale 2 puffs Every 4 (Four) Hours As Needed for Wheezing or Shortness of Air. 3/31/25  Yes Callum Peterson, DO   apixaban (ELIQUIS) 5 MG tablet tablet Take 1 tablet by mouth 2 (Two) Times a Day.   Yes ProviderRose MD   Fluticasone-Umeclidin-Vilant (TRELEGY) 100-62.5-25 MCG/ACT inhaler Inhale 1 puff Daily. 3/31/25  Yes Callum Peterson DO   folic acid (FOLVITE) 1 MG tablet Take 1 tablet by mouth Daily. 3/31/25 3/31/26 Yes Callum Peterson DO   furosemide (LASIX) 20 MG tablet Take 1 tablet by mouth 2 (Two) Times a Day. 3/31/25  Yes Callum Peterson DO   midodrine (PROAMATINE) 10 MG tablet Take 1 tablet by mouth 3 (Three) Times a Day Before Meals. 3/31/25  Yes Callum Peterson DO   ondansetron ODT (ZOFRAN-ODT) 4 MG disintegrating tablet Place 1 tablet on the tongue Every 8 (Eight) Hours As Needed for Nausea or Vomiting. 3/24/25  Yes Sofie Flor APRN   pantoprazole (PROTONIX) 40 MG EC tablet Take 1 tablet by mouth Daily. 3/31/25  Yes Callum Peterson DO   potassium chloride (KLOR-CON M20) 20 MEQ CR tablet  3/27/25  Yes Rose Londono MD   spironolactone (ALDACTONE) 25 MG tablet Take 1 tablet by mouth 2 (Two) Times a Day.   Yes Rose Londono MD   ursodiol (ACTIGALL) 300 MG capsule Take 1 capsule by mouth 2 (Two) Times a Day.  Patient taking differently: Take 1 capsule by mouth 2 (Two) Times a Day. 3 times a day 3/31/25  Yes Callum Peterson, DO    vitamin D (ERGOCALCIFEROL) 1.25 MG (41828 UT) capsule capsule Take 1 capsule by mouth 1 (One) Time Per Week. 3/31/25  Yes Callum Peterson DO        Patient Active Problem List   Diagnosis    Acute liver failure without hepatic coma    Acute hepatitis    Hypokalemia    Hyponatremia    Steatohepatitis, alcoholic    History of alcohol abuse    Tobacco abuse    Generalized abdominal pain    Need for influenza vaccination    Essential hypertension    Need for hepatitis A and B vaccination    Need for vaccination against Streptococcus pneumoniae    Need for meningococcal vaccination    Need for shingles vaccine    Huffman's esophagus without dysplasia    Umbilical hernia without obstruction and without gangrene    Acute hypoxic respiratory failure    Personal history of PE (pulmonary embolism)    Dyspnea    SBP (spontaneous bacterial peritonitis)    Peritonitis    Hospital discharge follow-up    ERRONEOUS ENCOUNTER--DISREGARD    Sepsis, due to unspecified organism, unspecified whether acute organ dysfunction present    Cirrhosis of liver with ascites    Iron deficiency anemia    Acute renal failure    Hepatic encephalopathy    Moderate persistent asthma    Annual physical exam    Anxiety    Anemia of chronic renal failure, stage 2 (mild)    Iron deficiency    Iron malabsorption    Itch    New daily persistent headache        Past Surgical History:   Procedure Laterality Date    CHOLECYSTECTOMY      ENDOSCOPY N/A 02/09/2022    Procedure: ESOPHAGOGASTRODUODENOSCOPY WITH BX;  Surgeon: Gino Khan MD;  Location: [facility] ENDOSCOPY;  Service: Gastroenterology;  Laterality: N/A;  RETAINED LIQUID FOOD IN STOMACH, HUFFMAN'S ESOPHAGUS    ENDOSCOPY N/A 02/10/2023    Procedure: ESOPHAGOGASTRODUODENOSCOPY;  Surgeon: Gino Khan MD;  Location: [facility] ENDOSCOPY;  Service: Gastroenterology;  Laterality: N/A;  GASTRITIS, BARRETTS ESOPHAGUS, PHG    ENDOSCOPY N/A 02/05/2025    Procedure:  ESOPHAGOGASTRODUODENOSCOPY WITH BIOPSIES;  Surgeon: Gino Khan MD;  Location: [facility] ENDOSCOPY;  Service: Gastroenterology;  Laterality: N/A;  ESOPHAGEAL VARICES, PORTAL HYPERTENSIVE GASTROPATHY    PARACENTESIS      2/20/25    THORACENTESIS      LAST ONE DONE 12/13/24 WITH DR SCHAEFER    TIPS PROCEDURE Right     11/11/2024    UPPER GASTROINTESTINAL ENDOSCOPY         Social History     Socioeconomic History    Marital status: Single   Tobacco Use    Smoking status: Every Day     Current packs/day: 0.50     Average packs/day: 0.5 packs/day for 17.5 years (8.8 ttl pk-yrs)     Types: Cigarettes     Start date: 1/1/2008     Passive exposure: Current    Smokeless tobacco: Never   Vaping Use    Vaping status: Never Used   Substance and Sexual Activity    Alcohol use: Not Currently     Alcohol/week: 3.0 standard drinks of alcohol     Comment: NO ALCOHOL IN OVER 5 YEARS    Drug use: Never    Sexual activity: Defer       Family History   Problem Relation Age of Onset    Alcohol abuse Mother     Arthritis Mother     COPD Mother     Heart disease Maternal Grandmother     Hypertension Maternal Grandmother     Lung cancer Maternal Grandmother     Stroke Maternal Grandmother     Heart disease Maternal Grandfather     Lung cancer Maternal Grandfather     Cancer Paternal Grandmother     Cancer Paternal Grandfather     Colon cancer Neg Hx     Malig Hyperthermia Neg Hx        Family history, surgical history, past medical history, Allergies and meds reviewed with patient today and updated in [facility] EMR.     ROS:  Review of Systems   Constitutional:  Negative for fatigue.   Gastrointestinal:  Positive for abdominal distention and abdominal pain. Negative for blood in stool, constipation and diarrhea.   Skin:         (+) itchy   Neurological:  Positive for headache.       OBJECTIVE:  Vitals:    07/18/25 0949   BP: 102/68   BP Location: Left arm   Patient Position: Sitting   Cuff Size: Adult   Pulse: 92   Temp: 97 °F (36.1 °C)  "  TempSrc: Temporal   SpO2: 100%   Weight: 64.9 kg (143 lb)   Height: 172.7 cm (68\")     No results found.   Body mass index is 21.74 kg/m².  No LMP for male patient.    The ASCVD Risk score (Vini DK, et al., 2019) failed to calculate for the following reasons:    The 2019 ASCVD risk score is only valid for ages 40 to 79     Physical Exam    Procedures    No visits with results within 30 Day(s) from this visit.   Latest known visit with results is:   Lab on 06/16/2025   Component Date Value Ref Range Status    Glucose 06/16/2025 59 (L)  65 - 99 mg/dL Final    BUN 06/16/2025 13.0  6.0 - 20.0 mg/dL Final    Creatinine 06/16/2025 1.34 (H)  0.76 - 1.27 mg/dL Final    Sodium 06/16/2025 135 (L)  136 - 145 mmol/L Final    Potassium 06/16/2025 4.4  3.5 - 5.2 mmol/L Final    Chloride 06/16/2025 100  98 - 107 mmol/L Final    CO2 06/16/2025 26.0  22.0 - 29.0 mmol/L Final    Calcium 06/16/2025 8.2 (L)  8.6 - 10.5 mg/dL Final    BUN/Creatinine Ratio 06/16/2025 9.7  7.0 - 25.0 Final    Anion Gap 06/16/2025 9.0  5.0 - 15.0 mmol/L Final    eGFR 06/16/2025 70.0  >60.0 mL/min/1.73 Final       ASSESSMENT/ PLAN:    Diagnoses and all orders for this visit:    1. Acute renal failure, unspecified acute renal failure type (Primary)  Assessment & Plan:  He has recently been to nephrology.  His renal disease is most likely related to his liver disease.      2. Alcoholic cirrhosis of liver with ascites  Assessment & Plan:  This is disease at this time seems to be otherwise stable.  He will continue to follow-up with gastroenterology.      3. Itch  Assessment & Plan:  He is itching could be related to some bile salts but he is already on medication for that.  It may just be some dryness.  Will have him do some Benadryl orally as needed for itching but be cautious some sedation.  Will also have him do some good dry skin care with short tepid showers and adequate emollients.      4. New daily persistent headache  Assessment & Plan:  Nephrology " did order a CT scan of his head but that is not for another month.  We offered him to do it at an alternative location but at this time wants to keep it where it is locally.              Other orders  -     vitamin D (ERGOCALCIFEROL) 1.25 MG (08582 UT) capsule capsule; Take 1 capsule by mouth 1 (One) Time Per Week.  Dispense: 4 capsule; Refill: 12  -     folic acid (FOLVITE) 1 MG tablet; Take 1 tablet by mouth Daily.  Dispense: 30 tablet; Refill: 12        BMI is within normal parameters. No other follow-up for BMI required.      Orders Placed Today:     New Medications Ordered This Visit   Medications    vitamin D (ERGOCALCIFEROL) 1.25 MG (54205 UT) capsule capsule     Sig: Take 1 capsule by mouth 1 (One) Time Per Week.     Dispense:  4 capsule     Refill:  12    folic acid (FOLVITE) 1 MG tablet     Sig: Take 1 tablet by mouth Daily.     Dispense:  30 tablet     Refill:  12        Management Plan:     An After Visit Summary was printed and given to the patient at discharge.    Follow-up: Return for Recheck, Next scheduled follow up.    Callum Peterson DO 7/18/2025 10:24 EDT  This note was electronically signed.

## 2025-07-18 NOTE — ASSESSMENT & PLAN NOTE
He has recently been to nephrology.  His renal disease is most likely related to his liver disease.

## 2025-07-21 RX ORDER — ESCITALOPRAM OXALATE 5 MG/1
5 TABLET ORAL DAILY
Qty: 90 TABLET | Refills: 0 | OUTPATIENT
Start: 2025-07-21

## 2025-07-24 RX ORDER — ESCITALOPRAM OXALATE 5 MG/1
5 TABLET ORAL DAILY
Qty: 30 TABLET | Refills: 5 | Status: SHIPPED | OUTPATIENT
Start: 2025-07-24

## 2025-07-24 NOTE — TELEPHONE ENCOUNTER
Patient states he is taking it and does need a refill. Patient is requesting 30 days instead of 90 days because its free when its 30 days

## 2025-08-11 DIAGNOSIS — J90 PLEURAL EFFUSION: Primary | ICD-10-CM

## 2025-08-12 ENCOUNTER — TELEPHONE (OUTPATIENT)
Dept: PULMONOLOGY | Facility: CLINIC | Age: 40
End: 2025-08-12
Payer: COMMERCIAL

## 2025-08-13 ENCOUNTER — HOSPITAL ENCOUNTER (OUTPATIENT)
Dept: GENERAL RADIOLOGY | Facility: HOSPITAL | Age: 40
Discharge: HOME | End: 2025-08-13
Admitting: STUDENT IN AN ORGANIZED HEALTH CARE EDUCATION/TRAINING PROGRAM
Payer: COMMERCIAL

## 2025-08-13 DIAGNOSIS — J90 RECURRENT PLEURAL EFFUSION ON RIGHT: Primary | ICD-10-CM

## 2025-08-13 DIAGNOSIS — J90 PLEURAL EFFUSION: ICD-10-CM

## 2025-08-13 PROCEDURE — 71046 X-RAY EXAM CHEST 2 VIEWS: CPT

## 2025-08-14 ENCOUNTER — HOSPITAL ENCOUNTER (OUTPATIENT)
Dept: GENERAL RADIOLOGY | Facility: HOSPITAL | Age: 40
Discharge: HOME | End: 2025-08-14
Payer: COMMERCIAL

## 2025-08-14 ENCOUNTER — HOSPITAL ENCOUNTER (OUTPATIENT)
Dept: INFUSION THERAPY | Facility: HOSPITAL | Age: 40
Discharge: HOME | End: 2025-08-14
Payer: COMMERCIAL

## 2025-08-14 VITALS
BODY MASS INDEX: 25.64 KG/M2 | WEIGHT: 168.65 LBS | OXYGEN SATURATION: 98 % | SYSTOLIC BLOOD PRESSURE: 109 MMHG | DIASTOLIC BLOOD PRESSURE: 72 MMHG | HEART RATE: 92 BPM | TEMPERATURE: 97.4 F

## 2025-08-14 DIAGNOSIS — J90 RECURRENT PLEURAL EFFUSION ON RIGHT: ICD-10-CM

## 2025-08-14 PROCEDURE — 71045 X-RAY EXAM CHEST 1 VIEW: CPT

## 2025-08-14 PROCEDURE — 32555 ASPIRATE PLEURA W/ IMAGING: CPT

## 2025-08-19 ENCOUNTER — APPOINTMENT (OUTPATIENT)
Facility: HOSPITAL | Age: 40
End: 2025-08-19
Payer: COMMERCIAL

## 2025-08-19 ENCOUNTER — APPOINTMENT (OUTPATIENT)
Dept: CT IMAGING | Facility: HOSPITAL | Age: 40
End: 2025-08-19
Payer: COMMERCIAL

## 2025-08-19 ENCOUNTER — HOSPITAL ENCOUNTER (INPATIENT)
Facility: HOSPITAL | Age: 40
LOS: 1 days | Discharge: OTHER INSTITUTION | End: 2025-08-21
Attending: EMERGENCY MEDICINE | Admitting: INTERNAL MEDICINE
Payer: COMMERCIAL

## 2025-08-19 PROBLEM — N17.9 AKI (ACUTE KIDNEY INJURY): Status: ACTIVE | Noted: 2025-08-19

## 2025-08-20 ENCOUNTER — APPOINTMENT (OUTPATIENT)
Dept: GENERAL RADIOLOGY | Facility: HOSPITAL | Age: 40
End: 2025-08-20
Payer: COMMERCIAL

## (undated) DEVICE — LINER SURG CANSTR SXN S/RIGD 1500CC

## (undated) DEVICE — SINGLE-USE BIOPSY FORCEPS: Brand: RADIAL JAW 4

## (undated) DEVICE — SOL IRRG H2O PL/BG 1000ML STRL

## (undated) DEVICE — GLV SURG SENSICARE PI ORTHO SZ8 LF STRL

## (undated) DEVICE — ANTIBACTERIAL UNDYED BRAIDED (POLYGLACTIN 910), SYNTHETIC ABSORBABLE SUTURE: Brand: COATED VICRYL

## (undated) DEVICE — SUT VIC PLS CTD BR 0 TIE 18IN VIL

## (undated) DEVICE — EGD OR ERCP KIT: Brand: MEDLINE INDUSTRIES, INC.

## (undated) DEVICE — Device: Brand: DEFENDO AIR/WATER/SUCTION AND BIOPSY VALVE

## (undated) DEVICE — SPNG GZ WOVN 8PLY 4X4IN LF STRL PK/2

## (undated) DEVICE — INTENDED FOR TISSUE SEPARATION, AND OTHER PROCEDURES THAT REQUIRE A SHARP SURGICAL BLADE TO PUNCTURE OR CUT.: Brand: BARD-PARKER ® CARBON RIB-BACK BLADES

## (undated) DEVICE — SUT ETHIB 0 MO7 18IN CX41D

## (undated) DEVICE — MAJOR-LF: Brand: MEDLINE INDUSTRIES, INC.

## (undated) DEVICE — BLCK/BITE BLOX WO/DENTL/RIM W/STRAP 54F

## (undated) DEVICE — ANTIBACTERIAL VIOLET BRAIDED (POLYGLACTIN 910), SYNTHETIC ABSORBABLE SUTURE: Brand: COATED VICRYL

## (undated) DEVICE — DRSNG SURESITE WNDW 4X4.5

## (undated) DEVICE — SLV SCD KN/LEN ADJ EXPRSS BLENDED MD 1P/U

## (undated) DEVICE — Device

## (undated) DEVICE — STERILE POLYISOPRENE POWDER-FREE SURGICAL GLOVES WITH EMOLLIENT COATING: Brand: PROTEXIS

## (undated) DEVICE — STRIP CLS WND SUTURESTRIP/PLS 0.5X4IN TP1103

## (undated) DEVICE — PENCL SMOKE/EVAC MEGADYNE TELESCP 10FT

## (undated) DEVICE — CONN JET HYDRA H20 AUXILIARY DISP

## (undated) DEVICE — SOLIDIFIER LIQLOC PLS 1500CC BT

## (undated) DEVICE — SOL IRR NACL 0.9PCT BO 1000ML

## (undated) DEVICE — GOWN,SIRUS,POLYRNF,BRTHSLV,2XL,18/CS: Brand: MEDLINE

## (undated) DEVICE — THE STERILE LIGHT HANDLE COVER IS USED WITH STERIS SURGICAL LIGHTING AND VISUALIZATION SYSTEMS.

## (undated) DEVICE — SYR LL TP 10ML STRL